# Patient Record
Sex: FEMALE | Race: WHITE | NOT HISPANIC OR LATINO | Employment: OTHER | URBAN - METROPOLITAN AREA
[De-identification: names, ages, dates, MRNs, and addresses within clinical notes are randomized per-mention and may not be internally consistent; named-entity substitution may affect disease eponyms.]

---

## 2017-02-07 DIAGNOSIS — I71.20 THORACIC AORTIC ANEURYSM WITHOUT RUPTURE (HCC): Primary | ICD-10-CM

## 2017-02-15 ENCOUNTER — HOSPITAL ENCOUNTER (OUTPATIENT)
Dept: CT IMAGING | Facility: HOSPITAL | Age: 72
Discharge: HOME | End: 2017-02-15
Admitting: PHYSICIAN ASSISTANT

## 2017-02-15 DIAGNOSIS — I71.20 THORACIC AORTIC ANEURYSM WITHOUT RUPTURE (HCC): ICD-10-CM

## 2017-02-15 PROCEDURE — 0 IOPAMIDOL PER 1 ML: Performed by: PHYSICIAN ASSISTANT

## 2017-02-15 PROCEDURE — 82565 ASSAY OF CREATININE: CPT

## 2017-02-15 PROCEDURE — 71275 CT ANGIOGRAPHY CHEST: CPT

## 2017-02-15 RX ADMIN — IOPAMIDOL 85 ML: 755 INJECTION, SOLUTION INTRAVENOUS at 13:14

## 2017-02-16 LAB — CREAT BLDA-MCNC: 0.8 MG/DL (ref 0.6–1.3)

## 2017-04-13 ENCOUNTER — OFFICE VISIT (OUTPATIENT)
Dept: CARDIAC SURGERY | Facility: CLINIC | Age: 72
End: 2017-04-13

## 2017-04-13 VITALS
HEIGHT: 65 IN | WEIGHT: 188 LBS | HEART RATE: 76 BPM | DIASTOLIC BLOOD PRESSURE: 86 MMHG | BODY MASS INDEX: 31.32 KG/M2 | SYSTOLIC BLOOD PRESSURE: 126 MMHG

## 2017-04-13 DIAGNOSIS — I71.20 THORACIC AORTIC ANEURYSM WITHOUT RUPTURE (HCC): Primary | ICD-10-CM

## 2017-04-13 PROCEDURE — 99214 OFFICE O/P EST MOD 30 MIN: CPT | Performed by: THORACIC SURGERY (CARDIOTHORACIC VASCULAR SURGERY)

## 2017-04-13 RX ORDER — MELOXICAM 7.5 MG/1
7.5 TABLET ORAL DAILY
Refills: 0 | COMMUNITY
Start: 2017-03-15 | End: 2019-04-30 | Stop reason: SDUPTHER

## 2017-04-13 NOTE — PROGRESS NOTES
04/13/2017  Patient Information  Lucie Michele                                                                                          [address]  [place] [address]   1945  'PCP/Referring Physician'  Giovanni Lee MD  [phone]  No ref. provider found    Chief Complaint   Patient presents with   • Follow-up     1 year follow up with CTA chest.   • Thoracic Aneurysm       History of Present Illness:   Patient is a fairly well known to me.  I have in years past performed an extensive Bentall procedure with aortic root reconstruction and repair of an ascending aortic aneurysm.  She had done well following that but developed, from where she had significantly elevated INR and supratherapeutic Coumadin levels, a small perigraft hematoma which has been completely stable for a number of years.  We have followed her with a repeat CT scan this year and she is here to discuss results of this.  She has no anginal symptoms.  She does get somewhat short of breath walking on an incline but has no other issues and has not been hospitalized in the year since I have seen her.      Patient Active Problem List   Diagnosis   • Meningioma   • Trigeminal neuralgia   • Depression   • Hypertonicity of bladder   • Hyperlipidemia   • Hypertension   • Osteoarthritis   • Osteoporosis   • Vascular disorder   • Sleep apnea   • Disorder of thyroid   • Thoracic aortic aneurysm without rupture     Past Medical History:   Diagnosis Date   • Aneurysm    • Arthritis    • Depression    • Disease of thyroid gland    • Hyperlipidemia    • Meningioma    • Osteoporosis    • Pseudoaneurysm    • Sleep apnea    • Trigeminal neuralgia      Past Surgical History:   Procedure Laterality Date   • ABDOMINAL SURGERY      tumor removed from ovary   • AORTIC VALVE REPAIR/REPLACEMENT     • ARTERIAL ANEURYSM REPAIR     • HYSTERECTOMY     • THYROID SURGERY     • TONSILLECTOMY AND ADENOIDECTOMY         Current Outpatient Prescriptions:   •  Ascorbic  Acid (VITAMIN C) 500 MG capsule, Take 1 capsule by mouth daily., Disp: , Rfl:   •  aspirin 81 MG EC tablet, Take 1 tablet by mouth daily., Disp: , Rfl:   •  atorvastatin (LIPITOR) 20 MG tablet, Take 1 tablet by mouth daily., Disp: , Rfl:   •  B Complex Vitamins (VITAMIN B COMPLEX) tablet, Take 1 tablet by mouth daily., Disp: , Rfl:   •  carbonyl iron (FEOSOL) 45 MG tablet tablet, Take 1 tablet by mouth daily., Disp: , Rfl:   •  cetirizine (ZyrTEC) 10 MG tablet, Take 1 tablet by mouth daily., Disp: , Rfl:   •  Cinnamon 500 MG capsule, Take 1 capsule by mouth daily., Disp: , Rfl:   •  Coenzyme Q10 (CO Q 10) 10 MG capsule, Take 1 capsule by mouth daily., Disp: , Rfl:   •  Digestive Enzymes (FIBERZYME CONCENTRATE-HP PO), Take 1 tablet by mouth daily., Disp: , Rfl:   •  furosemide (LASIX) 40 MG tablet, Take 1 tablet by mouth daily., Disp: , Rfl:   •  Glucosamine-Chondroit-Vit C-Mn (GLUCOSAMINE-CHONDROITIN) tablet, Take 1 tablet by mouth daily., Disp: , Rfl:   •  levothyroxine (SYNTHROID, LEVOTHROID) 112 MCG tablet, Take 1 tablet by mouth daily., Disp: , Rfl:   •  Lysine 500 MG capsule, Take 1 tablet by mouth daily., Disp: , Rfl:   •  meloxicam (MOBIC) 7.5 MG tablet, Take 7.5 mg by mouth Daily., Disp: , Rfl: 0  •  metoprolol succinate XL (TOPROL-XL) 50 MG 24 hr tablet, Take 1.5 tablets by mouth daily. 1 TABLET AM .5 IN THE PM, Disp: , Rfl:   •  Multiple Vitamins-Minerals (MULTIVITAMIN ADULT PO), Take 1 tablet by mouth daily., Disp: , Rfl:   •  omeprazole (PriLOSEC) 20 MG capsule, Take 1 capsule by mouth daily., Disp: , Rfl:   •  OXcarbazepine (TRILEPTAL) 150 MG tablet, TAKE 1 TABLET BY MOUTH THREE TIMES DAILY, Disp: 90 tablet, Rfl: 0  •  oxybutynin (DITROPAN) 5 MG tablet, Take 0.5 tablets by mouth 2 (two) times a day., Disp: , Rfl:   •  potassium chloride (K-DUR,KLOR-CON) 10 MEQ CR tablet, Take 1 tablet by mouth daily., Disp: , Rfl:   •  PROAIR  (90 BASE) MCG/ACT inhaler, INHALE 1 OR 2 PUFFS INTO THE LUNGS Q 4 TO 6  HOURS PRN, Disp: , Rfl: 1  •  SPIRIVA HANDIHALER 18 MCG per inhalation capsule, INHALE CONTENTS OF 1 C ONCE D, Disp: , Rfl: 5  •  Vitamin E 400 UNITS tablet, Take 1 tablet by mouth daily., Disp: , Rfl:   •  warfarin (COUMADIN) 2 MG tablet, Take 1 tablet by mouth daily. Monday,WEDNESDAY AND Friday ADD .5, Disp: , Rfl:   Allergies   Allergen Reactions   • Sulfa Antibiotics Hives     Social History     Social History   • Marital status:      Spouse name: N/A   • Number of children: N/A   • Years of education: N/A     Occupational History   • Not on file.     Social History Main Topics   • Smoking status: Former Smoker     Packs/day: 3.00     Years: 4.00     Types: Cigarettes     Quit date: 1968   • Smokeless tobacco: Never Used      Comment: up to 3 ppd   • Alcohol use Yes      Comment: 1 glass of wine a week   • Drug use: No   • Sexual activity: Not on file     Other Topics Concern   • Not on file     Social History Narrative   • No narrative on file     Family History   Problem Relation Age of Onset   • Breast cancer Mother    • COPD Mother    • Heart failure Mother    • Cancer Mother    • Arthritis Mother    • Kidney disease Mother    • Coronary artery disease Father    • Heart attack Father    • COPD Father      Review of Systems   Constitution: Positive for malaise/fatigue and weight loss. Negative for chills, fever and night sweats.   HENT: Negative.  Negative for headaches, hearing loss, odynophagia and sore throat.    Eyes: Negative.    Cardiovascular: Positive for dyspnea on exertion and leg swelling. Negative for chest pain, orthopnea and palpitations.   Respiratory: Positive for shortness of breath. Negative for cough and hemoptysis.    Endocrine: Negative.  Negative for cold intolerance, heat intolerance, polydipsia, polyphagia and polyuria.   Hematologic/Lymphatic: Bruises/bleeds easily.   Skin: Negative.  Negative for itching and rash.   Musculoskeletal: Positive for arthritis and joint pain.  "Negative for joint swelling and myalgias.   Gastrointestinal: Positive for diarrhea. Negative for abdominal pain, constipation, hematemesis, hematochezia, melena, nausea and vomiting.   Genitourinary: Positive for frequency. Negative for dysuria and hematuria.   Neurological: Positive for dizziness and light-headedness. Negative for focal weakness, numbness and seizures.   Psychiatric/Behavioral: Positive for depression. Negative for suicidal ideas.   Allergic/Immunologic: Negative.    All other systems reviewed and are negative.    Vitals:    04/13/17 0959   BP: 126/86   Pulse: 76   Weight: 188 lb (85.3 kg)   Height: 65\" (165.1 cm)      Physical Exam   CONSTITUTIONAL: Alert and conversant, Well dressed, Well nourished, No acute distress  EYES: Sclera clean, Anicteric, Pupils equal  ENT: No nasal deviation, Trachea midline  NECK: No neck masses, Supple  LUNGS: No wheezing, Cough, non-congested  HEART: No rubs, murmur of her prosthetic aortic valve ABDOMEN: Soft, non-distended, No masses, Non tender  to palpation  NEURO: No motor deficits, No sensory deficits, Cranial Nerves 2 through 12 grossly intact  PSYCHIATRIC: Oriented to person, place and time, No memory deficits, Mood appropriate  VASCULAR: No carotid bruits, Posterior tibial pulses palpable bilaterally, Femoral pulses palpable and symmetric    Labs/Imaging:   I reviewed the CT scan report and images.    Assessment/Plan:   The patient well known to me status post Bentall procedure.  A number of years ago she developed a small perigraft hematoma secondary to supratherapeutic Coumadin levels.  Now her Coumadin levels have been well-controlled there has been no further change.  The entire appearance of her CT scan has been unchanged now had a number of years.  At this point, we will maximize her surveillance interval and check her in 18 months rather than 1 year in order to minimize contrast and radiation exposure.  She is agreeable to that plan.      Patient " Active Problem List   Diagnosis   • Meningioma   • Trigeminal neuralgia   • Depression   • Hypertonicity of bladder   • Hyperlipidemia   • Hypertension   • Osteoarthritis   • Osteoporosis   • Vascular disorder   • Sleep apnea   • Disorder of thyroid   • Thoracic aortic aneurysm without rupture     Signed by: Korey Hollis M.D.    4/13/2017    CC:  MD Alexa Hernandez, , editing for Korey Hollis M.D.    I, Korey Hollis, have read and agree with the editing done by Vivian Crawford, .

## 2017-04-17 ENCOUNTER — OFFICE VISIT (OUTPATIENT)
Dept: NEUROLOGY | Facility: CLINIC | Age: 72
End: 2017-04-17

## 2017-04-17 VITALS
DIASTOLIC BLOOD PRESSURE: 84 MMHG | OXYGEN SATURATION: 98 % | HEART RATE: 75 BPM | WEIGHT: 190 LBS | HEIGHT: 65 IN | SYSTOLIC BLOOD PRESSURE: 128 MMHG | BODY MASS INDEX: 31.65 KG/M2

## 2017-04-17 DIAGNOSIS — G50.0 TRIGEMINAL NEURALGIA: Primary | ICD-10-CM

## 2017-04-17 PROCEDURE — 99212 OFFICE O/P EST SF 10 MIN: CPT | Performed by: NURSE PRACTITIONER

## 2017-04-17 RX ORDER — OXCARBAZEPINE 150 MG/1
150 TABLET, FILM COATED ORAL 3 TIMES DAILY
Qty: 270 TABLET | Refills: 3 | Status: SHIPPED | OUTPATIENT
Start: 2017-04-17 | End: 2018-04-17 | Stop reason: SDUPTHER

## 2017-04-17 NOTE — PROGRESS NOTES
Subjective:     Patient ID: Lucie Michele is a 71 y.o. female presenting for follow up for glossopharyngeal neuralgia. She has a history of a cavernous sinus meningioma with stereotactic radiation an dis followed by dr. Golden. She describes symptoms of a shocking sensation beginning in the midline of her throat and shooting up to the right side of her tongue, then into the right ear. She has been on oxcarbazepine 150 mg three times daily for several years and states that as long as she takes the medication she is free of the glossopharyngeal neuralgia symptoms. She denies adverse side effects to the medication.     Trigeminal Neuralgia   Pertinent negatives include no abdominal pain, chest pain, fatigue, headaches, nausea, neck pain, numbness, vomiting or weakness.     The following portions of the patient's history were reviewed and updated as appropriate: allergies, current medications, past family history, past medical history, past social history, past surgical history and problem list.    Review of Systems   Constitutional: Negative for fatigue.   HENT: Negative for trouble swallowing.    Eyes: Negative for photophobia, pain and visual disturbance.   Respiratory: Positive for shortness of breath. Negative for choking and chest tightness.    Cardiovascular: Negative for chest pain and leg swelling.   Gastrointestinal: Negative for abdominal pain, nausea and vomiting.   Musculoskeletal: Negative for back pain, gait problem and neck pain.   Neurological: Negative for dizziness, tremors, seizures, syncope, facial asymmetry, speech difficulty, weakness, light-headedness, numbness and headaches.   Hematological: Negative for adenopathy. Bruises/bleeds easily.   Psychiatric/Behavioral: Negative for agitation, behavioral problems, confusion, decreased concentration, dysphoric mood, hallucinations, self-injury, sleep disturbance and suicidal ideas. The patient is not nervous/anxious and is not hyperactive.          Objective:    The following exam was performed today and there have been no changes since her last visit.     Neurologic Exam  General: Well nourished, well developed, and in no acute distress.  HEENT: Normocephalic/atraumatic. Mucous membranes moist. Sclerae anicteric. Cervical range of motion is decreased on head turn to left, full to right. In primary seated position there is mild cervical asymmetry and shoulder height asymmetry. L shoulder is elevated compared to right and left levator and upper margin of trapezius are more prominent.  Skin: No notable rashes or lesions on the visible surfaces.    Extremities: No clubbing, cyanosis or significant edema.    Psychiatric: Pleasant, cooperative, and appropriate.    Neurologic Exam:  Mental Status: Alert and oriented. Speech is fluent. Comprehension is intact.    Cranial Nerves II-XII: Pupils are equal. Extraocular movements are full and conjugate. No nystagmus noted.  Hearing is intact to conversational tones. Facial strength is preserved/symmetric. Voice non-hoarse, non-dysarthric.    Motor: Normal bulk. No focal or lateralizing deficits grossly. There are no abnormal or involuntary movements noted.    Coordination: grossly intact.  Gait: Normal. No ataxia or apraxia.   Physical Exam    Assessment/Plan:     Lucie was seen today for trigeminal neuralgia.    Diagnoses and all orders for this visit:    Trigeminal neuralgia       Glossopharyngeal Neuralgia, well controlled on Trileptal 150 mg TID. Medication renewed without changes. Refills for 1 year provided. She will follow up with Dr. Golden regarding meningioma surveillance. She will f/u with me yearly regarding neuralgia but is to contact me if any pain flair occurs in the interim.

## 2018-01-30 ENCOUNTER — TELEPHONE (OUTPATIENT)
Dept: NEUROSURGERY | Facility: CLINIC | Age: 73
End: 2018-01-30

## 2018-01-30 DIAGNOSIS — D32.9 MENINGIOMA (HCC): Primary | ICD-10-CM

## 2018-01-30 NOTE — TELEPHONE ENCOUNTER
Order in. Please schedule at least 10 days prior to appt with KK and mail out new patient pw (last OV is converted).

## 2018-01-30 NOTE — TELEPHONE ENCOUNTER
Patient was last seen 4 yrs ago for meningioma by Dr Golden.  Pt called to schedule her appt and MRI appt.  Pt is scheduled to see KK on 3/2   Need an order for her MRI please

## 2018-02-16 ENCOUNTER — HOSPITAL ENCOUNTER (OUTPATIENT)
Dept: MRI IMAGING | Facility: HOSPITAL | Age: 73
Discharge: HOME | End: 2018-02-16
Admitting: NURSE PRACTITIONER

## 2018-02-16 DIAGNOSIS — D32.9 MENINGIOMA (HCC): ICD-10-CM

## 2018-02-16 PROCEDURE — 0 GADOBENATE DIMEGLUMINE 529 MG/ML SOLUTION: Performed by: NURSE PRACTITIONER

## 2018-02-16 PROCEDURE — 82565 ASSAY OF CREATININE: CPT

## 2018-02-16 PROCEDURE — 70553 MRI BRAIN STEM W/O & W/DYE: CPT

## 2018-02-16 PROCEDURE — A9577 INJ MULTIHANCE: HCPCS | Performed by: NURSE PRACTITIONER

## 2018-02-16 RX ADMIN — GADOBENATE DIMEGLUMINE 18 ML: 529 INJECTION, SOLUTION INTRAVENOUS at 12:42

## 2018-02-19 LAB — CREAT BLDA-MCNC: 0.9 MG/DL (ref 0.6–1.3)

## 2018-03-02 ENCOUNTER — OFFICE VISIT (OUTPATIENT)
Dept: NEUROSURGERY | Facility: CLINIC | Age: 73
End: 2018-03-02

## 2018-03-02 VITALS
HEIGHT: 66 IN | DIASTOLIC BLOOD PRESSURE: 82 MMHG | WEIGHT: 193 LBS | RESPIRATION RATE: 18 BRPM | HEART RATE: 78 BPM | SYSTOLIC BLOOD PRESSURE: 144 MMHG | BODY MASS INDEX: 31.02 KG/M2

## 2018-03-02 DIAGNOSIS — D49.6 CAVERNOUS SINUS TUMOR (HCC): Primary | ICD-10-CM

## 2018-03-02 DIAGNOSIS — G50.0 TRIGEMINAL NEURALGIA: ICD-10-CM

## 2018-03-02 PROCEDURE — 99203 OFFICE O/P NEW LOW 30 MIN: CPT | Performed by: NURSE PRACTITIONER

## 2018-03-02 RX ORDER — MULTIVIT-MIN/IRON/FOLIC ACID/K 18-600-40
CAPSULE ORAL
COMMUNITY
End: 2018-04-24

## 2018-03-02 NOTE — PROGRESS NOTES
"Subjective   Patient ID: Lucie Michele is a 72 y.o. female is here today for follow-up meningioma. Patient presents unaccompanied.     History of Present Illness   Patient presents for follow-up of right cavernous sinus meningioma.  She is status post stereotactic radiosurgery in December 2000.  She's been followed with iterative imaging since that time.  Her last visit was 4 years ago.  Since then she has had repeat MRI of the brain.    She denies any headaches or seizures. Vision has improved! She has history of trigeminal neuralgia with facial tics but no pain. She remains on Trileptal and follows yearly with neurology.    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 During repeat cardiac cath it was not found so no surgery required. She has been monitored closely since. She has been on Coumadin since 2011 due to AVR and artifical aortic root. She reports a fall on black ice approximately 1 month ago. She landed on hip. She did not have head injury but she did have CTH that was \"negative\". Her INR was elevated so she was given Vit K and they are still working on getting it into therapeutic range. She denies any sudden diet changes.     The following portions of the patient's history were reviewed and updated as appropriate: allergies, current medications, past family history, past medical history, past social history, past surgical history and problem list.    Review of Systems   HENT: Positive for tinnitus (R ear vibration feeling when meds not on time).    Eyes: Positive for visual disturbance (floaters ).   Musculoskeletal: Negative for gait problem.   Neurological: Negative for dizziness, tremors, seizures, syncope, speech difficulty, weakness, light-headedness, numbness and headaches.   Psychiatric/Behavioral: Negative for confusion and decreased concentration.       Objective   Physical Exam   Constitutional: She is oriented to person, place, and time. She appears " well-developed and well-nourished.   Body mass index is 31.63 kg/(m^2).     Pulmonary/Chest: Effort normal.   Neurological: She is alert and oriented to person, place, and time. She has a normal Finger-Nose-Finger Test, a normal Romberg Test and a normal Tandem Gait Test. Gait normal.   Skin: Skin is warm and dry.   Psychiatric: She has a normal mood and affect. Her speech is normal and behavior is normal. Judgment normal.   Vitals reviewed.    Neurologic Exam     Mental Status   Oriented to person, place, and time.   Follows 3 step commands.   Attention: normal. Concentration: normal.   Speech: speech is normal   Level of consciousness: alert  Knowledge: good.   Normal comprehension.     Cranial Nerves   Cranial nerves II through XII intact.     Motor Exam   Right arm pronator drift: absent  Left arm pronator drift: absent    Gait, Coordination, and Reflexes     Gait  Gait: normal    Coordination   Romberg: negative  Finger to nose coordination: normal  Tandem walking coordination: normal      Assessment/Plan   Independent Review of Radiographic Studies:    MRI [facility] dated February 16, 2018 was reviewed with Dr. Golden.  This reveals stable findings as compared with the prior study.  There is a right cavernous sinus T1 and T2 hypointense enhancing mass that is unchanged.    Medical Decision Making:    Patient presents for ongoing follow-up of right cavernous sinus lesion consistent with meningioma.  She is status post stereotactic radiosurgery 17 years ago.  Her MRIs of been stable since that time.  Her most recent MRI reveals continued stability of the residual lesion.  No additional lesions seen.  Her exam is as noted above with no neurologic red flags.  She reports having hearing test last year that was normal.  We will plan to see her back in 5 years with repeat MRI brain.  She'll contact sooner with any concerns.  She will continue follow-ups with neurology with regard to her  trigeminal neuralgia that is well controlled on Trileptal.    Plan: Return to office 5 years with MRI brain prior    Lucie was seen today for brain tumor.    Diagnoses and all orders for this visit:    Cavernous sinus tumor  -     Cancel: MRI Brain With & Without Contrast; Future  -     MRI Brain With & Without Contrast; Future    Trigeminal neuralgia  -     Cancel: MRI Brain With & Without Contrast; Future  -     MRI Brain With & Without Contrast; Future      Return in about 5 years (around 3/2/2023) for Follow-up with NP, with imaging.

## 2018-04-17 DIAGNOSIS — G50.0 TRIGEMINAL NEURALGIA: ICD-10-CM

## 2018-04-17 RX ORDER — OXCARBAZEPINE 150 MG/1
TABLET, FILM COATED ORAL
Qty: 270 TABLET | Refills: 3 | Status: SHIPPED | OUTPATIENT
Start: 2018-04-17 | End: 2018-04-24 | Stop reason: SDUPTHER

## 2018-04-24 ENCOUNTER — OFFICE VISIT (OUTPATIENT)
Dept: NEUROLOGY | Facility: CLINIC | Age: 73
End: 2018-04-24

## 2018-04-24 VITALS
WEIGHT: 199 LBS | SYSTOLIC BLOOD PRESSURE: 140 MMHG | HEIGHT: 65 IN | OXYGEN SATURATION: 98 % | HEART RATE: 74 BPM | DIASTOLIC BLOOD PRESSURE: 86 MMHG | BODY MASS INDEX: 33.15 KG/M2

## 2018-04-24 DIAGNOSIS — G50.0 TRIGEMINAL NEURALGIA: ICD-10-CM

## 2018-04-24 DIAGNOSIS — G52.1 GLOSSOPHARYNGEAL NEURALGIA: Primary | ICD-10-CM

## 2018-04-24 PROCEDURE — 99212 OFFICE O/P EST SF 10 MIN: CPT | Performed by: NURSE PRACTITIONER

## 2018-04-24 RX ORDER — OXCARBAZEPINE 150 MG/1
150 TABLET, FILM COATED ORAL 3 TIMES DAILY
Qty: 270 TABLET | Refills: 3 | Status: SHIPPED | OUTPATIENT
Start: 2018-04-24 | End: 2019-04-30 | Stop reason: SDUPTHER

## 2018-04-24 NOTE — PROGRESS NOTES
Subjective:     Patient ID: Lucie Michele is a 72 y.o. female presenting for follow up for glossopharyngeal neuralgia. She has a history of a cavernous sinus meningioma with stereotactic radiation and is followed by Dr. Golden. She describes symptoms of a shocking sensation beginning in the midline of her throat and shooting up to the right side of her tongue, then into the right ear. She has been on oxcarbazepine 150 mg three times daily for several years and states that as long as she takes the medication she is free of the glossopharyngeal neuralgia symptoms. She denies adverse side effects to the medication.     Trigeminal Neuralgia   Pertinent negatives include no abdominal pain, chest pain, fatigue, headaches, nausea, neck pain, numbness, vomiting or weakness.     The following portions of the patient's history were reviewed and updated as appropriate: allergies, current medications, past family history, past medical history, past social history, past surgical history and problem list.    Review of Systems   Constitutional: Negative for activity change, appetite change and fatigue.   HENT: Negative for facial swelling, hearing loss and trouble swallowing.    Eyes: Negative for photophobia, pain and visual disturbance.   Respiratory: Positive for shortness of breath. Negative for choking and chest tightness.    Cardiovascular: Positive for leg swelling. Negative for chest pain and palpitations.   Gastrointestinal: Negative for abdominal pain, nausea and vomiting.   Endocrine: Negative for polydipsia and polyphagia.   Musculoskeletal: Negative for back pain, gait problem and neck pain.   Neurological: Negative for dizziness, tremors, seizures, syncope, facial asymmetry, speech difficulty, weakness, light-headedness, numbness and headaches.   Hematological: Negative for adenopathy. Bruises/bleeds easily.   Psychiatric/Behavioral: Negative for agitation, behavioral problems, confusion, decreased concentration,  dysphoric mood, hallucinations, self-injury, sleep disturbance and suicidal ideas. The patient is not nervous/anxious and is not hyperactive.         Objective:    Neurologic Exam  General: Well nourished, well developed, and in no acute distress.  HEENT: Normocephalic/atraumatic. Mucous membranes moist. Sclerae anicteric. Cervical range of motion is decreased on head turn to left, full to right. In primary seated position there is mild cervical asymmetry and shoulder height asymmetry. L shoulder is elevated compared to right and left levator and upper margin of trapezius are more prominent.  Skin: No notable rashes or lesions on the visible surfaces.    Extremities: No clubbing, cyanosis or significant edema.    Psychiatric: Pleasant, cooperative, and appropriate.    Neurologic Exam:  Mental Status: Alert and oriented. Speech is fluent. Comprehension is intact.    Cranial Nerves II-XII: Pupils are equal. Extraocular movements are full and conjugate. No nystagmus noted.  Hearing is intact to conversational tones. Facial strength is preserved/symmetric. Voice non-hoarse, non-dysarthric.    Motor: Normal bulk. No focal or lateralizing deficits grossly. There are no abnormal or involuntary movements noted.    Coordination: grossly intact.  Gait: Normal. No ataxia or apraxia.     Physical Exam    Assessment/Plan:     Lucie was seen today for trigeminal neuralgia.    Diagnoses and all orders for this visit:    Glossopharyngeal neuralgia    Trigeminal neuralgia  -     OXcarbazepine (TRILEPTAL) 150 MG tablet; Take 1 tablet by mouth 3 (Three) Times a Day.       Glossopharyngeal Neuralgia, well controlled on Trileptal 150 mg TID. Medication renewed without changes. Refills for 1 year provided. She will follow up with Dr. Golden regarding meningioma surveillance. She will f/u with me yearly regarding neuralgia but is to contact me if any pain flair occurs in the interim.

## 2018-09-17 ENCOUNTER — TELEPHONE (OUTPATIENT)
Dept: CARDIAC SURGERY | Facility: CLINIC | Age: 73
End: 2018-09-17

## 2018-09-17 NOTE — TELEPHONE ENCOUNTER
PT RECEIVED RECALL LETTER PER ROM IN [place] FOR 18 MON W/ CTA ABD @ [facility], PT VERIFIED PHONE NUMBER, ADDRESS, AND INSURANCE

## 2018-09-18 DIAGNOSIS — I71.20 THORACIC AORTIC ANEURYSM WITHOUT RUPTURE (HCC): Primary | ICD-10-CM

## 2018-09-24 ENCOUNTER — HOSPITAL ENCOUNTER (OUTPATIENT)
Dept: CT IMAGING | Facility: HOSPITAL | Age: 73
Discharge: HOME | End: 2018-09-24
Admitting: PHYSICIAN ASSISTANT

## 2018-09-24 DIAGNOSIS — I71.20 THORACIC AORTIC ANEURYSM WITHOUT RUPTURE (HCC): ICD-10-CM

## 2018-09-24 PROCEDURE — 82565 ASSAY OF CREATININE: CPT

## 2018-09-24 PROCEDURE — 0 IOPAMIDOL PER 1 ML: Performed by: PHYSICIAN ASSISTANT

## 2018-09-24 PROCEDURE — 71275 CT ANGIOGRAPHY CHEST: CPT

## 2018-09-24 RX ADMIN — IOPAMIDOL 80 ML: 755 INJECTION, SOLUTION INTRAVENOUS at 13:48

## 2018-09-25 LAB — CREAT BLDA-MCNC: 1 MG/DL (ref 0.6–1.3)

## 2018-11-08 ENCOUNTER — OFFICE VISIT (OUTPATIENT)
Dept: CARDIAC SURGERY | Facility: CLINIC | Age: 73
End: 2018-11-08

## 2018-11-08 VITALS
HEIGHT: 65 IN | WEIGHT: 186 LBS | HEART RATE: 79 BPM | BODY MASS INDEX: 30.99 KG/M2 | DIASTOLIC BLOOD PRESSURE: 70 MMHG | OXYGEN SATURATION: 98 % | SYSTOLIC BLOOD PRESSURE: 130 MMHG

## 2018-11-08 DIAGNOSIS — I71.20 THORACIC AORTIC ANEURYSM WITHOUT RUPTURE (HCC): Primary | ICD-10-CM

## 2018-11-08 PROCEDURE — 99214 OFFICE O/P EST MOD 30 MIN: CPT | Performed by: THORACIC SURGERY (CARDIOTHORACIC VASCULAR SURGERY)

## 2018-11-08 RX ORDER — ERGOCALCIFEROL 1.25 MG/1
50000 CAPSULE ORAL WEEKLY
COMMUNITY

## 2018-11-08 RX ORDER — ALBUTEROL SULFATE 90 UG/1
2 AEROSOL, METERED RESPIRATORY (INHALATION) EVERY 4 HOURS PRN
COMMUNITY
End: 2022-04-14

## 2018-11-08 NOTE — PROGRESS NOTES
11/08/2018  Patient Information  Lucie Michele                                                                                          [address] [facility] [place] [address]   1945  'PCP/Referring Physician'  Giovanni Lee MD  [phone]  No ref. provider found    Chief Complaint   Patient presents with   • Follow-up     18 MO FU with CTA Chest for Thoracic Aneurysm   • Thoracic Aneurysm       History of Present Illness:  This is a very pleasant lady on whom I performed an extensive Bentall procedure with replacement of ascending aortic aneurysm.  This was all done in May, 2011.  She had an episode where her Coumadin became supratherapeutic and she had an intrathoracic hematoma for which we performed an aortogram as well as CT angiograms and this appeared stable and she did not require further surgery.  We have been following her with serial scans since that time and they have all been normal.  It has now been 18 months since her previous scan and she returns to see me today.  She has no unusual chest, back or flank or sternal pain.  She is actually staying quite active and her Coumadin level is much better controlled.      Patient Active Problem List   Diagnosis   • Cavernous sinus tumor (CMS/HCC)   • Trigeminal neuralgia   • Depression   • Hypertonicity of bladder   • Hyperlipidemia   • Hypertension   • Osteoarthritis   • Osteoporosis   • Vascular disorder   • Sleep apnea   • Disorder of thyroid   • Thoracic aortic aneurysm without rupture (CMS/HCC)     Past Medical History:   Diagnosis Date   • Abnormality of heart valve    • Aneurysm (CMS/HCC)     aortic   • Arthritis    • Broken bones     pelvis   • Depression    • Disease of thyroid gland    • Hyperlipidemia    • Meningioma (CMS/HCC)     NOT cancer, meningioma   • Osteoporosis    • Pseudoaneurysm (CMS/HCC)    • Raynaud disease    • Sleep apnea     CPAP   • Trigeminal neuralgia      Past Surgical History:   Procedure Laterality Date   • ABDOMINAL  SURGERY      tumor removed from ovary   • AORTIC VALVE REPAIR/REPLACEMENT     • ARTERIAL ANEURYSM REPAIR     • HYSTERECTOMY     • THYROID SURGERY     • TONSILLECTOMY AND ADENOIDECTOMY         Current Outpatient Prescriptions:   •  albuterol (PROAIR HFA) 108 (90 Base) MCG/ACT inhaler, Inhale 2 puffs Every 4 (Four) Hours As Needed for Wheezing., Disp: , Rfl:   •  Ascorbic Acid (VITAMIN C) 500 MG capsule, Take 1 capsule by mouth daily., Disp: , Rfl:   •  aspirin 81 MG EC tablet, Take 1 tablet by mouth daily., Disp: , Rfl:   •  atorvastatin (LIPITOR) 20 MG tablet, Take 1 tablet by mouth daily., Disp: , Rfl:   •  B Complex Vitamins (VITAMIN B COMPLEX) tablet, Take 1 tablet by mouth daily., Disp: , Rfl:   •  carbonyl iron (FEOSOL) 45 MG tablet tablet, Take 1 tablet by mouth daily., Disp: , Rfl:   •  cetirizine (ZyrTEC) 10 MG tablet, Take 1 tablet by mouth daily., Disp: , Rfl:   •  Cinnamon 500 MG capsule, Take 1 capsule by mouth daily., Disp: , Rfl:   •  Digestive Enzymes (FIBERZYME CONCENTRATE-HP PO), Take 1 tablet by mouth daily., Disp: , Rfl:   •  furosemide (LASIX) 40 MG tablet, Take 1 tablet by mouth daily., Disp: , Rfl:   •  Glucosamine-Chondroit-Vit C-Mn (GLUCOSAMINE 1500 COMPLEX PO), Take  by mouth., Disp: , Rfl:   •  GLUCOSAMINE-CHONDROITIN DS PO, Take  by mouth 2 (Two) Times a Day. 1500 mg, Disp: , Rfl:   •  levothyroxine (SYNTHROID, LEVOTHROID) 112 MCG tablet, Take 1 tablet by mouth daily., Disp: , Rfl:   •  Lysine 500 MG capsule, Take 1 tablet by mouth daily., Disp: , Rfl:   •  meloxicam (MOBIC) 7.5 MG tablet, Take 7.5 mg by mouth Daily., Disp: , Rfl: 0  •  metoprolol succinate XL (TOPROL-XL) 50 MG 24 hr tablet, Take 100 mg by mouth Daily. 1 TABLET AM .5 IN THE PM, Disp: , Rfl:   •  Multiple Vitamins-Minerals (MULTIVITAMIN ADULT PO), Take 1 tablet by mouth daily., Disp: , Rfl:   •  omeprazole (PriLOSEC) 20 MG capsule, Take 1 capsule by mouth daily., Disp: , Rfl:   •  OXcarbazepine (TRILEPTAL) 150 MG tablet,  Take 1 tablet by mouth 3 (Three) Times a Day., Disp: 270 tablet, Rfl: 3  •  oxybutynin (DITROPAN) 5 MG tablet, Take 0.5 tablets by mouth 2 (two) times a day., Disp: , Rfl:   •  potassium chloride (K-DUR,KLOR-CON) 10 MEQ CR tablet, Take 1 tablet by mouth daily., Disp: , Rfl:   •  SPIRIVA HANDIHALER 18 MCG per inhalation capsule, INHALE CONTENTS OF 1 C ONCE D, Disp: , Rfl: 5  •  vitamin D (ERGOCALCIFEROL) 74071 units capsule capsule, Take 50,000 Units by mouth 1 (One) Time Per Week., Disp: , Rfl:   •  Vitamin E 400 UNITS tablet, Take 1 tablet by mouth daily., Disp: , Rfl:   •  warfarin (COUMADIN) 2 MG tablet, Take 1 tablet by mouth Daily. 2 MG M/W/F/S and 3mg T/ Th/ S, Disp: , Rfl:   •  Coenzyme Q10 (CO Q 10) 10 MG capsule, Take 1 capsule by mouth daily., Disp: , Rfl:   Allergies   Allergen Reactions   • Adhesive Tape Itching     Reddening of skin, when younger   • Sulfa Antibiotics Hives     Social History     Social History   • Marital status:      Spouse name: N/A   • Number of children: 2   • Years of education: N/A     Occupational History   • retired      Teacher     Social History Main Topics   • Smoking status: Former Smoker     Packs/day: 3.00     Years: 4.00     Types: Cigarettes     Quit date: 1968   • Smokeless tobacco: Never Used      Comment: up to 3 ppd   • Alcohol use Yes      Comment: 1 glass of wine a week, usually not   • Drug use: No   • Sexual activity: Not on file     Other Topics Concern   • Not on file     Social History Narrative    Lives in [place].      Family History   Problem Relation Age of Onset   • Breast cancer Mother    • COPD Mother    • Heart failure Mother    • Arthritis Mother    • Kidney disease Mother    • Lymphoma Mother    • Coronary artery disease Father    • Heart attack Father    • COPD Father    • Heart disease Father    • Diabetes Maternal Uncle    • Heart disease Maternal Uncle    • Heart disease Maternal Grandfather    • Stroke Paternal Grandmother    • Heart  "disease Paternal Grandfather      Review of Systems   Constitution: Positive for weight loss (By diet). Negative for chills, fever, malaise/fatigue and night sweats.   HENT: Negative for hearing loss, odynophagia and sore throat.    Cardiovascular: Positive for dyspnea on exertion and leg swelling (bilateral). Negative for chest pain, orthopnea and palpitations.   Respiratory: Positive for cough. Negative for hemoptysis.    Endocrine: Negative for cold intolerance, heat intolerance, polydipsia, polyphagia and polyuria.   Hematologic/Lymphatic: Bruises/bleeds easily.   Skin: Negative for itching and rash.   Musculoskeletal: Positive for back pain and joint pain. Negative for joint swelling and myalgias.   Gastrointestinal: Negative for abdominal pain, constipation, diarrhea, hematemesis, hematochezia, melena, nausea and vomiting.   Genitourinary: Negative for dysuria, frequency and hematuria.   Neurological: Negative for focal weakness, headaches, numbness and seizures.   Psychiatric/Behavioral: Negative for suicidal ideas.   All other systems reviewed and are negative.    Vitals:    11/08/18 1232   BP: 130/70   BP Location: Left arm   Patient Position: Sitting   Pulse: 79   SpO2: 98%   Weight: 84.4 kg (186 lb)   Height: 165.1 cm (65\")      Physical Exam  CONSTITUTIONAL:  Alert and conversant, in no acute distress.  EYES:    Eyes anicteric.  EOMI.  PERRLA.  ENT:    No nasal deviation.  Trachea is midline.  NECK:    No masses or JVD.  LUNGS:   Decreased in the bases; no wheezing, rales or rhonchi.  CARDIAC:        Regular rate and rhythm without murmur or rub.  No carotid bruits.  GI:      The abdomen is soft, nontender, nondistended with normal bowel sounds.  No hepatosplenomegaly and no masses.  EXTREMITIES:    No cyanosis, clubbing or edema.  SKIN:    No ulcers.  NEURO:   No focal motor or sensory deficits.  PSYCH:   Alert and oriented x3; mood and affect good.    Labs/Imaging:  I have reviewed the CT scan " demonstrating no intrathoracic abnormality.    Assessment/Plan:  CT scan following her Bentall procedure nearly eight years ago shows no abnormalities.  This scan was compared to the scan of February, 2017, and it is completely stable.  At this point, I have made arrangements for a surveillance to be done two years from now.  She is agreeable to that plan.    Patient Active Problem List   Diagnosis   • Cavernous sinus tumor (CMS/HCC)   • Trigeminal neuralgia   • Depression   • Hypertonicity of bladder   • Hyperlipidemia   • Hypertension   • Osteoarthritis   • Osteoporosis   • Vascular disorder   • Sleep apnea   • Disorder of thyroid   • Thoracic aortic aneurysm without rupture (CMS/HCC)     CC: MD Alexa Hernandez transcribing on behalf of Korey Hollis MD    I, Korey Hollis MD, have read and agree with the editing done by Alexa Crawford, .

## 2019-04-30 ENCOUNTER — OFFICE VISIT (OUTPATIENT)
Dept: NEUROLOGY | Facility: CLINIC | Age: 74
End: 2019-04-30

## 2019-04-30 VITALS
WEIGHT: 191.8 LBS | OXYGEN SATURATION: 84 % | HEIGHT: 65 IN | SYSTOLIC BLOOD PRESSURE: 130 MMHG | HEART RATE: 74 BPM | DIASTOLIC BLOOD PRESSURE: 90 MMHG | BODY MASS INDEX: 31.96 KG/M2

## 2019-04-30 DIAGNOSIS — G50.0 TRIGEMINAL NEURALGIA: Primary | ICD-10-CM

## 2019-04-30 DIAGNOSIS — S04.891D: ICD-10-CM

## 2019-04-30 PROCEDURE — 99212 OFFICE O/P EST SF 10 MIN: CPT | Performed by: NURSE PRACTITIONER

## 2019-04-30 RX ORDER — METOPROLOL SUCCINATE 100 MG/1
TABLET, EXTENDED RELEASE ORAL
COMMUNITY
Start: 2018-11-09 | End: 2021-05-06

## 2019-04-30 RX ORDER — AMPICILLIN TRIHYDRATE 250 MG
CAPSULE ORAL
COMMUNITY
Start: 2018-05-04 | End: 2021-04-29 | Stop reason: SDUPTHER

## 2019-04-30 RX ORDER — HEPATITIS A VACCINE 1440 [IU]/ML
INJECTION, SUSPENSION INTRAMUSCULAR
Refills: 0 | COMMUNITY
Start: 2019-04-10 | End: 2022-11-02

## 2019-04-30 RX ORDER — OXYBUTYNIN CHLORIDE 5 MG/1
TABLET ORAL
COMMUNITY
Start: 2019-03-26 | End: 2019-04-30 | Stop reason: SDUPTHER

## 2019-04-30 RX ORDER — ASCORBIC ACID 100 MG
TABLET,CHEWABLE ORAL
COMMUNITY
Start: 2018-05-04 | End: 2021-04-29 | Stop reason: SDUPTHER

## 2019-04-30 RX ORDER — VITAMIN E 268 MG
180 CAPSULE ORAL DAILY
COMMUNITY
Start: 2018-05-04

## 2019-04-30 RX ORDER — ERGOCALCIFEROL 1.25 MG/1
CAPSULE ORAL
COMMUNITY
Start: 2018-12-26 | End: 2019-04-30 | Stop reason: SDUPTHER

## 2019-04-30 RX ORDER — FUROSEMIDE 40 MG/1
TABLET ORAL
COMMUNITY
Start: 2019-02-01 | End: 2019-04-30 | Stop reason: SDUPTHER

## 2019-04-30 RX ORDER — MELOXICAM 7.5 MG/1
TABLET ORAL
COMMUNITY
Start: 2019-04-02 | End: 2019-04-30 | Stop reason: SDUPTHER

## 2019-04-30 RX ORDER — ATORVASTATIN CALCIUM 20 MG/1
TABLET, FILM COATED ORAL
COMMUNITY
Start: 2018-05-15 | End: 2021-01-15 | Stop reason: SDUPTHER

## 2019-04-30 RX ORDER — CALCIUM POLYCARBOPHIL 625 MG 625 MG/1
TABLET ORAL
COMMUNITY
Start: 2018-05-04

## 2019-04-30 RX ORDER — LEVOTHYROXINE SODIUM 0.12 MG/1
TABLET ORAL
COMMUNITY
Start: 2019-02-15 | End: 2022-07-12

## 2019-04-30 RX ORDER — CHROMIUM 200 MCG
TABLET ORAL
COMMUNITY
Start: 2018-05-04

## 2019-04-30 RX ORDER — OXCARBAZEPINE 150 MG/1
150 TABLET, FILM COATED ORAL 3 TIMES DAILY
Qty: 270 TABLET | Refills: 3 | Status: SHIPPED | OUTPATIENT
Start: 2019-04-30 | End: 2020-04-20

## 2019-04-30 RX ORDER — AMPICILLIN TRIHYDRATE 250 MG
CAPSULE ORAL
COMMUNITY
Start: 2018-05-04

## 2019-04-30 RX ORDER — AMLODIPINE BESYLATE 5 MG/1
TABLET ORAL
COMMUNITY
End: 2021-12-01

## 2019-04-30 RX ORDER — OMEPRAZOLE 20 MG/1
CAPSULE, DELAYED RELEASE ORAL
COMMUNITY
Start: 2019-02-12 | End: 2019-04-30 | Stop reason: SDUPTHER

## 2019-04-30 RX ORDER — POTASSIUM CHLORIDE 750 MG/1
CAPSULE, EXTENDED RELEASE ORAL
COMMUNITY
End: 2019-04-30 | Stop reason: SDUPTHER

## 2019-04-30 RX ORDER — POTASSIUM CHLORIDE 1.5 G/1.77G
POWDER, FOR SOLUTION ORAL
COMMUNITY
End: 2021-12-01

## 2019-04-30 RX ORDER — VALACYCLOVIR HYDROCHLORIDE 1 G/1
TABLET, FILM COATED ORAL
COMMUNITY
End: 2022-12-01

## 2019-04-30 RX ORDER — WARFARIN SODIUM 2 MG/1
TABLET ORAL
COMMUNITY
Start: 2019-03-20 | End: 2019-04-30 | Stop reason: SDUPTHER

## 2019-04-30 NOTE — PROGRESS NOTES
Subjective:     Patient ID: Lucie Michele is a 73 y.o. female presenting for follow up for glossopharyngeal neuralgia. She has a history of a cavernous sinus meningioma with stereotactic radiation and is followed by Dr. Golden. She describes symptoms of a shocking sensation beginning in the midline of her throat and shooting up to the right side of her tongue, then into the right ear. She has been on oxcarbazepine 150 mg three times daily for several years and states that as long as she takes the medication she is free of the glossopharyngeal neuralgia symptoms. She denies adverse side effects to the medication.     History of Present Illness  The following portions of the patient's history were reviewed and updated as appropriate: allergies, current medications, past family history, past medical history, past social history, past surgical history and problem list.    Review of Systems   Constitutional: Negative for activity change, appetite change and fatigue.   HENT: Negative for congestion, sinus pressure, sinus pain, sore throat and tinnitus.    Eyes: Negative for photophobia, redness and visual disturbance.   Respiratory: Positive for shortness of breath. Negative for choking and chest tightness.    Cardiovascular: Positive for leg swelling. Negative for chest pain and palpitations.   Gastrointestinal: Negative for constipation, diarrhea and nausea.   Endocrine: Negative for polydipsia, polyphagia and polyuria.   Genitourinary: Negative for difficulty urinating, frequency and urgency.   Musculoskeletal: Positive for joint swelling. Negative for back pain and gait problem.   Skin: Negative for color change, pallor, rash and wound.   Allergic/Immunologic: Positive for environmental allergies. Negative for food allergies and immunocompromised state.   Neurological: Positive for headaches. Negative for dizziness, tremors, seizures, syncope, facial asymmetry, speech difficulty, weakness, light-headedness and  numbness.   Hematological: Negative for adenopathy. Bruises/bleeds easily.   Psychiatric/Behavioral: Negative for agitation, behavioral problems, confusion, decreased concentration, dysphoric mood, hallucinations, self-injury, sleep disturbance and suicidal ideas. The patient is not nervous/anxious and is not hyperactive.         Objective:    Neurologic Exam  General: Well nourished, well developed, and in no acute distress.  HEENT: Normocephalic/atraumatic. Mucous membranes moist. Sclerae anicteric. Cervical range of motion is decreased on head turn to left, full to right. In primary seated position there is mild cervical asymmetry and shoulder height asymmetry. L shoulder is elevated compared to right and left levator and upper margin of trapezius are more prominent.  Skin: No notable rashes or lesions on the visible surfaces.    Extremities: No clubbing, cyanosis or significant edema.    Psychiatric: Pleasant, cooperative, and appropriate.    Neurologic Exam:  Mental Status: Alert and oriented. Speech is fluent. Comprehension is intact.    Cranial Nerves II-XII: Pupils are equal. Extraocular movements are full and conjugate. No nystagmus noted.  Hearing is intact to conversational tones. Facial strength is preserved/symmetric. Voice non-hoarse, non-dysarthric.    Motor: Normal bulk. No focal or lateralizing deficits grossly. There are no abnormal or involuntary movements noted.    Coordination: grossly intact.  Gait: Normal. No ataxia or apraxia.      Physical Exam    Assessment/Plan:     Lucie was seen today for trigeminal neuralgia.    Diagnoses and all orders for this visit:    Trigeminal neuralgia  -     OXcarbazepine (TRILEPTAL) 150 MG tablet; Take 1 tablet by mouth 3 (Three) Times a Day.    Injury of right glossopharyngeal nerve, subsequent encounter          Glossopharyngeal Neuralgia, well controlled on Trileptal 150 mg TID. Medication renewed without changes. Refills for 1 year provided. She will  follow up with Dr. Golden regarding meningioma surveillance. She will f/u with me yearly regarding neuralgia but is to contact me if any pain flair occurs in the interim.

## 2020-04-19 DIAGNOSIS — G50.0 TRIGEMINAL NEURALGIA: ICD-10-CM

## 2020-04-20 RX ORDER — OXCARBAZEPINE 150 MG/1
TABLET, FILM COATED ORAL
Qty: 270 TABLET | Refills: 0 | Status: SHIPPED | OUTPATIENT
Start: 2020-04-20 | End: 2020-04-28 | Stop reason: SDUPTHER

## 2020-04-28 ENCOUNTER — TELEMEDICINE (OUTPATIENT)
Dept: NEUROLOGY | Facility: CLINIC | Age: 75
End: 2020-04-28

## 2020-04-28 DIAGNOSIS — G50.0 TRIGEMINAL NEURALGIA: ICD-10-CM

## 2020-04-28 DIAGNOSIS — G52.1 GLOSSOPHARYNGEAL NEURALGIA: Primary | ICD-10-CM

## 2020-04-28 PROCEDURE — 99441 PR PHYS/QHP TELEPHONE EVALUATION 5-10 MIN: CPT | Performed by: NURSE PRACTITIONER

## 2020-04-28 RX ORDER — OXCARBAZEPINE 150 MG/1
150 TABLET, FILM COATED ORAL 3 TIMES DAILY
Qty: 270 TABLET | Refills: 3 | Status: SHIPPED | OUTPATIENT
Start: 2020-04-28 | End: 2021-04-29 | Stop reason: SDUPTHER

## 2020-04-28 NOTE — PROGRESS NOTES
Subjective   Lucie Michele is a 74 y.o. female presenting for follow up for glossopharyngeal neuralgia. My last visit with the patient was one year ago.     This visit has been rescheduled as a phone visit to comply with patient safety concerns in accordance with [facility] recommendations. Total time of discussion was 8 minutes. The patient consented to this visit be performed by telephone.     She has a history of a cavernous sinus meningioma with stereotactic radiation and is followed by Dr. Golden. She describes symptoms of a shocking sensation beginning in the midline of her throat and shooting up to the right side of her tongue, then into the right ear. She has been on oxcarbazepine 150 mg three times daily for several years and states that as long as she takes the medication she is free of the glossopharyngeal neuralgia symptoms. She denies adverse side effects to the medication.       History of Present Illness     The following portions of the patient's history were reviewed and updated as appropriate: allergies, current medications, past family history, past medical history, past social history, past surgical history and problem list.    Review of Systems    Objective   Physical Exam  Not performed due to limitations of the evisit.     Assessment/Plan   Diagnoses and all orders for this visit:    Glossopharyngeal neuralgia    Trigeminal neuralgia  -     OXcarbazepine (TRILEPTAL) 150 MG tablet; Take 1 tablet by mouth 3 (Three) Times a Day.      Glossopharyngeal Neuralgia, well controlled on Trileptal 150 mg TID. Medication renewed without changes. Refills for 1 year provided. She will follow up with Dr. Golden regarding meningioma surveillance. She will f/u with me yearly regarding neuralgia but is to contact me if any pain flair occurs in the interim

## 2020-04-30 ENCOUNTER — TELEPHONE (OUTPATIENT)
Dept: NEUROLOGY | Facility: CLINIC | Age: 75
End: 2020-04-30

## 2020-04-30 NOTE — TELEPHONE ENCOUNTER
I have asked and no one in the office has called the pt. No telephone encounters documented of any attempts to call pt. Either. Please advise.

## 2021-01-07 ENCOUNTER — TELEPHONE (OUTPATIENT)
Dept: CARDIAC SURGERY | Facility: CLINIC | Age: 76
End: 2021-01-07

## 2021-01-12 DIAGNOSIS — I71.20 THORACIC AORTIC ANEURYSM WITHOUT RUPTURE (HCC): Primary | ICD-10-CM

## 2021-01-15 ENCOUNTER — CLINICAL SUPPORT (OUTPATIENT)
Dept: CARDIOLOGY | Facility: CLINIC | Age: 76
End: 2021-01-15

## 2021-01-15 DIAGNOSIS — I35.9 AORTIC VALVE DISEASE: Primary | ICD-10-CM

## 2021-01-15 DIAGNOSIS — E78.5 HYPERLIPIDEMIA, UNSPECIFIED HYPERLIPIDEMIA TYPE: Primary | ICD-10-CM

## 2021-01-15 LAB — INR PPP: 4.7 (ref 0.9–1.1)

## 2021-01-15 PROCEDURE — 85610 PROTHROMBIN TIME: CPT | Performed by: INTERNAL MEDICINE

## 2021-01-15 PROCEDURE — 36416 COLLJ CAPILLARY BLOOD SPEC: CPT | Performed by: INTERNAL MEDICINE

## 2021-01-15 RX ORDER — ATORVASTATIN CALCIUM 20 MG/1
20 TABLET, FILM COATED ORAL DAILY
Qty: 90 TABLET | Refills: 1 | Status: SHIPPED | OUTPATIENT
Start: 2021-01-15 | End: 2021-07-15

## 2021-01-15 NOTE — TELEPHONE ENCOUNTER
Dr. Blake:     Last Office Visit: 11/20/2020  Last Labs: 12/10/2020  Next Lab Visit:    Next Office Visit: 11/24/2021

## 2021-01-15 NOTE — PROGRESS NOTES
pts inr was 4.7 today her range is 2.6-3.8 she takes 2mg of warfarin Monday-Friday and them 3mg on Saturday and Sunday, I changed her to 2mg on Saturday and told her to eat a little more greens this week and to come recheck in 1 week.

## 2021-01-20 DIAGNOSIS — I35.9 AORTIC VALVE DISEASE: Primary | ICD-10-CM

## 2021-01-20 RX ORDER — FUROSEMIDE 40 MG/1
40 TABLET ORAL DAILY
Qty: 90 TABLET | Refills: 2 | Status: CANCELLED | OUTPATIENT
Start: 2021-01-20

## 2021-01-20 NOTE — TELEPHONE ENCOUNTER
Last Office Visit: 11/20/20  Last Labs: 12/10/20  Next Labs: will need  Next Office Visit: 11/24/21

## 2021-01-21 RX ORDER — FUROSEMIDE 40 MG/1
40 TABLET ORAL DAILY
Qty: 90 TABLET | Refills: 3 | Status: SHIPPED | OUTPATIENT
Start: 2021-01-21 | End: 2022-01-17

## 2021-01-22 ENCOUNTER — CLINICAL SUPPORT (OUTPATIENT)
Dept: CARDIOLOGY | Facility: CLINIC | Age: 76
End: 2021-01-22

## 2021-01-22 DIAGNOSIS — I35.9 AORTIC VALVE DISEASE: Primary | ICD-10-CM

## 2021-01-22 LAB — INR PPP: 3.9 (ref 0.9–1.1)

## 2021-01-22 PROCEDURE — 36416 COLLJ CAPILLARY BLOOD SPEC: CPT | Performed by: INTERNAL MEDICINE

## 2021-01-22 PROCEDURE — 85610 PROTHROMBIN TIME: CPT | Performed by: INTERNAL MEDICINE

## 2021-01-22 NOTE — PROGRESS NOTES
Pt was 3.9 today with a range of 2.6-3.8 told her to eat an extra salad and to recheck in 2 weeks.

## 2021-02-02 ENCOUNTER — HOSPITAL ENCOUNTER (OUTPATIENT)
Dept: CT IMAGING | Facility: HOSPITAL | Age: 76
Discharge: HOME | End: 2021-02-02
Admitting: PHYSICIAN ASSISTANT

## 2021-02-02 DIAGNOSIS — I71.20 THORACIC AORTIC ANEURYSM WITHOUT RUPTURE (HCC): ICD-10-CM

## 2021-02-02 LAB — CREAT BLDA-MCNC: 0.8 MG/DL (ref 0.6–1.3)

## 2021-02-02 PROCEDURE — 0 IOPAMIDOL PER 1 ML: Performed by: PHYSICIAN ASSISTANT

## 2021-02-02 PROCEDURE — 71275 CT ANGIOGRAPHY CHEST: CPT

## 2021-02-02 PROCEDURE — 82565 ASSAY OF CREATININE: CPT

## 2021-02-02 RX ADMIN — IOPAMIDOL 100 ML: 755 INJECTION, SOLUTION INTRAVENOUS at 11:18

## 2021-02-22 ENCOUNTER — CLINICAL SUPPORT (OUTPATIENT)
Dept: CARDIOLOGY | Facility: CLINIC | Age: 76
End: 2021-02-22

## 2021-02-22 DIAGNOSIS — I35.9 AORTIC VALVE DISEASE: Primary | ICD-10-CM

## 2021-02-22 LAB — INR PPP: 4.3 (ref 0.9–1.1)

## 2021-02-22 PROCEDURE — 36416 COLLJ CAPILLARY BLOOD SPEC: CPT | Performed by: INTERNAL MEDICINE

## 2021-02-22 PROCEDURE — 85610 PROTHROMBIN TIME: CPT | Performed by: INTERNAL MEDICINE

## 2021-02-22 NOTE — PROGRESS NOTES
Pt INR was 4.3 today, changed warfarin to 2mg M-Sun, told to eat an extra salad and rtc in 1 week.

## 2021-02-25 ENCOUNTER — OFFICE VISIT (OUTPATIENT)
Dept: CARDIAC SURGERY | Facility: CLINIC | Age: 76
End: 2021-02-25

## 2021-02-25 VITALS
WEIGHT: 199 LBS | TEMPERATURE: 97.7 F | HEART RATE: 78 BPM | HEIGHT: 65 IN | DIASTOLIC BLOOD PRESSURE: 74 MMHG | SYSTOLIC BLOOD PRESSURE: 120 MMHG | BODY MASS INDEX: 33.15 KG/M2 | OXYGEN SATURATION: 98 %

## 2021-02-25 DIAGNOSIS — I71.20 THORACIC AORTIC ANEURYSM WITHOUT RUPTURE (HCC): Primary | ICD-10-CM

## 2021-02-25 PROCEDURE — 99213 OFFICE O/P EST LOW 20 MIN: CPT | Performed by: NURSE PRACTITIONER

## 2021-02-25 NOTE — PROGRESS NOTES
[facility] Cardiothoracic Surgery Office Follow Up Note     Date of Encounter: 02/25/2021     MRN Number: [id number]  Name: Lucie Michele  Phone Number: [phone]     Referred By: No ref. provider found  PCP: Giovanni Lee MD    Chief Complaint:    Chief Complaint   Patient presents with   • Aortic Aneurysm     2 year follow-up with results from CTA chest to evaluate thoracic aortic aneurysm.       History of Present Illness:    Lucie Michele is a 75 y.o. female with a history of hypertension, hyperlipidemia, aortic valve disorder/ascending aortic aneurysm S/P Bentall procedure with Dr. Hollis approximately 2010.  She presents today for 2-year follow-up and discussion of her CTA chest.  She follows closely with Dr. Blake  She has had some recent issues with supratherapeutic INRs and they are adjusting her Coumadin dosing.  She denies any chest pain or back pain.  She does have some baseline dyspnea on exertion and lower extremity swelling.    Review of Systems:  Review of Systems   Constitution: Negative for chills, decreased appetite, diaphoresis, fever, malaise/fatigue, night sweats and weight loss.   HENT: Negative for congestion, hoarse voice, sore throat and stridor.    Cardiovascular: Positive for dyspnea on exertion and leg swelling. Negative for chest pain, claudication, irregular heartbeat, near-syncope, orthopnea, palpitations, paroxysmal nocturnal dyspnea and syncope.   Respiratory: Negative for cough, hemoptysis, shortness of breath, sleep disturbances due to breathing, snoring, sputum production and wheezing.    Hematologic/Lymphatic: Negative for adenopathy and bleeding problem. Bruises/bleeds easily.   Skin: Positive for dry skin. Negative for color change, itching, poor wound healing and rash.   Musculoskeletal: Positive for arthritis, joint pain and joint swelling. Negative for back pain, falls and muscle weakness.   Gastrointestinal: Negative for abdominal pain, anorexia,  constipation, diarrhea, hematochezia, melena, nausea and vomiting.   Neurological: Negative for difficulty with concentration, disturbances in coordination, dizziness, loss of balance, numbness, seizures, vertigo and weakness.   Psychiatric/Behavioral: Negative for altered mental status, depression, memory loss and substance abuse. The patient does not have insomnia and is not nervous/anxious.    Allergic/Immunologic: Negative for persistent infections.       I have reviewed the review of systems as entered by my clinical support staff and have updated it as appropriate.       Allergies:  Allergies   Allergen Reactions   • Adhesive Tape Itching     Reddening of skin, when younger  When left on for long period of time    • Sulfa Antibiotics Hives       Medications:      Current Outpatient Medications:   •  Ascorbic Acid (VITAMIN C) 100 MG chewable tablet, 1 po qd, Disp: , Rfl:   •  Ascorbic Acid (VITAMIN C) 500 MG capsule, Take 1 capsule by mouth daily., Disp: , Rfl:   •  aspirin 81 MG EC tablet, Take 1 tablet by mouth daily., Disp: , Rfl:   •  atorvastatin (Lipitor) 20 MG tablet, Take 1 tablet by mouth Daily., Disp: 90 tablet, Rfl: 1  •  B Complex Vitamins (VITAMIN B COMPLEX) tablet, Take 1 tablet by mouth daily., Disp: , Rfl:   •  calcium polycarbophil (FIBER-CAPS) 625 MG tablet, 1 po qd, Disp: , Rfl:   •  carbonyl iron (FEOSOL) 45 MG tablet tablet, Take 1 tablet by mouth daily., Disp: , Rfl:   •  carbonyl iron (FEOSOL) 45 MG tablet tablet, 1 po qd, Disp: , Rfl:   •  cetirizine (ZyrTEC) 10 MG tablet, Take 1 tablet by mouth daily., Disp: , Rfl:   •  Cholecalciferol 4000 units capsule, 1 po qd OTC, Disp: , Rfl:   •  Cinnamon 500 MG capsule, Take 1 capsule by mouth daily., Disp: , Rfl:   •  Cinnamon 500 MG capsule, 2 po qd- takes 1000 mg per day, Disp: , Rfl:   •  Coenzyme Q10 (CO Q 10) 10 MG capsule, Take 1 capsule by mouth daily., Disp: , Rfl:   •  Coenzyme Q10 (CO Q-10) 50 MG capsule, 1 po qd, Disp: , Rfl:   •   Digestive Enzymes (FIBERZYME CONCENTRATE-HP PO), Take 1 tablet by mouth daily., Disp: , Rfl:   •  furosemide (LASIX) 40 MG tablet, Take 1 tablet by mouth Daily., Disp: 90 tablet, Rfl: 3  •  GLUCOSAMINE-CHONDROITIN DS PO, Take  by mouth 2 (Two) Times a Day. 1500 mg, Disp: , Rfl:   •  L-Lysine 500 MG capsule, 1 po qd, Disp: , Rfl:   •  levothyroxine (Synthroid) 125 MCG tablet, , Disp: , Rfl:   •  levothyroxine sodium (TIROSINT) 125 MCG capsule capsule, Take 1 tablet by mouth Daily., Disp: , Rfl:   •  Lysine 500 MG capsule, Take 1 tablet by mouth daily., Disp: , Rfl:   •  metoprolol succinate XL (TOPROL XL) 100 MG 24 hr tablet, TAKE 1 TABLET BY ORAL ROUTE IN THE MORNING AND 1/2 TABLET IN THE AFTERNOON, Disp: , Rfl:   •  Multiple Vitamins-Minerals (MULTIVITAMIN ADULT PO), Take 1 tablet by mouth daily., Disp: , Rfl:   •  omeprazole (PriLOSEC) 20 MG capsule, Take 1 capsule by mouth daily., Disp: , Rfl:   •  OXcarbazepine (TRILEPTAL) 150 MG tablet, Take 1 tablet by mouth 3 (Three) Times a Day., Disp: 270 tablet, Rfl: 3  •  oxybutynin (DITROPAN) 5 MG tablet, Take 0.5 tablets by mouth 2 (two) times a day., Disp: , Rfl:   •  potassium chloride (K-DUR,KLOR-CON) 10 MEQ CR tablet, Take 1 tablet by mouth daily., Disp: , Rfl:   •  SPIRIVA HANDIHALER 18 MCG per inhalation capsule, INHALE CONTENTS OF 1 C ONCE D, Disp: , Rfl: 5  •  tiotropium (SPIRIVA HANDIHALER) 18 MCG per inhalation capsule, INHALE 1 CAPSULE BY INHALATION ROUTE EVERY DAY, Disp: , Rfl:   •  vitamin D (ERGOCALCIFEROL) 74304 units capsule capsule, Take 50,000 Units by mouth 1 (One) Time Per Week., Disp: , Rfl:   •  vitamin E 400 UNIT capsule, 1 po qd, Disp: , Rfl:   •  Vitamin E 400 UNITS tablet, Take 1 tablet by mouth daily., Disp: , Rfl:   •  warfarin (COUMADIN) 2 MG tablet, Take 1 tablet by mouth Daily. 2 MG M/W/F/S and 3mg T/ Th/ S, Disp: , Rfl:   •  albuterol (PROAIR HFA) 108 (90 Base) MCG/ACT inhaler, Inhale 2 puffs Every 4 (Four) Hours As Needed for Wheezing.,  Disp: , Rfl:   •  amLODIPine (NORVASC) 5 MG tablet, amlodipine 5 mg tablet, Disp: , Rfl:   •  HAVRIX 1440 EL U/ML vaccine, ADM 1ML IM UTD, Disp: , Rfl: 0  •  Pediatric Multivit-Minerals-C (CHEWABLES MULTIVITAMIN PO), 2 po qd OTC, Disp: , Rfl:   •  potassium chloride (KLOR-CON) 20 MEQ packet, Klor-Con 20 mEq oral packet, Disp: , Rfl:   •  valACYclovir (VALTREX) 1000 MG tablet, valacyclovir 1 gram tablet, Disp: , Rfl:     Social History     Socioeconomic History   • Marital status:      Spouse name: Not on file   • Number of children: 2   • Years of education: Not on file   • Highest education level: Not on file   Occupational History   • Occupation: retired     Comment: Teacher   Tobacco Use   • Smoking status: Former Smoker     Packs/day: 3.00     Years: 4.00     Pack years: 12.00     Types: Cigarettes     Quit date:      Years since quittin.1   • Smokeless tobacco: Never Used   • Tobacco comment: up to 3 ppd   Substance and Sexual Activity   • Alcohol use: Yes     Comment: 1 glass of wine a week, usually not   • Drug use: No   Social History Narrative    Lives in [place].        Family History   Problem Relation Age of Onset   • Breast cancer Mother    • COPD Mother    • Heart failure Mother    • Arthritis Mother    • Kidney disease Mother    • Lymphoma Mother    • Coronary artery disease Father    • Heart attack Father    • COPD Father    • Heart disease Father    • Diabetes Maternal Uncle    • Heart disease Maternal Uncle    • Heart disease Maternal Grandfather    • Stroke Paternal Grandmother    • Heart disease Paternal Grandfather        Past Medical History:   Diagnosis Date   • Abnormality of heart valve    • Aneurysm (CMS/HCC)     aortic   • Arthritis    • Broken bones     pelvis   • Depression    • Disease of thyroid gland    • Hyperlipidemia    • Meningioma (CMS/HCC)     NOT cancer, meningioma   • Osteoporosis    • Pseudoaneurysm (CMS/HCC)    • Raynaud disease    • Sleep apnea     CPAP  "  • Trigeminal neuralgia        Past Surgical History:   Procedure Laterality Date   • ABDOMINAL SURGERY      tumor removed from ovary   • AORTIC VALVE REPAIR/REPLACEMENT     • ARTERIAL ANEURYSM REPAIR     • HYSTERECTOMY     • THYROID SURGERY     • TONSILLECTOMY AND ADENOIDECTOMY         Physical Exam:  Vital Signs:    Vitals:    02/25/21 1324   BP: 120/74   BP Location: Right arm   Patient Position: Sitting   Pulse: 78   Temp: 97.7 °F (36.5 °C)   SpO2: 98%   Weight: 90.3 kg (199 lb)   Height: 165.1 cm (65\")     Body mass index is 33.12 kg/m².     Physical Exam  Vitals signs and nursing note reviewed.   Constitutional:       Appearance: Normal appearance. She is well-developed and well-groomed.   HENT:      Head: Normocephalic and atraumatic.   Neck:      Musculoskeletal: Neck supple.   Cardiovascular:      Rate and Rhythm: Normal rate and regular rhythm.      Heart sounds: Normal heart sounds, S1 normal and S2 normal. No murmur. No friction rub.      Comments: +click  Pulmonary:      Comments: Unlabored, Clear to auscultation bilaterally  Abdominal:      General: Bowel sounds are normal.      Palpations: Abdomen is soft.      Tenderness: There is no abdominal tenderness.   Musculoskeletal:      Right lower leg: No edema.      Left lower leg: No edema.   Skin:     General: Skin is warm and dry.   Neurological:      Mental Status: She is alert and oriented to person, place, and time.   Psychiatric:         Attention and Perception: Attention normal.         Mood and Affect: Mood normal.         Speech: Speech normal.         Behavior: Behavior is cooperative.         Labs/Imaging:    Ct Angiogram Chest With & Without Contrast    Result Date: 2/2/2021  Stable appearance of the thoracic aorta following repair of ascending aortic aneurysm. No acute findings in the chest otherwise.   This report was finalized on 2/2/2021 1:51 PM by Navid Duong.    Personally reviewed    Assessment / Plan:  Diagnoses and all orders " for this visit:    1. Thoracic aortic aneurysm without rupture (CMS/HCC) (Primary)     Lucie Michele is a 75 y.o. female with a history of hypertension, hyperlipidemia, aortic valve disorder/ascending aortic aneurysm S/P Bentall procedure with Dr. Hollis approximately 2010.  Discussed findings of stable postoperative ascending thoracic aorta.  Blood pressures have been stable.  She follows closely with Dr. Blake.  We will follow-up with patient in 2 years with repeat CTA chest earlier as needed.    SAGAR Parra  [facility] Cardiothoracic Surgery    Please note that portions of this note may have been completed with a voice recognition program. Efforts were made to edit the dictations, but occasionally words are mistranscribed.

## 2021-03-02 ENCOUNTER — CLINICAL SUPPORT (OUTPATIENT)
Dept: CARDIOLOGY | Facility: CLINIC | Age: 76
End: 2021-03-02

## 2021-03-02 DIAGNOSIS — I35.9 AORTIC VALVE DISEASE: Primary | ICD-10-CM

## 2021-03-02 LAB — INR PPP: 3 (ref 0.9–1.1)

## 2021-03-02 PROCEDURE — 36416 COLLJ CAPILLARY BLOOD SPEC: CPT | Performed by: INTERNAL MEDICINE

## 2021-03-02 PROCEDURE — 85610 PROTHROMBIN TIME: CPT | Performed by: INTERNAL MEDICINE

## 2021-04-03 DIAGNOSIS — I35.9 AORTIC VALVE DISEASE: Primary | ICD-10-CM

## 2021-04-05 ENCOUNTER — CLINICAL SUPPORT (OUTPATIENT)
Dept: CARDIOLOGY | Facility: CLINIC | Age: 76
End: 2021-04-05

## 2021-04-05 ENCOUNTER — ANTICOAGULATION VISIT (OUTPATIENT)
Dept: PHARMACY | Facility: HOSPITAL | Age: 76
End: 2021-04-05

## 2021-04-05 DIAGNOSIS — I35.9 AORTIC VALVE DISEASE: Primary | ICD-10-CM

## 2021-04-05 DIAGNOSIS — I35.9 AORTIC VALVE DISEASE: ICD-10-CM

## 2021-04-05 LAB — INR PPP: 2.6 (ref 0.9–1.1)

## 2021-04-05 PROCEDURE — 36416 COLLJ CAPILLARY BLOOD SPEC: CPT | Performed by: INTERNAL MEDICINE

## 2021-04-05 PROCEDURE — 85610 PROTHROMBIN TIME: CPT | Performed by: INTERNAL MEDICINE

## 2021-04-05 RX ORDER — WARFARIN SODIUM 2 MG/1
TABLET ORAL
Qty: 180 TABLET | Refills: 3 | Status: SHIPPED | OUTPATIENT
Start: 2021-04-05 | End: 2022-01-05

## 2021-04-05 NOTE — PROGRESS NOTES
Anticoagulation Clinic - Remote Progress Note  REMOTE LAB    Indication: St Hank Mechanical Aortic Valve  Referring Provider: Blas Blake  Initial Warfarin Start Date: 3/24/2011  Goal INR: 2.5-3.5   Current Drug Interactions: levothyroxine, MVI, glucosamine- chondroitin, Vit C, co- Q10; ASA, meloxicam  Bleed Risk: No hx of bleed per patient  Other: Lovenox bridge hx; of note patient has an artificial root    Diet: 1 cup of brussels sprouts or broccoli weekly  Alcohol: Seldom  Tobacco: None  OTC Pain Medication: APAP    INR History:  Date 4/5         Total Weekly Dose 15mg         INR 2.6         Notes            Phone Interview:  Tablet Strength: 2mg  Patient Contact Info: [phone] (Mobile) *Preferred*;   Estimated OOP cost: Will send if patient comes to [place]  Verbal Release Authorization signed on 4/7/21 -- may speak with Tammy Bai-friend [phone], Ismael Ryne -brother ([phone])  Lab Contact Info: Blas Blake's office    Patient Findings  Negatives:  Signs/symptoms of thrombosis, Signs/symptoms of bleeding, Laboratory test error suspected, Change in health, Change in alcohol use, Change in activity, Upcoming invasive procedure, Emergency department visit, Upcoming dental procedure, Missed doses, Extra doses, Change in medications, Change in diet/appetite, Hospital admission, Bruising, Other complaints     Welcomed Lucie Michele to the [facility]. I introduced myself and discussed that we will assist with her warfarin monitoring and work with her cardiologist or other physicians to provide patient care. Encouraged patient to call the clinic with requests for warfarin refills, changes in medications / diet, change in health, or upcoming procedures / surgeries. Discussed use of OTC pain medications, and alcohol / tobacco / dietary / other drug interactions in relation to warfarin. Explained that [facility] will now be managing her INR with her  cardiologist moving forward. The method in which she tests will remain the same, however, labs will come to the [facility]. We will then call the patient with results. Advised patient that if she does not receive a phone call from [facility] within 24 hours to call the clinic to inquire about her recent INR test. Furthermore, we discussed available dates to come to the [facility]ticoagulation [facility] for face to face meetings. Patient was agreeable to meeting twice per year. At this time, She did not have further questions or concerns. Provided patient with the clinic's contact information for future reference.    Plan:  1. INR is therapeutic today. New referral. Instructed pt to continue 2mg daily except 3mg Friday.   2. Repeat INR 4/19. She will call Lou's office for an appointment.  3. Pt declines warfarin refills.  4. Emailed patients rights and responsibilities and verbal release, and education pieces to the patient via [email or web address]. 4/6/2021  5. Verbal information provided over the phone to Lucie Michele. Lucie Michele expresses understanding by teach back, RBV dosing instructions, and has no further questions at this time.    Ngoc Brooks, PharmD  4/5/2021  10:18 EDT

## 2021-04-06 ENCOUNTER — ANTICOAGULATION VISIT (OUTPATIENT)
Dept: PHARMACY | Facility: HOSPITAL | Age: 76
End: 2021-04-06

## 2021-04-06 DIAGNOSIS — I35.9 AORTIC VALVE DISEASE: ICD-10-CM

## 2021-04-19 ENCOUNTER — ANTICOAGULATION VISIT (OUTPATIENT)
Dept: PHARMACY | Facility: HOSPITAL | Age: 76
End: 2021-04-19

## 2021-04-19 ENCOUNTER — CLINICAL SUPPORT (OUTPATIENT)
Dept: CARDIOLOGY | Facility: CLINIC | Age: 76
End: 2021-04-19

## 2021-04-19 DIAGNOSIS — I35.9 AORTIC VALVE DISEASE: Primary | ICD-10-CM

## 2021-04-19 LAB — INR PPP: 3.9 (ref 0.9–1.1)

## 2021-04-19 NOTE — PROGRESS NOTES
Anticoagulation Clinic - Remote Progress Note  REMOTE LAB    Indication: St Hank Mechanical Aortic Valve  Referring Provider: Blas Blake  Initial Warfarin Start Date: 3/24/2011  Goal INR: 2.5-3.5   Current Drug Interactions: levothyroxine, MVI, glucosamine- chondroitin, Vit C, co- Q10; ASA, meloxicam  Bleed Risk: No hx of bleed per patient  Other: Lovenox bridge hx; of note patient has an artificial root    Diet: 1 cup of brussels sprouts or broccoli weekly  Alcohol: Seldom  Tobacco: None  OTC Pain Medication: APAP    INR History:  Date 4/5 4/19        Total Weekly Dose 15mg 15mg        INR 2.6 3.9        Notes            Phone Interview:  Tablet Strength: 2mg  Patient Contact Info: [phone] (Mobile) *Preferred*;   Estimated OOP cost: Will send if patient comes to [place]  Verbal Release Authorization signed on 4/7/21 -- may speak with Tammy Bai-friend [phone], Ismael Michele -brother ([phone])  Lab Contact Info: Blas Blake's office  [email or web address].    Patient Findings  Positives:  Change in diet/appetite   Negatives:  Signs/symptoms of thrombosis, Signs/symptoms of bleeding, Laboratory test error suspected, Change in health, Change in alcohol use, Change in activity, Upcoming invasive procedure, Emergency department visit, Upcoming dental procedure, Missed doses, Extra doses, Change in medications, Hospital admission, Bruising, Other complaints   Comments:  She reports that she ate one serving of broccoli a week instead two (broccoli, brussels sprouts)      Plan:  1. INR is SUPRAtherapeutic today.  Instructed pt to decrease dose to 1mg and then continue 2mg daily except 3mg Friday.   2. Repeat INR 4/28/2021. She will call Lou's office for an appointment.  3. Pt declines warfarin refills.  4. Verbal information provided over the phone to Lucie Michele. Lucie Michele expresses understanding by teach back, RBV dosing instructions, and has no further questions at this  time.    Ngoc Brooks, PharmD  04/19/21  14:48 EDT

## 2021-04-26 ENCOUNTER — CLINICAL SUPPORT (OUTPATIENT)
Dept: CARDIOLOGY | Facility: CLINIC | Age: 76
End: 2021-04-26

## 2021-04-26 ENCOUNTER — ANTICOAGULATION VISIT (OUTPATIENT)
Dept: PHARMACY | Facility: HOSPITAL | Age: 76
End: 2021-04-26

## 2021-04-26 DIAGNOSIS — I35.9 AORTIC VALVE DISEASE: Primary | ICD-10-CM

## 2021-04-26 LAB — INR PPP: 3.9 (ref 0.9–1.1)

## 2021-04-26 NOTE — PROGRESS NOTES
Anticoagulation Clinic - Remote Progress Note  REMOTE LAB    Indication: St Hank Mechanical Aortic Valve  Referring Provider: Blas Blake  Initial Warfarin Start Date: 3/24/2011  Goal INR: 2.5-3.5   Current Drug Interactions: levothyroxine, MVI, glucosamine- chondroitin, Vit C, co- Q10; ASA, meloxicam  Bleed Risk: No hx of bleed per patient  Other: Lovenox bridge hx; of note patient has an artificial root    Diet: 1 cup of brussels sprouts or broccoli weekly  Alcohol: Seldom  Tobacco: None  OTC Pain Medication: APAP    INR History:  Date 4/5 4/19 4/26       Total Weekly Dose 15mg 15mg 14mg       INR 2.6 3.9 3.9       Notes            Phone Interview:  Tablet Strength: 2mg  Patient Contact Info: [phone] (Mobile) *Preferred*;   Estimated OOP cost: Will send if patient comes to [place]  Verbal Release Authorization signed on 4/7/21 -- may speak with Tammy Bai-friend [phone], Ismael Michele -brother ([phone])  Lab Contact Info: Blas Blake's office  [email or web address].    Patient Findings  Negatives:  Signs/symptoms of thrombosis, Signs/symptoms of bleeding, Laboratory test error suspected, Change in health, Change in alcohol use, Change in activity, Upcoming invasive procedure, Emergency department visit, Upcoming dental procedure, Missed doses, Extra doses, Change in medications, Change in diet/appetite, Hospital admission, Bruising, Other complaints     Plan:  1. INR is SUPRAtherapeutic today. Instructed pt to decrease dose to 1mg tonight and then decrease dose to warfarin 2mg daily .   2. Repeat INR 5/5/2021. She will call Lou's office for an appointment.  3. Pt declines warfarin refills.  4. Verbal information provided over the phone to Lucie Michele. Lucie Michele expresses understanding by teach back, RBV dosing instructions, and has no further questions at this time.    Ngoc Brooks, PharmD  04/26/21  11:53 EDT

## 2021-04-29 ENCOUNTER — OFFICE VISIT (OUTPATIENT)
Dept: NEUROLOGY | Facility: CLINIC | Age: 76
End: 2021-04-29

## 2021-04-29 VITALS
HEART RATE: 77 BPM | DIASTOLIC BLOOD PRESSURE: 80 MMHG | BODY MASS INDEX: 33.11 KG/M2 | WEIGHT: 198.7 LBS | OXYGEN SATURATION: 97 % | SYSTOLIC BLOOD PRESSURE: 132 MMHG | HEIGHT: 65 IN

## 2021-04-29 DIAGNOSIS — G52.1 GLOSSOPHARYNGEAL NEURALGIA: Primary | ICD-10-CM

## 2021-04-29 DIAGNOSIS — G50.0 TRIGEMINAL NEURALGIA: ICD-10-CM

## 2021-04-29 PROCEDURE — 99212 OFFICE O/P EST SF 10 MIN: CPT | Performed by: NURSE PRACTITIONER

## 2021-04-29 RX ORDER — OXCARBAZEPINE 150 MG/1
150 TABLET, FILM COATED ORAL 2 TIMES DAILY
Qty: 180 TABLET | Refills: 3 | Status: SHIPPED | OUTPATIENT
Start: 2021-04-29 | End: 2022-04-26 | Stop reason: SDUPTHER

## 2021-04-29 RX ORDER — MELOXICAM 7.5 MG/1
7.5 TABLET ORAL DAILY
COMMUNITY
Start: 2021-04-02 | End: 2022-08-19

## 2021-04-29 NOTE — PROGRESS NOTES
Subjective:     Patient ID: Lucie Michele is a 75 y.o. female presenting for follow up for glossopharyngeal neuralgia. I last saw the patient on 4/28/2020.     She has a history of cavernous sinus meningioma with stereotactic radiation and is followed by neurosurgery. She describes symptoms of a shocking sensation beginning in the midline of her throat and shooting up to the right side of her tongue, then into the right ear. She has been on oxcarbazepine 150 mg three times daily for several years and states that as long as she takes the medication she is free of the glossopharyngeal neuralgia symptoms. She denies adverse side effects to the medication. For the past year she says she has only been taking the medication twice a day and that this seems to work well for her.        History of Present Illness  The following portions of the patient's history were reviewed and updated as appropriate: allergies, current medications, past family history, past medical history, past social history, past surgical history and problem list.    Review of Systems   Constitutional: Negative for chills, fatigue and fever.   HENT: Negative for hearing loss, tinnitus and trouble swallowing.    Eyes: Negative for photophobia, pain and visual disturbance.   Respiratory: Negative for cough, shortness of breath and wheezing.    Cardiovascular: Positive for leg swelling. Negative for chest pain and palpitations.   Gastrointestinal: Negative for diarrhea, nausea and vomiting.   Endocrine: Negative for cold intolerance, heat intolerance and polydipsia.   Genitourinary: Negative for decreased urine volume, difficulty urinating and urgency.   Musculoskeletal: Negative for back pain, neck pain and neck stiffness.   Skin: Negative for color change, rash and wound.   Allergic/Immunologic: Negative for environmental allergies, food allergies and immunocompromised state.   Neurological: Negative for dizziness, tremors, seizures, syncope, facial  asymmetry, speech difficulty, weakness, light-headedness, numbness and headaches.   Hematological: Negative for adenopathy. Does not bruise/bleed easily.   Psychiatric/Behavioral: Negative for confusion, decreased concentration and sleep disturbance. The patient is not nervous/anxious.         Objective:    Neurologic Exam  General: Well nourished, well developed, and in no acute distress.  HEENT: Normocephalic/atraumatic. Mucous membranes moist. Sclerae anicteric.   Heart: Regular rate and rhythm. No murmurs, rubs or gallops.  Lungs: Clear to auscultation bilaterally.  Skin: No notable rashes or lesions on the visible surfaces.   Extremities: No clubbing, cyanosis or significant edema.   Psychiatric: Pleasant, cooperative, and appropriate.   Neurologic Exam:  Mental Status:  Alert and oriented. Speech is fluent. Comprehension is intact.   Cranial Nerves II-XII: Pupils equal, round, reactive to light. Extraocular movements are full and conjugate in all directions. Pursuit movements do not provoke any apparent dizziness or discomfort.  No nystagmus noted.  Hearing is intact to voice. Facial strength and sensation are preserved and symmetric. Tongue and palate midline. Voice non-hoarse, non-dysarthric.   Motor: Normal bulk and tone of bilateral upper and lower extremities. Strength is 5/5 in all 4 extremities both proximally and distally. There are no abnormal or involuntary movements noted.  Sensation: Intact to light touch throughout. Romberg was negative with no significant sway.   Coordination: Fully intact. Finger-to-nose performed accurately bilaterally.  Reflexes: The deep tendon reflexes are 2+/4 in bilateral biceps, brachioradialis, triceps, patella, and Achilles.  No pathologic reflexes were noted.  Gait: Normal. No ataxia or apraxia.         Physical Exam    Assessment/Plan:     Diagnoses and all orders for this visit:    1. Glossopharyngeal neuralgia (Primary)    2. Trigeminal neuralgia  -      OXcarbazepine (TRILEPTAL) 150 MG tablet; Take 1 tablet by mouth 2 (Two) Times a Day.  Dispense: 180 tablet; Refill: 3        She is doing well. No changes made today. Continue oxcarbazepine 150 mg twice daily. She will call with any problems, otherwise will f/u annually.

## 2021-05-05 ENCOUNTER — CLINICAL SUPPORT (OUTPATIENT)
Dept: CARDIOLOGY | Facility: CLINIC | Age: 76
End: 2021-05-05

## 2021-05-05 ENCOUNTER — ANTICOAGULATION VISIT (OUTPATIENT)
Dept: PHARMACY | Facility: HOSPITAL | Age: 76
End: 2021-05-05

## 2021-05-05 DIAGNOSIS — I35.9 AORTIC VALVE DISEASE: Primary | ICD-10-CM

## 2021-05-05 LAB — INR PPP: 2.7 (ref 0.9–1.1)

## 2021-05-05 PROCEDURE — 85610 PROTHROMBIN TIME: CPT | Performed by: INTERNAL MEDICINE

## 2021-05-05 PROCEDURE — 36416 COLLJ CAPILLARY BLOOD SPEC: CPT | Performed by: INTERNAL MEDICINE

## 2021-05-05 NOTE — PROGRESS NOTES
Anticoagulation Clinic - Remote Progress Note  REMOTE LAB    Indication: St Hank Mechanical Aortic Valve  Referring Provider: Blas Blake  Initial Warfarin Start Date: 3/24/2011  Goal INR: 2.5-3.5   Current Drug Interactions: levothyroxine, MVI, glucosamine- chondroitin, Vit C, co- Q10; ASA, meloxicam  Bleed Risk: No hx of bleed per patient  Other: Lovenox bridge hx; of note patient has an artificial root    Diet: 1 cup of brussels sprouts or broccoli weekly  Alcohol: Seldom  Tobacco: None  OTC Pain Medication: APAP    INR History:  Date 4/5 4/19 4/26 5/5      Total Weekly Dose 15mg 15mg 14mg 14mg      INR 2.6 3.9 3.9 2.7      Notes            Phone Interview:  Tablet Strength: 2mg  Patient Contact Info: [phone] (Mobile) *Preferred*;   Estimated OOP cost: Will send if patient comes to [place]  Verbal Release Authorization signed on 4/7/21 -- may speak with Tammy Bai-friend [phone], Ismael Michele -brother ([phone])  Lab Contact Info: Blas Blake's office  [email or web address].    Patient Findings    Negatives:  Signs/symptoms of thrombosis, Signs/symptoms of bleeding, Laboratory test error suspected, Change in health, Change in alcohol use, Change in activity, Upcoming invasive procedure, Emergency department visit, Upcoming dental procedure, Missed doses, Extra doses, Change in medications, Change in diet/appetite, Hospital admission, Bruising, Other complaints   Comments:  Has 3x servings GLV         Plan:  1. INR is therapeutic today. Instructed pt to continue decreased dose to warfarin 2mg daily .   2. Repeat INR 5/12/2021. She will call Lou's office for an appointment.IF WNL will extend interval  3. Pt declines warfarin refills.  4. Verbal information provided over the phone to Lucie Michele. Lucie Michele expresses understanding by teach back, RBV dosing instructions, and has no further questions at this time.    Lizet Marinelli, JenniD.  05/05/21   14:33 EDT

## 2021-05-06 DIAGNOSIS — I10 ESSENTIAL HYPERTENSION, BENIGN: Primary | ICD-10-CM

## 2021-05-06 RX ORDER — METOPROLOL SUCCINATE 100 MG/1
TABLET, EXTENDED RELEASE ORAL
Qty: 135 TABLET | Refills: 3 | Status: SHIPPED | OUTPATIENT
Start: 2021-05-06 | End: 2022-02-01

## 2021-05-10 ENCOUNTER — CLINICAL SUPPORT (OUTPATIENT)
Dept: CARDIOLOGY | Facility: CLINIC | Age: 76
End: 2021-05-10

## 2021-05-10 DIAGNOSIS — I35.9 AORTIC VALVE DISEASE: Primary | ICD-10-CM

## 2021-05-10 LAB — INR PPP: 3 (ref 0.9–1.1)

## 2021-05-11 ENCOUNTER — ANTICOAGULATION VISIT (OUTPATIENT)
Dept: PHARMACY | Facility: HOSPITAL | Age: 76
End: 2021-05-11

## 2021-05-11 DIAGNOSIS — I35.9 AORTIC VALVE DISEASE: Primary | ICD-10-CM

## 2021-05-11 NOTE — PROGRESS NOTES
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    Diet: 3x week: 1 cup of brussels sprouts or broccoli weekly 5/11/21  Alcohol: Seldom  Tobacco: None  OTC Pain Medication: APAP    INR History:  Date 4/5 4/19 4/26 5/5 5/11     Total Weekly Dose 15mg 15mg 14mg 14mg 14 mg     INR 2.6 3.9 3.9 2.7 3.0     Notes            Phone Interview:  Tablet Strength: 2mg  Patient Contact Info: [phone] (Mobile) *Preferred*;   Estimated OOP cost: Will send if patient comes to [place]  Verbal Release Authorization signed on 4/7/21 -- may speak with Tammy Bai-friend [phone], Ismael Michele -brother ([phone])  Lab Contact Info: [facility] ([place])  [email or web address].    Patient Findings:  Positives:  Bruising   Negatives:  Signs/symptoms of thrombosis, Signs/symptoms of bleeding, Laboratory test error suspected, Change in health, Change in alcohol use, Change in activity, Upcoming invasive procedure, Emergency department visit, Upcoming dental procedure, Missed doses, Extra doses, Change in medications, Change in diet/appetite, Hospital admission, Other complaints   Comments:  Ms. Michele says she has several bruises but this is not uncommon for her since taking warfarin. Advised her to monitor for unusual signs/symptoms since we are extending her recheck date. She verbalized understanding and will call the clinic back sooner if needed.     Plan:  1. INR is therapeutic today at 3.0 (goal 2.5-3.5). Instructed Ms. Michele to continue recently decreased dose of warfarin 2 mg daily until recheck.  2. Repeat INR in three  weeks.  3. Patient declines warfarin refills.  4. Verbal information provided over the phone to Lucie Michele. Lucie Michele expresses understanding by teach back, RBV dosing instructions, and has no further questions at this time.    Cristina Mcelroy, Sondra  5/11/2021  12:16 EDT    I, Ngoc Brooks, PharmD, have reviewed the note in full and agree with the assessment and plan.  05/11/21  15:52 EDT

## 2021-06-02 ENCOUNTER — CLINICAL SUPPORT (OUTPATIENT)
Dept: CARDIOLOGY | Facility: CLINIC | Age: 76
End: 2021-06-02

## 2021-06-02 ENCOUNTER — ANTICOAGULATION VISIT (OUTPATIENT)
Dept: PHARMACY | Facility: HOSPITAL | Age: 76
End: 2021-06-02

## 2021-06-02 DIAGNOSIS — I35.9 AORTIC VALVE DISEASE: Primary | ICD-10-CM

## 2021-06-02 LAB — INR PPP: 3.6 (ref 0.9–1.1)

## 2021-06-02 PROCEDURE — 85610 PROTHROMBIN TIME: CPT | Performed by: INTERNAL MEDICINE

## 2021-06-02 PROCEDURE — 36416 COLLJ CAPILLARY BLOOD SPEC: CPT | Performed by: INTERNAL MEDICINE

## 2021-06-02 NOTE — PROGRESS NOTES
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    Diet: 3x week: 1 cup of brussels sprouts or broccoli weekly 5/11/21  Alcohol: Seldom  Tobacco: None  OTC Pain Medication: APAP    INR History:  Date 4/5 4/19 4/26 5/5 5/11 6/2      Total Weekly Dose 15mg 15mg 14mg 14mg 14 mg 14 mg      INR 2.6 3.9 3.9 2.7 3.0 3.6      Notes      decr GLV        Phone Interview:  Tablet Strength: 2mg  Patient Contact Info: [phone] (Mobile) *Preferred*;   Estimated OOP cost: Will send if patient comes to [place]  Verbal Release Authorization signed on 4/7/21 -- may speak with Tammy Bhumika-friend [phone], Ismael Michele -brother ([phone])  Lab Contact Info: [facility] ([place])  [email or web address].    Patient Findings:    Positives:  Change in diet/appetite   Negatives:  Signs/symptoms of thrombosis, Signs/symptoms of bleeding, Laboratory test error suspected, Change in health, Change in alcohol use, Change in activity, Upcoming invasive procedure, Emergency department visit, Upcoming dental procedure, Missed doses, Extra doses, Change in medications, Hospital admission, Bruising, Other complaints   Comments:  I spoke with Ms Michele on 6/3. She reports only 2 servings of GLV this week instead of 3 servings (broccoli, brussels sprouts, or asapargus). She has some vegetables at home, and ate a small amount of broccoli with dinner yesterday.     Plan:  1. INR was slightly supratherapeutic yesterday at 3.6 (goal 2.5-3.5). I instructed Ms. Michele to eat some broccoli tonight and continue dose of warfarin 2 mg daily until recheck.  She will continue her normal 3 helpings of green vegetables weekly.  2. Repeat INR in 2 weeks, 6/16.  3. Patient declines warfarin refills.  4. Verbal information provided over the phone to Lucie Michele. Lucie Michele expresses understanding by teach back, RBV dosing instructions, and has no further questions at this time.    Kamilla Hardin, PharmD.  Pharmacy Resident  6/3/2021 10:03 EDT

## 2021-06-15 ENCOUNTER — CLINICAL SUPPORT (OUTPATIENT)
Dept: CARDIOLOGY | Facility: CLINIC | Age: 76
End: 2021-06-15

## 2021-06-15 ENCOUNTER — ANTICOAGULATION VISIT (OUTPATIENT)
Dept: PHARMACY | Facility: HOSPITAL | Age: 76
End: 2021-06-15

## 2021-06-15 DIAGNOSIS — I35.9 AORTIC VALVE DISEASE: Primary | ICD-10-CM

## 2021-06-15 LAB — INR PPP: 2.7 (ref 0.9–1.1)

## 2021-06-15 PROCEDURE — 36416 COLLJ CAPILLARY BLOOD SPEC: CPT | Performed by: INTERNAL MEDICINE

## 2021-06-15 PROCEDURE — 85610 PROTHROMBIN TIME: CPT | Performed by: INTERNAL MEDICINE

## 2021-06-15 NOTE — PROGRESS NOTES
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    Diet: 3x week: 1 cup of brussels sprouts or broccoli weekly 6/15/21  Alcohol: Seldom  Tobacco: None  OTC Pain Medication: APAP    INR History:  Date 4/5 4/19 4/26 5/5 5/11 6/2 6/15     Total Weekly Dose 15mg 15mg 14mg 14mg 14 mg 14 mg 14 mg     INR 2.6 3.9 3.9 2.7 3.0 3.6 2.7     Notes      decr GLV        Phone Interview:  Tablet Strength: 2mg  Patient Contact Info: [phone] (Mobile) *Preferred*;   Estimated OOP cost: Will send if patient comes to [place]  Verbal Release Authorization signed on 4/7/21 -- may speak with Tammy Bhumika-friend [phone], Ismael Michele -brother ([phone])  Lab Contact Info: [facility] ([place])  [email or web address].    Patient Findings:  Negatives:  Signs/symptoms of thrombosis, Signs/symptoms of bleeding, Laboratory test error suspected, Change in health, Change in alcohol use, Change in activity, Upcoming invasive procedure, Emergency department visit, Upcoming dental procedure, Missed doses, Extra doses, Change in medications, Change in diet/appetite, Hospital admission, Bruising, Other complaints     Plan:  1. INR is back WNL today at 2.7 (goal 2.5-3.5). Instructed Ms. Michele to  continue current dose of warfarin 2 mg daily until recheck.   2. Repeat INR in three weeks.  3. Patient declines warfarin refills.  4. Verbal information provided over the phone to Lucie Michele. Lucie Michele expresses understanding by teach back, RBV dosing instructions, and has no further questions at this time.  5. Patient is interested in mdINR  home monitor. Will start process today.     Cristina Mcelroy, Sondra  6/15/2021  10:54 EDT    I, gNoc Brooks, PharmD, have reviewed the note in full and agree with the assessment and plan.  06/15/21  15:39 EDT

## 2021-06-21 ENCOUNTER — ANTICOAGULATION VISIT (OUTPATIENT)
Dept: PHARMACY | Facility: HOSPITAL | Age: 76
End: 2021-06-21

## 2021-06-21 DIAGNOSIS — I35.9 AORTIC VALVE DISEASE: Primary | ICD-10-CM

## 2021-06-21 LAB — INR PPP: 2.9

## 2021-06-21 NOTE — PROGRESS NOTES
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  Estimated OOP cost: Will send if patient comes to [place]  Verbal Release Authorization signed on 4/7/21 -- may speak with Tammy Bai (friend: [phone]), Imsael Greerligia (brother: [phone])  Lab Contact Info: [facility] ([facility])    Patient Findings  Negatives:  Signs/symptoms of thrombosis, Signs/symptoms of bleeding, Laboratory test error suspected, Change in health, Change in alcohol use, Change in activity, Upcoming invasive procedure, Emergency department visit, Upcoming dental procedure, Missed doses, Extra doses, Change in medications, Change in diet/appetite, Hospital admission, Bruising, Other complaints     Plan:  1. INR was therapeutic on 6/18 at 2.9 via ; results received 6/21 (goal 2.5-3.5). Instructed Ms. Michele to continue warfarin 2mg oral daily until recheck.   2. Repeat INR on 6/25 with . Patient will be required to test weekly  3. Verbal information provided over the phone. Lucie Michele RBV dosing instructions, expresses understanding by teach back, and has no further questions  at this time.    Shekhar Scherer, CP  6/21/2021  11:08 EDT    I, Tyrone Hensley, PharmD, have reviewed the note in full and agree with the assessment and plan.  06/21/21  15:06 EDT

## 2021-06-25 ENCOUNTER — ANTICOAGULATION VISIT (OUTPATIENT)
Dept: PHARMACY | Facility: HOSPITAL | Age: 76
End: 2021-06-25

## 2021-06-25 DIAGNOSIS — I35.9 AORTIC VALVE DISEASE: Primary | ICD-10-CM

## 2021-06-25 LAB — INR PPP: 2.9

## 2021-06-25 NOTE — PROGRESS NOTES
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  Estimated OOP cost: Will send if patient comes to [place]  Verbal Release Authorization signed on 4/7/21 -- may speak with Tammy Bai (friend: [phone]), Ismael Michele (brother: [phone])  Lab Contact Info: [facility] ([facility])    Patient Findings  Negatives:  Signs/symptoms of thrombosis, Signs/symptoms of bleeding, Laboratory test error suspected, Change in health, Change in alcohol use, Change in activity, Upcoming invasive procedure, Emergency department visit, Upcoming dental procedure, Missed doses, Extra doses, Change in medications, Change in diet/appetite, Hospital admission, Bruising, Other complaints     Plan:  1. INR is therapeutic today, again at 2.9 (goal 2.5-3.5). Instructed Ms. Michele to continue warfarin 2mg oral daily until recheck.   2. Repeat INR in 1 week, 7/2. Patient will be required to test weekly  3. Verbal information provided over the phone. Lucie Michele RBV dosing instructions, expresses understanding by teach back, and has no further questions at this  time.    Shekhar Scherer, BALAJI  6/25/2021  14:28 EDT     IBob, [facility], have reviewed the note in full and agree with the assessment and plan.  06/25/21  14:40 EDT

## 2021-07-02 ENCOUNTER — ANTICOAGULATION VISIT (OUTPATIENT)
Dept: PHARMACY | Facility: HOSPITAL | Age: 76
End: 2021-07-02

## 2021-07-02 DIAGNOSIS — I35.9 AORTIC VALVE DISEASE: Primary | ICD-10-CM

## 2021-07-02 LAB — INR PPP: 2.6

## 2021-07-02 NOTE — PROGRESS NOTES
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  Estimated OOP cost: Will send if patient comes to [place]  Verbal Release Authorization signed on 4/7/21 -- may speak with Tammy Bhumika (friend: [phone]), Ismael Michele (brother: [phone])  Lab Contact Info: [facility] ([place])    Patient Findings  Negatives:  Signs/symptoms of thrombosis, Signs/symptoms of bleeding, Laboratory test error suspected, Change in health, Change in alcohol use, Change in activity, Upcoming invasive procedure, Emergency department visit, Upcoming dental procedure, Missed doses, Extra doses, Change in medications, Change in diet/appetite, Hospital admission, Bruising, Other complaints       Plan:  1. INR is therapeutic today at 2.6 (goal 2.5-3.5). Instructed Ms. Michele to continue warfarin 2mg oral daily until recheck.   2. Repeat INR in 1 week, 7/9. Patient will be required to test weekly  3. Verbal information provided over the phone. Lucie Michele RBV dosing instructions, expresses understanding by teach back, and has no further questions at this  time.    Shekhar Scherer, BALAJI  7/2/2021  12:23 EDT      I, Ngoc Brooks, PharmD, have reviewed the note in full and agree with the assessment and plan.  07/02/21  15:54 EDT

## 2021-07-09 ENCOUNTER — ANTICOAGULATION VISIT (OUTPATIENT)
Dept: PHARMACY | Facility: HOSPITAL | Age: 76
End: 2021-07-09

## 2021-07-09 DIAGNOSIS — G50.0 TRIGEMINAL NEURALGIA: ICD-10-CM

## 2021-07-09 DIAGNOSIS — I35.9 AORTIC VALVE DISEASE: Primary | ICD-10-CM

## 2021-07-09 LAB — INR PPP: 2.9

## 2021-07-09 RX ORDER — OXCARBAZEPINE 150 MG/1
TABLET, FILM COATED ORAL
Qty: 270 TABLET | OUTPATIENT
Start: 2021-07-09

## 2021-07-09 NOTE — PROGRESS NOTES
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  Estimated OOP cost: Will send if patient comes to [place]  Verbal Release Authorization signed on 4/7/21 -- may speak with Tammy Bhumika (friend: [phone]), Ismael Michele (brother: [phone])  Lab Contact Info: [facility] ([place])    Patient Findings  Negatives:  Signs/symptoms of thrombosis, Signs/symptoms of bleeding, Laboratory test error suspected, Change in health, Change in alcohol use, Change in activity, Upcoming invasive procedure, Emergency department visit, Upcoming dental procedure, Missed doses, Extra doses, Change in medications, Change in diet/appetite, Hospital admission, Bruising, Other complaints     Plan:  1. INR is therapeutic today at 2.9 (goal 2.5-3.5). Instructed Ms. Michele to continue warfarin 2mg oral daily until recheck.   2. Repeat INR in 1 week, 7/16. Patient will be required to test weekly  3. Verbal information provided over the phone. Lucie Michele RBV dosing instructions, expresses understanding by teach back, and has no further questions  at this time.    Shekhar Scherer, CP  7/9/2021  12:14 EDT     I, Tyrone Hensley, PharmD, have reviewed the note in full and agree with the assessment and plan.  07/09/21  14:00 EDT

## 2021-07-14 NOTE — TELEPHONE ENCOUNTER
Provider: SHARRI RAPHAEL    Caller: ASHIA GHOSH    Relationship to Patient: SELF    Phone Number: [phone]    Reason for Call: THE PT CALLED IN TO CHECK THE STATUS OF THE MED REFILL HER PHARMACY SENT IN. I NOTIFIED HER OF THE STATUS OF THE RX REFILL REQUEST.

## 2021-07-15 DIAGNOSIS — E78.5 HYPERLIPIDEMIA, UNSPECIFIED HYPERLIPIDEMIA TYPE: ICD-10-CM

## 2021-07-15 RX ORDER — ATORVASTATIN CALCIUM 20 MG/1
20 TABLET, FILM COATED ORAL DAILY
Qty: 90 TABLET | Refills: 0 | Status: SHIPPED | OUTPATIENT
Start: 2021-07-15 | End: 2021-11-24

## 2021-07-17 LAB — INR PPP: 3.1

## 2021-07-19 ENCOUNTER — ANTICOAGULATION VISIT (OUTPATIENT)
Dept: PHARMACY | Facility: HOSPITAL | Age: 76
End: 2021-07-19

## 2021-07-19 DIAGNOSIS — I35.9 AORTIC VALVE DISEASE: Primary | ICD-10-CM

## 2021-07-19 NOTE — PROGRESS NOTES
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    Diet: 3x week: 1 cup of brussels sprouts or broccoli weekly 6/15/21  Alcohol: Seldom  Tobacco: None  OTC Pain Medication: APAP    INR History:  Date 4/5 4/19 4/26 5/5 5/11 6/2 6/15 6/18 6/25 7/2 7/9 7/19   Total Weekly Dose 15mg 15mg 14mg 14mg 14mg 14mg 14mg 14mg 14mg 14mg 14mg 14mg   INR 2.6 3.9 3.9 2.7 3.0 3.6 2.7 2.9 2.9 2.6 2.9 3.1   Notes      decr GLV  recv'd 6/21; [facility]        Phone Interview:  Tablet Strength: 2mg  Patient Contact Info: [phone] (Mobile) *preferred*  [email or web address]  Estimated OOP cost: Will send if patient comes to [place]  Verbal Release Authorization signed on 4/7/21 -- may speak with Tammy Bai (friend: [phone]), Ismael Michele (brother: [phone])  Lab Contact Info: [facility] ([facility])    Patient Findings:  Negatives:  Signs/symptoms of thrombosis, Signs/symptoms of bleeding, Laboratory test error suspected, Change in health, Change in alcohol use, Change in activity, Upcoming invasive procedure, Emergency department visit, Upcoming dental procedure, Missed doses, Extra doses, Change in medications, Change in diet/appetite, Hospital admission, Bruising, Other complaints     Plan:  1. INR was therapeutic again this weekend, so instructed Ms. Michele to continue warfarin 2mg oral daily until recheck.   2. Repeat INR in 1 week (weekly testing required).  3. Verbal information provided over the phone. Lucie Michele RBV dosing instructions, expresses understanding by teach back, and has no further questions at this  time.    Jenni LopezD, BCPS  7/19/2021  10:50 EDT

## 2021-07-23 ENCOUNTER — ANTICOAGULATION VISIT (OUTPATIENT)
Dept: PHARMACY | Facility: HOSPITAL | Age: 76
End: 2021-07-23

## 2021-07-23 DIAGNOSIS — I35.9 AORTIC VALVE DISEASE: Primary | ICD-10-CM

## 2021-07-23 LAB — INR PPP: 2.5

## 2021-07-23 NOTE — PROGRESS NOTES
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    Diet: 3x week: 1 cup of brussels sprouts or broccoli weekly 6/15/21  Alcohol: Seldom  Tobacco: None  OTC Pain Medication: APAP    INR History:  Date 4/5 4/19 4/26 5/5 5/11 6/2 6/15 6/18 6/25 7/2 7/9 7/19   Total Weekly Dose 15mg 15mg 14mg 14mg 14mg 14mg 14mg 14mg 14mg 14mg 14mg 14mg   INR 2.6 3.9 3.9 2.7 3.0 3.6 2.7 2.9 2.9 2.6 2.9 3.1   Notes      decr GLV  recv'd 6/21; [facility]        Date 7/23              Total WeeklyDose 14mg              INR 2.5              Notes incr GLV                  Phone Interview:  Tablet Strength: 2mg  Patient Contact Info: [phone] (Mobile) *preferred*  [email or web address]  Estimated OOP cost: Will send if patient comes to [place]  Verbal Release Authorization signed on 4/7/21 -- may speak with Tammy Bai (friend: [phone]), Ismael Michele (brother: [phone])  Lab Contact Info: [facility] ([facility])    Patient Findings  Positives:  Change in diet/appetite   Negatives:  Signs/symptoms of thrombosis, Signs/symptoms of bleeding, Laboratory test error suspected, Change in health, Change in alcohol use, Change in activity, Upcoming invasive procedure, Emergency department visit, Upcoming dental procedure, Missed doses, Extra doses, Change in medications, Hospital admission, Bruising, Other complaints   Comments:  Patient feels she may have had a bit extra GLV this past week. Is agreeable to resume usual GLV intake. Otherwise denies findings .     Plan:  1. INR is therapeutic today at 2.5, though this is LLN. Instructed Ms.  Ryne to continue warfarin 2mg oral daily until recheck.   2. Repeat INR in 1 week, 7/30.   3. Verbal information provided over the phone. Lucie Michele RBV dosing instructions, expresses understanding by teach back, and has no further questions at this time.    Shekhar Scherer, BALAJI  7/23/2021  13:15 EDT     I, Eleanor Richardson, PharmD, have reviewed the note in full and agree with the assessment and plan.  07/23/21  14:55 EDT

## 2021-07-30 ENCOUNTER — ANTICOAGULATION VISIT (OUTPATIENT)
Dept: PHARMACY | Facility: HOSPITAL | Age: 76
End: 2021-07-30

## 2021-07-30 DIAGNOSIS — I35.9 AORTIC VALVE DISEASE: Primary | ICD-10-CM

## 2021-07-30 LAB — INR PPP: 2.3

## 2021-07-30 NOTE — PROGRESS NOTES
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    Diet: 3x week: 1 cup of brussels sprouts or broccoli weekly 6/15/21  Alcohol: Seldom  Tobacco: None  OTC Pain Medication: APAP    INR History:  Date 4/5 4/19 4/26 5/5 5/11 6/2 6/15 6/18 6/25 7/2 7/9 7/19   Total Weekly Dose 15mg 15mg 14mg 14mg 14mg 14mg 14mg 14mg 14mg 14mg 14mg 14mg   INR 2.6 3.9 3.9 2.7 3.0 3.6 2.7 2.9 2.9 2.6 2.9 3.1   Notes      decr GLV  recv'd 6/21; [facility]        Date 7/23 7/30             Total WeeklyDose 14mg              INR 2.5              Notes incr GLV                  Phone Interview:  Tablet Strength: 2mg  Patient Contact Info: [phone] (Mobile) *preferred*  [email or web address]  Estimated OOP cost: Will send if patient comes to [place]  Verbal Release Authorization signed on 4/7/21 -- may speak with Tammy Bai (friend: [phone]), Ismael Ryne (brother: [phone])  Lab Contact Info: [facility] ([facility])    Patient Findings    Negatives:  Signs/symptoms of thrombosis, Signs/symptoms of bleeding, Laboratory test error suspected, Change in health, Change in alcohol use, Change in activity, Upcoming invasive procedure, Emergency department visit, Upcoming dental procedure, Missed doses, Extra doses, Change in medications, Change in diet/appetite, Hospital admission, Bruising, Other complaints   Comments:  Patient feels she possibly had decrease in GLV this past week. Reports only having serving of broccoli on Wednesday and brussels sprouts on Monday. Does admit missing 1x dose of omeprazole. Otherwise denies findings.       Plan:  1. INR is SUBtherapeutic today at 2.3 (goal range 2.5-3.5). Instructed Ms. Michele INCREASE tonight's dose to 3mg (7.1% increase) and then continue warfarin 2mg oral daily until recheck.   2. Repeat INR in 1 week, 8/6.   3. Verbal information provided over the phone. Lucie Michele RBV dosing instructions, expresses understanding by teach back, and has no further questions at this time.    Shekhar Scherer CPhT  7/30/2021  14:58 EDT     I, Ngoc Brooks, PharmD, have reviewed the note in full and agree with the assessment and plan.  07/30/21  15:57 EDT

## 2021-08-09 ENCOUNTER — ANTICOAGULATION VISIT (OUTPATIENT)
Dept: PHARMACY | Facility: HOSPITAL | Age: 76
End: 2021-08-09

## 2021-08-09 DIAGNOSIS — I35.9 AORTIC VALVE DISEASE: Primary | ICD-10-CM

## 2021-08-09 LAB — INR PPP: 2.4

## 2021-08-09 NOTE — PROGRESS NOTES
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    Diet: 3x week: 1 cup of brussels sprouts or broccoli weekly 8/9/21  Alcohol: Seldom  Tobacco: None  OTC Pain Medication: APAP    INR History:  Date 4/5 4/19 4/26 5/5 5/11 6/2 6/15 6/18 6/25 7/2 7/9 7/19   Total Weekly Dose 15mg 15mg 14mg 14mg 14mg 14mg 14mg 14mg 14mg 14mg 14mg 14mg   INR 2.6 3.9 3.9 2.7 3.0 3.6 2.7 2.9 2.9 2.6 2.9 3.1   Notes      decr GLV  recv'd 6/21; HM HM        Date 7/23 7/30 8/6            Total WeeklyDose 14mg 14mg 14mg            INR 2.5 2.3 2.4            Notes incr GLV Less GLV Less GLV              Phone Interview:  Tablet Strength: 2mg  Patient Contact Info: [phone] (Mobile) *preferred*  [email or web address])  Estimated OOP cost: Will send if patient comes to [place]  Verbal Release Authorization signed on 4/7/21 -- may speak with Tammy Bai (friend: [phone]), Ismael Michele (brother: [phone])  Lab Contact Info: [facility] ([place])    Patient Findings  Negatives:  Signs/symptoms of thrombosis, Signs/symptoms of bleeding, Laboratory test error suspected, Change in health, Change in alcohol use, Change in activity, Upcoming invasive procedure, Emergency department visit, Upcoming dental procedure, Missed doses, Extra doses, Change in medications, Change in diet/appetite, Hospital admission, Bruising, Other complaints   Comments:  Patient feels she possibly had decrease in GLV this past week. Reports only having serving of broccoli on Wednesday and bruNearVersets on Monday. Does admit missing 1x dose of omeprazole.  Otherwise denies findings.      Plan:  1. INR is SUBtherapeutic again at 2.4 (goal range 2.5-3.5). results from 8/6 and received on 8/9. Instructed Ms. Michele increase dose to warfarin 2mg oral daily except 3mg Monday until recheck.   2. Repeat INR in 1 week from previous test, 8/13.   3. Verbal information provided over the phone. Lucie RODRIGUEZ Ryne RBV dosing instructions, expresses understanding by teach back, and has no further questions at this time.    Ngoc Brooks, PharmD  8/9/2021  14:08 EDT

## 2021-08-13 ENCOUNTER — ANTICOAGULATION VISIT (OUTPATIENT)
Dept: PHARMACY | Facility: HOSPITAL | Age: 76
End: 2021-08-13

## 2021-08-13 DIAGNOSIS — I35.9 AORTIC VALVE DISEASE: Primary | ICD-10-CM

## 2021-08-13 LAB — INR PPP: 3.4

## 2021-08-13 NOTE — PROGRESS NOTES
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    Diet: 3x week: 1 cup of brussels sprouts or broccoli weekly 8/9/21  Alcohol: Seldom  Tobacco: None  OTC Pain Medication: APAP    INR History:  Date 4/5 4/19 4/26 5/5 5/11 6/2 6/15 6/18 6/25 7/2 7/9 7/19   Total Weekly Dose 15mg 15mg 14mg 14mg 14mg 14mg 14mg 14mg 14mg 14mg 14mg 14mg   INR 2.6 3.9 3.9 2.7 3.0 3.6 2.7 2.9 2.9 2.6 2.9 3.1   Notes      decr GLV  recv'd 6/21; HM HM        Date 7/23 7/30 8/6 8/13           Total WeeklyDose 14mg 14mg 14mg 15mg           INR 2.5 2.3 2.4 3.4           Notes incr GLV Less GLV Less GLV Less GLV             Phone Interview:  Tablet Strength: 2mg  Patient Contact Info: [phone] (Mobile) *preferred*  [email or web address]  Estimated OOP cost: Will send if patient comes to [place]  Verbal Release Authorization signed on 4/7/21 -- may speak with Tammy Bai (friend: [phone]), Ismael Michele (brother: [phone])  Lab Contact Info: [facility] ([place])    Patient Findings  Negatives:  Signs/symptoms of thrombosis, Signs/symptoms of bleeding, Laboratory test error suspected, Change in health, Change in alcohol use, Change in activity, Upcoming invasive procedure, Emergency department visit, Upcoming dental procedure, Missed doses, Extra doses, Change in medications, Change in diet/appetite, Hospital admission, Bruising, Other complaints   Comments:  Patient reports that she ate one cup of peas this week. Discussed difference in broccoli or brussels sprouts vit k content. She reports she will incorporate her one cup of  broccoli or brussels sprouts back into her diet weekly.      Plan:  1. INR is therapeutic. Instructed Ms. Michele to continue recently increased dose of warfarin to 2mg oral daily except 3mg Monday until recheck. She will incorporate cup of broccoli or brussels sprouts into her diet once weekly.  2. Repeat INR in 1 week from previous test, 8/20. She works on Tue and Thurs and are not good days to test.   3. Verbal information provided over the phone. Lucie Michele RBV dosing instructions, expresses understanding by teach back, and has no further questions at this time.    Ngoc Brooks, PharmD  8/13/2021  16:12 EDT

## 2021-08-20 ENCOUNTER — ANTICOAGULATION VISIT (OUTPATIENT)
Dept: PHARMACY | Facility: HOSPITAL | Age: 76
End: 2021-08-20

## 2021-08-20 DIAGNOSIS — I35.9 AORTIC VALVE DISEASE: Primary | ICD-10-CM

## 2021-08-20 LAB — INR PPP: 3.8

## 2021-08-20 NOTE — PROGRESS NOTES
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    Diet: 3x week: 1 cup of brussels sprouts or broccoli weekly 8/9/21; premier protein ~4xwk  Alcohol: Seldom  Tobacco: None  OTC Pain Medication: APAP    INR History:  Date 4/5 4/19 4/26 5/5 5/11 6/2 6/15 6/18 6/25 7/2 7/9 7/19   Total Weekly Dose 15mg 15mg 14mg 14mg 14mg 14mg 14mg 14mg 14mg 14mg 14mg 14mg   INR 2.6 3.9 3.9 2.7 3.0 3.6 2.7 2.9 2.9 2.6 2.9 3.1   Notes      decr GLV  recv'd 6/21; HM HM        Date 7/23 7/30 8/6 8/13 8/20          Total WeeklyDose 14mg 14mg 14mg 15mg 15mg          INR 2.5 2.3 2.4 3.4 3.8          Notes incr GLV Less GLV Less GLV Less GLV             Phone Interview:  Tablet Strength: 2mg  Patient Contact Info: [phone] (Mobile) *preferred*  [email or web address]  Estimated OOP cost: Will send if patient comes to [place]  Verbal Release Authorization signed on 4/7/21 -- may speak with Tammy Bai (friend: [phone]), Ismael Michele (brother: [phone])  Lab Contact Info: [facility] ([place])    Patient Findings    Positives:  Change in diet/appetite   Negatives:  Signs/symptoms of thrombosis, Signs/symptoms of bleeding, Laboratory test error suspected, Change in health, Change in alcohol use, Change in activity, Upcoming invasive procedure, Emergency department visit, Upcoming dental procedure, Missed doses, Extra doses, Change in medications, Hospital admission, Bruising, Other complaints   Comments:  Ms. Michele reports that she has been having about 4 priemier protien shakes a week. She is going to being intermittent fasting  from 12-8pm. She had some broccoli and cauliflower this week. Approx. 1c each. 3 servings.          Plan:  1. INR is supratherapeutic at 3.8. Instructed Ms. Michele to reduce dose to 2mg daily She will incorporate cup of broccoli or brussels sprouts into her diet once weekly.  2. Repeat INR 8/27. She works on Tue and Thurs and are not good days to test.   3. Verbal information provided over the phone. Lucie Michele RBV dosing instructions, expresses understanding by teach back, and has no further questions at this time.    Thank you,    Ramana ArteagaD, NANCI, BCPS  8/20/2021  12:23 EDT

## 2021-08-27 ENCOUNTER — ANTICOAGULATION VISIT (OUTPATIENT)
Dept: PHARMACY | Facility: HOSPITAL | Age: 76
End: 2021-08-27

## 2021-08-27 DIAGNOSIS — I35.9 AORTIC VALVE DISEASE: Primary | ICD-10-CM

## 2021-08-27 LAB — INR PPP: 2.9

## 2021-08-27 NOTE — PROGRESS NOTES
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    Diet: 3x week: 1 cup of brussels sprouts or broccoli weekly 8/9/21; premier protein ~4x/week  Alcohol: Seldom  Tobacco: None  OTC Pain Medication: APAP    INR History:  Date 4/5 4/19 4/26 5/5 5/11 6/2 6/15 6/18 6/25 7/2 7/9 7/19   Total Weekly Dose 15mg 15mg 14mg 14mg 14mg 14mg 14mg 14mg 14mg 14mg 14mg 14mg   INR 2.6 3.9 3.9 2.7 3.0 3.6 2.7 2.9 2.9 2.6 2.9 3.1   Notes      decr GLV  recv'd 6/21; HM HM        Date 7/23 7/30 8/6 8/13 8/20 8/27         Total WeeklyDose 14mg 14mg 14mg 15mg 15mg 14mg         INR 2.5 2.3 2.4 3.4 3.8 2.9         Notes incr GLV decr GLV decr GLV decr GLV             Phone Interview:  Tablet Strength: 2mg  Patient Contact Info: [phone] (Mobile) *preferred*  [email or web address]  Estimated OOP cost: Will send if patient comes to [place]  Verbal Release Authorization signed on 4/7/21 -- may speak with Tammy Bai (friend: [phone]), Ismael Michele (brother: [phone])  Lab Contact Info: [facility] ([place])    Patient Findings  Negatives:  Signs/symptoms of thrombosis, Signs/symptoms of bleeding, Laboratory test error suspected, Change in health, Change in alcohol use, Change in activity, Upcoming invasive procedure, Emergency department visit, Upcoming dental procedure, Missed doses, Extra doses, Change in medications, Change in diet/appetite, Hospital admission, Bruising, Other complaints         Plan:  1. INR is therapeutic and back WNL today at 2.9. Instructed Ms. Michele continue recently reduced dose of warfarin 2mg oral  daily until recheck. She will continue with current GLV intake as well  2. Repeat INR in 1 week, 9/3, to ensure WNL  3. Verbal information provided over the phone. Lucie Michele RBV dosing instructions, expresses understanding by teach back, and has no further questions at this time.    Shekhar Scherer, Sondra  8/27/2021  11:06 EDT     I, Tyrone Hensley, PharmD, have reviewed the note in full and agree with the assessment and plan.  08/27/21  15:00 EDT

## 2021-09-03 ENCOUNTER — ANTICOAGULATION VISIT (OUTPATIENT)
Dept: PHARMACY | Facility: HOSPITAL | Age: 76
End: 2021-09-03

## 2021-09-03 DIAGNOSIS — I35.9 AORTIC VALVE DISEASE: Primary | ICD-10-CM

## 2021-09-03 LAB — INR PPP: 2.2

## 2021-09-03 NOTE — PROGRESS NOTES
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    Diet: 3x week: 1 cup of brussels sprouts or broccoli weekly 8/9/21; premier protein ~4x/week  Alcohol: Seldom  Tobacco: None  OTC Pain Medication: APAP    INR History:  Date 4/5 4/19 4/26 5/5 5/11 6/2 6/15 6/18 6/25 7/2 7/9 7/19   Total Weekly Dose 15mg 15mg 14mg 14mg 14mg 14mg 14mg 14mg 14mg 14mg 14mg 14mg   INR 2.6 3.9 3.9 2.7 3.0 3.6 2.7 2.9 2.9 2.6 2.9 3.1   Notes      decr GLV  recv'd 6/21; HM HM        Date 7/23 7/30 8/6 8/13 8/20 8/27 9/3        Total WeeklyDose 14mg 14mg 14mg 15mg 15mg 14mg 14mg        INR 2.5 2.3 2.4 3.4 3.8 2.9 2.2        Notes incr GLV decr GLV decr GLV decr GLV    1 boost         Phone Interview:  Tablet Strength: 2mg  Patient Contact Info: [phone] (Mobile) *preferred*  [email or web address]  Estimated OOP cost: Will send if patient comes to [place]  Verbal Release Authorization signed on 4/7/21 -- may speak with Tammy Bai (friend: [phone]), Ismael Michele (brother: [phone])  Lab Contact Info: [facility] ([place])    Patient findings  Negatives:  Signs/symptoms of thrombosis, Signs/symptoms of bleeding, Laboratory test error suspected, Change in health, Change in alcohol use, Change in activity, Upcoming invasive procedure, Emergency department visit, Upcoming dental procedure, Missed doses, Extra doses, Change in medications, Change in diet/appetite, Hospital admission, Bruising, Other complaints   Comments:  Servings of  broccoli on Tuesday, cauliflower and carrots on Wednesday, Thursday she had pizza with green  cresencio.       Plan:  1. INR is SUBtherapeutic today at 2.2. Instructed Ms. Michele continue recently reduced dose of warfarin 2mg oral daily until recheck. She will continue with current GLV intake as well.   2. Repeat INR in 1 week, 9/10.   3. Verbal information provided over the phone. Lucie Michele RBV dosing instructions, expresses understanding by teach back, and has no further questions at this time.    Shekhar Hawkins, PharmD  9/3/2021  15:26 EDT

## 2021-09-10 ENCOUNTER — ANTICOAGULATION VISIT (OUTPATIENT)
Dept: PHARMACY | Facility: HOSPITAL | Age: 76
End: 2021-09-10

## 2021-09-10 DIAGNOSIS — I35.9 AORTIC VALVE DISEASE: Primary | ICD-10-CM

## 2021-09-10 LAB — INR PPP: 3.2

## 2021-09-10 NOTE — PROGRESS NOTES
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    Diet: 3x week: 1 cup of brussels sprouts or broccoli weekly 8/9/21; premier protein ~4x/week  Alcohol: Seldom  Tobacco: None  OTC Pain Medication: APAP    INR History:  Date 4/5 4/19 4/26 5/5 5/11 6/2 6/15 6/18 6/25 7/2 7/9 7/19   Total Weekly Dose 15mg 15mg 14mg 14mg 14mg 14mg 14mg 14mg 14mg 14mg 14mg 14mg   INR 2.6 3.9 3.9 2.7 3.0 3.6 2.7 2.9 2.9 2.6 2.9 3.1   Notes      decr GLV  recv'd 6/21; HM HM        Date 7/23 7/30 8/6 8/13 8/20 8/27 9/3 9/10       Total WeeklyDose 14mg 14mg 14mg 15mg 15mg 14mg 14mg 15mg 14mg      INR 2.5 2.3 2.4 3.4 3.8 2.9 2.2 3.2       Notes incr GLV decr GLV decr GLV decr GLV    1x incr dose         Phone Interview:  Tablet Strength: 2mg  Patient Contact Info: [phone] (Mobile) *preferred*  [email or web address]  Estimated OOP cost: Will send if patient comes to [place]  Verbal Release Authorization signed on 4/7/21 -- may speak with Tammy Bai (friend: [phone]), Ismael Michele (brother: [phone])  Lab Contact Info: [facility] ([facility])      Patient Findings  Negatives:  Signs/symptoms of thrombosis, Signs/symptoms of bleeding, Laboratory test error suspected, Change in health, Change in alcohol use, Change in activity, Upcoming invasive procedure, Emergency department visit, Upcoming dental procedure, Missed doses, Extra doses, Change in medications, Change in diet/appetite, Hospital admission, Bruising, Other complaints         Plan:  1. INR is therapeutic and back WNL today at 3.2. Significant increase from previous  encounter. Instructed Ms. Michele to be consistent with GLV intake and then continue warfarin 2mg oral daily until recheck.  2. Repeat INR in 1 week, 9/17, to ensure WNL  3. Verbal information provided over the phone. Lucie Michele RBV dosing instructions, expresses understanding by teach back, and has no further questions at this time.    Shekhar Scherer CPhT  9/10/2021  15:23 EDT      I, Lizet Marinelli, PharmD, have reviewed the note in full and agree with the assessment and plan.  09/10/21  15:57 EDT

## 2021-09-17 ENCOUNTER — ANTICOAGULATION VISIT (OUTPATIENT)
Dept: PHARMACY | Facility: HOSPITAL | Age: 76
End: 2021-09-17

## 2021-09-17 DIAGNOSIS — I35.9 AORTIC VALVE DISEASE: Primary | ICD-10-CM

## 2021-09-17 LAB — INR PPP: 2.9

## 2021-09-17 NOTE — PROGRESS NOTES
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     Diet: 3x week: 1 cup of brussels sprouts or broccoli weekly 8/9/21; premier protein ~4x/week  Alcohol: Seldom  Tobacco: None  OTC Pain Medication: APAP    INR History:  Date 4/5 4/19 4/26 5/5 5/11 6/2 6/15 6/18 6/25 7/2 7/9 7/19   Total Weekly Dose 15mg 15mg 14mg 14mg 14mg 14mg 14mg 14mg 14mg 14mg 14mg 14mg   INR 2.6 3.9 3.9 2.7 3.0 3.6 2.7 2.9 2.9 2.6 2.9 3.1   Notes      decr GLV  recv'd 6/21; HM HM        Date 7/23 7/30 8/6 8/13 8/20 8/27 9/3 9/10 9/17      Total WeeklyDose 14mg 14mg 14mg 15mg 15mg 14mg 14mg 15mg 14mg      INR 2.5 2.3 2.4 3.4 3.8 2.9 2.2 3.2 2.9      Notes incr GLV decr GLV decr GLV decr GLV    1x incr dose         Phone Interview:  Tablet Strength: 2mg  Patient Contact Info: [phone] (Mobile) *preferred*  [email or web address]  Estimated OOP cost: Will send if patient comes to [place]  Verbal Release Authorization signed on 4/7/21 -- may speak with Tammy Bai (friend: [phone]), Ismael Greerligia (brother: [phone])  Lab Contact Info: [facility] ([place])    Patient Findings  Negatives:  Signs/symptoms of thrombosis, Signs/symptoms of bleeding, Laboratory test error suspected, Change in health, Change in alcohol use, Change in activity, Upcoming invasive procedure, Emergency department visit, Upcoming dental procedure, Missed doses, Extra doses, Change in medications, Change in diet/appetite, Hospital admission, Bruising, Other complaints     Plan:  1. INR is therapeutic today at 2.9. Instructed Ms. Michele to continue warfarin 2mg  oral daily until recheck.  2. Repeat INR in 1 week, 9/24.  3. Verbal information provided over the phone. Lucie JENNIFER Michele RBV dosing instructions, expresses understanding by teach back, and has no further questions at this time.    Shekhar Scherer, Sondra  9/17/2021  13:07 EDT    I, Tyrone Hensley, PharmD, have reviewed the note in full and agree with the assessment and plan.  09/17/21  15:25 EDT

## 2021-09-24 ENCOUNTER — ANTICOAGULATION VISIT (OUTPATIENT)
Dept: PHARMACY | Facility: HOSPITAL | Age: 76
End: 2021-09-24

## 2021-09-24 DIAGNOSIS — I35.9 AORTIC VALVE DISEASE: Primary | ICD-10-CM

## 2021-09-24 LAB — INR PPP: 3.3

## 2021-09-24 NOTE — PROGRESS NOTES
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     Diet: 3x week: 1 cup of brussels sprouts or broccoli weekly 8/9/21; premier protein ~4x/week  Alcohol: Seldom  Tobacco: None  OTC Pain Medication: APAP    INR History:  Date 4/5 4/19 4/26 5/5 5/11 6/2 6/15 6/18 6/25 7/2 7/9 7/19   Total Weekly Dose 15mg 15mg 14mg 14mg 14mg 14mg 14mg 14mg 14mg 14mg 14mg 14mg   INR 2.6 3.9 3.9 2.7 3.0 3.6 2.7 2.9 2.9 2.6 2.9 3.1   Notes      decr GLV  recv'd 6/21; HM HM        Date 7/23 7/30 8/6 8/13 8/20 8/27 9/3 9/10 9/17 9/24     Total WeeklyDose 14mg 14mg 14mg 15mg 15mg 14mg 14mg 15mg 14mg 14mg     INR 2.5 2.3 2.4 3.4 3.8 2.9 2.2 3.2 2.9 3.3     Notes incr GLV decr GLV decr GLV decr GLV    1x incr dose         Phone Interview:  Tablet Strength: 2mg  Patient Contact Info: [phone] (Mobile) *preferred*  [email or web address]  Estimated OOP cost: Will send if patient comes to [place]  Verbal Release Authorization signed on 4/7/21 -- may speak with Tammy Bai (friend: [phone]), Ismael Michele (brother: [phone])  Lab Contact Info: [facility] ([place])    Patient Findings    Negatives:  Signs/symptoms of thrombosis, Signs/symptoms of bleeding, Laboratory test error suspected, Change in health, Change in alcohol use, Change in activity, Upcoming invasive procedure, Emergency department visit, Upcoming dental procedure, Missed doses, Extra doses, Change in medications, Change in diet/appetite, Hospital admission, Bruising, Other complaints         Plan:  1. INR is therapeutic today at 3.3. Instructed MsDaylin Ryne to continue  warfarin 2mg oral daily until recheck.  2. Repeat INR in 1 week, 9/24.  3. Verbal information provided over the phone. Lucie Michele RBV dosing instructions, expresses understanding by teach back, and has no further questions at this time.    Lizet Marinelli, PharmD.  09/24/21   14:41 EDT

## 2021-10-01 ENCOUNTER — ANTICOAGULATION VISIT (OUTPATIENT)
Dept: PHARMACY | Facility: HOSPITAL | Age: 76
End: 2021-10-01

## 2021-10-01 DIAGNOSIS — I35.9 AORTIC VALVE DISEASE: Primary | ICD-10-CM

## 2021-10-01 LAB — INR PPP: 3.2

## 2021-10-01 NOTE — PROGRESS NOTES
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     Diet: 3x week: 1 cup of brussels sprouts or broccoli weekly 8/9/21; premier protein ~4x/week  Alcohol: Seldom  Tobacco: None  OTC Pain Medication: APAP    INR History:  Date 4/5 4/19 4/26 5/5 5/11 6/2 6/15 6/18 6/25 7/2 7/9 7/19   Total Weekly Dose 15mg 15mg 14mg 14mg 14mg 14mg 14mg 14mg 14mg 14mg 14mg 14mg   INR 2.6 3.9 3.9 2.7 3.0 3.6 2.7 2.9 2.9 2.6 2.9 3.1   Notes      decr GLV  recv'd 6/21; HM HM        Date 7/23 7/30 8/6 8/13 8/20 8/27 9/3 9/10 9/17 9/24 10/1    Total WeeklyDose 14mg 14mg 14mg 15mg 15mg 14mg 14mg 15mg 14mg 14mg 14mg    INR 2.5 2.3 2.4 3.4 3.8 2.9 2.2 3.2 2.9 3.3 3.2    Notes incr GLV decr GLV decr GLV decr GLV    1x incr dose         Phone Interview:  Tablet Strength: 2mg  Patient Contact Info: [phone] (Mobile) *preferred*  [email or web address]  Estimated OOP cost: Will send if patient comes to [place]  Verbal Release Authorization signed on 4/7/21 -- may speak with Tammy Bai (friend: [phone]), Ismael Michele (brother: [phone])  Lab Contact Info: [facility] ([place])    Patient Findings  Negatives:  Signs/symptoms of thrombosis, Signs/symptoms of bleeding, Laboratory test error suspected, Change in health, Change in alcohol use, Change in activity, Upcoming invasive procedure, Emergency department visit, Upcoming dental procedure, Missed doses, Extra doses, Change in medications, Change in diet/appetite, Hospital admission, Bruising, Other complaints     Plan:  1. INR is therapeutic today at 3.2. Instructed Ms. Michele to  continue warfarin 2mg oral daily until recheck.  2. Repeat INR in 1 week, 10/8  3. Verbal information provided over the phone. Lucie Michele RBV dosing instructions, expresses understanding by teach back, and has no further questions at this time.    Shekhar Scherer, Sondra  10/1/2021  15:21 EDT    I, Rosanna Guardado, PharmD, have reviewed the note in full and agree with the assessment and plan.  10/01/21  15:29 EDT

## 2021-10-08 ENCOUNTER — ANTICOAGULATION VISIT (OUTPATIENT)
Dept: PHARMACY | Facility: HOSPITAL | Age: 76
End: 2021-10-08

## 2021-10-08 DIAGNOSIS — I35.9 AORTIC VALVE DISEASE: Primary | ICD-10-CM

## 2021-10-08 LAB — INR PPP: 3.3

## 2021-10-08 NOTE — PROGRESS NOTES
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     Diet: 3x week: 1 cup of brussels sprouts or broccoli weekly 8/9/21; premier protein ~4x/week  Alcohol: Seldom  Tobacco: None  OTC Pain Medication: APAP    INR History:  Date 4/5 4/19 4/26 5/5 5/11 6/2 6/15 6/18 6/25 7/2 7/9 7/19 7/23 7/30   Total Weekly Dose 15mg 15mg 14mg 14mg 14mg 14mg 14mg 14mg 14mg 14mg 14mg 14mg 14mg 14mg   INR 2.6 3.9 3.9 2.7 3.0 3.6 2.7 2.9 2.9 2.6 2.9 3.1 2.5 2.3   Notes      decr GLV  recv'd 6/21; HM HM    incr GLV decr GLV     Date 8/6 8/13 8/20 8/27 9/3 9/10 9/17 9/24 10/1 10/8       Total WeeklyDose 14mg 15mg 15mg 14mg 14mg 15mg 14mg 14mg 14mg 14mg       INR 2.4 3.4 3.8 2.9 2.2 3.2 2.9 3.3 3.2 3.3       Notes decr GLV decr GLV    1x incr dose             Phone Interview:  Tablet Strength: 2mg  Patient Contact Info: [phone] (Mobile) *preferred*  [email or web address]  Estimated OOP cost: Will send if patient comes to [place]  Verbal Release Authorization signed on 4/7/21 -- may speak with Tammy Bai (friend: [phone]), Ismael Michele (brother: [phone])  Lab Contact Info: [facility] ([place])    Patient Findings  Positives:  Bruising   Negatives:  Signs/symptoms of thrombosis, Signs/symptoms of bleeding, Laboratory test error suspected, Change in health, Change in alcohol use, Change in activity, Upcoming invasive procedure, Emergency department visit, Upcoming dental procedure, Missed doses, Extra doses, Change in medications, Change in diet/appetite, Hospital admission, Other complaints   Comments:  Patient reports she  has large bruise on calf from hitting piece of wood in her garage. Does confirm it is healing. She is agreeable to keep an eye on it. Otherwise denies findings.        Plan:  1. INR is therapeutic today at 3.3. Instructed Ms. Michele to continue warfarin 2mg oral daily until recheck.  2. Repeat INR in 1 week, 10/15  3. Verbal information provided over the phone. Lucie Michele RBV dosing instructions, expresses understanding by teach back, and has no further questions at this time.    Shekhar Scherer CPhT  10/8/2021  12:11 EDT      I, Shekhar Hawkins, PharmD, have reviewed the note in full and agree with the assessment and plan.  10/08/21  13:26 EDT

## 2021-10-15 ENCOUNTER — ANTICOAGULATION VISIT (OUTPATIENT)
Dept: PHARMACY | Facility: HOSPITAL | Age: 76
End: 2021-10-15

## 2021-10-15 DIAGNOSIS — I35.9 AORTIC VALVE DISEASE: Primary | ICD-10-CM

## 2021-10-15 LAB — INR PPP: 3

## 2021-10-15 NOTE — PROGRESS NOTES
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     Diet: 3x week: 1 cup of brussels sprouts or broccoli weekly 8/9/21; premier protein ~4x/week  Alcohol: Seldom  Tobacco: None  OTC Pain Medication: APAP    INR History:  Date 4/5 4/19 4/26 5/5 5/11 6/2 6/15 6/18 6/25 7/2 7/9 7/19 7/23 7/30   Total Weekly Dose 15mg 15mg 14mg 14mg 14mg 14mg 14mg 14mg 14mg 14mg 14mg 14mg 14mg 14mg   INR 2.6 3.9 3.9 2.7 3.0 3.6 2.7 2.9 2.9 2.6 2.9 3.1 2.5 2.3   Notes      decr GLV  recv'd 6/21; HM HM    incr GLV decr GLV     Date 8/6 8/13 8/20 8/27 9/3 9/10 9/17 9/24 10/1 10/8 10/15      Total WeeklyDose 14mg 15mg 15mg 14mg 14mg 15mg 14mg 14mg 14mg 14mg 14mg      INR 2.4 3.4 3.8 2.9 2.2 3.2 2.9 3.3 3.2 3.3 3.0      Notes decr GLV decr GLV    1x incr dose     call        Phone Interview:  Tablet Strength: 2mg  Patient Contact Info: [phone] (Mobile) *preferred*  [email or web address]  Estimated OOP cost: Will send if patient comes to [place]  Verbal Release Authorization signed on 4/7/21 -- may speak with Tmamy Bai (friend: [phone]), Ismael Ryne (brother: [phone])  Lab Contact Info: [facility] ([facility])  ** will call once monthly or if INR is out of range**    Patient Findings  Negatives:  Signs/symptoms of thrombosis, Signs/symptoms of bleeding, Laboratory test error suspected, Change in health, Change in alcohol use, Change in activity, Upcoming invasive procedure, Emergency department visit, Upcoming dental procedure, Missed doses, Extra doses, Change in medications, Change in diet/appetite, Hospital admission,  Bruising, Other complaints   Comments:  Patient denies all findings. Will initate warfnocall protocol       Plan:  1. INR is therapeutic today at 3.0. Instructed Ms. Michele to continue warfarin 2mg oral daily until recheck.  2. Repeat INR in 1 week, 10/22. Will init warfnocall protocol going forward.  3. Verbal information provided over the phone. Lucie Michele RBV dosing instructions, expresses understanding by teach back, and has no further questions at this time.  4. Lucie Michele understands the importance of calling the [facility] if she notices any s/sx of bleeding, stroke, or abnormal bruising, if any changes are made to her medications or medication doses (Rx, OTC, herbal), or if any upcoming procedures are scheduled. Lucie Michele will likewise let us know if any other changes, questions, or concerns arise regarding anticoagulation therapy. she understands the importance of seeking medical attention immediately if she experiences any falls, vehicle accidents, or abnormal bleeding or bruising. Lucie Michele voiced understanding of this information and confirms that she has the [facility]'s contact information. Otherwise, we will plan to contact the patient once monthly or if her INR is out of range.    Shekhar Scherer, Sondra  10/15/2021  11:43 EDT     I, Tyrone Hensley, PharmD, have reviewed the note in full and agree with the assessment and plan. To initiate warfnocall policy at next encounter.  10/15/21  13:57 EDT

## 2021-10-22 ENCOUNTER — ANTICOAGULATION VISIT (OUTPATIENT)
Dept: PHARMACY | Facility: HOSPITAL | Age: 76
End: 2021-10-22

## 2021-10-22 DIAGNOSIS — I35.9 AORTIC VALVE DISEASE: Primary | ICD-10-CM

## 2021-10-22 LAB — INR PPP: 2.4

## 2021-10-22 NOTE — PROGRESS NOTES
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     Diet: 3x week: 1 cup of brussels sprouts or broccoli weekly 8/9/21; premier protein ~4x/week  Alcohol: Seldom  Tobacco: None  OTC Pain Medication: APAP    INR History:  Date 4/5 4/19 4/26 5/5 5/11 6/2 6/15 6/18 6/25 7/2 7/9 7/19 7/23 7/30   Total Weekly Dose 15mg 15mg 14mg 14mg 14mg 14mg 14mg 14mg 14mg 14mg 14mg 14mg 14mg 14mg   INR 2.6 3.9 3.9 2.7 3.0 3.6 2.7 2.9 2.9 2.6 2.9 3.1 2.5 2.3   Notes      decr GLV  recv'd 6/21; HM HM    incr GLV decr GLV     Date 8/6 8/13 8/20 8/27 9/3 9/10 9/17 9/24 10/1 10/8 10/15 10/22     Total WeeklyDose 14mg 15mg 15mg 14mg 14mg 15mg 14mg 14mg 14mg 14mg 14mg 14mg 15mg    INR 2.4 3.4 3.8 2.9 2.2 3.2 2.9 3.3 3.2 3.3 3.0 2.4     Notes decr GLV decr GLV    1x incr dose     call call 1x incr dose      Phone Interview:  Tablet Strength: 2mg  Patient Contact Info: [phone] (Mobile) *preferred*  [email or web address]  Estimated OOP cost: Will send if patient comes to [place]  Verbal Release Authorization signed on 4/7/21 -- may speak with Tammy Bai (friend: [phone]), Ismael Michele (brother: [phone])  Lab Contact Info: [facility] ([facility])  ** will call once monthly or if INR is out of range**    Patient Findings  Negatives:  Signs/symptoms of thrombosis, Signs/symptoms of bleeding, Laboratory test error suspected, Change in health, Change in alcohol use, Change in activity, Upcoming invasive procedure, Emergency department visit, Upcoming dental procedure, Missed doses, Extra doses, Change in medications, Change in  diet/appetite, Hospital admission, Bruising, Other complaints     Plan:  1. INR is SUBtherapeutic today at 2.4. Instructed Ms. Michele to INCREASE tonight's dose to 3mg and then continue warfarin 2mg oral daily until recheck.  2. Repeat INR in 1 week, 10/29. Will resume warfnocall protocol if therapeutic at next encounter  3. Verbal information provided over the phone. Lucie Michele RBV dosing instructions, expresses understanding by teach back, and has no further questions at this time.  4. Lucie Michele understands the importance of calling the [facility] if she notices any s/sx of bleeding, stroke, or abnormal bruising, if any changes are made to her medications or medication doses (Rx, OTC, herbal), or if any upcoming procedures are scheduled. Lucie Michele will likewise let us know if any other changes, questions, or concerns arise regarding anticoagulation therapy. she understands the importance of seeking medical attention immediately if she experiences any falls, vehicle accidents, or abnormal bleeding or bruising. Lucie Michele voiced understanding of this information and confirms that she has the [facility]'s contact information. Otherwise, we will plan to contact the patient once monthly or if her INR is out of range.    Shekhar Scherer CPhT  10/22/2021  11:34 EDT     I, Lizet Marinelli, PharmD, have reviewed the note in full and agree with the assessment and plan.  10/22/21  16:33 EDT

## 2021-10-29 ENCOUNTER — ANTICOAGULATION VISIT (OUTPATIENT)
Dept: PHARMACY | Facility: HOSPITAL | Age: 76
End: 2021-10-29

## 2021-10-29 DIAGNOSIS — I35.9 AORTIC VALVE DISEASE: Primary | ICD-10-CM

## 2021-10-29 LAB — INR PPP: 2.4

## 2021-10-29 NOTE — PROGRESS NOTES
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     Diet: 3x week: 1 cup of brussels sprouts or broccoli weekly (8/9/21)  Premier Protein (or Equate brand) meal replacement ~7x/week (10/29/21)  Alcohol: Seldom  Tobacco: None  OTC Pain Medication: APAP    INR History:  Date 4/5 4/19 4/26 5/5 5/11 6/2 6/15 6/18 6/25 7/2 7/9 7/19 7/23 7/30   Total Weekly Dose 15mg 15mg 14mg 14mg 14mg 14mg 14mg 14mg 14mg 14mg 14mg 14mg 14mg 14mg   INR 2.6 3.9 3.9 2.7 3.0 3.6 2.7 2.9 2.9 2.6 2.9 3.1 2.5 2.3   Notes      decr GLV  recv'd 6/21; HM HM    incr GLV decr GLV     Date 8/6 8/13 8/20 8/27 9/3 9/10 9/17 9/24 10/1 10/8 10/15 10/22 10/29    Total WeeklyDose 14mg 15mg 15mg 14mg 14mg 15mg 14mg 14mg 14mg 14mg 14mg 14mg 15mg 16mg   INR 2.4 3.4 3.8 2.9 2.2 3.2 2.9 3.3 3.2 3.3 3.0 2.4 2.4    Notes decr GLV decr GLV    1x incr dose     call call 1x incr dose;   incr VitK call 2x incr dose; call     Phone Interview:  Tablet Strength: 2mg  Patient Contact Info: [phone] (Mobile) *preferred*  [email or web address]  Estimated OOP cost: Will send if patient comes to [place]  Verbal Release Authorization signed on 4/7/21 -- may speak with Tammy Bhumika (friend: [phone]), Ismael Michele (brother: [phone])  Lab Contact Info: [facility] ([facility])  ** will call once monthly or if INR is out of range**    Patient Findings  Positives:  Change in diet/appetite   Negatives:  Signs/symptoms of thrombosis, Signs/symptoms of bleeding, Laboratory test error suspected, Change in health, Change in alcohol use, Change in activity, Upcoming  invasive procedure, Emergency department visit, Upcoming dental procedure, Missed doses, Extra doses, Change in medications, Hospital admission, Bruising, Other complaints   Comments:  Patient reports she has been drinking her meal replacement drinks ~7x/week. This may be slight increase in prior 4-5x a week intake. May account for SUBtherapeutic INR. Also reports starting Monday she has initiated mediterranean diet. Otherwise denies findings.       Plan:  1. INR is SUBtherapeutic today, again at 2.4. Instructed Ms. Michele to INCREASE tonight and tomorrow's dose to 3mg and then continue warfarin 2mg oral daily until recheck.  2. Repeat INR in 1 week, 11/5. Will resume warfnocall protocol if therapeutic at next encounter  3. Verbal information provided over the phone. Lucie Michele RBV dosing instructions, expresses understanding by teach back, and has no further questions at this time.  4. Lucie Michele understands the importance of calling the [facility] if she notices any s/sx of bleeding, stroke, or abnormal bruising, if any changes are made to her medications or medication doses (Rx, OTC, herbal), or if any upcoming procedures are scheduled. Lucie Michele will likewise let us know if any other changes, questions, or concerns arise regarding anticoagulation therapy. she understands the importance of seeking medical attention immediately if she experiences any falls, vehicle accidents, or abnormal bleeding or bruising. Lucie Michele voiced understanding of this information and confirms that she has the [facility]'s contact information. Otherwise, we will plan to contact the patient once monthly or if her INR is out of range.    Shekhar Scherer, Sondra  10/29/2021  13:14 EDT    I, Tyrone Hensley, PharmD, have reviewed the note in full and agree with the assessment and plan.  10/29/21  14:11 EDT

## 2021-11-05 ENCOUNTER — ANTICOAGULATION VISIT (OUTPATIENT)
Dept: PHARMACY | Facility: HOSPITAL | Age: 76
End: 2021-11-05

## 2021-11-05 DIAGNOSIS — I35.9 AORTIC VALVE DISEASE: Primary | ICD-10-CM

## 2021-11-05 LAB — INR PPP: 2.4

## 2021-11-05 NOTE — PROGRESS NOTES
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     Diet: 3x week: 1 cup of brussels sprouts or broccoli weekly (8/9/21)  Premier Protein (or Equate brand) meal replacement ~7x/week (10/29/21)  Alcohol: Seldom  Tobacco: None  OTC Pain Medication: APAP    INR History:  Date 4/5 4/19 4/26 5/5 5/11 6/2 6/15 6/18 6/25 7/2 7/9 7/19 7/23 7/30   Total Weekly Dose 15mg 15mg 14mg 14mg 14mg 14mg 14mg 14mg 14mg 14mg 14mg 14mg 14mg 14mg   INR 2.6 3.9 3.9 2.7 3.0 3.6 2.7 2.9 2.9 2.6 2.9 3.1 2.5 2.3   Notes      decr GLV  recv'd 6/21;  HM    incr GLV decr GLV     Date 8/6 8/13 8/20 8/27 9/3 9/10 9/17 9/24 10/1 10/8 10/15 10/22 10/29 11/5   Total WeeklyDose 14mg 15mg 15mg 14mg 14mg 15mg 14mg 14mg 14mg 14mg 14mg 14mg 15mg 16mg   INR 2.4 3.4 3.8 2.9 2.2 3.2 2.9 3.3 3.2 3.3 3.0 2.4 2.4 2.4   Notes decr GLV decr GLV    1xboost     call call 1x boost   incr VitK call 2x boost; call     Date               Total WeeklyDose               INR               Notes                 Phone Interview:  Tablet Strength: 2mg  Patient Contact Info: [phone] (Mobile) *preferred*  [email or web address]  Estimated OOP cost: Will send if patient comes to [place]  Verbal Release Authorization signed on 4/7/21 -- may speak with Tammy Bhumika (friend: [phone]), Ismael Michele (brother: [phone])  Lab Contact Info: [facility] ([facility])  ** will call once monthly or if INR is out of range**    Patient Findings  Negatives:  Signs/symptoms of thrombosis, Signs/symptoms of bleeding, Laboratory test error suspected, Change in health, Change in  alcohol use, Change in activity, Upcoming invasive procedure, Emergency department visit, Upcoming dental procedure, Missed doses, Extra doses, Change in medications, Change in diet/appetite, Hospital admission, Bruising, Other complaints   Comments:  Patient denies all findings. She feels she has been consistent with GLV intake. Admits she does have good intake of VitK containing foods and Ensure drinks but believes it has been consistent.      Plan:  1. INR is SUBtherapeutic again today, stable at 2.4. After consulting with Ngoc Brooks, PharmD, instructed Ms. Michele to increase to warfarin 3mg oral daily except for 2mg on SunTuesThurs until recheck.  2. Repeat INR in on Wed, 11/10  3. Verbal information provided over the phone. Lucie Michele RBV dosing instructions, expresses understanding by teach back, and has no further questions at this time.  4. Lucie Michele understands the importance of calling the [facility] if she notices any s/sx of bleeding, stroke, or abnormal bruising, if any changes are made to her medications or medication doses (Rx, OTC, herbal), or if any upcoming procedures are scheduled. Lucie Michele will likewise let us know if any other changes, questions, or concerns arise regarding anticoagulation therapy. she understands the importance of seeking medical attention immediately if she experiences any falls, vehicle accidents, or abnormal bleeding or bruising. Lucie Michele voiced understanding of this information and confirms that she has the [facility]'s contact information. Otherwise, we will plan to contact the patient once monthly or if her INR is out of range.    Shekhar Scherer, [facility]  11/5/2021  16:22 EDT    I, Jesenia Garcia, PharmD, have reviewed the note in full and agree with the assessment and plan.  It appears that plan is to increase maintence dose to warfarin 2 mg oral daily except 3 mg MonWedFri (17 mg/week).    11/08/21  14:10  EST

## 2021-11-10 ENCOUNTER — ANTICOAGULATION VISIT (OUTPATIENT)
Dept: PHARMACY | Facility: HOSPITAL | Age: 76
End: 2021-11-10

## 2021-11-10 DIAGNOSIS — I35.9 AORTIC VALVE DISEASE: Primary | ICD-10-CM

## 2021-11-10 LAB — INR PPP: 3.7

## 2021-11-10 NOTE — PROGRESS NOTES
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     Diet: 3x week: 1 cup of brussels sprouts or broccoli weekly (8/9/21)  Premier Protein (or Equate brand) meal replacement ~7x/week (10/29/21)  Alcohol: Seldom  Tobacco: None  OTC Pain Medication: APAP    INR History:  Date 4/5 4/19 4/26 5/5 5/11 6/2 6/15 6/18 6/25 7/2 7/9 7/19 7/23 7/30   Total Weekly Dose 15mg 15mg 14mg 14mg 14mg 14mg 14mg 14mg 14mg 14mg 14mg 14mg 14mg 14mg   INR 2.6 3.9 3.9 2.7 3.0 3.6 2.7 2.9 2.9 2.6 2.9 3.1 2.5 2.3   Notes      decr GLV  recv'd 6/21; [facility]    incr GLV decr GLV     Date 8/6 8/13 8/20 8/27 9/3 9/10 9/17 9/24 10/1 10/8 10/15 10/22 10/29 11/5   Total WeeklyDose 14mg 15mg 15mg 14mg 14mg 15mg 14mg 14mg 14mg 14mg 14mg 14mg 15mg 16mg   INR 2.4 3.4 3.8 2.9 2.2 3.2 2.9 3.3 3.2 3.3 3.0 2.4 2.4 2.4   Notes decr GLV decr GLV    1xboost     call call 1x boost   incr VitK call 2x boost; call     Date 11/10              Total WeeklyDose 17mg              INR 3.7              Notes Dec GLV                Phone Interview:  Tablet Strength: 2mg  Patient Contact Info: [phone] (Mobile) *preferred*  [email or web address]  Estimated OOP cost: Will send if patient comes to [place]  Verbal Release Authorization signed on 4/7/21 -- may speak with Tammy Bhumika (friend: [phone]), Ismael Michele (brother: [phone])  Lab Contact Info: [facility] ([facility])  ** will call once monthly or if INR is out of range**    Patient Findings  Positives:  Change in diet/appetite   Negatives:  Signs/symptoms of thrombosis, Signs/symptoms of bleeding,  Laboratory test error suspected, Change in health, Change in alcohol use, Change in activity, Upcoming invasive procedure, Emergency department visit, Upcoming dental procedure, Missed doses, Extra doses, Change in medications, Hospital admission, Bruising, Other complaints   Comments:  Has had reduced GLV the last few days and forgot to drink her protein drink yesterday. Plans to resume her normal GLV (jimmie, broccoli)          Plan:  1. INR is SUPRAtherapeutic today at 3.7, significant increase from previous encounter Instructed Ms. Michele to increase to warfarin 2mg oral daily except for 3mg on MonFri until recheck.  2. Repeat INR in on Wed, 11/10  3. Verbal information provided over the phone. Lucie Michele RBV dosing instructions, expresses understanding by teach back, and has no further questions at this time.  4. Lucie Michele understands the importance of calling the [facility] if she notices any s/sx of bleeding, stroke, or abnormal bruising, if any changes are made to her medications or medication doses (Rx, OTC, herbal), or if any upcoming procedures are scheduled. Lucie Michele will likewise let us know if any other changes, questions, or concerns arise regarding anticoagulation therapy. she understands the importance of seeking medical attention immediately if she experiences any falls, vehicle accidents, or abnormal bleeding or bruising. Lucie Michele voiced understanding of this information and confirms that she has the [facility]'s contact information. Otherwise, we will plan to contact the patient once monthly or if her INR is out of range.      Lizet Marinelli, JenniD.  11/10/21   12:21 EST

## 2021-11-17 ENCOUNTER — ANTICOAGULATION VISIT (OUTPATIENT)
Dept: PHARMACY | Facility: HOSPITAL | Age: 76
End: 2021-11-17

## 2021-11-17 DIAGNOSIS — I35.9 AORTIC VALVE DISEASE: Primary | ICD-10-CM

## 2021-11-17 LAB — INR PPP: 3.3

## 2021-11-17 NOTE — PROGRESS NOTES
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     Diet: 3x week: 1 cup of brussels sprouts or broccoli weekly (8/9/21)  Premier Protein (or Equate brand) meal replacement ~7x/week (10/29/21)  Alcohol: Seldom  Tobacco: None  OTC Pain Medication: APAP    INR History:  Date 4/5 4/19 4/26 5/5 5/11 6/2 6/15 6/18 6/25 7/2 7/9 7/19 7/23 7/30   Total Weekly Dose 15mg 15mg 14mg 14mg 14mg 14mg 14mg 14mg 14mg 14mg 14mg 14mg 14mg 14mg   INR 2.6 3.9 3.9 2.7 3.0 3.6 2.7 2.9 2.9 2.6 2.9 3.1 2.5 2.3   Notes      decr GLV  recv'd 6/21; [facility]    incr GLV decr GLV     Date 8/6 8/13 8/20 8/27 9/3 9/10 9/17 9/24 10/1 10/8 10/15 10/22 10/29 11/5   Total WeeklyDose 14mg 15mg 15mg 14mg 14mg 15mg 14mg 14mg 14mg 14mg 14mg 14mg 15mg 16mg   INR 2.4 3.4 3.8 2.9 2.2 3.2 2.9 3.3 3.2 3.3 3.0 2.4 2.4 2.4   Notes decr GLV decr GLV    1xboost     call call 1x boost   incr VitK call 2x boost; call     Date 11/10 11/17             Total WeeklyDose 17mg 16mg             INR 3.7 3.3             Notes Dec GLV                Phone Interview:  Tablet Strength: 2mg  Patient Contact Info: [phone] (Mobile) *preferred*  [email or web address]  Estimated OOP cost: Will send if patient comes to [place]  Verbal Release Authorization signed on 4/7/21 -- may speak with Tammy Bai (friend: [phone]), Ismael Michele (brother: [phone])  Lab Contact Info: [facility] ([facility])  ** will call once monthly or if INR is out of range**    Patient Findings    Negatives:  Signs/symptoms of thrombosis, Signs/symptoms of bleeding, Laboratory test error  DAVI for signature.        suspected, Change in health, Change in alcohol use, Change in activity, Upcoming invasive procedure, Emergency department visit, Upcoming dental procedure, Missed doses, Extra doses, Change in medications, Change in diet/appetite, Hospital admission, Bruising, Other complaints   Comments:  Patient INR within range for this visit. Patient not contacted.          Plan:  1. INR is therapeutic today at 3.3 (Goal 2.5 - 3.5), Instructed Ms. Michele to continue warfarin 2mg oral daily except for 3mg on MonFri until recheck.  2. Repeat INR in on Wed, 11/24  3. Verbal information provided over the phone. Lucie Michele RBV dosing instructions, expresses understanding by teach back, and has no further questions at this time.  4. Lucie Michele understands the importance of calling the [facility] if she notices any s/sx of bleeding, stroke, or abnormal bruising, if any changes are made to her medications or medication doses (Rx, OTC, herbal), or if any upcoming procedures are scheduled. Lucie Michele will likewise let us know if any other changes, questions, or concerns arise regarding anticoagulation therapy. she understands the importance of seeking medical attention immediately if she experiences any falls, vehicle accidents, or abnormal bleeding or bruising. Lucie Michele voiced understanding of this information and confirms that she has the [facility]'s contact information. Otherwise, we will plan to contact the patient once monthly or if her INR is out of range.      Herberth Khanna,  Tammy Candidate 2022 11/17/21  10:53 EST    Back in range with 16 mg/week, continue for now.  IShekhar, Tammy, have reviewed the note in full and agree with the assessment and plan.  11/18/21  08:26 EST

## 2021-11-24 ENCOUNTER — ANTICOAGULATION VISIT (OUTPATIENT)
Dept: PHARMACY | Facility: HOSPITAL | Age: 76
End: 2021-11-24

## 2021-11-24 DIAGNOSIS — I35.9 AORTIC VALVE DISEASE: Primary | ICD-10-CM

## 2021-11-24 DIAGNOSIS — E78.5 HYPERLIPIDEMIA, UNSPECIFIED HYPERLIPIDEMIA TYPE: ICD-10-CM

## 2021-11-24 LAB — INR PPP: 3.9

## 2021-11-24 RX ORDER — ATORVASTATIN CALCIUM 20 MG/1
20 TABLET, FILM COATED ORAL DAILY
Qty: 30 TABLET | Refills: 0 | Status: SHIPPED | OUTPATIENT
Start: 2021-11-24 | End: 2022-01-17 | Stop reason: SDUPTHER

## 2021-11-24 NOTE — PROGRESS NOTES
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     Diet: 3x week: 1 cup of brussels sprouts or broccoli weekly (8/9/21)  Premier Protein (or Equate brand) meal replacement ~7x/week (10/29/21)  Alcohol: Seldom  Tobacco: None  OTC Pain Medication: APAP    INR History:  Date 4/5 4/19 4/26 5/5 5/11 6/2 6/15 6/18 6/25 7/2 7/9 7/19 7/23 7/30   Total Weekly Dose 15mg 15mg 14mg 14mg 14mg 14mg 14mg 14mg 14mg 14mg 14mg 14mg 14mg 14mg   INR 2.6 3.9 3.9 2.7 3.0 3.6 2.7 2.9 2.9 2.6 2.9 3.1 2.5 2.3   Notes      decr GLV  recv'd 6/21; [facility]    incr GLV decr GLV     Date 8/6 8/13 8/20 8/27 9/3 9/10 9/17 9/24 10/1 10/8 10/15 10/22 10/29 11/5   Total WeeklyDose 14mg 15mg 15mg 14mg 14mg 15mg 14mg 14mg 14mg 14mg 14mg 14mg 15mg 16mg   INR 2.4 3.4 3.8 2.9 2.2 3.2 2.9 3.3 3.2 3.3 3.0 2.4 2.4 2.4   Notes decr GLV decr GLV    1xboost     call call 1x boost   incr VitK call 2x boost; call     Date 11/10 11/17 11/24            Total WeeklyDose 17mg 16mg 16mg            INR 3.7 3.3 3.9            Notes Dec GLV  Zero GLV              Phone Interview:  Tablet Strength: 2mg  Patient Contact Info: [phone] (Mobile) *preferred*  [email or web address]  Estimated OOP cost: Will send if patient comes to [place]  Verbal Release Authorization signed on 4/7/21 -- may speak with Tammy Bai (friend: [phone]), Ismael Percyligia (brother: [phone])  Lab Contact Info: [facility] ([facility])  ** will call once monthly or if INR is out of range**    Patient Findings  Negatives:  Signs/symptoms of thrombosis, Signs/symptoms of bleeding, Laboratory  test error suspected, Change in health, Change in alcohol use, Change in activity, Upcoming invasive procedure, Emergency department visit, Upcoming dental procedure, Missed doses, Extra doses, Change in medications, Change in diet/appetite, Hospital admission, Bruising, Other complaints   Comments:  Had zero GLV this week., has been saving up for thanksgiving         Plan:  1. INR is SUPRAtherapeutic today at 3.9 (Goal 2.5 - 3.5), Instructed Ms. Michele to reduce dose to warfarin 2mg oral daily except for 3mg on Mon until recheck.  2. Repeat INR in on Wed, 12/1  3. Verbal information provided over the phone. Lucie Michele RBV dosing instructions, expresses understanding by teach back, and has no further questions at this time.  4. Lucie Michele understands the importance of calling the [facility] if she notices any s/sx of bleeding, stroke, or abnormal bruising, if any changes are made to her medications or medication doses (Rx, OTC, herbal), or if any upcoming procedures are scheduled. Lucie Michele will likewise let us know if any other changes, questions, or concerns arise regarding anticoagulation therapy. she understands the importance of seeking medical attention immediately if she experiences any falls, vehicle accidents, or abnormal bleeding or bruising. Lucie Michele voiced understanding of this information and confirms that she has the [facility]'s contact information. Otherwise, we will plan to contact the patient once monthly or if her INR is out of range.      Lizet Marinelli, JenniD.  11/24/21   11:49 EST

## 2021-12-01 ENCOUNTER — ANTICOAGULATION VISIT (OUTPATIENT)
Dept: PHARMACY | Facility: HOSPITAL | Age: 76
End: 2021-12-01

## 2021-12-01 ENCOUNTER — OFFICE VISIT (OUTPATIENT)
Dept: CARDIOLOGY | Facility: CLINIC | Age: 76
End: 2021-12-01

## 2021-12-01 VITALS
WEIGHT: 195 LBS | SYSTOLIC BLOOD PRESSURE: 128 MMHG | OXYGEN SATURATION: 96 % | DIASTOLIC BLOOD PRESSURE: 72 MMHG | TEMPERATURE: 97 F | BODY MASS INDEX: 32.49 KG/M2 | RESPIRATION RATE: 11 BRPM | HEART RATE: 73 BPM | HEIGHT: 65 IN

## 2021-12-01 DIAGNOSIS — I71.20 THORACIC AORTIC ANEURYSM WITHOUT RUPTURE (HCC): Primary | ICD-10-CM

## 2021-12-01 DIAGNOSIS — Z95.2 S/P AVR: ICD-10-CM

## 2021-12-01 DIAGNOSIS — G47.33 OBSTRUCTIVE SLEEP APNEA SYNDROME: ICD-10-CM

## 2021-12-01 DIAGNOSIS — I38 HEART FAILURE DUE TO VALVULAR DISEASE, CHRONIC, DIASTOLIC (HCC): ICD-10-CM

## 2021-12-01 DIAGNOSIS — E78.00 PURE HYPERCHOLESTEROLEMIA: ICD-10-CM

## 2021-12-01 DIAGNOSIS — I35.9 AORTIC VALVE DISEASE: Primary | ICD-10-CM

## 2021-12-01 DIAGNOSIS — I50.32 HEART FAILURE DUE TO VALVULAR DISEASE, CHRONIC, DIASTOLIC (HCC): ICD-10-CM

## 2021-12-01 DIAGNOSIS — I10 PRIMARY HYPERTENSION: ICD-10-CM

## 2021-12-01 LAB — INR PPP: 4

## 2021-12-01 PROCEDURE — 93000 ELECTROCARDIOGRAM COMPLETE: CPT | Performed by: INTERNAL MEDICINE

## 2021-12-01 PROCEDURE — 99214 OFFICE O/P EST MOD 30 MIN: CPT | Performed by: INTERNAL MEDICINE

## 2021-12-01 RX ORDER — POTASSIUM CHLORIDE 750 MG/1
10 TABLET, FILM COATED, EXTENDED RELEASE ORAL DAILY
Qty: 90 TABLET | Refills: 3 | Status: SHIPPED | OUTPATIENT
Start: 2021-12-01 | End: 2022-12-08

## 2021-12-01 NOTE — PROGRESS NOTES
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     Diet: 3x week: 1 cup of brussels sprouts or broccoli weekly (8/9/21)  Premier Protein (or Equate brand) meal replacement ~7x/week (10/29/21)  Alcohol: Seldom  Tobacco: None  OTC Pain Medication: APAP    INR History:  Date 4/5 4/19 4/26 5/5 5/11 6/2 6/15 6/18 6/25 7/2 7/9 7/19 7/23 7/30   Total Weekly Dose 15mg 15mg 14mg 14mg 14mg 14mg 14mg 14mg 14mg 14mg 14mg 14mg 14mg 14mg   INR 2.6 3.9 3.9 2.7 3.0 3.6 2.7 2.9 2.9 2.6 2.9 3.1 2.5 2.3   Notes      decr GLV  recv'd 6/21; [facility]    incr GLV decr GLV     Date 8/6 8/13 8/20 8/27 9/3 9/10 9/17 9/24 10/1 10/8 10/15 10/22 10/29 11/5   Total WeeklyDose 14mg 15mg 15mg 14mg 14mg 15mg 14mg 14mg 14mg 14mg 14mg 14mg 15mg 16mg   INR 2.4 3.4 3.8 2.9 2.2 3.2 2.9 3.3 3.2 3.3 3.0 2.4 2.4 2.4   Notes decr GLV decr GLV    1xboost     call call 1x boost   incr VitK call 2x boost; call     Date 11/10 11/17 11/24 12/1           Total WeeklyDose 17mg 16mg 16mg 15mg           INR 3.7 3.3 3.9 4.0           Notes Dec GLV  Zero GLV              Phone Interview:  Tablet Strength: 2mg  Patient Contact Info: [phone] (Mobile) *preferred*  [email or web address]  Estimated OOP cost: Will send if patient comes to [place]  Verbal Release Authorization signed on 4/7/21 -- may speak with Tammy Bai (friend: [phone]), Ismael Michele (brother: [phone])  Lab Contact Info: [facility] ([facility])  ** will call once monthly or if INR is out of range**    Patient Findings  Positives:  Extra doses   Negatives:  Signs/symptoms of thrombosis,  Signs/symptoms of bleeding, Laboratory test error suspected, Change in health, Change in alcohol use, Change in activity, Upcoming invasive procedure, Emergency department visit, Upcoming dental procedure, Missed doses, Change in medications, Change in diet/appetite, Hospital admission, Bruising, Other complaints   Comments:  Did not have anticipated GLV with holiday. Misdosed. Only had 1x serving GLV instead of 2       Plan:  1. INR is SUPRAtherapeutic again today at 4.0 (Goal 2.5 - 3.5), Instructed Ms. Michele to resume normal GLV and take warfarin 2mg oral daily except 3mg Tuesday  until recheck.  2. Repeat INR in on Wed, 12/1  3. Verbal information provided over the phone. Lucie Michele RBV dosing instructions, expresses understanding by teach back, and has no further questions at this time.  4. Lucie Michele understands the importance of calling the [facility] if she notices any s/sx of bleeding, stroke, or abnormal bruising, if any changes are made to her medications or medication doses (Rx, OTC, herbal), or if any upcoming procedures are scheduled. Lucie Michele will likewise let us know if any other changes, questions, or concerns arise regarding anticoagulation therapy. she understands the importance of seeking medical attention immediately if she experiences any falls, vehicle accidents, or abnormal bleeding or bruising. Lucie Michele voiced understanding of this information and confirms that she has the [facility]'s contact information. Otherwise, we will plan to contact the patient once monthly or if her INR is out of range.      Lizet Marinelli, JenniD.  12/01/21   10:11 EST
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
## 2021-12-08 ENCOUNTER — ANTICOAGULATION VISIT (OUTPATIENT)
Dept: PHARMACY | Facility: HOSPITAL | Age: 76
End: 2021-12-08

## 2021-12-08 DIAGNOSIS — I35.9 AORTIC VALVE DISEASE: Primary | ICD-10-CM

## 2021-12-08 LAB — INR PPP: 2.6

## 2021-12-15 ENCOUNTER — ANTICOAGULATION VISIT (OUTPATIENT)
Dept: PHARMACY | Facility: HOSPITAL | Age: 76
End: 2021-12-15

## 2021-12-15 DIAGNOSIS — I35.9 AORTIC VALVE DISEASE: Primary | ICD-10-CM

## 2021-12-15 LAB — INR PPP: 3.4

## 2021-12-15 NOTE — PROGRESS NOTES
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     Diet: 3x week: 1 cup of brussels sprouts or broccoli weekly (8/9/21)  Premier Protein (or Equate brand) meal replacement ~7x/week (10/29/21)  Alcohol: Seldom  Tobacco: None  OTC Pain Medication: APAP    INR History:  Date 4/5 4/19 4/26 5/5 5/11 6/2 6/15 6/18 6/25 7/2 7/9 7/19 7/23 7/30   Total Weekly Dose 15mg 15mg 14mg 14mg 14mg 14mg 14mg 14mg 14mg 14mg 14mg 14mg 14mg 14mg   INR 2.6 3.9 3.9 2.7 3.0 3.6 2.7 2.9 2.9 2.6 2.9 3.1 2.5 2.3   Notes      decr GLV  recv'd 6/21; [facility]    incr GLV decr GLV     Date 8/6 8/13 8/20 8/27 9/3 9/10 9/17 9/24 10/1 10/8 10/15 10/22 10/29 11/5   Total WeeklyDose 14mg 15mg 15mg 14mg 14mg 15mg 14mg 14mg 14mg 14mg 14mg 14mg 15mg 16mg   INR 2.4 3.4 3.8 2.9 2.2 3.2 2.9 3.3 3.2 3.3 3.0 2.4 2.4 2.4   Notes decr GLV decr GLV    1xboost     call call 1x boost   incr VitK call 2x boost; call     Date 11/10 11/17 11/24 12/1 12/8 12/15         Total WeeklyDose 17mg 16mg 16mg 16mg 15mg 15 mg         INR 3.7 3.3 3.9 4.0 2.6 3.4         Notes Dec GLV  Zero GLV call Red x1 call Less GLV   call           Phone Interview:  Tablet Strength: 2mg  Patient Contact Info: [phone] (Mobile) *preferred*  [email or web address]  Estimated OOP cost: Will send if patient comes to [place]  Verbal Release Authorization signed on 4/7/21 -- may speak with Tammy Bai (friend: [phone]), Ismael Michele (brother: [phone])  Lab Contact Info: [facility] ([facility])  ** will call once monthly or if INR is out of  range**    Patient Findings  Positives:  Change in diet/appetite   Negatives:  Signs/symptoms of thrombosis, Signs/symptoms of bleeding, Laboratory test error suspected, Change in health, Change in alcohol use, Change in activity, Upcoming invasive procedure, Emergency department visit, Upcoming dental procedure, Missed doses, Extra doses, Change in medications, Hospital admission, Bruising, Other complaints   Comments:  Ms Michele denies any changes.  Above findings negative     She then stated that she only had one servings of GLV last week rather than her usual two servings.  She stated that she would try to resume her usual intake.     Plan:    1. INR is therapeutic  today at 3.4 (Goal 2.5 - 3.5. Instructed Ms. Michele to  continue warfarin 2mg oral daily except 3mg Mondays until recheck.  2. Repeat INR in on Wed, 12/22  3. Verbal information provided over the phone. Lucie Michele RBV dosing instructions, expresses understanding by teach back, and has no further questions at this time.  4. Lucie Michele understands the importance of calling the [facility] if she notices any s/sx of bleeding, stroke, or abnormal bruising, if any changes are made to her medications or medication doses (Rx, OTC, herbal), or if any upcoming procedures are scheduled. Lucie Michele will likewise let us know if any other changes, questions, or concerns arise regarding anticoagulation therapy. she understands the importance of seeking medical attention immediately if she experiences any falls, vehicle accidents, or abnormal bleeding or bruising. Lucie Michele voiced understanding of this information and confirms that she has the [facility]'s contact information. Otherwise, we will plan to contact the patient once monthly or if her INR is out of range.    Jesenia Garcia, PharmD  12/15/21   10:01 EST

## 2021-12-23 ENCOUNTER — ANTICOAGULATION VISIT (OUTPATIENT)
Dept: PHARMACY | Facility: HOSPITAL | Age: 76
End: 2021-12-23

## 2021-12-23 DIAGNOSIS — I35.9 AORTIC VALVE DISEASE: Primary | ICD-10-CM

## 2021-12-23 DIAGNOSIS — E78.5 HYPERLIPIDEMIA, UNSPECIFIED HYPERLIPIDEMIA TYPE: ICD-10-CM

## 2021-12-23 LAB — INR PPP: 4

## 2021-12-23 NOTE — PROGRESS NOTES
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     Diet: 3x week: 1 cup of brussels sprouts or broccoli weekly (8/9/21)  Premier Protein (or Equate brand) meal replacement ~7x/week (10/29/21)  Alcohol: Seldom  Tobacco: None  OTC Pain Medication: APAP    INR History:  Date 4/5 4/19 4/26 5/5 5/11 6/2 6/15 6/18 6/25 7/2 7/9 7/19 7/23 7/30   Total Weekly Dose 15mg 15mg 14mg 14mg 14mg 14mg 14mg 14mg 14mg 14mg 14mg 14mg 14mg 14mg   INR 2.6 3.9 3.9 2.7 3.0 3.6 2.7 2.9 2.9 2.6 2.9 3.1 2.5 2.3   Notes      decr GLV  recv'd 6/21; [facility]    incr GLV decr GLV     Date 8/6 8/13 8/20 8/27 9/3 9/10 9/17 9/24 10/1 10/8 10/15 10/22 10/29 11/5   Total WeeklyDose 14mg 15mg 15mg 14mg 14mg 15mg 14mg 14mg 14mg 14mg 14mg 14mg 15mg 16mg   INR 2.4 3.4 3.8 2.9 2.2 3.2 2.9 3.3 3.2 3.3 3.0 2.4 2.4 2.4   Notes decr GLV decr GLV    1xboost     call call 1x boost   incr VitK call 2x boost; call     Date 11/10 11/17 11/24 12/1 12/8 12/15 12/23        Total WeeklyDose 17mg 16mg 16mg 16mg 15mg 15 mg Pt did not call back        INR 3.7 3.3 3.9 4.0 2.6 3.4 4.0        Notes Dec GLV  Zero GLV call Red x1 call Less GLV   call           Phone Interview:  Tablet Strength: 2mg  Patient Contact Info: [phone] (Mobile) *preferred*  [email or web address]  Estimated OOP cost: Will send if patient comes to [place]  Verbal Release Authorization signed on 4/7/21 -- may speak with Tammy Bai (friend: [phone]), Ismael Michele (brother: [phone])  Lab Contact Info: [facility] ([facility])  ** will call once monthly or  if INR is out of range**        Plan:  1. INR is SUPRAtherapeutic today at 4.0 on 12/23 (Goal 2.5 - 3.5). Unable to get in touch with Ms. Michele.  2. Repeat INR is due today. Will close this encounter and await new result.  3. Verbal information provided over the phone. Lucie Michele RBV dosing instructions, expresses understanding by teach back, and has no further questions at this time.  4. Lucie Michele understands the importance of calling the [facility] if she notices any s/sx of bleeding, stroke, or abnormal bruising, if any changes are made to her medications or medication doses (Rx, OTC, herbal), or if any upcoming procedures are scheduled. Lucie Michele will likewise let us know if any other changes, questions, or concerns arise regarding anticoagulation therapy. she understands the importance of seeking medical attention immediately if she experiences any falls, vehicle accidents, or abnormal bleeding or bruising. Lucie Michele voiced understanding of this information and confirms that she has the [facility]'s contact information. Otherwise, we will plan to contact the patient once monthly or if her INR is out of range.    Ngoc Brooks, PharmD  12/23/21   10:31 EST

## 2021-12-30 ENCOUNTER — ANTICOAGULATION VISIT (OUTPATIENT)
Dept: PHARMACY | Facility: HOSPITAL | Age: 76
End: 2021-12-30

## 2021-12-30 DIAGNOSIS — I35.9 AORTIC VALVE DISEASE: Primary | ICD-10-CM

## 2021-12-30 LAB — INR PPP: 4.9

## 2021-12-30 NOTE — PROGRESS NOTES
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     Diet: 3x week: 1 cup of brussels sprouts or broccoli weekly (12/30/2021)  Premier Protein (or Equate brand) meal replacement ~7x/week 12/30/2021  Alcohol: Seldom  Tobacco: None  OTC Pain Medication: APAP    INR History:  Date 4/5 4/19 4/26 5/5 5/11 6/2 6/15 6/18 6/25 7/2 7/9 7/19 7/23 7/30   Total Weekly Dose 15mg 15mg 14mg 14mg 14mg 14mg 14mg 14mg 14mg 14mg 14mg 14mg 14mg 14mg   INR 2.6 3.9 3.9 2.7 3.0 3.6 2.7 2.9 2.9 2.6 2.9 3.1 2.5 2.3   Notes      decr GLV  recv'd 6/21; [facility]    incr GLV decr GLV     Date 8/6 8/13 8/20 8/27 9/3 9/10 9/17 9/24 10/1 10/8 10/15 10/22 10/29 11/5   Total WeeklyDose 14mg 15mg 15mg 14mg 14mg 15mg 14mg 14mg 14mg 14mg 14mg 14mg 15mg 16mg   INR 2.4 3.4 3.8 2.9 2.2 3.2 2.9 3.3 3.2 3.3 3.0 2.4 2.4 2.4   Notes decr GLV decr GLV    1xboost     call call 1x boost   incr VitK call 2x boost; call     Date 11/10 11/17 11/24 12/1 12/8 12/15 12/23 12/30       Total WeeklyDose 17mg 16mg 16mg 16mg 15mg 15 mg Pt did not call back 15mg       INR 3.7 3.3 3.9 4.0 2.6 3.4 4.0 4.9       Notes Dec GLV  Zero GLV call Red x1 call Less GLV   call           Phone Interview:  Tablet Strength: 2mg  Patient Contact Info: [phone] (Mobile) *preferred*  [email or web address]  Estimated OOP cost: Will send if patient comes to [place]  Verbal Release Authorization signed on 4/7/21 -- may speak with Tammy Bai (friend: [phone]), Ismael Michele (brother: [phone])  Lab Contact Info: [facility] ([facility])  ** will call  once monthly or if INR is out of range**    Patient Findings  Negatives:  Signs/symptoms of thrombosis, Signs/symptoms of bleeding, Laboratory test error suspected, Change in health, Change in alcohol use, Change in activity, Upcoming invasive procedure, Emergency department visit, Upcoming dental procedure, Missed doses, Extra doses, Change in medications, Change in diet/appetite, Hospital admission, Bruising, Other complaints   Comments:  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 She may have less protein drinks since she typically drinks them on Tuesday and Thursday- although she does try to drink them daily.     Plan:  1. INR is SUPRAtherapeutic today at 4.0 on 12/23 (Goal 2.5 - 3.5). She has a package of brussels sprouts at home and plans to eat them as an extra serving for the week. She will also resume her GLV intake. She will also decrease dose to 1mg tonight then continue 2mg daily until Monday. May need to drop dose again.   2. Repeat INR Monday.   3. Discussed to monitor for s/sx of bleeding including epistaxis, hematuria, unusual bruising, hemoptysis, hematochezia as well as s/sx of stroke including impaired speech, unilateral paralysis, blurry vision and when to seek medical attention   4. Verbal information provided over the phone. Lucie Michele RBV dosing instructions, expresses understanding by teach back, and has no further questions at this time.  5. Lucie Michele understands the importance of calling the [facility] if she notices any s/sx of bleeding, stroke, or abnormal bruising, if any changes are made to her medications or medication doses (Rx, OTC, herbal), or if any upcoming procedures are scheduled. Lucie Michele will likewise let us know if any other changes, questions, or concerns arise regarding anticoagulation therapy. she understands the importance of seeking medical attention immediately if she  experiences any falls, vehicle accidents, or abnormal bleeding or bruising. Lucie Michele voiced understanding of this information and confirms that she has the [facility]'s contact information. Otherwise, we will plan to contact the patient once monthly or if her INR is out of range.    Ngoc Brooks, PharmD  12/30/21   11:30 EST

## 2022-01-03 ENCOUNTER — ANTICOAGULATION VISIT (OUTPATIENT)
Dept: PHARMACY | Facility: HOSPITAL | Age: 77
End: 2022-01-03

## 2022-01-03 DIAGNOSIS — I35.9 AORTIC VALVE DISEASE: Primary | ICD-10-CM

## 2022-01-03 LAB — INR PPP: 3.6

## 2022-01-03 NOTE — PROGRESS NOTES
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     Diet: 3x week: 1 cup of brussels sprouts or broccoli weekly (12/30/2021)  Premier Protein (or Equate brand) meal replacement ~7x/week 12/30/2021  Alcohol: Seldom  Tobacco: None  OTC Pain Medication: APAP    INR History:  Date 4/5 4/19 4/26 5/5 5/11 6/2 6/15 6/18 6/25 7/2 7/9 7/19 7/23 7/30   Total Weekly Dose 15mg 15mg 14mg 14mg 14mg 14mg 14mg 14mg 14mg 14mg 14mg 14mg 14mg 14mg   INR 2.6 3.9 3.9 2.7 3.0 3.6 2.7 2.9 2.9 2.6 2.9 3.1 2.5 2.3   Notes      decr GLV  recv'd 6/21; [facility]    incr GLV decr GLV     Date 8/6 8/13 8/20 8/27 9/3 9/10 9/17 9/24 10/1 10/8 10/15 10/22 10/29 11/5   Total WeeklyDose 14mg 15mg 15mg 14mg 14mg 15mg 14mg 14mg 14mg 14mg 14mg 14mg 15mg 16mg   INR 2.4 3.4 3.8 2.9 2.2 3.2 2.9 3.3 3.2 3.3 3.0 2.4 2.4 2.4   Notes decr GLV decr GLV    1xboost     call call 1x boost   incr VitK call 2x boost; call     Date 11/10 11/17 11/24 12/1 12/8 12/15 12/23 12/30 1/3/22      Total WeeklyDose 17mg 16mg 16mg 16mg 15mg 15 mg Pt did not call back 15mg 12 mg      INR 3.7 3.3 3.9 4.0 2.6 3.4 4.0 4.9 3.6      Notes Dec GLV  Zero GLV call Red x1 call Less GLV   call  Less  Protein drink redx1  She held x1 (midose)        Phone Interview:  Tablet Strength: 2mg  Patient Contact Info: [phone] (Mobile) *preferred*  [email or web address]  Estimated OOP cost: Will send if patient comes to [place]  Verbal Release Authorization signed on 4/7/21 -- may speak with Tammy Bai (friend: [phone]), Ismael Michele (brother: [phone])  Lab  Contact Info: [facility] ([facility])  ** will call once monthly or if INR is out of range**    Patient Findings  Positives:  Missed doses   Negatives:  Signs/symptoms of thrombosis, Signs/symptoms of bleeding, Laboratory test error suspected, Change in health, Change in alcohol use, Change in activity, Upcoming invasive procedure, Emergency department visit, Upcoming dental procedure, Extra doses, Change in medications, Change in diet/appetite, Hospital admission, Bruising, Other complaints   Comments:  She denies any unusual bruising or bleeding   She had a serving of brussel sprouts on Thursday and 1/2 serving of green beans on Friday.     She did not have any protein drinks from Tuesday until this morning.  She plans to resume her protein drinks along with her regular diet   She reported that she held her dose on Thursday and took the 1 mg on Friday, though documentation states that she should have taken 1 mg on Thursday and 2 mg on Friday.   Discussed the importance of consistent intake of her premier protein drinks and foods that contain vitamin K to help stabilize INR.  She verbalized understanding.   Otherwise, above findings negative      Plan:    1. INR is slightly supra therapeutic today at 3.6 though improved from last encounter (Goal 2.5 - 3.5).  Instructed Ms. Michele to continue warfarin 2 mg oral daily until recheck.  She has resumed her GLV intake.  2. Repeat INR Friday, 1/7 per patient preference.   3. She is aware to monitor for s/sx of bleeding including epistaxis, hematuria, unusual bruising, hemoptysis, hematochezia as well as s/sx of stroke including impaired speech, unilateral paralysis, blurry vision and when to seek medical attention   4. Verbal information provided over the phone. Lucie Michele RBV dosing instructions, expresses understanding by teach back, and has no further questions at this time.  5. Lucie Michele understands the importance of calling the [facility] if she notices any s/sx of bleeding, stroke, or abnormal bruising, if any changes are made to her medications or medication doses (Rx, OTC, herbal), or if any upcoming procedures are scheduled. Lucie Michele will likewise let us know if any other changes, questions, or concerns arise regarding anticoagulation therapy. she understands the importance of seeking medical attention immediately if she experiences any falls, vehicle accidents, or abnormal bleeding or bruising. Lucie A Ryne voiced understanding of this information and confirms that she has the [facility]'s contact information. Otherwise, we will plan to contact the patient once monthly or if her INR is out of range.    Jesenia Garcia, PharmD  01/03/22   13:12 EST

## 2022-01-05 DIAGNOSIS — I35.9 AORTIC VALVE DISEASE: ICD-10-CM

## 2022-01-05 RX ORDER — WARFARIN SODIUM 2 MG/1
TABLET ORAL
Qty: 180 TABLET | Refills: 1 | Status: SHIPPED | OUTPATIENT
Start: 2022-01-05 | End: 2023-03-16 | Stop reason: SDUPTHER

## 2022-01-07 ENCOUNTER — ANTICOAGULATION VISIT (OUTPATIENT)
Dept: PHARMACY | Facility: HOSPITAL | Age: 77
End: 2022-01-07

## 2022-01-07 DIAGNOSIS — I35.9 AORTIC VALVE DISEASE: Primary | ICD-10-CM

## 2022-01-07 LAB — INR PPP: 2.4

## 2022-01-07 NOTE — PROGRESS NOTES
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     Diet: 3x week: 1 cup of brussels sprouts or broccoli weekly (12/30/2021)  Premier Protein (or Equate brand) meal replacement ~7x/week 12/30/2021  Alcohol: Seldom  Tobacco: None  OTC Pain Medication: APAP    INR History:  Date 4/5 4/19 4/26 5/5 5/11 6/2 6/15 6/18 6/25 7/2 7/9 7/19 7/23 7/30   Total Weekly Dose 15mg 15mg 14mg 14mg 14mg 14mg 14mg 14mg 14mg 14mg 14mg 14mg 14mg 14mg   INR 2.6 3.9 3.9 2.7 3.0 3.6 2.7 2.9 2.9 2.6 2.9 3.1 2.5 2.3   Notes      decr GLV  recv'd 6/21; [facility]    incr GLV decr GLV     Date 8/6 8/13 8/20 8/27 9/3 9/10 9/17 9/24 10/1 10/8 10/15 10/22 10/29 11/5   Total WeeklyDose 14mg 15mg 15mg 14mg 14mg 15mg 14mg 14mg 14mg 14mg 14mg 14mg 15mg 16mg   INR 2.4 3.4 3.8 2.9 2.2 3.2 2.9 3.3 3.2 3.3 3.0 2.4 2.4 2.4   Notes decr GLV decr GLV    1xboost     call call 1x boost   incr VitK call 2x boost; call     Date 11/10 11/17 11/24 12/1 12/8 12/15 12/23 12/30 1/3/22      Total WeeklyDose 17mg 16mg 16mg 16mg 15mg 15 mg Pt did not call back 15mg 12 mg      INR 3.7 3.3 3.9 4.0 2.6 3.4 4.0 4.9 3.6      Notes Dec GLV  Zero GLV call Red x1 call Less GLV   call  Less  Protein drink redx1  She held x1 (midose)        Phone Interview:  Tablet Strength: 2mg  Patient Contact Info: [phone] (Mobile) *preferred*  [email or web address]  Estimated OOP cost: Will send if patient comes to [place]  Verbal Release Authorization signed on 4/7/21 -- may speak with Tammy Bai (friend: [phone]), Ismael Michele (brother: [phone])  Lab  Contact Info: [facility] ([facility])  ** will call once monthly or if INR is out of range**    Patient Findings  Negatives:  Signs/symptoms of thrombosis, Signs/symptoms of bleeding, Laboratory test error suspected, Change in health, Change in alcohol use, Change in activity, Upcoming invasive procedure, Emergency department visit, Upcoming dental procedure, Missed doses, Extra doses, Change in medications, Change in diet/appetite, Hospital admission, Bruising, Other complaints   Comments:  Had kamarnels sproudonte Tuesday        Plan:    1. INR is slightly sub therapeutic today at 2.4, significant decrease from last encounter (Goal 2.5 - 3.5).  Instructed Ms. Michele to take 3mg tonight then continue warfarin 2 mg oral daily until recheck.  She has resumed her GLV intake.  2. Repeat INR 1/11.   3. Verbal information provided over the phone. Lucie Michele RBV dosing instructions, expresses understanding by teach back, and has no further questions at this time.  4. Lucie Michele understands the importance of calling the [facility] if she notices any s/sx of bleeding, stroke, or abnormal bruising, if any changes are made to her medications or medication doses (Rx, OTC, herbal), or if any upcoming procedures are scheduled. Lucie Michele will likewise let us know if any other changes, questions, or concerns arise regarding anticoagulation therapy. she understands the importance of seeking medical attention immediately if she experiences any falls, vehicle accidents, or abnormal bleeding or bruising. Lucie Michele voiced understanding of this information and confirms that she has the [facility]'s contact information. Otherwise, we will plan to contact the patient once monthly or if her INR is out of range.    Lizet Marinelli, JenniD.  01/07/22   11:40 EST

## 2022-01-11 ENCOUNTER — ANTICOAGULATION VISIT (OUTPATIENT)
Dept: PHARMACY | Facility: HOSPITAL | Age: 77
End: 2022-01-11

## 2022-01-11 DIAGNOSIS — I35.9 AORTIC VALVE DISEASE: Primary | ICD-10-CM

## 2022-01-11 LAB — INR PPP: 3.3

## 2022-01-11 NOTE — PROGRESS NOTES
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     Diet: 3x week: 1 cup of brussels sprouts or broccoli weekly (12/30/2021)  Premier Protein (or Equate brand) meal replacement ~7x/week 12/30/2021  Alcohol: Seldom  Tobacco: None  OTC Pain Medication: APAP    INR History:  Date 4/5 4/19 4/26 5/5 5/11 6/2 6/15 6/18 6/25 7/2 7/9 7/19 7/23 7/30   Total Weekly Dose 15mg 15mg 14mg 14mg 14mg 14mg 14mg 14mg 14mg 14mg 14mg 14mg 14mg 14mg   INR 2.6 3.9 3.9 2.7 3.0 3.6 2.7 2.9 2.9 2.6 2.9 3.1 2.5 2.3   Notes      decr GLV  recv'd 6/21; [facility]    incr GLV decr GLV     Date 8/6 8/13 8/20 8/27 9/3 9/10 9/17 9/24 10/1 10/8 10/15 10/22 10/29 11/5   Total WeeklyDose 14mg 15mg 15mg 14mg 14mg 15mg 14mg 14mg 14mg 14mg 14mg 14mg 15mg 16mg   INR 2.4 3.4 3.8 2.9 2.2 3.2 2.9 3.3 3.2 3.3 3.0 2.4 2.4 2.4   Notes decr GLV decr GLV    1xboost     call call 1x boost   incr VitK call 2x boost; call     Date 11/10 11/17 11/24 12/1 12/8 12/15 12/23 12/30 1/3/22 1/7 1/11    Total WeeklyDose 17mg 16mg 16mg 16mg 15mg 15 mg Pt did not call back 15mg 12 mg 13mg 15mg    INR 3.7 3.3 3.9 4.0 2.6 3.4 4.0 4.9 3.6 2.4 3.3    Notes Dec GLV  Zero GLV call Red x1 call Less GLV   call  Less  Protein drink redx1  She held x1 (midose)  Dec vit K fall      Phone Interview:  Tablet Strength: 2mg  Patient Contact Info: [phone] (Mobile) *preferred*  [email or web address]  Estimated OOP cost: Will send if patient comes to [place]  Verbal Release Authorization signed on 4/7/21 -- may speak with Tammy Bai (friend: [phone]), Ismael  Ryne (brother: [phone])  Lab Contact Info: [facility] ([facility])  ** will call once monthly or if INR is out of range**    Patient Findings    Positives:  Change in diet/appetite, Bruising   Negatives:  Signs/symptoms of thrombosis, Signs/symptoms of bleeding, Laboratory test error suspected, Change in health, Change in alcohol use, Change in activity, Upcoming invasive procedure, Emergency department visit, Upcoming dental procedure, Missed doses, Extra doses, Change in medications, Hospital admission, Other complaints   Comments:  Had a fall on black ice and hurt knee, took 4 total doses APAP. Missed protein drinks x2 days       Plan:    1. INR is therapeutic today at 3.3 (Goal 2.5 - 3.5).  Instructed Ms. Michele to continue warfarin 2 mg oral daily except 3mg Friday until recheck.  .  2. Repeat INR 1/18.   3. Verbal information provided over the phone. Lucie Michele RBV dosing instructions, expresses understanding by teach back, and has no further questions at this time.  4. Lucie Michele understands the importance of calling the [facility] if she notices any s/sx of bleeding, stroke, or abnormal bruising, if any changes are made to her medications or medication doses (Rx, OTC, herbal), or if any upcoming procedures are scheduled. Lucie Michele will likewise let us know if any other changes, questions, or concerns arise regarding anticoagulation therapy. she understands the importance of seeking medical attention immediately if she experiences any falls, vehicle accidents, or abnormal bleeding or bruising. Lucie Michele voiced understanding of this information and confirms that she has the [facility]'s contact information. Otherwise, we will plan to contact the patient once monthly or if her INR is out of range.    Lizet Marinelli, JenniD.  01/11/22   11:50 EST

## 2022-01-17 DIAGNOSIS — E78.5 HYPERLIPIDEMIA, UNSPECIFIED HYPERLIPIDEMIA TYPE: ICD-10-CM

## 2022-01-17 RX ORDER — ATORVASTATIN CALCIUM 20 MG/1
20 TABLET, FILM COATED ORAL DAILY
Qty: 90 TABLET | Refills: 3 | Status: SHIPPED | OUTPATIENT
Start: 2022-01-17 | End: 2022-04-14 | Stop reason: SDUPTHER

## 2022-01-17 RX ORDER — ATORVASTATIN CALCIUM 20 MG/1
20 TABLET, FILM COATED ORAL DAILY
Qty: 30 TABLET | Refills: 0 | Status: SHIPPED | OUTPATIENT
Start: 2022-01-17 | End: 2023-01-04 | Stop reason: SDUPTHER

## 2022-01-17 RX ORDER — FUROSEMIDE 40 MG/1
40 TABLET ORAL DAILY
Qty: 90 TABLET | Refills: 1 | Status: SHIPPED | OUTPATIENT
Start: 2022-01-17 | End: 2022-04-14 | Stop reason: SDUPTHER

## 2022-01-18 ENCOUNTER — ANTICOAGULATION VISIT (OUTPATIENT)
Dept: PHARMACY | Facility: HOSPITAL | Age: 77
End: 2022-01-18

## 2022-01-18 DIAGNOSIS — I35.9 AORTIC VALVE DISEASE: Primary | ICD-10-CM

## 2022-01-18 LAB — INR PPP: 2.8

## 2022-01-18 NOTE — PROGRESS NOTES
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     Diet: 3x week: 1 cup of brussels sprouts or broccoli weekly (12/30/2021)  Premier Protein (or Equate brand) meal replacement ~7x/week 12/30/2021  Alcohol: Seldom  Tobacco: None  OTC Pain Medication: APAP    INR History:  Date 4/5 4/19 4/26 5/5 5/11 6/2 6/15 6/18 6/25 7/2 7/9 7/19 7/23 7/30   Total Weekly Dose 15mg 15mg 14mg 14mg 14mg 14mg 14mg 14mg 14mg 14mg 14mg 14mg 14mg 14mg   INR 2.6 3.9 3.9 2.7 3.0 3.6 2.7 2.9 2.9 2.6 2.9 3.1 2.5 2.3   Notes      decr GLV  recv'd 6/21; [facility]    incr GLV decr GLV     Date 8/6 8/13 8/20 8/27 9/3 9/10 9/17 9/24 10/1 10/8 10/15 10/22 10/29 11/5   Total WeeklyDose 14mg 15mg 15mg 14mg 14mg 15mg 14mg 14mg 14mg 14mg 14mg 14mg 15mg 16mg   INR 2.4 3.4 3.8 2.9 2.2 3.2 2.9 3.3 3.2 3.3 3.0 2.4 2.4 2.4   Notes decr GLV decr GLV    1xboost     call call 1x boost   incr VitK call 2x boost; call     Date 11/10 11/17 11/24 12/1 12/8 12/15 12/23 12/30 1/3/22 1/7 1/11 1/18    Total WeeklyDose 17mg 16mg 16mg 16mg 15mg 15 mg Pt did not call back 15mg 12 mg 13mg 15mg 15 mg    INR 3.7 3.3 3.9 4.0 2.6 3.4 4.0 4.9 3.6 2.4 3.3 2.8    Notes Dec GLV  Zero GLV call Red x1 call Less GLV   call  Less  Protein drink redx1  She held x1 (midose)  Dec vit K fall  Call  apap call      Phone Interview:  Tablet Strength: 2mg  Patient Contact Info: [phone] (Mobile) *preferred*  [email or web address]  Estimated OOP cost: Will send if patient comes to [place]  Verbal Release Authorization signed on 4/7/21 -- may speak with Tammy  Erika (friend: [phone]), Ismael Michele (brother: [phone])  Lab Contact Info: [facility] ([place])  ** will call once monthly or if INR is out of range**    Patient Findings  Positives:  Change in medications, Bruising   Negatives:  Signs/symptoms of thrombosis, Signs/symptoms of bleeding, Laboratory test error suspected, Change in health, Change in alcohol use, Change in activity, Upcoming invasive procedure, Emergency department visit, Upcoming dental procedure, Missed doses, Extra doses, Change in diet/appetite, Hospital admission, Other complaints   Comments:  Her bruise is sliding down her leg after her fall; however, she does not feel any knots.   She has not been taking any apap over the past few days.   Dr Blake has renewed her atorvastatin   Otherwise, above findings negative      Plan:    1. INR is therapeutic today at 2.8 (Goal 2.5 - 3.5).  Instructed Ms. Michele to continue warfarin 2 mg oral daily except 3mg Friday until recheck.  .  2. Repeat INR 1/25   3. Verbal information provided over the phone. Lucie Michele RBV dosing instructions, expresses understanding by teach back, and has no further questions at this time.  4. Lucie Michele understands the importance of calling the [facility] if she notices any s/sx of bleeding, stroke, or abnormal bruising, if any changes are made to her medications or medication doses (Rx, OTC, herbal), or if any upcoming procedures are scheduled. Lucie Michele will likewise let us know if any other changes, questions, or concerns arise regarding anticoagulation therapy. she understands the importance of seeking medical attention immediately if she experiences any falls, vehicle accidents, or abnormal bleeding or bruising. Lucie Mihcele voiced understanding of this information and confirms that she has the [facility]'s contact information. Otherwise, we will plan to contact the patient once monthly or if her INR is out  of range.    Jesenia Garcia, PharmD  01/18/22   13:39 EST

## 2022-01-25 ENCOUNTER — ANTICOAGULATION VISIT (OUTPATIENT)
Dept: PHARMACY | Facility: HOSPITAL | Age: 77
End: 2022-01-25

## 2022-01-25 DIAGNOSIS — I35.9 AORTIC VALVE DISEASE: Primary | ICD-10-CM

## 2022-01-25 LAB — INR PPP: 2.7

## 2022-01-25 NOTE — PROGRESS NOTES
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     Diet: 3x week: 1 cup of brussels sprouts or broccoli weekly (12/30/2021)  Premier Protein (or Equate brand) meal replacement ~7x/week 12/30/2021  Alcohol: Seldom  Tobacco: None  OTC Pain Medication: APAP    INR History:  Date 4/5 4/19 4/26 5/5 5/11 6/2 6/15 6/18 6/25 7/2 7/9 7/19 7/23 7/30   Total Weekly Dose 15mg 15mg 14mg 14mg 14mg 14mg 14mg 14mg 14mg 14mg 14mg 14mg 14mg 14mg   INR 2.6 3.9 3.9 2.7 3.0 3.6 2.7 2.9 2.9 2.6 2.9 3.1 2.5 2.3   Notes      decr GLV  recv'd 6/21; [facility]    incr GLV decr GLV     Date 8/6 8/13 8/20 8/27 9/3 9/10 9/17 9/24 10/1 10/8 10/15 10/22 10/29 11/5   Total WeeklyDose 14mg 15mg 15mg 14mg 14mg 15mg 14mg 14mg 14mg 14mg 14mg 14mg 15mg 16mg   INR 2.4 3.4 3.8 2.9 2.2 3.2 2.9 3.3 3.2 3.3 3.0 2.4 2.4 2.4   Notes decr GLV decr GLV    1xboost     call call 1x boost   incr VitK call 2x boost; call     Date 11/10 11/17 11/24 12/1 12/8 12/15 12/23 12/30 1/3/22 1/7 1/11 1/18 1/25   Total WeeklyDose 17mg 16mg 16mg 16mg 15mg 15 mg Pt did not call back 15mg 12 mg 13mg 15mg 15 mg 15 mg   INR 3.7 3.3 3.9 4.0 2.6 3.4 4.0 4.9 3.6 2.4 3.3 2.8 2.7   Notes Dec GLV  Zero GLV call Red x1 call Less GLV   call  Less  Protein drink redx1  She held x1 (midose)  Dec vit K fall  Call  apap call      Phone Interview:  Tablet Strength: 2mg  Patient Contact Info: [phone] (Mobile) *preferred*  [email or web address]  Estimated OOP cost: Will send if patient comes to [place]  Verbal Release Authorization signed on 4/7/21 -- may speak  with Tammy Bai (friend: [phone]), Ismael Michele (brother: [phone])  Lab Contact Info: [facility] ([facility])  ** will call once monthly or if INR is out of range**    Patient Findings:  Comments:  Patient was not contacted at this encounter.     Plan:  1. INR is therapeutic today at 2.7 (Goal 2.5 - 3.5). Ms. Michele to continue warfarin 2 mg oral daily except 3mg Friday until recheck.    2. Repeat INR in one week, 2/1/22.  3. Lucie Michele understands the importance of calling the [facility] if she notices any s/sx of bleeding, stroke, or abnormal bruising, if any changes are made to her medications or medication doses (Rx, OTC, herbal), or if any upcoming procedures are scheduled. Lucie Michele will likewise let us know if any other changes, questions, or concerns arise regarding anticoagulation therapy. she understands the importance of seeking medical attention immediately if she experiences any falls, vehicle accidents, or abnormal bleeding or bruising. Lucie Michele voiced understanding of this information and confirms that she has the [facility]'s contact information. Otherwise, we will plan to contact the patient once monthly or if her INR is out of range.    Cristina Mcelroy, Sondra  1/25/2022  10:43 Lizet COLEY, PharmD, have reviewed the note in full and agree with the assessment and plan.  01/25/22  10:55 EST

## 2022-02-01 ENCOUNTER — ANTICOAGULATION VISIT (OUTPATIENT)
Dept: PHARMACY | Facility: HOSPITAL | Age: 77
End: 2022-02-01

## 2022-02-01 DIAGNOSIS — I35.9 AORTIC VALVE DISEASE: Primary | ICD-10-CM

## 2022-02-01 DIAGNOSIS — I10 ESSENTIAL HYPERTENSION, BENIGN: ICD-10-CM

## 2022-02-01 LAB — INR PPP: 2.9

## 2022-02-01 RX ORDER — METOPROLOL SUCCINATE 100 MG/1
TABLET, EXTENDED RELEASE ORAL
Qty: 135 TABLET | Refills: 3 | Status: SHIPPED | OUTPATIENT
Start: 2022-02-01 | End: 2022-04-14 | Stop reason: SDUPTHER

## 2022-02-01 NOTE — PROGRESS NOTES
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     Diet: 3x week: 1 cup of brussels sprouts or broccoli weekly (12/30/2021)  Premier Protein (or Equate brand) meal replacement ~7x/week 12/30/2021  Alcohol: Seldom  Tobacco: None  OTC Pain Medication: APAP    INR History:  Date 4/5 4/19 4/26 5/5 5/11 6/2 6/15 6/18 6/25 7/2 7/9 7/19 7/23 7/30   Total Weekly Dose 15mg 15mg 14mg 14mg 14mg 14mg 14mg 14mg 14mg 14mg 14mg 14mg 14mg 14mg   INR 2.6 3.9 3.9 2.7 3.0 3.6 2.7 2.9 2.9 2.6 2.9 3.1 2.5 2.3   Notes      decr GLV  recv'd 6/21; [facility]    incr GLV decr GLV     Date 8/6 8/13 8/20 8/27 9/3 9/10 9/17 9/24 10/1 10/8 10/15 10/22 10/29 11/5   Total WeeklyDose 14mg 15mg 15mg 14mg 14mg 15mg 14mg 14mg 14mg 14mg 14mg 14mg 15mg 16mg   INR 2.4 3.4 3.8 2.9 2.2 3.2 2.9 3.3 3.2 3.3 3.0 2.4 2.4 2.4   Notes decr GLV decr GLV    1xboost     call call 1x boost   incr VitK call 2x boost; call     Date 11/10 11/17 11/24 12/1 12/8 12/15 12/23 12/30 1/3/22 1/7 1/11 1/18 1/25 2/1   Total WeeklyDose 17mg 16mg 16mg 16mg 15mg 15 mg Pt did not call back 15mg 12 mg 13mg 15mg 15 mg 15 mg 15 mg   INR 3.7 3.3 3.9 4.0 2.6 3.4 4.0 4.9 3.6 2.4 3.3 2.8 2.7 2.9   Notes Dec GLV  Zero GLV call Red x1 call Less GLV   call  Less  Protein drink redx1  She held x1 (midose)  Dec vit K fall  Call  apap call       Phone Interview:  Tablet Strength: 2mg  Patient Contact Info: [phone] (Mobile) *preferred*  [email or web address]  Estimated OOP cost: Will send if patient comes to [place]  Verbal Release Authorization signed on 4/7/21  -- may speak with Tammy Bai (friend: [phone]), Ismael Michele (brother: [phone])  Lab Contact Info: [facility] ([facility])  ** will call once monthly or if INR is out of range**    Patient Findings:  Comments:  Patient was not contacted at this encounter.     Plan:  1. INR is therapeutic today at 2.9 (Goal 2.5 - 3.5). Ms. Michele to continue warfarin 2 mg oral daily except 3mg Friday until recheck.    2. Repeat INR in one week, 2/8/22.  3. Lucie Michele understands the importance of calling the [facility] if she notices any s/sx of bleeding, stroke, or abnormal bruising, if any changes are made to her medications or medication doses (Rx, OTC, herbal), or if any upcoming procedures are scheduled. Lucie Michele will likewise let us know if any other changes, questions, or concerns arise regarding anticoagulation therapy. she understands the importance of seeking medical attention immediately if she experiences any falls, vehicle accidents, or abnormal bleeding or bruising. Lucie Michele voiced understanding of this information and confirms that she has the [facility]'s contact information. Otherwise, we will plan to contact the patient once monthly or if her INR is out of range.    Cristina Mcelroy, BALAJI  2/1/2022  08:59 Lizet COLEY, PharmD, have reviewed the note in full and agree with the assessment and plan.  02/01/22  09:10 EST

## 2022-02-08 ENCOUNTER — ANTICOAGULATION VISIT (OUTPATIENT)
Dept: PHARMACY | Facility: HOSPITAL | Age: 77
End: 2022-02-08

## 2022-02-08 DIAGNOSIS — I35.9 AORTIC VALVE DISEASE: Primary | ICD-10-CM

## 2022-02-08 LAB — INR PPP: 4

## 2022-02-08 NOTE — PROGRESS NOTES
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     Diet: 3x week: 1 cup of brussels sprouts or broccoli weekly (12/30/2021)  Premier Protein (or Equate brand) meal replacement ~7x/week 12/30/2021  Alcohol: Seldom  Tobacco: None  OTC Pain Medication: APAP    INR History:  Date 4/5 4/19 4/26 5/5 5/11 6/2 6/15 6/18 6/25 7/2 7/9 7/19 7/23 7/30   Total Weekly Dose 15mg 15mg 14mg 14mg 14mg 14mg 14mg 14mg 14mg 14mg 14mg 14mg 14mg 14mg   INR 2.6 3.9 3.9 2.7 3.0 3.6 2.7 2.9 2.9 2.6 2.9 3.1 2.5 2.3   Notes      decr GLV  recv'd 6/21; [facility]    incr GLV decr GLV     Date 8/6 8/13 8/20 8/27 9/3 9/10 9/17 9/24 10/1 10/8 10/15 10/22 10/29 11/5   Total WeeklyDose 14mg 15mg 15mg 14mg 14mg 15mg 14mg 14mg 14mg 14mg 14mg 14mg 15mg 16mg   INR 2.4 3.4 3.8 2.9 2.2 3.2 2.9 3.3 3.2 3.3 3.0 2.4 2.4 2.4   Notes decr GLV decr GLV    1xboost     call call 1x boost   incr VitK call 2x boost; call     Date 11/10 11/17 11/24 12/1 12/8 12/15 12/23 12/30 1/3/22 1/7 1/11 1/18 1/25 2/1   Total WeeklyDose 17mg 16mg 16mg 16mg 15mg 15 mg Pt did not call back 15mg 12 mg 13mg 15mg 15 mg 15 mg 15 mg   INR 3.7 3.3 3.9 4.0 2.6 3.4 4.0 4.9 3.6 2.4 3.3 2.8 2.7 2.9   Notes Dec GLV  Zero GLV call Red x1 call Less GLV   call  Less  Protein drink redx1  She held x1 (midose)  Dec vit K fall  Call  apap call       Date 2/8              Total WeeklyDose               INR 4.0              Notes                     Phone Interview:  Tablet Strength: 2mg  Patient Contact Info: [phone] (Mobile) *preferred*   [email or web address]  Estimated OOP cost: Will send if patient comes to [place]  Verbal Release Authorization signed on 4/7/21 -- may speak with Tammy Bai (friend: [phone]), Ismael Michele (brother: [phone])  Lab Contact Info: [facility] ([facility])  ** will call once monthly or if INR is out of range**    Patient Findings:    Positives:  Change in diet/appetite   Negatives:  Signs/symptoms of thrombosis, Signs/symptoms of bleeding, Laboratory test error suspected, Change in health, Change in alcohol use, Change in activity, Upcoming invasive procedure, Emergency department visit, Upcoming dental procedure, Missed doses, Extra doses, Change in medications, Hospital admission, Bruising, Other complaints   Comments:  Zero GLV last week (usually 2x) Plans to resume as soon as she gets to grocery         Plan:  1. INR is SUPRAtherapeutic today at 4.0 (Goal 2.5 - 3.5). Ms. Michele to resume normal GLV and reduce tonight's dose to 1mg then continue warfarin 2 mg oral daily except 3mg Friday until recheck.    2. Repeat INR in one week, 2/15/22.  3. Lucie Michele understands the importance of calling the [facility] if she notices any s/sx of bleeding, stroke, or abnormal bruising, if any changes are made to her medications or medication doses (Rx, OTC, herbal), or if any upcoming procedures are scheduled. Lucie Michele will likewise let us know if any other changes, questions, or concerns arise regarding anticoagulation therapy. she understands the importance of seeking medical attention immediately if she experiences any falls, vehicle accidents, or abnormal bleeding or bruising. Lucie Michele voiced understanding of this information and confirms that she has the [facility]'s contact information. Otherwise, we will plan to contact the patient once monthly or if her INR is out of range.    Lizet Marinelli, JenniD.  02/08/22   11:08 EST

## 2022-02-15 ENCOUNTER — ANTICOAGULATION VISIT (OUTPATIENT)
Dept: PHARMACY | Facility: HOSPITAL | Age: 77
End: 2022-02-15

## 2022-02-15 DIAGNOSIS — I35.9 AORTIC VALVE DISEASE: Primary | ICD-10-CM

## 2022-02-15 LAB — INR PPP: 4

## 2022-02-15 NOTE — PROGRESS NOTES
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     Diet: 3x week: 1 cup of brussels sprouts or broccoli weekly (12/30/2021)  Premier Protein (or Equate brand) meal replacement ~2x/week 2/15/22  Alcohol: Seldom  Tobacco: None  OTC Pain Medication: APAP    INR History:  Date 4/5 4/19 4/26 5/5 5/11 6/2 6/15 6/18 6/25 7/2 7/9 7/19 7/23 7/30   Total Weekly Dose 15mg 15mg 14mg 14mg 14mg 14mg 14mg 14mg 14mg 14mg 14mg 14mg 14mg 14mg   INR 2.6 3.9 3.9 2.7 3.0 3.6 2.7 2.9 2.9 2.6 2.9 3.1 2.5 2.3   Notes      decr GLV  recv'd 6/21; [facility]    incr GLV decr GLV     Date 8/6 8/13 8/20 8/27 9/3 9/10 9/17 9/24 10/1 10/8 10/15 10/22 10/29 11/5   Total WeeklyDose 14mg 15mg 15mg 14mg 14mg 15mg 14mg 14mg 14mg 14mg 14mg 14mg 15mg 16mg   INR 2.4 3.4 3.8 2.9 2.2 3.2 2.9 3.3 3.2 3.3 3.0 2.4 2.4 2.4   Notes decr GLV decr GLV    1xboost     call call 1x boost   incr VitK call 2x boost; call     Date 11/10 11/17 11/24 12/1 12/8 12/15 12/23 12/30 1/3/22 1/7 1/11 1/18 1/25 2/1   Total WeeklyDose 17mg 16mg 16mg 16mg 15mg 15 mg Pt did not call back 15mg 12 mg 13mg 15mg 15 mg 15 mg 15 mg   INR 3.7 3.3 3.9 4.0 2.6 3.4 4.0 4.9 3.6 2.4 3.3 2.8 2.7 2.9   Notes Dec GLV  Zero GLV call Red x1 call Less GLV   call  Less  Protein drink redx1  She held x1 (midose)  Dec vit K fall  Call  apap call       Date 2/8 2/15             Total WeeklyDose 15mg 14mg             INR 4.0 4.0             Notes Dec GLV                    Phone Interview:  Tablet Strength: 2mg  Patient Contact Info: [phone] (Mobile) *preferred*   [email or web address]  Estimated OOP cost: Will send if patient comes to [place]  Verbal Release Authorization signed on 4/7/21 -- may speak with Tammy Tyler (friend: [phone]), Ismael Michele (brother: [phone])  Lab Contact Info: [facility] ([facility])  ** will call once monthly or if INR is out of range**    Patient Findings:      Negatives:  Signs/symptoms of thrombosis, Signs/symptoms of bleeding, Laboratory test error suspected, Change in health, Change in alcohol use, Change in activity, Upcoming invasive procedure, Emergency department visit, Upcoming dental procedure, Missed doses, Extra doses, Change in medications, Change in diet/appetite, Hospital admission, Bruising, Other complaints   Comments:  Had broccoli, peas and mixed veggies x2. Only has 2x protein drinks-- was drinking 7x week but has reduced use. Will not resume daily use for now but may reume qd at end of the month when she is back to work           Plan:  1. INR is SUPRAtherapeutic again today at 4.0 (Goal 2.5 - 3.5). Ms. Michele to resume normal GLV and reduce tonight's dose to 1mg then reduce dose to warfarin 2 mg oral daily until recheck.    2. Repeat INR in one week, 2/22/22.  3. Lucie Michele understands the importance of calling the [facility] if she notices any s/sx of bleeding, stroke, or abnormal bruising, if any changes are made to her medications or medication doses (Rx, OTC, herbal), or if any upcoming procedures are scheduled. Lucie Michele will likewise let us know if any other changes, questions, or concerns arise regarding anticoagulation therapy. she understands the importance of seeking medical attention immediately if she experiences any falls, vehicle accidents, or abnormal bleeding or bruising. Lucie Michele voiced understanding of this information and confirms that she has the [facility]'s contact information. Otherwise, we will plan to contact the patient once monthly  or if her INR is out of range.    Lizet Marinelli, PharmD.  02/15/22   14:16 EST

## 2022-02-22 ENCOUNTER — ANTICOAGULATION VISIT (OUTPATIENT)
Dept: PHARMACY | Facility: HOSPITAL | Age: 77
End: 2022-02-22

## 2022-02-22 DIAGNOSIS — I35.9 AORTIC VALVE DISEASE: Primary | ICD-10-CM

## 2022-02-22 LAB — INR PPP: 2.8

## 2022-02-22 NOTE — PROGRESS NOTES
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     Diet: 3x week: 1 cup of brussels sprouts or broccoli weekly (12/30/2021)  Premier Protein (or Equate brand) meal replacement ~2x/week 2/15/22  Alcohol: Seldom  Tobacco: None  OTC Pain Medication: APAP    INR History:  Date 4/5 4/19 4/26 5/5 5/11 6/2 6/15 6/18 6/25 7/2 7/9 7/19 7/23 7/30   Total Weekly Dose 15mg 15mg 14mg 14mg 14mg 14mg 14mg 14mg 14mg 14mg 14mg 14mg 14mg 14mg   INR 2.6 3.9 3.9 2.7 3.0 3.6 2.7 2.9 2.9 2.6 2.9 3.1 2.5 2.3   Notes      decr GLV  recv'd 6/21; [facility]    incr GLV decr GLV     Date 8/6 8/13 8/20 8/27 9/3 9/10 9/17 9/24 10/1 10/8 10/15 10/22 10/29 11/5   Total WeeklyDose 14mg 15mg 15mg 14mg 14mg 15mg 14mg 14mg 14mg 14mg 14mg 14mg 15mg 16mg   INR 2.4 3.4 3.8 2.9 2.2 3.2 2.9 3.3 3.2 3.3 3.0 2.4 2.4 2.4   Notes decr GLV decr GLV    1xboost     call call 1x boost   incr VitK call 2x boost; call     Date 11/10 11/17 11/24 12/1 12/8 12/15 12/23 12/30 1/3/22 1/7 1/11 1/18 1/25 2/1   Total WeeklyDose 17mg 16mg 16mg 16mg 15mg 15 mg Pt did not call back 15mg 12 mg 13mg 15mg 15 mg 15 mg 15 mg   INR 3.7 3.3 3.9 4.0 2.6 3.4 4.0 4.9 3.6 2.4 3.3 2.8 2.7 2.9   Notes Dec GLV  Zero GLV call Red x1 call Less GLV   call  Less  Protein drink redx1  She held x1 (midose)  Dec vit K fall  Call  apap call       Date 2/8 2/15 2/22            Total WeeklyDose 15mg 14mg 14 mg            INR 4.0 4.0 2.8            Notes Dec GLV  Mis-dose              Phone Interview:  Tablet Strength: 2mg  Patient Contact Info: [phone] (Mobile) *preferred*   [email or web address]  Estimated OOP cost: Will send if patient comes to [place]  Verbal Release Authorization signed on 4/7/21 -- may speak with Tammy Bai (friend: [phone]), Ismael Michele (brother: [phone])  Lab Contact Info: [facility] ([facility])  ** will call once monthly or if INR is out of range**    Patient Findings:  Negatives:  Signs/symptoms of thrombosis, Signs/symptoms of bleeding, Laboratory test error suspected, Change in health, Change in alcohol use, Change in activity, Upcoming invasive procedure, Emergency department visit, Upcoming dental procedure, Missed doses, Extra doses, Change in medications, Change in diet/appetite, Hospital admission, Bruising, Other complaints   Comments:  Took warfarin 2 mg daily versus intended 1 mg on Tuesday of last week. She states her diet and appetite are basically back to baseline. Had two protein drinks this week and a salad last night for dinner. All other findings negative.     Plan:  1. INR is back WNL today at 2.8 (Goal 2.5 - 3.5). Spoke with Lizet Marinelli, PharmD, and instructed Ms. Michele to take warfarin 2 mg daily except 3 mg on Friday until recheck.    2. Repeat INR in one week, 3/1/22.  3. Verbal information provided over the phone. Patient RBV dosing instructions, expresses understanding by teach back, and has no further questions at this time.  4. Lucie Michele understands the importance of calling the [facility] if she notices any s/sx of bleeding, stroke, or abnormal bruising, if any changes are made to her medications or medication doses (Rx, OTC, herbal), or if any upcoming procedures are scheduled. Lucie Michele will likewise let us know if any other changes, questions, or concerns arise regarding anticoagulation therapy. she understands the importance of seeking medical attention immediately if she experiences any falls, vehicle accidents, or abnormal bleeding or bruising. Lucie RODRIGUEZ Percyligia voiced  understanding of this information and confirms that she has the [facility]'s contact information. Otherwise, we will plan to contact the patient once monthly or if her INR is out of range.    Cristina Mcelroy, Sondra  2/22/2022  11:41 Justina COLEY, PharmD, have reviewed the note in full and agree with the assessment and plan.  02/22/22  14:31 EST

## 2022-03-01 ENCOUNTER — ANTICOAGULATION VISIT (OUTPATIENT)
Dept: PHARMACY | Facility: HOSPITAL | Age: 77
End: 2022-03-01

## 2022-03-01 DIAGNOSIS — I35.9 AORTIC VALVE DISEASE: Primary | ICD-10-CM

## 2022-03-01 LAB — INR PPP: 2.9

## 2022-03-01 NOTE — PROGRESS NOTES
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     Diet: 3x week: 1 cup of brussels sprouts or broccoli weekly (12/30/2021)  Premier Protein (or Equate brand) meal replacement ~2x/week 2/15/22  Alcohol: Seldom  Tobacco: None  OTC Pain Medication: APAP    INR History:  Date 4/5 4/19 4/26 5/5 5/11 6/2 6/15 6/18 6/25 7/2 7/9 7/19 7/23 7/30   Total Weekly Dose 15mg 15mg 14mg 14mg 14mg 14mg 14mg 14mg 14mg 14mg 14mg 14mg 14mg 14mg   INR 2.6 3.9 3.9 2.7 3.0 3.6 2.7 2.9 2.9 2.6 2.9 3.1 2.5 2.3   Notes      decr GLV  recv'd 6/21; [facility]    incr GLV decr GLV     Date 8/6 8/13 8/20 8/27 9/3 9/10 9/17 9/24 10/1 10/8 10/15 10/22 10/29 11/5   Total WeeklyDose 14mg 15mg 15mg 14mg 14mg 15mg 14mg 14mg 14mg 14mg 14mg 14mg 15mg 16mg   INR 2.4 3.4 3.8 2.9 2.2 3.2 2.9 3.3 3.2 3.3 3.0 2.4 2.4 2.4   Notes decr GLV decr GLV    1xboost     call call 1x boost   incr VitK call 2x boost; call     Date 11/10 11/17 11/24 12/1 12/8 12/15 12/23 12/30 1/3/22 1/7 1/11 1/18 1/25 2/1   Total WeeklyDose 17mg 16mg 16mg 16mg 15mg 15 mg Pt did not call back 15mg 12 mg 13mg 15mg 15 mg 15 mg 15 mg   INR 3.7 3.3 3.9 4.0 2.6 3.4 4.0 4.9 3.6 2.4 3.3 2.8 2.7 2.9   Notes Dec GLV  Zero GLV call Red x1 call Less GLV   call  Less  Protein drink redx1  self held x1 (misdose)  Dec vit K fall, apap  Call    call       Date 2/8 2/15 2/22 3/1           Total WeeklyDose 15mg 14mg 14 mg 15 mg           INR 4.0 4.0 2.8 2.9           Notes Call; Dec GLV call Call; Mis-dose call             Phone Interview:  Tablet Strength: 2mg  Patient Contact  Info: [phone] (Mobile) *preferred*  [email or web address]  Estimated OOP cost: Will send if patient comes to [place]  Verbal Release Authorization signed on 4/7/21 -- may speak with Tammy Tyler (friend: [phone]), Ismael Michele (brother: [phone])  Lab Contact Info: [facility] ([facility])  ** will call once monthly or if INR is out of range**    Patient Findings:  Negatives:  Signs/symptoms of thrombosis, Signs/symptoms of bleeding, Laboratory test error suspected, Change in health, Change in alcohol use, Change in activity, Upcoming invasive procedure, Emergency department visit, Upcoming dental procedure, Missed doses, Extra doses, Change in medications, Change in diet/appetite, Hospital admission, Bruising, Other complaints   Comments:  All findings negative upon patient interview.      Plan:  1. INR is therapeutic today at 2.9 (goal 2.5-3.5). Instructed Ms. Michele to continue warfarin 2 mg oral daily except 3 mg on Friday until recheck.    2. Repeat INR in one week, 3/8.  3. Verbal information provided over the phone. Patient RBV dosing instructions, expresses understanding by teach back, and has no further questions at this time.  4. Lucie Michele understands the importance of calling the [facility] if she notices any s/sx of bleeding, stroke, or abnormal bruising, if any changes are made to her medications or medication doses (Rx, OTC, herbal), or if any upcoming procedures are scheduled. Lucie Michele will likewise let us know if any other changes, questions, or concerns arise regarding anticoagulation therapy. she understands the importance of seeking medical attention immediately if she experiences any falls, vehicle accidents, or abnormal bleeding or bruising. Lucie Michele voiced understanding of this information and confirms that she has the [facility]'s contact information. Otherwise, we will plan to contact the patient once monthly or if her INR  is out of range.    Cristina Mcelroy, [facility]  3/1/2022  09:18 EST    I, Jesenia Garcia, PharmD, have reviewed the note in full and agree with the assessment and plan.  03/01/22  15:12 EST

## 2022-03-08 ENCOUNTER — ANTICOAGULATION VISIT (OUTPATIENT)
Dept: PHARMACY | Facility: HOSPITAL | Age: 77
End: 2022-03-08

## 2022-03-08 DIAGNOSIS — I35.9 AORTIC VALVE DISEASE: Primary | ICD-10-CM

## 2022-03-08 LAB — INR PPP: 2.9

## 2022-03-08 NOTE — PROGRESS NOTES
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     Diet: 3x week: 1 cup of brussels sprouts or broccoli weekly (12/30/2021)  Premier Protein (or Equate brand) meal replacement ~2x/week 2/15/22  Alcohol: Seldom  Tobacco: None  OTC Pain Medication: APAP    INR History:  Date 4/5 4/19 4/26 5/5 5/11 6/2 6/15 6/18 6/25 7/2 7/9 7/19 7/23 7/30   Total Weekly Dose 15mg 15mg 14mg 14mg 14mg 14mg 14mg 14mg 14mg 14mg 14mg 14mg 14mg 14mg   INR 2.6 3.9 3.9 2.7 3.0 3.6 2.7 2.9 2.9 2.6 2.9 3.1 2.5 2.3   Notes      decr GLV  recv'd 6/21; [facility]    incr GLV decr GLV     Date 8/6 8/13 8/20 8/27 9/3 9/10 9/17 9/24 10/1 10/8 10/15 10/22 10/29 11/5   Total WeeklyDose 14mg 15mg 15mg 14mg 14mg 15mg 14mg 14mg 14mg 14mg 14mg 14mg 15mg 16mg   INR 2.4 3.4 3.8 2.9 2.2 3.2 2.9 3.3 3.2 3.3 3.0 2.4 2.4 2.4   Notes decr GLV decr GLV    1xboost     call call 1x boost   incr VitK call 2x boost; call     Date 11/10 11/17 11/24 12/1 12/8 12/15 12/23 12/30 1/3/22 1/7 1/11 1/18 1/25 2/1   Total WeeklyDose 17mg 16mg 16mg 16mg 15mg 15 mg Pt did not call back 15mg 12 mg 13mg 15mg 15 mg 15 mg 15 mg   INR 3.7 3.3 3.9 4.0 2.6 3.4 4.0 4.9 3.6 2.4 3.3 2.8 2.7 2.9   Notes Dec GLV  Zero GLV call Red x1 call Less GLV   call  Less  Protein drink redx1  self held x1 (misdose)  Dec vit K fall, apap  Call    call       Date 2/8 2/15 2/22 3/1 3/8          Total WeeklyDose 15mg 14mg 14 mg 15 mg 15 mg          INR 4.0 4.0 2.8 2.9 2.9          Notes Call; Dec GLV call Call; Mis-dose call             Phone Interview:  Tablet Strength: 2mg  Patient  Contact Info: [phone] (Mobile) *preferred*  [email or web address]  Estimated OOP cost: Will send if patient comes to [place]  Verbal Release Authorization signed on 4/7/21 -- may speak with Tammy Bai (friend: [phone]), Ismael Michele (brother: [phone])  Lab Contact Info: [facility] ([facility])  ** will call once monthly or if INR is out of range**    Patient Findings:  Comments:  Patient was not contacted at this encounter.      Plan:  1. INR is therapeutic again today at 2.9 (goal 2.5-3.5). Instructed Ms. Michele to continue warfarin 2 mg oral daily except 3 mg on Friday until recheck.    2. Repeat INR in one week, 3/15/22.  3. Lucie Michele understands the importance of calling the [facility] if she notices any s/sx of bleeding, stroke, or abnormal bruising, if any changes are made to her medications or medication doses (Rx, OTC, herbal), or if any upcoming procedures are scheduled. Lucie Michele will likewise let us know if any other changes, questions, or concerns arise regarding anticoagulation therapy. she understands the importance of seeking medical attention immediately if she experiences any falls, vehicle accidents, or abnormal bleeding or bruising. Lucie Michele voiced understanding of this information and confirms that she has the [facility]'s contact information. Otherwise, we will plan to contact the patient once monthly or if her INR is out of range.    Cristina Mcelroy, [facility]  3/8/2022  10:06 DANNA MAS, Ngoc Brooks, PharmD, have reviewed the note in full and agree with the assessment and plan.  03/08/22  14:50 EST

## 2022-03-15 ENCOUNTER — ANTICOAGULATION VISIT (OUTPATIENT)
Dept: PHARMACY | Facility: HOSPITAL | Age: 77
End: 2022-03-15

## 2022-03-15 DIAGNOSIS — I35.9 AORTIC VALVE DISEASE: Primary | ICD-10-CM

## 2022-03-15 LAB — INR PPP: 4.2

## 2022-03-15 NOTE — PROGRESS NOTES
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     Diet: 3x week: 1 cup of brussels sprouts or broccoli weekly (12/30/2021)  Premier Protein (or Equate brand) meal replacement ~2x/week 2/15/22  Alcohol: Seldom  Tobacco: None  OTC Pain Medication: APAP    INR History:  Date 4/5 4/19 4/26 5/5 5/11 6/2 6/15 6/18 6/25 7/2 7/9 7/19 7/23 7/30   Total Weekly Dose 15mg 15mg 14mg 14mg 14mg 14mg 14mg 14mg 14mg 14mg 14mg 14mg 14mg 14mg   INR 2.6 3.9 3.9 2.7 3.0 3.6 2.7 2.9 2.9 2.6 2.9 3.1 2.5 2.3   Notes      [facility] GL[facility]  [facility]    incr GLV decr GLV     Date 8/6 8/13 8/20 8/27 9/3 9/10 9/17 9/24 10/1 10/8 10/15 10/22 10/29 11/5   Total WeeklyDose 14mg 15mg 15mg 14mg 14mg 15mg 14mg 14mg 14mg 14mg 14mg 14mg 15mg 16mg   INR 2.4 3.4 3.8 2.9 2.2 3.2 2.9 3.3 3.2 3.3 3.0 2.4 2.4 2.4   Notes decr GLV decr GLV    1xboost     call call 1x boost   incr VitK call 2x boost; call     Date 11/10 11/17 11/24 12/1 12/8 12/15 12/23 12/30 1/3/22 1/7 1/11 1/18 1/25 2/1   Total WeeklyDose 17mg 16mg 16mg 16mg 15mg 15 mg Pt did not call back 15mg 12 mg 13mg 15mg 15 mg 15 mg 15 mg   INR 3.7 3.3 3.9 4.0 2.6 3.4 4.0 4.9 3.6 2.4 3.3 2.8 2.7 2.9   Notes Dec GLV  Zero GLV call Red x1 call Less GLV   call  Less  Protein drink redx1  self held x1 (misdose)  Dec vit K fall, apap  Call    call       Date 2/8 2/15 2/22 3/1 3/8 3/15         Total WeeklyDose 15mg 14mg 14 mg 15 mg 15 mg 15 mg         INR 4.0 4.0 2.8 2.9 2.9 4.2         Notes Call; Dec GLV call Call; Mis-dose call  Call; no GLV           Phone Interview:  Tablet  Strength: 2mg  Patient Contact Info: [phone] (Mobile) *preferred*  [email or web address]  Estimated OOP cost: Will send if patient comes to [place]  Verbal Release Authorization signed on 4/7/21 -- may speak with Tammy Tyler (friend: [phone]), Ismael Michele (brother: [phone])  Lab Contact Info: [facility] ([facility])  ** will call once monthly or if INR is out of range**    Patient Findings  Positives:  Change in diet/appetite   Negatives:  Signs/symptoms of thrombosis, Signs/symptoms of bleeding, Laboratory test error suspected, Change in health, Change in alcohol use, Change in activity, Upcoming invasive procedure, Emergency department visit, Upcoming dental procedure, Missed doses, Extra doses, Change in medications, Hospital admission, Bruising, Other complaints   Comments:  She did not have any green vegetables last week; she typically has two servings each week.  Also, she only had one protein drink and she usually drinks two to three per week.  She has resumed her usual intake as of yesterday.   Otherwise, above findings negative      Plan:    1. INR is SUPRA therapeutic today at 4.2 (goal 2.5-3.5). Instructed Ms. Michele to take warfarin 1 mg tonight, then tomorrow resume warfarin 2 mg oral daily except 3 mg on Friday until recheck.    2. Repeat INR in one week, 3/22/22.  3. Verbal information provided over the phone. Patient RBV dosing instructions, expresses understanding by teach back, and has no further questions at this time  4. Lucie Michele understands the importance of calling the [facility] if she notices any s/sx of bleeding, stroke, or abnormal bruising, if any changes are made to her medications or medication doses (Rx, OTC, herbal), or if any upcoming procedures are scheduled. Lucie Michele will likewise let us know if any other changes, questions, or concerns arise regarding anticoagulation therapy. she understands the importance of seeking medical  attention immediately if she experiences any falls, vehicle accidents, or abnormal bleeding or bruising. Lucie Michele voiced understanding of this information and confirms that she has the [facility]'s contact information. Otherwise, we will plan to contact the patient once monthly or if her INR is out of range.    Jesenia Garcia, PharmD  3/15/2022  09:45 EDT

## 2022-03-22 ENCOUNTER — ANTICOAGULATION VISIT (OUTPATIENT)
Dept: PHARMACY | Facility: HOSPITAL | Age: 77
End: 2022-03-22

## 2022-03-22 DIAGNOSIS — I35.9 AORTIC VALVE DISEASE: Primary | ICD-10-CM

## 2022-03-22 LAB — INR PPP: 3.9

## 2022-03-22 NOTE — PROGRESS NOTES
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     Diet: 3x week: 1 cup of brussels sprouts or broccoli weekly (12/30/2021)  Premier Protein (or Equate brand) meal replacement ~2x/week 2/15/22  Alcohol: Seldom  Tobacco: None  OTC Pain Medication: APAP    INR History:  Date 4/5 4/19 4/26 5/5 5/11 6/2 6/15 6/18 6/25 7/2 7/9 7/19 7/23 7/30   Total Weekly Dose 15mg 15mg 14mg 14mg 14mg 14mg 14mg 14mg 14mg 14mg 14mg 14mg 14mg 14mg   INR 2.6 3.9 3.9 2.7 3.0 3.6 2.7 2.9 2.9 2.6 2.9 3.1 2.5 2.3   Notes      decr GLV  recv'd 6/21; [facility]    incr GLV decr GLV     Date 8/6 8/13 8/20 8/27 9/3 9/10 9/17 9/24 10/1 10/8 10/15 10/22 10/29 11/5   Total WeeklyDose 14mg 15mg 15mg 14mg 14mg 15mg 14mg 14mg 14mg 14mg 14mg 14mg 15mg 16mg   INR 2.4 3.4 3.8 2.9 2.2 3.2 2.9 3.3 3.2 3.3 3.0 2.4 2.4 2.4   Notes decr GLV decr GLV    1xboost     call call 1x boost   incr VitK call 2x boost; call     Date 11/10 11/17 11/24 12/1 12/8 12/15 12/23 12/30 1/3/22 1/7 1/11 1/18 1/25 2/1   Total WeeklyDose 17mg 16mg 16mg 16mg 15mg 15 mg Pt did not call back 15mg 12 mg 13mg 15mg 15 mg 15 mg 15 mg   INR 3.7 3.3 3.9 4.0 2.6 3.4 4.0 4.9 3.6 2.4 3.3 2.8 2.7 2.9   Notes Dec GLV  Zero GLV call Red x1 call Less GLV   call  Less  Protein drink redx1  self held x1 (misdose)  Dec vit K fall, apap  Call    call       Date 2/8 2/15 2/22 3/1 3/8 3/15 3/22        Total WeeklyDose 15mg 14mg 14 mg 15 mg 15 mg 15 mg 14mg        INR 4.0 4.0 2.8 2.9 2.9 4.2 3.9        Notes Call; Dec GLV call Call; Mis-dose call  Call; no GLV           Phone  Interview:  Tablet Strength: 2mg  Patient Contact Info: [phone] (Mobile) *preferred*  [email or web address]  Estimated OOP cost: Will send if patient comes to [place]  Verbal Release Authorization signed on 4/7/21 -- may speak with Tammy Bai (friend: [phone]), Ismael Michele (brother: [phone])  Lab Contact Info: [facility] ([facility])  ** will call once monthly or if INR is out of range**    Patient Findings  Positives:  Change in diet/appetite, Bruising   Negatives:  Signs/symptoms of thrombosis, Signs/symptoms of bleeding, Laboratory test error suspected, Change in health, Change in alcohol use, Change in activity, Upcoming invasive procedure, Emergency department visit, Upcoming dental procedure, Missed doses, Extra doses, Change in medications, Hospital admission, Other complaints   Comments:  Had more GLV than last week-- her normal 2x servings + 1 protein drink, however she usually drinks two to three per week Had 1x dose 1000mg APAP yesterday for back pain         Plan:    1. INR is SUPRA therapeutic today at 3.9 (goal 2.5-3.5). Instructed Ms. Michele to take warfarin 1 mg tonight, then tomorrow reduce dose to warfarin 2 mg oral daily until recheck.    2. Repeat INR in one week, 3/22/22.  3. Verbal information provided over the phone. Patient RBV dosing instructions, expresses understanding by teach back, and has no further questions at this time  4. Lucie Michele understands the importance of calling the [facility] if she notices any s/sx of bleeding, stroke, or abnormal bruising, if any changes are made to her medications or medication doses (Rx, OTC, herbal), or if any upcoming procedures are scheduled. Lucie Michele will likewise let us know if any other changes, questions, or concerns arise regarding anticoagulation therapy. she understands the importance of seeking medical attention immediately if she experiences any falls, vehicle accidents, or abnormal  bleeding or bruising. Lucie Michele voiced understanding of this information and confirms that she has the [facility]'s contact information. Otherwise, we will plan to contact the patient once monthly or if her INR is out of range.      Lizet Marinelli, PharmD.  03/22/22   15:06 EDT

## 2022-03-29 ENCOUNTER — ANTICOAGULATION VISIT (OUTPATIENT)
Dept: PHARMACY | Facility: HOSPITAL | Age: 77
End: 2022-03-29

## 2022-03-29 DIAGNOSIS — I35.9 AORTIC VALVE DISEASE: Primary | ICD-10-CM

## 2022-03-29 LAB — INR PPP: 3.3

## 2022-03-29 NOTE — PROGRESS NOTES
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     Diet: 3x week: 1 cup of brussels sprouts or broccoli weekly (12/30/2021)  Premier Protein (or Equate brand) meal replacement ~2x/week 2/15/22  Alcohol: Seldom  Tobacco: None  OTC Pain Medication: APAP    INR History:  Date 4/5 4/19 4/26 5/5 5/11 6/2 6/15 6/18 6/25 7/2 7/9 7/19 7/23 7/30   Total Weekly Dose 15mg 15mg 14mg 14mg 14mg 14mg 14mg 14mg 14mg 14mg 14mg 14mg 14mg 14mg   INR 2.6 3.9 3.9 2.7 3.0 3.6 2.7 2.9 2.9 2.6 2.9 3.1 2.5 2.3   Notes      decr GLV  recv'd 6/21; [facility]    incr GLV decr GLV     Date 8/6 8/13 8/20 8/27 9/3 9/10 9/17 9/24 10/1 10/8 10/15 10/22 10/29 11/5   Total WeeklyDose 14mg 15mg 15mg 14mg 14mg 15mg 14mg 14mg 14mg 14mg 14mg 14mg 15mg 16mg   INR 2.4 3.4 3.8 2.9 2.2 3.2 2.9 3.3 3.2 3.3 3.0 2.4 2.4 2.4   Notes decr GLV decr GLV    1xboost     call call 1x boost   incr VitK call 2x boost; call     Date 11/10 11/17 11/24 12/1 12/8 12/15 12/23 12/30 1/3/22 1/7 1/11 1/18 1/25 2/1   Total WeeklyDose 17mg 16mg 16mg 16mg 15mg 15 mg Pt did not call back 15mg 12 mg 13mg 15mg 15 mg 15 mg 15 mg   INR 3.7 3.3 3.9 4.0 2.6 3.4 4.0 4.9 3.6 2.4 3.3 2.8 2.7 2.9   Notes Dec GLV  Zero GLV call Red x1 call Less GLV   call  Less  Protein drink redx1  self held x1 (misdose)  Dec vit K fall, apap  Call    call       Date 2/8 2/15 2/22 3/1 3/8 3/15 3/22 3/29       Total WeeklyDose 15mg 14mg 14 mg 15 mg 15 mg 15 mg 14mg 13 mg       INR 4.0 4.0 2.8 2.9 2.9 4.2 3.9 3.3       Notes Call; Dec GLV call Call; Mis-dose call  Call; no [employer] Inc GLV.  Less protein, apap  redx1         Phone Interview:  Tablet Strength: 2mg  Patient Contact Info: [phone] (Mobile) *preferred*  [email or web address]  Estimated OOP cost: Will send if patient comes to [place]  Verbal Release Authorization signed on 4/7/21 -- may speak with Tammy Bai (friend: [phone]), Ismael Michele (brother: [phone])  Lab Contact Info: [facility] ([facility])  ** will call once monthly or if INR is out of range**    Patient Findings  Negatives:  Signs/symptoms of thrombosis, Signs/symptoms of bleeding, Laboratory test error suspected, Change in health, Change in alcohol use, Change in activity, Upcoming invasive procedure, Emergency department visit, Upcoming dental procedure, Missed doses, Extra doses, Change in medications, Change in diet/appetite, Hospital admission, Bruising, Other complaints   Comments:  Ms Michele had two small servings of broccoli and a serving of mixed vegetables.   She only drank one protein drink.  She typically has two servings per week.   Otherwise, above findings negative     Plan:    1. INR is therapeutic today at 3.3 (goal 2.5-3.5). Instructed Ms. Michele to take continue recently reduced regimen of warfarin 2 mg oral daily until recheck.    2. Repeat INR in one week, 3/22/22.  3. Verbal information provided over the phone. Patient RBV dosing instructions, expresses understanding by teach back, and has no further questions at this time  4. Lucie Michele understands the importance of calling the [facility] if she notices any s/sx of bleeding, stroke, or abnormal bruising, if any changes are made to her medications or medication doses (Rx, OTC, herbal), or if any upcoming procedures are scheduled. Lucie Michele will likewise let us know if any other changes, questions, or concerns arise regarding anticoagulation therapy. she understands the importance of seeking medical attention immediately if she experiences any falls, vehicle  accidents, or abnormal bleeding or bruising. Lucie Michele voiced understanding of this information and confirms that she has the [facility]'s contact information. Otherwise, we will plan to contact the patient once monthly or if her INR is out of range.      Jesenia Garcia, PharmD  03/29/22   11:53 EDT

## 2022-04-05 ENCOUNTER — ANTICOAGULATION VISIT (OUTPATIENT)
Dept: PHARMACY | Facility: HOSPITAL | Age: 77
End: 2022-04-05

## 2022-04-05 DIAGNOSIS — I35.9 AORTIC VALVE DISEASE: Primary | ICD-10-CM

## 2022-04-05 LAB — INR PPP: 2.9

## 2022-04-07 ENCOUNTER — LAB (OUTPATIENT)
Dept: FAMILY MEDICINE CLINIC | Facility: CLINIC | Age: 77
End: 2022-04-07

## 2022-04-07 DIAGNOSIS — R73.9 BLOOD GLUCOSE ELEVATED: ICD-10-CM

## 2022-04-07 DIAGNOSIS — E53.8 DISORDER OF VITAMIN B12: ICD-10-CM

## 2022-04-07 DIAGNOSIS — E78.5 HYPERLIPIDEMIA, UNSPECIFIED HYPERLIPIDEMIA TYPE: ICD-10-CM

## 2022-04-07 DIAGNOSIS — E55.9 VITAMIN D DEFICIENCY DISEASE: ICD-10-CM

## 2022-04-07 DIAGNOSIS — E03.9 ACQUIRED HYPOTHYROIDISM: Primary | ICD-10-CM

## 2022-04-07 PROCEDURE — 36415 COLL VENOUS BLD VENIPUNCTURE: CPT | Performed by: FAMILY MEDICINE

## 2022-04-08 LAB
25(OH)D3+25(OH)D2 SERPL-MCNC: 55.9 NG/ML (ref 30–100)
ALBUMIN SERPL-MCNC: 4.1 G/DL (ref 3.7–4.7)
ALBUMIN/GLOB SERPL: 1.7 {RATIO} (ref 1.2–2.2)
ALP SERPL-CCNC: 113 IU/L (ref 44–121)
ALT SERPL-CCNC: 19 IU/L (ref 0–32)
AST SERPL-CCNC: 29 IU/L (ref 0–40)
BASOPHILS # BLD AUTO: 0 X10E3/UL (ref 0–0.2)
BASOPHILS NFR BLD AUTO: 1 %
BILIRUB SERPL-MCNC: 0.3 MG/DL (ref 0–1.2)
BUN SERPL-MCNC: 13 MG/DL (ref 8–27)
BUN/CREAT SERPL: 16 (ref 12–28)
CALCIUM SERPL-MCNC: 9 MG/DL (ref 8.7–10.3)
CHLORIDE SERPL-SCNC: 101 MMOL/L (ref 96–106)
CHOLEST SERPL-MCNC: 204 MG/DL (ref 100–199)
CK SERPL-CCNC: 87 U/L (ref 32–182)
CO2 SERPL-SCNC: 22 MMOL/L (ref 20–29)
CREAT SERPL-MCNC: 0.8 MG/DL (ref 0.57–1)
EGFRCR SERPLBLD CKD-EPI 2021: 77 ML/MIN/1.73
EOSINOPHIL # BLD AUTO: 0.3 X10E3/UL (ref 0–0.4)
EOSINOPHIL NFR BLD AUTO: 5 %
ERYTHROCYTE [DISTWIDTH] IN BLOOD BY AUTOMATED COUNT: 14.1 % (ref 11.7–15.4)
GLOBULIN SER CALC-MCNC: 2.4 G/DL (ref 1.5–4.5)
GLUCOSE SERPL-MCNC: 98 MG/DL (ref 65–99)
HBA1C MFR BLD: 5.8 % (ref 4.8–5.6)
HCT VFR BLD AUTO: 38.4 % (ref 34–46.6)
HDLC SERPL-MCNC: 70 MG/DL
HGB BLD-MCNC: 12.6 G/DL (ref 11.1–15.9)
IMM GRANULOCYTES # BLD AUTO: 0 X10E3/UL (ref 0–0.1)
IMM GRANULOCYTES NFR BLD AUTO: 0 %
LDLC SERPL CALC-MCNC: 111 MG/DL (ref 0–99)
LYMPHOCYTES # BLD AUTO: 2.2 X10E3/UL (ref 0.7–3.1)
LYMPHOCYTES NFR BLD AUTO: 36 %
MCH RBC QN AUTO: 30.4 PG (ref 26.6–33)
MCHC RBC AUTO-ENTMCNC: 32.8 G/DL (ref 31.5–35.7)
MCV RBC AUTO: 93 FL (ref 79–97)
MONOCYTES # BLD AUTO: 0.7 X10E3/UL (ref 0.1–0.9)
MONOCYTES NFR BLD AUTO: 11 %
NEUTROPHILS # BLD AUTO: 2.9 X10E3/UL (ref 1.4–7)
NEUTROPHILS NFR BLD AUTO: 47 %
PLATELET # BLD AUTO: 219 X10E3/UL (ref 150–450)
POTASSIUM SERPL-SCNC: 3.6 MMOL/L (ref 3.5–5.2)
PROT SERPL-MCNC: 6.5 G/DL (ref 6–8.5)
RBC # BLD AUTO: 4.14 X10E6/UL (ref 3.77–5.28)
SODIUM SERPL-SCNC: 139 MMOL/L (ref 134–144)
TRIGL SERPL-MCNC: 133 MG/DL (ref 0–149)
TSH SERPL DL<=0.005 MIU/L-ACNC: 2.56 UIU/ML (ref 0.45–4.5)
VIT B12 SERPL-MCNC: 396 PG/ML (ref 232–1245)
VLDLC SERPL CALC-MCNC: 23 MG/DL (ref 5–40)
WBC # BLD AUTO: 6.2 X10E3/UL (ref 3.4–10.8)

## 2022-04-12 ENCOUNTER — ANTICOAGULATION VISIT (OUTPATIENT)
Dept: PHARMACY | Facility: HOSPITAL | Age: 77
End: 2022-04-12

## 2022-04-12 DIAGNOSIS — I35.9 AORTIC VALVE DISEASE: Primary | ICD-10-CM

## 2022-04-12 LAB — INR PPP: 3

## 2022-04-12 NOTE — PROGRESS NOTES
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     Diet: 3x week: 1 cup of brussels sprouts or broccoli weekly (12/30/2021)  Premier Protein (or Equate brand) meal replacement ~2x/week 2/15/22  Alcohol: Seldom  Tobacco: None  OTC Pain Medication: APAP    INR History:  Date 4/5 4/19 4/26 5/5 5/11 6/2 6/15 6/18 6/25 7/2 7/9 7/19 7/23 7/30   Total Weekly Dose 15mg 15mg 14mg 14mg 14mg 14mg 14mg 14mg 14mg 14mg 14mg 14mg 14mg 14mg   INR 2.6 3.9 3.9 2.7 3.0 3.6 2.7 2.9 2.9 2.6 2.9 3.1 2.5 2.3   Notes      decr GLV  recv'd 6/21; [facility]    incr GLV decr GLV     Date 8/6 8/13 8/20 8/27 9/3 9/10 9/17 9/24 10/1 10/8 10/15 10/22 10/29 11/5   Total WeeklyDose 14mg 15mg 15mg 14mg 14mg 15mg 14mg 14mg 14mg 14mg 14mg 14mg 15mg 16mg   INR 2.4 3.4 3.8 2.9 2.2 3.2 2.9 3.3 3.2 3.3 3.0 2.4 2.4 2.4   Notes decr GLV decr GLV    1xboost     call call 1x boost   incr VitK call 2x boost; call     Date 11/10 11/17 11/24 12/1 12/8 12/15 12/23 12/30 1/3/22 1/7 1/11 1/18 1/25 2/1   Total WeeklyDose 17mg 16mg 16mg 16mg 15mg 15 mg Pt did not call back 15mg 12 mg 13mg 15mg 15 mg 15 mg 15 mg   INR 3.7 3.3 3.9 4.0 2.6 3.4 4.0 4.9 3.6 2.4 3.3 2.8 2.7 2.9   Notes Dec GLV  Zero GLV call Red x1 call Less GLV   call  Less  Protein drink redx1  self held x1 (misdose)  Dec vit K fall, apap  Call    call       Date 2/8 2/15 2/22 3/1 3/8 3/15 3/22 3/29 4/5 4/12     Total WeeklyDose 15mg 14mg 14 mg 15 mg 15 mg 15 mg 14mg 13 mg 14 mg 14 mg     INR 4.0 4.0 2.8 2.9 2.9 4.2 3.9 3.3 2.9 3.0     Notes Call; Dec GLV call Call; Mis-dose call   Call; no GLV Inc GLV. Less protein, apap  redx1 call        Phone Interview:  Tablet Strength: 2mg  Patient Contact Info: [phone] (Mobile) *preferred*  [email or web address]  Estimated OOP cost: Will send if patient comes to [place]  Verbal Release Authorization signed on 4/7/21 -- may speak with Tammy Bai (friend: [phone]), Ismael Michele (brother: [phone])  Lab Contact Info: [facility] ([facility])  ** will call once monthly or if INR is out of range**    Patient Findings:  Comments:  Patient was not contacted at this encounter.      Plan:  1. INR is therapeutic today at 3.0 (goal 2.5-3.5). Ms. Michele to continue warfarin 2 mg oral daily until recheck.    2. Repeat INR in one week, 4/19/22.  3. uLcie Michele understands the importance of calling the [facility] if she notices any s/sx of bleeding, stroke, or abnormal bruising, if any changes are made to her medications or medication doses (Rx, OTC, herbal), or if any upcoming procedures are scheduled. Lucie Michele will likewise let us know if any other changes, questions, or concerns arise regarding anticoagulation therapy. she understands the importance of seeking medical attention immediately if she experiences any falls, vehicle accidents, or abnormal bleeding or bruising. Lucie Michele voiced understanding of this information and confirms that she has the [facility]'s contact information. Otherwise, we will plan to contact the patient once monthly or if her INR is out of range.    Cristina Mcelroy, Sondra  4/12/2022  11:31 EDT    I, Shekhar Hawkins, PharmD, have reviewed the note in full and agree with the assessment and plan.  04/12/22  14:21 EDT

## 2022-04-13 PROBLEM — M48.02 DEGENERATIVE CERVICAL SPINAL STENOSIS: Status: ACTIVE | Noted: 2022-04-13

## 2022-04-13 PROBLEM — E66.9 OBESITY (BMI 30.0-34.9): Status: ACTIVE | Noted: 2022-04-13

## 2022-04-13 PROBLEM — R73.03 PREDIABETES: Status: ACTIVE | Noted: 2022-04-13

## 2022-04-13 PROBLEM — E55.9 VITAMIN D DEFICIENCY: Status: ACTIVE | Noted: 2018-04-11

## 2022-04-13 PROBLEM — M85.80 OSTEOPENIA: Status: ACTIVE | Noted: 2022-04-13

## 2022-04-13 PROBLEM — Z92.29 HISTORY OF POSTMENOPAUSAL HRT: Status: ACTIVE | Noted: 2022-04-13

## 2022-04-13 PROBLEM — Z79.899 HIGH RISK MEDICATION USE: Status: ACTIVE | Noted: 2022-04-13

## 2022-04-13 PROBLEM — J44.9 CHRONIC OBSTRUCTIVE PULMONARY DISEASE: Status: ACTIVE | Noted: 2022-04-13

## 2022-04-13 PROBLEM — E66.811 OBESITY (BMI 30.0-34.9): Status: ACTIVE | Noted: 2022-04-13

## 2022-04-13 PROBLEM — J30.9 ALLERGIC RHINITIS: Status: ACTIVE | Noted: 2022-04-13

## 2022-04-13 PROBLEM — K21.9 GASTROESOPHAGEAL REFLUX DISEASE WITHOUT ESOPHAGITIS: Status: ACTIVE | Noted: 2022-04-13

## 2022-04-13 PROBLEM — N18.31 STAGE 3A CHRONIC KIDNEY DISEASE (HCC): Status: ACTIVE | Noted: 2022-04-13

## 2022-04-13 PROBLEM — F32.5 MAJOR DEPRESSION IN REMISSION: Status: ACTIVE | Noted: 2022-04-13

## 2022-04-13 PROBLEM — Z79.01 CHRONIC ANTICOAGULATION: Status: ACTIVE | Noted: 2022-04-13

## 2022-04-13 PROBLEM — K92.89 DUODENOGASTRIC REFLUX: Status: ACTIVE | Noted: 2022-04-13

## 2022-04-13 PROBLEM — Z87.891 FORMER TOBACCO USE: Status: ACTIVE | Noted: 2022-04-13

## 2022-04-13 PROBLEM — F95.0 TRANSIENT TIC DISORDER: Status: ACTIVE | Noted: 2022-04-13

## 2022-04-13 PROBLEM — Z86.79 HISTORY OF ATRIAL FLUTTER: Status: ACTIVE | Noted: 2018-05-04

## 2022-04-13 PROBLEM — D49.7 NEOPLASM OF MENINGES: Status: ACTIVE | Noted: 2022-04-13

## 2022-04-13 PROBLEM — M15.9 PRIMARY OSTEOARTHRITIS INVOLVING MULTIPLE JOINTS: Status: ACTIVE | Noted: 2022-04-13

## 2022-04-13 PROBLEM — M15.0 PRIMARY OSTEOARTHRITIS INVOLVING MULTIPLE JOINTS: Status: ACTIVE | Noted: 2022-04-13

## 2022-04-13 PROBLEM — E03.9 ACQUIRED HYPOTHYROIDISM: Status: ACTIVE | Noted: 2022-04-13

## 2022-04-13 PROBLEM — K83.8 BILIARY SLUDGE: Status: ACTIVE | Noted: 2022-04-13

## 2022-04-13 PROBLEM — I27.20 PULMONARY HYPERTENSION (HCC): Status: ACTIVE | Noted: 2022-04-13

## 2022-04-13 RX ORDER — MELOXICAM 7.5 MG/1
1 TABLET ORAL DAILY
COMMUNITY
Start: 2021-11-29 | End: 2022-04-13 | Stop reason: SDUPTHER

## 2022-04-13 RX ORDER — OMEPRAZOLE 20 MG/1
TABLET, DELAYED RELEASE ORAL
COMMUNITY
Start: 2022-02-11 | End: 2022-04-13 | Stop reason: SDUPTHER

## 2022-04-13 RX ORDER — LEVOTHYROXINE SODIUM 0.12 MG/1
1 TABLET ORAL DAILY
COMMUNITY
Start: 2022-01-07 | End: 2022-04-14 | Stop reason: SDUPTHER

## 2022-04-13 RX ORDER — BUDESONIDE, GLYCOPYRROLATE, AND FORMOTEROL FUMARATE 160; 9; 4.8 UG/1; UG/1; UG/1
2 AEROSOL, METERED RESPIRATORY (INHALATION) EVERY 12 HOURS
COMMUNITY
Start: 2022-02-08 | End: 2022-04-13 | Stop reason: SDUPTHER

## 2022-04-14 ENCOUNTER — OFFICE VISIT (OUTPATIENT)
Dept: FAMILY MEDICINE CLINIC | Facility: CLINIC | Age: 77
End: 2022-04-14

## 2022-04-14 ENCOUNTER — TELEPHONE (OUTPATIENT)
Dept: FAMILY MEDICINE CLINIC | Facility: CLINIC | Age: 77
End: 2022-04-14

## 2022-04-14 VITALS
HEART RATE: 82 BPM | BODY MASS INDEX: 32.97 KG/M2 | HEIGHT: 65 IN | OXYGEN SATURATION: 97 % | WEIGHT: 197.9 LBS | TEMPERATURE: 97.8 F | RESPIRATION RATE: 12 BRPM | DIASTOLIC BLOOD PRESSURE: 82 MMHG | SYSTOLIC BLOOD PRESSURE: 138 MMHG

## 2022-04-14 DIAGNOSIS — I10 ESSENTIAL HYPERTENSION, BENIGN: ICD-10-CM

## 2022-04-14 DIAGNOSIS — N18.31 STAGE 3A CHRONIC KIDNEY DISEASE: ICD-10-CM

## 2022-04-14 DIAGNOSIS — E55.9 VITAMIN D DEFICIENCY: ICD-10-CM

## 2022-04-14 DIAGNOSIS — M85.80 OSTEOPENIA, UNSPECIFIED LOCATION: ICD-10-CM

## 2022-04-14 DIAGNOSIS — Z87.81 HISTORY OF PELVIC FRACTURE: ICD-10-CM

## 2022-04-14 DIAGNOSIS — Z13.820 OSTEOPOROSIS SCREENING: ICD-10-CM

## 2022-04-14 DIAGNOSIS — E03.9 ACQUIRED HYPOTHYROIDISM: ICD-10-CM

## 2022-04-14 DIAGNOSIS — I10 PRIMARY HYPERTENSION: Primary | ICD-10-CM

## 2022-04-14 DIAGNOSIS — E07.9 DISORDER OF THYROID: ICD-10-CM

## 2022-04-14 DIAGNOSIS — R73.03 PREDIABETES: ICD-10-CM

## 2022-04-14 DIAGNOSIS — M25.552 LEFT HIP PAIN: ICD-10-CM

## 2022-04-14 PROBLEM — M85.89 OSTEOPENIA OF MULTIPLE SITES: Status: ACTIVE | Noted: 2022-04-14

## 2022-04-14 PROCEDURE — 99214 OFFICE O/P EST MOD 30 MIN: CPT | Performed by: FAMILY MEDICINE

## 2022-04-14 RX ORDER — METOPROLOL SUCCINATE 100 MG/1
100 TABLET, EXTENDED RELEASE ORAL DAILY
Qty: 90 TABLET | Refills: 3 | Status: SHIPPED | OUTPATIENT
Start: 2022-04-14 | End: 2022-12-27

## 2022-04-14 RX ORDER — FUROSEMIDE 40 MG/1
40 TABLET ORAL DAILY
Qty: 90 TABLET | Refills: 3 | Status: SHIPPED | OUTPATIENT
Start: 2022-04-14 | End: 2022-08-01

## 2022-04-14 RX ORDER — OMEPRAZOLE 20 MG/1
20 CAPSULE, DELAYED RELEASE ORAL DAILY
Qty: 90 CAPSULE | Refills: 3 | Status: SHIPPED | OUTPATIENT
Start: 2022-04-14

## 2022-04-14 RX ORDER — OXYBUTYNIN CHLORIDE 5 MG/1
5 TABLET ORAL 2 TIMES DAILY
Qty: 180 TABLET | Refills: 3 | Status: SHIPPED | OUTPATIENT
Start: 2022-04-14 | End: 2023-04-05
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
## 2022-04-14 NOTE — ASSESSMENT & PLAN NOTE
Patient is instructed to not take any NSAIDs.  Medicines as directed.  Stay well-hydrated.  GFR 77

## 2022-04-14 NOTE — TELEPHONE ENCOUNTER
Called [facility] and added a DEXA scan to appt 4/19/2022 for mammogram and now dexa

## 2022-04-14 NOTE — ASSESSMENT & PLAN NOTE
Fell approximately 2 months ago and her left hip is continued to hurt.  We will get x-ray of her left hip

## 2022-04-19 ENCOUNTER — ANTICOAGULATION VISIT (OUTPATIENT)
Dept: PHARMACY | Facility: HOSPITAL | Age: 77
End: 2022-04-19

## 2022-04-19 DIAGNOSIS — I35.9 AORTIC VALVE DISEASE: Primary | ICD-10-CM

## 2022-04-19 LAB — INR PPP: 3.8

## 2022-04-19 NOTE — PROGRESS NOTES
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     Diet: 3x week: 1 cup of brussels sprouts or broccoli weekly (4/19/2022)  Premier Protein (or Equate brand) meal replacement ~2x/week 2/15/22  Alcohol: Seldom  Tobacco: None  OTC Pain Medication: APAP    INR History:  Date 4/5 4/19 4/26 5/5 5/11 6/2 6/15 6/18 6/25 7/2 7/9 7/19 7/23 7/30   Total Weekly Dose 15mg 15mg 14mg 14mg 14mg 14mg 14mg 14mg 14mg 14mg 14mg 14mg 14mg 14mg   INR 2.6 3.9 3.9 2.7 3.0 3.6 2.7 2.9 2.9 2.6 2.9 3.1 2.5 2.3   Notes      decr GLV  recv'd 6/21; [facility]    incr GLV decr GLV     Date 8/6 8/13 8/20 8/27 9/3 9/10 9/17 9/24 10/1 10/8 10/15 10/22 10/29 11/5   Total WeeklyDose 14mg 15mg 15mg 14mg 14mg 15mg 14mg 14mg 14mg 14mg 14mg 14mg 15mg 16mg   INR 2.4 3.4 3.8 2.9 2.2 3.2 2.9 3.3 3.2 3.3 3.0 2.4 2.4 2.4   Notes decr GLV decr GLV    1xboost     call call 1x boost   incr VitK call 2x boost; call     Date 11/10 11/17 11/24 12/1 12/8 12/15 12/23 12/30 1/3/22 1/7 1/11 1/18 1/25 2/1   Total WeeklyDose 17mg 16mg 16mg 16mg 15mg 15 mg Pt did not call back 15mg 12 mg 13mg 15mg 15 mg 15 mg 15 mg   INR 3.7 3.3 3.9 4.0 2.6 3.4 4.0 4.9 3.6 2.4 3.3 2.8 2.7 2.9   Notes Dec GLV  Zero GLV call Red x1 call Less GLV   call  Less  Protein drink redx1  self held x1 (misdose)  Dec vit K fall, apap  Call    call       Date 2/8 2/15 2/22 3/1 3/8 3/15 3/22 3/29 4/5 4/12 4/19    Total WeeklyDose 15mg 14mg 14 mg 15 mg 15 mg 15 mg 14mg 13 mg 14 mg 14 mg 14mg    INR 4.0 4.0 2.8 2.9 2.9 4.2 3.9 3.3 2.9 3.0 3.8    Notes Call; Dec GLV call Call;  Mis-dose call  Call; no GLV Inc GLV. Less protein, apap  redx1 call  Less GLV      Phone Interview:  Tablet Strength: 2mg  Patient Contact Info: [phone] (Mobile) *preferred*  [email or web address]  Estimated OOP cost: Will send if patient comes to [place]  Verbal Release Authorization signed on 4/7/21 -- may speak with Tammy Bai (friend: [phone]), Ismael Michele (brother: [phone])  Lab Contact Info: [facility] ([facility])  ** will call once monthly or if INR is out of range**    Patient Findings  Positives:  Change in diet/appetite   Negatives:  Signs/symptoms of thrombosis, Signs/symptoms of bleeding, Laboratory test error suspected, Change in health, Change in alcohol use, Change in activity, Upcoming invasive procedure, Emergency department visit, Upcoming dental procedure, Missed doses, Extra doses, Change in medications, Hospital admission, Bruising, Other complaints   Comments:  Ms. Michele reports that she has decreased her protein drinks to twice last week. Then she didn't eat her last serving of Vit K. Discussed vit K and affect on warfarin. She is now taking meloxicam again- she ran out.      Plan:  1. INR is SUPRAtherapeutic today at 3.8 (goal 2.5-3.5). Instructed Ms. Michele to reduce dose to 1mg today then continue warfarin 2mg oral daily. She also plans to resume GLV intake and   2. Repeat INR in one week, 4/26/22.  3. Lucie Michele understands the importance of calling the [facility] if she notices any s/sx of bleeding, stroke, or abnormal bruising, if any changes are made to her medications or medication doses (Rx, OTC, herbal), or if any upcoming procedures are scheduled. Lucie Michele will likewise let us know if any other changes, questions, or concerns arise regarding anticoagulation therapy. she understands the importance of seeking medical attention immediately if she experiences any falls, vehicle accidents, or abnormal bleeding or bruising. Lucie RODRIGUEZ  Ryne voiced understanding of this information and confirms that she has the [facility]'s contact information. Otherwise, we will plan to contact the patient once monthly or if her INR is out of range.    Ngoc Brooks, PharmD  4/19/2022  11:01 EDT

## 2022-04-22 RX ORDER — ALENDRONATE SODIUM 70 MG/1
70 TABLET ORAL
Qty: 12 TABLET | Refills: 3 | Status: SHIPPED | OUTPATIENT
Start: 2022-04-22 | End: 2023-03-27

## 2022-04-22 RX ORDER — CAL/D3/MAG11/ZINC/COP/MANG/BOR 600 MG-800
600 TABLET ORAL 2 TIMES DAILY
COMMUNITY
End: 2022-04-22 | Stop reason: SDUPTHER

## 2022-04-22 RX ORDER — CAL/D3/MAG11/ZINC/COP/MANG/BOR 600 MG-800
600 TABLET ORAL 2 TIMES DAILY
Qty: 180 TABLET | Refills: 3 | Status: SHIPPED | OUTPATIENT
Start: 2022-04-22

## 2022-04-22 RX ORDER — ALENDRONATE SODIUM 70 MG/1
70 TABLET ORAL
COMMUNITY
End: 2022-04-22 | Stop reason: SDUPTHER

## 2022-04-22 NOTE — TELEPHONE ENCOUNTER
Rx Refill Note    Requested Prescriptions     Pending Prescriptions Disp Refills   • alendronate (FOSAMAX) 70 MG tablet       Sig: Take 1 tablet by mouth Every 7 (Seven) Days.   • Calcium Carbonate-Vit D-Min (Caltrate 600+D Plus Minerals) 600-800 MG-UNIT tablet       Sig: Take 600 mg by mouth 2 (Two) Times a Day.        Last office visit with prescribing clinician: 4/14/2022      Next office visit with prescribing clinician: 10/14/2022   Last labs: 10/25/2021  Last refill: never new script  Pharmacy [facility]

## 2022-04-26 ENCOUNTER — OFFICE VISIT (OUTPATIENT)
Dept: NEUROLOGY | Facility: CLINIC | Age: 77
End: 2022-04-26

## 2022-04-26 VITALS
DIASTOLIC BLOOD PRESSURE: 78 MMHG | WEIGHT: 196.21 LBS | BODY MASS INDEX: 32.65 KG/M2 | HEART RATE: 66 BPM | SYSTOLIC BLOOD PRESSURE: 130 MMHG | OXYGEN SATURATION: 95 %

## 2022-04-26 DIAGNOSIS — D49.6 CAVERNOUS SINUS TUMOR: Primary | ICD-10-CM

## 2022-04-26 DIAGNOSIS — G52.1 GLOSSOPHARYNGEAL NEURALGIA: ICD-10-CM

## 2022-04-26 DIAGNOSIS — G50.0 TRIGEMINAL NEURALGIA: ICD-10-CM

## 2022-04-26 PROCEDURE — 99213 OFFICE O/P EST LOW 20 MIN: CPT | Performed by: NURSE PRACTITIONER

## 2022-04-26 RX ORDER — OXCARBAZEPINE 150 MG/1
150 TABLET, FILM COATED ORAL 3 TIMES DAILY
Qty: 270 TABLET | Refills: 3 | Status: SHIPPED | OUTPATIENT
Start: 2022-04-26 | End: 2023-03-14

## 2022-04-26 RX ORDER — BUDESONIDE, GLYCOPYRROLATE, AND FORMOTEROL FUMARATE 160; 9; 4.8 UG/1; UG/1; UG/1
AEROSOL, METERED RESPIRATORY (INHALATION)
COMMUNITY
Start: 2022-03-12 | End: 2023-01-04 | Stop reason: SDUPTHER

## 2022-04-26 NOTE — PROGRESS NOTES
Subjective:     Patient ID: Lucie Michele is a 76 y.o. female presenting for follow up for glossopharyngeal neuralgia. I last saw the patient on 4/29/2021.     She has a history of cavernous sinus meningioma with stereotactic radiation and is followed by neurosurgery. She describes symptoms of a shocking sensation beginning in the midline of her throat and shooting up to the right side of her tongue, then into the right ear. She has been on oxcarbazepine 150 mg three times daily for several years and states that as long as she takes the medication she is free of the glossopharyngeal neuralgia symptoms. She denies adverse side effects to the medication.Last year she stated she had reduced her medication to bid and this seemed to be fine. Today she states that every day around 4:30 pm she notices a twitching in the right face with a slight change in sensation. This is not painful but is similar to what would happen when this problem first started.         History of Present Illness  The following portions of the patient's history were reviewed and updated as appropriate: allergies, current medications, past family history, past medical history, past social history, past surgical history and problem list.    Review of Systems   Musculoskeletal: Positive for gait problem (slightly worse).   Neurological: Positive for numbness (tingle right side face). Negative for dizziness, tremors, seizures, syncope, facial asymmetry, speech difficulty, weakness, light-headedness and headaches.   Hematological: Does not bruise/bleed easily.   Psychiatric/Behavioral: Negative for agitation, behavioral problems, confusion, decreased concentration, dysphoric mood, hallucinations, self-injury, sleep disturbance and suicidal ideas. The patient is not nervous/anxious and is not hyperactive.         Objective:    Neurologic Exam  General: Well nourished, well developed, and in no acute distress.  HEENT: Normocephalic/atraumatic. Mucous  membranes moist. Sclerae anicteric.   Heart: Regular rate and rhythm. No murmurs, rubs or gallops.  Lungs: Clear to auscultation bilaterally.  Skin: No notable rashes or lesions on the visible surfaces.   Extremities: No clubbing, cyanosis or significant edema.   Psychiatric: Pleasant, cooperative, and appropriate.   Neurologic Exam:  Mental Status:  Alert and oriented. Speech is fluent. Comprehension is intact.   Cranial Nerves II-XII: Pupils equal, round, reactive to light. Extraocular movements are full and conjugate in all directions. Pursuit movements do not provoke any apparent dizziness or discomfort.  No nystagmus noted.  Hearing is intact to voice. Facial strength and sensation are preserved and symmetric. Tongue and palate midline. Voice non-hoarse, non-dysarthric.   Motor: Normal bulk and tone of bilateral upper and lower extremities. Strength is 5/5 in all 4 extremities both proximally and distally. There are no abnormal or involuntary movements noted.  Sensation: Intact to light touch throughout. Romberg was negative with no significant sway.   Coordination: Fully intact. Finger-to-nose performed accurately bilaterally.  Reflexes: The deep tendon reflexes are 2+/4 in bilateral biceps, brachioradialis, triceps, patella, and Achilles.  No pathologic reflexes were noted.  Gait: Normal. No ataxia or apraxia.      Physical Exam    Assessment/Plan:     Diagnoses and all orders for this visit:    1. Cavernous sinus tumor (HCC) (Primary)    2. Trigeminal neuralgia  -     OXcarbazepine (TRILEPTAL) 150 MG tablet; Take 1 tablet by mouth 3 (Three) Times a Day.  Dispense: 270 tablet; Refill: 3    3. Glossopharyngeal neuralgia        Her symptoms have worsened slightly over the last 4-5 months. I am going to have her increase the dose of oxcarbazepine back to 150 mg three times a day to see if this helps. If this is not helpful we may need to recheck an MRI. She will call in a few weeks if no improvement. If this  improves the symptoms she can follow up in one year.

## 2022-04-27 ENCOUNTER — ANTICOAGULATION VISIT (OUTPATIENT)
Dept: PHARMACY | Facility: HOSPITAL | Age: 77
End: 2022-04-27

## 2022-04-27 DIAGNOSIS — I35.9 AORTIC VALVE DISEASE: Primary | ICD-10-CM

## 2022-04-27 LAB — INR PPP: 2.4

## 2022-04-27 NOTE — PROGRESS NOTES
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     Diet: 3x week: 1 cup of brussels sprouts or broccoli weekly (4/19/2022)  Premier Protein (or Equate brand) meal replacement ~2x/week 4/27/22  Alcohol: Seldom  Tobacco: None  OTC Pain Medication: APAP    INR History:  Date 4/5 4/19 4/26 5/5 5/11 6/2 6/15 6/18 6/25 7/2 7/9 7/19 7/23 7/30   Total Weekly Dose 15mg 15mg 14mg 14mg 14mg 14mg 14mg 14mg 14mg 14mg 14mg 14mg 14mg 14mg   INR 2.6 3.9 3.9 2.7 3.0 3.6 2.7 2.9 2.9 2.6 2.9 3.1 2.5 2.3   Notes      decr GLV  recv'd 6/21; [facility]    incr GLV decr GLV     Date 8/6 8/13 8/20 8/27 9/3 9/10 9/17 9/24 10/1 10/8 10/15 10/22 10/29 11/5   Total WeeklyDose 14mg 15mg 15mg 14mg 14mg 15mg 14mg 14mg 14mg 14mg 14mg 14mg 15mg 16mg   INR 2.4 3.4 3.8 2.9 2.2 3.2 2.9 3.3 3.2 3.3 3.0 2.4 2.4 2.4   Notes decr GLV decr GLV    1xboost     call call 1x boost   incr VitK call 2x boost; call     Date 11/10 11/17 11/24 12/1 12/8 12/15 12/23 12/30 1/3/22 1/7 1/11 1/18 1/25 2/1   Total WeeklyDose 17mg 16mg 16mg 16mg 15mg 15 mg Pt did not call back 15mg 12 mg 13mg 15mg 15 mg 15 mg 15 mg   INR 3.7 3.3 3.9 4.0 2.6 3.4 4.0 4.9 3.6 2.4 3.3 2.8 2.7 2.9   Notes Dec GLV  Zero GLV call Red x1 call Less GLV   call  Less  Protein drink redx1  self held x1 (misdose)  Dec vit K fall, apap  Call    call       Date 2/8 2/15 2/22 3/1 3/8 3/15 3/22 3/29 4/5 4/12 4/19 4/26   Total WeeklyDose 15mg 14mg 14 mg 15 mg 15 mg 15 mg 14mg 13 mg 14 mg 14 mg 14mg 13mg   INR 4.0 4.0 2.8 2.9 2.9 4.2 3.9 3.3 2.9 3.0 3.8 2.4   Notes Call; Dec GLV call  Call; Mis-dose call  Call; no GLV Inc GLV. Less protein, apap  redx1 call  Less GLV rec'd 4/27, call     Phone Interview:  Tablet Strength: 2mg  Patient Contact Info: [phone] (Mobile) *preferred*  [email or web address]  Estimated OOP cost: Will send if patient comes to [place]  Verbal Release Authorization signed on 4/7/21 -- may speak with Tammy Bhumika (friend: [phone]), Ismael Michele (brother: [phone])  Lab Contact Info: [facility] ([facility])  ** will call once monthly or if INR is out of range**    Patient Findings:  Positives:  Change in medications, Change in diet/appetite, Bruising   Negatives:  Signs/symptoms of thrombosis, Signs/symptoms of bleeding, Laboratory test error suspected, Change in health, Change in alcohol use, Change in activity, Upcoming invasive procedure, Emergency department visit, Upcoming dental procedure, Missed doses, Extra doses, Hospital admission, Other complaints   Comments:  Has a bruise on her left arm however she remembers running into her filing cabinet. She will be starting alendronate and Caltrate for osteoporosis as soon as her pharmacy fills the prescriptions. She surprisingly states she missed a serving of protein shake (usually TuesThurs, missed Thursday). She also reports she usually has GLV 3x per week but only had two servings this week.       Plan:  1. INR is slightly sub therapeutic today at 2.4 (goal 2.5-3.5). Spoke with Ramana Bagley, PharmD and instructed Ms. Michele to boost today to 3 mg then otherwise resume warfarin 2 mg oral daily until recheck.   2. Repeat INR in one week, 5/3/22.  3. Verbal information provided over the phone. Patient RBV dosing instructions, expresses understanding by teach back, and has no further questions at this time.  4. Lucie Michele understands the importance of calling the [facility] if she notices any s/sx of bleeding, stroke, or abnormal bruising, if any changes are made to her medications or  medication doses (Rx, OTC, herbal), or if any upcoming procedures are scheduled. Lucie Michele will likewise let us know if any other changes, questions, or concerns arise regarding anticoagulation therapy. she understands the importance of seeking medical attention immediately if she experiences any falls, vehicle accidents, or abnormal bleeding or bruising. Lucie Michele voiced understanding of this information and confirms that she has the [facility]'s contact information. Otherwise, we will plan to contact the patient once monthly or if her INR is out of range.    Cristina Mcelroy, Pharmacy Technician  4/27/2022  08:31 EDT     ``  I, Lizet Marinelli, PharmD, have reviewed the note in full and agree with the assessment and plan.  04/27/22  11:44 EDT

## 2022-04-28 ENCOUNTER — TELEPHONE (OUTPATIENT)
Dept: FAMILY MEDICINE CLINIC | Facility: CLINIC | Age: 77
End: 2022-04-28

## 2022-04-28 NOTE — TELEPHONE ENCOUNTER
----- Message from Lucie Michele sent at 4/25/2022  8:36 PM EDT -----  Regarding: Hip X-ray  I just read the notes from my last visit.  I did not get an appointment for the hip X-ray.  The notes imply I was supposed to just go get it before 4/19.  I didn't understand that I didn't need an appointment.  Can that xray be set up again?  I will go get it done!

## 2022-05-02 ENCOUNTER — OFFICE VISIT (OUTPATIENT)
Dept: FAMILY MEDICINE CLINIC | Facility: CLINIC | Age: 77
End: 2022-05-02
Payer: MEDICARE

## 2022-05-02 VITALS
TEMPERATURE: 97.3 F | OXYGEN SATURATION: 95 % | BODY MASS INDEX: 33.52 KG/M2 | DIASTOLIC BLOOD PRESSURE: 60 MMHG | WEIGHT: 201.2 LBS | SYSTOLIC BLOOD PRESSURE: 138 MMHG | HEART RATE: 85 BPM | HEIGHT: 65 IN

## 2022-05-02 DIAGNOSIS — Z71.89 ADVANCED DIRECTIVES, COUNSELING/DISCUSSION: ICD-10-CM

## 2022-05-02 DIAGNOSIS — Z95.2 HISTORY OF AORTIC VALVE REPLACEMENT: ICD-10-CM

## 2022-05-02 DIAGNOSIS — Z79.01 CHRONIC ANTICOAGULATION: ICD-10-CM

## 2022-05-02 DIAGNOSIS — I27.20 PULMONARY HYPERTENSION: ICD-10-CM

## 2022-05-02 DIAGNOSIS — Z79.899 HIGH RISK MEDICATION USE: ICD-10-CM

## 2022-05-02 DIAGNOSIS — E66.9 OBESITY (BMI 30.0-34.9): ICD-10-CM

## 2022-05-02 DIAGNOSIS — Z86.79 HISTORY OF ATRIAL FLUTTER: ICD-10-CM

## 2022-05-02 DIAGNOSIS — J30.89 SEASONAL ALLERGIC RHINITIS DUE TO OTHER ALLERGIC TRIGGER: ICD-10-CM

## 2022-05-02 DIAGNOSIS — N18.31 STAGE 3A CHRONIC KIDNEY DISEASE: ICD-10-CM

## 2022-05-02 DIAGNOSIS — I38 HEART FAILURE DUE TO VALVULAR DISEASE, CHRONIC, DIASTOLIC: Primary | ICD-10-CM

## 2022-05-02 DIAGNOSIS — Z12.11 COLON CANCER SCREENING: ICD-10-CM

## 2022-05-02 DIAGNOSIS — M81.0 AGE-RELATED OSTEOPOROSIS WITHOUT CURRENT PATHOLOGICAL FRACTURE: ICD-10-CM

## 2022-05-02 DIAGNOSIS — G50.0 TRIGEMINAL NEURALGIA: ICD-10-CM

## 2022-05-02 DIAGNOSIS — M15.9 PRIMARY OSTEOARTHRITIS INVOLVING MULTIPLE JOINTS: ICD-10-CM

## 2022-05-02 DIAGNOSIS — E03.9 ACQUIRED HYPOTHYROIDISM: ICD-10-CM

## 2022-05-02 DIAGNOSIS — I50.32 HEART FAILURE DUE TO VALVULAR DISEASE, CHRONIC, DIASTOLIC: Primary | ICD-10-CM

## 2022-05-02 DIAGNOSIS — F32.5 MAJOR DEPRESSION IN REMISSION: ICD-10-CM

## 2022-05-02 DIAGNOSIS — E55.9 VITAMIN D DEFICIENCY: ICD-10-CM

## 2022-05-02 DIAGNOSIS — N31.8 HYPERTONICITY OF BLADDER: ICD-10-CM

## 2022-05-02 DIAGNOSIS — E78.2 MIXED HYPERLIPIDEMIA: ICD-10-CM

## 2022-05-02 DIAGNOSIS — R73.03 PREDIABETES: ICD-10-CM

## 2022-05-02 DIAGNOSIS — I10 PRIMARY HYPERTENSION: ICD-10-CM

## 2022-05-02 DIAGNOSIS — M48.02 DEGENERATIVE CERVICAL SPINAL STENOSIS: ICD-10-CM

## 2022-05-02 DIAGNOSIS — Z13.820 OSTEOPOROSIS SCREENING: ICD-10-CM

## 2022-05-02 DIAGNOSIS — Z00.00 MEDICARE ANNUAL WELLNESS VISIT, SUBSEQUENT: ICD-10-CM

## 2022-05-02 DIAGNOSIS — Z12.39 ENCOUNTER FOR BREAST CANCER SCREENING USING NON-MAMMOGRAM MODALITY: ICD-10-CM

## 2022-05-02 PROBLEM — J44.9 CHRONIC OBSTRUCTIVE PULMONARY DISEASE: Status: RESOLVED | Noted: 2022-04-13 | Resolved: 2022-05-02

## 2022-05-02 PROBLEM — J44.9 COPD MIXED TYPE: Status: ACTIVE | Noted: 2022-05-02

## 2022-05-02 NOTE — PROGRESS NOTES
QUICK REFERENCE INFORMATION:  The ABCs of the Annual Wellness Visit    Subsequent Medicare Wellness Visit      HEALTH RISK ASSESSMENT    1945    Recent Hospitalizations:  No hospitalization(s) within the last year..    Current Medical Providers:  Patient Care Team:  Giovanni Lee MD as PCP -   BlakeBlas cisse MD as Consulting Physician (Cardiology)  Bam Walden APRN as Nurse Practitioner (Nurse Practitioner)  Florentin Golden MD as Surgeon (Neurosurgery)  Korey Hollis MD as Surgeon (Cardiothoracic Surgery)    Smoking Status:  Social History     Tobacco Use   Smoking Status Former Smoker   • Packs/day: 1.00   • Years: 4.00   • Pack years: 4.00   • Types: Cigarettes   • Start date:    • Quit date:    • Years since quittin.3   Smokeless Tobacco Never Used   Tobacco Comment    up to 3 ppd       Alcohol Consumption:  Social History     Substance and Sexual Activity   Alcohol Use Yes    Comment: 1 glass occasionally       Depression Screen:   PHQ-2/PHQ-9 Depression Screening 2022   Little Interest or Pleasure in Doing Things 0-->not at all   Feeling Down, Depressed or Hopeless 0-->not at all   PHQ-9: Brief Depression Severity Measure Score 0       Health Habits and Functional and Cognitive Screening:  Functional & Cognitive Status 2022   Do you have difficulty preparing food and eating? No   Do you have difficulty bathing yourself, getting dressed or grooming yourself? No   Do you have difficulty using the toilet? No   Do you have difficulty moving around from place to place? No   Do you have trouble with steps or getting out of a bed or a chair? No   Current Diet Other        Current Diet Comment limited greens   Exercise (times per week) 7 times per week   Current Exercises Include Walking   Do you need help using the phone?  No   Are you deaf or do you have serious difficulty hearing?  No   Do you need help with transportation? No   Do you need help  shopping? No   Do you need help preparing meals?  No   Do you need help with housework?  No   Do you need help with laundry? No   Do you need help taking your medications? No   Do you need help managing money? No   Do you ever drive or ride in a car without wearing a seat belt? No   Have you felt unusual stress, anger or loneliness in the last month? No   Who do you live with? Child   If you need help, do you have trouble finding someone available to you? No   Have you been bothered in the last four weeks by sexual problems? No   Do you have difficulty concentrating, remembering or making decisions? No       Fall Risk Screen:  JEREMÍAS Fall Risk Assessment was completed, and patient is at HIGH risk for falls. Assessment completed on:2022    ACE III MINI   ATTENTION  What is the year: correct  What is the month of the year: correct  What is the day of the week?: correct  What is the date?: correct  MEMORY  Repeat address three times, only score third attempt: Justin Lee [address] [place] [address]: 7  HOW MANY ANIMALS DID THE PATIENT NAME  Verbal Fluency -- Animal Names (0-25): 22+  CLOCK DRAWING  Clock Drawing: All Correct  MEMORY RECALL  Tell me what you remember about that name and address we were repeating at the beginnin  ACE TOTAL SCORE  Total ACE Score - <25/30 strongly suggests cognitive impairment; <21/30 almost certainly shows dementia: 30    Does the patient have evidence of cognitive impairment? No    No opioid medication identified on active medication list. I have reviewed chart for other potential  high risk medication/s and harmful drug interactions in the elderly.          Aspirin use counseling: Does not need ASA (and currently is not on it)    Recent Lab Results:  CMP:  Lab Results   Component Value Date    BUN 13 2022    CREATININE 0.80 2022    BCR 16 2022     2022    K 3.6 2022    CO2 22 2022    CALCIUM 9.0 2022    PROTENTOTREF 6.5  04/07/2022    ALBUMIN 4.1 04/07/2022    LABGLOBREF 2.4 04/07/2022    LABIL2 1.7 04/07/2022    BILITOT 0.3 04/07/2022    ALKPHOS 113 04/07/2022    AST 29 04/07/2022    ALT 19 04/07/2022     HbA1c:  Lab Results   Component Value Date    HGBA1C 5.8 (H) 04/07/2022     Microalbumin:  No results found for: MICROALBUR, POCMALB, POCCREAT  Lipid Panel  Lab Results   Component Value Date    TRIG 133 04/07/2022    HDL 70 04/07/2022     (H) 04/07/2022    AST 29 04/07/2022    ALT 19 04/07/2022       Visual Acuity:  No exam data present    Age-appropriate Screening Schedule:  Refer to the list below for future screening recommendations based on patient's age, sex and/or medical conditions. Orders for these recommended tests are listed in the plan section. The patient has been provided with a written plan.    Health Maintenance   Topic Date Due   • INFLUENZA VACCINE  08/01/2022   • LIPID PANEL  04/07/2023   • MAMMOGRAM  04/14/2024   • DXA SCAN  04/19/2024   • TDAP/TD VACCINES (2 - Td or Tdap) 06/21/2031   • ZOSTER VACCINE  Completed        Social Determinants of Health     Tobacco Use: Medium Risk   • Smoking Tobacco Use: Former Smoker   • Smokeless Tobacco Use: Never Used   Alcohol Use: Not At Risk   • Frequency of Alcohol Consumption: 2-4 times a month   • Average Number of Drinks: 1 or 2   • Frequency of Binge Drinking: Never   Financial Resource Strain: Low Risk    • Difficulty of Paying Living Expenses: Not very hard   Food Insecurity: No Food Insecurity   • Worried About Running Out of Food in the Last Year: Never true   • Ran Out of Food in the Last Year: Never true   Transportation Needs: No Transportation Needs   • Lack of Transportation (Medical): No   • Lack of Transportation (Non-Medical): No   Physical Activity: Sufficiently Active   • Days of Exercise per Week: 7 days   • Minutes of Exercise per Session: 30 min   Stress: No Stress Concern Present   • Feeling of Stress : Not at all   Social Connections:  Moderately Isolated   • Frequency of Communication with Friends and Family: More than three times a week   • Frequency of Social Gatherings with Friends and Family: More than three times a week   • Attends Tenriism Services: Never   • Active Member of Clubs or Organizations: Yes   • Attends Club or Organization Meetings: More than 4 times per year   • Marital Status:    Intimate Partner Violence: Not At Risk   • Fear of Current or Ex-Partner: No   • Emotionally Abused: No   • Physically Abused: No   • Sexually Abused: No   Depression: Not at risk   • PHQ-2 Score: 0   Housing Stability: Low Risk    • Unable to Pay for Housing in the Last Year: No   • Number of Places Lived in the Last Year: 1   • Unstable Housing in the Last Year: No           Subjective     Lucie Michele is a 76 y.o. female who presents for a Subsequent Wellness Visit.    The following portions of the patient's history were reviewed and updated as appropriate: allergies, current medications, past family history, past medical history, past social history, past surgical history and problem list.    Outpatient Medications Prior to Visit   Medication Sig Dispense Refill   • alendronate (FOSAMAX) 70 MG tablet Take 1 tablet by mouth Every 7 (Seven) Days. 12 tablet 3   • atorvastatin (LIPITOR) 20 MG tablet TAKE 1 TABLET BY MOUTH DAILY 30 tablet 0   • B Complex Vitamins (VITAMIN B COMPLEX) tablet Take 1 tablet by mouth daily.     • Budeson-Glycopyrrol-Formoterol (Breztri Aerosphere) 160-9-4.8 MCG/ACT aerosol inhaler      • Calcium Carbonate-Vit D-Min (Caltrate 600+D Plus Minerals) 600-800 MG-UNIT tablet Take 600 mg by mouth 2 (Two) Times a Day. 180 tablet 3   • cetirizine (ZyrTEC) 10 MG tablet Take 1 tablet by mouth daily.     • Cholecalciferol 4000 units capsule 1 po qd OTC     • Cinnamon 500 MG capsule Take 1 capsule by mouth daily.     • Coenzyme Q10 (CO Q-10) 50 MG capsule 1 po qd     • Digestive Enzymes (FIBERZYME CONCENTRATE-HP PO) Take 1  tablet by mouth daily.     • furosemide (LASIX) 40 MG tablet Take 1 tablet by mouth Daily. 90 tablet 3   • GLUCOSAMINE-CHONDROITIN DS PO Take  by mouth 2 (Two) Times a Day. 1500 mg     • L-Lysine 500 MG capsule 1 po qd     • levothyroxine (SYNTHROID, LEVOTHROID) 125 MCG tablet      • meloxicam (MOBIC) 7.5 MG tablet Take 7.5 mg by mouth Daily.     • metoprolol succinate XL (TOPROL-XL) 100 MG 24 hr tablet Take 1 tablet by mouth Daily. 90 tablet 3   • Multiple Vitamins-Minerals (MULTIVITAMIN ADULT PO) Take 1 tablet by mouth daily.     • omeprazole (priLOSEC) 20 MG capsule Take 1 capsule by mouth Daily. 90 capsule 3   • OXcarbazepine (TRILEPTAL) 150 MG tablet Take 1 tablet by mouth 3 (Three) Times a Day. 270 tablet 3   • oxybutynin (DITROPAN) 5 MG tablet Take 1 tablet by mouth 2 (Two) Times a Day. 180 tablet 3   • potassium chloride 10 MEQ CR tablet Take 1 tablet by mouth Daily. 90 tablet 3   • vitamin E 400 UNIT capsule 1 po qd     • warfarin (COUMADIN) 2 MG tablet Take 2 tablets by mouth once daily or as directed by the anticoagulation clinic 180 tablet 1   • carbonyl iron (FEOSOL) 45 MG tablet tablet 1 po qd     • HAVRIX 1440 EL U/ML vaccine ADM 1ML IM UTD  0   • Pediatric Multivit-Minerals-C (CHEWABLES MULTIVITAMIN PO) 2 po qd OTC     • polycarbophil (calcium polycarbophil) 625 MG tablet tablet 1 po qd     • SPIRIVA HANDIHALER 18 MCG per inhalation capsule INHALE CONTENTS OF 1 C ONCE D  5   • tiotropium (SPIRIVA) 18 MCG per inhalation capsule INHALE 1 CAPSULE BY INHALATION ROUTE EVERY DAY     • valACYclovir (VALTREX) 1000 MG tablet valacyclovir 1 gram tablet     • vitamin D (ERGOCALCIFEROL) 87372 units capsule capsule Take 50,000 Units by mouth 1 (One) Time Per Week.       No facility-administered medications prior to visit.       Patient Active Problem List   Diagnosis   • Cavernous sinus tumor (HCC)   • Trigeminal neuralgia   • Depression   • Hypertonicity of bladder   • Hyperlipidemia   • Hypertension   •  "Osteoarthritis   • Osteoporosis   • Vascular disorder   • Sleep apnea   • Disorder of thyroid   • Thoracic aortic aneurysm without rupture (HCC)   • Aortic valve disease   • Heart failure due to valvular disease, chronic, diastolic (HCC)   • Acquired hypothyroidism   • Allergic rhinitis   • Biliary sludge   • Chronic anticoagulation   • Degenerative cervical spinal stenosis   • Duodenogastric reflux   • Former tobacco use   • Gastroesophageal reflux disease without esophagitis   • High risk medication use   • History of aortic valve replacement   • History of atrial flutter   • History of postmenopausal HRT   • Major depression in remission ([facility])   • Neoplasm of meninges   • Obesity (BMI 30.0-34.9)   • Osteopenia   • Prediabetes   • Primary osteoarthritis involving multiple joints   • Pulmonary hypertension (HCC)   • Stage 3a chronic kidney disease (HCC)   • Transient tic disorder   • Vitamin D deficiency   • Left hip pain   • Osteopenia of multiple sites   • COPD mixed type ([facility])       Advance Care Planning:  ACP discussion was held with the patient during this visit. Patient has an advance directive (not in EMR), copy requested.    Identification of Risk Factors:  Risk factors include: Chronic Pain   Fall Risk  Hearing Problem  Inactivity/Sedentary  Obesity/Overweight .    Compared to one year ago, the patient feels her physical health is the same.  Compared to one year ago, the patient feels her mental health is the same.    Objective       Vitals:    05/02/22 1014   BP: 138/60   BP Location: Left arm   Cuff Size: Large Adult   Pulse: 85   Temp: 97.3 °F (36.3 °C)   SpO2: 95%   Weight: 91.3 kg (201 lb 3.2 oz)   Height: 165.1 cm (65\")   PainSc:   4   PainLoc: Knee     BMI Readings from Last 1 Encounters:   05/02/22 33.48 kg/m²   BMI is above normal parameters. Recommendations include: exercise counseling and nutrition counseling      Assessment/Plan   Problem List Items Addressed This Visit        Allergies and " Adverse Reactions    Allergic rhinitis    Relevant Medications    meloxicam (MOBIC) 7.5 MG tablet    High risk medication use       Cardiac and Vasculature    Hyperlipidemia    Relevant Medications    atorvastatin (LIPITOR) 20 MG tablet    Hypertension    Relevant Medications    oxybutynin (DITROPAN) 5 MG tablet    furosemide (LASIX) 40 MG tablet    metoprolol succinate XL (TOPROL-XL) 100 MG 24 hr tablet    omeprazole (priLOSEC) 20 MG capsule    Heart failure due to valvular disease, chronic, diastolic (HCC) - Primary    Relevant Medications    metoprolol succinate XL (TOPROL-XL) 100 MG 24 hr tablet    History of aortic valve replacement    History of atrial flutter       Coag and Thromboembolic    Chronic anticoagulation       Endocrine and Metabolic    Acquired hypothyroidism    Relevant Medications    levothyroxine (SYNTHROID, LEVOTHROID) 125 MCG tablet    oxybutynin (DITROPAN) 5 MG tablet    furosemide (LASIX) 40 MG tablet    metoprolol succinate XL (TOPROL-XL) 100 MG 24 hr tablet    omeprazole (priLOSEC) 20 MG capsule    Obesity (BMI 30.0-34.9)    Prediabetes    Relevant Medications    oxybutynin (DITROPAN) 5 MG tablet    furosemide (LASIX) 40 MG tablet    metoprolol succinate XL (TOPROL-XL) 100 MG 24 hr tablet    omeprazole (priLOSEC) 20 MG capsule    Vitamin D deficiency    Relevant Medications    oxybutynin (DITROPAN) 5 MG tablet    furosemide (LASIX) 40 MG tablet    metoprolol succinate XL (TOPROL-XL) 100 MG 24 hr tablet    omeprazole (priLOSEC) 20 MG capsule       Genitourinary and Reproductive     Hypertonicity of bladder    Relevant Medications    oxybutynin (DITROPAN) 5 MG tablet    Stage 3a chronic kidney disease (HCC)    Relevant Medications    oxybutynin (DITROPAN) 5 MG tablet    furosemide (LASIX) 40 MG tablet    metoprolol succinate XL (TOPROL-XL) 100 MG 24 hr tablet    omeprazole (priLOSEC) 20 MG capsule       Mental Health    Major depression in remission ([facility])       Musculoskeletal and Injuries     Osteoarthritis    Osteoporosis       Neuro    Trigeminal neuralgia    Relevant Medications    OXcarbazepine (TRILEPTAL) 150 MG tablet    Degenerative cervical spinal stenosis       Pulmonary and Pneumonias    Pulmonary hypertension (HCC)      Other Visit Diagnoses     Medicare annual wellness visit, subsequent        Discussed health problems and history. Reviewed vaccines. Patient is current for all. Health indicators and risk factors reviewed. Patient educated.     Osteoporosis screening        continue follow up with pcp. Current for DEXA    Encounter for breast cancer screening using non-mammogram modality        continue follow up with pcp. Current for Mamm    Colon cancer screening        continue follow up with pcp. Patient deneies changes in bowel patterns or rectal bleeding. Current for colonoscopy    Advanced directives, counseling/discussion        Patient has living will        Patient Self-Management and Personalized Health Advice  The patient has been provided with information about: exercise and fall prevention and preventive services including:   · Annual Wellness Visit (AWV)  · Bone Density Measurements  · Colorectal Cancer Screening, Colonoscopy  · Hepatitis C Virus Screening (beneficiaries must fall into one of the following categories to be eligible- high risk for HCV infection, born between [birth year]34-[birth year]19, or history of blood transfusion before 1992).    Outpatient Encounter Medications as of 5/2/2022   Medication Sig Dispense Refill   • alendronate (FOSAMAX) 70 MG tablet Take 1 tablet by mouth Every 7 (Seven) Days. 12 tablet 3   • atorvastatin (LIPITOR) 20 MG tablet TAKE 1 TABLET BY MOUTH DAILY 30 tablet 0   • B Complex Vitamins (VITAMIN B COMPLEX) tablet Take 1 tablet by mouth daily.     • Budeson-Glycopyrrol-Formoterol (Breztri Aerosphere) 160-9-4.8 MCG/ACT aerosol inhaler      • Calcium Carbonate-Vit D-Min (Caltrate 600+D Plus Minerals) 600-800 MG-UNIT tablet Take 600 mg by mouth 2 (Two) Times  a Day. 180 tablet 3   • cetirizine (ZyrTEC) 10 MG tablet Take 1 tablet by mouth daily.     • Cholecalciferol 4000 units capsule 1 po qd OTC     • Cinnamon 500 MG capsule Take 1 capsule by mouth daily.     • Coenzyme Q10 (CO Q-10) 50 MG capsule 1 po qd     • Digestive Enzymes (FIBERZYME CONCENTRATE-HP PO) Take 1 tablet by mouth daily.     • furosemide (LASIX) 40 MG tablet Take 1 tablet by mouth Daily. 90 tablet 3   • GLUCOSAMINE-CHONDROITIN DS PO Take  by mouth 2 (Two) Times a Day. 1500 mg     • L-Lysine 500 MG capsule 1 po qd     • levothyroxine (SYNTHROID, LEVOTHROID) 125 MCG tablet      • meloxicam (MOBIC) 7.5 MG tablet Take 7.5 mg by mouth Daily.     • metoprolol succinate XL (TOPROL-XL) 100 MG 24 hr tablet Take 1 tablet by mouth Daily. 90 tablet 3   • Multiple Vitamins-Minerals (MULTIVITAMIN ADULT PO) Take 1 tablet by mouth daily.     • omeprazole (priLOSEC) 20 MG capsule Take 1 capsule by mouth Daily. 90 capsule 3   • OXcarbazepine (TRILEPTAL) 150 MG tablet Take 1 tablet by mouth 3 (Three) Times a Day. 270 tablet 3   • oxybutynin (DITROPAN) 5 MG tablet Take 1 tablet by mouth 2 (Two) Times a Day. 180 tablet 3   • potassium chloride 10 MEQ CR tablet Take 1 tablet by mouth Daily. 90 tablet 3   • vitamin E 400 UNIT capsule 1 po qd     • warfarin (COUMADIN) 2 MG tablet Take 2 tablets by mouth once daily or as directed by the anticoagulation clinic 180 tablet 1   • carbonyl iron (FEOSOL) 45 MG tablet tablet 1 po qd     • HAVRIX 1440 EL U/ML vaccine ADM 1ML IM UTD  0   • Pediatric Multivit-Minerals-C (CHEWABLES MULTIVITAMIN PO) 2 po qd OTC     • polycarbophil (calcium polycarbophil) 625 MG tablet tablet 1 po qd     • SPIRIVA HANDIHALER 18 MCG per inhalation capsule INHALE CONTENTS OF 1 C ONCE D  5   • tiotropium (SPIRIVA) 18 MCG per inhalation capsule INHALE 1 CAPSULE BY INHALATION ROUTE EVERY DAY     • valACYclovir (VALTREX) 1000 MG tablet valacyclovir 1 gram tablet     • vitamin D (ERGOCALCIFEROL) 02045 units  capsule capsule Take 50,000 Units by mouth 1 (One) Time Per Week.       No facility-administered encounter medications on file as of 5/2/2022.       Follow Up:  Return in about 1 year (around 5/2/2023) for Medicare Wellness.     There are no Patient Instructions on file for this visit.    An After Visit Summary and PPPS with all of these plans were given to the patient.       I have reviewed and edited information documented by wellness nurse and documentation on diagnoses is my own.

## 2022-05-03 ENCOUNTER — ANTICOAGULATION VISIT (OUTPATIENT)
Dept: PHARMACY | Facility: HOSPITAL | Age: 77
End: 2022-05-03

## 2022-05-03 DIAGNOSIS — I35.9 AORTIC VALVE DISEASE: Primary | ICD-10-CM

## 2022-05-03 LAB — INR PPP: 3.8

## 2022-05-03 NOTE — PROGRESS NOTES
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     Diet: 3x week: 1 cup of brussels sprouts or broccoli weekly (4/19/2022)  Premier Protein (or Equate brand) meal replacement ~2x/week 4/27/22  Alcohol: Seldom  Tobacco: None  OTC Pain Medication: APAP    INR History:  Date 4/5 4/19 4/26 5/5 5/11 6/2 6/15 6/18 6/25 7/2 7/9 7/19 7/23 7/30   Total Weekly Dose 15mg 15mg 14mg 14mg 14mg 14mg 14mg 14mg 14mg 14mg 14mg 14mg 14mg 14mg   INR 2.6 3.9 3.9 2.7 3.0 3.6 2.7 2.9 2.9 2.6 2.9 3.1 2.5 2.3   Notes      decr GLV  recv'd 6/21; [facility]    incr GLV decr GLV     Date 8/6 8/13 8/20 8/27 9/3 9/10 9/17 9/24 10/1 10/8 10/15 10/22 10/29 11/5   Total WeeklyDose 14mg 15mg 15mg 14mg 14mg 15mg 14mg 14mg 14mg 14mg 14mg 14mg 15mg 16mg   INR 2.4 3.4 3.8 2.9 2.2 3.2 2.9 3.3 3.2 3.3 3.0 2.4 2.4 2.4   Notes decr GLV decr GLV    1xboost     call call 1x boost   incr VitK call 2x boost; call     Date 11/10 11/17 11/24 12/1 12/8 12/15 12/23 12/30 1/3/22 1/7 1/11 1/18 1/25 2/1   Total WeeklyDose 17mg 16mg 16mg 16mg 15mg 15 mg Pt did not call back 15mg 12 mg 13mg 15mg 15 mg 15 mg 15 mg   INR 3.7 3.3 3.9 4.0 2.6 3.4 4.0 4.9 3.6 2.4 3.3 2.8 2.7 2.9   Notes Dec GLV  Zero GLV call Red x1 call Less GLV   call  Less  Protein drink redx1  self held x1 (misdose)  Dec vit K fall, apap  Call    call       Date 2/8 2/15 2/22 3/1 3/8 3/15 3/22 3/29 4/5 4/12 4/19 4/26 5/3   Total WeeklyDose 15mg 14mg 14 mg 15 mg 15 mg 15 mg 14mg 13 mg 14 mg 14 mg 14mg 13mg 15mg   INR 4.0 4.0 2.8 2.9 2.9 4.2 3.9 3.3 2.9 3.0 3.8 2.4 3.8   Notes Call;  Dec GLV call Call; Mis-dose call  Call; no GLV Inc GLV. Less protein, apap  redx1 call  Less GLV rec'd 4/27, call Dec GLV     Phone Interview:  Tablet Strength: 2mg  Patient Contact Info: [phone] (Mobile) *preferred*  [email or web address]  Estimated OOP cost: Will send if patient comes to [place]  Verbal Release Authorization signed on 4/7/21 -- may speak with Tammy Bhumika (friend: [phone]), Ismael Michele (brother: [phone])  Lab Contact Info: [facility] ([facility])  ** will call once monthly or if INR is out of range**    Positives:  Change in diet/appetite   Negatives:  Signs/symptoms of thrombosis, Signs/symptoms of bleeding, Laboratory test error suspected, Change in health, Change in alcohol use, Change in activity, Upcoming invasive procedure, Emergency department visit, Upcoming dental procedure, Missed doses, Extra doses, Change in medications, Hospital admission, Bruising, Other complaints   Comments:  Missed 1x protein drink and missed a serving of GLV         Plan:  1. INR is SUPRAtherapeutic today at 3.8 (goal 2.5-3.5). Instructed Ms. Michele to resume normal vit K consumption and resume warfarin 2 mg oral daily until recheck.   2. Repeat INR in one week, 5/11/22.  3. Verbal information provided over the phone. Patient RBV dosing instructions, expresses understanding by teach back, and has no further questions at this time.  4. Lucie Michele understands the importance of calling the [facility] if she notices any s/sx of bleeding, stroke, or abnormal bruising, if any changes are made to her medications or medication doses (Rx, OTC, herbal), or if any upcoming procedures are scheduled. Lucie Michele will likewise let us know if any other changes, questions, or concerns arise regarding anticoagulation therapy. she understands the importance of seeking medical attention immediately if she experiences any falls, vehicle accidents, or abnormal bleeding or bruising.  Lucie Michele voiced understanding of this information and confirms that she has the [facility]'s contact information. Otherwise, we will plan to contact the patient once monthly or if her INR is out of range.    Jenni ShipmanD.  05/03/22   13:20 EDT

## 2022-05-11 ENCOUNTER — ANTICOAGULATION VISIT (OUTPATIENT)
Dept: PHARMACY | Facility: HOSPITAL | Age: 77
End: 2022-05-11

## 2022-05-11 DIAGNOSIS — I35.9 AORTIC VALVE DISEASE: Primary | ICD-10-CM

## 2022-05-11 LAB — INR PPP: 2.5

## 2022-05-11 NOTE — PROGRESS NOTES
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     Diet: 3x week: 1 cup of brussels sprouts or broccoli weekly (4/19/2022)  Premier Protein (or Equate brand) meal replacement ~2x/week 5/11/22  Alcohol: Seldom  Tobacco: None  OTC Pain Medication: APAP    INR History:  Date 4/5 4/19 4/26 5/5 5/11 6/2 6/15 6/18 6/25 7/2 7/9 7/19 7/23 7/30   Total Weekly Dose 15mg 15mg 14mg 14mg 14mg 14mg 14mg 14mg 14mg 14mg 14mg 14mg 14mg 14mg   INR 2.6 3.9 3.9 2.7 3.0 3.6 2.7 2.9 2.9 2.6 2.9 3.1 2.5 2.3   Notes      decr GLV  recv'd 6/21; [facility]    incr GLV decr GLV     Date 8/6 8/13 8/20 8/27 9/3 9/10 9/17 9/24 10/1 10/8 10/15 10/22 10/29 11/5   Total WeeklyDose 14mg 15mg 15mg 14mg 14mg 15mg 14mg 14mg 14mg 14mg 14mg 14mg 15mg 16mg   INR 2.4 3.4 3.8 2.9 2.2 3.2 2.9 3.3 3.2 3.3 3.0 2.4 2.4 2.4   Notes decr GLV decr GLV    1xboost     call call 1x boost   incr VitK call 2x boost; call     Date 11/10 11/17 11/24 12/1 12/8 12/15 12/23 12/30 1/3/22 1/7 1/11 1/18 1/25 2/1   Total WeeklyDose 17mg 16mg 16mg 16mg 15mg 15 mg Pt did not call back 15mg 12 mg 13mg 15mg 15 mg 15 mg 15 mg   INR 3.7 3.3 3.9 4.0 2.6 3.4 4.0 4.9 3.6 2.4 3.3 2.8 2.7 2.9   Notes Dec GLV  Zero GLV call Red x1 call Less GLV   call  Less  Protein drink redx1  self held x1 (misdose)  Dec vit K fall, apap  Call    call       Date 2/8 2/15 2/22 3/1 3/8 3/15 3/22 3/29 4/5 4/12 4/19 4/26 5/3 5/11   Total WeeklyDose 15mg 14mg 14 mg 15 mg 15 mg 15 mg 14mg 13 mg 14 mg 14 mg 14mg 13mg 15mg 14 mg   INR 4.0 4.0 2.8 2.9 2.9 4.2 3.9 3.3 2.9 3.0 3.8 2.4 3.8 2.5    Notes Call; Dec GLV call Call; Mis-dose call  Call; no GLV Inc GLV. Less protein, apap  redx1 call  Less GLV rec'd 4/27, call Dec GLV      Phone Interview:  Tablet Strength: 2mg  Patient Contact Info: [phone] (Mobile) *preferred*  [email or web address]  Estimated OOP cost: Will send if patient comes to [place]  Verbal Release Authorization signed on 4/7/21 -- may speak with Tammy Bhumika (friend: [phone]), Ismael Michele (brother: [phone])  Lab Contact Info: [facility] ([facility])  ** will call once monthly or if INR is out of range**    Patient Findings:  Positives:  Change in medications, Change in diet/appetite   Negatives:  Signs/symptoms of thrombosis, Signs/symptoms of bleeding, Laboratory test error suspected, Change in health, Change in alcohol use, Change in activity, Upcoming invasive procedure, Emergency department visit, Upcoming dental procedure, Missed doses, Extra doses, Hospital admission, Bruising, Other complaints   Comments:  Her diet has been off this week due to 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 She plans on resuming normal diet going forward. Also reports her Caltrate supplement has arrived and she has recently started taking that as well.     Plan:  1. INR is therapeutic today at [facility] (goal 2.5-3.5). Instructed Ms. Michele to take warfarin 2 mg daily excpt 3 mg on Wednesday this week. She will resume normal GLV intake.  2. Repeat INR in one week, 5/18/22.  3. Verbal information provided over the phone. Patient RBV dosing instructions, expresses understanding by teach back, and has no further questions at this time.  4. Lucie Michele understands the importance of calling the [facility] if she notices any s/sx of bleeding, stroke, or abnormal bruising, if any changes are made to her medications or medication doses (Rx, OTC, herbal), or if any upcoming procedures are scheduled. Lucie Michele will likewise let us  know if any other changes, questions, or concerns arise regarding anticoagulation therapy. she understands the importance of seeking medical attention immediately if she experiences any falls, vehicle accidents, or abnormal bleeding or bruising. Lucie Michele voiced understanding of this information and confirms that she has the [facility]'s contact information. Otherwise, we will plan to contact the patient once monthly or if her INR is out of range.    Cristina Mcelroy CPhT  5/11/2022  11:05 EDT    I, Shekhar Hawkins, Tammy, have reviewed the note in full and agree with the assessment and plan.  05/12/22  08:27 EDT

## 2022-05-18 ENCOUNTER — ANTICOAGULATION VISIT (OUTPATIENT)
Dept: PHARMACY | Facility: HOSPITAL | Age: 77
End: 2022-05-18

## 2022-05-18 DIAGNOSIS — I35.9 AORTIC VALVE DISEASE: Primary | ICD-10-CM

## 2022-05-18 LAB — INR PPP: 2.6

## 2022-05-18 NOTE — PROGRESS NOTES
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     Diet: 3x week: 1 cup of brussels sprouts or broccoli weekly (4/19/2022)  Premier Protein (or Equate brand) meal replacement ~2x/week 5/11/22  Alcohol: Seldom  Tobacco: None  OTC Pain Medication: APAP    INR History:  Date 4/5 4/19 4/26 5/5 5/11 6/2 6/15 6/18 6/25 7/2 7/9 7/19 7/23 7/30   Total Weekly Dose 15mg 15mg 14mg 14mg 14mg 14mg 14mg 14mg 14mg 14mg 14mg 14mg 14mg 14mg   INR 2.6 3.9 3.9 2.7 3.0 3.6 2.7 2.9 2.9 2.6 2.9 3.1 2.5 2.3   Notes      decr GLV  recv'd 6/21; [facility]    incr GLV decr GLV     Date 8/6 8/13 8/20 8/27 9/3 9/10 9/17 9/24 10/1 10/8 10/15 10/22 10/29 11/5   Total WeeklyDose 14mg 15mg 15mg 14mg 14mg 15mg 14mg 14mg 14mg 14mg 14mg 14mg 15mg 16mg   INR 2.4 3.4 3.8 2.9 2.2 3.2 2.9 3.3 3.2 3.3 3.0 2.4 2.4 2.4   Notes decr GLV decr GLV    1xboost     call call 1x boost   incr VitK call 2x boost; call     Date 11/10 11/17 11/24 12/1 12/8 12/15 12/23 12/30 1/3/22 1/7 1/11 1/18 1/25 2/1   Total WeeklyDose 17mg 16mg 16mg 16mg 15mg 15 mg Pt did not call back 15mg 12 mg 13mg 15mg 15 mg 15 mg 15 mg   INR 3.7 3.3 3.9 4.0 2.6 3.4 4.0 4.9 3.6 2.4 3.3 2.8 2.7 2.9   Notes Dec GLV  Zero GLV call Red x1 call Less GLV   call  Less  Protein drink redx1  self held x1 (misdose)  Dec vit K fall, apap  Call    call       Date 2/8 2/15 2/22 3/1 3/8 3/15 3/22 3/29 4/5 4/12 4/19 4/26 5/3 5/11   Total WeeklyDose 15mg 14mg 14 mg 15 mg 15 mg 15 mg 14mg 13 mg 14 mg 14 mg 14mg 13mg 15mg 14 mg   INR 4.0 4.0 2.8 2.9 2.9 4.2 3.9 3.3 2.9 3.0 3.8 2.4 3.8 2.5    Notes Call; Dec GLV call Call; Mis-dose call  Call; no GLV Inc GLV. Less protein, apap  redx1 call  Less GLV rec'd 4/27, call Dec GLV      Date 5/18                Total WeeklyDose 15 mg                INR 2.6                Notes                     Phone Interview:  Tablet Strength: 2mg  Patient Contact Info: [phone] (Mobile) *preferred*  [email or web address]  Estimated OOP cost: Will send if patient comes to [place]  Verbal Release Authorization signed on 4/7/21 -- may speak with Tammy Bai (friend: [phone]), Ismael Michele (brother: [phone])  Lab Contact Info: [facility] ([facility])  ** will call once monthly or if INR is out of range**    Patient Findings  Comments:  Pt was not contacted at this encounter.     Plan:  1. INR is therapeutic today at 2.6 (goal 2.5-3.5). Ms. Michele will continue warfarin 2 mg daily excpt 3 mg on Wednesday this week.   2. Repeat INR in one week, 5/25/22.  3. Lucie Michele understands the importance of calling the [facility] if she notices any s/sx of bleeding, stroke, or abnormal bruising, if any changes are made to her medications or medication doses (Rx, OTC, herbal), or if any upcoming procedures are scheduled. Lucie Michele will likewise let us know if any other changes, questions, or concerns arise regarding anticoagulation therapy. she understands the importance of seeking medical attention immediately if she experiences any falls, vehicle accidents, or abnormal bleeding or bruising. Lucie Michele voiced understanding of this information and confirms that she has the [facility]'s contact information. Otherwise, we will plan to contact the patient once monthly or if her INR is out of range.    Eric Hernandez CPhT  5/18/2022  11:22 EDT     I, Jesenia Garcia, PharmD, have reviewed the note in full and agree with the assessment and plan.  05/18/22  12:31 EDT

## 2022-05-21 NOTE — PROGRESS NOTES
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     Diet: 3x week: 1 cup of brussels sprouts or broccoli weekly (8/9/21)  Premier Protein (or Equate brand) meal replacement ~7x/week (10/29/21)  Alcohol: Seldom  Tobacco: None  OTC Pain Medication: APAP    INR History:  Date 4/5 4/19 4/26 5/5 5/11 6/2 6/15 6/18 6/25 7/2 7/9 7/19 7/23 7/30   Total Weekly Dose 15mg 15mg 14mg 14mg 14mg 14mg 14mg 14mg 14mg 14mg 14mg 14mg 14mg 14mg   INR 2.6 3.9 3.9 2.7 3.0 3.6 2.7 2.9 2.9 2.6 2.9 3.1 2.5 2.3   Notes      decr GLV  recv'd 6/21; [facility]    incr GLV decr GLV     Date 8/6 8/13 8/20 8/27 9/3 9/10 9/17 9/24 10/1 10/8 10/15 10/22 10/29 11/5   Total WeeklyDose 14mg 15mg 15mg 14mg 14mg 15mg 14mg 14mg 14mg 14mg 14mg 14mg 15mg 16mg   INR 2.4 3.4 3.8 2.9 2.2 3.2 2.9 3.3 3.2 3.3 3.0 2.4 2.4 2.4   Notes decr GLV decr GLV    1xboost     call call 1x boost   incr VitK call 2x boost; call     Date 11/10 11/17 11/24 12/1 12/8          Total WeeklyDose 17mg 16mg 16mg 16mg 15mg          INR 3.7 3.3 3.9 4.0 2.6          Notes Dec GLV  Zero GLV              Phone Interview:  Tablet Strength: 2mg  Patient Contact Info: [phone] (Mobile) *preferred*  [email or web address]  Estimated OOP cost: Will send if patient comes to [place]  Verbal Release Authorization signed on 4/7/21 -- may speak with Tammy Bai (friend: [phone]), Ismael Michele (brother: [phone])  Lab Contact Info: [facility] ([facility])  ** will call once monthly or if INR is out of range**    Patient Findings  Negatives:  Signs/symptoms of thrombosis, Signs/symptoms of  Ongoing issue over the last several weeks now leading to difficulty bearing weight secondary to pain  Encourage weight loss in the setting of morbid obesity  XR of knee without acute osseous abnormality, however, did reveal tricompartmental osteoarthritis similar to imaging last year  Appreciate orthopedic surgery input -> unsuccessful attempts at fluid aspiration -> recommending optimization of pain control -> may consider future joint steroid injection once acute sepsis/infection resolves  PT/OT recommending skilled rehab placement and patient agreeable - awaiting placement bleeding, Laboratory test error suspected, Change in health, Change in alcohol use, Change in activity, Upcoming invasive procedure, Emergency department visit, Upcoming dental procedure, Missed doses, Extra doses, Change in medications, Change in diet/appetite, Hospital admission, Bruising, Other complaints   Comments:  2 med changes: stopped ASA qd and one of her potassium supplements.  Only had GLV once this week and plans ot have tonight           Plan:  1. INR is therapeutic  today at 2.6 (Goal 2.5 - 3.5), significant decrease from previous encounter. Instructed Ms. Michele to  continue warfarin 2mg oral daily except 3mg MOnday until recheck.  2. Repeat INR in on Wed, 12/15  3. Verbal information provided over the phone. Lucie Michele RBV dosing instructions, expresses understanding by teach back, and has no further questions at this time.  4. Lucie Michele understands the importance of calling the [facility] if she notices any s/sx of bleeding, stroke, or abnormal bruising, if any changes are made to her medications or medication doses (Rx, OTC, herbal), or if any upcoming procedures are scheduled. Lucie Michele will likewise let us know if any other changes, questions, or concerns arise regarding anticoagulation therapy. she understands the importance of seeking medical attention immediately if she experiences any falls, vehicle accidents, or abnormal bleeding or bruising. Lucie Michele voiced understanding of this information and confirms that she has the [facility]'s contact information. Otherwise, we will plan to contact the patient once monthly or if her INR is out of range.      Lizet Marinelli, JenniD.  12/08/21   10:55 EST

## 2022-05-25 ENCOUNTER — ANTICOAGULATION VISIT (OUTPATIENT)
Dept: PHARMACY | Facility: HOSPITAL | Age: 77
End: 2022-05-25

## 2022-05-25 DIAGNOSIS — I35.9 AORTIC VALVE DISEASE: Primary | ICD-10-CM

## 2022-05-25 LAB — INR PPP: 2.3

## 2022-05-25 NOTE — PROGRESS NOTES
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     Diet: 3x week: 1 cup of brussels sprouts or broccoli weekly (4/19/2022)  Premier Protein (or Equate brand) meal replacement ~2x/week 5/11/22  Alcohol: Seldom  Tobacco: None  OTC Pain Medication: APAP    INR History:  Date 4/5 4/19 4/26 5/5 5/11 6/2 6/15 6/18 6/25 7/2 7/9 7/19 7/23 7/30   Total Weekly Dose 15mg 15mg 14mg 14mg 14mg 14mg 14mg 14mg 14mg 14mg 14mg 14mg 14mg 14mg   INR 2.6 3.9 3.9 2.7 3.0 3.6 2.7 2.9 2.9 2.6 2.9 3.1 2.5 2.3   Notes      decr GLV  recv'd 6/21; [facility]    incr GLV decr GLV     Date 8/6 8/13 8/20 8/27 9/3 9/10 9/17 9/24 10/1 10/8 10/15 10/22 10/29 11/5   Total WeeklyDose 14mg 15mg 15mg 14mg 14mg 15mg 14mg 14mg 14mg 14mg 14mg 14mg 15mg 16mg   INR 2.4 3.4 3.8 2.9 2.2 3.2 2.9 3.3 3.2 3.3 3.0 2.4 2.4 2.4   Notes decr GLV decr GLV    1xboost     call call 1x boost   incr VitK call 2x boost; call     Date 11/10 11/17 11/24 12/1 12/8 12/15 12/23 12/30 1/3/22 1/7 1/11 1/18 1/25 2/1   Total WeeklyDose 17mg 16mg 16mg 16mg 15mg 15 mg Pt did not call back 15mg 12 mg 13mg 15mg 15 mg 15 mg 15 mg   INR 3.7 3.3 3.9 4.0 2.6 3.4 4.0 4.9 3.6 2.4 3.3 2.8 2.7 2.9   Notes Dec GLV  Zero GLV call Red x1 call Less GLV   call  Less  Protein drink redx1  self held x1 (misdose)  Dec vit K fall, apap  Call    call       Date 2/8 2/15 2/22 3/1 3/8 3/15 3/22 3/29 4/5 4/12 4/19 4/26 5/3 5/11   Total WeeklyDose 15mg 14mg 14 mg 15 mg 15 mg 15 mg 14mg 13 mg 14 mg 14 mg 14mg 13mg 15mg 14 mg   INR 4.0 4.0 2.8 2.9 2.9 4.2 3.9 3.3 2.9 3.0 3.8 2.4 3.8 2.5    Notes Call; Dec GLV call Call; Mis-dose call  Call; no GLV Inc GLV. Less protein, apap  redx1 call  Less GLV rec'd 4/27, call Dec GLV      Date 5/18 5/25               Total WeeklyDose 15 mg 15mg               INR 2.6 2.3               Notes  Inc GLV  call                   Phone Interview:  Tablet Strength: 2mg  Patient Contact Info: [phone] (Mobile) *preferred*  [email or web address]  Estimated OOP cost: Will send if patient comes to [place]  Verbal Release Authorization signed on 4/7/21 -- may speak with Tammy Bhumika (friend: [phone]), Ismael Michele (brother: [phone])  Lab Contact Info: [facility] ([facility])  ** will call once monthly or if INR is out of range**    Patient Findings    Positives:  Change in diet/appetite   Negatives:  Signs/symptoms of thrombosis, Signs/symptoms of bleeding, Laboratory test error suspected, Change in health, Change in alcohol use, Change in activity, Upcoming invasive procedure, Emergency department visit, Upcoming dental procedure, Missed doses, Extra doses, Change in medications, Hospital admission, Bruising, Other complaints   Comments:  Had more GLV while on vacation this week; also, only had 1 protein drink. All other findings negative.      Plan:  1. INR is subtherapeutic today at 2.3 (goal 2.5-3.5). After speaking to Naz Brooks pharmD, instructed Ms. Michele to boost today's dose to 4mg, then continue warfarin 2 mg daily excpt 4 mg on Wednesday this week. (6.7% increase)  2. Repeat INR in one week, 6/1.  3. Lucie Michele understands the importance of calling the [facility] if she notices any s/sx of bleeding, stroke, or abnormal bruising, if any changes are made to her medications or medication doses (Rx, OTC, herbal), or if any upcoming procedures are scheduled. Lucie Michele will likewise let us know if any other changes, questions, or concerns arise regarding anticoagulation therapy. she understands the importance of  seeking medical attention immediately if she experiences any falls, vehicle accidents, or abnormal bleeding or bruising. Lucie Michele voiced understanding of this information and confirms that she has the [facility]'s contact information. Otherwise, we will plan to contact the patient once monthly or if her INR is out of range.    Eric Hernandez CPhT  5/25/2022  10:44 EDT     I, Justina Jara, PharmD, have reviewed the note in full and agree with the assessment and plan.  05/25/22  13:26 EDT

## 2022-06-01 ENCOUNTER — ANTICOAGULATION VISIT (OUTPATIENT)
Dept: PHARMACY | Facility: HOSPITAL | Age: 77
End: 2022-06-01

## 2022-06-01 DIAGNOSIS — I35.9 AORTIC VALVE DISEASE: Primary | ICD-10-CM

## 2022-06-01 LAB — INR PPP: 2.7

## 2022-06-01 NOTE — PROGRESS NOTES
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     Diet: 3x week: 1 cup of brussels sprouts or broccoli weekly (4/19/2022)  Premier Protein (or Equate brand) meal replacement ~2x/week 5/11/22  Alcohol: Seldom  Tobacco: None  OTC Pain Medication: APAP    INR History:  Date 4/5 4/19 4/26 5/5 5/11 6/2 6/15 6/18 6/25 7/2 7/9 7/19 7/23 7/30   Total Weekly Dose 15mg 15mg 14mg 14mg 14mg 14mg 14mg 14mg 14mg 14mg 14mg 14mg 14mg 14mg   INR 2.6 3.9 3.9 2.7 3.0 3.6 2.7 2.9 2.9 2.6 2.9 3.1 2.5 2.3   Notes      decr GLV  recv'd 6/21; [facility]    incr GLV decr GLV     Date 8/6 8/13 8/20 8/27 9/3 9/10 9/17 9/24 10/1 10/8 10/15 10/22 10/29 11/5   Total WeeklyDose 14mg 15mg 15mg 14mg 14mg 15mg 14mg 14mg 14mg 14mg 14mg 14mg 15mg 16mg   INR 2.4 3.4 3.8 2.9 2.2 3.2 2.9 3.3 3.2 3.3 3.0 2.4 2.4 2.4   Notes decr GLV decr GLV    1xboost     call call 1x boost   incr VitK call 2x boost; call     Date 11/10 11/17 11/24 12/1 12/8 12/15 12/23 12/30 1/3/22 1/7 1/11 1/18 1/25 2/1   Total WeeklyDose 17mg 16mg 16mg 16mg 15mg 15 mg Pt did not call back 15mg 12 mg 13mg 15mg 15 mg 15 mg 15 mg   INR 3.7 3.3 3.9 4.0 2.6 3.4 4.0 4.9 3.6 2.4 3.3 2.8 2.7 2.9   Notes Dec GLV  Zero GLV call Red x1 call Less GLV   call  Less  Protein drink redx1  self held x1 (misdose)  Dec vit K fall, apap  Call    call       Date 2/8 2/15 2/22 3/1 3/8 3/15 3/22 3/29 4/5 4/12 4/19 4/26 5/3 5/11   Total WeeklyDose 15mg 14mg 14 mg 15 mg 15 mg 15 mg 14mg 13 mg 14 mg 14 mg 14mg 13mg 15mg 14 mg   INR 4.0 4.0 2.8 2.9 2.9 4.2 3.9 3.3 2.9 3.0 3.8 2.4 3.8 2.5    Notes Call; Dec GLV call Call; Mis-dose call  Call; no GLV Inc GLV. Less protein, apap  redx1 call  Less GLV rec'd 4/27, call Dec GLV      Date 5/18 5/25 6/1              Total WeeklyDose 15 mg 15mg 16mg              INR 2.6 2.3 2.7              Notes  Inc GLV  call call                  Phone Interview:  Tablet Strength: 2mg  Patient Contact Info: [phone] (Mobile) *preferred*  [email or web address]  Estimated OOP cost: Will send if patient comes to [place]  Verbal Release Authorization signed on 4/7/21 -- may speak with Tammy Erika (friend: [phone]), Ismael Michele (brother: [phone])  Lab Contact Info: [facility] ([facility])  ** will call once monthly or if INR is out of range**    Patient Findings    Positives:  Change in diet/appetite   Negatives:  Signs/symptoms of thrombosis, Signs/symptoms of bleeding, Laboratory test error suspected, Change in health, Change in alcohol use, Change in activity, Upcoming invasive procedure, Emergency department visit, Upcoming dental procedure, Missed doses, Extra doses, Change in medications, Hospital admission, Bruising, Other complaints   Comments:  Pt has resumed normal diet after being on vacation. All other findings negative.      Plan:  1. INR is therapeutic today at 2.7 (goal 2.5-3.5). Per Lizet Marinelli, PharmD, instructed Ms. Michele to return to warfarin 2 mg daily excpt 3 mg on Wednesday this week.   2. Repeat INR in one week, 6/8.  3. Lucie Michele understands the importance of calling the [facility] if she notices any s/sx of bleeding, stroke, or abnormal bruising, if any changes are made to her medications or medication doses (Rx, OTC, herbal), or if any upcoming procedures are scheduled. Lucie Michele will likewise let us know if any other changes, questions, or concerns arise regarding anticoagulation therapy. she understands the importance of seeking medical attention immediately if she experiences any falls,  vehicle accidents, or abnormal bleeding or bruising. Lucie Michele voiced understanding of this information and confirms that she has the [facility]'s contact information. Otherwise, we will plan to contact the patient once monthly or if her INR is out of range.    Eric Hernandez CPhT  6/1/2022  11:45 EDT     I, Lizet Marinelli, PharmD, have reviewed the note in full and agree with the assessment and plan.  06/01/22  15:45 EDT

## 2022-06-08 ENCOUNTER — ANTICOAGULATION VISIT (OUTPATIENT)
Dept: PHARMACY | Facility: HOSPITAL | Age: 77
End: 2022-06-08

## 2022-06-08 DIAGNOSIS — I35.9 AORTIC VALVE DISEASE: Primary | ICD-10-CM

## 2022-06-08 LAB — INR PPP: 3.2

## 2022-06-08 NOTE — PROGRESS NOTES
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     Diet: 3x week: 1 cup of brussels sprouts or broccoli weekly (4/19/2022)  Premier Protein (or Equate brand) meal replacement ~2x/week 5/11/22  Alcohol: Seldom  Tobacco: None  OTC Pain Medication: APAP    INR History:  Date 4/5 4/19 4/26 5/5 5/11 6/2 6/15 6/18 6/25 7/2 7/9 7/19 7/23 7/30   Total Weekly Dose 15mg 15mg 14mg 14mg 14mg 14mg 14mg 14mg 14mg 14mg 14mg 14mg 14mg 14mg   INR 2.6 3.9 3.9 2.7 3.0 3.6 2.7 2.9 2.9 2.6 2.9 3.1 2.5 2.3   Notes      decr GLV  recv'd 6/21; [facility]    incr GLV decr GLV     Date 8/6 8/13 8/20 8/27 9/3 9/10 9/17 9/24 10/1 10/8 10/15 10/22 10/29 11/5   Total WeeklyDose 14mg 15mg 15mg 14mg 14mg 15mg 14mg 14mg 14mg 14mg 14mg 14mg 15mg 16mg   INR 2.4 3.4 3.8 2.9 2.2 3.2 2.9 3.3 3.2 3.3 3.0 2.4 2.4 2.4   Notes decr GLV decr GLV    1xboost     call call 1x boost   incr VitK call 2x boost; call     Date 11/10 11/17 11/24 12/1 12/8 12/15 12/23 12/30 1/3/22 1/7 1/11 1/18 1/25 2/1   Total WeeklyDose 17mg 16mg 16mg 16mg 15mg 15 mg Pt did not call back 15mg 12 mg 13mg 15mg 15 mg 15 mg 15 mg   INR 3.7 3.3 3.9 4.0 2.6 3.4 4.0 4.9 3.6 2.4 3.3 2.8 2.7 2.9   Notes Dec GLV  Zero GLV call Red x1 call Less GLV   call  Less  Protein drink redx1  self held x1 (misdose)  Dec vit K fall, apap  Call    call       Date 2/8 2/15 2/22 3/1 3/8 3/15 3/22 3/29 4/5 4/12 4/19 4/26 5/3 5/11   Total WeeklyDose 15mg 14mg 14 mg 15 mg 15 mg 15 mg 14mg 13 mg 14 mg 14 mg 14mg 13mg 15mg 14 mg   INR 4.0 4.0 2.8 2.9 2.9 4.2 3.9 3.3 2.9 3.0 3.8 2.4 3.8 2.5    Notes Call; Dec GLV call Call; Mis-dose call  Call; no GLV Inc GLV. Less protein, apap  redx1 call  Less GLV rec'd 4/27, call Dec GLV      Date 5/18 5/25 6/1 6/8             Total WeeklyDose 15 mg 15mg 16mg 16mg             INR 2.6 2.3 2.7 3.2             Notes  Inc GLV  call call                Phone Interview:  Tablet Strength: 2mg  Patient Contact Info: [phone] (Mobile) *preferred*  [email or web address]  Estimated OOP cost: Will send if patient comes to [place]  Verbal Release Authorization signed on 4/7/21 -- may speak with aTmmy Tyler (friend: [phone]), Ismael Michele (brother: [phone])  Lab Contact Info: [facility] ([facility])  ** will call once monthly or if INR is out of range**    Patient Findings  Negatives:  Signs/symptoms of thrombosis, Signs/symptoms of bleeding, Laboratory test error suspected, Change in health, Change in alcohol use, Change in activity, Upcoming invasive procedure, Emergency department visit, Upcoming dental procedure, Missed doses, Extra doses, Change in medications, Change in diet/appetite, Hospital admission, Bruising, Other complaints   Comments:  Pt did not drink her second protein drink this week.      Plan:  1. INR is therapeutic today at 3.2 (goal 2.5-3.5). Instructed Ms. Michele to continue warfarin 2 mg daily except 3 mg on Wednesday this week.   2. Repeat INR in one week, 6/8.  3. Lucie Michele understands the importance of calling the [facility] if she notices any s/sx of bleeding, stroke, or abnormal bruising, if any changes are made to her medications or medication doses (Rx, OTC, herbal), or if any upcoming procedures are scheduled. Lucie Michele will likewise let us know if any other changes, questions, or concerns arise regarding anticoagulation therapy. she understands the importance of seeking medical attention immediately if she experiences any falls, vehicle accidents, or abnormal bleeding or bruising. Lucie RODRIGUEZ Ryne  voiced understanding of this information and confirms that she has the [facility]'s contact information. Otherwise, we will plan to contact the patient once monthly or if her INR is out of range.    Ngoc Brooks, PharmD  6/8/2022  13:29 EDT

## 2022-06-15 ENCOUNTER — ANTICOAGULATION VISIT (OUTPATIENT)
Dept: PHARMACY | Facility: HOSPITAL | Age: 77
End: 2022-06-15

## 2022-06-15 DIAGNOSIS — I35.9 AORTIC VALVE DISEASE: Primary | ICD-10-CM

## 2022-06-15 LAB — INR PPP: 3

## 2022-06-15 NOTE — PROGRESS NOTES
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     Diet: 3x week: 1 cup of brussels sprouts or broccoli weekly (4/19/2022)  Premier Protein (or Equate brand) meal replacement ~2x/week 5/11/22  Alcohol: Seldom  Tobacco: None  OTC Pain Medication: APAP    INR History:  Date 4/5 4/19 4/26 5/5 5/11 6/2 6/15 6/18 6/25 7/2 7/9 7/19 7/23 7/30   Total Weekly Dose 15mg 15mg 14mg 14mg 14mg 14mg 14mg 14mg 14mg 14mg 14mg 14mg 14mg 14mg   INR 2.6 3.9 3.9 2.7 3.0 3.6 2.7 2.9 2.9 2.6 2.9 3.1 2.5 2.3   Notes      decr GLV  recv'd 6/21; [facility]    incr GLV decr GLV     Date 8/6 8/13 8/20 8/27 9/3 9/10 9/17 9/24 10/1 10/8 10/15 10/22 10/29 11/5   Total WeeklyDose 14mg 15mg 15mg 14mg 14mg 15mg 14mg 14mg 14mg 14mg 14mg 14mg 15mg 16mg   INR 2.4 3.4 3.8 2.9 2.2 3.2 2.9 3.3 3.2 3.3 3.0 2.4 2.4 2.4   Notes decr GLV decr GLV    1xboost     call call 1x boost   incr VitK call 2x boost; call     Date 11/10 11/17 11/24 12/1 12/8 12/15 12/23 12/30 1/3/22 1/7 1/11 1/18 1/25 2/1   Total WeeklyDose 17mg 16mg 16mg 16mg 15mg 15 mg Pt did not call back 15mg 12 mg 13mg 15mg 15 mg 15 mg 15 mg   INR 3.7 3.3 3.9 4.0 2.6 3.4 4.0 4.9 3.6 2.4 3.3 2.8 2.7 2.9   Notes Dec GLV  Zero GLV call Red x1 call Less GLV   call  Less  Protein drink redx1  self held x1 (misdose)  Dec vit K fall, apap  Call    call       Date 2/8 2/15 2/22 3/1 3/8 3/15 3/22 3/29 4/5 4/12 4/19 4/26 5/3 5/11   Total WeeklyDose 15mg 14mg 14 mg 15 mg 15 mg 15 mg 14mg 13 mg 14 mg 14 mg 14mg 13mg 15mg 14 mg   INR 4.0 4.0 2.8 2.9 2.9 4.2 3.9 3.3 2.9 3.0 3.8 2.4 3.8 2.5    Notes Call; Dec GLV call Call; Mis-dose call  Call; no GLV Inc GLV. Less protein, apap  redx1 call  Less GLV rec'd 4/27, call Dec GLV      Date 5/18 5/25 6/1 6/8 6/15            Total WeeklyDose 15 mg 15mg 16mg 15 mg 15 mg            INR 2.6 2.3 2.7 3.2 3.0            Notes  Inc GLV  call call                Phone Interview:  Tablet Strength: 2mg  Patient Contact Info: [phone] (Mobile) *preferred*  [email or web address]  Estimated OOP cost: Will send if patient comes to [place]  Verbal Release Authorization signed on 4/7/21 -- may speak with Tammy Bhumika (friend: [phone]), Ismael Michele (brother: [phone])  Lab Contact Info: [facility] ([facility])  ** will call once monthly or if INR is out of range**    Patient Findings    Comments:  Patient not contacted at this encounter.      Plan:  1. INR is therapeutic today at 3.0 (goal 2.5-3.5). Instructed Ms. Michele to continue warfarin 2 mg daily except 3 mg on Wednesday this week.   2. Repeat INR in one week, 6/22/22..  3. Lucie Michele understands the importance of calling the [facility] if she notices any s/sx of bleeding, stroke, or abnormal bruising, if any changes are made to her medications or medication doses (Rx, OTC, herbal), or if any upcoming procedures are scheduled. Lucie Michele will likewise let us know if any other changes, questions, or concerns arise regarding anticoagulation therapy. she understands the importance of seeking medical attention immediately if she experiences any falls, vehicle accidents, or abnormal bleeding or bruising. Lucie Michele voiced understanding of this information and confirms that she has the [facility]'s contact information. Otherwise, we will plan to contact the patient once monthly or if her INR is out of range.    Eric Hernandez CPhT  6/15/2022  11:24 EDT     I, Kamilla Felix, PharmD, have reviewed the note in full and agree with the assessment and  plan.  06/15/22  11:55 EDT

## 2022-06-22 ENCOUNTER — ANTICOAGULATION VISIT (OUTPATIENT)
Dept: PHARMACY | Facility: HOSPITAL | Age: 77
End: 2022-06-22

## 2022-06-22 DIAGNOSIS — I35.9 AORTIC VALVE DISEASE: Primary | ICD-10-CM

## 2022-06-22 LAB — INR PPP: 2.9

## 2022-06-22 NOTE — PROGRESS NOTES
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     Diet: 3x week: 1 cup of brussels sprouts or broccoli weekly (4/19/2022)  Premier Protein (or Equate brand) meal replacement ~2x/week 5/11/22  Alcohol: Seldom  Tobacco: None  OTC Pain Medication: APAP    INR History:  Date 4/5 4/19 4/26 5/5 5/11 6/2 6/15 6/18 6/25 7/2 7/9 7/19 7/23 7/30   Total Weekly Dose 15mg 15mg 14mg 14mg 14mg 14mg 14mg 14mg 14mg 14mg 14mg 14mg 14mg 14mg   INR 2.6 3.9 3.9 2.7 3.0 3.6 2.7 2.9 2.9 2.6 2.9 3.1 2.5 2.3   Notes      decr GLV  recv'd 6/21; [facility]    incr GLV decr GLV     Date 8/6 8/13 8/20 8/27 9/3 9/10 9/17 9/24 10/1 10/8 10/15 10/22 10/29 11/5   Total WeeklyDose 14mg 15mg 15mg 14mg 14mg 15mg 14mg 14mg 14mg 14mg 14mg 14mg 15mg 16mg   INR 2.4 3.4 3.8 2.9 2.2 3.2 2.9 3.3 3.2 3.3 3.0 2.4 2.4 2.4   Notes decr GLV decr GLV    1xboost     call call 1x boost   incr VitK call 2x boost; call     Date 11/10 11/17 11/24 12/1 12/8 12/15 12/23 12/30 1/3/22 1/7 1/11 1/18 1/25 2/1   Total WeeklyDose 17mg 16mg 16mg 16mg 15mg 15 mg Pt did not call back 15mg 12 mg 13mg 15mg 15 mg 15 mg 15 mg   INR 3.7 3.3 3.9 4.0 2.6 3.4 4.0 4.9 3.6 2.4 3.3 2.8 2.7 2.9   Notes Dec GLV  Zero GLV call Red x1 call Less GLV   call  Less  Protein drink redx1  self held x1 (misdose)  Dec vit K fall, apap  Call    call       Date 2/8 2/15 2/22 3/1 3/8 3/15 3/22 3/29 4/5 4/12 4/19 4/26 5/3 5/11   Total WeeklyDose 15mg 14mg 14 mg 15 mg 15 mg 15 mg 14mg 13 mg 14 mg 14 mg 14mg 13mg 15mg 14 mg   INR 4.0 4.0 2.8 2.9 2.9 4.2 3.9 3.3 2.9 3.0 3.8 2.4 3.8 2.5    Notes Call; Dec GLV call Call; Mis-dose call  Call; no GLV Inc GLV. Less protein, apap  redx1 call  Less GLV rec'd 4/27, call Dec GLV      Date 5/18 5/25 6/1 6/8 6/15 6/22           Total WeeklyDose 15 mg 15mg 16mg 15 mg 15 mg 15 mg           INR 2.6 2.3 2.7 3.2 3.0 2.9           Notes  Inc GLV  call call                Phone Interview:  Tablet Strength: 2mg  Patient Contact Info: [phone] (Mobile) *preferred*  [email or web address]  Estimated OOP cost: Will send if patient comes to [place]  Verbal Release Authorization signed on 4/7/21 -- may speak with Tammy Bhumika (friend: [phone]), Ismael Michele (brother: [phone])  Lab Contact Info: [facility] ([facility])  ** will call once monthly or if INR is out of range**    Patient Findings    Comments:  Patient not contacted at this encounter.      Plan:  1. INR is therapeutic today at 2.9 (goal 2.5-3.5). Instructed Ms. Michele to continue warfarin 2 mg daily except 3 mg on Wednesday this week.   2. Repeat INR in one week, 6/29/22..  3. Lucie Michele understands the importance of calling the [facility] if she notices any s/sx of bleeding, stroke, or abnormal bruising, if any changes are made to her medications or medication doses (Rx, OTC, herbal), or if any upcoming procedures are scheduled. Lucie Michele will likewise let us know if any other changes, questions, or concerns arise regarding anticoagulation therapy. she understands the importance of seeking medical attention immediately if she experiences any falls, vehicle accidents, or abnormal bleeding or bruising. Lucie Michele voiced understanding of this information and confirms that she has the [facility]'s contact information. Otherwise, we will plan to contact the patient once monthly or if her INR is out of range.    Eric Hernandez CPhT  6/22/2022  15:09 EDT     I, Kamilla Felix, PharmD, have reviewed the note in full and agree with the assessment and  plan.  06/22/22  16:26 EDT

## 2022-06-29 ENCOUNTER — ANTICOAGULATION VISIT (OUTPATIENT)
Dept: PHARMACY | Facility: HOSPITAL | Age: 77
End: 2022-06-29

## 2022-06-29 DIAGNOSIS — I35.9 AORTIC VALVE DISEASE: Primary | ICD-10-CM

## 2022-06-29 LAB — INR PPP: 2.6

## 2022-06-29 NOTE — PROGRESS NOTES
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     Diet: 3x week: 1 cup of brussels sprouts or broccoli weekly (4/19/2022)  Premier Protein (or Equate brand) meal replacement ~2x/week 5/11/22  Alcohol: Seldom  Tobacco: None  OTC Pain Medication: APAP    INR History:  Date 4/5 4/19 4/26 5/5 5/11 6/2 6/15 6/18 6/25 7/2 7/9 7/19 7/23 7/30   Total Weekly Dose 15mg 15mg 14mg 14mg 14mg 14mg 14mg 14mg 14mg 14mg 14mg 14mg 14mg 14mg   INR 2.6 3.9 3.9 2.7 3.0 3.6 2.7 2.9 2.9 2.6 2.9 3.1 2.5 2.3   Notes      decr GLV  recv'd 6/21; [facility]    incr GLV decr GLV     Date 8/6 8/13 8/20 8/27 9/3 9/10 9/17 9/24 10/1 10/8 10/15 10/22 10/29 11/5   Total WeeklyDose 14mg 15mg 15mg 14mg 14mg 15mg 14mg 14mg 14mg 14mg 14mg 14mg 15mg 16mg   INR 2.4 3.4 3.8 2.9 2.2 3.2 2.9 3.3 3.2 3.3 3.0 2.4 2.4 2.4   Notes decr GLV decr GLV    1xboost     call call 1x boost   incr VitK call 2x boost; call     Date 11/10 11/17 11/24 12/1 12/8 12/15 12/23 12/30 1/3/22 1/7 1/11 1/18 1/25 2/1   Total WeeklyDose 17mg 16mg 16mg 16mg 15mg 15 mg Pt did not call back 15mg 12 mg 13mg 15mg 15 mg 15 mg 15 mg   INR 3.7 3.3 3.9 4.0 2.6 3.4 4.0 4.9 3.6 2.4 3.3 2.8 2.7 2.9   Notes Dec GLV  Zero GLV call Red x1 call Less GLV   call  Less  Protein drink redx1  self held x1 (misdose)  Dec vit K fall, apap  Call    call       Date 2/8 2/15 2/22 3/1 3/8 3/15 3/22 3/29 4/5 4/12 4/19 4/26 5/3 5/11   Total WeeklyDose 15mg 14mg 14 mg 15 mg 15 mg 15 mg 14mg 13 mg 14 mg 14 mg 14mg 13mg 15mg 14 mg   INR 4.0 4.0 2.8 2.9 2.9 4.2 3.9 3.3 2.9 3.0 3.8 2.4 3.8 2.5    Notes Call; Dec GLV call Call; Mis-dose call  Call; no GLV Inc GLV. Less protein, apap  redx1 call  Less GLV rec'd 4/27, call Dec GLV      Date 5/18 5/25 6/1 6/8 6/15 6/22 6/29          Total WeeklyDose 15 mg 15mg 16mg 15 mg 15 mg 15 mg 15 mg          INR 2.6 2.3 2.7 3.2 3.0 2.9 2.6          Notes  Inc GLV  call call                Phone Interview:  Tablet Strength: 2mg  Patient Contact Info: [phone] (Mobile) *preferred*  [email or web address]  Estimated OOP cost: Will send if patient comes to [place]  Verbal Release Authorization signed on 4/7/21 -- may speak with Tammy Bhumika (friend: [phone]), Ismael Michele (brother: [phone])  Lab Contact Info: [facility] ([facility])  ** will call once monthly or if INR is out of range**    Patient Findings:  Comments:  Patient not contacted at this encounter.      Plan:  1. INR is therapeutic today at 2.6 (goal 2.5-3.5). Ms. Michele to continue warfarin 2 mg daily except 3 mg on Wednesday this week.   2. Repeat INR in one week, 7/6/22.  3. Lucie Michele understands the importance of calling the [facility] if she notices any s/sx of bleeding, stroke, or abnormal bruising, if any changes are made to her medications or medication doses (Rx, OTC, herbal), or if any upcoming procedures are scheduled. Lucie Michele will likewise let us know if any other changes, questions, or concerns arise regarding anticoagulation therapy. she understands the importance of seeking medical attention immediately if she experiences any falls, vehicle accidents, or abnormal bleeding or bruising. Lucie Michele voiced understanding of this information and confirms that she has the [facility]'s contact information. Otherwise, we will plan to contact the patient once monthly or if her INR is out of range.    Cristina Mcelroy CPhT  6/29/2022  11:13 EDT    I, Lizet Marinelli, PharmD, have reviewed the note in full and agree with the assessment  and plan.  06/29/22  14:06 EDT

## 2022-07-06 ENCOUNTER — ANTICOAGULATION VISIT (OUTPATIENT)
Dept: PHARMACY | Facility: HOSPITAL | Age: 77
End: 2022-07-06

## 2022-07-06 DIAGNOSIS — I35.9 AORTIC VALVE DISEASE: Primary | ICD-10-CM

## 2022-07-06 LAB — INR PPP: 2.7

## 2022-07-06 NOTE — PROGRESS NOTES
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     Diet: 3x week: 1 cup of brussels sprouts or broccoli weekly (4/19/2022)  Premier Protein (or Equate brand) meal replacement ~2x/week 7/6/22  Alcohol: Seldom  Tobacco: None  OTC Pain Medication: APAP    INR History:  Date 4/5 4/19 4/26 5/5 5/11 6/2 6/15 6/18 6/25 7/2 7/9 7/19 7/23 7/30   Total Weekly Dose 15mg 15mg 14mg 14mg 14mg 14mg 14mg 14mg 14mg 14mg 14mg 14mg 14mg 14mg   INR 2.6 3.9 3.9 2.7 3.0 3.6 2.7 2.9 2.9 2.6 2.9 3.1 2.5 2.3   Notes      decr GLV  recv'd 6/21; [facility]    incr GLV decr GLV     Date 8/6 8/13 8/20 8/27 9/3 9/10 9/17 9/24 10/1 10/8 10/15 10/22 10/29 11/5   Total WeeklyDose 14mg 15mg 15mg 14mg 14mg 15mg 14mg 14mg 14mg 14mg 14mg 14mg 15mg 16mg   INR 2.4 3.4 3.8 2.9 2.2 3.2 2.9 3.3 3.2 3.3 3.0 2.4 2.4 2.4   Notes decr GLV decr GLV    1xboost     call call 1x boost   incr VitK call 2x boost; call     Date 11/10 11/17 11/24 12/1 12/8 12/15 12/23 12/30 1/3/22 1/7 1/11 1/18 1/25 2/1   Total WeeklyDose 17mg 16mg 16mg 16mg 15mg 15 mg Pt did not call back 15mg 12 mg 13mg 15mg 15 mg 15 mg 15 mg   INR 3.7 3.3 3.9 4.0 2.6 3.4 4.0 4.9 3.6 2.4 3.3 2.8 2.7 2.9   Notes Dec GLV  Zero GLV call Red x1 call Less GLV   call  Less  Protein drink redx1  self held x1 (misdose)  Dec vit K fall, apap  Call    call       Date 2/8 2/15 2/22 3/1 3/8 3/15 3/22 3/29 4/5 4/12 4/19 4/26 5/3 5/11   Total WeeklyDose 15mg 14mg 14 mg 15 mg 15 mg 15 mg 14mg 13 mg 14 mg 14 mg 14mg 13mg 15mg 14 mg   INR 4.0 4.0 2.8 2.9 2.9 4.2 3.9 3.3 2.9 3.0 3.8 2.4 3.8 2.5    Notes Call; Dec GLV call Call; Mis-dose call  Call; no GLV Inc GLV. Less protein, apap  redx1 call  Less GLV rec'd 4/27, call Dec GLV      Date 5/18 5/25 6/1 6/8 6/15 6/22 6/29 7/6         Total WeeklyDose 15 mg 15mg 16mg 15 mg 15 mg 15 mg 15 mg 15 mg         INR 2.6 2.3 2.7 3.2 3.0 2.9 2.6 2.7         Notes  Inc GLV  call call call    call           Phone Interview:  Tablet Strength: 2mg  Patient Contact Info: [phone] (Mobile) *preferred*  [email or web address]  Estimated OOP cost: Will send if patient comes to [place]  Verbal Release Authorization signed on 4/7/21 -- may speak with Tammy Bhumika (friend: [phone]), Ismael Michele (brother: [phone])  Lab Contact Info: [facility] ([facility])  ** will call once monthly or if INR is out of range**    Patient Findings:  Negatives:  Signs/symptoms of thrombosis, Signs/symptoms of bleeding, Laboratory test error suspected, Change in health, Change in alcohol use, Change in activity, Upcoming invasive procedure, Emergency department visit, Upcoming dental procedure, Missed doses, Extra doses, Change in medications, Change in diet/appetite, Hospital admission, Bruising, Other complaints   Comments:  All findings negative per patient.      Plan:  1. INR is therapeutic today at 2.7 (goal 2.5-3.5). Ms. Michele to continue warfarin 2 mg daily except 3 mg on Wednesday this week.   2. Repeat INR in one week, 7/13/22.  3. Verbal information provided over the phone. Patient RBV dosing instructions, expresses understanding by teach back, and has no further questions at this time.  4. Lucie Michele understands the importance of calling the [facility] if she notices any s/sx of bleeding, stroke, or abnormal bruising, if any changes are made to her medications or medication doses (Rx, OTC, herbal), or if any upcoming procedures are scheduled. Lucie RODRIGUEZ Ryne will likewise let us know if any other changes, questions, or concerns arise regarding  anticoagulation therapy. she understands the importance of seeking medical attention immediately if she experiences any falls, vehicle accidents, or abnormal bleeding or bruising. Lucie Michele voiced understanding of this information and confirms that she has the [facility]'s contact information. Otherwise, we will plan to contact the patient once monthly or if her INR is out of range.    Cristina Mcelroy CPhT  7/6/2022  11:13 EDT    I, Tim Narvaez, PharmD, have reviewed the note in full and agree with the assessment and plan.  07/06/22  13:32 EDT

## 2022-07-12 DIAGNOSIS — E03.9 HYPOTHYROIDISM, UNSPECIFIED TYPE: Primary | ICD-10-CM

## 2022-07-12 RX ORDER — LEVOTHYROXINE SODIUM 0.12 MG/1
TABLET ORAL
Qty: 90 TABLET | Refills: 0 | Status: SHIPPED | OUTPATIENT
Start: 2022-07-12 | End: 2022-10-18

## 2022-07-12 NOTE — TELEPHONE ENCOUNTER
Last seen 04/14/22  Last bw 04/14/22   Pt sleeping comfortably, arouses easily, denies pain, SOB, or needs. Call light and personal possessions within reach, will continue to monitor.

## 2022-07-14 ENCOUNTER — ANTICOAGULATION VISIT (OUTPATIENT)
Dept: PHARMACY | Facility: HOSPITAL | Age: 77
End: 2022-07-14

## 2022-07-14 DIAGNOSIS — E03.9 HYPOTHYROIDISM, UNSPECIFIED TYPE: ICD-10-CM

## 2022-07-14 DIAGNOSIS — I35.9 AORTIC VALVE DISEASE: Primary | ICD-10-CM

## 2022-07-14 LAB — INR PPP: 3.6

## 2022-07-14 RX ORDER — LEVOTHYROXINE SODIUM 0.12 MG/1
TABLET ORAL
Qty: 90 TABLET | Refills: 0 | OUTPATIENT
Start: 2022-07-14

## 2022-07-14 NOTE — PROGRESS NOTES
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     Diet: 3x week: 1 cup of brussels sprouts or broccoli weekly (4/19/2022)  Premier Protein (or Equate brand) meal replacement ~2x/week 7/6/22  Alcohol: Seldom  Tobacco: None  OTC Pain Medication: APAP    INR History:  Date 4/5 4/19 4/26 5/5 5/11 6/2 6/15 6/18 6/25 7/2 7/9 7/19 7/23 7/30   Total Weekly Dose 15mg 15mg 14mg 14mg 14mg 14mg 14mg 14mg 14mg 14mg 14mg 14mg 14mg 14mg   INR 2.6 3.9 3.9 2.7 3.0 3.6 2.7 2.9 2.9 2.6 2.9 3.1 2.5 2.3   Notes      decr GLV  recv'd 6/21; [facility]    incr GLV decr GLV     Date 8/6 8/13 8/20 8/27 9/3 9/10 9/17 9/24 10/1 10/8 10/15 10/22 10/29 11/5   Total WeeklyDose 14mg 15mg 15mg 14mg 14mg 15mg 14mg 14mg 14mg 14mg 14mg 14mg 15mg 16mg   INR 2.4 3.4 3.8 2.9 2.2 3.2 2.9 3.3 3.2 3.3 3.0 2.4 2.4 2.4   Notes decr GLV decr GLV    1xboost     call call 1x boost   incr VitK call 2x boost; call     Date 11/10 11/17 11/24 12/1 12/8 12/15 12/23 12/30 1/3/22 1/7 1/11 1/18 1/25 2/1   Total WeeklyDose 17mg 16mg 16mg 16mg 15mg 15 mg Pt did not call back 15mg 12 mg 13mg 15mg 15 mg 15 mg 15 mg   INR 3.7 3.3 3.9 4.0 2.6 3.4 4.0 4.9 3.6 2.4 3.3 2.8 2.7 2.9   Notes Dec GLV  Zero GLV call Red x1 call Less GLV   call  Less  Protein drink redx1  self held x1 (misdose)  Dec vit K fall, apap  Call    call       Date 2/8 2/15 2/22 3/1 3/8 3/15 3/22 3/29 4/5 4/12 4/19 4/26 5/3 5/11   Total WeeklyDose 15mg 14mg 14 mg 15 mg 15 mg 15 mg 14mg 13 mg 14 mg 14 mg 14mg 13mg 15mg 14 mg   INR 4.0 4.0 2.8 2.9 2.9 4.2 3.9 3.3 2.9 3.0 3.8 2.4 3.8 2.5    Notes Call; Dec GLV call Call; Mis-dose call  Call; no GLV Inc GLV. Less protein, apap  redx1 call  Less GLV rec'd 4/27, call Dec GLV      Date 5/18 5/25 6/1 6/8 6/15 6/22 6/29 7/6 7/13        Total WeeklyDose 15 mg 15mg 16mg 15 mg 15 mg 15 mg 15 mg 15 mg 15mg        INR 2.6 2.3 2.7 3.2 3.0 2.9 2.6 2.7 3.6        Notes  Inc GLV  call call call    call 7/14-- call          Phone Interview:  Tablet Strength: 2mg  Patient Contact Info: [phone] (Mobile) *preferred*  [email or web address]  Estimated OOP cost: Will send if patient comes to [place]  Verbal Release Authorization signed on 4/7/21 -- may speak with Tammy Bai (friend: [phone]), Ismael Michele (brother: [phone])  Lab Contact Info: [facility] ([facility])  ** will call once monthly or if INR is out of range**    Patient Findings:      Positives:  Change in diet/appetite   Negatives:  Signs/symptoms of thrombosis, Signs/symptoms of bleeding, Laboratory test error suspected, Change in health, Change in alcohol use, Change in activity, Upcoming invasive procedure, Emergency department visit, Upcoming dental procedure, Missed doses, Extra doses, Change in medications, Hospital admission, Bruising, Other complaints   Comments:  Did not have her normal GLV (only one protein drink and 1x GLV). Self adj last nights dose to 2mg. Plans to resume her normal diet         Plan:  1. INR is SUPRAtherapeutic yesterday at 3.6 (goal 2.5-3.5). Considering patient self adjusted last night's dose to 2mg, Ms. Michele to resume warfarin 2 mg daily except 3 mg on Wednesday this week.   2. Repeat INR in one week, 7/20/22.  3. Verbal information provided over the phone. Patient RBV dosing instructions, expresses understanding by teach back, and has no further questions at this time.  4. Lucie Michele understands the importance of calling the [facility] if she notices any s/sx of bleeding, stroke, or abnormal bruising, if any changes are made to  her medications or medication doses (Rx, OTC, herbal), or if any upcoming procedures are scheduled. Lucie Michele will likewise let us know if any other changes, questions, or concerns arise regarding anticoagulation therapy. she understands the importance of seeking medical attention immediately if she experiences any falls, vehicle accidents, or abnormal bleeding or bruising. Lucie Michele voiced understanding of this information and confirms that she has the [facility]'s contact information. Otherwise, we will plan to contact the patient once monthly or if her INR is out of range.    Lizet Marinelli, JenniD.  07/14/22   09:24 EDT

## 2022-07-20 ENCOUNTER — ANTICOAGULATION VISIT (OUTPATIENT)
Dept: PHARMACY | Facility: HOSPITAL | Age: 77
End: 2022-07-20

## 2022-07-20 DIAGNOSIS — I35.9 AORTIC VALVE DISEASE: Primary | ICD-10-CM

## 2022-07-20 LAB — INR PPP: 3

## 2022-07-20 NOTE — PROGRESS NOTES
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     Diet: 3x week: 1 cup of brussels sprouts or broccoli weekly (4/19/2022)  Premier Protein (or Equate brand) meal replacement ~2x/week 7/6/22  Alcohol: Seldom  Tobacco: None  OTC Pain Medication: APAP    INR History:  Date 4/5 4/19 4/26 5/5 5/11 6/2 6/15 6/18 6/25 7/2 7/9 7/19 7/23 7/30   Total Weekly Dose 15mg 15mg 14mg 14mg 14mg 14mg 14mg 14mg 14mg 14mg 14mg 14mg 14mg 14mg   INR 2.6 3.9 3.9 2.7 3.0 3.6 2.7 2.9 2.9 2.6 2.9 3.1 2.5 2.3   Notes      decr GLV  recv'd 6/21; [facility]    incr GLV decr GLV     Date 8/6 8/13 8/20 8/27 9/3 9/10 9/17 9/24 10/1 10/8 10/15 10/22 10/29 11/5   Total WeeklyDose 14mg 15mg 15mg 14mg 14mg 15mg 14mg 14mg 14mg 14mg 14mg 14mg 15mg 16mg   INR 2.4 3.4 3.8 2.9 2.2 3.2 2.9 3.3 3.2 3.3 3.0 2.4 2.4 2.4   Notes decr GLV decr GLV    1xboost     call call 1x boost   incr VitK call 2x boost; call     Date 11/10 11/17 11/24 12/1 12/8 12/15 12/23 12/30 1/3/22 1/7 1/11 1/18 1/25 2/1   Total WeeklyDose 17mg 16mg 16mg 16mg 15mg 15 mg Pt did not call back 15mg 12 mg 13mg 15mg 15 mg 15 mg 15 mg   INR 3.7 3.3 3.9 4.0 2.6 3.4 4.0 4.9 3.6 2.4 3.3 2.8 2.7 2.9   Notes Dec GLV  Zero GLV call Red x1 call Less GLV   call  Less  Protein drink redx1  self held x1 (misdose)  Dec vit K fall, apap  Call    call       Date 2/8 2/15 2/22 3/1 3/8 3/15 3/22 3/29 4/5 4/12 4/19 4/26 5/3 5/11   Total WeeklyDose 15mg 14mg 14 mg 15 mg 15 mg 15 mg 14mg 13 mg 14 mg 14 mg 14mg 13mg 15mg 14 mg   INR 4.0 4.0 2.8 2.9 2.9 4.2 3.9 3.3 2.9 3.0 3.8 2.4 3.8 2.5    Notes Call; Dec GLV call Call; Mis-dose call  Call; no GLV Inc GLV. Less protein, apap  redx1 call  Less GLV rec'd 4/27, call Dec GLV      Date 5/18 5/25 6/1 6/8 6/15 6/22 6/29 7/6 7/13 7/20       Total WeeklyDose 15 mg 15mg 16mg 15 mg 15 mg 15 mg 15 mg 15 mg 15mg 14 mg       INR 2.6 2.3 2.7 3.2 3.0 2.9 2.6 2.7 3.6 3.0       Notes  Inc GLV  call call call    call 7/14-- call call         Phone Interview:  Tablet Strength: 2mg  Patient Contact Info: [phone] (Mobile) *preferred*  [email or web address]  Estimated OOP cost: Will send if patient comes to [place]  Verbal Release Authorization signed on 4/7/21 -- may speak with Tammy Bhumika (friend: [phone]), Ismael Michele (brother: [phone])  Lab Contact Info: [facility] ([place])  ** will call once monthly or if INR is out of range**    Patient Findings  Positives:  Bruising   Negatives:  Signs/symptoms of thrombosis, Signs/symptoms of bleeding, Laboratory test error suspected, Change in health, Change in alcohol use, Change in activity, Upcoming invasive procedure, Emergency department visit, Upcoming dental procedure, Missed doses, Extra doses, Change in medications, Change in diet/appetite, Hospital admission, Other complaints   Comments:  Bruising occurred overnight Saturday, getting better though. Discussed to monitor for s/sx of bleeding including epistaxis, hematuria, unusual bruising, hemoptysis, hematochezia as well as s/sx of stroke including impaired speech, unilateral paralysis, blurry vision as well as s/sx of a blood clot including severe pain, warmth or redness in calf or leg that worsens when you walk, SOA or blood tinged sputum and when to seek medical attention.      Plan:  1. INR is therapeutic yesterday at 3.0 (goal 2.5-3.5). Per Ngoc Mcneal, PharmD, instructed Ms. Michele to resume warfarin 2 mg daily except 3 mg on Wednesday this week.   2. Repeat INR in one week, 7/27/22.  3. Verbal information provided over the phone.  Patient RBV dosing instructions, expresses understanding by teach back, and has no further questions at this time.  4. Lucie Michele understands the importance of calling the [facility] if she notices any s/sx of bleeding, stroke, or abnormal bruising, if any changes are made to her medications or medication doses (Rx, OTC, herbal), or if any upcoming procedures are scheduled. Lucie Michele will likewise let us know if any other changes, questions, or concerns arise regarding anticoagulation therapy. she understands the importance of seeking medical attention immediately if she experiences any falls, vehicle accidents, or abnormal bleeding or bruising. Lucie Michele voiced understanding of this information and confirms that she has the [facility]'s contact information. Otherwise, we will plan to contact the patient once monthly or if her INR is out of range.    Eric Hernandez CPhT  7/20/2022  15:48 EDT     I, Ngoc Brooks, PharmD, have reviewed the note in full and agree with the assessment and plan.  07/21/22  09:25 EDT

## 2022-07-22 ENCOUNTER — ANTICOAGULATION VISIT (OUTPATIENT)
Dept: PHARMACY | Facility: HOSPITAL | Age: 77
End: 2022-07-22

## 2022-07-22 ENCOUNTER — CLINICAL SUPPORT (OUTPATIENT)
Dept: CARDIOLOGY | Facility: CLINIC | Age: 77
End: 2022-07-22

## 2022-07-22 DIAGNOSIS — I35.9 AORTIC VALVE DISEASE: Primary | ICD-10-CM

## 2022-07-22 DIAGNOSIS — I48.11 LONGSTANDING PERSISTENT ATRIAL FIBRILLATION: Primary | ICD-10-CM

## 2022-07-22 LAB — INR PPP: 3.6 (ref 0.9–1.1)

## 2022-07-22 NOTE — PROGRESS NOTES
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     Diet: 3x week: 1 cup of brussels sprouts or broccoli weekly (4/19/2022)  Premier Protein (or Equate brand) meal replacement ~2x/week 7/6/22  Alcohol: Seldom  Tobacco: None  OTC Pain Medication: APAP    INR History:  Date 4/5 4/19 4/26 5/5 5/11 6/2 6/15 6/18 6/25 7/2 7/9 7/19 7/23 7/30   Total Weekly Dose 15mg 15mg 14mg 14mg 14mg 14mg 14mg 14mg 14mg 14mg 14mg 14mg 14mg 14mg   INR 2.6 3.9 3.9 2.7 3.0 3.6 2.7 2.9 2.9 2.6 2.9 3.1 2.5 2.3   Notes      decr GLV  recv'd 6/21; [facility]    incr GLV decr GLV     Date 8/6 8/13 8/20 8/27 9/3 9/10 9/17 9/24 10/1 10/8 10/15 10/22 10/29 11/5   Total WeeklyDose 14mg 15mg 15mg 14mg 14mg 15mg 14mg 14mg 14mg 14mg 14mg 14mg 15mg 16mg   INR 2.4 3.4 3.8 2.9 2.2 3.2 2.9 3.3 3.2 3.3 3.0 2.4 2.4 2.4   Notes decr GLV decr GLV    1xboost     call call 1x boost   incr VitK call 2x boost; call     Date 11/10 11/17 11/24 12/1 12/8 12/15 12/23 12/30 1/3/22 1/7 1/11 1/18 1/25 2/1   Total WeeklyDose 17mg 16mg 16mg 16mg 15mg 15 mg Pt did not call back 15mg 12 mg 13mg 15mg 15 mg 15 mg 15 mg   INR 3.7 3.3 3.9 4.0 2.6 3.4 4.0 4.9 3.6 2.4 3.3 2.8 2.7 2.9   Notes Dec GLV  Zero GLV call Red x1 call Less GLV   call  Less  Protein drink redx1  self held x1 (misdose)  Dec vit K fall, apap  Call    call       Date 2/8 2/15 2/22 3/1 3/8 3/15 3/22 3/29 4/5 4/12 4/19 4/26 5/3 5/11   Total WeeklyDose 15mg 14mg 14 mg 15 mg 15 mg 15 mg 14mg 13 mg 14 mg 14 mg 14mg 13mg 15mg 14 mg   INR 4.0 4.0 2.8 2.9 2.9 4.2 3.9 3.3 2.9 3.0 3.8 2.4 3.8 2.5    Notes Call; Dec GLV call Call; Mis-dose call  Call; no GLV Inc GLV. Less protein, apap  redx1 call  Less GLV rec'd 4/27, call Dec GLV      Date 5/18 5/25 6/1 6/8 6/15 6/22 6/29 7/6 7/13 7/20 7/22      Total WeeklyDose 15 mg 15mg 16mg 15 mg 15 mg 15 mg 15 mg 15 mg 15mg 14 mg 15 mg      INR 2.6 2.3 2.7 3.2 3.0 2.9 2.6 2.7 3.6 3.0 3.6      Notes  Inc GLV  call call call    call 7/14-- call call call        Phone Interview:  Tablet Strength: 2mg  Patient Contact Info: [phone] (Mobile) *preferred*  [email or web address]  Estimated OOP cost: Will send if patient comes to [place]  Verbal Release Authorization signed on 4/7/21 -- may speak with Tammy Bai (friend: [phone]), Ismael Michele (brother: [phone])  Lab Contact Info: [facility] ([place])  ** will call once monthly or if INR is out of range**    Patient Findings    Positives:  Change in health, Change in diet/appetite, Bruising   Negatives:  Signs/symptoms of thrombosis, Signs/symptoms of bleeding, Laboratory test error suspected, Change in alcohol use, Change in activity, Upcoming invasive procedure, Emergency department visit, Upcoming dental procedure, Missed doses, Extra doses, Change in medications, Hospital admission, Other complaints   Comments:  Visited PCP this morning to access bruising on LLE that appeared overnight. PCP deferred warfarin to clinic and cleared pt.  Pt also reports only having one premier protein in last week. Discussed to monitor for s/sx of bleeding including epistaxis, hematuria, unusual bruising, hemoptysis, hematochezia as well as s/sx of stroke including impaired speech, unilateral paralysis, blurry vision as well as s/sx of a blood clot including severe pain, warmth or redness in calf or leg that worsens when you walk, SOA or blood tinged sputum and when to seek medical attention.      Plan:  1. INR is SUPRAtherapeutic today at 3.6 (goal 2.5-3.5). Per Ngoc Polen, PharmD, instructed Ms. Michele to  reduce tonights dose to 1 mg then resume warfarin 2 mg daily except 3 mg on Wednesday this week.   2. Repeat INR in one week, 7/27/22.  3. Verbal information provided over the phone. Patient RBV dosing instructions, expresses understanding by teach back, and has no further questions at this time.  4. Lucie Michele understands the importance of calling the [facility] if she notices any s/sx of bleeding, stroke, or abnormal bruising, if any changes are made to her medications or medication doses (Rx, OTC, herbal), or if any upcoming procedures are scheduled. Lucie Michele will likewise let us know if any other changes, questions, or concerns arise regarding anticoagulation therapy. she understands the importance of seeking medical attention immediately if she experiences any falls, vehicle accidents, or abnormal bleeding or bruising. Lucie Michele voiced understanding of this information and confirms that she has the [facility]'s contact information. Otherwise, we will plan to contact the patient once monthly or if her INR is out of range.    Eric Hernandez CPhT  7/22/2022  12:12 EDT     I, Lizet Marinelli, PharmD, have reviewed the note in full and agree with the assessment and plan.  07/22/22  13:21 EDT

## 2022-07-27 ENCOUNTER — ANTICOAGULATION VISIT (OUTPATIENT)
Dept: PHARMACY | Facility: HOSPITAL | Age: 77
End: 2022-07-27

## 2022-07-27 DIAGNOSIS — I35.9 AORTIC VALVE DISEASE: Primary | ICD-10-CM

## 2022-07-27 LAB — INR PPP: 3

## 2022-07-27 NOTE — PROGRESS NOTES
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     Diet: 3x week: 1 cup of brussels sprouts or broccoli weekly (4/19/2022)  Premier Protein (or Equate brand) meal replacement ~2x/week 7/6/22  Alcohol: Seldom  Tobacco: None  OTC Pain Medication: APAP    INR History:  Date 4/5 4/19 4/26 5/5 5/11 6/2 6/15 6/18 6/25 7/2 7/9 7/19 7/23 7/30   Total Weekly Dose 15mg 15mg 14mg 14mg 14mg 14mg 14mg 14mg 14mg 14mg 14mg 14mg 14mg 14mg   INR 2.6 3.9 3.9 2.7 3.0 3.6 2.7 2.9 2.9 2.6 2.9 3.1 2.5 2.3   Notes      decr GLV  recv'd 6/21; [facility]    incr GLV decr GLV     Date 8/6 8/13 8/20 8/27 9/3 9/10 9/17 9/24 10/1 10/8 10/15 10/22 10/29 11/5   Total WeeklyDose 14mg 15mg 15mg 14mg 14mg 15mg 14mg 14mg 14mg 14mg 14mg 14mg 15mg 16mg   INR 2.4 3.4 3.8 2.9 2.2 3.2 2.9 3.3 3.2 3.3 3.0 2.4 2.4 2.4   Notes decr GLV decr GLV    1xboost     call call 1x boost   incr VitK call 2x boost; call     Date 11/10 11/17 11/24 12/1 12/8 12/15 12/23 12/30 1/3/22 1/7 1/11 1/18 1/25 2/1   Total WeeklyDose 17mg 16mg 16mg 16mg 15mg 15 mg Pt did not call back 15mg 12 mg 13mg 15mg 15 mg 15 mg 15 mg   INR 3.7 3.3 3.9 4.0 2.6 3.4 4.0 4.9 3.6 2.4 3.3 2.8 2.7 2.9   Notes Dec GLV  Zero GLV call Red x1 call Less GLV   call  Less  Protein drink redx1  self held x1 (misdose)  Dec vit K fall, apap  Call    call       Date 2/8 2/15 2/22 3/1 3/8 3/15 3/22 3/29 4/5 4/12 4/19 4/26 5/3 5/11   Total WeeklyDose 15mg 14mg 14 mg 15 mg 15 mg 15 mg 14mg 13 mg 14 mg 14 mg 14mg 13mg 15mg 14 mg   INR 4.0 4.0 2.8 2.9 2.9 4.2 3.9 3.3 2.9 3.0 3.8 2.4 3.8 2.5    Notes Call; Dec GLV call Call; Mis-dose call  Call; no GLV Inc GLV. Less protein, apap  redx1 call  Less GLV rec'd 4/27, call Dec GLV      Date 5/18 5/25 6/1 6/8 6/15 6/22 6/29 7/6 7/13 7/20 7/22 7/27     Total WeeklyDose 15 mg 15mg 16mg 15 mg 15 mg 15 mg 15 mg 15 mg 15mg 14 mg 15 mg 14 mg     INR 2.6 2.3 2.7 3.2 3.0 2.9 2.6 2.7 3.6 3.0 3.6 3.0     Notes  Inc GLV  call call call    call 7/14-- call call call        Phone Interview:  Tablet Strength: 2mg  Patient Contact Info: [phone] (Mobile) *preferred*  [email or web address]  Estimated OOP cost: Will send if patient comes to [place]  Verbal Release Authorization signed on 4/7/21 -- may speak with Tammy Bai (friend: [phone]), Ismael Michele (brother: [phone])  Lab Contact Info: [facility] ([facility])  ** will call once monthly or if INR is out of range**    Patient Findings  Comments:  Patient not contacted at this encounter.      Plan:  1. INR is therapeutic today at 3.0 (goal 2.5-3.5). Ms. Michele to resume warfarin 2 mg daily until recheck.  2. Repeat INR in one week, 8/3/22.  3. Verbal information provided over the phone. Patient RBV dosing instructions, expresses understanding by teach back, and has no further questions at this time.  4. Lucie Michele understands the importance of calling the [facility] if she notices any s/sx of bleeding, stroke, or abnormal bruising, if any changes are made to her medications or medication doses (Rx, OTC, herbal), or if any upcoming procedures are scheduled. Lucie Michele will likewise let us know if any other changes, questions, or concerns arise regarding anticoagulation therapy. she understands the importance of seeking medical attention immediately if she experiences any falls, vehicle accidents, or abnormal bleeding or bruising. Lucie Michele voiced understanding of this information and confirms that she has the [facility]'s contact information. Otherwise, we  will plan to contact the patient once monthly or if her INR is out of range.    Eric Hernandez CPhT  7/27/2022  13:35 EDT

## 2022-07-27 NOTE — PROGRESS NOTES
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     Diet: 3x week: 1 cup of brussels sprouts or broccoli weekly (4/19/2022)  Premier Protein (or Equate brand) meal replacement ~2x/week 7/6/22  Alcohol: Seldom  Tobacco: None  OTC Pain Medication: APAP     INR History:  Date 4/5 4/19 4/26 5/5 5/11 6/2 6/15 6/18 6/25 7/2 7/9 7/19 7/23 7/30   Total Weekly Dose 15mg 15mg 14mg 14mg 14mg 14mg 14mg 14mg 14mg 14mg 14mg 14mg 14mg 14mg   INR 2.6 3.9 3.9 2.7 3.0 3.6 2.7 2.9 2.9 2.6 2.9 3.1 2.5 2.3   Notes           decr GLV   recv'd 6/21; [facility]       incr GLV decr GLV      Date 8/6 8/13 8/20 8/27 9/3 9/10 9/17 9/24 10/1 10/8 10/15 10/22 10/29 11/5   Total WeeklyDose 14mg 15mg 15mg 14mg 14mg 15mg 14mg 14mg 14mg 14mg 14mg 14mg 15mg 16mg   INR 2.4 3.4 3.8 2.9 2.2 3.2 2.9 3.3 3.2 3.3 3.0 2.4 2.4 2.4   Notes decr GLV decr GLV       1xboost         call call 1x boost   incr VitK call 2x boost; call      Date 11/10 11/17 11/24 12/1 12/8 12/15 12/23 12/30 1/3/22 1/7 1/11 1/18 1/25 2/1   Total WeeklyDose 17mg 16mg 16mg 16mg 15mg 15 mg Pt did not call back 15mg 12 mg 13mg 15mg 15 mg 15 mg 15 mg   INR 3.7 3.3 3.9 4.0 2.6 3.4 4.0 4.9 3.6 2.4 3.3 2.8 2.7 2.9   Notes Dec GLV   Zero GLV call Red x1 call Less GLV   call   Less  Protein drink redx1  self held x1 (misdose)   Dec vit K fall, apap  Call    call          Date 2/8 2/15 2/22 3/1 3/8 3/15 3/22 3/29 4/5 4/12 4/19 4/26 5/3 5/11   Total WeeklyDose 15mg 14mg 14 mg 15 mg 15 mg 15 mg 14mg 13 mg 14 mg 14 mg 14mg 13mg 15mg 14 mg   INR 4.0 4.0 2.8 2.9 2.9 4.2 3.9  3.3 2.9 3.0 3.8 2.4 3.8 2.5   Notes Call; Dec GLV call Call; Mis-dose call   Call; no GLV Inc GLV. Less protein, apap  redx1 call   Less GLV rec'd 4/27, call Dec GLV        Date 5/18 5/25 6/1 6/8 6/15 6/22 6/29 7/6 7/13 7/20 7/22 7/27       Total WeeklyDose 15 mg 15mg 16mg 15 mg 15 mg 15 mg 15 mg 15 mg 15mg 14 mg 15 mg 14 mg       INR 2.6 2.3 2.7 3.2 3.0 2.9 2.6 2.7 3.6 3.0 3.6 3.0       Notes   Inc GLV  call call call       call 7/14-- call call call  call          Phone Interview:  Tablet Strength: 2mg  Patient Contact Info: [phone] (Mobile) *preferred*  [email or web address]  Estimated OOP cost: Will send if patient comes to [place]  Verbal Release Authorization signed on 4/7/21 -- may speak with Tammy Bhumika (friend: [phone]), Ismael Michele (brother: [phone])  Lab Contact Info: [facility] ([facility])  ** will call once monthly or if INR is out of range**     Patient Findings    Negatives:  Signs/symptoms of thrombosis, Signs/symptoms of bleeding, Laboratory test error suspected, Change in health, Change in alcohol use, Change in activity, Upcoming invasive procedure, Emergency department visit, Upcoming dental procedure, Missed doses, Extra doses, Change in medications, Change in diet/appetite, Hospital admission, Bruising, Other complaints   Comments:  All findings negative per pt.      Plan:  1. INR is therapeutic today at 3.0 (goal 2.5-3.5). Instructed Ms. Michele to take warfarin 2 mg daily until recheck.  2. Repeat INR in one week, 8/3/22.  3. Verbal information provided over the phone. Patient RBV dosing instructions, expresses understanding by teach back, and has no further questions at this time.  4. Lucie Michele understands the importance of calling the [facility] if she notices any s/sx of bleeding, stroke, or abnormal bruising, if any changes are made to her medications or medication doses (Rx, OTC, herbal), or if any upcoming procedures are scheduled. Lucie RODRIGUEZ  Ryne will likewise let us know if any other changes, questions, or concerns arise regarding anticoagulation therapy. she understands the importance of seeking medical attention immediately if she experiences any falls, vehicle accidents, or abnormal bleeding or bruising. Lucie Michele voiced understanding of this information and confirms that she has the [facility]'s contact information. Otherwise, we will plan to contact the patient once monthly or if her INR is out of range.     Eric Hernandez CPhT  7/27/2022  13:35 EDT     I, Justina Jara, PharmD, have reviewed the note in full and agree with the assessment and plan.  07/27/22  15:54 EDT

## 2022-07-29 DIAGNOSIS — N18.31 STAGE 3A CHRONIC KIDNEY DISEASE: ICD-10-CM

## 2022-07-29 DIAGNOSIS — E03.9 ACQUIRED HYPOTHYROIDISM: ICD-10-CM

## 2022-07-29 DIAGNOSIS — I10 PRIMARY HYPERTENSION: ICD-10-CM

## 2022-07-29 DIAGNOSIS — E55.9 VITAMIN D DEFICIENCY: ICD-10-CM

## 2022-07-29 DIAGNOSIS — E07.9 DISORDER OF THYROID: ICD-10-CM

## 2022-07-29 DIAGNOSIS — I10 ESSENTIAL HYPERTENSION, BENIGN: ICD-10-CM

## 2022-07-29 DIAGNOSIS — R73.03 PREDIABETES: ICD-10-CM

## 2022-07-29 DIAGNOSIS — Z13.820 OSTEOPOROSIS SCREENING: ICD-10-CM

## 2022-07-29 DIAGNOSIS — M25.552 LEFT HIP PAIN: ICD-10-CM

## 2022-07-29 DIAGNOSIS — M85.80 OSTEOPENIA, UNSPECIFIED LOCATION: ICD-10-CM

## 2022-08-01 RX ORDER — FUROSEMIDE 40 MG/1
40 TABLET ORAL DAILY
Qty: 90 TABLET | Refills: 1 | Status: SHIPPED | OUTPATIENT
Start: 2022-08-01

## 2022-08-10 ENCOUNTER — ANTICOAGULATION VISIT (OUTPATIENT)
Dept: PHARMACY | Facility: HOSPITAL | Age: 77
End: 2022-08-10

## 2022-08-10 DIAGNOSIS — I35.9 AORTIC VALVE DISEASE: Primary | ICD-10-CM

## 2022-08-10 LAB — INR PPP: 2.4

## 2022-08-10 NOTE — PROGRESS NOTES
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     Diet: 3x week: 1 cup of brussels sprouts or broccoli weekly (4/19/2022)  Premier Protein (or Equate brand) meal replacement ~2x/week 7/6/22  Alcohol: Seldom  Tobacco: None  OTC Pain Medication: APAP     INR History:  Date 4/5 4/19 4/26 5/5 5/11 6/2 6/15 6/18 6/25 7/2 7/9 7/19 7/23 7/30   Total Weekly Dose 15mg 15mg 14mg 14mg 14mg 14mg 14mg 14mg 14mg 14mg 14mg 14mg 14mg 14mg   INR 2.6 3.9 3.9 2.7 3.0 3.6 2.7 2.9 2.9 2.6 2.9 3.1 2.5 2.3   Notes           decr GLV   recv'd 6/21; [facility]       incr GLV decr GLV      Date 8/6 8/13 8/20 8/27 9/3 9/10 9/17 9/24 10/1 10/8 10/15 10/22 10/29 11/5   Total WeeklyDose 14mg 15mg 15mg 14mg 14mg 15mg 14mg 14mg 14mg 14mg 14mg 14mg 15mg 16mg   INR 2.4 3.4 3.8 2.9 2.2 3.2 2.9 3.3 3.2 3.3 3.0 2.4 2.4 2.4   Notes decr GLV decr GLV       1xboost         call call 1x boost   incr VitK call 2x boost; call      Date 11/10 11/17 11/24 12/1 12/8 12/15 12/23 12/30 1/3/22 1/7 1/11 1/18 1/25 2/1   Total WeeklyDose 17mg 16mg 16mg 16mg 15mg 15 mg Pt did not call back 15mg 12 mg 13mg 15mg 15 mg 15 mg 15 mg   INR 3.7 3.3 3.9 4.0 2.6 3.4 4.0 4.9 3.6 2.4 3.3 2.8 2.7 2.9   Notes Dec GLV   Zero GLV call Red x1 call Less GLV   call   Less  Protein drink redx1  self held x1 (misdose)   Dec vit K fall, apap  Call    call          Date 2/8 2/15 2/22 3/1 3/8 3/15 3/22 3/29 4/5 4/12 4/19 4/26 5/3 5/11   Total WeeklyDose 15mg 14mg 14 mg 15 mg 15 mg 15 mg 14mg 13 mg 14 mg 14 mg 14mg 13mg 15mg 14 mg   INR 4.0 4.0 2.8 2.9 2.9 4.2 3.9  3.3 2.9 3.0 3.8 2.4 3.8 2.5   Notes Call; Dec GLV call Call; Mis-dose call   Call; no GLV Inc GLV. Less protein, apap  redx1 call   Less GLV rec'd 4/27, call Dec GLV        Date 5/18 5/25 6/1 6/8 6/15 6/22 6/29 7/6 7/13 7/20 7/22 7/27  8/10     Total WeeklyDose 15 mg 15mg 16mg 15 mg 15 mg 15 mg 15 mg 15 mg 15mg 14 mg 15 mg 14 mg  14 mg     INR 2.6 2.3 2.7 3.2 3.0 2.9 2.6 2.7 3.6 3.0 3.6 3.0  2.4     Notes   Inc GLV  call call call       call 7/14-- call call call  call  call        Phone Interview:  Tablet Strength: 2mg  Patient Contact Info: [phone] (Mobile) *preferred*  [email or web address]  Estimated OOP cost: Will send if patient comes to [place]  Verbal Release Authorization signed on 4/7/21 -- may speak with Tammy Bhumika (friend: [phone]), Ismael Michele (brother: [phone])  Lab Contact Info: [facility] ([facility])  ** will call once monthly or if INR is out of range**     Patient Findings  Positives:  Change in diet/appetite   Negatives:  Signs/symptoms of thrombosis, Signs/symptoms of bleeding, Laboratory test error suspected, Change in health, Change in alcohol use, Change in activity, Upcoming invasive procedure, Emergency department visit, Upcoming dental procedure, Missed doses, Extra doses, Change in medications, Hospital admission, Bruising, Other complaints   Comments:  Pt reports lack of appetite d/t friend being admitted to hospital over weekend. All other findings negative.      Plan:  1. INR is SUBtherapeutic today at 2.4 (goal 2.5-3.5). Per Ngoc Brooks, PharmD, instructed Ms. Michele to boost tonights warfarin dose to 3 mg then continue warfarin 2 mg daily until recheck.  2. Repeat INR in one week, 8/17/22.  3. Verbal information provided over the phone. Patient RBV dosing instructions, expresses understanding by teach back, and has no further questions at this time.  4. Lucie Michele understands the importance of calling the [facility]tico[facility] if she notices any  s/sx of bleeding, stroke, or abnormal bruising, if any changes are made to her medications or medication doses (Rx, OTC, herbal), or if any upcoming procedures are scheduled. Lucie Michele will likewise let us know if any other changes, questions, or concerns arise regarding anticoagulation therapy. she understands the importance of seeking medical attention immediately if she experiences any falls, vehicle accidents, or abnormal bleeding or bruising. Lucie Michele voiced understanding of this information and confirms that she has the [facility]'s contact information. Otherwise, we will plan to contact the patient once monthly or if her INR is out of range.     Eric Hernandez CPhT  8/10/2022  14:30 EDT     I, Vicky Blacbkurn, PharmD, have reviewed the note in full and agree with the assessment and plan.  08/10/22  14:42 EDT

## 2022-08-17 ENCOUNTER — ANTICOAGULATION VISIT (OUTPATIENT)
Dept: PHARMACY | Facility: HOSPITAL | Age: 77
End: 2022-08-17

## 2022-08-17 DIAGNOSIS — I35.9 AORTIC VALVE DISEASE: Primary | ICD-10-CM

## 2022-08-17 LAB — INR PPP: 3.4

## 2022-08-17 NOTE — PROGRESS NOTES
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     Diet: 3x week: 1 cup of brussels sprouts or broccoli weekly (4/19/2022)  Premier Protein (or Equate brand) meal replacement ~2x/week 7/6/22  Alcohol: Seldom  Tobacco: None  OTC Pain Medication: APAP     INR History:  Date 4/5 4/19 4/26 5/5 5/11 6/2 6/15 6/18 6/25 7/2 7/9 7/19 7/23 7/30   Total Weekly Dose 15mg 15mg 14mg 14mg 14mg 14mg 14mg 14mg 14mg 14mg 14mg 14mg 14mg 14mg   INR 2.6 3.9 3.9 2.7 3.0 3.6 2.7 2.9 2.9 2.6 2.9 3.1 2.5 2.3   Notes           decr GLV   recv'd 6/21; [facility]       incr GLV decr GLV      Date 8/6 8/13 8/20 8/27 9/3 9/10 9/17 9/24 10/1 10/8 10/15 10/22 10/29 11/5   Total WeeklyDose 14mg 15mg 15mg 14mg 14mg 15mg 14mg 14mg 14mg 14mg 14mg 14mg 15mg 16mg   INR 2.4 3.4 3.8 2.9 2.2 3.2 2.9 3.3 3.2 3.3 3.0 2.4 2.4 2.4   Notes decr GLV decr GLV       1xboost         call call 1x boost   incr VitK call 2x boost; call      Date 11/10 11/17 11/24 12/1 12/8 12/15 12/23 12/30 1/3/22 1/7 1/11 1/18 1/25 2/1   Total WeeklyDose 17mg 16mg 16mg 16mg 15mg 15 mg Pt did not call back 15mg 12 mg 13mg 15mg 15 mg 15 mg 15 mg   INR 3.7 3.3 3.9 4.0 2.6 3.4 4.0 4.9 3.6 2.4 3.3 2.8 2.7 2.9   Notes Dec GLV   Zero GLV call Red x1 call Less GLV   call   Less  Protein drink redx1  self held x1 (misdose)   Dec vit K fall, apap  Call    call          Date 2/8 2/15 2/22 3/1 3/8 3/15 3/22 3/29 4/5 4/12 4/19 4/26 5/3 5/11   Total WeeklyDose 15mg 14mg 14 mg 15 mg 15 mg 15 mg 14mg 13 mg 14 mg 14 mg 14mg 13mg 15mg 14 mg   INR 4.0 4.0 2.8 2.9 2.9 4.2 3.9 3.3  2.9 3.0 3.8 2.4 3.8 2.5   Notes Call; Dec GLV call Call; Mis-dose call   Call; no GLV Inc GLV. Less protein, apap  redx1 call   Less GLV rec'd 4/27, call Dec GLV        Date 5/18 5/25 6/1 6/8 6/15 6/22 6/29 7/6 7/13 7/20 7/22 7/27  8/10  8/17   Total WeeklyDose 15 mg 15mg 16mg 15 mg 15 mg 15 mg 15 mg 15 mg 15mg 14 mg 15 mg 14 mg  14 mg  15 mg   INR 2.6 2.3 2.7 3.2 3.0 2.9 2.6 2.7 3.6 3.0 3.6 3.0  2.4  3.4   Notes   Inc GLV  call call call       call 7/14-- call call call  call  call  call      Phone Interview:  Tablet Strength: 2mg  Patient Contact Info: [phone] (Mobile) *preferred*  [email or web address]  Estimated OOP cost: Will send if patient comes to [place]  Verbal Release Authorization signed on 4/7/21 -- may speak with Tammy Bhumika (friend: [phone]), Ismael Michele (brother: [phone])  Lab Contact Info: [facility] ([facility])  ** will call once monthly or if INR is out of range**      Patient Findings  Negatives:  Signs/symptoms of thrombosis, Signs/symptoms of bleeding, Laboratory test error suspected, Change in health, Change in alcohol use, Change in activity, Upcoming invasive procedure, Emergency department visit, Upcoming dental procedure, Missed doses, Extra doses, Change in medications, Change in diet/appetite, Hospital admission, Bruising, Other complaints   Comments:  Pt reports lack of appetite d/t friend being admitted to hospital over weekend. All other findings negative.      Plan:  1. INR is therapeutic today at 3.4 (goal 2.5-3.5). Per Ngoc Brooks, PharmD, instructed Ms. Michele to increase dose to warfarin 2 mg daily except 3mg Wednesday until recheck.  2. Repeat INR in one week, 8/24/22.  3. Verbal information provided over the phone. Patient RBV dosing instructions, expresses understanding by teach back, and has no further questions at this time.  4. Lucie Michele understands the importance of calling the [facility] if she notices any s/sx of  bleeding, stroke, or abnormal bruising, if any changes are made to her medications or medication doses (Rx, OTC, herbal), or if any upcoming procedures are scheduled. Lucie Michele will likewise let us know if any other changes, questions, or concerns arise regarding anticoagulation therapy. she understands the importance of seeking medical attention immediately if she experiences any falls, vehicle accidents, or abnormal bleeding or bruising. Lucie Michele voiced understanding of this information and confirms that she has the [facility]'s contact information. Otherwise, we will plan to contact the patient once monthly or if her INR is out of range.    Ngoc Brooks, PharmD  08/17/22  14:06 EDT

## 2022-08-19 RX ORDER — MELOXICAM 7.5 MG/1
TABLET ORAL
Qty: 30 TABLET | Refills: 0 | Status: SHIPPED | OUTPATIENT
Start: 2022-08-19 | End: 2022-09-13

## 2022-08-19 NOTE — TELEPHONE ENCOUNTER
Rx Refill Note    Requested Prescriptions     Pending Prescriptions Disp Refills   • meloxicam (MOBIC) 7.5 MG tablet [Pharmacy Med Name: MELOXICAM 7.5MG TABLETS] 30 tablet 0     Sig: TAKE 1 TABLET BY MOUTH EVERY DAY        Last office visit with prescribing clinician: 4/14/2022      Next office visit with prescribing clinician: 10/14/2022   Last labs:   Last refill: 11/29/2021   Pharmacy (be sure to add in Epic). correct

## 2022-08-24 ENCOUNTER — TELEPHONE (OUTPATIENT)
Dept: CARDIOLOGY | Facility: CLINIC | Age: 77
End: 2022-08-24

## 2022-08-24 NOTE — TELEPHONE ENCOUNTER
PLEASE CALL PT BACK - SHE CANT SEEM TO GET HER PT TO TAKE. WANTS TO KNOW IF SHE CAN COME IN AND HAVE THE OFFICE DO IT TOMORROW    [phone]

## 2022-08-25 ENCOUNTER — ANTICOAGULATION VISIT (OUTPATIENT)
Dept: PHARMACY | Facility: HOSPITAL | Age: 77
End: 2022-08-25

## 2022-08-25 DIAGNOSIS — I35.9 AORTIC VALVE DISEASE: Primary | ICD-10-CM

## 2022-08-25 LAB — INR PPP: 4.3

## 2022-08-25 NOTE — PROGRESS NOTES
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     Diet: 3x week: 1 cup of brussels sprouts or broccoli weekly (4/19/2022)  Premier Protein (or Equate brand) meal replacement ~2x/week 7/6/22  Alcohol: Seldom  Tobacco: None  OTC Pain Medication: APAP     INR History:  Date 4/5 4/19 4/26 5/5 5/11 6/2 6/15 6/18 6/25 7/2 7/9 7/19 7/23 7/30   Total Weekly Dose 15mg 15mg 14mg 14mg 14mg 14mg 14mg 14mg 14mg 14mg 14mg 14mg 14mg 14mg   INR 2.6 3.9 3.9 2.7 3.0 3.6 2.7 2.9 2.9 2.6 2.9 3.1 2.5 2.3   Notes           decr GLV   recv'd 6/21; [facility]       incr GLV decr GLV      Date 8/6 8/13 8/20 8/27 9/3 9/10 9/17 9/24 10/1 10/8 10/15 10/22 10/29 11/5   Total WeeklyDose 14mg 15mg 15mg 14mg 14mg 15mg 14mg 14mg 14mg 14mg 14mg 14mg 15mg 16mg   INR 2.4 3.4 3.8 2.9 2.2 3.2 2.9 3.3 3.2 3.3 3.0 2.4 2.4 2.4   Notes decr GLV decr GLV       1xboost         call call 1x boost   incr VitK call 2x boost; call      Date 11/10 11/17 11/24 12/1 12/8 12/15 12/23 12/30 1/3/22 1/7 1/11 1/18 1/25 2/1   Total WeeklyDose 17mg 16mg 16mg 16mg 15mg 15 mg Pt did not call back 15mg 12 mg 13mg 15mg 15 mg 15 mg 15 mg   INR 3.7 3.3 3.9 4.0 2.6 3.4 4.0 4.9 3.6 2.4 3.3 2.8 2.7 2.9   Notes Dec GLV   Zero GLV call Red x1 call Less GLV   call   Less  Protein drink redx1  self held x1 (misdose)   Dec vit K fall, apap  Call    call          Date 2/8 2/15 2/22 3/1 3/8 3/15 3/22 3/29 4/5 4/12 4/19 4/26 5/3 5/11   Total WeeklyDose 15mg 14mg 14 mg 15 mg 15 mg 15 mg 14mg 13 mg 14 mg 14 mg 14mg 13mg 15mg 14 mg   INR 4.0 4.0 2.8 2.9 2.9 4.2 3.9 3.3  2.9 3.0 3.8 2.4 3.8 2.5   Notes Call; Dec GLV call Call; Mis-dose call   Call; no GLV Inc GLV. Less protein, apap  redx1 call   Less GLV rec'd 4/27, call Dec GLV        Date 5/18 5/25 6/1 6/8 6/15 6/22 6/29 7/6 7/13 7/20 7/22 7/27  8/10  8/17   Total WeeklyDose 15 mg 15mg 16mg 15 mg 15 mg 15 mg 15 mg 15 mg 15mg 14 mg 15 mg 14 mg  14 mg  15 mg   INR 2.6 2.3 2.7 3.2 3.0 2.9 2.6 2.7 3.6 3.0 3.6 3.0  2.4  3.4   Notes   Inc GLV  call call call       call 7/14-- call call call  call  call  call      Date 8/25              Total WeeklyDose 14mg              INR 4.3              Notes Dec GLV                Phone Interview:  Tablet Strength: 2mg  Patient Contact Info: [phone] (Mobile) *preferred*  [email or web address]  Estimated OOP cost: Will send if patient comes to [place]  Verbal Release Authorization signed on 4/7/21 -- may speak with Tammy Bai (friend: [phone]), Ismael Michele (brother: [phone])  Lab Contact Info: [facility] ([facility])  ** will call once monthly or if INR is out of range**      Patient Findings    Positives:  Change in diet/appetite   Negatives:  Signs/symptoms of thrombosis, Signs/symptoms of bleeding, Laboratory test error suspected, Change in health, Change in alcohol use, Change in activity, Upcoming invasive procedure, Emergency department visit, Upcoming dental procedure, Missed doses, Extra doses, Change in medications, Hospital admission, Bruising, Other complaints   Comments:  Tried to text x3 yesterday and got error 6. Had no GLV this week and fewer protein drinks (1 vs 2-3 normally)           Plan:  1. INR is SUPRAtherapeutic today at 4.3 (goal 2.5-3.5). nstructed Ms. Michele to decrease dose tonight to 1mg then continue warfarin 2 mg daily except 3mg Wednesday until recheck. Will resume normal GLV  2. Repeat INR in one week, 8/31/22.  3. Verbal information provided over the phone. Patient RBV dosing instructions, expresses understanding by teach back, and has no  further questions at this time.  4. Lucie Michele understands the importance of calling the [facility] if she notices any s/sx of bleeding, stroke, or abnormal bruising, if any changes are made to her medications or medication doses (Rx, OTC, herbal), or if any upcoming procedures are scheduled. Lucie Michele will likewise let us know if any other changes, questions, or concerns arise regarding anticoagulation therapy. she understands the importance of seeking medical attention immediately if she experiences any falls, vehicle accidents, or abnormal bleeding or bruising. Lucie Michele voiced understanding of this information and confirms that she has the [facility]'s contact information. Otherwise, we will plan to contact the patient once monthly or if her INR is out of range.    Lizet Marinelli PharmD.  08/25/22   10:18 EDT

## 2022-08-31 ENCOUNTER — ANTICOAGULATION VISIT (OUTPATIENT)
Dept: PHARMACY | Facility: HOSPITAL | Age: 77
End: 2022-08-31

## 2022-08-31 DIAGNOSIS — I35.9 AORTIC VALVE DISEASE: Primary | ICD-10-CM

## 2022-08-31 LAB — INR PPP: 2.8

## 2022-08-31 NOTE — PROGRESS NOTES
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     Diet: 3x week: 1 cup of brussels sprouts or broccoli weekly (4/19/2022)  Premier Protein (or Equate brand) meal replacement ~2x/week 7/6/22  Alcohol: Seldom  Tobacco: None  OTC Pain Medication: APAP     INR History:  Date 4/5 4/19 4/26 5/5 5/11 6/2 6/15 6/18 6/25 7/2 7/9 7/19 7/23 7/30   Total Weekly Dose 15mg 15mg 14mg 14mg 14mg 14mg 14mg 14mg 14mg 14mg 14mg 14mg 14mg 14mg   INR 2.6 3.9 3.9 2.7 3.0 3.6 2.7 2.9 2.9 2.6 2.9 3.1 2.5 2.3   Notes           decr GLV   recv'd 6/21; [facility]       incr GLV decr GLV      Date 8/6 8/13 8/20 8/27 9/3 9/10 9/17 9/24 10/1 10/8 10/15 10/22 10/29 11/5   Total WeeklyDose 14mg 15mg 15mg 14mg 14mg 15mg 14mg 14mg 14mg 14mg 14mg 14mg 15mg 16mg   INR 2.4 3.4 3.8 2.9 2.2 3.2 2.9 3.3 3.2 3.3 3.0 2.4 2.4 2.4   Notes decr GLV decr GLV       1xboost         call call 1x boost   incr VitK call 2x boost; call      Date 11/10 11/17 11/24 12/1 12/8 12/15 12/23 12/30 1/3/22 1/7 1/11 1/18 1/25 2/1   Total WeeklyDose 17mg 16mg 16mg 16mg 15mg 15 mg Pt did not call back 15mg 12 mg 13mg 15mg 15 mg 15 mg 15 mg   INR 3.7 3.3 3.9 4.0 2.6 3.4 4.0 4.9 3.6 2.4 3.3 2.8 2.7 2.9   Notes Dec GLV   Zero GLV call Red x1 call Less GLV   call   Less  Protein drink redx1  self held x1 (misdose)   Dec vit K fall, apap  Call    call          Date 2/8 2/15 2/22 3/1 3/8 3/15 3/22 3/29 4/5 4/12 4/19 4/26 5/3 5/11   Total WeeklyDose 15mg 14mg 14 mg 15 mg 15 mg 15 mg 14mg 13 mg 14 mg 14 mg 14mg 13mg 15mg 14 mg   INR 4.0 4.0 2.8 2.9 2.9 4.2 3.9 3.3  2.9 3.0 3.8 2.4 3.8 2.5   Notes Call; Dec GLV call Call; Mis-dose call   Call; no GLV Inc GLV. Less protein, apap  redx1 call   Less GLV rec'd 4/27, call Dec GLV        Date 5/18 5/25 6/1 6/8 6/15 6/22 6/29 7/6 7/13 7/20 7/22 7/27  8/10  8/17   Total WeeklyDose 15 mg 15mg 16mg 15 mg 15 mg 15 mg 15 mg 15 mg 15mg 14 mg 15 mg 14 mg  14 mg  15 mg   INR 2.6 2.3 2.7 3.2 3.0 2.9 2.6 2.7 3.6 3.0 3.6 3.0  2.4  3.4   Notes   Inc GLV  call call call       call 7/14-- call call call  call  call  call      Date 8/25 8/31             Total WeeklyDose 14mg 13 mg             INR 4.3 2.8             Notes Dec GLV call               Phone Interview:  Tablet Strength: 2mg  Patient Contact Info: [phone] (Mobile) *preferred*  [email or web address]  Estimated OOP cost: Will send if patient comes to [place]  Verbal Release Authorization signed on 4/7/21 -- may speak with Tammy Bhumika (friend: [phone]), Ismael Michele (brother: [phone])  Lab Contact Info: [facility] ([facility])  ** will call once monthly or if INR is out of range**      Patient Findings    Negatives:  Signs/symptoms of thrombosis, Signs/symptoms of bleeding, Laboratory test error suspected, Change in health, Change in alcohol use, Change in activity, Upcoming invasive procedure, Emergency department visit, Upcoming dental procedure, Missed doses, Extra doses, Change in medications, Change in diet/appetite, Hospital admission, Bruising, Other complaints   Comments:  All findings negative per pt.      Plan:  1. INR is therapeutic today at 2.8 (goal 2.5-3.5). Per Ngoc Brooks, PharmD, instructed Ms. Michele to take warfarin 2mg every day until recheck.   2. Repeat INR in one week, 9/7//22.  3. Verbal information provided over the phone. Patient RBV dosing instructions, expresses understanding by teach back, and has no further questions at this time.  4. Lucie Michele understands the importance of calling the [facility] if she notices  any s/sx of bleeding, stroke, or abnormal bruising, if any changes are made to her medications or medication doses (Rx, OTC, herbal), or if any upcoming procedures are scheduled. Lucie Michele will likewise let us know if any other changes, questions, or concerns arise regarding anticoagulation therapy. she understands the importance of seeking medical attention immediately if she experiences any falls, vehicle accidents, or abnormal bleeding or bruising. Lucie Michele voiced understanding of this information and confirms that she has the [facility]'s contact information. Otherwise, we will plan to contact the patient once monthly or if her INR is out of range.    Eric Hernandez CPhT  8/31/2022  14:15 EDT     I, Shekhar Hawkins, PharmD, have reviewed the note in full and agree with the assessment and plan.  08/31/22  15:00 EDT

## 2022-08-31 NOTE — PROGRESS NOTES
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     Diet: 3x week: 1 cup of brussels sprouts or broccoli weekly (4/19/2022)  Premier Protein (or Equate brand) meal replacement ~2x/week 7/6/22  Alcohol: Seldom  Tobacco: None  OTC Pain Medication: APAP     INR History:  Date 4/5 4/19 4/26 5/5 5/11 6/2 6/15 6/18 6/25 7/2 7/9 7/19 7/23 7/30   Total Weekly Dose 15mg 15mg 14mg 14mg 14mg 14mg 14mg 14mg 14mg 14mg 14mg 14mg 14mg 14mg   INR 2.6 3.9 3.9 2.7 3.0 3.6 2.7 2.9 2.9 2.6 2.9 3.1 2.5 2.3   Notes           decr GLV   recv'd 6/21; [facility]       incr GLV decr GLV      Date 8/6 8/13 8/20 8/27 9/3 9/10 9/17 9/24 10/1 10/8 10/15 10/22 10/29 11/5   Total WeeklyDose 14mg 15mg 15mg 14mg 14mg 15mg 14mg 14mg 14mg 14mg 14mg 14mg 15mg 16mg   INR 2.4 3.4 3.8 2.9 2.2 3.2 2.9 3.3 3.2 3.3 3.0 2.4 2.4 2.4   Notes decr GLV decr GLV       1xboost         call call 1x boost   incr VitK call 2x boost; call      Date 11/10 11/17 11/24 12/1 12/8 12/15 12/23 12/30 1/3/22 1/7 1/11 1/18 1/25 2/1   Total WeeklyDose 17mg 16mg 16mg 16mg 15mg 15 mg Pt did not call back 15mg 12 mg 13mg 15mg 15 mg 15 mg 15 mg   INR 3.7 3.3 3.9 4.0 2.6 3.4 4.0 4.9 3.6 2.4 3.3 2.8 2.7 2.9   Notes Dec GLV   Zero GLV call Red x1 call Less GLV   call   Less  Protein drink redx1  self held x1 (misdose)   Dec vit K fall, apap  Call    call          Date 2/8 2/15 2/22 3/1 3/8 3/15 3/22 3/29 4/5 4/12 4/19 4/26 5/3 5/11   Total WeeklyDose 15mg 14mg 14 mg 15 mg 15 mg 15 mg 14mg 13 mg 14 mg 14 mg 14mg 13mg 15mg 14 mg   INR 4.0 4.0 2.8 2.9 2.9 4.2 3.9 3.3  2.9 3.0 3.8 2.4 3.8 2.5   Notes Call; Dec GLV call Call; Mis-dose call   Call; no GLV Inc GLV. Less protein, apap  redx1 call   Less GLV rec'd 4/27, call Dec GLV        Date 5/18 5/25 6/1 6/8 6/15 6/22 6/29 7/6 7/13 7/20 7/22 7/27  8/10  8/17   Total WeeklyDose 15 mg 15mg 16mg 15 mg 15 mg 15 mg 15 mg 15 mg 15mg 14 mg 15 mg 14 mg  14 mg  15 mg   INR 2.6 2.3 2.7 3.2 3.0 2.9 2.6 2.7 3.6 3.0 3.6 3.0  2.4  3.4   Notes   Inc GLV  call call call       call 7/14-- call call call  call  call  call      Date 8/25 8/31             Total WeeklyDose 14mg 13 mg             INR 4.3 2.8             Notes Dec GLV                Phone Interview:  Tablet Strength: 2mg  Patient Contact Info: [phone] (Mobile) *preferred*  [email or web address]  Estimated OOP cost: Will send if patient comes to [place]  Verbal Release Authorization signed on 4/7/21 -- may speak with Tammy Bhumika (friend: [phone]), Ismael Michele (brother: [phone])  Lab Contact Info: [facility] ([facility])  ** will call once monthly or if INR is out of range**     *LVM 8/31/2022 @ 13:13 EDT*    *DISREGARD FOLLOWING UNTIL PT IS CONTACTED*     Patient Findings    Positives:  Change in diet/appetite   Negatives:  Signs/symptoms of thrombosis, Signs/symptoms of bleeding, Laboratory test error suspected, Change in health, Change in alcohol use, Change in activity, Upcoming invasive procedure, Emergency department visit, Upcoming dental procedure, Missed doses, Extra doses, Change in medications, Hospital admission, Bruising, Other complaints   Comments:  Tried to text x3 yesterday and got error 6. Had no GLV this week and fewer protein drinks (1 vs 2-3 normally)           Plan:  1. INR is SUPRAtherapeutic today at 4.3 (goal 2.5-3.5). nstructed Ms. Noell to decrease dose tonight to 1mg then continue warfarin 2 mg daily except 3mg Wednesday until recheck. Will resume normal GLV  2. Repeat INR in one week, 8/31/22.  3. Verbal information provided over the  phone. Patient RBV dosing instructions, expresses understanding by teach back, and has no further questions at this time.  4. Lucie Michele understands the importance of calling the [facility] if she notices any s/sx of bleeding, stroke, or abnormal bruising, if any changes are made to her medications or medication doses (Rx, OTC, herbal), or if any upcoming procedures are scheduled. Lucie Michele will likewise let us know if any other changes, questions, or concerns arise regarding anticoagulation therapy. she understands the importance of seeking medical attention immediately if she experiences any falls, vehicle accidents, or abnormal bleeding or bruising. Lucie Michele voiced understanding of this information and confirms that she has the [facility]'s contact information. Otherwise, we will plan to contact the patient once monthly or if her INR is out of range.

## 2022-09-07 ENCOUNTER — ANTICOAGULATION VISIT (OUTPATIENT)
Dept: PHARMACY | Facility: HOSPITAL | Age: 77
End: 2022-09-07

## 2022-09-07 DIAGNOSIS — I35.9 AORTIC VALVE DISEASE: Primary | ICD-10-CM

## 2022-09-07 LAB — INR PPP: 1.7

## 2022-09-07 NOTE — PROGRESS NOTES
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     Diet: 3x week: 1 cup of brussels sprouts or broccoli weekly (4/19/2022)  Premier Protein (or Equate brand) meal replacement ~2x/week 7/6/22  Alcohol: Seldom  Tobacco: None  OTC Pain Medication: APAP     INR History:  Date 4/5 4/19 4/26 5/5 5/11 6/2 6/15 6/18 6/25 7/2 7/9 7/19 7/23 7/30   Total Weekly Dose 15mg 15mg 14mg 14mg 14mg 14mg 14mg 14mg 14mg 14mg 14mg 14mg 14mg 14mg   INR 2.6 3.9 3.9 2.7 3.0 3.6 2.7 2.9 2.9 2.6 2.9 3.1 2.5 2.3   Notes           decr GLV   recv'd 6/21; [facility]       incr GLV decr GLV      Date 8/6 8/13 8/20 8/27 9/3 9/10 9/17 9/24 10/1 10/8 10/15 10/22 10/29 11/5   Total WeeklyDose 14mg 15mg 15mg 14mg 14mg 15mg 14mg 14mg 14mg 14mg 14mg 14mg 15mg 16mg   INR 2.4 3.4 3.8 2.9 2.2 3.2 2.9 3.3 3.2 3.3 3.0 2.4 2.4 2.4   Notes decr GLV decr GLV       1xboost         call call 1x boost   incr VitK call 2x boost; call      Date 11/10 11/17 11/24 12/1 12/8 12/15 12/23 12/30 1/3/22 1/7 1/11 1/18 1/25 2/1   Total WeeklyDose 17mg 16mg 16mg 16mg 15mg 15 mg Pt did not call back 15mg 12 mg 13mg 15mg 15 mg 15 mg 15 mg   INR 3.7 3.3 3.9 4.0 2.6 3.4 4.0 4.9 3.6 2.4 3.3 2.8 2.7 2.9   Notes Dec GLV   Zero GLV call Red x1 call Less GLV   call   Less  Protein drink redx1  self held x1 (misdose)   Dec vit K fall, apap  Call    call          Date 2/8 2/15 2/22 3/1 3/8 3/15 3/22 3/29 4/5 4/12 4/19 4/26 5/3 5/11   Total WeeklyDose 15mg 14mg 14 mg 15 mg 15 mg 15 mg 14mg 13 mg 14 mg 14 mg 14mg 13mg 15mg 14 mg   INR 4.0 4.0 2.8 2.9 2.9 4.2 3.9 3.3  "2.9 3.0 3.8 2.4 3.8 2.5   Notes Call; Dec GLV call Call; Mis-dose call   Call; no GLV Inc GLV. Less protein, apap  redx1 call   Less GLV rec'd 4/27, call Dec GLV        Date 5/18 5/25 6/1 6/8 6/15 6/22 6/29 7/6 7/13 7/20 7/22 7/27  8/10  8/17   Total WeeklyDose 15 mg 15mg 16mg 15 mg 15 mg 15 mg 15 mg 15 mg 15mg 14 mg 15 mg 14 mg  14 mg  15 mg   INR 2.6 2.3 2.7 3.2 3.0 2.9 2.6 2.7 3.6 3.0 3.6 3.0  2.4  3.4   Notes   Inc GLV  call call call       call 7/14-- call call call  call  call  call      Date 8/25 8/31 9/7            Total WeeklyDose 14mg 13 mg 14 mg            INR 4.3 2.8 1.7            Notes Dec GLV call               Phone Interview:  Tablet Strength: 2mg  Patient Contact Info: [phone] (Mobile) *preferred*  [email or web address]  Estimated OOP cost: Will send if patient comes to [place]  Verbal Release Authorization signed on 4/7/21 -- may speak with Tammy Bai (friend: [phone]), Ismael Michele (brother: [phone])  Lab Contact Info: [facility] ([facility])  ** will call once monthly or if INR is out of range**   4/7/22 Scr: 0.8     Patient Findings    Positives:  Change in diet/appetite   Negatives:  Signs/symptoms of thrombosis, Signs/symptoms of bleeding, Laboratory test error suspected, Change in health, Change in alcohol use, Change in activity, Upcoming invasive procedure, Emergency department visit, Upcoming dental procedure, Missed doses, Extra doses, Change in medications, Hospital admission, Bruising, Other complaints   Comments:  Mrs Michele thinks that the only thing different is that she had 3 protein shakes this  week in stead of her normal 2/week. She drinks Equate shakes that have ~25mcg Vitamin K.     Recommended therapeutic Lovenox injections but patient refused.  She states last time she had to use them she \"thought she was bleeding to death\" because she started bleeding from injection site.  It frightenered her so much she states she went to the ER.       Plan:  1. " INR is subtherapeutic today at 1.7 (goal 2.5-3.5). Instructed Ms. Michele to BOOST today's dose to 4mg and tomorrow dose to 3mg, then take warfarin 2mg every day until recheck.   2. Repeat INR9/12/223. Verbal information provided over the phone. Patient RBV dosing instructions, expresses understanding by teach back, and has no further questions at this time.  4. Lucie Michele understands the importance of calling the [facility] if she notices any s/sx of bleeding, stroke, or abnormal bruising, if any changes are made to her medications or medication doses (Rx, OTC, herbal), or if any upcoming procedures are scheduled. Lucie Michele will likewise let us know if any other changes, questions, or concerns arise regarding anticoagulation therapy. she understands the importance of seeking medical attention immediately if she experiences any falls, vehicle accidents, or abnormal bleeding or bruising. Lucie Michele voiced understanding of this information and confirms that she has the [facility]'s contact information. Otherwise, we will plan to contact the patient once monthly or if her INR is out of range.    Justina Jara, PharmD, BCPS   9/7/2022  14:11 EDT

## 2022-09-12 ENCOUNTER — ANTICOAGULATION VISIT (OUTPATIENT)
Dept: PHARMACY | Facility: HOSPITAL | Age: 77
End: 2022-09-12

## 2022-09-12 DIAGNOSIS — I35.9 AORTIC VALVE DISEASE: Primary | ICD-10-CM

## 2022-09-12 LAB — INR PPP: 2.9

## 2022-09-12 NOTE — PROGRESS NOTES
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     Diet: 3x week: 1 cup of brussels sprouts or broccoli weekly (4/19/2022)  Premier Protein (or Equate brand) meal replacement ~2x/week 7/6/22  Alcohol: Seldom  Tobacco: None  OTC Pain Medication: APAP     INR History:  Date 4/5 4/19 4/26 5/5 5/11 6/2 6/15 6/18 6/25 7/2 7/9 7/19 7/23 7/30   Total Weekly Dose 15mg 15mg 14mg 14mg 14mg 14mg 14mg 14mg 14mg 14mg 14mg 14mg 14mg 14mg   INR 2.6 3.9 3.9 2.7 3.0 3.6 2.7 2.9 2.9 2.6 2.9 3.1 2.5 2.3   Notes           decr GLV   recv'd 6/21; [facility]       incr GLV decr GLV      Date 8/6 8/13 8/20 8/27 9/3 9/10 9/17 9/24 10/1 10/8 10/15 10/22 10/29 11/5   Total WeeklyDose 14mg 15mg 15mg 14mg 14mg 15mg 14mg 14mg 14mg 14mg 14mg 14mg 15mg 16mg   INR 2.4 3.4 3.8 2.9 2.2 3.2 2.9 3.3 3.2 3.3 3.0 2.4 2.4 2.4   Notes decr GLV decr GLV       1xboost         call call 1x boost   incr VitK call 2x boost; call      Date 11/10 11/17 11/24 12/1 12/8 12/15 12/23 12/30 1/3/22 1/7 1/11 1/18 1/25 2/1   Total WeeklyDose 17mg 16mg 16mg 16mg 15mg 15 mg Pt did not call back 15mg 12 mg 13mg 15mg 15 mg 15 mg 15 mg   INR 3.7 3.3 3.9 4.0 2.6 3.4 4.0 4.9 3.6 2.4 3.3 2.8 2.7 2.9   Notes Dec GLV   Zero GLV call Red x1 call Less GLV   call   Less  Protein drink redx1  self held x1 (misdose)   Dec vit K fall, apap  Call    call          Date 2/8 2/15 2/22 3/1 3/8 3/15 3/22 3/29 4/5 4/12 4/19 4/26 5/3 5/11   Total WeeklyDose 15mg 14mg 14 mg 15 mg 15 mg 15 mg 14mg 13 mg 14 mg 14 mg 14mg 13mg 15mg 14 mg   INR 4.0 4.0 2.8 2.9 2.9 4.2 3.9 3.3  2.9 3.0 3.8 2.4 3.8 2.5   Notes Call; Dec GLV call Call; Mis-dose call   Call; no GLV Inc GLV. Less protein, apap  redx1 call   Less GLV rec'd 4/27, call Dec GLV        Date 5/18 5/25 6/1 6/8 6/15 6/22 6/29 7/6 7/13 7/20 7/22 7/27  8/10  8/17   Total WeeklyDose 15 mg 15mg 16mg 15 mg 15 mg 15 mg 15 mg 15 mg 15mg 14 mg 15 mg 14 mg  14 mg  15 mg   INR 2.6 2.3 2.7 3.2 3.0 2.9 2.6 2.7 3.6 3.0 3.6 3.0  2.4  3.4   Notes   Inc GLV  call call call       call 7/14-- call call call  call  call  call      Date 8/25 8/31 9/7 9/12           Total WeeklyDose 14mg 13 mg 14 mg 17mg           INR 4.3 2.8 1.7 2.9           Notes Dec GLV call call call             Phone Interview:  Tablet Strength: 2mg  Patient Contact Info: [phone] (Mobile) *preferred*  [email or web address]  Verbal Release Authorization signed on 4/7/21 -- may speak with Tammy Bhumika (friend: [phone]), Ismael Michele (brother: [phone])  Lab Contact Info: [facility] ([facility])  ** will call once monthly or if INR is out of range**   4/7/22 Scr: 0.8     Patient Findings    Negatives:  Signs/symptoms of thrombosis, Signs/symptoms of bleeding, Laboratory test error suspected, Change in health, Change in alcohol use, Change in activity, Upcoming invasive procedure, Emergency department visit, Upcoming dental procedure, Missed doses, Extra doses, Change in medications, Change in diet/appetite, Hospital admission, Bruising, Other complaints   Comments:  Stayed off protein drinks this week       Plan:  1. INR is therapeutic today at 2.9 (goal 2.5-3.5). Instructed Ms. Michele to resume warfarin 2mg every day except  3mg Wed until recheck.   2. Repeat INR9/12/223. Verbal information provided over the phone. Patient RBV dosing instructions, expresses understanding by teach back, and has no further questions at this time.  3. Lucie Michele understands the importance of calling the [facility] if she notices any s/sx of bleeding, stroke, or  abnormal bruising, if any changes are made to her medications or medication doses (Rx, OTC, herbal), or if any upcoming procedures are scheduled. Lucie Michele will likewise let us know if any other changes, questions, or concerns arise regarding anticoagulation therapy. she understands the importance of seeking medical attention immediately if she experiences any falls, vehicle accidents, or abnormal bleeding or bruising. Lucie Michele voiced understanding of this information and confirms that she has the [facility]'s contact information. Otherwise, we will plan to contact the patient once monthly or if her INR is out of range.      Lizet Marinelli PharmD.  09/12/22   11:23 EDT

## 2022-09-13 RX ORDER — MELOXICAM 7.5 MG/1
TABLET ORAL
Qty: 30 TABLET | Refills: 0 | Status: SHIPPED | OUTPATIENT
Start: 2022-09-13 | End: 2022-10-14

## 2022-09-13 NOTE — TELEPHONE ENCOUNTER
Rx Refill Note    Requested Prescriptions     Pending Prescriptions Disp Refills   • meloxicam (MOBIC) 7.5 MG tablet [Pharmacy Med Name: MELOXICAM 7.5MG TABLETS] 30 tablet 0     Sig: TAKE 1 TABLET BY MOUTH EVERY DAY        Last office visit with prescribing clinician: 4/14/2022      Next office visit with prescribing clinician: 10/14/2022   Last labs: 04/14/2022  Last refill:  08/19/2022 for only 30 days  Pharmacy  [facility]

## 2022-09-19 ENCOUNTER — ANTICOAGULATION VISIT (OUTPATIENT)
Dept: PHARMACY | Facility: HOSPITAL | Age: 77
End: 2022-09-19

## 2022-09-19 DIAGNOSIS — I35.9 AORTIC VALVE DISEASE: Primary | ICD-10-CM

## 2022-09-19 LAB — INR PPP: 2.1

## 2022-09-19 NOTE — PROGRESS NOTES
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     Diet: 3x week: 1 cup of brussels sprouts or broccoli weekly (4/19/2022)  Premier Protein (or Equate brand) meal replacement ~2x/week 7/6/22  Alcohol: Seldom  Tobacco: None  OTC Pain Medication: APAP     INR History:  Date 4/5 4/19 4/26 5/5 5/11 6/2 6/15 6/18 6/25 7/2 7/9 7/19 7/23 7/30   Total Weekly Dose 15mg 15mg 14mg 14mg 14mg 14mg 14mg 14mg 14mg 14mg 14mg 14mg 14mg 14mg   INR 2.6 3.9 3.9 2.7 3.0 3.6 2.7 2.9 2.9 2.6 2.9 3.1 2.5 2.3   Notes           decr GLV   recv'd 6/21; [facility]       incr GLV decr GLV      Date 8/6 8/13 8/20 8/27 9/3 9/10 9/17 9/24 10/1 10/8 10/15 10/22 10/29 11/5   Total WeeklyDose 14mg 15mg 15mg 14mg 14mg 15mg 14mg 14mg 14mg 14mg 14mg 14mg 15mg 16mg   INR 2.4 3.4 3.8 2.9 2.2 3.2 2.9 3.3 3.2 3.3 3.0 2.4 2.4 2.4   Notes decr GLV decr GLV       1xboost         call call 1x boost   incr VitK call 2x boost; call      Date 11/10 11/17 11/24 12/1 12/8 12/15 12/23 12/30 1/3/22 1/7 1/11 1/18 1/25 2/1   Total WeeklyDose 17mg 16mg 16mg 16mg 15mg 15 mg Pt did not call back 15mg 12 mg 13mg 15mg 15 mg 15 mg 15 mg   INR 3.7 3.3 3.9 4.0 2.6 3.4 4.0 4.9 3.6 2.4 3.3 2.8 2.7 2.9   Notes Dec GLV   Zero GLV call Red x1 call Less GLV   call   Less  Protein drink redx1  self held x1 (misdose)   Dec vit K fall, apap  Call    call          Date 2/8 2/15 2/22 3/1 3/8 3/15 3/22 3/29 4/5 4/12 4/19 4/26 5/3 5/11   Total WeeklyDose 15mg 14mg 14 mg 15 mg 15 mg 15 mg 14mg 13 mg 14 mg 14 mg 14mg 13mg 15mg 14 mg   INR 4.0 4.0 2.8 2.9 2.9 4.2 3.9 3.3  2.9 3.0 3.8 2.4 3.8 2.5   Notes Call; Dec GLV call Call; Mis-dose call   Call; no GLV Inc GLV. Less protein, apap  redx1 call   Less GLV rec'd 4/27, call Dec GLV        Date 5/18 5/25 6/1 6/8 6/15 6/22 6/29 7/6 7/13 7/20 7/22 7/27  8/10  8/17   Total WeeklyDose 15 mg 15mg 16mg 15 mg 15 mg 15 mg 15 mg 15 mg 15mg 14 mg 15 mg 14 mg  14 mg  15 mg   INR 2.6 2.3 2.7 3.2 3.0 2.9 2.6 2.7 3.6 3.0 3.6 3.0  2.4  3.4   Notes   Inc GLV  call call call       call 7/14-- call call call  call  call  call      Date 8/25 8/31 9/7 9/12 9/19          Total WeeklyDose 14mg 13 mg 14 mg 17mg 15 mg          INR 4.3 2.8 1.7 2.9 2.1          Notes Dec GLV call Call refused enox; extra protein  Call; no protein drinks Call; less green tea, inc            Phone Interview:  Tablet Strength: 2mg  Patient Contact Info: [phone] (Mobile) *preferred*  [email or web address]  Verbal Release Authorization signed on 4/7/21 -- may speak with Tammy Bai (friend: [phone]), Ismael Michele (brother: [phone])  Lab Contact Info: [facility] ([facility])  ** will call once monthly or if INR is out of range**   4/7/22 Scr: 0.8    Patient Findings  Negatives:  Signs/symptoms of thrombosis, Signs/symptoms of bleeding, Laboratory test error suspected, Change in health, Change in alcohol use, Change in activity, Upcoming invasive procedure, Emergency department visit, Upcoming dental procedure, Missed doses, Extra doses, Change in medications, Change in diet/appetite, Hospital admission, Bruising, Other complaints   Comments:  She had one protein drink on Friday. She had a green tea on Saturday, she had been drinking one per day.  She would like to resume drinking as she has quit drinking diet coke. She had 1/2 cup of Broccoli on Thursday.   Discussed the importance of consistent intake of vitamin K; also, green tea may increase risk of bleeding. She will continue her green tea daily along with her two protein shakes per week and monitor her  INR closely.   Otherwise, above findings negative     Plan:    1. INR is SUB therapeutic today at 2.1 (goal 2.5-3.5). Ms. Michele will boost warfarin 3 mg tonight, then tomorrow resume warfarin 2mg oral daily except  3mg Wednesdays until recheck.  (16 mg/week, 6.7%)  2. Repeat INR on Friday, 9/23 to evaluate dosing needs.  3. Verbal information provided over the phone. Patient RBV dosing instructions, expresses understanding by teach back, and has no further questions at this time.  4. Lucie Michele understands the importance of calling the [facility] if she notices any s/sx of bleeding, stroke, or abnormal bruising, if any changes are made to her medications or medication doses (Rx, OTC, herbal), or if any upcoming procedures are scheduled. Lucie Michele will likewise let us know if any other changes, questions, or concerns arise regarding anticoagulation therapy. she understands the importance of seeking medical attention immediately if she experiences any falls, vehicle accidents, or abnormal bleeding or bruising. Lucie Michele voiced understanding of this information and confirms that she has the [facility]'s contact information. Otherwise, we will plan to contact the patient once monthly or if her INR is out of range.    Jesenia Garcia, PharmD  9/19/2022  14:15 EDT

## 2022-09-26 ENCOUNTER — ANTICOAGULATION VISIT (OUTPATIENT)
Dept: PHARMACY | Facility: HOSPITAL | Age: 77
End: 2022-09-26

## 2022-09-26 DIAGNOSIS — I35.9 AORTIC VALVE DISEASE: Primary | ICD-10-CM

## 2022-09-26 LAB — INR PPP: 3.4

## 2022-09-26 NOTE — PROGRESS NOTES
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     Diet: 3x week: 1 cup of brussels sprouts or broccoli weekly (4/19/2022)  Premier Protein (or Equate brand) meal replacement ~2x/week 7/6/22  Alcohol: Seldom  Tobacco: None  OTC Pain Medication: APAP     INR History:  Date 4/5 4/19 4/26 5/5 5/11 6/2 6/15 6/18 6/25 7/2 7/9 7/19 7/23 7/30   Total Weekly Dose 15mg 15mg 14mg 14mg 14mg 14mg 14mg 14mg 14mg 14mg 14mg 14mg 14mg 14mg   INR 2.6 3.9 3.9 2.7 3.0 3.6 2.7 2.9 2.9 2.6 2.9 3.1 2.5 2.3   Notes           decr GLV   recv'd 6/21; [facility]       incr GLV decr GLV      Date 8/6 8/13 8/20 8/27 9/3 9/10 9/17 9/24 10/1 10/8 10/15 10/22 10/29 11/5   Total WeeklyDose 14mg 15mg 15mg 14mg 14mg 15mg 14mg 14mg 14mg 14mg 14mg 14mg 15mg 16mg   INR 2.4 3.4 3.8 2.9 2.2 3.2 2.9 3.3 3.2 3.3 3.0 2.4 2.4 2.4   Notes decr GLV decr GLV       1xboost         call call 1x boost   incr VitK call 2x boost; call      Date 11/10 11/17 11/24 12/1 12/8 12/15 12/23 12/30 1/3/22 1/7 1/11 1/18 1/25 2/1   Total WeeklyDose 17mg 16mg 16mg 16mg 15mg 15 mg Pt did not call back 15mg 12 mg 13mg 15mg 15 mg 15 mg 15 mg   INR 3.7 3.3 3.9 4.0 2.6 3.4 4.0 4.9 3.6 2.4 3.3 2.8 2.7 2.9   Notes Dec GLV   Zero GLV call Red x1 call Less GLV   call   Less  Protein drink redx1  self held x1 (misdose)   Dec vit K fall, apap  Call    call          Date 2/8 2/15 2/22 3/1 3/8 3/15 3/22 3/29 4/5 4/12 4/19 4/26 5/3 5/11   Total WeeklyDose 15mg 14mg 14 mg 15 mg 15 mg 15 mg 14mg 13 mg 14 mg 14 mg 14mg 13mg 15mg 14 mg   INR 4.0 4.0 2.8 2.9 2.9 4.2 3.9 3.3  2.9 3.0 3.8 2.4 3.8 2.5   Notes Call; Dec GLV call Call; Mis-dose call   Call; no GLV Inc GLV. Less protein, apap  redx1 call   Less GLV rec'd 4/27, call Dec GLV        Date 5/18 5/25 6/1 6/8 6/15 6/22 6/29 7/6 7/13 7/20 7/22 7/27  8/10  8/17   Total WeeklyDose 15 mg 15mg 16mg 15 mg 15 mg 15 mg 15 mg 15 mg 15mg 14 mg 15 mg 14 mg  14 mg  15 mg   INR 2.6 2.3 2.7 3.2 3.0 2.9 2.6 2.7 3.6 3.0 3.6 3.0  2.4  3.4   Notes   Inc GLV  call call call       call 7/14-- call call call  call  call  call      Date 8/25 8/31 9/7 9/12 9/19 9/26         Total WeeklyDose 14mg 13 mg 14 mg 17mg 15 mg 16mg         INR 4.3 2.8 1.7 2.9 2.1 3.4         Notes Dec GLV call Call refused enox; extra protein  Call; no protein drinks Call; less green tea, inc            Phone Interview:  Tablet Strength: 2mg  Patient Contact Info: [phone] (Mobile) *preferred*  [email or web address]  Verbal Release Authorization signed on 4/7/21 -- may speak with Tammy Bhumika (friend: [phone]), Ismael Michele (brother: [phone])  Lab Contact Info: [facility] ([facility])  ** will call once monthly or if INR is out of range**   4/7/22 Scr: 0.8    Patient Findings  Negatives:  Signs/symptoms of thrombosis, Signs/symptoms of bleeding, Laboratory test error suspected, Change in health, Change in alcohol use, Change in activity, Upcoming invasive procedure, Emergency department visit, Upcoming dental procedure, Missed doses, Extra doses, Change in medications, Change in diet/appetite, Hospital admission, Bruising, Other complaints   Comments:  Normal GLV, continues green tea         Plan:    1. INR is therapeutic today at 3.4 (goal 2.5-3.5). Ms. Michele to resume warfarin 2mg oral daily except  3mg Wednesdays until recheck.   2. Repeat INR in 1 week  3. Verbal information provided over the phone. Patient RBV dosing instructions, expresses understanding by teach back, and has no further questions at this time.  4. Lucie RODRIGUEZ Ryne understands the  importance of calling the [facility] if she notices any s/sx of bleeding, stroke, or abnormal bruising, if any changes are made to her medications or medication doses (Rx, OTC, herbal), or if any upcoming procedures are scheduled. Lucie Michele will likewise let us know if any other changes, questions, or concerns arise regarding anticoagulation therapy. she understands the importance of seeking medical attention immediately if she experiences any falls, vehicle accidents, or abnormal bleeding or bruising. Lucie A Ryne voiced understanding of this information and confirms that she has the [facility]'s contact information. Otherwise, we will plan to contact the patient once monthly or if her INR is out of range.      Lizet Marinelli PharmD.  09/26/22   13:36 EDT

## 2022-10-03 ENCOUNTER — ANTICOAGULATION VISIT (OUTPATIENT)
Dept: PHARMACY | Facility: HOSPITAL | Age: 77
End: 2022-10-03

## 2022-10-03 DIAGNOSIS — I35.9 AORTIC VALVE DISEASE: Primary | ICD-10-CM

## 2022-10-03 LAB — INR PPP: 3.8

## 2022-10-03 NOTE — PROGRESS NOTES
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     Diet: 3x week: 1 cup of brussels sprouts or broccoli weekly (4/19/2022)  Premier Protein (or Equate brand) meal replacement ~2x/week 7/6/22  Alcohol: Seldom  Tobacco: None  OTC Pain Medication: APAP     INR History:  Date 4/5 4/19 4/26 5/5 5/11 6/2 6/15 6/18 6/25 7/2 7/9 7/19 7/23 7/30   Total Weekly Dose 15mg 15mg 14mg 14mg 14mg 14mg 14mg 14mg 14mg 14mg 14mg 14mg 14mg 14mg   INR 2.6 3.9 3.9 2.7 3.0 3.6 2.7 2.9 2.9 2.6 2.9 3.1 2.5 2.3   Notes           decr GLV   recv'd 6/21; [facility]       incr GLV decr GLV      Date 8/6 8/13 8/20 8/27 9/3 9/10 9/17 9/24 10/1 10/8 10/15 10/22 10/29 11/5   Total WeeklyDose 14mg 15mg 15mg 14mg 14mg 15mg 14mg 14mg 14mg 14mg 14mg 14mg 15mg 16mg   INR 2.4 3.4 3.8 2.9 2.2 3.2 2.9 3.3 3.2 3.3 3.0 2.4 2.4 2.4   Notes decr GLV decr GLV       1xboost         call call 1x boost   incr VitK call 2x boost; call      Date 11/10 11/17 11/24 12/1 12/8 12/15 12/23 12/30 1/3/22 1/7 1/11 1/18 1/25 2/1   Total WeeklyDose 17mg 16mg 16mg 16mg 15mg 15 mg Pt did not call back 15mg 12 mg 13mg 15mg 15 mg 15 mg 15 mg   INR 3.7 3.3 3.9 4.0 2.6 3.4 4.0 4.9 3.6 2.4 3.3 2.8 2.7 2.9   Notes Dec GLV   Zero GLV call Red x1 call Less GLV   call   Less  Protein drink redx1  self held x1 (misdose)   Dec vit K fall, apap  Call    call          Date 2/8 2/15 2/22 3/1 3/8 3/15 3/22 3/29 4/5 4/12 4/19 4/26 5/3 5/11   Total WeeklyDose 15mg 14mg 14 mg 15 mg 15 mg 15 mg 14mg 13 mg 14 mg 14 mg 14mg 13mg 15mg 14 mg   INR 4.0 4.0 2.8 2.9 2.9 4.2 3.9 3.3  2.9 3.0 3.8 2.4 3.8 2.5   Notes Call; Dec GLV call Call; Mis-dose call   Call; no GLV Inc GLV. Less protein, apap  redx1 call   Less GLV rec'd 4/27, call Dec GLV        Date 5/18 5/25 6/1 6/8 6/15 6/22 6/29 7/6 7/13 7/20 7/22 7/27  8/10  8/17   Total WeeklyDose 15 mg 15mg 16mg 15 mg 15 mg 15 mg 15 mg 15 mg 15mg 14 mg 15 mg 14 mg  14 mg  15 mg   INR 2.6 2.3 2.7 3.2 3.0 2.9 2.6 2.7 3.6 3.0 3.6 3.0  2.4  3.4   Notes   Inc GLV  call call call       call 7/14-- call call call  call  call  call      Date 8/25 8/31 9/7 9/12 9/19 9/26 10/3        Total WeeklyDose 14mg 13 mg 14 mg 17mg 15 mg 16mg 15mg        INR 4.3 2.8 1.7 2.9 2.1 3.4 3.8        Notes Dec GLV call Call refused enox; extra protein  Call; no protein drinks Call; less green tea, inc  Call; cranberries          Phone Interview:  Tablet Strength: 2mg  Patient Contact Info: [phone] (Mobile) *preferred*  [email or web address]  Verbal Release Authorization signed on 4/7/21 -- may speak with Tammy Bhumika (friend: [phone]), Ismael Michele (brother: [phone])  Lab Contact Info: [facility] ([facility])  ** will call once monthly or if INR is out of range**   4/7/22 Scr: 0.8    Patient Findings    Positives:  Change in diet/appetite   Negatives:  Signs/symptoms of thrombosis, Signs/symptoms of bleeding, Laboratory test error suspected, Change in health, Change in alcohol use, Change in activity, Upcoming invasive procedure, Emergency department visit, Upcoming dental procedure, Missed doses, Extra doses, Change in medications, Hospital admission, Bruising, Other complaints   Comments:  Has been eating dried cranberries            Plan:    1. INR is SUPRAtherapeutic today at 3.8 (goal 2.5-3.5). Ms. Michele to reduce dose to warfarin 2mg oral daily until recheck.   2. Repeat INR in 1 week  3. Verbal information provided over the phone. Patient RBV dosing instructions, expresses understanding by teach back, and has no further questions at this time.  4.  Lucie Michele understands the importance of calling the [facility] if she notices any s/sx of bleeding, stroke, or abnormal bruising, if any changes are made to her medications or medication doses (Rx, OTC, herbal), or if any upcoming procedures are scheduled. Lucie Michele will likewise let us know if any other changes, questions, or concerns arise regarding anticoagulation therapy. she understands the importance of seeking medical attention immediately if she experiences any falls, vehicle accidents, or abnormal bleeding or bruising. Lucie Michele voiced understanding of this information and confirms that she has the [facility]'s contact information. Otherwise, we will plan to contact the patient once monthly or if her INR is out of range.      Lizet Marinelli, JenniD.  10/03/22   12:09 EDT

## 2022-10-07 ENCOUNTER — LAB (OUTPATIENT)
Dept: FAMILY MEDICINE CLINIC | Facility: CLINIC | Age: 77
End: 2022-10-07

## 2022-10-07 DIAGNOSIS — E55.9 VITAMIN D DEFICIENCY: ICD-10-CM

## 2022-10-07 DIAGNOSIS — M85.80 OSTEOPENIA, UNSPECIFIED LOCATION: ICD-10-CM

## 2022-10-07 DIAGNOSIS — M25.552 LEFT HIP PAIN: ICD-10-CM

## 2022-10-07 DIAGNOSIS — I10 PRIMARY HYPERTENSION: ICD-10-CM

## 2022-10-07 DIAGNOSIS — Z13.820 OSTEOPOROSIS SCREENING: ICD-10-CM

## 2022-10-07 DIAGNOSIS — E07.9 DISORDER OF THYROID: ICD-10-CM

## 2022-10-07 DIAGNOSIS — N18.31 STAGE 3A CHRONIC KIDNEY DISEASE: ICD-10-CM

## 2022-10-07 DIAGNOSIS — I10 ESSENTIAL HYPERTENSION, BENIGN: ICD-10-CM

## 2022-10-07 DIAGNOSIS — E03.9 ACQUIRED HYPOTHYROIDISM: ICD-10-CM

## 2022-10-07 DIAGNOSIS — R73.03 PREDIABETES: ICD-10-CM

## 2022-10-07 PROCEDURE — 36415 COLL VENOUS BLD VENIPUNCTURE: CPT | Performed by: FAMILY MEDICINE

## 2022-10-08 LAB
25(OH)D3+25(OH)D2 SERPL-MCNC: 67.5 NG/ML (ref 30–100)
ALBUMIN SERPL-MCNC: 4 G/DL (ref 3.7–4.7)
ALBUMIN/GLOB SERPL: 1.9 {RATIO} (ref 1.2–2.2)
ALP SERPL-CCNC: 121 IU/L (ref 44–121)
ALT SERPL-CCNC: 20 IU/L (ref 0–32)
AST SERPL-CCNC: 31 IU/L (ref 0–40)
BASOPHILS # BLD AUTO: 0.1 X10E3/UL (ref 0–0.2)
BASOPHILS NFR BLD AUTO: 1 %
BILIRUB SERPL-MCNC: 0.5 MG/DL (ref 0–1.2)
BUN SERPL-MCNC: 9 MG/DL (ref 8–27)
BUN/CREAT SERPL: 11 (ref 12–28)
CALCIUM SERPL-MCNC: 8.7 MG/DL (ref 8.7–10.3)
CHLORIDE SERPL-SCNC: 98 MMOL/L (ref 96–106)
CHOLEST SERPL-MCNC: 198 MG/DL (ref 100–199)
CO2 SERPL-SCNC: 24 MMOL/L (ref 20–29)
CREAT SERPL-MCNC: 0.81 MG/DL (ref 0.57–1)
EGFRCR SERPLBLD CKD-EPI 2021: 75 ML/MIN/1.73
EOSINOPHIL # BLD AUTO: 0.4 X10E3/UL (ref 0–0.4)
EOSINOPHIL NFR BLD AUTO: 5 %
ERYTHROCYTE [DISTWIDTH] IN BLOOD BY AUTOMATED COUNT: 14.3 % (ref 11.7–15.4)
GLOBULIN SER CALC-MCNC: 2.1 G/DL (ref 1.5–4.5)
GLUCOSE SERPL-MCNC: 84 MG/DL (ref 70–99)
HBA1C MFR BLD: 5.6 % (ref 4.8–5.6)
HCT VFR BLD AUTO: 40.5 % (ref 34–46.6)
HDLC SERPL-MCNC: 70 MG/DL
HGB BLD-MCNC: 13.2 G/DL (ref 11.1–15.9)
IMM GRANULOCYTES # BLD AUTO: 0 X10E3/UL (ref 0–0.1)
IMM GRANULOCYTES NFR BLD AUTO: 1 %
LDLC SERPL CALC-MCNC: 106 MG/DL (ref 0–99)
LYMPHOCYTES # BLD AUTO: 2.2 X10E3/UL (ref 0.7–3.1)
LYMPHOCYTES NFR BLD AUTO: 31 %
MCH RBC QN AUTO: 29.7 PG (ref 26.6–33)
MCHC RBC AUTO-ENTMCNC: 32.6 G/DL (ref 31.5–35.7)
MCV RBC AUTO: 91 FL (ref 79–97)
MONOCYTES # BLD AUTO: 0.9 X10E3/UL (ref 0.1–0.9)
MONOCYTES NFR BLD AUTO: 12 %
NEUTROPHILS # BLD AUTO: 3.5 X10E3/UL (ref 1.4–7)
NEUTROPHILS NFR BLD AUTO: 50 %
PLATELET # BLD AUTO: 246 X10E3/UL (ref 150–450)
POTASSIUM SERPL-SCNC: 3.5 MMOL/L (ref 3.5–5.2)
PROT SERPL-MCNC: 6.1 G/DL (ref 6–8.5)
RBC # BLD AUTO: 4.44 X10E6/UL (ref 3.77–5.28)
SODIUM SERPL-SCNC: 138 MMOL/L (ref 134–144)
SPECIMEN STATUS: NORMAL
TRIGL SERPL-MCNC: 125 MG/DL (ref 0–149)
TSH SERPL DL<=0.005 MIU/L-ACNC: 3.73 UIU/ML (ref 0.45–4.5)
VIT B12 SERPL-MCNC: 446 PG/ML (ref 232–1245)
VLDLC SERPL CALC-MCNC: 22 MG/DL (ref 5–40)
WBC # BLD AUTO: 7.1 X10E3/UL (ref 3.4–10.8)

## 2022-10-10 ENCOUNTER — ANTICOAGULATION VISIT (OUTPATIENT)
Dept: PHARMACY | Facility: HOSPITAL | Age: 77
End: 2022-10-10

## 2022-10-10 DIAGNOSIS — I35.9 AORTIC VALVE DISEASE: Primary | ICD-10-CM

## 2022-10-10 LAB — INR PPP: 2.4

## 2022-10-10 NOTE — PROGRESS NOTES
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       incr GLV decr GLV      Date 8/6 8/13 8/20 8/27 9/3 9/10 9/17 9/24 10/1 10/8 10/15 10/22 10/29 11/5   Total WeeklyDose 14mg 15mg 15mg 14mg 14mg 15mg 14mg 14mg 14mg 14mg 14mg 14mg 15mg 16mg   INR 2.4 3.4 3.8 2.9 2.2 3.2 2.9 3.3 3.2 3.3 3.0 2.4 2.4 2.4   Notes decr GLV decr GLV       1xboost         call call 1x boost   incr VitK call 2x boost; call      Date 11/10 11/17 11/24 12/1 12/8 12/15 12/23 12/30 1/3/22 1/7 1/11 1/18 1/25 2/1   Total WeeklyDose 17mg 16mg 16mg 16mg 15mg 15 mg Pt did not call back 15mg 12 mg 13mg 15mg 15 mg 15 mg 15 mg   INR 3.7 3.3 3.9 4.0 2.6 3.4 4.0 4.9 3.6 2.4 3.3 2.8 2.7 2.9   Notes Dec GLV   Zero GLV call Red x1 call Less GLV   call   Less  Protein drink redx1  self held x1 (misdose)   Dec vit K fall, apap  Call    call          Date 2/8 2/15 2/22 3/1 3/8 3/15 3/22 3/29 4/5 4/12 4/19 4/26 5/3 5/11   Total WeeklyDose 15mg 14mg 14 mg 15 mg 15 mg 15 mg 14mg 13 mg 14 mg 14 mg 14mg 13mg 15mg 14 mg   INR 4.0 4.0 2.8 2.9 2.9 4.2 3.9 3.3  2.9 3.0 3.8 2.4 3.8 2.5   Notes Call; Dec GLV call Call; Mis-dose call   Call; no GLV Inc GLV. Less protein, apap  redx1 call   Less GLV rec'd 4/27, call Dec GLV        Date 5/18 5/25 6/1 6/8 6/15 6/22 6/29 7/6 7/13 7/20 7/22 7/27  8/10  8/17   Total WeeklyDose 15 mg 15mg 16mg 15 mg 15 mg 15 mg 15 mg 15 mg 15mg 14 mg 15 mg 14 mg  14 mg  15 mg   INR 2.6 2.3 2.7 3.2 3.0 2.9 2.6 2.7 3.6 3.0 3.6 3.0  2.4  3.4   Notes   Inc GLV  call call call       call 7/14-- call call call  call  call  call      Date 8/25 8/31 9/7 9/12 9/19 9/26 10/3 10/10       Total WeeklyDose 14mg 13 mg 14 mg 17mg 15 mg 16mg 15mg 14 mg       INR 4.3 2.8 1.7 2.9 2.1 3.4 3.8 2.4       Notes Dec GLV call Call refused enox; extra protein  Call; no protein drinks Call; less green tea, inc boostx1 Call; cranberries Redx1; call         Phone Interview:  Tablet Strength: 2mg  Patient Contact Info: [phone] (Mobile) *preferred*  [email or web address]  Verbal Release Authorization signed on 4/7/21 -- may speak with Tammy Bai (friend: [phone]), Ismael Michele (brother: [phone])  Lab Contact Info: [facility] ([facility])  ** will call once monthly or if INR is out of range**   4/7/22 Scr: 0.8    Patient Findings    Negatives:  Signs/symptoms of thrombosis, Signs/symptoms of bleeding, Laboratory test error suspected, Change in health, Change in alcohol use, Change in activity, Upcoming invasive procedure, Emergency department visit, Upcoming dental procedure, Missed doses, Extra doses, Change in medications, Change in diet/appetite, Hospital admission, Bruising, Other complaints   Comments:  She reported two protein drinks and 5 green teas and some green beans   Otherwise, above findings negative     Plan:    1. INR is slightly sub therapeutic today at 2.4 (goal 2.5-3.5). Ms. Michele to resume usual maintenance regimen of warfarin 2mg oral daily except 3 mg Monday until recheck.   2. Repeat INR in 1 week, 10/17.  She prefers testing on  Mondays   3. Verbal information provided over the phone. Patient RBV dosing instructions, expresses understanding by teach back, and has no further questions at this time.  4. Lucie Michele understands the importance of calling the [facility] if she notices any s/sx of bleeding, stroke, or abnormal bruising, if any changes are made to her medications or medication doses (Rx, OTC, herbal), or if any upcoming procedures are scheduled. Lucie Michele will likewise let us know if any other changes, questions, or concerns arise regarding anticoagulation therapy. she understands the importance of seeking medical attention immediately if she experiences any falls, vehicle accidents, or abnormal bleeding or bruising. Lucie Michele voiced understanding of this information and confirms that she has the [facility]'s contact information. Otherwise, we will plan to contact the patient once monthly or if her INR is out of range.      Jesenia Garcia, PharmD  10/10/22   15:08 EDT

## 2022-10-13 NOTE — TELEPHONE ENCOUNTER
Rx Refill Note    Requested Prescriptions     Pending Prescriptions Disp Refills   • meloxicam (MOBIC) 7.5 MG tablet [Pharmacy Med Name: MELOXICAM 7.5MG TABLETS] 30 tablet 0     Sig: TAKE 1 TABLET BY MOUTH EVERY DAY        Last office visit with prescribing clinician: 4/14/2022      Next office visit with prescribing clinician: 11/2/2022   Last labs:   Last refill: 09/13/2022   Pharmacy (be sure to add in Epic). correct

## 2022-10-14 RX ORDER — MELOXICAM 7.5 MG/1
TABLET ORAL
Qty: 30 TABLET | Refills: 0 | Status: SHIPPED | OUTPATIENT
Start: 2022-10-14 | End: 2023-01-04 | Stop reason: SDUPTHER

## 2022-10-17 ENCOUNTER — ANTICOAGULATION VISIT (OUTPATIENT)
Dept: PHARMACY | Facility: HOSPITAL | Age: 77
End: 2022-10-17

## 2022-10-17 DIAGNOSIS — I35.9 AORTIC VALVE DISEASE: Primary | ICD-10-CM

## 2022-10-17 LAB — INR PPP: 1.8

## 2022-10-17 NOTE — PROGRESS NOTES
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     Diet: 3x week: 1 cup of brussels sprouts or broccoli weekly (4/19/2022)  Premier Protein (or Equate brand) meal replacement ~2x/week 7/6/22  Alcohol: Seldom  Tobacco: None  OTC Pain Medication: APAP     INR History:  Date 4/5 4/19 4/26 5/5 5/11 6/2 6/15 6/18 6/25 7/2 7/9 7/19 7/23 7/30   Total Weekly Dose 15mg 15mg 14mg 14mg 14mg 14mg 14mg 14mg 14mg 14mg 14mg 14mg 14mg 14mg   INR 2.6 3.9 3.9 2.7 3.0 3.6 2.7 2.9 2.9 2.6 2.9 3.1 2.5 2.3   Notes           decr GLV   recv'd 6/21; [facility]       incr GLV decr GLV      Date 8/6 8/13 8/20 8/27 9/3 9/10 9/17 9/24 10/1 10/8 10/15 10/22 10/29 11/5   Total WeeklyDose 14mg 15mg 15mg 14mg 14mg 15mg 14mg 14mg 14mg 14mg 14mg 14mg 15mg 16mg   INR 2.4 3.4 3.8 2.9 2.2 3.2 2.9 3.3 3.2 3.3 3.0 2.4 2.4 2.4   Notes decr GLV decr GLV       1xboost         call call 1x boost   incr VitK call 2x boost; call      Date 11/10 11/17 11/24 12/1 12/8 12/15 12/23 12/30 1/3/22 1/7 1/11 1/18 1/25 2/1   Total WeeklyDose 17mg 16mg 16mg 16mg 15mg 15 mg Pt did not call back 15mg 12 mg 13mg 15mg 15 mg 15 mg 15 mg   INR 3.7 3.3 3.9 4.0 2.6 3.4 4.0 4.9 3.6 2.4 3.3 2.8 2.7 2.9   Notes Dec GLV   Zero GLV call Red x1 call Less GLV   call   Less  Protein drink redx1  self held x1 (misdose)   Dec vit K fall, apap  Call    call          Date 2/8 2/15 2/22 3/1 3/8 3/15 3/22 3/29 4/5 4/12 4/19 4/26 5/3 5/11   Total WeeklyDose 15mg 14mg 14 mg 15 mg 15 mg 15 mg 14mg 13 mg 14 mg 14 mg 14mg 13mg 15mg 14 mg   INR 4.0 4.0 2.8 2.9 2.9 4.2 3.9 3.3  2.9 3.0 3.8 2.4 3.8 2.5   Notes Call; Dec GLV call Call; Mis-dose call   Call; no GLV Inc GLV. Less protein, apap  redx1 call   Less GLV rec'd 4/27, call Dec GLV        Date 5/18 5/25 6/1 6/8 6/15 6/22 6/29 7/6 7/13 7/20 7/22 7/27  8/10  8/17   Total WeeklyDose 15 mg 15mg 16mg 15 mg 15 mg 15 mg 15 mg 15 mg 15mg 14 mg 15 mg 14 mg  14 mg  15 mg   INR 2.6 2.3 2.7 3.2 3.0 2.9 2.6 2.7 3.6 3.0 3.6 3.0  2.4  3.4   Notes   Inc GLV  call call call       call 7/14-- call call call  call  call  call      Date 8/25 8/31 9/7 9/12 9/19 9/26 10/3 10/10 10/17      Total WeeklyDose 14mg 13 mg 14 mg 17mg 15 mg 16mg 15mg 14 mg 13mg      INR 4.3 2.8 1.7 2.9 2.1 3.4 3.8 2.4 1.8      Notes Dec GLV call Call refused enox; extra protein  Call; no protein drinks Call; less green tea, inc boostx1 Call; cranberries Redx1; call 1x miss        Phone Interview:  Tablet Strength: 2mg  Patient Contact Info: [phone] (Mobile) *preferred*  [email or web address]  Verbal Release Authorization signed on 4/7/21 -- may speak with Tammy Bai (friend: [phone]), Ismael Michele (brother: [phone])  Lab Contact Info: [facility] ([facility])  ** will call once monthly or if INR is out of range**   4/7/22 Scr: 0.8    Patient Findings  Positives:  Missed doses   Negatives:  Signs/symptoms of thrombosis, Signs/symptoms of bleeding, Laboratory test error suspected, Change in health, Change in alcohol use, Change in activity, Upcoming invasive procedure, Emergency department visit, Upcoming dental procedure, Extra doses, Change in medications, Change in diet/appetite, Hospital admission, Bruising, Other complaints   Comments:  Per pt preference will boost dose instead of lovenox       Plan:    1. INR is sub therapeutic today at 1.8 (goal 2.5-3.5). Ms. Michele to BOOST tonight's dose to 4mg then resume usual maintenance regimen of warfarin 2mg oral daily except 3 mg Wednesday until recheck.   2. Repeat INR in 1 week, 10/24.  She prefers testing on  Mondays   3. Verbal information provided over the phone. Patient RBV dosing instructions, expresses understanding by teach back, and has no further questions at this time.  4. Lucie Michele understands the importance of calling the [facility] if she notices any s/sx of bleeding, stroke, or abnormal bruising, if any changes are made to her medications or medication doses (Rx, OTC, herbal), or if any upcoming procedures are scheduled. Lucie Michele will likewise let us know if any other changes, questions, or concerns arise regarding anticoagulation therapy. she understands the importance of seeking medical attention immediately if she experiences any falls, vehicle accidents, or abnormal bleeding or bruising. Lucie Michele voiced understanding of this information and confirms that she has the [facility]'s contact information. Otherwise, we will plan to contact the patient once monthly or if her INR is out of range.    Lizet Marinelli, JenniD.  10/17/22   14:12 EDT

## 2022-10-18 DIAGNOSIS — E03.9 HYPOTHYROIDISM, UNSPECIFIED TYPE: ICD-10-CM

## 2022-10-18 RX ORDER — LEVOTHYROXINE SODIUM 0.12 MG/1
TABLET ORAL
Qty: 90 TABLET | Refills: 0 | Status: SHIPPED | OUTPATIENT
Start: 2022-10-18 | End: 2023-01-04 | Stop reason: SDUPTHER

## 2022-10-18 NOTE — TELEPHONE ENCOUNTER
Rx Refill Note    Requested Prescriptions     Pending Prescriptions Disp Refills   • levothyroxine (SYNTHROID, LEVOTHROID) 125 MCG tablet [Pharmacy Med Name: LEVOTHYROXINE 0.125MG (125MCG) TAB] 90 tablet 0     Sig: TAKE 1 TABLET BY MOUTH EVERY DAY        Last office visit with prescribing clinician: 4/14/2022      Next office visit with prescribing clinician: 11/2/2022   Last labs:   Last refill: 07/12/2022   Pharmacy (be sure to add in Epic). correct

## 2022-10-19 DIAGNOSIS — E03.9 HYPOTHYROIDISM, UNSPECIFIED TYPE: ICD-10-CM

## 2022-10-19 RX ORDER — LEVOTHYROXINE SODIUM 0.12 MG/1
TABLET ORAL
Qty: 90 TABLET | Refills: 0 | OUTPATIENT
Start: 2022-10-19

## 2022-10-24 ENCOUNTER — ANTICOAGULATION VISIT (OUTPATIENT)
Dept: PHARMACY | Facility: HOSPITAL | Age: 77
End: 2022-10-24

## 2022-10-24 DIAGNOSIS — I35.9 AORTIC VALVE DISEASE: Primary | ICD-10-CM

## 2022-10-24 LAB — INR PPP: 3.7

## 2022-10-24 NOTE — PROGRESS NOTES
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     Diet: 3x week: 1 cup of brussels sprouts or broccoli weekly (4/19/2022)  Premier Protein (or Equate brand) meal replacement ~2x/week 7/6/22  Alcohol: Seldom  Tobacco: None  OTC Pain Medication: APAP     INR History:  Date 4/5 4/19 4/26 5/5 5/11 6/2 6/15 6/18 6/25 7/2 7/9 7/19 7/23 7/30   Total Weekly Dose 15mg 15mg 14mg 14mg 14mg 14mg 14mg 14mg 14mg 14mg 14mg 14mg 14mg 14mg   INR 2.6 3.9 3.9 2.7 3.0 3.6 2.7 2.9 2.9 2.6 2.9 3.1 2.5 2.3   Notes           decr GLV   recv'd 6/21; [facility]       incr GLV decr GLV      Date 8/6 8/13 8/20 8/27 9/3 9/10 9/17 9/24 10/1 10/8 10/15 10/22 10/29 11/5   Total WeeklyDose 14mg 15mg 15mg 14mg 14mg 15mg 14mg 14mg 14mg 14mg 14mg 14mg 15mg 16mg   INR 2.4 3.4 3.8 2.9 2.2 3.2 2.9 3.3 3.2 3.3 3.0 2.4 2.4 2.4   Notes decr GLV decr GLV       1xboost         call call 1x boost   incr VitK call 2x boost; call      Date 11/10 11/17 11/24 12/1 12/8 12/15 12/23 12/30 1/3/22 1/7 1/11 1/18 1/25 2/1   Total WeeklyDose 17mg 16mg 16mg 16mg 15mg 15 mg Pt did not call back 15mg 12 mg 13mg 15mg 15 mg 15 mg 15 mg   INR 3.7 3.3 3.9 4.0 2.6 3.4 4.0 4.9 3.6 2.4 3.3 2.8 2.7 2.9   Notes Dec GLV   Zero GLV call Red x1 call Less GLV   call   Less  Protein drink redx1  self held x1 (misdose)   Dec vit K fall, apap  Call    call          Date 2/8 2/15 2/22 3/1 3/8 3/15 3/22 3/29 4/5 4/12 4/19 4/26 5/3 5/11   Total WeeklyDose 15mg 14mg 14 mg 15 mg 15 mg 15 mg 14mg 13 mg 14 mg 14 mg 14mg 13mg 15mg 14 mg   INR 4.0 4.0 2.8 2.9 2.9 4.2 3.9 3.3  2.9 3.0 3.8 2.4 3.8 2.5   Notes Call; Dec GLV call Call; Mis-dose call   Call; no GLV Inc GLV. Less protein, apap  redx1 call   Less GLV rec'd 4/27, call Dec GLV        Date 5/18 5/25 6/1 6/8 6/15 6/22 6/29 7/6 7/13 7/20 7/22 7/27  8/10  8/17   Total WeeklyDose 15 mg 15mg 16mg 15 mg 15 mg 15 mg 15 mg 15 mg 15mg 14 mg 15 mg 14 mg  14 mg  15 mg   INR 2.6 2.3 2.7 3.2 3.0 2.9 2.6 2.7 3.6 3.0 3.6 3.0  2.4  3.4   Notes   Inc GLV  call call call       call 7/14-- call call call  call  call  call      Date 8/25 8/31 9/7 9/12 9/19 9/26 10/3 10/10 10/17 10/24     Total WeeklyDose 14mg 13 mg 14 mg 17mg 15 mg 16mg 15mg 14 mg 13mg 17 mg     INR 4.3 2.8 1.7 2.9 2.1 3.4 3.8 2.4 1.8 3.7     Notes Dec GLV call Call refused enox; extra protein  Call; no protein drinks Call; less green tea, inc boostx1 Call; cranberries Redx1; call 1x miss Call  Less protein       Phone Interview:  Tablet Strength: 2mg  Patient Contact Info: [phone] (Mobile) *preferred*  [email or web address]  Verbal Release Authorization signed on 4/7/21 -- may speak with Tammy Bhumika (friend: [phone]), Ismael Michele (brother: [phone])  Lab Contact Info: [facility] ([facility])  ** will call once monthly or if INR is out of range**   4/7/22 Scr: 0.8    Patient Findings  Positives:  Change in diet/appetite   Negatives:  Signs/symptoms of thrombosis, Signs/symptoms of bleeding, Laboratory test error suspected, Change in health, Change in alcohol use, Change in activity, Upcoming invasive procedure, Emergency department visit, Upcoming dental procedure, Missed doses, Extra doses, Change in medications, Hospital admission, Bruising, Other complaints   Comments:  Only had one protein drink last week.   She plans to resume her usual intake of two protein drinks per week. Discussed that they contain vitamin K.  She would like to resume a serving of GLV soon.  She will hold off until INR rechecked next week.    All other findings negative.       Plan:  1. INR is SUPRAtherapeutic today at 3.7 (goal 2.5-3.5).  Called back and instructed Ms. Michele to resume warfarin 2 mg oral daily except 3 mg Wednesday until recheck. She may require warfarin 2 mg oral daily except 3 mg on MonWed, held off until next week due to fluctuations in dosing recently.  2. Repeat INR in 1 week, 10/31/22.  She prefers testing on Mondays   3. Verbal information provided over the phone. Patient RBV dosing instructions, expresses understanding by teach back, and has no further questions at this time.  4. Lucie Micehle understands the importance of calling the [facility] if she notices any s/sx of bleeding, stroke, or abnormal bruising, if any changes are made to her medications or medication doses (Rx, OTC, herbal), or if any upcoming procedures are scheduled. Lucie Michele will likewise let us know if any other changes, questions, or concerns arise regarding anticoagulation therapy. she understands the importance of seeking medical attention immediately if she experiences any falls, vehicle accidents, or abnormal bleeding or bruising. Lucie Michele voiced understanding of this information and confirms that she has the [facility]'s contact information. Otherwise, we will plan to contact the patient once monthly or if her INR is out of range.    Jesenia Garcia, PharmD  10/24/2022  14:47 EDT

## 2022-10-26 ENCOUNTER — ANTICOAGULATION VISIT (OUTPATIENT)
Dept: PHARMACY | Facility: HOSPITAL | Age: 77
End: 2022-10-26

## 2022-10-26 DIAGNOSIS — I35.9 AORTIC VALVE DISEASE: Primary | ICD-10-CM

## 2022-10-31 ENCOUNTER — ANTICOAGULATION VISIT (OUTPATIENT)
Dept: PHARMACY | Facility: HOSPITAL | Age: 77
End: 2022-10-31

## 2022-10-31 DIAGNOSIS — I35.9 AORTIC VALVE DISEASE: Primary | ICD-10-CM

## 2022-10-31 LAB — INR PPP: 4.5

## 2022-10-31 NOTE — PROGRESS NOTES
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     Diet: 3x week: 1 cup of brussels sprouts or broccoli weekly (4/19/2022)  Premier Protein (or Equate brand) meal replacement ~2x/week 7/6/22  Alcohol: Seldom  Tobacco: None  OTC Pain Medication: APAP     INR History:  Date 4/5 4/19 4/26 5/5 5/11 6/2 6/15 6/18 6/25 7/2 7/9 7/19 7/23 7/30   Total Weekly Dose 15mg 15mg 14mg 14mg 14mg 14mg 14mg 14mg 14mg 14mg 14mg 14mg 14mg 14mg   INR 2.6 3.9 3.9 2.7 3.0 3.6 2.7 2.9 2.9 2.6 2.9 3.1 2.5 2.3   Notes           decr GLV   recv'd 6/21; [facility]       incr GLV decr GLV      Date 8/6 8/13 8/20 8/27 9/3 9/10 9/17 9/24 10/1 10/8 10/15 10/22 10/29 11/5   Total WeeklyDose 14mg 15mg 15mg 14mg 14mg 15mg 14mg 14mg 14mg 14mg 14mg 14mg 15mg 16mg   INR 2.4 3.4 3.8 2.9 2.2 3.2 2.9 3.3 3.2 3.3 3.0 2.4 2.4 2.4   Notes decr GLV decr GLV       1xboost         call call 1x boost   incr VitK call 2x boost; call      Date 11/10 11/17 11/24 12/1 12/8 12/15 12/23 12/30 1/3/22 1/7 1/11 1/18 1/25 2/1   Total WeeklyDose 17mg 16mg 16mg 16mg 15mg 15 mg Pt did not call back 15mg 12 mg 13mg 15mg 15 mg 15 mg 15 mg   INR 3.7 3.3 3.9 4.0 2.6 3.4 4.0 4.9 3.6 2.4 3.3 2.8 2.7 2.9   Notes Dec GLV   Zero GLV call Red x1 call Less GLV   call   Less  Protein drink redx1  self held x1 (misdose)   Dec vit K fall, apap  Call    call          Date 2/8 2/15 2/22 3/1 3/8 3/15 3/22 3/29 4/5 4/12 4/19 4/26 5/3 5/11   Total WeeklyDose 15mg 14mg 14 mg 15 mg 15 mg 15 mg 14mg 13 mg 14 mg 14 mg 14mg 13mg 15mg 14 mg   INR 4.0 4.0 2.8 2.9 2.9 4.2 3.9 3.3  2.9 3.0 3.8 2.4 3.8 2.5   Notes Call; Dec GLV call Call; Mis-dose call   Call; no GLV Inc GLV. Less protein, apap  redx1 call   Less GLV rec'd 4/27, call Dec GLV        Date 5/18 5/25 6/1 6/8 6/15 6/22 6/29 7/6 7/13 7/20 7/22 7/27  8/10  8/17   Total WeeklyDose 15 mg 15mg 16mg 15 mg 15 mg 15 mg 15 mg 15 mg 15mg 14 mg 15 mg 14 mg  14 mg  15 mg   INR 2.6 2.3 2.7 3.2 3.0 2.9 2.6 2.7 3.6 3.0 3.6 3.0  2.4  3.4   Notes   Inc GLV  call call call       call 7/14-- call call call  call  call  call      Date 8/25 8/31 9/7 9/12 9/19 9/26 10/3 10/10 10/17 10/24 10/31    Total WeeklyDose 14mg 13 mg 14 mg 17mg 15 mg 16mg 15mg 14 mg 13mg 17 mg 15mg    INR 4.3 2.8 1.7 2.9 2.1 3.4 3.8 2.4 1.8 3.7 4.5    Notes Dec GLV call Call refused enox; extra protein  Call; no protein drinks Call; less green tea, inc boostx1 Call; cranberries Redx1; call 1x miss Call  Less protein       Phone Interview:  Tablet Strength: 2mg  Patient Contact Info: [phone] (Mobile) *preferred*  [email or web address]  Verbal Release Authorization signed on 4/7/21 -- may speak with Tammy Bhumika (friend: [phone]), Ismael Michele (brother: [phone])  Lab Contact Info: [facility] ([facility])  ** will call once monthly or if INR is out of range**   4/7/22 Scr: 0.8    Patient Findings    Positives:  Bruising   Negatives:  Signs/symptoms of thrombosis, Signs/symptoms of bleeding, Laboratory test error suspected, Change in health, Change in alcohol use, Change in activity, Upcoming invasive procedure, Emergency department visit, Upcoming dental procedure, Missed doses, Extra doses, Change in medications, Change in diet/appetite, Hospital admission, Other complaints   Comments:  Only 1 protein drink this week, usually tries for 2         Plan:  1. INR is SUPRAtherapeutic again today at 4.5 (goal 2.5-3.5).  Called back and instructed Ms. Michele to reduce tonight's dose to 1mg then reduce dose to warfarin 2 mg oral daily  until recheck.   2. Repeat INR in  1 week, 11/7.  She prefers testing on Mondays   3. Verbal information provided over the phone. Patient RBV dosing instructions, expresses understanding by teach back, and has no further questions at this time.  4. Lucie Michele understands the importance of calling the [facility] if she notices any s/sx of bleeding, stroke, or abnormal bruising, if any changes are made to her medications or medication doses (Rx, OTC, herbal), or if any upcoming procedures are scheduled. Lucie Michele will likewise let us know if any other changes, questions, or concerns arise regarding anticoagulation therapy. she understands the importance of seeking medical attention immediately if she experiences any falls, vehicle accidents, or abnormal bleeding or bruising. Lucie Michele voiced understanding of this information and confirms that she has the [facility]'s contact information. Otherwise, we will plan to contact the patient once monthly or if her INR is out of range.        Lizet Marinelli PharmD.  10/31/22   15:37 EDT

## 2022-11-02 ENCOUNTER — OFFICE VISIT (OUTPATIENT)
Dept: FAMILY MEDICINE CLINIC | Facility: CLINIC | Age: 77
End: 2022-11-02

## 2022-11-02 VITALS
RESPIRATION RATE: 12 BRPM | BODY MASS INDEX: 32.89 KG/M2 | HEART RATE: 74 BPM | SYSTOLIC BLOOD PRESSURE: 128 MMHG | DIASTOLIC BLOOD PRESSURE: 84 MMHG | WEIGHT: 197.4 LBS | TEMPERATURE: 98.2 F | OXYGEN SATURATION: 97 % | HEIGHT: 65 IN

## 2022-11-02 DIAGNOSIS — E03.9 ACQUIRED HYPOTHYROIDISM: ICD-10-CM

## 2022-11-02 DIAGNOSIS — Z13.9 SCREENING DUE: ICD-10-CM

## 2022-11-02 DIAGNOSIS — E07.9 DISORDER OF THYROID: ICD-10-CM

## 2022-11-02 DIAGNOSIS — E78.2 MIXED HYPERLIPIDEMIA: ICD-10-CM

## 2022-11-02 DIAGNOSIS — Z13.820 OSTEOPOROSIS SCREENING: ICD-10-CM

## 2022-11-02 DIAGNOSIS — N18.31 STAGE 3A CHRONIC KIDNEY DISEASE: ICD-10-CM

## 2022-11-02 DIAGNOSIS — I10 PRIMARY HYPERTENSION: ICD-10-CM

## 2022-11-02 DIAGNOSIS — M85.80 OSTEOPENIA, UNSPECIFIED LOCATION: ICD-10-CM

## 2022-11-02 DIAGNOSIS — M25.552 LEFT HIP PAIN: ICD-10-CM

## 2022-11-02 DIAGNOSIS — Z79.899 HIGH RISK MEDICATION USE: Primary | ICD-10-CM

## 2022-11-02 DIAGNOSIS — R73.03 PREDIABETES: ICD-10-CM

## 2022-11-02 DIAGNOSIS — I71.21 ANEURYSM OF ASCENDING AORTA WITHOUT RUPTURE: ICD-10-CM

## 2022-11-02 DIAGNOSIS — R73.9 HYPERGLYCEMIA: ICD-10-CM

## 2022-11-02 DIAGNOSIS — E55.9 VITAMIN D DEFICIENCY: ICD-10-CM

## 2022-11-02 DIAGNOSIS — I10 ESSENTIAL HYPERTENSION, BENIGN: ICD-10-CM

## 2022-11-02 PROCEDURE — 99214 OFFICE O/P EST MOD 30 MIN: CPT | Performed by: FAMILY MEDICINE
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
## 2022-11-02 NOTE — ASSESSMENT & PLAN NOTE
GFR 75.  Patient is instructed to not take any NSAIDs.  Medicines as directed.  Stay well-hydrated.

## 2022-11-07 ENCOUNTER — ANTICOAGULATION VISIT (OUTPATIENT)
Dept: PHARMACY | Facility: HOSPITAL | Age: 77
End: 2022-11-07

## 2022-11-07 DIAGNOSIS — I35.9 AORTIC VALVE DISEASE: Primary | ICD-10-CM

## 2022-11-07 LAB — INR PPP: 3.2

## 2022-11-07 NOTE — PROGRESS NOTES
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     Diet: 3x week: 1 cup of brussels sprouts or broccoli weekly (4/19/2022)  Premier Protein (or Equate brand) meal replacement ~2x/week 7/6/22  Alcohol: Seldom  Tobacco: None  OTC Pain Medication: APAP     INR History:  Date 4/5 4/19 4/26 5/5 5/11 6/2 6/15 6/18 6/25 7/2 7/9 7/19 7/23 7/30   Total Weekly Dose 15mg 15mg 14mg 14mg 14mg 14mg 14mg 14mg 14mg 14mg 14mg 14mg 14mg 14mg   INR 2.6 3.9 3.9 2.7 3.0 3.6 2.7 2.9 2.9 2.6 2.9 3.1 2.5 2.3   Notes           decr GLV   recv'd 6/21; [facility]       incr GLV decr GLV      Date 8/6 8/13 8/20 8/27 9/3 9/10 9/17 9/24 10/1 10/8 10/15 10/22 10/29 11/5   Total WeeklyDose 14mg 15mg 15mg 14mg 14mg 15mg 14mg 14mg 14mg 14mg 14mg 14mg 15mg 16mg   INR 2.4 3.4 3.8 2.9 2.2 3.2 2.9 3.3 3.2 3.3 3.0 2.4 2.4 2.4   Notes decr GLV decr GLV       1xboost         call call 1x boost   incr VitK call 2x boost; call      Date 11/10 11/17 11/24 12/1 12/8 12/15 12/23 12/30 1/3/22 1/7 1/11 1/18 1/25 2/1   Total WeeklyDose 17mg 16mg 16mg 16mg 15mg 15 mg Pt did not call back 15mg 12 mg 13mg 15mg 15 mg 15 mg 15 mg   INR 3.7 3.3 3.9 4.0 2.6 3.4 4.0 4.9 3.6 2.4 3.3 2.8 2.7 2.9   Notes Dec GLV   Zero GLV call Red x1 call Less GLV   call   Less  Protein drink redx1  self held x1 (misdose)   Dec vit K fall, apap  Call    call          Date 2/8 2/15 2/22 3/1 3/8 3/15 3/22 3/29 4/5 4/12 4/19 4/26 5/3 5/11   Total WeeklyDose 15mg 14mg 14 mg 15 mg 15 mg 15 mg 14mg 13 mg 14 mg 14 mg 14mg 13mg 15mg 14 mg   INR 4.0 4.0 2.8 2.9 2.9 4.2 3.9 3.3  2.9 3.0 3.8 2.4 3.8 2.5   Notes Call; Dec GLV call Call; Mis-dose call   Call; no GLV Inc GLV. Less protein, apap  redx1 call   Less GLV rec'd 4/27, call Dec GLV        Date 5/18 5/25 6/1 6/8 6/15 6/22 6/29 7/6 7/13 7/20 7/22 7/27  8/10  8/17   Total WeeklyDose 15 mg 15mg 16mg 15 mg 15 mg 15 mg 15 mg 15 mg 15mg 14 mg 15 mg 14 mg  14 mg  15 mg   INR 2.6 2.3 2.7 3.2 3.0 2.9 2.6 2.7 3.6 3.0 3.6 3.0  2.4  3.4   Notes   Inc GLV  call call call       call 7/14-- call call call  call  call  call      Date 8/25 8/31 9/7 9/12 9/19 9/26 10/3 10/10 10/17 10/24 10/31   Total WeeklyDose 14mg 13 mg 14 mg 17mg 15 mg 16mg 15mg 14 mg 13mg 17 mg 15mg   INR 4.3 2.8 1.7 2.9 2.1 3.4 3.8 2.4 1.8 3.7 4.5   Notes Dec GLV call Call refused enox; extra protein  Call; no protein drinks Call; less green tea, inc boostx1 Call; cranberries Redx1; call 1x miss Call  Less protein      Date 11/7              Total WeeklyDose 13 mg              INR 3.2              Notes                 Phone Interview:  Tablet Strength: 2mg  Patient Contact Info: [phone] (Mobile) *preferred*  [email or web address]  Verbal Release Authorization signed on 4/7/21 -- may speak with Tammy Bhumika (friend: [phone]), Ismael Michele (brother: [phone])  Lab Contact Info: [facility] ([facility])  ** will call once monthly or if INR is out of range**   4/7/22 Scr: 0.8    Patient Findings  Negatives:  Signs/symptoms of thrombosis, Signs/symptoms of bleeding, Laboratory test error suspected, Change in health, Change in alcohol use, Change in activity, Upcoming invasive procedure, Emergency department visit, Upcoming dental procedure, Missed doses, Extra doses, Change in medications, Change in diet/appetite, Hospital admission, Bruising, Other complaints   Comments:  Patient had her normal 2 protein drinks (Thursday and Saturday, only had one week prior), mixed veggies, and small helping of peas for other GLV intake. No bruising/bleeding out of ordinary,  bruise improved from last week.       Plan:  1. INR is therapeutic today at 3.2 (goal 2.5-3.5), improved from 4.5 last week.  Instructed Ms. Michele to reduce tonight's take dose of warfarin 2 mg oral daily  until recheck (14 mg weekly).   2. Repeat INR in 1 week, 11/14.  She prefers testing on Mondays   3. Verbal information provided over the phone. Patient RBV dosing instructions, expresses understanding by teach back, and has no further questions at this time.  4. Lucie Michele understands the importance of calling the [facility] if she notices any s/sx of bleeding, stroke, or abnormal bruising, if any changes are made to her medications or medication doses (Rx, OTC, herbal), or if any upcoming procedures are scheduled. Lucie Michele will likewise let us know if any other changes, questions, or concerns arise regarding anticoagulation therapy. she understands the importance of seeking medical attention immediately if she experiences any falls, vehicle accidents, or abnormal bleeding or bruising. Lucie Michele voiced understanding of this information and confirms that she has the [facility]'s contact information. Otherwise, we will plan to contact the patient once monthly or if her INR is out of range.    Tyrone Hensley, PharmD, BCPS  11/7/2022  14:53 EST

## 2022-11-08 ENCOUNTER — ANTICOAGULATION VISIT (OUTPATIENT)
Dept: PHARMACY | Facility: HOSPITAL | Age: 77
End: 2022-11-08

## 2022-11-08 DIAGNOSIS — I35.9 AORTIC VALVE DISEASE: Primary | ICD-10-CM

## 2022-11-08 NOTE — PROGRESS NOTES
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     Diet: 3x week: 1 cup of brussels sprouts or broccoli weekly (4/19/2022)  Premier Protein (or Equate brand) meal replacement ~2x/week 7/6/22  Alcohol: Seldom  Tobacco: None  OTC Pain Medication: APAP     INR History:  Date 4/5 4/19 4/26 5/5 5/11 6/2 6/15 6/18 6/25 7/2 7/9 7/19 7/23 7/30   Total Weekly Dose 15mg 15mg 14mg 14mg 14mg 14mg 14mg 14mg 14mg 14mg 14mg 14mg 14mg 14mg   INR 2.6 3.9 3.9 2.7 3.0 3.6 2.7 2.9 2.9 2.6 2.9 3.1 2.5 2.3   Notes           decr GLV   recv'd 6/21; [facility]       incr GLV decr GLV      Date 8/6 8/13 8/20 8/27 9/3 9/10 9/17 9/24 10/1 10/8 10/15 10/22 10/29 11/5   Total WeeklyDose 14mg 15mg 15mg 14mg 14mg 15mg 14mg 14mg 14mg 14mg 14mg 14mg 15mg 16mg   INR 2.4 3.4 3.8 2.9 2.2 3.2 2.9 3.3 3.2 3.3 3.0 2.4 2.4 2.4   Notes decr GLV decr GLV       1xboost         call call 1x boost   incr VitK call 2x boost; call      Date 11/10 11/17 11/24 12/1 12/8 12/15 12/23 12/30 1/3/22 1/7 1/11 1/18 1/25 2/1   Total WeeklyDose 17mg 16mg 16mg 16mg 15mg 15 mg Pt did not call back 15mg 12 mg 13mg 15mg 15 mg 15 mg 15 mg   INR 3.7 3.3 3.9 4.0 2.6 3.4 4.0 4.9 3.6 2.4 3.3 2.8 2.7 2.9   Notes Dec GLV   Zero GLV call Red x1 call Less GLV   call   Less  Protein drink redx1  self held x1 (misdose)   Dec vit K fall, apap  Call    call          Date 2/8 2/15 2/22 3/1 3/8 3/15 3/22 3/29 4/5 4/12 4/19 4/26 5/3 5/11   Total WeeklyDose 15mg 14mg 14 mg 15 mg 15 mg 15 mg 14mg 13 mg 14 mg 14 mg 14mg 13mg 15mg 14 mg   INR 4.0 4.0 2.8 2.9 2.9 4.2 3.9 3.3  2.9 3.0 3.8 2.4 3.8 2.5   Notes Call; Dec GLV call Call; Mis-dose call   Call; no GLV Inc GLV. Less protein, apap  redx1 call   Less GLV rec'd 4/27, call Dec GLV        Date 5/18 5/25 6/1 6/8 6/15 6/22 6/29 7/6 7/13 7/20 7/22 7/27  8/10  8/17   Total WeeklyDose 15 mg 15mg 16mg 15 mg 15 mg 15 mg 15 mg 15 mg 15mg 14 mg 15 mg 14 mg  14 mg  15 mg   INR 2.6 2.3 2.7 3.2 3.0 2.9 2.6 2.7 3.6 3.0 3.6 3.0  2.4  3.4   Notes   Inc GLV  call call call       call 7/14-- call call call  call  call  call      Date 8/25 8/31 9/7 9/12 9/19 9/26 10/3 10/10 10/17 10/24 10/31 11/7    Total WeeklyDose 14mg 13 mg 14 mg 17mg 15 mg 16mg 15mg 14 mg 13mg 17 mg 15mg 13 mg    INR 4.3 2.8 1.7 2.9 2.1 3.4 3.8 2.4 1.8 3.7 4.5 3.2    Notes Dec GLV call Call refused enox; extra protein  Call; no protein drinks Call; less green tea, inc boostx1 Call; cranberries Redx1; call 1x miss Call  Less protein  redx1      Date                Total WeeklyDose               INR               Notes                 Phone Interview:  Tablet Strength: 2mg  Patient Contact Info: [phone] (Mobile) *preferred*  [email or web address]  Verbal Release Authorization signed on 4/7/21 -- may speak with Tammy Bai (friend: [phone]), Ismael Michele (brother: [phone])  Lab Contact Info: [facility] ([facility])  ** will call once monthly or if INR is out of range**   4/7/22 Scr: 0.8    Duplicate fax received from yesterday

## 2022-11-14 ENCOUNTER — ANTICOAGULATION VISIT (OUTPATIENT)
Dept: PHARMACY | Facility: HOSPITAL | Age: 77
End: 2022-11-14

## 2022-11-14 DIAGNOSIS — I35.9 AORTIC VALVE DISEASE: Primary | ICD-10-CM

## 2022-11-14 LAB — INR PPP: 3.3

## 2022-11-14 NOTE — PROGRESS NOTES
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     Diet: 3x week: 1 cup of brussels sprouts or broccoli weekly (4/19/2022)  Premier Protein (or Equate brand) meal replacement ~2x/week 7/6/22  Alcohol: Seldom  Tobacco: None  OTC Pain Medication: APAP     INR History:  Date 4/5 4/19 4/26 5/5 5/11 6/2 6/15 6/18 6/25 7/2 7/9 7/19 7/23 7/30   Total Weekly Dose 15mg 15mg 14mg 14mg 14mg 14mg 14mg 14mg 14mg 14mg 14mg 14mg 14mg 14mg   INR 2.6 3.9 3.9 2.7 3.0 3.6 2.7 2.9 2.9 2.6 2.9 3.1 2.5 2.3   Notes           decr GLV   recv'd 6/21; [facility]       incr GLV decr GLV      Date 8/6 8/13 8/20 8/27 9/3 9/10 9/17 9/24 10/1 10/8 10/15 10/22 10/29 11/5   Total WeeklyDose 14mg 15mg 15mg 14mg 14mg 15mg 14mg 14mg 14mg 14mg 14mg 14mg 15mg 16mg   INR 2.4 3.4 3.8 2.9 2.2 3.2 2.9 3.3 3.2 3.3 3.0 2.4 2.4 2.4   Notes decr GLV decr GLV       1xboost         call call 1x boost   incr VitK call 2x boost; call      Date 11/10 11/17 11/24 12/1 12/8 12/15 12/23 12/30 1/3/22 1/7 1/11 1/18 1/25 2/1   Total WeeklyDose 17mg 16mg 16mg 16mg 15mg 15 mg Pt did not call back 15mg 12 mg 13mg 15mg 15 mg 15 mg 15 mg   INR 3.7 3.3 3.9 4.0 2.6 3.4 4.0 4.9 3.6 2.4 3.3 2.8 2.7 2.9   Notes Dec GLV   Zero GLV call Red x1 call Less GLV   call   Less  Protein drink redx1  self held x1 (misdose)   Dec vit K fall, apap  Call    call          Date 2/8 2/15 2/22 3/1 3/8 3/15 3/22 3/29 4/5 4/12 4/19 4/26 5/3 5/11   Total WeeklyDose 15mg 14mg 14 mg 15 mg 15 mg 15 mg 14mg 13 mg 14 mg 14 mg 14mg 13mg 15mg 14 mg   INR 4.0 4.0 2.8 2.9 2.9 4.2 3.9 3.3  2.9 3.0 3.8 2.4 3.8 2.5   Notes Call; Dec GLV call Call; Mis-dose call   Call; no GLV Inc GLV. Less protein, apap  redx1 call   Less GLV rec'd 4/27, call Dec GLV        Date 5/18 5/25 6/1 6/8 6/15 6/22 6/29 7/6 7/13 7/20 7/22 7/27  8/10  8/17   Total WeeklyDose 15 mg 15mg 16mg 15 mg 15 mg 15 mg 15 mg 15 mg 15mg 14 mg 15 mg 14 mg  14 mg  15 mg   INR 2.6 2.3 2.7 3.2 3.0 2.9 2.6 2.7 3.6 3.0 3.6 3.0  2.4  3.4   Notes   Inc GLV  call call call       call 7/14-- call call call  call  call  call      Date 8/25 8/31 9/7 9/12 9/19 9/26 10/3 10/10 10/17 10/24 10/31   Total WeeklyDose 14mg 13 mg 14 mg 17mg 15 mg 16mg 15mg 14 mg 13mg 17 mg 15mg   INR 4.3 2.8 1.7 2.9 2.1 3.4 3.8 2.4 1.8 3.7 4.5   Notes Dec GLV call Call refused enox; extra protein  Call; no protein drinks Call; less green tea, inc boostx1 Call; cranberries Redx1; call 1x miss Call  Less protein      Date 11/7 11/14             Total WeeklyDose 13 mg 14 mg             INR 3.2 3.3             Notes  call               Phone Interview:  Tablet Strength: 2mg  Patient Contact Info: [phone] (Mobile) *preferred*  [email or web address]  Verbal Release Authorization signed on 4/7/21 -- may speak with Tammy Bai (friend: [phone]), Ismael Michele (brother: [phone])  Lab Contact Info: [facility] ([place])  ** will call once monthly or if INR is out of range**   4/7/22 Scr: 0.8      Patient Findings  Negatives:  Signs/symptoms of thrombosis, Signs/symptoms of bleeding, Laboratory test error suspected, Change in health, Change in alcohol use, Change in activity, Upcoming invasive procedure, Emergency department visit, Upcoming dental procedure, Missed doses, Extra doses, Change in medications, Change in diet/appetite, Hospital admission, Bruising, Other complaints   Comments:  All findings negative per patient        Plan:  1. INR is therapeutic today at 3.3 (goal 2.5-3.5). results received verbally per patient. Instructed MsDaylin Ryne to continue dose  of warfarin 2 mg oral daily  until recheck (14 mg weekly).   2. Repeat INR in 1 week, 11/21.  She prefers testing on Mondays   3. Verbal information provided over the phone. Patient RBV dosing instructions, expresses understanding by teach back, and has no further questions at this time.  4. Lucie Michele understands the importance of calling the [facility] if she notices any s/sx of bleeding, stroke, or abnormal bruising, if any changes are made to her medications or medication doses (Rx, OTC, herbal), or if any upcoming procedures are scheduled. Lucie Michele will likewise let us know if any other changes, questions, or concerns arise regarding anticoagulation therapy. she understands the importance of seeking medical attention immediately if she experiences any falls, vehicle accidents, or abnormal bleeding or bruising. Lucie Michele voiced understanding of this information and confirms that she has the [facility]'s contact information. Otherwise, we will plan to contact the patient once monthly or if her INR is out of range.    Harpal Branch, Pharmacy Technician  11/14/2022  13:18 EST       If therapeutic at next encounter can officially change to reduced maintenance dose    I, Sahra Javier, [facility], have reviewed the note in full and agree with the assessment and plan.  11/15/22  08:20 EST

## 2022-11-21 ENCOUNTER — ANTICOAGULATION VISIT (OUTPATIENT)
Dept: PHARMACY | Facility: HOSPITAL | Age: 77
End: 2022-11-21

## 2022-11-21 DIAGNOSIS — I35.9 AORTIC VALVE DISEASE: Primary | ICD-10-CM

## 2022-11-21 LAB — INR PPP: 3.4

## 2022-11-21 NOTE — PROGRESS NOTES
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     Diet: 3x week: 1 cup of brussels sprouts or broccoli weekly (4/19/2022)  Premier Protein (or Equate brand) meal replacement ~2x/week 7/6/22  Alcohol: Seldom  Tobacco: None  OTC Pain Medication: APAP     INR History:  Date 4/5 4/19 4/26 5/5 5/11 6/2 6/15 6/18 6/25 7/2 7/9 7/19 7/23 7/30   Total Weekly Dose 15mg 15mg 14mg 14mg 14mg 14mg 14mg 14mg 14mg 14mg 14mg 14mg 14mg 14mg   INR 2.6 3.9 3.9 2.7 3.0 3.6 2.7 2.9 2.9 2.6 2.9 3.1 2.5 2.3   Notes           decr GLV   recv'd 6/21; [facility]       incr GLV decr GLV      Date 8/6 8/13 8/20 8/27 9/3 9/10 9/17 9/24 10/1 10/8 10/15 10/22 10/29 11/5   Total WeeklyDose 14mg 15mg 15mg 14mg 14mg 15mg 14mg 14mg 14mg 14mg 14mg 14mg 15mg 16mg   INR 2.4 3.4 3.8 2.9 2.2 3.2 2.9 3.3 3.2 3.3 3.0 2.4 2.4 2.4   Notes decr GLV decr GLV       1xboost         call call 1x boost   incr VitK call 2x boost; call      Date 11/10 11/17 11/24 12/1 12/8 12/15 12/23 12/30 1/3/22 1/7 1/11 1/18 1/25 2/1   Total WeeklyDose 17mg 16mg 16mg 16mg 15mg 15 mg Pt did not call back 15mg 12 mg 13mg 15mg 15 mg 15 mg 15 mg   INR 3.7 3.3 3.9 4.0 2.6 3.4 4.0 4.9 3.6 2.4 3.3 2.8 2.7 2.9   Notes Dec GLV   Zero GLV call Red x1 call Less GLV   call   Less  Protein drink redx1  self held x1 (misdose)   Dec vit K fall, apap  Call    call          Date 2/8 2/15 2/22 3/1 3/8 3/15 3/22 3/29 4/5 4/12 4/19 4/26 5/3 5/11   Total WeeklyDose 15mg 14mg 14 mg 15 mg 15 mg 15 mg 14mg 13 mg 14 mg 14 mg 14mg 13mg 15mg 14 mg   INR 4.0 4.0 2.8 2.9 2.9 4.2 3.9 3.3  2.9 3.0 3.8 2.4 3.8 2.5   Notes Call; Dec GLV call Call; Mis-dose call   Call; no GLV Inc GLV. Less protein, apap  redx1 call   Less GLV rec'd 4/27, call Dec GLV        Date 5/18 5/25 6/1 6/8 6/15 6/22 6/29 7/6 7/13 7/20 7/22 7/27  8/10  8/17   Total WeeklyDose 15 mg 15mg 16mg 15 mg 15 mg 15 mg 15 mg 15 mg 15mg 14 mg 15 mg 14 mg  14 mg  15 mg   INR 2.6 2.3 2.7 3.2 3.0 2.9 2.6 2.7 3.6 3.0 3.6 3.0  2.4  3.4   Notes   Inc GLV  call call call       call 7/14-- call call call  call  call  call      Date 8/25 8/31 9/7 9/12 9/19 9/26 10/3 10/10 10/17 10/24 10/31   Total WeeklyDose 14mg 13 mg 14 mg 17mg 15 mg 16mg 15mg 14 mg 13mg 17 mg 15mg   INR 4.3 2.8 1.7 2.9 2.1 3.4 3.8 2.4 1.8 3.7 4.5   Notes Dec GLV call Call refused enox; extra protein  Call; no protein drinks Call; less green tea, inc boostx1 Call; cranberries Redx1; call 1x miss Call  Less protein      Date 11/7 11/14 11/21            Total WeeklyDose 13 mg 14 mg 14 mg            INR 3.2 3.3 3.4            Notes  call redx1              Phone Interview:  Tablet Strength: 2mg  Patient Contact Info: [phone] (Mobile) *preferred*  [email or web address]  Verbal Release Authorization signed on 4/7/21 -- may speak with Tammy Bai (friend: [phone]), Ismael Percyligia (brother: [phone])  Lab Contact Info: [facility] ([facility])  ** will call once monthly or if INR is out of range**   4/7/22 Scr: 0.8    Patient Findings  Negatives:  Signs/symptoms of thrombosis, Signs/symptoms of bleeding, Laboratory test error suspected, Change in health, Change in alcohol use, Change in activity, Upcoming invasive procedure, Emergency department visit, Upcoming dental procedure, Missed doses, Extra doses, Change in medications, Change in diet/appetite, Hospital admission, Bruising, Other complaints   Comments:  All findings negative per pt.      Plan:  1. INR is therapeutic today at 3.4 (goal 2.5-3.5). results received verbally per patient. Instructed Ms. Michele to  continue dose of warfarin 2 mg oral daily  until recheck (14 mg weekly).   2. Repeat INR in 1 week, 11/28. She prefers testing on Mondays   3. Verbal information provided over the phone. Patient RBV dosing instructions, expresses understanding by teach back, and has no further questions at this time.  4. Lucie Michele understands the importance of calling the [facility] if she notices any s/sx of bleeding, stroke, or abnormal bruising, if any changes are made to her medications or medication doses (Rx, OTC, herbal), or if any upcoming procedures are scheduled. Lucie Michele will likewise let us know if any other changes, questions, or concerns arise regarding anticoagulation therapy. she understands the importance of seeking medical attention immediately if she experiences any falls, vehicle accidents, or abnormal bleeding or bruising. Lucie Michele voiced understanding of this information and confirms that she has the [facility]'s contact information. Otherwise, we will plan to contact the patient once monthly or if her INR is out of range.    Sasha Marinelli CPHT  11/21/2022  10:06 Jesenia COLEY, PharmD, have reviewed the note in full and agree with the assessment and plan. Tracker updated to reflect dosing communicated to patient.  11/22/22  14:46 EST

## 2022-11-28 ENCOUNTER — ANTICOAGULATION VISIT (OUTPATIENT)
Dept: PHARMACY | Facility: HOSPITAL | Age: 77
End: 2022-11-28

## 2022-11-28 DIAGNOSIS — I35.9 AORTIC VALVE DISEASE: Primary | ICD-10-CM

## 2022-11-28 LAB
INR PPP: 3.4
INR PPP: 3.6

## 2022-11-28 NOTE — PROGRESS NOTES
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     Diet: 3x week: 1 cup of brussels sprouts or broccoli weekly (4/19/2022)  Premier Protein (or Equate brand) meal replacement ~2x/week 7/6/22  Alcohol: Seldom  Tobacco: None  OTC Pain Medication: APAP     INR History:  Date 4/5 4/19 4/26 5/5 5/11 6/2 6/15 6/18 6/25 7/2 7/9 7/19 7/23 7/30   Total Weekly Dose 15mg 15mg 14mg 14mg 14mg 14mg 14mg 14mg 14mg 14mg 14mg 14mg 14mg 14mg   INR 2.6 3.9 3.9 2.7 3.0 3.6 2.7 2.9 2.9 2.6 2.9 3.1 2.5 2.3   Notes           decr GLV   recv'd 6/21; [facility]       incr GLV decr GLV      Date 8/6 8/13 8/20 8/27 9/3 9/10 9/17 9/24 10/1 10/8 10/15 10/22 10/29 11/5   Total WeeklyDose 14mg 15mg 15mg 14mg 14mg 15mg 14mg 14mg 14mg 14mg 14mg 14mg 15mg 16mg   INR 2.4 3.4 3.8 2.9 2.2 3.2 2.9 3.3 3.2 3.3 3.0 2.4 2.4 2.4   Notes decr GLV decr GLV       1xboost         call call 1x boost   incr VitK call 2x boost; call      Date 11/10 11/17 11/24 12/1 12/8 12/15 12/23 12/30 1/3/22 1/7 1/11 1/18 1/25 2/1   Total WeeklyDose 17mg 16mg 16mg 16mg 15mg 15 mg Pt did not call back 15mg 12 mg 13mg 15mg 15 mg 15 mg 15 mg   INR 3.7 3.3 3.9 4.0 2.6 3.4 4.0 4.9 3.6 2.4 3.3 2.8 2.7 2.9   Notes Dec GLV   Zero GLV call Red x1 call Less GLV   call   Less  Protein drink redx1  self held x1 (misdose)   Dec vit K fall, apap  Call    call          Date 2/8 2/15 2/22 3/1 3/8 3/15 3/22 3/29 4/5 4/12 4/19 4/26 5/3 5/11   Total WeeklyDose 15mg 14mg 14 mg 15 mg 15 mg 15 mg 14mg 13 mg 14 mg 14 mg 14mg 13mg 15mg 14 mg   INR 4.0 4.0 2.8 2.9 2.9 4.2 3.9  3.3 2.9 3.0 3.8 2.4 3.8 2.5   Notes Call; Dec GLV call Call; Mis-dose call   Call; no GLV Inc GLV. Less protein, apap  redx1 call   Less GLV rec'd 4/27, call Dec GLV        Date 5/18 5/25 6/1 6/8 6/15 6/22 6/29 7/6 7/13 7/20 7/22 7/27  8/10  8/17   Total WeeklyDose 15 mg 15mg 16mg 15 mg 15 mg 15 mg 15 mg 15 mg 15mg 14 mg 15 mg 14 mg  14 mg  15 mg   INR 2.6 2.3 2.7 3.2 3.0 2.9 2.6 2.7 3.6 3.0 3.6 3.0  2.4  3.4   Notes   Inc GLV  call call call       call 7/14-- call call call  call  call  call      Date 8/25 8/31 9/7 9/12 9/19 9/26 10/3 10/10 10/17 10/24 10/31   Total WeeklyDose 14mg 13 mg 14 mg 17mg 15 mg 16mg 15mg 14 mg 13mg 17 mg 15mg   INR 4.3 2.8 1.7 2.9 2.1 3.4 3.8 2.4 1.8 3.7 4.5   Notes Dec GLV call Call refused enox; extra protein  Call; no protein drinks Call; less green tea, inc boostx1 Call; cranberries Redx1; call 1x miss Call  Less protein      Date 11/7 11/14 11/21 11/28           Total WeeklyDose 13 mg 14 mg 14 mg 14 mg           INR 3.2 3.3 3.4 3.6           Notes  call redx1 One less protein shake             Phone Interview:  Tablet Strength: 2mg  Patient Contact Info: [phone] (Mobile) *preferred*  [email or web address]  Verbal Release Authorization signed on 4/7/21 -- may speak with Tammy Bai (friend: [phone]), Ismael Michele (brother: [phone])  Lab Contact Info: [facility] ([facility])  ** will call once monthly or if INR is out of range**   4/7/22 Scr: 0.8    Patient Findings  Positives:  Change in diet/appetite   Negatives:  Signs/symptoms of thrombosis, Signs/symptoms of bleeding, Laboratory test error suspected, Change in health, Change in alcohol use, Change in activity, Upcoming invasive procedure, Emergency department visit, Upcoming dental procedure, Missed doses, Extra doses, Change in medications, Hospital admission, Bruising, Other complaints   Comments:  She did not have her second protein shake on Thursday as she ate a Thanksgiving meal.   Otherwise, above  findings negative      Plan:    1. INR is therapeutic today at 3.6 (goal 2.5-3.5). results received verbally per patient. Instructed Ms. Michele to continue usual regimen of warfarin 2 mg oral daily until recheck (14 mg weekly).   2. Repeat INR in 1 week, 12/5. She prefers testing on Mondays   3. Verbal information provided over the phone. Patient RBV dosing instructions, expresses understanding by teach back, and has no further questions at this time.  4. Lcuie Michele understands the importance of calling the [facility] if she notices any s/sx of bleeding, stroke, or abnormal bruising, if any changes are made to her medications or medication doses (Rx, OTC, herbal), or if any upcoming procedures are scheduled. Lucie Michele will likewise let us know if any other changes, questions, or concerns arise regarding anticoagulation therapy. she understands the importance of seeking medical attention immediately if she experiences any falls, vehicle accidents, or abnormal bleeding or bruising. Lucie Michele voiced understanding of this information and confirms that she has the [facility]'s contact information. Otherwise, we will plan to contact the patient once monthly or if her INR is out of range.    Jesenia Garcia, PharmD  11/28/2022  12:05 EST

## 2022-11-30 ENCOUNTER — TELEPHONE (OUTPATIENT)
Dept: NEUROSURGERY | Facility: CLINIC | Age: 77
End: 2022-11-30

## 2022-11-30 DIAGNOSIS — G50.0 TRIGEMINAL NEURALGIA: Primary | ICD-10-CM

## 2022-12-01 ENCOUNTER — OFFICE VISIT (OUTPATIENT)
Dept: CARDIOLOGY | Facility: CLINIC | Age: 77
End: 2022-12-01

## 2022-12-01 VITALS
RESPIRATION RATE: 18 BRPM | HEART RATE: 78 BPM | OXYGEN SATURATION: 98 % | BODY MASS INDEX: 33.05 KG/M2 | WEIGHT: 198.4 LBS | TEMPERATURE: 97.3 F | SYSTOLIC BLOOD PRESSURE: 122 MMHG | DIASTOLIC BLOOD PRESSURE: 70 MMHG | HEIGHT: 65 IN

## 2022-12-01 DIAGNOSIS — Z99.89 OSA ON CPAP: ICD-10-CM

## 2022-12-01 DIAGNOSIS — I50.32 CHRONIC DIASTOLIC CONGESTIVE HEART FAILURE: ICD-10-CM

## 2022-12-01 DIAGNOSIS — G47.33 OSA ON CPAP: ICD-10-CM

## 2022-12-01 DIAGNOSIS — Z86.79 S/P ASCENDING AORTIC ANEURYSM REPAIR: ICD-10-CM

## 2022-12-01 DIAGNOSIS — Z98.890 S/P ASCENDING AORTIC ANEURYSM REPAIR: ICD-10-CM

## 2022-12-01 DIAGNOSIS — Z95.2 HISTORY OF AORTIC VALVE REPLACEMENT: Primary | ICD-10-CM

## 2022-12-01 DIAGNOSIS — I10 ESSENTIAL HYPERTENSION, BENIGN: ICD-10-CM

## 2022-12-01 DIAGNOSIS — E78.2 MIXED HYPERLIPIDEMIA: ICD-10-CM

## 2022-12-01 PROBLEM — I38 HEART FAILURE DUE TO VALVULAR DISEASE, CHRONIC, DIASTOLIC: Status: RESOLVED | Noted: 2021-12-01 | Resolved: 2022-12-01

## 2022-12-01 PROBLEM — Z87.891 FORMER TOBACCO USE: Status: RESOLVED | Noted: 2022-04-13 | Resolved: 2022-12-01

## 2022-12-01 PROBLEM — Z79.899 HIGH RISK MEDICATION USE: Status: RESOLVED | Noted: 2022-04-13 | Resolved: 2022-12-01

## 2022-12-01 PROCEDURE — 99214 OFFICE O/P EST MOD 30 MIN: CPT | Performed by: INTERNAL MEDICINE

## 2022-12-01 PROCEDURE — 93000 ELECTROCARDIOGRAM COMPLETE: CPT | Performed by: INTERNAL MEDICINE

## 2022-12-01 RX ORDER — DAPAGLIFLOZIN 10 MG/1
10 TABLET, FILM COATED ORAL DAILY
Qty: 90 TABLET | Refills: 2 | Status: SHIPPED | OUTPATIENT
Start: 2022-12-01
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
## 2022-12-05 ENCOUNTER — ANTICOAGULATION VISIT (OUTPATIENT)
Dept: PHARMACY | Facility: HOSPITAL | Age: 77
End: 2022-12-05

## 2022-12-05 DIAGNOSIS — I35.9 AORTIC VALVE DISEASE: Primary | ICD-10-CM

## 2022-12-05 LAB — INR PPP: 2.5

## 2022-12-05 RX ORDER — MELOXICAM 7.5 MG/1
TABLET ORAL
Qty: 30 TABLET | Refills: 5 | OUTPATIENT
Start: 2022-12-05

## 2022-12-05 NOTE — TELEPHONE ENCOUNTER
Rx Refill Note    Requested Prescriptions     Pending Prescriptions Disp Refills   • meloxicam (MOBIC) 7.5 MG tablet [Pharmacy Med Name: MELOXICAM 7.5MG TABLETS] 30 tablet 0     Sig: TAKE 1 TABLET BY MOUTH EVERY DAY        Last office visit with prescribing clinician: 11/2/2022      Next office visit with prescribing clinician: 5/3/2023   Last labs:   Last refill:    Pharmacy [facility]

## 2022-12-05 NOTE — PROGRESS NOTES
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     Diet: 3x week: 1 cup of brussels sprouts or broccoli weekly (4/19/2022)  Premier Protein (or Equate brand) meal replacement ~2x/week 7/6/22  Alcohol: Seldom  Tobacco: None  OTC Pain Medication: APAP     INR History:  Date 4/5 4/19 4/26 5/5 5/11 6/2 6/15 6/18 6/25 7/2 7/9 7/19 7/23 7/30   Total Weekly Dose 15mg 15mg 14mg 14mg 14mg 14mg 14mg 14mg 14mg 14mg 14mg 14mg 14mg 14mg   INR 2.6 3.9 3.9 2.7 3.0 3.6 2.7 2.9 2.9 2.6 2.9 3.1 2.5 2.3   Notes           decr GLV   recv'd 6/21; [facility]       incr GLV decr GLV      Date 8/6 8/13 8/20 8/27 9/3 9/10 9/17 9/24 10/1 10/8 10/15 10/22 10/29 11/5   Total WeeklyDose 14mg 15mg 15mg 14mg 14mg 15mg 14mg 14mg 14mg 14mg 14mg 14mg 15mg 16mg   INR 2.4 3.4 3.8 2.9 2.2 3.2 2.9 3.3 3.2 3.3 3.0 2.4 2.4 2.4   Notes decr GLV decr GLV       1xboost         call call 1x boost   incr VitK call 2x boost; call      Date 11/10 11/17 11/24 12/1 12/8 12/15 12/23 12/30 1/3/22 1/7 1/11 1/18 1/25 2/1   Total WeeklyDose 17mg 16mg 16mg 16mg 15mg 15 mg Pt did not call back 15mg 12 mg 13mg 15mg 15 mg 15 mg 15 mg   INR 3.7 3.3 3.9 4.0 2.6 3.4 4.0 4.9 3.6 2.4 3.3 2.8 2.7 2.9   Notes Dec GLV   Zero GLV call Red x1 call Less GLV   call   Less  Protein drink redx1  self held x1 (misdose)   Dec vit K fall, apap  Call    call          Date 2/8 2/15 2/22 3/1 3/8 3/15 3/22 3/29 4/5 4/12 4/19 4/26 5/3 5/11   Total WeeklyDose 15mg 14mg 14 mg 15 mg 15 mg 15 mg 14mg 13 mg 14 mg 14 mg 14mg 13mg 15mg 14 mg   INR 4.0 4.0 2.8 2.9 2.9 4.2 3.9  3.3 2.9 3.0 3.8 2.4 3.8 2.5   Notes Call; Dec GLV call Call; Mis-dose call   Call; no GLV Inc GLV. Less protein, apap  redx1 call   Less GLV rec'd 4/27, call Dec GLV        Date 5/18 5/25 6/1 6/8 6/15 6/22 6/29 7/6 7/13 7/20 7/22 7/27  8/10  8/17   Total WeeklyDose 15 mg 15mg 16mg 15 mg 15 mg 15 mg 15 mg 15 mg 15mg 14 mg 15 mg 14 mg  14 mg  15 mg   INR 2.6 2.3 2.7 3.2 3.0 2.9 2.6 2.7 3.6 3.0 3.6 3.0  2.4  3.4   Notes   Inc GLV  call call call       call 7/14-- call call call  call  call  call      Date 8/25 8/31 9/7 9/12 9/19 9/26 10/3 10/10 10/17 10/24 10/31   Total WeeklyDose 14mg 13 mg 14 mg 17mg 15 mg 16mg 15mg 14 mg 13mg 17 mg 15mg   INR 4.3 2.8 1.7 2.9 2.1 3.4 3.8 2.4 1.8 3.7 4.5   Notes Dec GLV call Call refused enox; extra protein  Call; no protein drinks Call; less green tea, inc boostx1 Call; cranberries Redx1; call 1x miss Call  Less protein      Date 11/7 11/14 11/21 11/28 12/5          Total WeeklyDose 13 mg 14 mg 14 mg 14 mg 14 mg          INR 3.2 3.3 3.4 3.6 2.5          Notes  call redx1 One less protein shake             Phone Interview:  Tablet Strength: 2mg  Patient Contact Info: [phone] (Mobile) *preferred*  [email or web address]  Verbal Release Authorization signed on 4/7/21 -- may speak with Tammy Bai (friend: [phone]), Ismael Michele (brother: [phone])  Lab Contact Info: [facility] ([facility])  ** will call once monthly or if INR is out of range**   4/7/22 Scr: 0.8    Patient Findings    Negatives:  Signs/symptoms of thrombosis, Signs/symptoms of bleeding, Laboratory test error suspected, Change in health, Change in alcohol use, Change in activity, Upcoming invasive procedure, Emergency department visit, Upcoming dental procedure, Missed doses, Extra doses, Change in medications, Change in diet/appetite, Hospital admission, Bruising, Other complaints   Comments:  All findings negative per pt.      Plan:    1. INR is therapeutic today at 2.5 (goal 2.5-3.5). results  received verbally from patient. Instructed Ms. Michele to continue regular regimen of warfarin 2 mg oral daily until recheck (14 mg weekly).   2. Repeat INR in 1 week, 12/12/22; She prefers testing on Mondays   3. Verbal information provided over the phone. Patient RBV dosing instructions, expresses understanding by teach back, and has no further questions at this time.  4. Lucie Michele understands the importance of calling the [facility] if she notices any s/sx of bleeding, stroke, or abnormal bruising, if any changes are made to her medications or medication doses (Rx, OTC, herbal), or if any upcoming procedures are scheduled. Lucie Michele will likewise let us know if any other changes, questions, or concerns arise regarding anticoagulation therapy. she understands the importance of seeking medical attention immediately if she experiences any falls, vehicle accidents, or abnormal bleeding or bruising. Lucie Michele voiced understanding of this information and confirms that she has the [facility]'s contact information. Otherwise, we will plan to contact the patient once monthly or if her INR is out of range.    Eric Hernandez, Sondra  12/5/2022  14:33 Lizet COLEY, PharmD, have reviewed the note in full and agree with the assessment and plan.  12/06/22  10:44 EST

## 2022-12-06 RX ORDER — MELOXICAM 7.5 MG/1
7.5 TABLET ORAL DAILY
Qty: 30 TABLET | Refills: 2 | OUTPATIENT
Start: 2022-12-06

## 2022-12-06 NOTE — TELEPHONE ENCOUNTER
Rx Refill Note    Requested Prescriptions     Pending Prescriptions Disp Refills   • meloxicam (MOBIC) 7.5 MG tablet 30 tablet 0     Sig: Take 1 tablet by mouth Daily.        Last office visit with prescribing clinician: 11/2/2022      Next office visit with prescribing clinician: 5/3/2023   Last labs:   Last refill:    Pharmacy [facility]

## 2022-12-08 RX ORDER — POTASSIUM CHLORIDE 750 MG/1
10 TABLET, FILM COATED, EXTENDED RELEASE ORAL DAILY
Qty: 90 TABLET | Refills: 3 | Status: SHIPPED | OUTPATIENT
Start: 2022-12-08

## 2022-12-12 ENCOUNTER — ANTICOAGULATION VISIT (OUTPATIENT)
Dept: PHARMACY | Facility: HOSPITAL | Age: 77
End: 2022-12-12

## 2022-12-12 DIAGNOSIS — I35.9 AORTIC VALVE DISEASE: Primary | ICD-10-CM

## 2022-12-12 LAB — INR PPP: 4

## 2022-12-12 NOTE — PROGRESS NOTES
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     Diet: 3x week: 1 cup of brussels sprouts or broccoli weekly (4/19/2022)  Premier Protein (or Equate brand) meal replacement ~2x/week 7/6/22  Alcohol: Seldom  Tobacco: None  OTC Pain Medication: APAP     INR History:  Date 4/5 4/19 4/26 5/5 5/11 6/2 6/15 6/18 6/25 7/2 7/9 7/19 7/23 7/30   Total Weekly Dose 15mg 15mg 14mg 14mg 14mg 14mg 14mg 14mg 14mg 14mg 14mg 14mg 14mg 14mg   INR 2.6 3.9 3.9 2.7 3.0 3.6 2.7 2.9 2.9 2.6 2.9 3.1 2.5 2.3   Notes           decr GLV   recv'd 6/21; [facility]       incr GLV decr GLV      Date 8/6 8/13 8/20 8/27 9/3 9/10 9/17 9/24 10/1 10/8 10/15 10/22 10/29 11/5   Total WeeklyDose 14mg 15mg 15mg 14mg 14mg 15mg 14mg 14mg 14mg 14mg 14mg 14mg 15mg 16mg   INR 2.4 3.4 3.8 2.9 2.2 3.2 2.9 3.3 3.2 3.3 3.0 2.4 2.4 2.4   Notes decr GLV decr GLV       1xboost         call call 1x boost   incr VitK call 2x boost; call      Date 11/10 11/17 11/24 12/1 12/8 12/15 12/23 12/30 1/3/22 1/7 1/11 1/18 1/25 2/1   Total WeeklyDose 17mg 16mg 16mg 16mg 15mg 15 mg Pt did not call back 15mg 12 mg 13mg 15mg 15 mg 15 mg 15 mg   INR 3.7 3.3 3.9 4.0 2.6 3.4 4.0 4.9 3.6 2.4 3.3 2.8 2.7 2.9   Notes Dec GLV   Zero GLV call Red x1 call Less GLV   call   Less  Protein drink redx1  self held x1 (misdose)   Dec vit K fall, apap  Call    call          Date 2/8 2/15 2/22 3/1 3/8 3/15 3/22 3/29 4/5 4/12 4/19 4/26 5/3 5/11   Total WeeklyDose 15mg 14mg 14 mg 15 mg 15 mg 15 mg 14mg 13 mg 14 mg 14 mg 14mg 13mg 15mg 14 mg   INR 4.0 4.0 2.8 2.9 2.9 4.2 3.9  3.3 2.9 3.0 3.8 2.4 3.8 2.5   Notes Call; Dec GLV call Call; Mis-dose call   Call; no GLV Inc GLV. Less protein, apap  redx1 call   Less GLV rec'd 4/27, call Dec GLV        Date 5/18 5/25 6/1 6/8 6/15 6/22 6/29 7/6 7/13 7/20 7/22 7/27  8/10  8/17   Total WeeklyDose 15 mg 15mg 16mg 15 mg 15 mg 15 mg 15 mg 15 mg 15mg 14 mg 15 mg 14 mg  14 mg  15 mg   INR 2.6 2.3 2.7 3.2 3.0 2.9 2.6 2.7 3.6 3.0 3.6 3.0  2.4  3.4   Notes   Inc GLV  call call call       call 7/14-- call call call  call  call  call      Date 8/25 8/31 9/7 9/12 9/19 9/26 10/3 10/10 10/17 10/24 10/31   Total WeeklyDose 14mg 13 mg 14 mg 17mg 15 mg 16mg 15mg 14 mg 13mg 17 mg 15mg   INR 4.3 2.8 1.7 2.9 2.1 3.4 3.8 2.4 1.8 3.7 4.5   Notes Dec GLV call Call refused enox; extra protein  Call; no protein drinks Call; less green tea, inc boostx1 Call; cranberries Redx1; call 1x miss Call  Less protein      Date 11/7 11/14 11/21 11/28 12/5 12/12         Total WeeklyDose 13 mg 14 mg 14 mg 14 mg 14 mg 14 mg         INR 3.2 3.3 3.4 3.6 2.5 4.0         Notes  call redx1 One less protein shake   Inc GLV          Phone Interview:  Tablet Strength: 2mg  Patient Contact Info: [phone] (Mobile) *preferred*  [email or web address]  Verbal Release Authorization signed on 4/7/21 -- may speak with Tammy Bai (friend: [phone]), Ismael Michele (brother: [phone])  Lab Contact Info: [facility] ([place])  ** will call once monthly or if INR is out of range**   4/7/22 Scr: 0.8        Patient Findings  Positives:  Change in diet/appetite   Negatives:  Signs/symptoms of thrombosis, Signs/symptoms of bleeding, Laboratory test error suspected, Change in health, Change in alcohol use, Change in activity, Upcoming invasive procedure, Emergency department visit, Upcoming dental procedure, Missed doses, Extra doses, Change in medications, Hospital admission, Bruising, Other complaints   Comments:  No glv this past week.   All other findings negative per patient.           Plan:  1. INR is SUPRAherapeutic today at 4.0  (goal 2.5-3.5). results received verbally from patient. Per Ramana Bagley PharmD, Instructed Ms. Michele to continue regular regimen of warfarin 2 mg oral daily until recheck (14 mg weekly). Suggested patient to eat extra servings of GLV to bring INR WNL.   2. Repeat INR in 1 week, 12/19/22; She prefers testing on Mondays   3. Verbal information provided over the phone. Patient RBV dosing instructions, expresses understanding by teach back, and has no further questions at this time.  4. Lucie Michele understands the importance of calling the [facility] if she notices any s/sx of bleeding, stroke, or abnormal bruising, if any changes are made to her medications or medication doses (Rx, OTC, herbal), or if any upcoming procedures are scheduled. Lucie Michele will likewise let us know if any other changes, questions, or concerns arise regarding anticoagulation therapy. she understands the importance of seeking medical attention immediately if she experiences any falls, vehicle accidents, or abnormal bleeding or bruising. Lucie Michele voiced understanding of this information and confirms that she has the [facility]'s contact information. Otherwise, we will plan to contact the patient once monthly or if her INR is out of range.      Harpal Branch, Pharmacy Technician  12/12/2022  11:35 EST      Would have considered 1x dose of 1mg, however previously INR has decreased by > 1 without dose reduction    I, Lizet aMrinelli, PharmD, have reviewed the note in full and agree with the assessment and plan.  12/13/22  13:54 EST

## 2022-12-12 NOTE — TELEPHONE ENCOUNTER
Caller: Lucie Michele    Relationship: Self    Best call back number: [phone]    Requested Prescriptions:   Requested Prescriptions     Pending Prescriptions Disp Refills   • meloxicam (MOBIC) 7.5 MG tablet 30 tablet 0     Sig: Take 1 tablet by mouth Daily.        Pharmacy where request should be sent: [id number] - [facility] [place] - [address] [facility] [address]  [place] - [phone] [facility] [phone] FX     Additional details provided by patient:   APPOINTMENT SCHEDULED 01/04/2023  PATIENT ONLY HAS 3 DAYS LEFT OF MEDICATION     Does the patient have less than a 3 day supply:  [x] Yes  [] No    Would you like a call back once the refill request has been completed: [x] Yes [] No    If the office needs to give you a call back, can they leave a voicemail: [x] Yes [] No    Manjeet Soto Rep   12/12/22 15:04 EST

## 2022-12-13 RX ORDER — MELOXICAM 7.5 MG/1
7.5 TABLET ORAL DAILY
Qty: 90 TABLET | Refills: 1 | OUTPATIENT
Start: 2022-12-13

## 2022-12-13 NOTE — TELEPHONE ENCOUNTER
Rx Refill Note    Requested Prescriptions     Pending Prescriptions Disp Refills   • meloxicam (MOBIC) 7.5 MG tablet 30 tablet 0     Sig: Take 1 tablet by mouth Daily.        Last office visit with prescribing clinician: 11/2/2022      Next office visit with prescribing clinician: 1/4/2023   Last labs:   Last refill:    Pharmacy [facility]

## 2022-12-16 DIAGNOSIS — I50.30 DIASTOLIC CONGESTIVE HEART FAILURE, UNSPECIFIED HF CHRONICITY: Primary | ICD-10-CM

## 2022-12-19 ENCOUNTER — ANTICOAGULATION VISIT (OUTPATIENT)
Dept: PHARMACY | Facility: HOSPITAL | Age: 77
End: 2022-12-19

## 2022-12-19 DIAGNOSIS — I35.9 AORTIC VALVE DISEASE: Primary | ICD-10-CM

## 2022-12-19 LAB — INR PPP: 2.9

## 2022-12-19 NOTE — PROGRESS NOTES
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     Diet: 3x week: 1 cup of brussels sprouts or broccoli weekly (4/19/2022)  Premier Protein (or Equate brand) meal replacement ~2x/week 7/6/22  Alcohol: Seldom  Tobacco: None  OTC Pain Medication: APAP     INR History:  Date 4/5 4/19 4/26 5/5 5/11 6/2 6/15 6/18 6/25 7/2 7/9 7/19 7/23 7/30   Total Weekly Dose 15mg 15mg 14mg 14mg 14mg 14mg 14mg 14mg 14mg 14mg 14mg 14mg 14mg 14mg   INR 2.6 3.9 3.9 2.7 3.0 3.6 2.7 2.9 2.9 2.6 2.9 3.1 2.5 2.3   Notes           decr GLV   recv'd 6/21; [facility]       incr GLV decr GLV      Date 8/6 8/13 8/20 8/27 9/3 9/10 9/17 9/24 10/1 10/8 10/15 10/22 10/29 11/5   Total WeeklyDose 14mg 15mg 15mg 14mg 14mg 15mg 14mg 14mg 14mg 14mg 14mg 14mg 15mg 16mg   INR 2.4 3.4 3.8 2.9 2.2 3.2 2.9 3.3 3.2 3.3 3.0 2.4 2.4 2.4   Notes decr GLV decr GLV       1xboost         call call 1x boost   incr VitK call 2x boost; call      Date 11/10 11/17 11/24 12/1 12/8 12/15 12/23 12/30 1/3/22 1/7 1/11 1/18 1/25 2/1   Total WeeklyDose 17mg 16mg 16mg 16mg 15mg 15 mg Pt did not call back 15mg 12 mg 13mg 15mg 15 mg 15 mg 15 mg   INR 3.7 3.3 3.9 4.0 2.6 3.4 4.0 4.9 3.6 2.4 3.3 2.8 2.7 2.9   Notes Dec GLV   Zero GLV call Red x1 call Less GLV   call   Less  Protein drink redx1  self held x1 (misdose)   Dec vit K fall, apap  Call    call          Date 2/8 2/15 2/22 3/1 3/8 3/15 3/22 3/29 4/5 4/12 4/19 4/26 5/3 5/11   Total WeeklyDose 15mg 14mg 14 mg 15 mg 15 mg 15 mg 14mg 13 mg 14 mg 14 mg 14mg 13mg 15mg 14 mg   INR 4.0 4.0 2.8 2.9 2.9 4.2 3.9  3.3 2.9 3.0 3.8 2.4 3.8 2.5   Notes Call; Dec GLV call Call; Mis-dose call   Call; no GLV Inc GLV. Less protein, apap  redx1 call   Less GLV rec'd 4/27, call Dec GLV        Date 5/18 5/25 6/1 6/8 6/15 6/22 6/29 7/6 7/13 7/20 7/22 7/27  8/10  8/17   Total WeeklyDose 15 mg 15mg 16mg 15 mg 15 mg 15 mg 15 mg 15 mg 15mg 14 mg 15 mg 14 mg  14 mg  15 mg   INR 2.6 2.3 2.7 3.2 3.0 2.9 2.6 2.7 3.6 3.0 3.6 3.0  2.4  3.4   Notes   Inc GLV  call call call       call 7/14-- call call call  call  call  call      Date 8/25 8/31 9/7 9/12 9/19 9/26 10/3 10/10 10/17 10/24 10/31   Total WeeklyDose 14mg 13 mg 14 mg 17mg 15 mg 16mg 15mg 14 mg 13mg 17 mg 15mg   INR 4.3 2.8 1.7 2.9 2.1 3.4 3.8 2.4 1.8 3.7 4.5   Notes Dec GLV call Call refused enox; extra protein  Call; no protein drinks Call; less green tea, inc boostx1 Call; cranberries Redx1; call 1x miss Call  Less protein      Date 11/7 11/14 11/21 11/28 12/5 12/12 12/19        Total WeeklyDose 13 mg 14 mg 14 mg 14 mg 14 mg 14 mg 14mg        INR 3.2 3.3 3.4 3.6 2.5 4.0 2.9        Notes  call redx1 One less protein shake   Inc GLV          Phone Interview:  Tablet Strength: 2mg  Patient Contact Info: [phone] (Mobile) *preferred*  [email or web address]  Verbal Release Authorization signed on 4/7/21 -- may speak with Tammy Bai (friend: [phone]), Ismael Michele (brother: [phone])  Lab Contact Info: [facility] ([place])  ** will call once monthly or if INR is out of range**   4/7/22 Scr: 0.8        Patient Findings  Positives:  Change in diet/appetite   Negatives:  Signs/symptoms of thrombosis, Signs/symptoms of bleeding, Laboratory test error suspected, Change in health, Change in alcohol use, Change in activity, Upcoming invasive procedure, Emergency department visit, Upcoming dental procedure, Missed doses, Extra doses, Change in medications, Hospital admission, Bruising, Other complaints   Comments:  Inc GLV-- 2x servings green beans, 2x protein drinks          Plan:  1. INR is therapeutic today at 2.9  (goal 2.5-3.5). Instructed Ms. Michele to continue regular regimen of warfarin 2 mg oral daily until recheck (14 mg weekly).  2. Repeat INR in 1 week, 12/26/22; She prefers testing on Mondays -- aware clinic is closed  3. Verbal information provided over the phone. Patient RBV dosing instructions, expresses understanding by teach back, and has no further questions at this time.  4. Lucie Michele understands the importance of calling the [facility] if she notices any s/sx of bleeding, stroke, or abnormal bruising, if any changes are made to her medications or medication doses (Rx, OTC, herbal), or if any upcoming procedures are scheduled. Lucie Michele will likewise let us know if any other changes, questions, or concerns arise regarding anticoagulation therapy. she understands the importance of seeking medical attention immediately if she experiences any falls, vehicle accidents, or abnormal bleeding or bruising. Lucie Michele voiced understanding of this information and confirms that she has the [facility]'s contact information. Otherwise, we will plan to contact the patient once monthly or if her INR is out of range.    Lizet Marinelli, JenniD.  12/19/22   14:50 EST

## 2022-12-27 ENCOUNTER — ANTICOAGULATION VISIT (OUTPATIENT)
Dept: PHARMACY | Facility: HOSPITAL | Age: 77
End: 2022-12-27

## 2022-12-27 DIAGNOSIS — E55.9 VITAMIN D DEFICIENCY: ICD-10-CM

## 2022-12-27 DIAGNOSIS — I35.9 AORTIC VALVE DISEASE: Primary | ICD-10-CM

## 2022-12-27 DIAGNOSIS — M85.80 OSTEOPENIA, UNSPECIFIED LOCATION: ICD-10-CM

## 2022-12-27 DIAGNOSIS — R73.03 PREDIABETES: ICD-10-CM

## 2022-12-27 DIAGNOSIS — N18.31 STAGE 3A CHRONIC KIDNEY DISEASE: ICD-10-CM

## 2022-12-27 DIAGNOSIS — Z13.820 OSTEOPOROSIS SCREENING: ICD-10-CM

## 2022-12-27 DIAGNOSIS — M25.552 LEFT HIP PAIN: ICD-10-CM

## 2022-12-27 DIAGNOSIS — I10 PRIMARY HYPERTENSION: ICD-10-CM

## 2022-12-27 DIAGNOSIS — E07.9 DISORDER OF THYROID: ICD-10-CM

## 2022-12-27 DIAGNOSIS — I10 ESSENTIAL HYPERTENSION, BENIGN: ICD-10-CM

## 2022-12-27 DIAGNOSIS — E03.9 ACQUIRED HYPOTHYROIDISM: ICD-10-CM

## 2022-12-27 LAB — INR PPP: 4.1

## 2022-12-27 RX ORDER — METOPROLOL SUCCINATE 100 MG/1
100 TABLET, EXTENDED RELEASE ORAL DAILY
Qty: 90 TABLET | Refills: 1 | Status: SHIPPED | OUTPATIENT
Start: 2022-12-27 | End: 2023-04-03 | Stop reason: SDUPTHER

## 2022-12-27 NOTE — PROGRESS NOTES
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     Diet: 3x week: 1 cup of brussels sprouts or broccoli weekly (4/19/2022)  Premier Protein (or Equate brand) meal replacement ~2x/week 7/6/22  Alcohol: Seldom  Tobacco: None  OTC Pain Medication: APAP     INR History:  Date 4/5 4/19 4/26 5/5 5/11 6/2 6/15 6/18 6/25 7/2 7/9 7/19 7/23 7/30   Total Weekly Dose 15mg 15mg 14mg 14mg 14mg 14mg 14mg 14mg 14mg 14mg 14mg 14mg 14mg 14mg   INR 2.6 3.9 3.9 2.7 3.0 3.6 2.7 2.9 2.9 2.6 2.9 3.1 2.5 2.3   Notes           decr GLV   recv'd 6/21; [facility]       incr GLV decr GLV      Date 8/6 8/13 8/20 8/27 9/3 9/10 9/17 9/24 10/1 10/8 10/15 10/22 10/29 11/5   Total WeeklyDose 14mg 15mg 15mg 14mg 14mg 15mg 14mg 14mg 14mg 14mg 14mg 14mg 15mg 16mg   INR 2.4 3.4 3.8 2.9 2.2 3.2 2.9 3.3 3.2 3.3 3.0 2.4 2.4 2.4   Notes decr GLV decr GLV       1xboost         call call 1x boost   incr VitK call 2x boost; call      Date 11/10 11/17 11/24 12/1 12/8 12/15 12/23 12/30 1/3/22 1/7 1/11 1/18 1/25 2/1   Total WeeklyDose 17mg 16mg 16mg 16mg 15mg 15 mg Pt did not call back 15mg 12 mg 13mg 15mg 15 mg 15 mg 15 mg   INR 3.7 3.3 3.9 4.0 2.6 3.4 4.0 4.9 3.6 2.4 3.3 2.8 2.7 2.9   Notes Dec GLV   Zero GLV call Red x1 call Less GLV   call   Less  Protein drink redx1  self held x1 (misdose)   Dec vit K fall, apap  Call    call          Date 2/8 2/15 2/22 3/1 3/8 3/15 3/22 3/29 4/5 4/12 4/19 4/26 5/3 5/11   Total WeeklyDose 15mg 14mg 14 mg 15 mg 15 mg 15 mg 14mg 13 mg 14 mg 14 mg 14mg 13mg 15mg 14 mg   INR 4.0 4.0 2.8 2.9 2.9 4.2 3.9  3.3 2.9 3.0 3.8 2.4 3.8 2.5   Notes Call; Dec GLV call Call; Mis-dose call   Call; no GLV Inc GLV. Less protein, apap  redx1 call   Less GLV rec'd 4/27, call Dec GLV        Date 5/18 5/25 6/1 6/8 6/15 6/22 6/29 7/6 7/13 7/20 7/22 7/27  8/10  8/17   Total WeeklyDose 15 mg 15mg 16mg 15 mg 15 mg 15 mg 15 mg 15 mg 15mg 14 mg 15 mg 14 mg  14 mg  15 mg   INR 2.6 2.3 2.7 3.2 3.0 2.9 2.6 2.7 3.6 3.0 3.6 3.0  2.4  3.4   Notes   Inc GLV  call call call       call 7/14-- call call call  call  call  call      Date 8/25 8/31 9/7 9/12 9/19 9/26 10/3 10/10 10/17 10/24 10/31   Total WeeklyDose 14mg 13 mg 14 mg 17mg 15 mg 16mg 15mg 14 mg 13mg 17 mg 15mg   INR 4.3 2.8 1.7 2.9 2.1 3.4 3.8 2.4 1.8 3.7 4.5   Notes Dec GLV call Call refused enox; extra protein  Call; no protein drinks Call; less green tea, inc boostx1 Call; cranberries Redx1; call 1x miss Call  Less protein      Date 11/7 11/14 11/21 11/28 12/5 12/12 12/19 12/27       Total WeeklyDose 13 mg 14 mg 14 mg 14 mg 14 mg 14 mg 14mg 14 mg        INR 3.2 3.3 3.4 3.6 2.5 4.0 2.9 4.1       Notes  call redx1 One less protein shake   Inc GLV Decreased GLV         Phone Interview:  Tablet Strength: 2mg  Patient Contact Info: [phone] (Mobile) *preferred*  [email or web address]  Verbal Release Authorization signed on 4/7/21 -- may speak with Tammy Bai (friend: [phone]), Ismael Michele (brother: [phone])  Lab Contact Info: [facility] ([facility])  ** will call once monthly or if INR is out of range**   4/7/22 Scr: 0.8      Patient Findings  Positives:  Change in diet/appetite   Negatives:  Signs/symptoms of thrombosis, Signs/symptoms of bleeding, Laboratory test error suspected, Change in health, Change in alcohol use, Change in activity, Upcoming invasive procedure, Emergency department visit, Upcoming dental procedure, Missed doses, Extra doses, Change in medications, Hospital admission, Bruising, Other complaints   Comments:  Patient reports not having access  to usual GLV, and Ms. Michele ate some cranberry sauce with her dressing over the holiday.   All other findings negative per patient.  Patient did have drink her normal amount of premier protein       Plan:  1. INR is SUPRAtherapeutic today at 4.1  (goal 2.5-3.5). Per Shekhar Hawkins Instructed Ms. Michele toREDUCE tonight's dose of warfarin to 1 mg oral then continue warfarin 2 mg oral daily until recheck   2. Repeat INR in 1 week 1/3/23  She prefers testing on Mondays -- aware clinic is closed  3. Verbal information provided over the phone. Patient RBV dosing instructions, expresses understanding by teach back, and has no further questions at this time.  4. Lucie Michele understands the importance of calling the [facility] if she notices any s/sx of bleeding, stroke, or abnormal bruising, if any changes are made to her medications or medication doses (Rx, OTC, herbal), or if any upcoming procedures are scheduled. Lucie Michele will likewise let us know if any other changes, questions, or concerns arise regarding anticoagulation therapy. she understands the importance of seeking medical attention immediately if she experiences any falls, vehicle accidents, or abnormal bleeding or bruising. Lucie Michele voiced understanding of this information and confirms that she has the [facility]'s contact information. Otherwise, we will plan to contact the patient once monthly or if her INR is out of range.    Harpal Branch, Pharmacy Technician  12/27/2022  10:46 EST    I, Tim Narvaez, PharmD, have reviewed the note in full and agree with the assessment and plan.  12/27/22  15:09 EST

## 2022-12-27 NOTE — TELEPHONE ENCOUNTER
Rx Refill Note    Requested Prescriptions     Pending Prescriptions Disp Refills   • metoprolol succinate XL (TOPROL-XL) 100 MG 24 hr tablet [Pharmacy Med Name: METOPROLOL ER SUCCINATE 100MG TABS] 30 tablet 0     Sig: TAKE 1 TABLET BY MOUTH DAILY        Last office visit with prescribing clinician: 11/2/2022      Next office visit with prescribing clinician: 1/4/2023   Last labs:   Last refill: 04/14/2022   Pharmacy (be sure to add in Epic). correct

## 2022-12-29 ENCOUNTER — HOSPITAL ENCOUNTER (OUTPATIENT)
Dept: NUCLEAR MEDICINE | Facility: HOSPITAL | Age: 77
Discharge: HOME | End: 2022-12-29

## 2022-12-29 ENCOUNTER — ANTICOAGULATION VISIT (OUTPATIENT)
Dept: PHARMACY | Facility: HOSPITAL | Age: 77
End: 2022-12-29
Payer: MEDICARE

## 2022-12-29 DIAGNOSIS — I35.9 AORTIC VALVE DISEASE: Primary | ICD-10-CM

## 2022-12-29 DIAGNOSIS — E85.9 AMYLOID DISEASE: ICD-10-CM

## 2022-12-29 PROCEDURE — A9538 TC99M PYROPHOSPHATE: HCPCS | Performed by: INTERNAL MEDICINE

## 2022-12-29 PROCEDURE — 0 TECHNETIUM TC99M PYROPHOSPHATE: Performed by: INTERNAL MEDICINE

## 2022-12-29 PROCEDURE — 78800 RP LOCLZJ TUM 1 AREA 1 D IMG: CPT

## 2022-12-29 RX ADMIN — TECHNETIUM TC99M PYROPHOSPHATE 1 DOSE: 12 INJECTION INTRAVENOUS at 12:08

## 2023-01-03 ENCOUNTER — ANTICOAGULATION VISIT (OUTPATIENT)
Dept: PHARMACY | Facility: HOSPITAL | Age: 78
End: 2023-01-03
Payer: MEDICARE

## 2023-01-03 DIAGNOSIS — I35.9 AORTIC VALVE DISEASE: Primary | ICD-10-CM

## 2023-01-03 LAB — INR PPP: 3.6

## 2023-01-03 NOTE — PROGRESS NOTES
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     Diet: 3x week: 1 cup of brussels sprouts or broccoli weekly, green beans (1/3/23)  Premier Protein (or Equate brand) meal replacement ~2x/week 1/3/23  Alcohol: Seldom  Tobacco: None  OTC Pain Medication: APAP     INR History:  Date 4/5 4/19 4/26 5/5 5/11 6/2 6/15 6/18 6/25 7/2 7/9 7/19 7/23 7/30   Total Weekly Dose 15mg 15mg 14mg 14mg 14mg 14mg 14mg 14mg 14mg 14mg 14mg 14mg 14mg 14mg   INR 2.6 3.9 3.9 2.7 3.0 3.6 2.7 2.9 2.9 2.6 2.9 3.1 2.5 2.3   Notes           decr GLV   recv'd 6/21; [facility]       incr GLV decr GLV      Date 8/6 8/13 8/20 8/27 9/3 9/10 9/17 9/24 10/1 10/8 10/15 10/22 10/29 11/5   Total WeeklyDose 14mg 15mg 15mg 14mg 14mg 15mg 14mg 14mg 14mg 14mg 14mg 14mg 15mg 16mg   INR 2.4 3.4 3.8 2.9 2.2 3.2 2.9 3.3 3.2 3.3 3.0 2.4 2.4 2.4   Notes decr GLV decr GLV       1xboost         call call 1x boost   incr VitK call 2x boost; call      Date 11/10 11/17 11/24 12/1 12/8 12/15 12/23 12/30 1/3/22 1/7 1/11 1/18 1/25 2/1   Total WeeklyDose 17mg 16mg 16mg 16mg 15mg 15 mg Pt did not call back 15mg 12 mg 13mg 15mg 15 mg 15 mg 15 mg   INR 3.7 3.3 3.9 4.0 2.6 3.4 4.0 4.9 3.6 2.4 3.3 2.8 2.7 2.9   Notes Dec GLV   Zero GLV call Red x1 call Less GLV   call   Less  Protein drink redx1  self held x1 (misdose)   Dec vit K fall, apap  Call    call          Date 2/8 2/15 2/22 3/1 3/8 3/15 3/22 3/29 4/5 4/12 4/19 4/26 5/3 5/11   Total WeeklyDose 15mg 14mg 14 mg 15 mg 15 mg 15 mg 14mg 13 mg 14 mg 14 mg 14mg 13mg 15mg 14 mg   INR 4.0 4.0 2.8 2.9 2.9  4.2 3.9 3.3 2.9 3.0 3.8 2.4 3.8 2.5   Notes Call; Dec GLV call Call; Mis-dose call   Call; no GLV Inc GLV. Less protein, apap  redx1 call   Less GLV rec'd 4/27, call Dec GLV        Date 5/18 5/25 6/1 6/8 6/15 6/22 6/29 7/6 7/13 7/20 7/22 7/27  8/10  8/17   Total WeeklyDose 15 mg 15mg 16mg 15 mg 15 mg 15 mg 15 mg 15 mg 15mg 14 mg 15 mg 14 mg  14 mg  15 mg   INR 2.6 2.3 2.7 3.2 3.0 2.9 2.6 2.7 3.6 3.0 3.6 3.0  2.4  3.4   Notes   Inc GLV  call call call       call 7/14-- call call call  call  call  call      Date 8/25 8/31 9/7 9/12 9/19 9/26 10/3 10/10 10/17 10/24 10/31   Total WeeklyDose 14mg 13 mg 14 mg 17mg 15 mg 16mg 15mg 14 mg 13mg 17 mg 15mg   INR 4.3 2.8 1.7 2.9 2.1 3.4 3.8 2.4 1.8 3.7 4.5   Notes Dec GLV call Call refused enox; extra protein  Call; no protein drinks Call; less green tea, inc boostx1 Call; cranberries Redx1; call 1x miss Call  Less protein      Date 11/7 11/14 11/21 11/28 12/5 12/12 12/19 12/27 1/3      Total WeeklyDose 13 mg 14 mg 14 mg 14 mg 14 mg 14 mg 14mg 14 mg  13mg      INR 3.2 3.3 3.4 3.6 2.5 4.0 2.9 4.1 3.6      Notes  call redx1 One less protein shake   Inc GLV Decreased GLV W/o meloxicam  tramadol       Phone Interview:  Tablet Strength: 2mg  Patient Contact Info: [phone] (Mobile) *preferred*  [email or web address]  Verbal Release Authorization signed on 4/7/21 -- may speak with Tammy Bai (friend: [phone]), Ismael Michele (brother: [phone])  Lab Contact Info: [facility] ([facility])  ** will call once monthly or if INR is out of range**   4/7/22 Scr: 0.8      Patient Findings    Positives:  Change in medications   Negatives:  Signs/symptoms of thrombosis, Signs/symptoms of bleeding, Laboratory test error suspected, Change in health, Change in alcohol use, Change in activity, Upcoming invasive procedure, Emergency department visit, Upcoming dental procedure, Missed doses, Extra doses, Change in diet/appetite, Hospital admission, Bruising, Other complaints    Comments:  Patient reports no changes in diet. Confirmed home dose of warfarin.     Patient did state that she forgot to report during her phone call with the  clinic last week that she has been without meloxicam. She states that she has not been taking it for about 3 weeks now at the time of our phone call today. Ms. Michele does report she has an upcoming doctors appointment where she may receive new refill authorization. I instructed Ms. Michele to update us after her doctors vist and let us know if/when she starts taking meloxicam again.     I did review her current diet with her today. Patient reports that she still eats GLV x3 a week, and still is drinking the protein shakes. States she ate GLV 3 times since last INR check.           Plan:  1. INR is SUPRAtherapeutic today at 3.6  (goal 2.5-3.5).  Instructed Ms. Michele tocontinue half serving of GLV this week   2. Repeat INR in 1 week 1/9/23  She prefers testing on Mondays.  3. Verbal information provided over the phone. Patient RBV dosing instructions, expresses understanding by teach back, and has no further questions at this time.  4. Lucie Michele understands the importance of calling the [facility] if she notices any s/sx of bleeding, stroke, or abnormal bruising, if any changes are made to her medications or medication doses (Rx, OTC, herbal), or if any upcoming procedures are scheduled. Lucie Michele will likewise let us know if any other changes, questions, or concerns arise regarding anticoagulation therapy. she understands the importance of seeking medical attention immediately if she experiences any falls, vehicle accidents, or abnormal bleeding or bruising. Lucie Michele voiced understanding of this information and confirms that she has the [facility]'s contact information. Otherwise, we will plan to contact the patient once monthly or if her INR is out of range.    Kevin Campbell RPH  1/3/2023  10:51 EST          Spoke with patient she will be started tramadol BID x2 days then on the third day will take a dose of meloxicam. Instructed her to reduce tonight;s dose to 1mg    ILizet, PharmD, have reviewed the note in full and agree with the assessment and plan.  01/05/23  10:05 EST

## 2023-01-04 ENCOUNTER — OFFICE VISIT (OUTPATIENT)
Dept: FAMILY MEDICINE CLINIC | Facility: CLINIC | Age: 78
End: 2023-01-04
Payer: MEDICARE

## 2023-01-04 ENCOUNTER — TELEPHONE (OUTPATIENT)
Dept: FAMILY MEDICINE CLINIC | Facility: CLINIC | Age: 78
End: 2023-01-04

## 2023-01-04 VITALS
WEIGHT: 193 LBS | BODY MASS INDEX: 32.15 KG/M2 | HEART RATE: 85 BPM | HEIGHT: 65 IN | TEMPERATURE: 97.1 F | DIASTOLIC BLOOD PRESSURE: 88 MMHG | SYSTOLIC BLOOD PRESSURE: 130 MMHG | OXYGEN SATURATION: 98 % | RESPIRATION RATE: 14 BRPM

## 2023-01-04 DIAGNOSIS — M15.9 PRIMARY OSTEOARTHRITIS INVOLVING MULTIPLE JOINTS: ICD-10-CM

## 2023-01-04 DIAGNOSIS — E03.9 HYPOTHYROIDISM, UNSPECIFIED TYPE: ICD-10-CM

## 2023-01-04 DIAGNOSIS — J44.9 COPD MIXED TYPE: ICD-10-CM

## 2023-01-04 DIAGNOSIS — I27.20 PULMONARY HYPERTENSION: Primary | ICD-10-CM

## 2023-01-04 DIAGNOSIS — S81.802D WOUND OF LEFT LOWER EXTREMITY, SUBSEQUENT ENCOUNTER: ICD-10-CM

## 2023-01-04 DIAGNOSIS — E78.5 HYPERLIPIDEMIA, UNSPECIFIED HYPERLIPIDEMIA TYPE: ICD-10-CM

## 2023-01-04 PROBLEM — S81.802A WOUND OF LEFT LEG: Status: ACTIVE | Noted: 2023-01-04

## 2023-01-04 PROCEDURE — 3079F DIAST BP 80-89 MM HG: CPT | Performed by: FAMILY MEDICINE

## 2023-01-04 PROCEDURE — 3075F SYST BP GE 130 - 139MM HG: CPT | Performed by: FAMILY MEDICINE

## 2023-01-04 PROCEDURE — 1160F RVW MEDS BY RX/DR IN RCRD: CPT | Performed by: FAMILY MEDICINE

## 2023-01-04 PROCEDURE — 1159F MED LIST DOCD IN RCRD: CPT | Performed by: FAMILY MEDICINE

## 2023-01-04 PROCEDURE — 99214 OFFICE O/P EST MOD 30 MIN: CPT | Performed by: FAMILY MEDICINE

## 2023-01-04 RX ORDER — LEVOTHYROXINE SODIUM 0.12 MG/1
125 TABLET ORAL DAILY
Qty: 90 TABLET | Refills: 1 | Status: SHIPPED | OUTPATIENT
Start: 2023-01-04

## 2023-01-04 RX ORDER — MELOXICAM 7.5 MG/1
7.5 TABLET ORAL DAILY
Qty: 90 TABLET | Refills: 1 | Status: SHIPPED | OUTPATIENT
Start: 2023-01-04

## 2023-01-04 RX ORDER — ATORVASTATIN CALCIUM 20 MG/1
20 TABLET, FILM COATED ORAL DAILY
Qty: 90 TABLET | Refills: 1 | Status: SHIPPED | OUTPATIENT
Start: 2023-01-04

## 2023-01-04 RX ORDER — BUDESONIDE, GLYCOPYRROLATE, AND FORMOTEROL FUMARATE 160; 9; 4.8 UG/1; UG/1; UG/1
2 AEROSOL, METERED RESPIRATORY (INHALATION) 2 TIMES DAILY
Qty: 3 EACH | Refills: 1 | Status: CANCELLED | OUTPATIENT
Start: 2023-01-04

## 2023-01-04 RX ORDER — TRAMADOL HYDROCHLORIDE 50 MG/1
50 TABLET ORAL 2 TIMES DAILY PRN
Qty: 60 TABLET | Refills: 2 | Status: SHIPPED | OUTPATIENT
Start: 2023-01-04 | End: 2023-01-06 | Stop reason: SDUPTHER

## 2023-01-04 RX ORDER — BUDESONIDE, GLYCOPYRROLATE, AND FORMOTEROL FUMARATE 160; 9; 4.8 UG/1; UG/1; UG/1
2 AEROSOL, METERED RESPIRATORY (INHALATION) 2 TIMES DAILY
Qty: 10.7 EACH | Refills: 3 | Status: SHIPPED | OUTPATIENT
Start: 2023-01-04
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
## 2023-01-04 NOTE — TELEPHONE ENCOUNTER
[facility] and talked with Alexa, pt can only have 7 days fill and then 60 one month, because its a new script

## 2023-01-04 NOTE — ASSESSMENT & PLAN NOTE
Patient has been taking meloxicam and tolerated it fairly well.  Patient is on Coumadin.  I discussed with her at length difficulty with this.  She is going to take meloxicam every third day.  I am going to give her some tramadol to take as needed.  She can take Tylenol at length

## 2023-01-04 NOTE — TELEPHONE ENCOUNTER
Pharmacy Name: [facility] - [place], [place] - [address] [phone] [facility] [phone]      Pharmacy representative name:FOSTER   Pharmacy representative phone number: [phone]    What medication are you calling in regards to: TRAMADOL 50 MG    What question does the pharmacy have: FOSTER WITH [facility]POR[facility] THAT PER THE PATIENT'S PLAN, [employer] CAN ONLY FILL A ONE WEEK SUPPLY FOR THE PATIENT'S INITIAL FILL (14 TABLETS), THEN THEY CAN REFILL AT THE QUANTIFY OF 60 FOR THE ONE MONTH SUPPLY     Who is the provider that prescribed the medication:DR SALAZAR   Additional notes: PLEASE CALL IF ANY QUESTIONS

## 2023-01-04 NOTE — ASSESSMENT & PLAN NOTE
Reviewed last echocardiogram.  Patient is following up with Dr. Her mcdonnell.  I will recheck in 1 month.  I talked her about assisted living.  She is not up for it at this time.  She is having some shortness of breath

## 2023-01-05 ENCOUNTER — ANTICOAGULATION VISIT (OUTPATIENT)
Dept: PHARMACY | Facility: HOSPITAL | Age: 78
End: 2023-01-05
Payer: MEDICARE

## 2023-01-05 ENCOUNTER — TELEPHONE (OUTPATIENT)
Dept: CARDIOLOGY | Facility: CLINIC | Age: 78
End: 2023-01-05
Payer: MEDICARE

## 2023-01-05 DIAGNOSIS — E85.9 AMYLOIDOSIS, UNSPECIFIED TYPE: Primary | ICD-10-CM

## 2023-01-05 DIAGNOSIS — I35.9 AORTIC VALVE DISEASE: Primary | ICD-10-CM

## 2023-01-05 NOTE — TELEPHONE ENCOUNTER
Spoke with Ms. Michele and went over NM PYP test.  Advised it did not give a definite answer on the amyloidosis and Dr. Blake would like to send her for a cardiac MRI.  It would be done at [facility] in [place] and I did confirm with the MRI team that it can be done with her St Hank mechanical valve.  She is agreeable and I advised I would place order and Caren will authorize through the insurance.

## 2023-01-06 RX ORDER — TRAMADOL HYDROCHLORIDE 50 MG/1
50 TABLET ORAL 2 TIMES DAILY PRN
Qty: 60 TABLET | Refills: 2 | Status: SHIPPED | OUTPATIENT
Start: 2023-01-06 | End: 2023-09-11

## 2023-01-06 NOTE — TELEPHONE ENCOUNTER
Tramadol needs to go to [facility] pt only wants her inhaler [facility] go [facility]    Rx Refill Note    Requested Prescriptions     Pending Prescriptions Disp Refills   • traMADol (ULTRAM) 50 MG tablet 60 tablet 2     Sig: Take 1 tablet by mouth 2 (Two) Times a Day As Needed for Moderate Pain.        Last office visit with prescribing clinician: 1/4/2023      Next office visit with prescribing clinician: 2/6/2023   Last labs:   Last refill:    Pharmacy [facility]

## 2023-01-09 ENCOUNTER — ANTICOAGULATION VISIT (OUTPATIENT)
Dept: PHARMACY | Facility: HOSPITAL | Age: 78
End: 2023-01-09
Payer: MEDICARE

## 2023-01-09 DIAGNOSIS — I35.9 AORTIC VALVE DISEASE: Primary | ICD-10-CM

## 2023-01-09 LAB — INR PPP: 2.6

## 2023-01-09 NOTE — PROGRESS NOTES
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     Diet: 3x week: 1 cup of brussels sprouts or broccoli weekly, green beans (1/3/23)  Premier Protein (or Equate brand) meal replacement ~2x/week 1/3/23  Alcohol: Seldom  Tobacco: None  OTC Pain Medication: APAP     INR History:  Date 4/5 4/19 4/26 5/5 5/11 6/2 6/15 6/18 6/25 7/2 7/9 7/19 7/23 7/30   Total Weekly Dose 15mg 15mg 14mg 14mg 14mg 14mg 14mg 14mg 14mg 14mg 14mg 14mg 14mg 14mg   INR 2.6 3.9 3.9 2.7 3.0 3.6 2.7 2.9 2.9 2.6 2.9 3.1 2.5 2.3   Notes           decr GLV   recv'd 6/21; [facility]       incr GLV decr GLV      Date 8/6 8/13 8/20 8/27 9/3 9/10 9/17 9/24 10/1 10/8 10/15 10/22 10/29 11/5   Total WeeklyDose 14mg 15mg 15mg 14mg 14mg 15mg 14mg 14mg 14mg 14mg 14mg 14mg 15mg 16mg   INR 2.4 3.4 3.8 2.9 2.2 3.2 2.9 3.3 3.2 3.3 3.0 2.4 2.4 2.4   Notes decr GLV decr GLV       1xboost         call call 1x boost   incr VitK call 2x boost; call      Date 11/10 11/17 11/24 12/1 12/8 12/15 12/23 12/30 1/3/22 1/7 1/11 1/18 1/25 2/1   Total WeeklyDose 17mg 16mg 16mg 16mg 15mg 15 mg Pt did not call back 15mg 12 mg 13mg 15mg 15 mg 15 mg 15 mg   INR 3.7 3.3 3.9 4.0 2.6 3.4 4.0 4.9 3.6 2.4 3.3 2.8 2.7 2.9   Notes Dec GLV   Zero GLV call Red x1 call Less GLV   call   Less  Protein drink redx1  self held x1 (misdose)   Dec vit K fall, apap  Call    call          Date 2/8 2/15 2/22 3/1 3/8 3/15 3/22 3/29 4/5 4/12 4/19 4/26 5/3 5/11   Total WeeklyDose 15mg 14mg 14 mg 15 mg 15 mg 15 mg 14mg 13 mg 14 mg 14 mg 14mg 13mg 15mg 14 mg   INR 4.0 4.0 2.8 2.9 2.9  4.2 3.9 3.3 2.9 3.0 3.8 2.4 3.8 2.5   Notes Call; Dec GLV call Call; Mis-dose call   Call; no GLV Inc GLV. Less protein, apap  redx1 call   Less GLV rec'd 4/27, call Dec GLV        Date 5/18 5/25 6/1 6/8 6/15 6/22 6/29 7/6 7/13 7/20 7/22 7/27  8/10  8/17   Total WeeklyDose 15 mg 15mg 16mg 15 mg 15 mg 15 mg 15 mg 15 mg 15mg 14 mg 15 mg 14 mg  14 mg  15 mg   INR 2.6 2.3 2.7 3.2 3.0 2.9 2.6 2.7 3.6 3.0 3.6 3.0  2.4  3.4   Notes   Inc GLV  call call call       call 7/14-- call call call  call  call  call      Date 8/25 8/31 9/7 9/12 9/19 9/26 10/3 10/10 10/17 10/24 10/31   Total WeeklyDose 14mg 13 mg 14 mg 17mg 15 mg 16mg 15mg 14 mg 13mg 17 mg 15mg   INR 4.3 2.8 1.7 2.9 2.1 3.4 3.8 2.4 1.8 3.7 4.5   Notes Dec GLV call Call refused enox; extra protein  Call; no protein drinks Call; less green tea, inc boostx1 Call; cranberries Redx1; call 1x miss Call  Less protein      Date 11/7 11/14 11/21 11/28 12/5 12/12 12/19 12/27 1/3 1/9     Total WeeklyDose 13 mg 14 mg 14 mg 14 mg 14 mg 14 mg 14mg 14 mg  13mg 13 mg     INR 3.2 3.3 3.4 3.6 2.5 4.0 2.9 4.1 3.6 2.6     Notes  call redx1 One less protein shake   Inc GLV Decreased GLV W/o meloxicam  Tramadol,  meloxicam       Phone Interview:  Tablet Strength: 2mg  Patient Contact Info: [phone] (Mobile) *preferred*  [email or web address]  Verbal Release Authorization signed on 4/7/21 -- may speak with Tammy Fontanafriend: [phone]), Ismael Michele (brother: [phone])  Lab Contact Info: [facility] ([facility])  ** will call once monthly or if INR is out of range**   4/7/22 Scr: 0.8    Patient Findings  Positives:  Change in medications   Negatives:  Signs/symptoms of thrombosis, Signs/symptoms of bleeding, Laboratory test error suspected, Change in health, Change in alcohol use, Change in activity, Upcoming invasive procedure, Emergency department visit, Upcoming dental procedure, Missed doses, Extra doses, Change in diet/appetite, Hospital admission, Bruising,  Other complaints   Comments:  Patient reports no changes in diet .   Patient reports starting Farxiga three weeks ago, Per last note Ms Michele has started tramadol and meloxicam.   All other findings negative per patient       Plan:  1. INR is therapeutic today at 2.6  (goal 2.5-3.5).  Instructed Ms. Michele to continue warfarin 2 mg oral daily until recheck.  2. Repeat INR in 1 week 1/16/23  She prefers testing on Mondays.  3. Verbal information provided over the phone. Patient RBV dosing instructions, expresses understanding by teach back, and has no further questions at this time.  4. Lucie Michele understands the importance of calling the [facility] if she notices any s/sx of bleeding, stroke, or abnormal bruising, if any changes are made to her medications or medication doses (Rx, OTC, herbal), or if any upcoming procedures are scheduled. Lucie Michele will likewise let us know if any other changes, questions, or concerns arise regarding anticoagulation therapy. she understands the importance of seeking medical attention immediately if she experiences any falls, vehicle accidents, or abnormal bleeding or bruising. Lucie Michele voiced understanding of this information and confirms that she has the [facility]'s contact information. Otherwise, we will plan to contact the patient once monthly or if her INR is out of range.    Harpal Branch, Pharmacy Technician  1/9/2023  11:11 Ngoc COLEY, PharmD, have reviewed the note in full and agree with the assessment and plan.  01/10/23  11:43 EST

## 2023-01-11 ENCOUNTER — ANTICOAGULATION VISIT (OUTPATIENT)
Dept: PHARMACY | Facility: HOSPITAL | Age: 78
End: 2023-01-11
Payer: MEDICARE

## 2023-01-11 DIAGNOSIS — I35.9 AORTIC VALVE DISEASE: Primary | ICD-10-CM

## 2023-01-16 ENCOUNTER — ANTICOAGULATION VISIT (OUTPATIENT)
Dept: PHARMACY | Facility: HOSPITAL | Age: 78
End: 2023-01-16
Payer: MEDICARE

## 2023-01-16 DIAGNOSIS — I35.9 AORTIC VALVE DISEASE: Primary | ICD-10-CM

## 2023-01-16 LAB — INR PPP: 3.3

## 2023-01-16 NOTE — PROGRESS NOTES
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     Diet: 3x week: 1 cup of brussels sprouts or broccoli weekly, green beans (1/3/23)  Premier Protein (or Equate brand) meal replacement ~2x/week 1/3/23  Alcohol: Seldom  Tobacco: None  OTC Pain Medication: APAP     INR History:  Date 4/5 4/19 4/26 5/5 5/11 6/2 6/15 6/18 6/25 7/2 7/9 7/19 7/23 7/30   Total Weekly Dose 15mg 15mg 14mg 14mg 14mg 14mg 14mg 14mg 14mg 14mg 14mg 14mg 14mg 14mg   INR 2.6 3.9 3.9 2.7 3.0 3.6 2.7 2.9 2.9 2.6 2.9 3.1 2.5 2.3   Notes           decr GLV   recv'd 6/21; [facility]       incr GLV decr GLV      Date 8/6 8/13 8/20 8/27 9/3 9/10 9/17 9/24 10/1 10/8 10/15 10/22 10/29 11/5   Total WeeklyDose 14mg 15mg 15mg 14mg 14mg 15mg 14mg 14mg 14mg 14mg 14mg 14mg 15mg 16mg   INR 2.4 3.4 3.8 2.9 2.2 3.2 2.9 3.3 3.2 3.3 3.0 2.4 2.4 2.4   Notes decr GLV decr GLV       1xboost         call call 1x boost   incr VitK call 2x boost; call      Date 11/10 11/17 11/24 12/1 12/8 12/15 12/23 12/30 1/3/22 1/7 1/11 1/18 1/25 2/1   Total WeeklyDose 17mg 16mg 16mg 16mg 15mg 15 mg Pt did not call back 15mg 12 mg 13mg 15mg 15 mg 15 mg 15 mg   INR 3.7 3.3 3.9 4.0 2.6 3.4 4.0 4.9 3.6 2.4 3.3 2.8 2.7 2.9   Notes Dec GLV   Zero GLV call Red x1 call Less GLV   call   Less  Protein drink redx1  self held x1 (misdose)   Dec vit K fall, apap  Call    call          Date 2/8 2/15 2/22 3/1 3/8 3/15 3/22 3/29 4/5 4/12 4/19 4/26 5/3 5/11   Total WeeklyDose 15mg 14mg 14 mg 15 mg 15 mg 15 mg 14mg 13 mg 14 mg 14 mg 14mg 13mg 15mg 14 mg   INR 4.0 4.0 2.8 2.9 2.9  4.2 3.9 3.3 2.9 3.0 3.8 2.4 3.8 2.5   Notes Call; Dec GLV call Call; Mis-dose call   Call; no GLV Inc GLV. Less protein, apap  redx1 call   Less GLV rec'd 4/27, call Dec GLV        Date 5/18 5/25 6/1 6/8 6/15 6/22 6/29 7/6 7/13 7/20 7/22 7/27  8/10  8/17   Total WeeklyDose 15 mg 15mg 16mg 15 mg 15 mg 15 mg 15 mg 15 mg 15mg 14 mg 15 mg 14 mg  14 mg  15 mg   INR 2.6 2.3 2.7 3.2 3.0 2.9 2.6 2.7 3.6 3.0 3.6 3.0  2.4  3.4   Notes   Inc GLV  call call call       call 7/14-- call call call  call  call  call      Date 8/25 8/31 9/7 9/12 9/19 9/26 10/3 10/10 10/17 10/24 10/31   Total WeeklyDose 14mg 13 mg 14 mg 17mg 15 mg 16mg 15mg 14 mg 13mg 17 mg 15mg   INR 4.3 2.8 1.7 2.9 2.1 3.4 3.8 2.4 1.8 3.7 4.5   Notes Dec GLV call Call refused enox; extra protein  Call; no protein drinks Call; less green tea, inc boostx1 Call; cranberries Redx1; call 1x miss Call  Less protein    1/1  Date 11/7 11/14 11/21 11/28 12/5 12/12 12/19 12/27 1/3 1/9 1/16/23    Total WeeklyDose 13 mg 14 mg 14 mg 14 mg 14 mg 14 mg 14mg 14 mg  13mg 13 mg 14 mg     INR 3.2 3.3 3.4 3.6 2.5 4.0 2.9 4.1 3.6 2.6 3.3    Notes  call redx1 One less protein shake   Inc GLV Decreased GLV W/o meloxicam  Tramadol,  meloxicam Tramadol,  meloxicam      Phone Interview:  Tablet Strength: 2mg  Patient Contact Info: [phone] (Mobile) *preferred*  [email or web address]  Verbal Release Authorization signed on 4/7/21 -- may speak with Tammy Bai (friend: [phone]), Ismael Michele (brother: [phone])  Lab Contact Info: [facility] ([facility])  ** will call once monthly or if INR is out of range**   4/7/22 Scr: 0.8      Patient Findings  Positives:  Change in medications   Negatives:  Signs/symptoms of thrombosis, Signs/symptoms of bleeding, Laboratory test error suspected, Change in health, Change in alcohol use, Change in activity, Upcoming invasive procedure, Emergency department visit, Upcoming dental procedure, Missed doses, Extra doses, Change in  diet/appetite, Hospital admission, Bruising, Other complaints   Comments:  Patient on Meloxicam and Tramadol   Took an antibiotic before dental cleaning last week.   All other findings negative per patient          Plan:  1. INR is therapeutic today at 3.3  (goal 2.5-3.5). Per Linda Brooks PharmD Instructed Ms. Michele to continue warfarin 2 mg oral daily until recheck.  2. Repeat INR in 1 week 1/23/23  She prefers testing on Mondays.  3. Verbal information provided over the phone. Patient RBV dosing instructions, expresses understanding by teach back, and has no further questions at this time.  4. Lucie Michele understands the importance of calling the [facility] if she notices any s/sx of bleeding, stroke, or abnormal bruising, if any changes are made to her medications or medication doses (Rx, OTC, herbal), or if any upcoming procedures are scheduled. Lucie Michele will likewise let us know if any other changes, questions, or concerns arise regarding anticoagulation therapy. she understands the importance of seeking medical attention immediately if she experiences any falls, vehicle accidents, or abnormal bleeding or bruising. Lucie Michele voiced understanding of this information and confirms that she has the [facility]'s contact information. Otherwise, we will plan to contact the patient once monthly or if her INR is out of range.    Harpal Branch, Pharmacy Technician  1/16/2023  11:58 Tyrone COLEY, PharmD, have reviewed the note in full and agree with the assessment and plan. Patient called in result. Harpal to call patient to see if she can call result in to [facility] so we can get fax copy.  01/16/23  15:17 EST

## 2023-01-17 ENCOUNTER — TELEPHONE (OUTPATIENT)
Dept: FAMILY MEDICINE CLINIC | Facility: CLINIC | Age: 78
End: 2023-01-17

## 2023-01-17 NOTE — TELEPHONE ENCOUNTER
PATIENT SAID SHE DROPPED OFF A LETTER AT THE  TODAY AND WANTED TO TELL DR VIRK  THAT SHE CALLED [facility] AND TOOK CARE OF EVERYTHING SO HE DOES NOT HAVE TO TAKE ANY ACTION ON THE LETTER

## 2023-01-20 DIAGNOSIS — I71.20 THORACIC AORTIC ANEURYSM WITHOUT RUPTURE, UNSPECIFIED PART: Primary | ICD-10-CM

## 2023-01-23 ENCOUNTER — ANTICOAGULATION VISIT (OUTPATIENT)
Dept: PHARMACY | Facility: HOSPITAL | Age: 78
End: 2023-01-23
Payer: MEDICARE

## 2023-01-23 DIAGNOSIS — I35.9 AORTIC VALVE DISEASE: Primary | ICD-10-CM

## 2023-01-23 LAB — INR PPP: 2.7

## 2023-01-23 NOTE — PROGRESS NOTES
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     Diet: 3x week: 1 cup of brussels sprouts or broccoli weekly, green beans (1/3/23)  Premier Protein (or Equate brand) meal replacement ~2x/week 1/3/23  Alcohol: Seldom  Tobacco: None  OTC Pain Medication: APAP     INR History:  Date 4/5 4/19 4/26 5/5 5/11 6/2 6/15 6/18 6/25 7/2 7/9 7/19 7/23 7/30   Total Weekly Dose 15mg 15mg 14mg 14mg 14mg 14mg 14mg 14mg 14mg 14mg 14mg 14mg 14mg 14mg   INR 2.6 3.9 3.9 2.7 3.0 3.6 2.7 2.9 2.9 2.6 2.9 3.1 2.5 2.3   Notes           decr GLV   recv'd 6/21; [facility]       incr GLV decr GLV      Date 8/6 8/13 8/20 8/27 9/3 9/10 9/17 9/24 10/1 10/8 10/15 10/22 10/29 11/5   Total WeeklyDose 14mg 15mg 15mg 14mg 14mg 15mg 14mg 14mg 14mg 14mg 14mg 14mg 15mg 16mg   INR 2.4 3.4 3.8 2.9 2.2 3.2 2.9 3.3 3.2 3.3 3.0 2.4 2.4 2.4   Notes decr GLV decr GLV       1xboost         call call 1x boost   incr VitK call 2x boost; call      Date 11/10 11/17 11/24 12/1 12/8 12/15 12/23 12/30 1/3/22 1/7 1/11 1/18 1/25 2/1   Total WeeklyDose 17mg 16mg 16mg 16mg 15mg 15 mg Pt did not call back 15mg 12 mg 13mg 15mg 15 mg 15 mg 15 mg   INR 3.7 3.3 3.9 4.0 2.6 3.4 4.0 4.9 3.6 2.4 3.3 2.8 2.7 2.9   Notes Dec GLV   Zero GLV call Red x1 call Less GLV   call   Less  Protein drink redx1  self held x1 (misdose)   Dec vit K fall, apap  Call    call          Date 2/8 2/15 2/22 3/1 3/8 3/15 3/22 3/29 4/5 4/12 4/19 4/26 5/3 5/11   Total WeeklyDose 15mg 14mg 14 mg 15 mg 15 mg 15 mg 14mg 13 mg 14 mg 14 mg 14mg 13mg 15mg 14 mg   INR 4.0 4.0 2.8 2.9 2.9  4.2 3.9 3.3 2.9 3.0 3.8 2.4 3.8 2.5   Notes Call; Dec GLV call Call; Mis-dose call   Call; no GLV Inc GLV. Less protein, apap  redx1 call   Less GLV rec'd 4/27, call Dec GLV        Date 5/18 5/25 6/1 6/8 6/15 6/22 6/29 7/6 7/13 7/20 7/22 7/27  8/10  8/17   Total WeeklyDose 15 mg 15mg 16mg 15 mg 15 mg 15 mg 15 mg 15 mg 15mg 14 mg 15 mg 14 mg  14 mg  15 mg   INR 2.6 2.3 2.7 3.2 3.0 2.9 2.6 2.7 3.6 3.0 3.6 3.0  2.4  3.4   Notes   Inc GLV  call call call       call 7/14-- call call call  call  call  call      Date 8/25 8/31 9/7 9/12 9/19 9/26 10/3 10/10 10/17 10/24 10/31   Total WeeklyDose 14mg 13 mg 14 mg 17mg 15 mg 16mg 15mg 14 mg 13mg 17 mg 15mg   INR 4.3 2.8 1.7 2.9 2.1 3.4 3.8 2.4 1.8 3.7 4.5   Notes Dec GLV call Call refused enox; extra protein  Call; no protein drinks Call; less green tea, inc boostx1 Call; cranberries Redx1; call 1x miss Call  Less protein    1/1  Date 11/7 11/14 11/21 11/28 12/5 12/12 12/19 12/27 1/3 1/9 1/16/23 1/23    Total WeeklyDose 13 mg 14 mg 14 mg 14 mg 14 mg 14 mg 14mg 14 mg  13mg 13 mg 14 mg  14 mg    INR 3.2 3.3 3.4 3.6 2.5 4.0 2.9 4.1 3.6 2.6 3.3 2.7    Notes  call redx1 One less protein shake   Inc GLV Decreased GLV W/o meloxicam  Tramadol,  meloxicam Tramadol,  meloxicam Tramadol,  meloxicam      Phone Interview:  Tablet Strength: 2mg  Patient Contact Info: [phone] (Mobile) *preferred*  [email or web address]  Verbal Release Authorization signed on 4/7/21 -- may speak with Tammy Bhumika (friend: [phone]), Ismael Percyligia (brother: [phone])  Lab Contact Info: [facility] ([facility])  ** will call once monthly or if INR is out of range**   4/7/22 Scr: 0.8    Patient Findings  Positives:  Change in medications   Negatives:  Signs/symptoms of thrombosis, Signs/symptoms of bleeding, Laboratory test error suspected, Change in health, Change in alcohol use, Change in activity, Upcoming invasive procedure, Emergency department visit, Upcoming dental procedure, Missed  doses, Extra doses, Change in diet/appetite, Hospital admission, Bruising, Other complaints   Comments:  Pt on last week of rx tramadol, still taking meloxicam every 3rd day per pt.   All other findings negative per pt.        Plan:  1. INR is therapeutic today at 2.7  (goal 2.5-3.5).  Ms Michele called results in verbally.  Ms. Michele will continue warfarin 2 mg oral daily until recheck.  2. Repeat INR in 1 week 1/30/23  She prefers testing on Mondays.  3. Verbal information provided over the phone. Patient RBV dosing instructions, expresses understanding by teach back, and has no further questions at this time.  4. Lucie Michele understands the importance of calling the [facility] if she notices any s/sx of bleeding, stroke, or abnormal bruising, if any changes are made to her medications or medication doses (Rx, OTC, herbal), or if any upcoming procedures are scheduled. Lucie Michele will likewise let us know if any other changes, questions, or concerns arise regarding anticoagulation therapy. she understands the importance of seeking medical attention immediately if she experiences any falls, vehicle accidents, or abnormal bleeding or bruising. Lucie Michele voiced understanding of this information and confirms that she has the [facility]'s contact information. Otherwise, we will plan to contact the patient once monthly or if her INR is out of range.    Sasha Marinelli CPHT  1/23/2023  11:52 Jesenia COLEY, PharmD, have reviewed the note in full and agree with the assessment and plan.  01/23/23  14:17 EST

## 2023-01-30 ENCOUNTER — ANTICOAGULATION VISIT (OUTPATIENT)
Dept: PHARMACY | Facility: HOSPITAL | Age: 78
End: 2023-01-30
Payer: MEDICARE

## 2023-01-30 DIAGNOSIS — I35.9 AORTIC VALVE DISEASE: Primary | ICD-10-CM

## 2023-01-30 LAB — INR PPP: 3

## 2023-01-30 NOTE — PROGRESS NOTES
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     Diet: 3x week: 1 cup of brussels sprouts or broccoli weekly, green beans (1/3/23)  Premier Protein (or Equate brand) meal replacement ~2x/week 1/3/23  Alcohol: Seldom  Tobacco: None  OTC Pain Medication: APAP     INR History:  Date 4/5 4/19 4/26 5/5 5/11 6/2 6/15 6/18 6/25 7/2 7/9 7/19 7/23 7/30   Total Weekly Dose 15mg 15mg 14mg 14mg 14mg 14mg 14mg 14mg 14mg 14mg 14mg 14mg 14mg 14mg   INR 2.6 3.9 3.9 2.7 3.0 3.6 2.7 2.9 2.9 2.6 2.9 3.1 2.5 2.3   Notes           decr GLV   recv'd 6/21; [facility]       incr GLV decr GLV      Date 8/6 8/13 8/20 8/27 9/3 9/10 9/17 9/24 10/1 10/8 10/15 10/22 10/29 11/5   Total WeeklyDose 14mg 15mg 15mg 14mg 14mg 15mg 14mg 14mg 14mg 14mg 14mg 14mg 15mg 16mg   INR 2.4 3.4 3.8 2.9 2.2 3.2 2.9 3.3 3.2 3.3 3.0 2.4 2.4 2.4   Notes decr GLV decr GLV       1xboost         call call 1x boost   incr VitK call 2x boost; call      Date 11/10 11/17 11/24 12/1 12/8 12/15 12/23 12/30 1/3/22 1/7 1/11 1/18 1/25 2/1   Total WeeklyDose 17mg 16mg 16mg 16mg 15mg 15 mg Pt did not call back 15mg 12 mg 13mg 15mg 15 mg 15 mg 15 mg   INR 3.7 3.3 3.9 4.0 2.6 3.4 4.0 4.9 3.6 2.4 3.3 2.8 2.7 2.9   Notes Dec GLV   Zero GLV call Red x1 call Less GLV   call   Less  Protein drink redx1  self held x1 (misdose)   Dec vit K fall, apap  Call    call          Date 2/8 2/15 2/22 3/1 3/8 3/15 3/22 3/29 4/5 4/12 4/19 4/26 5/3 5/11   Total WeeklyDose 15mg 14mg 14 mg 15 mg 15 mg 15 mg 14mg 13 mg 14 mg 14 mg 14mg 13mg 15mg 14 mg   INR 4.0 4.0 2.8 2.9 2.9  4.2 3.9 3.3 2.9 3.0 3.8 2.4 3.8 2.5   Notes Call; Dec GLV call Call; Mis-dose call   Call; no GLV Inc GLV. Less protein, apap  redx1 call   Less GLV rec'd 4/27, call Dec GLV        Date 5/18 5/25 6/1 6/8 6/15 6/22 6/29 7/6 7/13 7/20 7/22 7/27  8/10  8/17   Total WeeklyDose 15 mg 15mg 16mg 15 mg 15 mg 15 mg 15 mg 15 mg 15mg 14 mg 15 mg 14 mg  14 mg  15 mg   INR 2.6 2.3 2.7 3.2 3.0 2.9 2.6 2.7 3.6 3.0 3.6 3.0  2.4  3.4   Notes   Inc GLV  call call call       call 7/14-- call call call  call  call  call      Date 8/25 8/31 9/7 9/12 9/19 9/26 10/3 10/10 10/17 10/24 10/31   Total WeeklyDose 14mg 13 mg 14 mg 17mg 15 mg 16mg 15mg 14 mg 13mg 17 mg 15mg   INR 4.3 2.8 1.7 2.9 2.1 3.4 3.8 2.4 1.8 3.7 4.5   Notes Dec GLV call Call refused enox; extra protein  Call; no protein drinks Call; less green tea, inc boostx1 Call; cranberries Redx1; call 1x miss Call  Less protein    1/1  Date 11/7 11/14 11/21 11/28 12/5 12/12 12/19 12/27 1/3 1/9 1/16/23 1/23 1/30/23   Total WeeklyDose 13 mg 14 mg 14 mg 14 mg 14 mg 14 mg 14mg 14 mg  13mg 13 mg 14 mg  14 mg 14 mg   INR 3.2 3.3 3.4 3.6 2.5 4.0 2.9 4.1 3.6 2.6 3.3 2.7 3.0   Notes  call redx1 One less protein shake   Inc GLV Decreased GLV W/o meloxicam  Tramadol,  meloxicam Tramadol,  meloxicam Tramadol,  meloxicam meloxicam     Phone Interview:  Tablet Strength: 2mg  Patient Contact Info: [phone] (Mobile) *preferred*  [email or web address]  Verbal Release Authorization signed on 4/7/21 -- may speak with Tammy Bai (friend: [phone]), Ismael Michele (brother: [phone])  Lab Contact Info: [facility] ([facility])  ** will call once monthly or if INR is out of range**   4/7/22 Scr: 0.8    Patient Findings  Positives:  Change in medications   Negatives:  Signs/symptoms of thrombosis, Signs/symptoms of bleeding, Laboratory test error suspected, Change in health, Change in alcohol use, Change in activity, Upcoming invasive procedure, Emergency department visit, Upcoming  dental procedure, Missed doses, Extra doses, Change in diet/appetite, Hospital admission, Bruising, Other complaints   Comments:  Patient reports she has no tramadol refills left, and is still taking meloxicam every 3 days.   All other findings negative per patient      Plan:  1. INR is therapeutic today at 3.0  (goal 2.5-3.5).  Instructed Ms. Michele to continue warfarin 2 mg oral daily until recheck.  2. Repeat INR in 1 week 2/6/23  She prefers testing on Mondays.  3. Verbal information provided over the phone. Patient RBV dosing instructions, expresses understanding by teach back, and has no further questions at this time.  4. Lucie Michele understands the importance of calling the [facility] if she notices any s/sx of bleeding, stroke, or abnormal bruising, if any changes are made to her medications or medication doses (Rx, OTC, herbal), or if any upcoming procedures are scheduled. Lucie Michele will likewise let us know if any other changes, questions, or concerns arise regarding anticoagulation therapy. she understands the importance of seeking medical attention immediately if she experiences any falls, vehicle accidents, or abnormal bleeding or bruising. Lucie Michele voiced understanding of this information and confirms that she has the [facility]'s contact information. Otherwise, we will plan to contact the patient once monthly or if her INR is out of range.    Harpal Branch, Pharmacy Technician  1/30/2023  11:23 Ngoc COLEY, PharmD, have reviewed the note in full and agree with the assessment and plan.  01/30/23  13:26 EST

## 2023-02-06 ENCOUNTER — OFFICE VISIT (OUTPATIENT)
Dept: FAMILY MEDICINE CLINIC | Facility: CLINIC | Age: 78
End: 2023-02-06
Payer: MEDICARE

## 2023-02-06 ENCOUNTER — HOSPITAL ENCOUNTER (OUTPATIENT)
Dept: CT IMAGING | Facility: HOSPITAL | Age: 78
Discharge: HOME | End: 2023-02-06
Admitting: NURSE PRACTITIONER
Payer: MEDICARE

## 2023-02-06 VITALS
TEMPERATURE: 97.4 F | SYSTOLIC BLOOD PRESSURE: 130 MMHG | OXYGEN SATURATION: 98 % | BODY MASS INDEX: 31.49 KG/M2 | HEIGHT: 65 IN | DIASTOLIC BLOOD PRESSURE: 80 MMHG | RESPIRATION RATE: 12 BRPM | WEIGHT: 189 LBS | HEART RATE: 78 BPM

## 2023-02-06 DIAGNOSIS — Z79.899 HIGH RISK MEDICATION USE: ICD-10-CM

## 2023-02-06 DIAGNOSIS — I27.20 PULMONARY HYPERTENSION: ICD-10-CM

## 2023-02-06 DIAGNOSIS — I10 ESSENTIAL HYPERTENSION, BENIGN: ICD-10-CM

## 2023-02-06 DIAGNOSIS — E03.9 ACQUIRED HYPOTHYROIDISM: ICD-10-CM

## 2023-02-06 DIAGNOSIS — R73.9 HYPERGLYCEMIA: ICD-10-CM

## 2023-02-06 DIAGNOSIS — E78.2 MIXED HYPERLIPIDEMIA: ICD-10-CM

## 2023-02-06 DIAGNOSIS — I71.20 THORACIC AORTIC ANEURYSM WITHOUT RUPTURE, UNSPECIFIED PART: ICD-10-CM

## 2023-02-06 DIAGNOSIS — M15.9 PRIMARY OSTEOARTHRITIS INVOLVING MULTIPLE JOINTS: Primary | ICD-10-CM

## 2023-02-06 DIAGNOSIS — E89.0 H/O PARTIAL THYROIDECTOMY: ICD-10-CM

## 2023-02-06 DIAGNOSIS — E55.9 VITAMIN D DEFICIENCY: ICD-10-CM

## 2023-02-06 DIAGNOSIS — N18.31 STAGE 3A CHRONIC KIDNEY DISEASE: ICD-10-CM

## 2023-02-06 LAB — CREAT BLDA-MCNC: 0.8 MG/DL (ref 0.6–1.3)

## 2023-02-06 PROCEDURE — 71275 CT ANGIOGRAPHY CHEST: CPT

## 2023-02-06 PROCEDURE — 99214 OFFICE O/P EST MOD 30 MIN: CPT | Performed by: FAMILY MEDICINE

## 2023-02-06 PROCEDURE — 82565 ASSAY OF CREATININE: CPT

## 2023-02-06 PROCEDURE — 0 IOPAMIDOL PER 1 ML: Performed by: NURSE PRACTITIONER

## 2023-02-06 RX ORDER — ALBUTEROL SULFATE 90 UG/1
2 AEROSOL, METERED RESPIRATORY (INHALATION) EVERY 4 HOURS PRN
Qty: 3 G | Refills: 1 | Status: SHIPPED | OUTPATIENT
Start: 2023-02-06

## 2023-02-06 RX ADMIN — IOPAMIDOL 95 ML: 755 INJECTION, SOLUTION INTRAVENOUS at 13:30
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
## 2023-02-07 ENCOUNTER — ANTICOAGULATION VISIT (OUTPATIENT)
Dept: PHARMACY | Facility: HOSPITAL | Age: 78
End: 2023-02-07
Payer: MEDICARE

## 2023-02-07 DIAGNOSIS — I35.9 AORTIC VALVE DISEASE: Primary | ICD-10-CM

## 2023-02-07 LAB — INR PPP: 2.9

## 2023-02-07 NOTE — PROGRESS NOTES
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     Diet: 3x week: 1 cup of brussels sprouts or broccoli weekly, green beans (1/3/23)  Premier Protein (or Equate brand) meal replacement ~2x/week 1/3/23  Alcohol: Seldom  Tobacco: None  OTC Pain Medication: APAP     INR History:  Date 4/5 4/19 4/26 5/5 5/11 6/2 6/15 6/18 6/25 7/2 7/9 7/19 7/23 7/30   Total Weekly Dose 15mg 15mg 14mg 14mg 14mg 14mg 14mg 14mg 14mg 14mg 14mg 14mg 14mg 14mg   INR 2.6 3.9 3.9 2.7 3.0 3.6 2.7 2.9 2.9 2.6 2.9 3.1 2.5 2.3   Notes           decr GLV   recv'd 6/21; [facility]       incr GLV decr GLV      Date 8/6 8/13 8/20 8/27 9/3 9/10 9/17 9/24 10/1 10/8 10/15 10/22 10/29 11/5   Total WeeklyDose 14mg 15mg 15mg 14mg 14mg 15mg 14mg 14mg 14mg 14mg 14mg 14mg 15mg 16mg   INR 2.4 3.4 3.8 2.9 2.2 3.2 2.9 3.3 3.2 3.3 3.0 2.4 2.4 2.4   Notes decr GLV decr GLV       1xboost         call call 1x boost   incr VitK call 2x boost; call      Date 11/10 11/17 11/24 12/1 12/8 12/15 12/23 12/30 1/3/22 1/7 1/11 1/18 1/25 2/1   Total WeeklyDose 17mg 16mg 16mg 16mg 15mg 15 mg Pt did not call back 15mg 12 mg 13mg 15mg 15 mg 15 mg 15 mg   INR 3.7 3.3 3.9 4.0 2.6 3.4 4.0 4.9 3.6 2.4 3.3 2.8 2.7 2.9   Notes Dec GLV   Zero GLV call Red x1 call Less GLV   call   Less  Protein drink redx1  self held x1 (misdose)   Dec vit K fall, apap  Call    call          Date 2/8 2/15 2/22 3/1 3/8 3/15 3/22 3/29 4/5 4/12 4/19 4/26 5/3 5/11   Total WeeklyDose 15mg 14mg 14 mg 15 mg 15 mg 15 mg 14mg 13 mg 14 mg 14 mg 14mg 13mg 15mg 14 mg   INR 4.0 4.0 2.8 2.9 2.9  4.2 3.9 3.3 2.9 3.0 3.8 2.4 3.8 2.5   Notes Call; Dec GLV call Call; Mis-dose call   Call; no GLV Inc GLV. Less protein, apap  redx1 call   Less GLV rec'd 4/27, call Dec GLV        Date 5/18 5/25 6/1 6/8 6/15 6/22 6/29 7/6 7/13 7/20 7/22 7/27  8/10  8/17   Total WeeklyDose 15 mg 15mg 16mg 15 mg 15 mg 15 mg 15 mg 15 mg 15mg 14 mg 15 mg 14 mg  14 mg  15 mg   INR 2.6 2.3 2.7 3.2 3.0 2.9 2.6 2.7 3.6 3.0 3.6 3.0  2.4  3.4   Notes   Inc GLV  call call call       call 7/14-- call call call  call  call  call      Date 8/25 8/31 9/7 9/12 9/19 9/26 10/3 10/10 10/17 10/24 10/31   Total WeeklyDose 14mg 13 mg 14 mg 17mg 15 mg 16mg 15mg 14 mg 13mg 17 mg 15mg   INR 4.3 2.8 1.7 2.9 2.1 3.4 3.8 2.4 1.8 3.7 4.5   Notes Dec GLV call Call refused enox; extra protein  Call; no protein drinks Call; less green tea, inc boostx1 Call; cranberries Redx1; call 1x miss Call  Less protein    1/1  Date 11/7 11/14 11/21 11/28 12/5 12/12 12/19 12/27 1/3 1/9 1/16/23 1/23 1/30/23   Total WeeklyDose 13 mg 14 mg 14 mg 14 mg 14 mg 14 mg 14mg 14 mg  13mg 13 mg 14 mg  14 mg 14 mg   INR 3.2 3.3 3.4 3.6 2.5 4.0 2.9 4.1 3.6 2.6 3.3 2.7 3.0   Notes  call redx1 One less protein shake   Inc GLV Decreased GLV W/o meloxicam  Tramadol,  meloxicam Tramadol,  meloxicam Tramadol,  meloxicam meloxicam     Date 2/7/23              Total WeeklyDose 14 mg              INR 2.9              Notes Call                 Phone Interview:  Tablet Strength: 2mg  Patient Contact Info: [phone] (Mobile) *preferred*  [email or web address]  Verbal Release Authorization signed on 4/7/21 -- may speak with Tammy hBumika (friend: [phone]), Ismael Michele (brother: [phone])  Lab Contact Info: [facility] ([facility])  ** will call once monthly or if INR is out of range**   4/7/22 Scr: 0.8      Patient Findings  Positives:  Change in medications   Negatives:  Signs/symptoms of thrombosis, Signs/symptoms of bleeding, Laboratory test error suspected, Change in health,  Change in alcohol use, Change in activity, Upcoming invasive procedure, Emergency department visit, Upcoming dental procedure, Missed doses, Extra doses, Change in diet/appetite, Hospital admission, Bruising, Other complaints   Comments:  Patient has Resumed Tramadol, she takes half a pill every two days.   All other findings negative per patient      Plan:    1. INR is therapeutic today at 2.9  (goal 2.5-3.5).  Instructed Ms. Michele to continue warfarin 2 mg oral daily until recheck.  2. Repeat INR in 1 week 2/13/23  She prefers testing on Mondays.  3. Verbal information provided over the phone. Patient RBV dosing instructions, expresses understanding by teach back, and has no further questions at this time.  4. Lucie Michele understands the importance of calling the [facility] if she notices any s/sx of bleeding, stroke, or abnormal bruising, if any changes are made to her medications or medication doses (Rx, OTC, herbal), or if any upcoming procedures are scheduled. Lucie Michele will likewise let us know if any other changes, questions, or concerns arise regarding anticoagulation therapy. she understands the importance of seeking medical attention immediately if she experiences any falls, vehicle accidents, or abnormal bleeding or bruising. Lucie Michele voiced understanding of this information and confirms that she has the [facility]'s contact information. Otherwise, we will plan to contact the patient once monthly or if her INR is out of range.    Harpal Branch, Pharmacy Technician  2/7/2023  09:18 EST    TG, Jesenia Garcia, PharmD, have reviewed the note in full and agree with the assessment and plan.  02/07/23  15:27 EST

## 2023-02-09 ENCOUNTER — OFFICE VISIT (OUTPATIENT)
Dept: CARDIAC SURGERY | Facility: CLINIC | Age: 78
End: 2023-02-09
Payer: MEDICARE

## 2023-02-09 VITALS
OXYGEN SATURATION: 98 % | TEMPERATURE: 98.7 F | WEIGHT: 188.6 LBS | DIASTOLIC BLOOD PRESSURE: 75 MMHG | SYSTOLIC BLOOD PRESSURE: 131 MMHG | HEART RATE: 72 BPM | BODY MASS INDEX: 31.38 KG/M2

## 2023-02-09 DIAGNOSIS — I71.20 THORACIC AORTIC ANEURYSM WITHOUT RUPTURE, UNSPECIFIED PART: Primary | ICD-10-CM

## 2023-02-09 PROCEDURE — 99213 OFFICE O/P EST LOW 20 MIN: CPT | Performed by: REGISTERED NURSE
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
## 2023-02-13 ENCOUNTER — DOCUMENTATION (OUTPATIENT)
Dept: CARDIAC SURGERY | Facility: CLINIC | Age: 78
End: 2023-02-13
Payer: MEDICARE

## 2023-02-13 ENCOUNTER — ANTICOAGULATION VISIT (OUTPATIENT)
Dept: PHARMACY | Facility: HOSPITAL | Age: 78
End: 2023-02-13
Payer: MEDICARE

## 2023-02-13 DIAGNOSIS — I35.9 AORTIC VALVE DISEASE: Primary | ICD-10-CM

## 2023-02-13 LAB — INR PPP: 3

## 2023-02-13 NOTE — PROGRESS NOTES
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     Diet: 3x week: 1 cup of brussels sprouts or broccoli weekly, green beans (1/3/23)  Premier Protein (or Equate brand) meal replacement ~2x/week 1/3/23  Alcohol: Seldom  Tobacco: None  OTC Pain Medication: APAP     INR History:  Date 4/5 4/19 4/26 5/5 5/11 6/2 6/15 6/18 6/25 7/2 7/9 7/19 7/23 7/30   Total Weekly Dose 15mg 15mg 14mg 14mg 14mg 14mg 14mg 14mg 14mg 14mg 14mg 14mg 14mg 14mg   INR 2.6 3.9 3.9 2.7 3.0 3.6 2.7 2.9 2.9 2.6 2.9 3.1 2.5 2.3   Notes           decr GLV   recv'd 6/21; [facility]       incr GLV decr GLV      Date 8/6 8/13 8/20 8/27 9/3 9/10 9/17 9/24 10/1 10/8 10/15 10/22 10/29 11/5   Total WeeklyDose 14mg 15mg 15mg 14mg 14mg 15mg 14mg 14mg 14mg 14mg 14mg 14mg 15mg 16mg   INR 2.4 3.4 3.8 2.9 2.2 3.2 2.9 3.3 3.2 3.3 3.0 2.4 2.4 2.4   Notes decr GLV decr GLV       1xboost         call call 1x boost   incr VitK call 2x boost; call      Date 11/10 11/17 11/24 12/1 12/8 12/15 12/23 12/30 1/3/22 1/7 1/11 1/18 1/25 2/1   Total WeeklyDose 17mg 16mg 16mg 16mg 15mg 15 mg Pt did not call back 15mg 12 mg 13mg 15mg 15 mg 15 mg 15 mg   INR 3.7 3.3 3.9 4.0 2.6 3.4 4.0 4.9 3.6 2.4 3.3 2.8 2.7 2.9   Notes Dec GLV   Zero GLV call Red x1 call Less GLV   call   Less  Protein drink redx1  self held x1 (misdose)   Dec vit K fall, apap  Call    call          Date 2/8 2/15 2/22 3/1 3/8 3/15 3/22 3/29 4/5 4/12 4/19 4/26 5/3 5/11   Total WeeklyDose 15mg 14mg 14 mg 15 mg 15 mg 15 mg 14mg 13 mg 14 mg 14 mg 14mg 13mg 15mg 14 mg   INR 4.0 4.0 2.8 2.9 2.9  4.2 3.9 3.3 2.9 3.0 3.8 2.4 3.8 2.5   Notes Call; Dec GLV call Call; Mis-dose call   Call; no GLV Inc GLV. Less protein, apap  redx1 call   Less GLV rec'd 4/27, call Dec GLV        Date 5/18 5/25 6/1 6/8 6/15 6/22 6/29 7/6 7/13 7/20 7/22 7/27  8/10  8/17   Total WeeklyDose 15 mg 15mg 16mg 15 mg 15 mg 15 mg 15 mg 15 mg 15mg 14 mg 15 mg 14 mg  14 mg  15 mg   INR 2.6 2.3 2.7 3.2 3.0 2.9 2.6 2.7 3.6 3.0 3.6 3.0  2.4  3.4   Notes   Inc GLV  call call call       call 7/14-- call call call  call  call  call      Date 8/25 8/31 9/7 9/12 9/19 9/26 10/3 10/10 10/17 10/24 10/31   Total WeeklyDose 14mg 13 mg 14 mg 17mg 15 mg 16mg 15mg 14 mg 13mg 17 mg 15mg   INR 4.3 2.8 1.7 2.9 2.1 3.4 3.8 2.4 1.8 3.7 4.5   Notes Dec GLV call Call refused enox; extra protein  Call; no protein drinks Call; less green tea, inc boostx1 Call; cranberries Redx1; call 1x miss Call  Less protein    1/1  Date 11/7 11/14 11/21 11/28 12/5 12/12 12/19 12/27 1/3 1/9 1/16/23 1/23 1/30/23   Total WeeklyDose 13 mg 14 mg 14 mg 14 mg 14 mg 14 mg 14mg 14 mg  13mg 13 mg 14 mg  14 mg 14 mg   INR 3.2 3.3 3.4 3.6 2.5 4.0 2.9 4.1 3.6 2.6 3.3 2.7 3.0   Notes  call redx1 One less protein shake   Inc GLV Decreased GLV W/o meloxicam  Tramadol,  meloxicam Tramadol,  meloxicam Tramadol,  meloxicam meloxicam     Date 2/7/23 2/13             Total WeeklyDose 14 mg 14 mg             INR 2.9 3.0             Notes Call  call               Phone Interview:  Tablet Strength: 2mg  Patient Contact Info: [phone] (Mobile) *preferred*  [email or web address]  Verbal Release Authorization signed on 4/7/21 -- may speak with Tammy Bhumika (friend: [phone]), Ismael Michele (brother: [phone])  Lab Contact Info: [facility] ([facility])  ** will call once monthly or if INR is out of range**   4/7/22 Scr: 0.8    Patient Findings    Positives:  Change in medications   Negatives:  Signs/symptoms of thrombosis, Signs/symptoms of bleeding, Laboratory test error suspected,  Change in health, Change in alcohol use, Change in activity, Upcoming invasive procedure, Emergency department visit, Upcoming dental procedure, Missed doses, Extra doses, Change in diet/appetite, Hospital admission, Bruising, Other complaints   Comments:  Continues taking tramadol and meloxicam. All other findings negative.      Plan:    1. INR is therapeutic today at 3.0  (goal 2.5-3.5), results received verbally from patient.  Instructed Ms. Michele to continue warfarin 2 mg oral daily until recheck.  2. Repeat INR in 1 week 2/20/23  She prefers testing on Mondays.  3. Verbal information provided over the phone. Patient RBV dosing instructions, expresses understanding by teach back, and has no further questions at this time.  4. Lucie Michele understands the importance of calling the [facility] if she notices any s/sx of bleeding, stroke, or abnormal bruising, if any changes are made to her medications or medication doses (Rx, OTC, herbal), or if any upcoming procedures are scheduled. Lucie Michele will likewise let us know if any other changes, questions, or concerns arise regarding anticoagulation therapy. she understands the importance of seeking medical attention immediately if she experiences any falls, vehicle accidents, or abnormal bleeding or bruising. Lucie Michele voiced understanding of this information and confirms that she has the [facility]'s contact information. Otherwise, we will plan to contact the patient once monthly or if her INR is out of range.    Eric Hernandez CPhT  2/13/2023  11:18 EST     TG, Mak Wolfe, [facility], have reviewed the note in full and agree with the assessment and plan.  02/13/23  11:43 EST

## 2023-02-13 NOTE — PROGRESS NOTES
[facility] Cardiothoracic Surgery     Date of Encounter: 2023     Name: Lucie Michele  : 1945       Patient was discussed with Dr. Hollis.  Dr. Hollis discussed patient and films with Dr. Dalal.  Ultimately decision was made to send films to [facility].  I personally contacted patient and updated.    Thank you for allowing me to participate in your care.  Best Regards,    SAGAR Woodward  [facility] Cardiothoracic Surgery  23  15:17 EST

## 2023-02-21 ENCOUNTER — ANTICOAGULATION VISIT (OUTPATIENT)
Dept: PHARMACY | Facility: HOSPITAL | Age: 78
End: 2023-02-21
Payer: MEDICARE

## 2023-02-21 DIAGNOSIS — I35.9 AORTIC VALVE DISEASE: Primary | ICD-10-CM

## 2023-02-21 LAB — INR PPP: 3.6

## 2023-02-21 NOTE — PROGRESS NOTES
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     Diet: 3x week: 1 cup of brussels sprouts or broccoli weekly, green beans (1/3/23)  Premier Protein (or Equate brand) meal replacement ~2x/week 1/3/23  Alcohol: Seldom  Tobacco: None  OTC Pain Medication: APAP     INR History:  Date 4/5 4/19 4/26 5/5 5/11 6/2 6/15 6/18 6/25 7/2 7/9 7/19 7/23 7/30   Total Weekly Dose 15mg 15mg 14mg 14mg 14mg 14mg 14mg 14mg 14mg 14mg 14mg 14mg 14mg 14mg   INR 2.6 3.9 3.9 2.7 3.0 3.6 2.7 2.9 2.9 2.6 2.9 3.1 2.5 2.3   Notes           decr GLV   recv'd 6/21; [facility]       incr GLV decr GLV      Date 8/6 8/13 8/20 8/27 9/3 9/10 9/17 9/24 10/1 10/8 10/15 10/22 10/29 11/5   Total WeeklyDose 14mg 15mg 15mg 14mg 14mg 15mg 14mg 14mg 14mg 14mg 14mg 14mg 15mg 16mg   INR 2.4 3.4 3.8 2.9 2.2 3.2 2.9 3.3 3.2 3.3 3.0 2.4 2.4 2.4   Notes decr GLV decr GLV       1xboost         call call 1x boost   incr VitK call 2x boost; call      Date 11/10 11/17 11/24 12/1 12/8 12/15 12/23 12/30 1/3/22 1/7 1/11 1/18 1/25 2/1   Total WeeklyDose 17mg 16mg 16mg 16mg 15mg 15 mg Pt did not call back 15mg 12 mg 13mg 15mg 15 mg 15 mg 15 mg   INR 3.7 3.3 3.9 4.0 2.6 3.4 4.0 4.9 3.6 2.4 3.3 2.8 2.7 2.9   Notes Dec GLV   Zero GLV call Red x1 call Less GLV   call   Less  Protein drink redx1  self held x1 (misdose)   Dec vit K fall, apap  Call    call          Date 2/8 2/15 2/22 3/1 3/8 3/15 3/22 3/29 4/5 4/12 4/19 4/26 5/3 5/11   Total WeeklyDose 15mg 14mg 14 mg 15 mg 15 mg 15 mg 14mg 13 mg 14 mg 14 mg 14mg 13mg 15mg 14 mg   INR 4.0 4.0 2.8 2.9 2.9  4.2 3.9 3.3 2.9 3.0 3.8 2.4 3.8 2.5   Notes Call; Dec GLV call Call; Mis-dose call   Call; no GLV Inc GLV. Less protein, apap  redx1 call   Less GLV rec'd 4/27, call Dec GLV        Date 5/18 5/25 6/1 6/8 6/15 6/22 6/29 7/6 7/13 7/20 7/22 7/27  8/10  8/17   Total WeeklyDose 15 mg 15mg 16mg 15 mg 15 mg 15 mg 15 mg 15 mg 15mg 14 mg 15 mg 14 mg  14 mg  15 mg   INR 2.6 2.3 2.7 3.2 3.0 2.9 2.6 2.7 3.6 3.0 3.6 3.0  2.4  3.4   Notes   Inc GLV  call call call       call 7/14-- call call call  call  call  call      Date 8/25 8/31 9/7 9/12 9/19 9/26 10/3 10/10 10/17 10/24 10/31   Total WeeklyDose 14mg 13 mg 14 mg 17mg 15 mg 16mg 15mg 14 mg 13mg 17 mg 15mg   INR 4.3 2.8 1.7 2.9 2.1 3.4 3.8 2.4 1.8 3.7 4.5   Notes Dec GLV call Call refused enox; extra protein  Call; no protein drinks Call; less green tea, inc boostx1 Call; cranberries Redx1; call 1x miss Call  Less protein    1/1  Date 11/7 11/14 11/21 11/28 12/5 12/12 12/19 12/27 1/3 1/9 1/16/23 1/23 1/30/23   Total WeeklyDose 13 mg 14 mg 14 mg 14 mg 14 mg 14 mg 14mg 14 mg  13mg 13 mg 14 mg  14 mg 14 mg   INR 3.2 3.3 3.4 3.6 2.5 4.0 2.9 4.1 3.6 2.6 3.3 2.7 3.0   Notes  call redx1 One less protein shake   Inc GLV Decreased GLV W/o meloxicam  Tramadol,  meloxicam Tramadol,  meloxicam Tramadol,  meloxicam meloxicam     Date 2/7/23 2/13 2/20            Total WeeklyDose 14 mg 14 mg 14 mg            INR 2.9 3.0 3.6            Notes Call  call rec 2/21              Phone Interview:  Tablet Strength: 2mg  Patient Contact Info: [phone] (Mobile) *preferred*  [email or web address]  Verbal Release Authorization signed on 4/7/21 -- may speak with Tammy Bhumika (friend: [phone]), Ismael Percyligia (brother: [phone])  Lab Contact Info: [facility] ([facility])  ** will call once monthly or if INR is out of range**   4/7/22 Scr: 0.8    Patient Findings    Positives:  Change in medications, Change in diet/appetite   Negatives:  Signs/symptoms of thrombosis, Signs/symptoms of  bleeding, Laboratory test error suspected, Change in health, Change in alcohol use, Change in activity, Upcoming invasive procedure, Emergency department visit, Upcoming dental procedure, Missed doses, Extra doses, Hospital admission, Bruising, Other complaints   Comments:  Continues taking tramadol and meloxicam. Only had one serving of GLV last week All other findings negative.      Plan:    1. INR was SUPRAtherapeutic yesterday at 3.6  (goal 2.5-3.5), results received verbally from patient.  Instructed Ms. Michele to continue warfarin 2 mg oral daily until recheck. Ms Michele will return to normal GLV intake this week.  2. Repeat INR in 1 week 2/27/23  She prefers testing on Mondays.  3. Verbal information provided over the phone. Patient RBV dosing instructions, expresses understanding by teach back, and has no further questions at this time.  4. Lucie Michele understands the importance of calling the [facility] if she notices any s/sx of bleeding, stroke, or abnormal bruising, if any changes are made to her medications or medication doses (Rx, OTC, herbal), or if any upcoming procedures are scheduled. Lucie Michele will likewise let us know if any other changes, questions, or concerns arise regarding anticoagulation therapy. she understands the importance of seeking medical attention immediately if she experiences any falls, vehicle accidents, or abnormal bleeding or bruising. Lucie Michele voiced understanding of this information and confirms that she has the [facility]'s contact information. Otherwise, we will plan to contact the patient once monthly or if her INR is out of range.    Eric Hernandez CPhT  2/21/2023  09:14 Lizet COLEY, PharmD, have reviewed the note in full and agree with the assessment and plan.  02/22/23  12:05 EST

## 2023-02-27 ENCOUNTER — ANTICOAGULATION VISIT (OUTPATIENT)
Dept: PHARMACY | Facility: HOSPITAL | Age: 78
End: 2023-02-27
Payer: MEDICARE

## 2023-02-27 DIAGNOSIS — I35.9 AORTIC VALVE DISEASE: Primary | ICD-10-CM

## 2023-02-27 LAB — INR PPP: 2.6

## 2023-02-27 NOTE — PROGRESS NOTES
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     Diet: 3x week: 1 cup of brussels sprouts or broccoli weekly, green beans (1/3/23)  Premier Protein (or Equate brand) meal replacement ~2x/week 1/3/23  Alcohol: Seldom  Tobacco: None  OTC Pain Medication: APAP     INR History:  Date 4/5 4/19 4/26 5/5 5/11 6/2 6/15 6/18 6/25 7/2 7/9 7/19 7/23 7/30   Total Weekly Dose 15mg 15mg 14mg 14mg 14mg 14mg 14mg 14mg 14mg 14mg 14mg 14mg 14mg 14mg   INR 2.6 3.9 3.9 2.7 3.0 3.6 2.7 2.9 2.9 2.6 2.9 3.1 2.5 2.3   Notes           decr GLV   recv'd 6/21; [facility]       incr GLV decr GLV      Date 8/6 8/13 8/20 8/27 9/3 9/10 9/17 9/24 10/1 10/8 10/15 10/22 10/29 11/5   Total WeeklyDose 14mg 15mg 15mg 14mg 14mg 15mg 14mg 14mg 14mg 14mg 14mg 14mg 15mg 16mg   INR 2.4 3.4 3.8 2.9 2.2 3.2 2.9 3.3 3.2 3.3 3.0 2.4 2.4 2.4   Notes decr GLV decr GLV       1xboost         call call 1x boost   incr VitK call 2x boost; call      Date 11/10 11/17 11/24 12/1 12/8 12/15 12/23 12/30 1/3/22 1/7 1/11 1/18 1/25 2/1   Total WeeklyDose 17mg 16mg 16mg 16mg 15mg 15 mg Pt did not call back 15mg 12 mg 13mg 15mg 15 mg 15 mg 15 mg   INR 3.7 3.3 3.9 4.0 2.6 3.4 4.0 4.9 3.6 2.4 3.3 2.8 2.7 2.9   Notes Dec GLV   Zero GLV call Red x1 call Less GLV   call   Less  Protein drink redx1  self held x1 (misdose)   Dec vit K fall, apap  Call    call          Date 2/8 2/15 2/22 3/1 3/8 3/15 3/22 3/29 4/5 4/12 4/19 4/26 5/3 5/11   Total WeeklyDose 15mg 14mg 14 mg 15 mg 15 mg 15 mg 14mg 13 mg 14 mg 14 mg 14mg 13mg 15mg 14 mg   INR 4.0 4.0 2.8 2.9 2.9  4.2 3.9 3.3 2.9 3.0 3.8 2.4 3.8 2.5   Notes Call; Dec GLV call Call; Mis-dose call   Call; no GLV Inc GLV. Less protein, apap  redx1 call   Less GLV rec'd 4/27, call Dec GLV        Date 5/18 5/25 6/1 6/8 6/15 6/22 6/29 7/6 7/13 7/20 7/22 7/27  8/10  8/17   Total WeeklyDose 15 mg 15mg 16mg 15 mg 15 mg 15 mg 15 mg 15 mg 15mg 14 mg 15 mg 14 mg  14 mg  15 mg   INR 2.6 2.3 2.7 3.2 3.0 2.9 2.6 2.7 3.6 3.0 3.6 3.0  2.4  3.4   Notes   Inc GLV  call call call       call 7/14-- call call call  call  call  call      Date 8/25 8/31 9/7 9/12 9/19 9/26 10/3 10/10 10/17 10/24 10/31   Total WeeklyDose 14mg 13 mg 14 mg 17mg 15 mg 16mg 15mg 14 mg 13mg 17 mg 15mg   INR 4.3 2.8 1.7 2.9 2.1 3.4 3.8 2.4 1.8 3.7 4.5   Notes Dec GLV call Call refused enox; extra protein  Call; no protein drinks Call; less green tea, inc boostx1 Call; cranberries Redx1; call 1x miss Call  Less protein    1/1  Date 11/7 11/14 11/21 11/28 12/5 12/12 12/19 12/27 1/3 1/9 1/16/23 1/23 1/30/23   Total WeeklyDose 13 mg 14 mg 14 mg 14 mg 14 mg 14 mg 14mg 14 mg  13mg 13 mg 14 mg  14 mg 14 mg   INR 3.2 3.3 3.4 3.6 2.5 4.0 2.9 4.1 3.6 2.6 3.3 2.7 3.0   Notes  call redx1 One less protein shake   Inc GLV Decreased GLV W/o meloxicam  Tramadol,  meloxicam Tramadol,  meloxicam Tramadol,  meloxicam meloxicam     Date 2/7/23 2/13 2/20 2/27/23           Total WeeklyDose 14 mg 14 mg 14 mg 14 mg            INR 2.9 3.0 3.6 2.6           Notes Call  call rec 2/21  Call               Phone Interview:  Tablet Strength: 2mg  Patient Contact Info: [phone] (Mobile) *preferred*  [email or web address]  Verbal Release Authorization signed on 4/7/21 -- may speak with Tammy Bai (friend: [phone]), Ismael Michele (brother: [phone])  Lab Contact Info: [facility] ([facility])  ** will call once monthly or if INR is out of range**   4/7/22 Scr: 0.8      Patient Findings  Comments:  Patient not contacted at this encounter        Plan:  1. INR was therapeutic today at 2.6   (goal 2.5-3.5). Ms. Michele will continue warfarin 2 mg oral daily until recheck.   2. Repeat INR in 1 week 3/6/23  She prefers testing on Mondays.  3. Verbal information provided over the phone. Patient RBV dosing instructions, expresses understanding by teach back, and has no further questions at this time.  4. Lucie Michele understands the importance of calling the [facility] if she notices any s/sx of bleeding, stroke, or abnormal bruising, if any changes are made to her medications or medication doses (Rx, OTC, herbal), or if any upcoming procedures are scheduled. Lucie Michele will likewise let us know if any other changes, questions, or concerns arise regarding anticoagulation therapy. she understands the importance of seeking medical attention immediately if she experiences any falls, vehicle accidents, or abnormal bleeding or bruising. Lucie Michele voiced understanding of this information and confirms that she has the [facility]'s contact information. Otherwise, we will plan to contact the patient once monthly or if her INR is out of range.    Harpal Branch, Pharmacy Technician  2/27/2023  15:30 Lizet COLEY, PharmD, have reviewed the note in full and agree with the assessment and plan.  02/28/23  16:15 EST

## 2023-03-02 ENCOUNTER — HOSPITAL ENCOUNTER (OUTPATIENT)
Dept: MRI IMAGING | Facility: HOSPITAL | Age: 78
Discharge: HOME | End: 2023-03-02
Admitting: INTERNAL MEDICINE
Payer: MEDICARE

## 2023-03-02 DIAGNOSIS — E85.9 AMYLOIDOSIS, UNSPECIFIED TYPE: ICD-10-CM

## 2023-03-02 PROCEDURE — 0 GADOBENATE DIMEGLUMINE 529 MG/ML SOLUTION: Performed by: INTERNAL MEDICINE

## 2023-03-02 PROCEDURE — 75561 CARDIAC MRI FOR MORPH W/DYE: CPT | Performed by: INTERNAL MEDICINE

## 2023-03-02 PROCEDURE — A9577 INJ MULTIHANCE: HCPCS | Performed by: INTERNAL MEDICINE

## 2023-03-02 PROCEDURE — 75561 CARDIAC MRI FOR MORPH W/DYE: CPT

## 2023-03-02 RX ADMIN — GADOBENATE DIMEGLUMINE 6 ML: 529 INJECTION, SOLUTION INTRAVENOUS at 11:03

## 2023-03-06 ENCOUNTER — ANTICOAGULATION VISIT (OUTPATIENT)
Dept: PHARMACY | Facility: HOSPITAL | Age: 78
End: 2023-03-06
Payer: MEDICARE

## 2023-03-06 ENCOUNTER — HOSPITAL ENCOUNTER (OUTPATIENT)
Dept: MRI IMAGING | Facility: HOSPITAL | Age: 78
Discharge: HOME | End: 2023-03-06
Admitting: NEUROLOGICAL SURGERY
Payer: MEDICARE

## 2023-03-06 DIAGNOSIS — G50.0 TRIGEMINAL NEURALGIA: ICD-10-CM

## 2023-03-06 DIAGNOSIS — I35.9 AORTIC VALVE DISEASE: Primary | ICD-10-CM

## 2023-03-06 LAB — INR PPP: 3

## 2023-03-06 PROCEDURE — A9577 INJ MULTIHANCE: HCPCS | Performed by: NEUROLOGICAL SURGERY

## 2023-03-06 PROCEDURE — 0 GADOBENATE DIMEGLUMINE 529 MG/ML SOLUTION: Performed by: NEUROLOGICAL SURGERY

## 2023-03-06 PROCEDURE — 70553 MRI BRAIN STEM W/O & W/DYE: CPT

## 2023-03-06 PROCEDURE — 82565 ASSAY OF CREATININE: CPT

## 2023-03-06 RX ADMIN — GADOBENATE DIMEGLUMINE 17 ML: 529 INJECTION, SOLUTION INTRAVENOUS at 15:17

## 2023-03-06 NOTE — PROGRESS NOTES
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     Diet: 3x week: 1 cup of brussels sprouts or broccoli weekly, green beans (1/3/23)  Premier Protein (or Equate brand) meal replacement ~2x/week 1/3/23  Alcohol: Seldom  Tobacco: None  OTC Pain Medication: APAP     INR History:  Date 4/5 4/19 4/26 5/5 5/11 6/2 6/15 6/18 6/25 7/2 7/9 7/19 7/23 7/30   Total Weekly Dose 15mg 15mg 14mg 14mg 14mg 14mg 14mg 14mg 14mg 14mg 14mg 14mg 14mg 14mg   INR 2.6 3.9 3.9 2.7 3.0 3.6 2.7 2.9 2.9 2.6 2.9 3.1 2.5 2.3   Notes           decr GLV   recv'd 6/21; [facility]       incr GLV decr GLV      Date 8/6 8/13 8/20 8/27 9/3 9/10 9/17 9/24 10/1 10/8 10/15 10/22 10/29 11/5   Total WeeklyDose 14mg 15mg 15mg 14mg 14mg 15mg 14mg 14mg 14mg 14mg 14mg 14mg 15mg 16mg   INR 2.4 3.4 3.8 2.9 2.2 3.2 2.9 3.3 3.2 3.3 3.0 2.4 2.4 2.4   Notes decr GLV decr GLV       1xboost         call call 1x boost   incr VitK call 2x boost; call      Date 11/10 11/17 11/24 12/1 12/8 12/15 12/23 12/30 1/3/22 1/7 1/11 1/18 1/25 2/1   Total WeeklyDose 17mg 16mg 16mg 16mg 15mg 15 mg Pt did not call back 15mg 12 mg 13mg 15mg 15 mg 15 mg 15 mg   INR 3.7 3.3 3.9 4.0 2.6 3.4 4.0 4.9 3.6 2.4 3.3 2.8 2.7 2.9   Notes Dec GLV   Zero GLV call Red x1 call Less GLV   call   Less  Protein drink redx1  self held x1 (misdose)   Dec vit K fall, apap  Call    call          Date 2/8 2/15 2/22 3/1 3/8 3/15 3/22 3/29 4/5 4/12 4/19 4/26 5/3 5/11   Total WeeklyDose 15mg 14mg 14 mg 15 mg 15 mg 15 mg 14mg 13 mg 14 mg 14 mg 14mg 13mg 15mg 14 mg   INR 4.0 4.0 2.8 2.9 2.9  4.2 3.9 3.3 2.9 3.0 3.8 2.4 3.8 2.5   Notes Call; Dec GLV call Call; Mis-dose call   Call; no GLV Inc GLV. Less protein, apap  redx1 call   Less GLV rec'd 4/27, call Dec GLV        Date 5/18 5/25 6/1 6/8 6/15 6/22 6/29 7/6 7/13 7/20 7/22 7/27  8/10  8/17   Total WeeklyDose 15 mg 15mg 16mg 15 mg 15 mg 15 mg 15 mg 15 mg 15mg 14 mg 15 mg 14 mg  14 mg  15 mg   INR 2.6 2.3 2.7 3.2 3.0 2.9 2.6 2.7 3.6 3.0 3.6 3.0  2.4  3.4   Notes   Inc GLV  call call call       call 7/14-- call call call  call  call  call      Date 8/25 8/31 9/7 9/12 9/19 9/26 10/3 10/10 10/17 10/24 10/31   Total WeeklyDose 14mg 13 mg 14 mg 17mg 15 mg 16mg 15mg 14 mg 13mg 17 mg 15mg   INR 4.3 2.8 1.7 2.9 2.1 3.4 3.8 2.4 1.8 3.7 4.5   Notes Dec GLV call Call refused enox; extra protein  Call; no protein drinks Call; less green tea, inc boostx1 Call; cranberries Redx1; call 1x miss Call  Less protein    1/1  Date 11/7 11/14 11/21 11/28 12/5 12/12 12/19 12/27 1/3 1/9 1/16/23 1/23 1/30/23   Total WeeklyDose 13 mg 14 mg 14 mg 14 mg 14 mg 14 mg 14mg 14 mg  13mg 13 mg 14 mg  14 mg 14 mg   INR 3.2 3.3 3.4 3.6 2.5 4.0 2.9 4.1 3.6 2.6 3.3 2.7 3.0   Notes  call redx1 One less protein shake   Inc GLV Decreased GLV W/o meloxicam  Tramadol,  meloxicam Tramadol,  meloxicam Tramadol,  meloxicam meloxicam     Date 2/7/23 2/13 2/20 2/27/23 3/6          Total WeeklyDose 14 mg 14 mg 14 mg 14 mg  14 mg          INR 2.9 3.0 3.6 2.6 3          Notes Call  call rec 2/21  Call   call            Phone Interview:  Tablet Strength: 2mg  Patient Contact Info: [phone] (Mobile) *preferred*  [email or web address]  Verbal Release Authorization signed on 4/7/21 -- may speak with Tammy Bai (friend: [phone]), Ismael Michele (brother: [phone])  Lab Contact Info: [facility] ([facility])  ** will call once monthly or if INR is out of range**   4/7/22 Scr: 0.8    Patient Findings  Positives:  Change in medications, Other complaints   Negatives:  Signs/symptoms of  thrombosis, Signs/symptoms of bleeding, Laboratory test error suspected, Change in health, Change in alcohol use, Change in activity, Upcoming invasive procedure, Emergency department visit, Upcoming dental procedure, Missed doses, Extra doses, Change in diet/appetite, Hospital admission, Bruising   Comments:  She stated that the [facility] Sherry is not currently working.   She stated that she continues to take Farxiga and is losing weight.   She had a heart scan last week and is having a brain scan for her meningioma today.  Otherwise, above findings negative     Plan:    1. INR was therapeutic today at 3  (goal 2.5-3.5).  Instructed Ms. Michele to continue warfarin 2 mg oral daily until recheck.    Results received verbally from Ms. Michele.  2. Repeat INR in on week on 3/13/23  She prefers testing on Mondays.  3. Verbal information provided over the phone. Patient RBV dosing instructions, expresses understanding by teach back, and has no further questions at this time.  4. Lucie Michele understands the importance of calling the [facility] if she notices any s/sx of bleeding, stroke, or abnormal bruising, if any changes are made to her medications or medication doses (Rx, OTC, herbal), or if any upcoming procedures are scheduled. Lucie Michele will likewise let us know if any other changes, questions, or concerns arise regarding anticoagulation therapy. she understands the importance of seeking medical attention immediately if she experiences any falls, vehicle accidents, or abnormal bleeding or bruising. Lucie Michele voiced understanding of this information and confirms that she has the [facility]'s contact information. Otherwise, we will plan to contact the patient once monthly or if her INR is out of range.    Jesenia Garcia, PharmD  3/6/2023  12:08 EST

## 2023-03-07 ENCOUNTER — TELEPHONE (OUTPATIENT)
Dept: CARDIOLOGY | Facility: CLINIC | Age: 78
End: 2023-03-07
Payer: MEDICARE

## 2023-03-07 ENCOUNTER — OFFICE VISIT (OUTPATIENT)
Dept: NEUROSURGERY | Facility: CLINIC | Age: 78
End: 2023-03-07
Payer: MEDICARE

## 2023-03-07 VITALS
BODY MASS INDEX: 31.32 KG/M2 | HEIGHT: 65 IN | SYSTOLIC BLOOD PRESSURE: 134 MMHG | DIASTOLIC BLOOD PRESSURE: 83 MMHG | HEART RATE: 66 BPM | OXYGEN SATURATION: 96 % | WEIGHT: 188 LBS | TEMPERATURE: 97.3 F

## 2023-03-07 DIAGNOSIS — D49.6 CAVERNOUS SINUS TUMOR: Primary | ICD-10-CM

## 2023-03-07 LAB — CREAT BLDA-MCNC: 0.8 MG/DL (ref 0.6–1.3)

## 2023-03-07 PROCEDURE — 99204 OFFICE O/P NEW MOD 45 MIN: CPT | Performed by: NEUROLOGICAL SURGERY

## 2023-03-07 NOTE — PROGRESS NOTES
"Subjective   Patient ID: Lucie Michele is a 77 y.o. female is here today for 5 year follow-up with a new MRI Brain done on 03/06/2023 @ [facility].    Today patient denies HA's, seizures, visual changes, but patient states that her ticks sometimes happen    Patient, Provider, and MA are all wearing a mask in our office today    History of Present Illness    This patient returns today.  She is still having some occasional tics with facial spasms on the right side.  She otherwise feels okay.    The following portions of the patient's history were reviewed and updated as appropriate: allergies, current medications, past family history, past medical history, past social history, past surgical history and problem list.    Review of Systems   Constitutional: Negative for chills and fever.   HENT: Negative for congestion.    Eyes: Negative for visual disturbance.   Neurological: Negative for seizures, speech difficulty and headaches.       I reviewed the review of systems listed by the patient and discussed by my MA    Objective     Vitals:    03/07/23 1205   BP: 134/83   Cuff Size: Adult   Pulse: 66   Temp: 97.3 °F (36.3 °C)   SpO2: 96%   Weight: 85.3 kg (188 lb)   Height: 165.1 cm (65\")     Body mass index is 31.28 kg/m².    Tobacco Use: Medium Risk   • Smoking Tobacco Use: Former   • Smokeless Tobacco Use: Never   • Passive Exposure: Not on file          Physical Exam  Neurological:      Mental Status: She is alert and oriented to person, place, and time.       Neurologic Exam     Mental Status   Oriented to person, place, and time.           Assessment & Plan   Independent Review of Radiographic Studies:      I personally reviewed the images from the following studies.    I reviewed an MRI of the brain done on March 6 of this year.  This shows an enhancing mass along the right cavernous sinus.  It is unchanged in size going back to an MRI in 2018.    Medical Decision Making:      The patient says she has been following " this since 2000.  It is not changed in size during that time and is still quite small.  As 77 years old this is unlikely to ever cause her problem at this point.  Consequently I would tend to just leave it alone.  She would call if she has any symptoms in the future we can always reevaluate her.    Diagnoses and all orders for this visit:    1. Cavernous sinus tumor (HCC) (Primary)      Return if symptoms worsen or fail to improve.

## 2023-03-07 NOTE — TELEPHONE ENCOUNTER
Spoke with Ms. Michele and advised her that the MRI she had for Dr. Blake does not show amyloidosis.  She states she also had a MRI for Dr. Hollis 2 or so weeks ago and they are sending those results off to [facility] for them to review.  She verbalized understanding.

## 2023-03-13 ENCOUNTER — ANTICOAGULATION VISIT (OUTPATIENT)
Dept: PHARMACY | Facility: HOSPITAL | Age: 78
End: 2023-03-13
Payer: MEDICARE

## 2023-03-13 DIAGNOSIS — I35.9 AORTIC VALVE DISEASE: Primary | ICD-10-CM

## 2023-03-13 LAB — INR PPP: 3.5

## 2023-03-13 NOTE — PROGRESS NOTES
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     Diet: 3x week: 1 cup of brussels sprouts or broccoli weekly, green beans (1/3/23)  Premier Protein (or Equate brand) meal replacement ~2x/week 1/3/23  Alcohol: Seldom  Tobacco: None  OTC Pain Medication: APAP     INR History:  Date 4/5 4/19 4/26 5/5 5/11 6/2 6/15 6/18 6/25 7/2 7/9 7/19 7/23 7/30   Total Weekly Dose 15mg 15mg 14mg 14mg 14mg 14mg 14mg 14mg 14mg 14mg 14mg 14mg 14mg 14mg   INR 2.6 3.9 3.9 2.7 3.0 3.6 2.7 2.9 2.9 2.6 2.9 3.1 2.5 2.3   Notes           decr GLV   recv'd 6/21; [facility]       incr GLV decr GLV      Date 8/6 8/13 8/20 8/27 9/3 9/10 9/17 9/24 10/1 10/8 10/15 10/22 10/29 11/5   Total WeeklyDose 14mg 15mg 15mg 14mg 14mg 15mg 14mg 14mg 14mg 14mg 14mg 14mg 15mg 16mg   INR 2.4 3.4 3.8 2.9 2.2 3.2 2.9 3.3 3.2 3.3 3.0 2.4 2.4 2.4   Notes decr GLV decr GLV       1xboost         call call 1x boost   incr VitK call 2x boost; call      Date 11/10 11/17 11/24 12/1 12/8 12/15 12/23 12/30 1/3/22 1/7 1/11 1/18 1/25 2/1   Total WeeklyDose 17mg 16mg 16mg 16mg 15mg 15 mg Pt did not call back 15mg 12 mg 13mg 15mg 15 mg 15 mg 15 mg   INR 3.7 3.3 3.9 4.0 2.6 3.4 4.0 4.9 3.6 2.4 3.3 2.8 2.7 2.9   Notes Dec GLV   Zero GLV call Red x1 call Less GLV   call   Less  Protein drink redx1  self held x1 (misdose)   Dec vit K fall, apap  Call    call          Date 2/8 2/15 2/22 3/1 3/8 3/15 3/22 3/29 4/5 4/12 4/19 4/26 5/3 5/11   Total WeeklyDose 15mg 14mg 14 mg 15 mg 15 mg 15 mg 14mg 13 mg 14 mg 14 mg 14mg 13mg 15mg 14 mg   INR 4.0 4.0 2.8 2.9 2.9  4.2 3.9 3.3 2.9 3.0 3.8 2.4 3.8 2.5   Notes Call; Dec GLV call Call; Mis-dose call   Call; no GLV Inc GLV. Less protein, apap  redx1 call   Less GLV rec'd 4/27, call Dec GLV        Date 5/18 5/25 6/1 6/8 6/15 6/22 6/29 7/6 7/13 7/20 7/22 7/27  8/10  8/17   Total WeeklyDose 15 mg 15mg 16mg 15 mg 15 mg 15 mg 15 mg 15 mg 15mg 14 mg 15 mg 14 mg  14 mg  15 mg   INR 2.6 2.3 2.7 3.2 3.0 2.9 2.6 2.7 3.6 3.0 3.6 3.0  2.4  3.4   Notes   Inc GLV  call call call       call 7/14-- call call call  call  call  call      Date 8/25 8/31 9/7 9/12 9/19 9/26 10/3 10/10 10/17 10/24 10/31   Total WeeklyDose 14mg 13 mg 14 mg 17mg 15 mg 16mg 15mg 14 mg 13mg 17 mg 15mg   INR 4.3 2.8 1.7 2.9 2.1 3.4 3.8 2.4 1.8 3.7 4.5   Notes Dec GLV call Call refused enox; extra protein  Call; no protein drinks Call; less green tea, inc boostx1 Call; cranberries Redx1; call 1x miss Call  Less protein    1/1  Date 11/7 11/14 11/21 11/28 12/5 12/12 12/19 12/27 1/3 1/9 1/16/23 1/23 1/30/23   Total WeeklyDose 13 mg 14 mg 14 mg 14 mg 14 mg 14 mg 14mg 14 mg  13mg 13 mg 14 mg  14 mg 14 mg   INR 3.2 3.3 3.4 3.6 2.5 4.0 2.9 4.1 3.6 2.6 3.3 2.7 3.0   Notes  call redx1 One less protein shake   Inc GLV Decreased GLV W/o meloxicam  Tramadol,  meloxicam Tramadol,  meloxicam Tramadol,  meloxicam meloxicam     Date 2/7/23 2/13 2/20 2/27/23 3/6 3/13/23         Total WeeklyDose 14 mg 14 mg 14 mg 14 mg  14 mg 14 mg          INR 2.9 3.0 3.6 2.6 3 3.5         Notes Call  call rec 2/21  Call   call Call            Phone Interview:  Tablet Strength: 2mg  Patient Contact Info: [phone] (Mobile) *preferred*  [email or web address]  Verbal Release Authorization signed on 4/7/21 -- may speak with Tammy Bai (friend: [phone]), Ismael Michele (brother: [phone])  Lab Contact Info: [facility] ([facility])  ** will call once monthly or if INR is out of range**   4/7/22 Scr: 0.8      Patient Findings  Positives:  Change in diet/appetite   Negatives:  Signs/symptoms of  thrombosis, Signs/symptoms of bleeding, Laboratory test error suspected, Change in health, Change in alcohol use, Change in activity, Upcoming invasive procedure, Emergency department visit, Upcoming dental procedure, Missed doses, Extra doses, Change in medications, Hospital admission, Bruising, Other complaints   Comments:  Less GLV  this past week   All other findings negative per patient      Plan:  1. INR was therapeutic today at 3.5  (goal 2.5-3.5).  Instructed Ms. Michele to continue warfarin 2 mg oral daily until recheck.  Advised patient to resume regular GLV intake.   Results received verbally from Ms. Michele.  2. Repeat INR in on week on 3/20/23  She prefers testing on Mondays.  3. Verbal information provided over the phone. Patient RBV dosing instructions, expresses understanding by teach back, and has no further questions at this time.  4. Lucie Michele understands the importance of calling the [facility] if she notices any s/sx of bleeding, stroke, or abnormal bruising, if any changes are made to her medications or medication doses (Rx, OTC, herbal), or if any upcoming procedures are scheduled. Lucie Michele will likewise let us know if any other changes, questions, or concerns arise regarding anticoagulation therapy. she understands the importance of seeking medical attention immediately if she experiences any falls, vehicle accidents, or abnormal bleeding or bruising. Lucie Michele voiced understanding of this information and confirms that she has the [facility]'s contact information. Otherwise, we will plan to contact the patient once monthly or if her INR is out of range.    Harpal Branch, Pharmacy Technician  3/13/2023  11:32 EDT    I, Ngoc Brooks, PharmD, have reviewed the note in full and agree with the assessment and plan.  03/13/23  14:52 EDT   No

## 2023-03-14 DIAGNOSIS — G50.0 TRIGEMINAL NEURALGIA: ICD-10-CM

## 2023-03-14 RX ORDER — OXCARBAZEPINE 150 MG/1
TABLET, FILM COATED ORAL
Qty: 270 TABLET | Refills: 3 | Status: SHIPPED | OUTPATIENT
Start: 2023-03-14

## 2023-03-16 ENCOUNTER — TELEPHONE (OUTPATIENT)
Dept: CARDIOLOGY | Facility: CLINIC | Age: 78
End: 2023-03-16
Payer: MEDICARE

## 2023-03-16 DIAGNOSIS — I35.9 AORTIC VALVE DISEASE: ICD-10-CM

## 2023-03-16 RX ORDER — WARFARIN SODIUM 2 MG/1
TABLET ORAL
Qty: 90 TABLET | Refills: 1 | Status: SHIPPED | OUTPATIENT
Start: 2023-03-16

## 2023-03-23 ENCOUNTER — ANTICOAGULATION VISIT (OUTPATIENT)
Dept: PHARMACY | Facility: HOSPITAL | Age: 78
End: 2023-03-23
Payer: MEDICARE

## 2023-03-23 DIAGNOSIS — I35.9 AORTIC VALVE DISEASE: Primary | ICD-10-CM

## 2023-03-23 LAB — INR PPP: 3.4

## 2023-03-23 NOTE — PROGRESS NOTES
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     Diet: 3x week: 1 cup of brussels sprouts or broccoli weekly, green beans (1/3/23)  Premier Protein (or Equate brand) meal replacement ~2x/week 1/3/23  Alcohol: Seldom  Tobacco: None  OTC Pain Medication: APAP     INR History:  Date 4/5 4/19 4/26 5/5 5/11 6/2 6/15 6/18 6/25 7/2 7/9 7/19 7/23 7/30   Total Weekly Dose 15mg 15mg 14mg 14mg 14mg 14mg 14mg 14mg 14mg 14mg 14mg 14mg 14mg 14mg   INR 2.6 3.9 3.9 2.7 3.0 3.6 2.7 2.9 2.9 2.6 2.9 3.1 2.5 2.3   Notes           decr GLV   recv'd 6/21; [facility]       incr GLV decr GLV      Date 8/6 8/13 8/20 8/27 9/3 9/10 9/17 9/24 10/1 10/8 10/15 10/22 10/29 11/5   Total WeeklyDose 14mg 15mg 15mg 14mg 14mg 15mg 14mg 14mg 14mg 14mg 14mg 14mg 15mg 16mg   INR 2.4 3.4 3.8 2.9 2.2 3.2 2.9 3.3 3.2 3.3 3.0 2.4 2.4 2.4   Notes decr GLV decr GLV       1xboost         call call 1x boost   incr VitK call 2x boost; call      Date 11/10 11/17 11/24 12/1 12/8 12/15 12/23 12/30 1/3/22 1/7 1/11 1/18 1/25 2/1   Total WeeklyDose 17mg 16mg 16mg 16mg 15mg 15 mg Pt did not call back 15mg 12 mg 13mg 15mg 15 mg 15 mg 15 mg   INR 3.7 3.3 3.9 4.0 2.6 3.4 4.0 4.9 3.6 2.4 3.3 2.8 2.7 2.9   Notes Dec GLV   Zero GLV call Red x1 call Less GLV   call   Less  Protein drink redx1  self held x1 (misdose)   Dec vit K fall, apap  Call    call          Date 2/8 2/15 2/22 3/1 3/8 3/15 3/22 3/29 4/5 4/12 4/19 4/26 5/3 5/11   Total WeeklyDose 15mg 14mg 14 mg 15 mg 15 mg 15 mg 14mg 13 mg 14 mg 14 mg 14mg 13mg 15mg 14 mg   INR 4.0 4.0 2.8 2.9 2.9  4.2 3.9 3.3 2.9 3.0 3.8 2.4 3.8 2.5   Notes Call; Dec GLV call Call; Mis-dose call   Call; no GLV Inc GLV. Less protein, apap  redx1 call   Less GLV rec'd 4/27, call Dec GLV        Date 5/18 5/25 6/1 6/8 6/15 6/22 6/29 7/6 7/13 7/20 7/22 7/27  8/10  8/17   Total WeeklyDose 15 mg 15mg 16mg 15 mg 15 mg 15 mg 15 mg 15 mg 15mg 14 mg 15 mg 14 mg  14 mg  15 mg   INR 2.6 2.3 2.7 3.2 3.0 2.9 2.6 2.7 3.6 3.0 3.6 3.0  2.4  3.4   Notes   Inc GLV  call call call       call 7/14-- call call call  call  call  call      Date 8/25 8/31 9/7 9/12 9/19 9/26 10/3 10/10 10/17 10/24 10/31   Total WeeklyDose 14mg 13 mg 14 mg 17mg 15 mg 16mg 15mg 14 mg 13mg 17 mg 15mg   INR 4.3 2.8 1.7 2.9 2.1 3.4 3.8 2.4 1.8 3.7 4.5   Notes Dec GLV call Call refused enox; extra protein  Call; no protein drinks Call; less green tea, inc boostx1 Call; cranberries Redx1; call 1x miss Call  Less protein    1/1  Date 11/7 11/14 11/21 11/28 12/5 12/12 12/19 12/27 1/3 1/9 1/16/23 1/23 1/30/23   Total WeeklyDose 13 mg 14 mg 14 mg 14 mg 14 mg 14 mg 14mg 14 mg  13mg 13 mg 14 mg  14 mg 14 mg   INR 3.2 3.3 3.4 3.6 2.5 4.0 2.9 4.1 3.6 2.6 3.3 2.7 3.0   Notes  call redx1 One less protein shake   Inc GLV Decreased GLV W/o meloxicam  Tramadol,  meloxicam Tramadol,  meloxicam Tramadol,  meloxicam meloxicam     Date 2/7/23 2/13 2/20 2/27/23 3/6 3/13/23 3/23        Total WeeklyDose 14 mg 14 mg 14 mg 14 mg  14 mg 14 mg  14 mg        INR 2.9 3.0 3.6 2.6 3 3.5 3.4        Notes Call  call rec 2/21  Call   call Call            Phone Interview:  Tablet Strength: 2mg  Patient Contact Info: [phone] (Mobile) *preferred*  [email or web address]  Verbal Release Authorization signed on 4/7/21 -- may speak with Tammy Bai (friend: [phone]), Ismael Michele (brother: [phone])  Lab Contact Info: [facility] ([facility])  ** will call once monthly or if INR is out of range**   4/7/22 Scr: 0.8    Patient Findings    Comments:  Patient not contacted at this encounter.       Plan:  1. INR was therapeutic today at 3.4 (goal 2.5-3.5). Ms. Michele will continue warfarin 2 mg oral daily until recheck.    2. Repeat INR on 3/27//23  She prefers testing on Mondays.  3. Verbal information provided over the phone. Patient RBV dosing instructions, expresses understanding by teach back, and has no further questions at this time.  4. Lucie Michele understands the importance of calling the [facility] if she notices any s/sx of bleeding, stroke, or abnormal bruising, if any changes are made to her medications or medication doses (Rx, OTC, herbal), or if any upcoming procedures are scheduled. Lucie Michele will likewise let us know if any other changes, questions, or concerns arise regarding anticoagulation therapy. she understands the importance of seeking medical attention immediately if she experiences any falls, vehicle accidents, or abnormal bleeding or bruising. Lucie Michele voiced understanding of this information and confirms that she has the [facility]'s contact information. Otherwise, we will plan to contact the patient once monthly or if her INR is out of range.    Eric Hernandez CPhT  3/23/2023  10:09 EDT     I, Shekhar Hawkins, PharmD, have reviewed the note in full and agree with the assessment and plan.  03/23/23  13:10 EDT

## 2023-03-27 ENCOUNTER — ANTICOAGULATION VISIT (OUTPATIENT)
Dept: PHARMACY | Facility: HOSPITAL | Age: 78
End: 2023-03-27
Payer: MEDICARE

## 2023-03-27 DIAGNOSIS — I35.9 AORTIC VALVE DISEASE: Primary | ICD-10-CM

## 2023-03-27 LAB — INR PPP: 2.4

## 2023-03-27 RX ORDER — ALENDRONATE SODIUM 70 MG/1
TABLET ORAL
Qty: 12 TABLET | Refills: 0 | Status: SHIPPED | OUTPATIENT
Start: 2023-03-27

## 2023-03-27 NOTE — PROGRESS NOTES
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     Diet: 3x week: 1 cup of brussels sprouts or broccoli weekly, green beans (1/3/23)  Premier Protein (or Equate brand) meal replacement ~2x/week 1/3/23  Alcohol: Seldom  Tobacco: None  OTC Pain Medication: APAP     INR History:  Date 4/5 4/19 4/26 5/5 5/11 6/2 6/15 6/18 6/25 7/2 7/9 7/19 7/23 7/30   Total Weekly Dose 15mg 15mg 14mg 14mg 14mg 14mg 14mg 14mg 14mg 14mg 14mg 14mg 14mg 14mg   INR 2.6 3.9 3.9 2.7 3.0 3.6 2.7 2.9 2.9 2.6 2.9 3.1 2.5 2.3   Notes           decr GLV   recv'd 6/21; [facility]       incr GLV decr GLV      Date 8/6 8/13 8/20 8/27 9/3 9/10 9/17 9/24 10/1 10/8 10/15 10/22 10/29 11/5   Total WeeklyDose 14mg 15mg 15mg 14mg 14mg 15mg 14mg 14mg 14mg 14mg 14mg 14mg 15mg 16mg   INR 2.4 3.4 3.8 2.9 2.2 3.2 2.9 3.3 3.2 3.3 3.0 2.4 2.4 2.4   Notes decr GLV decr GLV       1xboost         call call 1x boost   incr VitK call 2x boost; call      Date 11/10 11/17 11/24 12/1 12/8 12/15 12/23 12/30 1/3/22 1/7 1/11 1/18 1/25 2/1   Total WeeklyDose 17mg 16mg 16mg 16mg 15mg 15 mg Pt did not call back 15mg 12 mg 13mg 15mg 15 mg 15 mg 15 mg   INR 3.7 3.3 3.9 4.0 2.6 3.4 4.0 4.9 3.6 2.4 3.3 2.8 2.7 2.9   Notes Dec GLV   Zero GLV call Red x1 call Less GLV   call   Less  Protein drink redx1  self held x1 (misdose)   Dec vit K fall, apap  Call    call          Date 2/8 2/15 2/22 3/1 3/8 3/15 3/22 3/29 4/5 4/12 4/19 4/26 5/3 5/11   Total WeeklyDose 15mg 14mg 14 mg 15 mg 15 mg 15 mg 14mg 13 mg 14 mg 14 mg 14mg 13mg 15mg 14 mg   INR 4.0 4.0 2.8 2.9 2.9  4.2 3.9 3.3 2.9 3.0 3.8 2.4 3.8 2.5   Notes Call; Dec GLV call Call; Mis-dose call   Call; no GLV Inc GLV. Less protein, apap  redx1 call   Less GLV rec'd 4/27, call Dec GLV        Date 5/18 5/25 6/1 6/8 6/15 6/22 6/29 7/6 7/13 7/20 7/22 7/27  8/10  8/17   Total WeeklyDose 15 mg 15mg 16mg 15 mg 15 mg 15 mg 15 mg 15 mg 15mg 14 mg 15 mg 14 mg  14 mg  15 mg   INR 2.6 2.3 2.7 3.2 3.0 2.9 2.6 2.7 3.6 3.0 3.6 3.0  2.4  3.4   Notes   Inc GLV  call call call       call 7/14-- call call call  call  call  call      Date 8/25 8/31 9/7 9/12 9/19 9/26 10/3 10/10 10/17 10/24 10/31   Total WeeklyDose 14mg 13 mg 14 mg 17mg 15 mg 16mg 15mg 14 mg 13mg 17 mg 15mg   INR 4.3 2.8 1.7 2.9 2.1 3.4 3.8 2.4 1.8 3.7 4.5   Notes Dec GLV call Call refused enox; extra protein  Call; no protein drinks Call; less green tea, inc boostx1 Call; cranberries Redx1; call 1x miss Call  Less protein    1/1  Date 11/7 11/14 11/21 11/28 12/5 12/12 12/19 12/27 1/3 1/9 1/16/23 1/23 1/30/23   Total WeeklyDose 13 mg 14 mg 14 mg 14 mg 14 mg 14 mg 14mg 14 mg  13mg 13 mg 14 mg  14 mg 14 mg   INR 3.2 3.3 3.4 3.6 2.5 4.0 2.9 4.1 3.6 2.6 3.3 2.7 3.0   Notes  call redx1 One less protein shake   Inc GLV Decreased GLV W/o meloxicam  Tramadol,  meloxicam Tramadol,  meloxicam Tramadol,  meloxicam meloxicam     Date 2/7/23 2/13 2/20 2/27/23 3/6 3/13/23 3/23 3/27       Total WeeklyDose 14 mg 14 mg 14 mg 14 mg  14 mg 14 mg  14 mg 14 mg       INR 2.9 3.0 3.6 2.6 3 3.5 3.4 2.4       Notes Call  call rec 2/21  Call   call Call   call         Phone Interview:  Tablet Strength: 2mg  Patient Contact Info: [phone] (Mobile) *preferred*  [email or web address]  Verbal Release Authorization signed on 4/7/21 -- may speak with Tammy Bai (friend: [phone]), Ismael Michele (brother: [phone])  Lab Contact Info: [facility] ([facility])  ** will call once monthly or if INR is out of range**   4/7/22 Scr: 0.8    Patient Findings  Negatives:  Signs/symptoms of thrombosis,  Signs/symptoms of bleeding, Laboratory test error suspected, Change in health, Change in alcohol use, Change in activity, Upcoming invasive procedure, Emergency department visit, Upcoming dental procedure, Missed doses, Extra doses, Change in medications, Change in diet/appetite, Hospital admission, Bruising, Other complaints   Comments:  All findings negative per pt.      Plan:  1. INR was SUBtherapeutic today at 2.4 (goal 2.5-3.5). Per Lizet Marinelli, JenniD, instructed Ms. Michele to boost tonights warfarin dose to 3 mg then continue warfarin 2 mg oral daily until recheck.    2. Repeat INR in one week, 4/3/23. She prefers testing on Mondays.  3. Verbal information provided over the phone. Patient RBV dosing instructions, expresses understanding by teach back, and has no further questions at this time.  4. Lucie Michele understands the importance of calling the [facility] if she notices any s/sx of bleeding, stroke, or abnormal bruising, if any changes are made to her medications or medication doses (Rx, OTC, herbal), or if any upcoming procedures are scheduled. Lucie Michele will likewise let us know if any other changes, questions, or concerns arise regarding anticoagulation therapy. she understands the importance of seeking medical attention immediately if she experiences any falls, vehicle accidents, or abnormal bleeding or bruising. Lucie Michele voiced understanding of this information and confirms that she has the [facility]'s contact information. Otherwise, we will plan to contact the patient once monthly or if her INR is out of range.    Eric Hernandez CPhT  3/27/2023  11:16 EDT     I, Ngoc Brooks, PharmD, have reviewed the note in full and agree with the assessment and plan.  03/27/23  12:19 EDT

## 2023-03-27 NOTE — TELEPHONE ENCOUNTER
Rx Refill Note    Requested Prescriptions     Pending Prescriptions Disp Refills   • alendronate (FOSAMAX) 70 MG tablet [Pharmacy Med Name: ALENDRONATE 70MG TABLETS] 12 tablet 3     Sig: TAKE 1 TABLET BY MOUTH EVERY 7 DAYS        Last office visit with prescribing clinician: 2/6/2023      Next office visit with prescribing clinician: 5/3/2023   Last labs:   Last refill:    Pharmacy [facility]

## 2023-03-31 RX ORDER — ALENDRONATE SODIUM 70 MG/1
TABLET ORAL
Qty: 12 TABLET | Refills: 0 | OUTPATIENT
Start: 2023-03-31

## 2023-04-03 ENCOUNTER — ANTICOAGULATION VISIT (OUTPATIENT)
Dept: PHARMACY | Facility: HOSPITAL | Age: 78
End: 2023-04-03
Payer: MEDICARE

## 2023-04-03 ENCOUNTER — TELEPHONE (OUTPATIENT)
Dept: CARDIOLOGY | Facility: CLINIC | Age: 78
End: 2023-04-03
Payer: MEDICARE

## 2023-04-03 DIAGNOSIS — M85.80 OSTEOPENIA, UNSPECIFIED LOCATION: ICD-10-CM

## 2023-04-03 DIAGNOSIS — R73.03 PREDIABETES: ICD-10-CM

## 2023-04-03 DIAGNOSIS — I35.9 AORTIC VALVE DISEASE: Primary | ICD-10-CM

## 2023-04-03 DIAGNOSIS — E03.9 ACQUIRED HYPOTHYROIDISM: ICD-10-CM

## 2023-04-03 DIAGNOSIS — M25.552 LEFT HIP PAIN: ICD-10-CM

## 2023-04-03 DIAGNOSIS — Z13.820 OSTEOPOROSIS SCREENING: ICD-10-CM

## 2023-04-03 DIAGNOSIS — I10 PRIMARY HYPERTENSION: ICD-10-CM

## 2023-04-03 DIAGNOSIS — E55.9 VITAMIN D DEFICIENCY: ICD-10-CM

## 2023-04-03 DIAGNOSIS — N18.31 STAGE 3A CHRONIC KIDNEY DISEASE: ICD-10-CM

## 2023-04-03 DIAGNOSIS — I10 ESSENTIAL HYPERTENSION, BENIGN: ICD-10-CM

## 2023-04-03 DIAGNOSIS — E07.9 DISORDER OF THYROID: ICD-10-CM

## 2023-04-03 LAB — INR PPP: 2.8

## 2023-04-03 RX ORDER — METOPROLOL SUCCINATE 100 MG/1
100 TABLET, EXTENDED RELEASE ORAL DAILY
Qty: 90 TABLET | Refills: 2 | Status: SHIPPED | OUTPATIENT
Start: 2023-04-03

## 2023-04-03 NOTE — TELEPHONE ENCOUNTER
metoprolol succinate XL (TOPROL-XL) 100 MG 24 hr tablet    [id number] - [place], [place] - [address] AT [facility] [address] - [phone]  - [phone] FX    SHE IS OUT

## 2023-04-03 NOTE — PROGRESS NOTES
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     Diet: 3x week: 1 cup of brussels sprouts or broccoli weekly, green beans (1/3/23)  Premier Protein (or Equate brand) meal replacement ~2x/week 1/3/23  Alcohol: Seldom  Tobacco: None  OTC Pain Medication: APAP     INR History:  Date 4/5 4/19 4/26 5/5 5/11 6/2 6/15 6/18 6/25 7/2 7/9 7/19 7/23 7/30   Total Weekly Dose 15mg 15mg 14mg 14mg 14mg 14mg 14mg 14mg 14mg 14mg 14mg 14mg 14mg 14mg   INR 2.6 3.9 3.9 2.7 3.0 3.6 2.7 2.9 2.9 2.6 2.9 3.1 2.5 2.3   Notes           decr GLV   recv'd 6/21; [facility]       incr GLV decr GLV      Date 8/6 8/13 8/20 8/27 9/3 9/10 9/17 9/24 10/1 10/8 10/15 10/22 10/29 11/5   Total WeeklyDose 14mg 15mg 15mg 14mg 14mg 15mg 14mg 14mg 14mg 14mg 14mg 14mg 15mg 16mg   INR 2.4 3.4 3.8 2.9 2.2 3.2 2.9 3.3 3.2 3.3 3.0 2.4 2.4 2.4   Notes decr GLV decr GLV       1xboost         call call 1x boost   incr VitK call 2x boost; call      Date 11/10 11/17 11/24 12/1 12/8 12/15 12/23 12/30 1/3/22 1/7 1/11 1/18 1/25 2/1   Total WeeklyDose 17mg 16mg 16mg 16mg 15mg 15 mg Pt did not call back 15mg 12 mg 13mg 15mg 15 mg 15 mg 15 mg   INR 3.7 3.3 3.9 4.0 2.6 3.4 4.0 4.9 3.6 2.4 3.3 2.8 2.7 2.9   Notes Dec GLV   Zero GLV call Red x1 call Less GLV   call   Less  Protein drink redx1  self held x1 (misdose)   Dec vit K fall, apap  Call    call          Date 2/8 2/15 2/22 3/1 3/8 3/15 3/22 3/29 4/5 4/12 4/19 4/26 5/3 5/11   Total WeeklyDose 15mg 14mg 14 mg 15 mg 15 mg 15 mg 14mg 13 mg 14 mg 14 mg 14mg 13mg 15mg 14 mg   INR 4.0 4.0 2.8 2.9 2.9  4.2 3.9 3.3 2.9 3.0 3.8 2.4 3.8 2.5   Notes Call; Dec GLV call Call; Mis-dose call   Call; no GLV Inc GLV. Less protein, apap  redx1 call   Less GLV rec'd 4/27, call Dec GLV        Date 5/18 5/25 6/1 6/8 6/15 6/22 6/29 7/6 7/13 7/20 7/22 7/27  8/10  8/17   Total WeeklyDose 15 mg 15mg 16mg 15 mg 15 mg 15 mg 15 mg 15 mg 15mg 14 mg 15 mg 14 mg  14 mg  15 mg   INR 2.6 2.3 2.7 3.2 3.0 2.9 2.6 2.7 3.6 3.0 3.6 3.0  2.4  3.4   Notes   Inc GLV  call call call       call 7/14-- call call call  call  call  call      Date 8/25 8/31 9/7 9/12 9/19 9/26 10/3 10/10 10/17 10/24 10/31   Total WeeklyDose 14mg 13 mg 14 mg 17mg 15 mg 16mg 15mg 14 mg 13mg 17 mg 15mg   INR 4.3 2.8 1.7 2.9 2.1 3.4 3.8 2.4 1.8 3.7 4.5   Notes Dec GLV call Call refused enox; extra protein  Call; no protein drinks Call; less green tea, inc boostx1 Call; cranberries Redx1; call 1x miss Call  Less protein    1/1  Date 11/7 11/14 11/21 11/28 12/5 12/12 12/19 12/27 1/3 1/9 1/16/23 1/23 1/30/23   Total WeeklyDose 13 mg 14 mg 14 mg 14 mg 14 mg 14 mg 14mg 14 mg  13mg 13 mg 14 mg  14 mg 14 mg   INR 3.2 3.3 3.4 3.6 2.5 4.0 2.9 4.1 3.6 2.6 3.3 2.7 3.0   Notes  call redx1 One less protein shake   Inc GLV Decreased GLV W/o meloxicam  Tramadol,  meloxicam Tramadol,  meloxicam Tramadol,  meloxicam meloxicam     Date 2/7/23 2/13 2/20 2/27/23 3/6 3/13/23 3/23 3/27 4/3/23      Total WeeklyDose 14 mg 14 mg 14 mg 14 mg  14 mg 14 mg  14 mg 14 mg 15 mg       INR 2.9 3.0 3.6 2.6 3 3.5 3.4 2.4 2.8      Notes Call  call rec 2/21  Call   call Call   call Call         Phone Interview:  Tablet Strength: 2mg  Patient Contact Info: [phone] (Mobile) *preferred*  [email or web address]  Verbal Release Authorization signed on 4/7/21 -- may speak with Tammy Bai (friend: [phone]), Ismael Ryne (brother: [phone])  Lab Contact Info: [facility] ([facility])  ** will call once monthly or if INR is out of range**   4/7/22 Scr: 0.8    Patient Findings  Negatives:   Signs/symptoms of thrombosis, Signs/symptoms of bleeding, Laboratory test error suspected, Change in health, Change in alcohol use, Change in activity, Upcoming invasive procedure, Emergency department visit, Upcoming dental procedure, Missed doses, Extra doses, Change in medications, Change in diet/appetite, Hospital admission, Bruising, Other complaints   Comments:  All findings negative per pt.      Plan:  1. INR was therapeutic today at 2.8 (goal 2.5-3.5). received results verbally from patient. Instructed Ms. Michele to continue warfarin 2 mg oral daily until recheck.    2. Repeat INR in one week, 4/10/23. She prefers testing on Mondays.  3. Verbal information provided over the phone. Patient RBV dosing instructions, expresses understanding by teach back, and has no further questions at this time.  4. Lucie Michele understands the importance of calling the [facility] if she notices any s/sx of bleeding, stroke, or abnormal bruising, if any changes are made to her medications or medication doses (Rx, OTC, herbal), or if any upcoming procedures are scheduled. Lucie Michele will likewise let us know if any other changes, questions, or concerns arise regarding anticoagulation therapy. she understands the importance of seeking medical attention immediately if she experiences any falls, vehicle accidents, or abnormal bleeding or bruising. Lucie Michele voiced understanding of this information and confirms that she has the [facility]'s contact information. Otherwise, we will plan to contact the patient once monthly or if her INR is out of range.    [place], Pharmacy Technician  4/3/2023  11:34 EDT       I, Ngoc Brooks, PharmD, have reviewed the note in full and agree with the assessment and plan.  04/03/23  12:18 EDT

## 2023-04-04 DIAGNOSIS — E03.9 HYPOTHYROIDISM, UNSPECIFIED TYPE: ICD-10-CM

## 2023-04-04 RX ORDER — LEVOTHYROXINE SODIUM 0.12 MG/1
125 TABLET ORAL DAILY
Qty: 90 TABLET | Refills: 1 | OUTPATIENT
Start: 2023-04-04

## 2023-04-04 RX ORDER — MELOXICAM 7.5 MG/1
7.5 TABLET ORAL DAILY
Qty: 90 TABLET | Refills: 1 | OUTPATIENT
Start: 2023-04-04

## 2023-04-05 DIAGNOSIS — N18.31 STAGE 3A CHRONIC KIDNEY DISEASE: ICD-10-CM

## 2023-04-05 DIAGNOSIS — I10 PRIMARY HYPERTENSION: ICD-10-CM

## 2023-04-05 DIAGNOSIS — E55.9 VITAMIN D DEFICIENCY: ICD-10-CM

## 2023-04-05 DIAGNOSIS — M85.80 OSTEOPENIA, UNSPECIFIED LOCATION: ICD-10-CM

## 2023-04-05 DIAGNOSIS — M25.552 LEFT HIP PAIN: ICD-10-CM

## 2023-04-05 DIAGNOSIS — I10 ESSENTIAL HYPERTENSION, BENIGN: ICD-10-CM

## 2023-04-05 DIAGNOSIS — Z13.820 OSTEOPOROSIS SCREENING: ICD-10-CM

## 2023-04-05 DIAGNOSIS — E07.9 DISORDER OF THYROID: ICD-10-CM

## 2023-04-05 DIAGNOSIS — R73.03 PREDIABETES: ICD-10-CM

## 2023-04-05 DIAGNOSIS — E03.9 ACQUIRED HYPOTHYROIDISM: ICD-10-CM

## 2023-04-05 RX ORDER — OXYBUTYNIN CHLORIDE 5 MG/1
TABLET ORAL
Qty: 180 TABLET | Refills: 0 | Status: SHIPPED | OUTPATIENT
Start: 2023-04-05

## 2023-04-05 NOTE — TELEPHONE ENCOUNTER
Rx Refill Note    Requested Prescriptions     Pending Prescriptions Disp Refills   • oxybutynin (DITROPAN) 5 MG tablet [Pharmacy Med Name: OXYBUTYNIN 5MG TABLETS] 180 tablet 0     Sig: TAKE 1 TABLET BY MOUTH TWICE DAILY        Last office visit with prescribing clinician: 2/6/2023      Next office visit with prescribing clinician: 5/3/2023   Last labs:   Last refill: 04/14/2022   Pharmacy (be sure to add in Epic). correct

## 2023-04-08 DIAGNOSIS — N18.31 STAGE 3A CHRONIC KIDNEY DISEASE: ICD-10-CM

## 2023-04-08 DIAGNOSIS — E55.9 VITAMIN D DEFICIENCY: ICD-10-CM

## 2023-04-08 DIAGNOSIS — M25.552 LEFT HIP PAIN: ICD-10-CM

## 2023-04-08 DIAGNOSIS — M85.80 OSTEOPENIA, UNSPECIFIED LOCATION: ICD-10-CM

## 2023-04-08 DIAGNOSIS — E03.9 ACQUIRED HYPOTHYROIDISM: ICD-10-CM

## 2023-04-08 DIAGNOSIS — I10 ESSENTIAL HYPERTENSION, BENIGN: ICD-10-CM

## 2023-04-08 DIAGNOSIS — R73.03 PREDIABETES: ICD-10-CM

## 2023-04-08 DIAGNOSIS — E07.9 DISORDER OF THYROID: ICD-10-CM

## 2023-04-08 DIAGNOSIS — Z13.820 OSTEOPOROSIS SCREENING: ICD-10-CM

## 2023-04-08 DIAGNOSIS — I10 PRIMARY HYPERTENSION: ICD-10-CM

## 2023-04-10 ENCOUNTER — ANTICOAGULATION VISIT (OUTPATIENT)
Dept: PHARMACY | Facility: HOSPITAL | Age: 78
End: 2023-04-10
Payer: MEDICARE

## 2023-04-10 DIAGNOSIS — I35.9 AORTIC VALVE DISEASE: Primary | ICD-10-CM

## 2023-04-10 LAB — INR PPP: 2.5

## 2023-04-10 RX ORDER — OXYBUTYNIN CHLORIDE 5 MG/1
TABLET ORAL
Qty: 180 TABLET | Refills: 0 | OUTPATIENT
Start: 2023-04-10

## 2023-04-10 NOTE — PROGRESS NOTES
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     Diet: 3x week: 1 cup of brussels sprouts or broccoli weekly, green beans (1/3/23)  Premier Protein (or Equate brand) meal replacement ~2x/week 1/3/23  Alcohol: Seldom  Tobacco: None  OTC Pain Medication: APAP     INR History:  Date 4/5 4/19 4/26 5/5 5/11 6/2 6/15 6/18 6/25 7/2 7/9 7/19 7/23 7/30   Total Weekly Dose 15mg 15mg 14mg 14mg 14mg 14mg 14mg 14mg 14mg 14mg 14mg 14mg 14mg 14mg   INR 2.6 3.9 3.9 2.7 3.0 3.6 2.7 2.9 2.9 2.6 2.9 3.1 2.5 2.3   Notes           decr GLV   recv'd 6/21; [facility]       incr GLV decr GLV      Date 8/6 8/13 8/20 8/27 9/3 9/10 9/17 9/24 10/1 10/8 10/15 10/22 10/29 11/5   Total WeeklyDose 14mg 15mg 15mg 14mg 14mg 15mg 14mg 14mg 14mg 14mg 14mg 14mg 15mg 16mg   INR 2.4 3.4 3.8 2.9 2.2 3.2 2.9 3.3 3.2 3.3 3.0 2.4 2.4 2.4   Notes decr GLV decr GLV       1xboost         call call 1x boost   incr VitK call 2x boost; call      Date 11/10 11/17 11/24 12/1 12/8 12/15 12/23 12/30 1/3/22 1/7 1/11 1/18 1/25 2/1   Total WeeklyDose 17mg 16mg 16mg 16mg 15mg 15 mg Pt did not call back 15mg 12 mg 13mg 15mg 15 mg 15 mg 15 mg   INR 3.7 3.3 3.9 4.0 2.6 3.4 4.0 4.9 3.6 2.4 3.3 2.8 2.7 2.9   Notes Dec GLV   Zero GLV call Red x1 call Less GLV   call   Less  Protein drink redx1  self held x1 (misdose)   Dec vit K fall, apap  Call    call          Date 2/8 2/15 2/22 3/1 3/8 3/15 3/22 3/29 4/5 4/12 4/19 4/26 5/3 5/11   Total WeeklyDose 15mg 14mg 14 mg 15 mg 15 mg 15 mg 14mg 13 mg 14 mg 14 mg 14mg 13mg 15mg 14 mg   INR 4.0 4.0 2.8 2.9 2.9  4.2 3.9 3.3 2.9 3.0 3.8 2.4 3.8 2.5   Notes Call; Dec GLV call Call; Mis-dose call   Call; no GLV Inc GLV. Less protein, apap  redx1 call   Less GLV rec'd 4/27, call Dec GLV        Date 5/18 5/25 6/1 6/8 6/15 6/22 6/29 7/6 7/13 7/20 7/22 7/27  8/10  8/17   Total WeeklyDose 15 mg 15mg 16mg 15 mg 15 mg 15 mg 15 mg 15 mg 15mg 14 mg 15 mg 14 mg  14 mg  15 mg   INR 2.6 2.3 2.7 3.2 3.0 2.9 2.6 2.7 3.6 3.0 3.6 3.0  2.4  3.4   Notes   Inc GLV  call call call       call 7/14-- call call call  call  call  call      Date 8/25 8/31 9/7 9/12 9/19 9/26 10/3 10/10 10/17 10/24 10/31   Total WeeklyDose 14mg 13 mg 14 mg 17mg 15 mg 16mg 15mg 14 mg 13mg 17 mg 15mg   INR 4.3 2.8 1.7 2.9 2.1 3.4 3.8 2.4 1.8 3.7 4.5   Notes Dec GLV call Call refused enox; extra protein  Call; no protein drinks Call; less green tea, inc boostx1 Call; cranberries Redx1; call 1x miss Call  Less protein    1/1  Date 11/7 11/14 11/21 11/28 12/5 12/12 12/19 12/27 1/3 1/9 1/16/23 1/23 1/30/23   Total WeeklyDose 13 mg 14 mg 14 mg 14 mg 14 mg 14 mg 14mg 14 mg  13mg 13 mg 14 mg  14 mg 14 mg   INR 3.2 3.3 3.4 3.6 2.5 4.0 2.9 4.1 3.6 2.6 3.3 2.7 3.0   Notes  call redx1 One less protein shake   Inc GLV Decreased GLV W/o meloxicam  Tramadol,  meloxicam Tramadol,  meloxicam Tramadol,  meloxicam meloxicam     Date 2/7/23 2/13 2/20 2/27/23 3/6 3/13/23 3/23 3/27 4/3/23 4/10/23     Total WeeklyDose 14 mg 14 mg 14 mg 14 mg  14 mg 14 mg  14 mg 14 mg 15 mg  14mg     INR 2.9 3.0 3.6 2.6 3 3.5 3.4 2.4 2.8 2.5     Notes Call  call rec 2/21  Call   call Call   call Call  call       Phone Interview:  Tablet Strength: 2mg  Patient Contact Info: [phone] (Mobile) *preferred*  [email or web address]  Verbal Release Authorization signed on 4/7/21 -- may speak with Tammy Bai (friend: [phone]), Ismael Ryne (brother: [phone])  Lab Contact Info: [facility] ([facility])  ** will call once monthly or if INR is out of range**   4/7/22 Scr: 0.8    Patient  "Findings  Negatives:  Signs/symptoms of thrombosis, Signs/symptoms of bleeding, Laboratory test error suspected, Change in health, Change in alcohol use, Change in activity, Upcoming invasive procedure, Emergency department visit, Upcoming dental procedure, Missed doses, Extra doses, Change in medications, Change in diet/appetite, Hospital admission, Bruising, Other complaints   Comments:  All findings negative per pt.      Plan:  1. INR was therapeutic today at 2.5 (goal 2.5-3.5). Received results verbally from patient. Instructed Ms. Michele to take 3mg today continue warfarin 2 mg oral daily until recheck. Patient was concerned about it being \"on the low side\".  2. Repeat INR in one week, 4/17/23. She prefers testing on Mondays.  3. Verbal information provided over the phone. Patient RBV dosing instructions, expresses understanding by teach back, and has no further questions at this time.  4. Lucie Michele understands the importance of calling the [facility] if she notices any s/sx of bleeding, stroke, or abnormal bruising, if any changes are made to her medications or medication doses (Rx, OTC, herbal), or if any upcoming procedures are scheduled. Lucie Michele will likewise let us know if any other changes, questions, or concerns arise regarding anticoagulation therapy. she understands the importance of seeking medical attention immediately if she experiences any falls, vehicle accidents, or abnormal bleeding or bruising. Lucie Michele voiced understanding of this information and confirms that she has the [facility]'s contact information. Otherwise, we will plan to contact the patient once monthly or if her INR is out of range.    Ramana Bagley, PharmD  4/10/2023  11:44 EDT         "

## 2023-04-13 DIAGNOSIS — Z13.820 OSTEOPOROSIS SCREENING: ICD-10-CM

## 2023-04-13 DIAGNOSIS — E55.9 VITAMIN D DEFICIENCY: ICD-10-CM

## 2023-04-13 DIAGNOSIS — I10 ESSENTIAL HYPERTENSION, BENIGN: ICD-10-CM

## 2023-04-13 DIAGNOSIS — M85.80 OSTEOPENIA, UNSPECIFIED LOCATION: ICD-10-CM

## 2023-04-13 DIAGNOSIS — E03.9 ACQUIRED HYPOTHYROIDISM: ICD-10-CM

## 2023-04-13 DIAGNOSIS — N18.31 STAGE 3A CHRONIC KIDNEY DISEASE: ICD-10-CM

## 2023-04-13 DIAGNOSIS — R73.03 PREDIABETES: ICD-10-CM

## 2023-04-13 DIAGNOSIS — E07.9 DISORDER OF THYROID: ICD-10-CM

## 2023-04-13 DIAGNOSIS — M25.552 LEFT HIP PAIN: ICD-10-CM

## 2023-04-13 DIAGNOSIS — I10 PRIMARY HYPERTENSION: ICD-10-CM

## 2023-04-14 NOTE — TELEPHONE ENCOUNTER
Rx Refill Note    Requested Prescriptions     Pending Prescriptions Disp Refills   • omeprazole (priLOSEC) 20 MG capsule [Pharmacy Med Name: OMEPRAZOLE 20MG CAPSULES] 30 capsule 0     Sig: TAKE 1 CAPSULE BY MOUTH DAILY        Last office visit with prescribing clinician: 2/6/2023      Next office visit with prescribing clinician: 5/3/2023   Last labs:   Last refill: needs   Pharmacy (be sure to add in Epic). correct

## 2023-04-17 ENCOUNTER — ANTICOAGULATION VISIT (OUTPATIENT)
Dept: PHARMACY | Facility: HOSPITAL | Age: 78
End: 2023-04-17
Payer: MEDICARE

## 2023-04-17 ENCOUNTER — CLINICAL SUPPORT (OUTPATIENT)
Dept: CARDIOLOGY | Facility: CLINIC | Age: 78
End: 2023-04-17
Payer: MEDICARE

## 2023-04-17 DIAGNOSIS — Z86.79 S/P ASCENDING AORTIC ANEURYSM REPAIR: ICD-10-CM

## 2023-04-17 DIAGNOSIS — Z98.890 S/P ASCENDING AORTIC ANEURYSM REPAIR: ICD-10-CM

## 2023-04-17 DIAGNOSIS — I35.9 AORTIC VALVE DISEASE: Primary | ICD-10-CM

## 2023-04-17 DIAGNOSIS — I10 PRIMARY HYPERTENSION: Primary | ICD-10-CM

## 2023-04-17 LAB — INR PPP: 2.3 (ref 0.9–1.1)

## 2023-04-17 RX ORDER — OMEPRAZOLE 20 MG/1
20 CAPSULE, DELAYED RELEASE ORAL DAILY
Qty: 90 CAPSULE | Refills: 1 | Status: SHIPPED | OUTPATIENT
Start: 2023-04-17

## 2023-04-17 NOTE — PROGRESS NOTES
Capillary Blood Specimen Collection  Capillary blood collection performed in [facility] Ema Hamilton MA. Patient tolerated the procedure well without complications.   04/17/23   Ema Hamilton MA

## 2023-04-17 NOTE — PROGRESS NOTES
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     Diet: 3x week: 1 cup of brussels sprouts or broccoli weekly, green beans (1/3/23)  Premier Protein (or Equate brand) meal replacement ~2x/week 1/3/23  Alcohol: Seldom  Tobacco: None  OTC Pain Medication: APAP     INR History:  Date 4/5 4/19 4/26 5/5 5/11 6/2 6/15 6/18 6/25 7/2 7/9 7/19 7/23 7/30   Total Weekly Dose 15mg 15mg 14mg 14mg 14mg 14mg 14mg 14mg 14mg 14mg 14mg 14mg 14mg 14mg   INR 2.6 3.9 3.9 2.7 3.0 3.6 2.7 2.9 2.9 2.6 2.9 3.1 2.5 2.3   Notes           decr GLV   recv'd 6/21; [facility]       incr GLV decr GLV      Date 8/6 8/13 8/20 8/27 9/3 9/10 9/17 9/24 10/1 10/8 10/15 10/22 10/29 11/5   Total WeeklyDose 14mg 15mg 15mg 14mg 14mg 15mg 14mg 14mg 14mg 14mg 14mg 14mg 15mg 16mg   INR 2.4 3.4 3.8 2.9 2.2 3.2 2.9 3.3 3.2 3.3 3.0 2.4 2.4 2.4   Notes decr GLV decr GLV       1xboost         call call 1x boost   incr VitK call 2x boost; call      Date 11/10 11/17 11/24 12/1 12/8 12/15 12/23 12/30 1/3/22 1/7 1/11 1/18 1/25 2/1   Total WeeklyDose 17mg 16mg 16mg 16mg 15mg 15 mg Pt did not call back 15mg 12 mg 13mg 15mg 15 mg 15 mg 15 mg   INR 3.7 3.3 3.9 4.0 2.6 3.4 4.0 4.9 3.6 2.4 3.3 2.8 2.7 2.9   Notes Dec GLV   Zero GLV call Red x1 call Less GLV   call   Less  Protein drink redx1  self held x1 (misdose)   Dec vit K fall, apap  Call    call          Date 2/8 2/15 2/22 3/1 3/8 3/15 3/22 3/29 4/5 4/12 4/19 4/26 5/3 5/11   Total WeeklyDose 15mg 14mg 14 mg 15 mg 15 mg 15 mg 14mg 13 mg 14 mg 14 mg 14mg 13mg 15mg 14 mg   INR 4.0 4.0 2.8 2.9 2.9  4.2 3.9 3.3 2.9 3.0 3.8 2.4 3.8 2.5   Notes Call; Dec GLV call Call; Mis-dose call   Call; no GLV Inc GLV. Less protein, apap  redx1 call   Less GLV rec'd 4/27, call Dec GLV        Date 5/18 5/25 6/1 6/8 6/15 6/22 6/29 7/6 7/13 7/20 7/22 7/27  8/10  8/17   Total WeeklyDose 15 mg 15mg 16mg 15 mg 15 mg 15 mg 15 mg 15 mg 15mg 14 mg 15 mg 14 mg  14 mg  15 mg   INR 2.6 2.3 2.7 3.2 3.0 2.9 2.6 2.7 3.6 3.0 3.6 3.0  2.4  3.4   Notes   Inc GLV  call call call       call 7/14-- call call call  call  call  call      Date 8/25 8/31 9/7 9/12 9/19 9/26 10/3 10/10 10/17 10/24 10/31   Total WeeklyDose 14mg 13 mg 14 mg 17mg 15 mg 16mg 15mg 14 mg 13mg 17 mg 15mg   INR 4.3 2.8 1.7 2.9 2.1 3.4 3.8 2.4 1.8 3.7 4.5   Notes Dec GLV call Call refused enox; extra protein  Call; no protein drinks Call; less green tea, inc boostx1 Call; cranberries Redx1; call 1x miss Call  Less protein    1/1  Date 11/7 11/14 11/21 11/28 12/5 12/12 12/19 12/27 1/3 1/9 1/16/23 1/23 1/30/23   Total WeeklyDose 13 mg 14 mg 14 mg 14 mg 14 mg 14 mg 14mg 14 mg  13mg 13 mg 14 mg  14 mg 14 mg   INR 3.2 3.3 3.4 3.6 2.5 4.0 2.9 4.1 3.6 2.6 3.3 2.7 3.0   Notes  call redx1 One less protein shake   Inc GLV Decreased GLV W/o meloxicam  Tramadol,  meloxicam Tramadol,  meloxicam Tramadol,  meloxicam meloxicam     Date 2/7/23 2/13 2/20 2/27/23 3/6 3/13/23 3/23 3/27 4/3/23 4/10/23 4/17/23    Total WeeklyDose 14 mg 14 mg 14 mg 14 mg  14 mg 14 mg  14 mg 14 mg 15 mg  14mg 13 mg    INR 2.9 3.0 3.6 2.6 3 3.5 3.4 2.4 2.8 2.5 2.3    Notes Call  call rec 2/21  Call   call Call   call Call  call Missed dose      Phone Interview:  Tablet Strength: 2mg  Patient Contact Info: [phone] (Mobile) *preferred*  [email or web address]  Verbal Release Authorization signed on 4/7/21 -- may speak with Tammy Bai (friend: [phone]), Ismael Michele (brother: [phone])  Lab Contact Info: [facility] ([facility])  ** will call once monthly or if INR is out of range**   4/7/22 Scr:  0.8      Patient Findings  Positives:  Missed doses   Negatives:  Signs/symptoms of thrombosis, Signs/symptoms of bleeding, Laboratory test error suspected, Change in health, Change in alcohol use, Change in activity, Upcoming invasive procedure, Emergency department visit, Upcoming dental procedure, Extra doses, Change in medications, Change in diet/appetite, Hospital admission, Bruising, Other complaints   Comments:  Patient forgot to take her warfarin Friday 4/14     All other findings negative  per patient        Plan:  1. INR was SUBtherapeutic today at 2.3 (goal 2.5-3.5). Received results verbally from patient. Per Herberth Khanna [facility], Instructed Ms. Michele to BOOST tonight's dose of warfarin to 3 mg then continue warfarin 2 mg oral daily until recheck.  2. Repeat INR in one week, 4/24/23. She prefers testing on Mondays.  3. Verbal information provided over the phone. Patient RBV dosing instructions, expresses understanding by teach back, and has no further questions at this time.  4. Lucie Michele understands the importance of calling the [facility] if she notices any s/sx of bleeding, stroke, or abnormal bruising, if any changes are made to her medications or medication doses (Rx, OTC, herbal), or if any upcoming procedures are scheduled. Lucie Michele will likewise let us know if any other changes, questions, or concerns arise regarding anticoagulation therapy. she understands the importance of seeking medical attention immediately if she experiences any falls, vehicle accidents, or abnormal bleeding or bruising. Lucie Michele voiced understanding of this information and confirms that she has the [facility]'s contact information. Otherwise, we will plan to contact the patient once monthly or if her INR is out of range.    Harpal Branch, Pharmacy Technician  4/17/2023  10:01 EDT     I, Herberth Khanna, [facility], assisted in the patient interview. I have reviewed the note in  full and agree with the assessment and plan.  04/17/23  11:17 EDT

## 2023-04-24 ENCOUNTER — ANTICOAGULATION VISIT (OUTPATIENT)
Dept: PHARMACY | Facility: HOSPITAL | Age: 78
End: 2023-04-24
Payer: MEDICARE

## 2023-04-24 ENCOUNTER — CLINICAL SUPPORT (OUTPATIENT)
Dept: CARDIOLOGY | Facility: CLINIC | Age: 78
End: 2023-04-24
Payer: MEDICARE

## 2023-04-24 DIAGNOSIS — Z98.890 S/P ASCENDING AORTIC ANEURYSM REPAIR: Primary | ICD-10-CM

## 2023-04-24 DIAGNOSIS — Z86.79 S/P ASCENDING AORTIC ANEURYSM REPAIR: Primary | ICD-10-CM

## 2023-04-24 DIAGNOSIS — I35.9 AORTIC VALVE DISEASE: Primary | ICD-10-CM

## 2023-04-24 LAB — INR PPP: 3.2 (ref 0.9–1.1)

## 2023-04-24 NOTE — PROGRESS NOTES
Capillary Blood Specimen Collection  Capillary blood collection performed in clinic by Ema aHmilton MA. Patient tolerated the procedure well without complications.   04/24/23   Ema Hamilton MA

## 2023-04-24 NOTE — PROGRESS NOTES
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     Diet: 3x week: 1 cup of brussels sprouts or broccoli weekly, green beans (1/3/23)  Premier Protein (or Equate brand) meal replacement ~2x/week 1/3/23  Alcohol: Seldom  Tobacco: None  OTC Pain Medication: APAP     INR History:  Date 4/5 4/19 4/26 5/5 5/11 6/2 6/15 6/18 6/25 7/2 7/9 7/19 7/23 7/30   Total Weekly Dose 15mg 15mg 14mg 14mg 14mg 14mg 14mg 14mg 14mg 14mg 14mg 14mg 14mg 14mg   INR 2.6 3.9 3.9 2.7 3.0 3.6 2.7 2.9 2.9 2.6 2.9 3.1 2.5 2.3   Notes           decr GLV   recv'd 6/21; [facility]       incr GLV decr GLV      Date 8/6 8/13 8/20 8/27 9/3 9/10 9/17 9/24 10/1 10/8 10/15 10/22 10/29 11/5   Total WeeklyDose 14mg 15mg 15mg 14mg 14mg 15mg 14mg 14mg 14mg 14mg 14mg 14mg 15mg 16mg   INR 2.4 3.4 3.8 2.9 2.2 3.2 2.9 3.3 3.2 3.3 3.0 2.4 2.4 2.4   Notes decr GLV decr GLV       1xboost         call call 1x boost   incr VitK call 2x boost; call      Date 11/10 11/17 11/24 12/1 12/8 12/15 12/23 12/30 1/3/22 1/7 1/11 1/18 1/25 2/1   Total WeeklyDose 17mg 16mg 16mg 16mg 15mg 15 mg Pt did not call back 15mg 12 mg 13mg 15mg 15 mg 15 mg 15 mg   INR 3.7 3.3 3.9 4.0 2.6 3.4 4.0 4.9 3.6 2.4 3.3 2.8 2.7 2.9   Notes Dec GLV   Zero GLV call Red x1 call Less GLV   call   Less  Protein drink redx1  self held x1 (misdose)   Dec vit K fall, apap  Call    call          Date 2/8 2/15 2/22 3/1 3/8 3/15 3/22 3/29 4/5 4/12 4/19 4/26 5/3 5/11   Total WeeklyDose 15mg 14mg 14 mg 15 mg 15 mg 15 mg 14mg 13 mg 14 mg 14 mg 14mg 13mg 15mg 14 mg   INR 4.0 4.0 2.8 2.9 2.9  4.2 3.9 3.3 2.9 3.0 3.8 2.4 3.8 2.5   Notes Call; Dec GLV call Call; Mis-dose call   Call; no GLV Inc GLV. Less protein, apap  redx1 call   Less GLV rec'd 4/27, call Dec GLV        Date 5/18 5/25 6/1 6/8 6/15 6/22 6/29 7/6 7/13 7/20 7/22 7/27  8/10  8/17   Total WeeklyDose 15 mg 15mg 16mg 15 mg 15 mg 15 mg 15 mg 15 mg 15mg 14 mg 15 mg 14 mg  14 mg  15 mg   INR 2.6 2.3 2.7 3.2 3.0 2.9 2.6 2.7 3.6 3.0 3.6 3.0  2.4  3.4   Notes   Inc GLV  call call call       call 7/14-- call call call  call  call  call      Date 8/25 8/31 9/7 9/12 9/19 9/26 10/3 10/10 10/17 10/24 10/31   Total WeeklyDose 14mg 13 mg 14 mg 17mg 15 mg 16mg 15mg 14 mg 13mg 17 mg 15mg   INR 4.3 2.8 1.7 2.9 2.1 3.4 3.8 2.4 1.8 3.7 4.5   Notes Dec GLV call Call refused enox; extra protein  Call; no protein drinks Call; less green tea, inc boostx1 Call; cranberries Redx1; call 1x miss Call  Less protein    1/1  Date 11/7 11/14 11/21 11/28 12/5 12/12 12/19 12/27 1/3 1/9 1/16/23 1/23 1/30/23   Total WeeklyDose 13 mg 14 mg 14 mg 14 mg 14 mg 14 mg 14mg 14 mg  13mg 13 mg 14 mg  14 mg 14 mg   INR 3.2 3.3 3.4 3.6 2.5 4.0 2.9 4.1 3.6 2.6 3.3 2.7 3.0   Notes  call redx1 One less protein shake   Inc GLV Decreased GLV W/o meloxicam  Tramadol,  meloxicam Tramadol,  meloxicam Tramadol,  meloxicam meloxicam     Date 2/7/23 2/13 2/20 2/27/23 3/6 3/13/23 3/23 3/27 4/3/23 4/10/23 4/17/23 4/24   Total WeeklyDose 14 mg 14 mg 14 mg 14 mg  14 mg 14 mg  14 mg 14 mg 15 mg  14mg 13 mg 15 mg   INR 2.9 3.0 3.6 2.6 3 3.5 3.4 2.4 2.8 2.5 2.3 3.2   Notes Call  call rec 2/21  Call   call Call   call Call  call Missed dose 1x boost     Phone Interview:  Tablet Strength: 2mg  Patient Contact Info: [phone] (Mobile) *preferred*  [email or web address]  Verbal Release Authorization signed on 4/7/21 -- may speak with Tammy Bai (friend: [phone]), Ismael Percyligia (brother: [phone])  Lab Contact Info: [facility] ([facility])  ** will call once monthly or if INR is out of  range**   4/7/22 Scr: 0.8    Patient Findings    Comments:  Patient not contacted at this encounter.      Plan:  1. INR was therapeutic today at 3.2 (goal 2.5-3.5). Ms. Michele will resume warfarin 2 mg oral daily until recheck.  2. Repeat INR in one week, 5/1/23. She prefers testing on Mondays.  3. Verbal information provided over the phone. Patient RBV dosing instructions, expresses understanding by teach back, and has no further questions at this time.  4. Lucie Michele understands the importance of calling the [facility] if she notices any s/sx of bleeding, stroke, or abnormal bruising, if any changes are made to her medications or medication doses (Rx, OTC, herbal), or if any upcoming procedures are scheduled. Lucie Michele will likewise let us know if any other changes, questions, or concerns arise regarding anticoagulation therapy. she understands the importance of seeking medical attention immediately if she experiences any falls, vehicle accidents, or abnormal bleeding or bruising. Lucie Michele voiced understanding of this information and confirms that she has the [facility]'s contact information. Otherwise, we will plan to contact the patient once monthly or if her INR is out of range.    Eric Hernandez CPhT  4/24/2023  11:55 EDT       Negatives:  Signs/symptoms of thrombosis, Signs/symptoms of bleeding, Laboratory test error suspected, Change in health, Change in alcohol use, Change in activity, Upcoming invasive procedure, Emergency department visit, Upcoming dental procedure, Missed doses, Extra doses, Change in medications, Change in diet/appetite, Hospital admission, Bruising, Other complaints     Patient will resume 2mg daily    Lizet MAS, PharmD, have reviewed the note in full and agree with the assessment and plan.  04/24/23  15:15 EDT

## 2023-04-26 ENCOUNTER — LAB (OUTPATIENT)
Dept: FAMILY MEDICINE CLINIC | Facility: CLINIC | Age: 78
End: 2023-04-26
Payer: MEDICARE

## 2023-04-26 DIAGNOSIS — E78.2 MIXED HYPERLIPIDEMIA: ICD-10-CM

## 2023-04-26 DIAGNOSIS — R73.9 HYPERGLYCEMIA: ICD-10-CM

## 2023-04-26 DIAGNOSIS — E03.9 ACQUIRED HYPOTHYROIDISM: ICD-10-CM

## 2023-04-26 DIAGNOSIS — Z79.899 HIGH RISK MEDICATION USE: ICD-10-CM

## 2023-04-26 DIAGNOSIS — I71.21 ANEURYSM OF ASCENDING AORTA WITHOUT RUPTURE: ICD-10-CM

## 2023-04-26 DIAGNOSIS — E55.9 VITAMIN D DEFICIENCY: ICD-10-CM

## 2023-04-26 DIAGNOSIS — I10 PRIMARY HYPERTENSION: ICD-10-CM

## 2023-04-26 DIAGNOSIS — N18.31 STAGE 3A CHRONIC KIDNEY DISEASE: ICD-10-CM

## 2023-04-26 DIAGNOSIS — Z13.9 SCREENING DUE: ICD-10-CM

## 2023-04-27 ENCOUNTER — OFFICE VISIT (OUTPATIENT)
Dept: NEUROLOGY | Facility: CLINIC | Age: 78
End: 2023-04-27
Payer: MEDICARE

## 2023-04-27 VITALS
OXYGEN SATURATION: 98 % | SYSTOLIC BLOOD PRESSURE: 118 MMHG | WEIGHT: 184 LBS | HEART RATE: 59 BPM | DIASTOLIC BLOOD PRESSURE: 66 MMHG | BODY MASS INDEX: 30.66 KG/M2 | HEIGHT: 65 IN

## 2023-04-27 DIAGNOSIS — R41.9 COGNITIVE COMPLAINTS: ICD-10-CM

## 2023-04-27 DIAGNOSIS — G52.1 GLOSSOPHARYNGEAL NEURALGIA: Primary | ICD-10-CM

## 2023-04-27 LAB
25(OH)D3+25(OH)D2 SERPL-MCNC: 77.9 NG/ML (ref 30–100)
ALBUMIN SERPL-MCNC: 3.9 G/DL (ref 3.7–4.7)
ALBUMIN/GLOB SERPL: 1.8 {RATIO} (ref 1.2–2.2)
ALP SERPL-CCNC: 107 IU/L (ref 44–121)
ALT SERPL-CCNC: 18 IU/L (ref 0–32)
AST SERPL-CCNC: 31 IU/L (ref 0–40)
BASOPHILS # BLD AUTO: 0 X10E3/UL (ref 0–0.2)
BASOPHILS NFR BLD AUTO: 1 %
BILIRUB SERPL-MCNC: 0.4 MG/DL (ref 0–1.2)
BUN SERPL-MCNC: 9 MG/DL (ref 8–27)
BUN/CREAT SERPL: 11 (ref 12–28)
CALCIUM SERPL-MCNC: 8.6 MG/DL (ref 8.7–10.3)
CHLORIDE SERPL-SCNC: 100 MMOL/L (ref 96–106)
CHOLEST SERPL-MCNC: 178 MG/DL (ref 100–199)
CO2 SERPL-SCNC: 25 MMOL/L (ref 20–29)
CREAT SERPL-MCNC: 0.83 MG/DL (ref 0.57–1)
EGFRCR SERPLBLD CKD-EPI 2021: 73 ML/MIN/1.73
EOSINOPHIL # BLD AUTO: 0.3 X10E3/UL (ref 0–0.4)
EOSINOPHIL NFR BLD AUTO: 5 %
ERYTHROCYTE [DISTWIDTH] IN BLOOD BY AUTOMATED COUNT: 14.7 % (ref 11.7–15.4)
GLOBULIN SER CALC-MCNC: 2.2 G/DL (ref 1.5–4.5)
GLUCOSE SERPL-MCNC: 98 MG/DL (ref 70–99)
HBA1C MFR BLD: 5.9 % (ref 4.8–5.6)
HCT VFR BLD AUTO: 39 % (ref 34–46.6)
HCV IGG SERPL QL IA: NON REACTIVE
HDLC SERPL-MCNC: 70 MG/DL
HGB BLD-MCNC: 13.1 G/DL (ref 11.1–15.9)
IMM GRANULOCYTES # BLD AUTO: 0 X10E3/UL (ref 0–0.1)
IMM GRANULOCYTES NFR BLD AUTO: 0 %
LDLC SERPL CALC-MCNC: 85 MG/DL (ref 0–99)
LYMPHOCYTES # BLD AUTO: 2 X10E3/UL (ref 0.7–3.1)
LYMPHOCYTES NFR BLD AUTO: 30 %
MCH RBC QN AUTO: 29.7 PG (ref 26.6–33)
MCHC RBC AUTO-ENTMCNC: 33.6 G/DL (ref 31.5–35.7)
MCV RBC AUTO: 88 FL (ref 79–97)
MONOCYTES # BLD AUTO: 0.8 X10E3/UL (ref 0.1–0.9)
MONOCYTES NFR BLD AUTO: 11 %
NEUTROPHILS # BLD AUTO: 3.6 X10E3/UL (ref 1.4–7)
NEUTROPHILS NFR BLD AUTO: 53 %
PLATELET # BLD AUTO: 214 X10E3/UL (ref 150–450)
POTASSIUM SERPL-SCNC: 3 MMOL/L (ref 3.5–5.2)
PROT SERPL-MCNC: 6.1 G/DL (ref 6–8.5)
RBC # BLD AUTO: 4.41 X10E6/UL (ref 3.77–5.28)
SODIUM SERPL-SCNC: 141 MMOL/L (ref 134–144)
TRIGL SERPL-MCNC: 133 MG/DL (ref 0–149)
TSH SERPL DL<=0.005 MIU/L-ACNC: 1.12 UIU/ML (ref 0.45–4.5)
VIT B12 SERPL-MCNC: 296 PG/ML (ref 232–1245)
VLDLC SERPL CALC-MCNC: 23 MG/DL (ref 5–40)
WBC # BLD AUTO: 6.8 X10E3/UL (ref 3.4–10.8)

## 2023-04-27 PROCEDURE — 1160F RVW MEDS BY RX/DR IN RCRD: CPT | Performed by: NURSE PRACTITIONER

## 2023-04-27 PROCEDURE — 3078F DIAST BP <80 MM HG: CPT | Performed by: NURSE PRACTITIONER

## 2023-04-27 PROCEDURE — 3074F SYST BP LT 130 MM HG: CPT | Performed by: NURSE PRACTITIONER

## 2023-04-27 PROCEDURE — 99214 OFFICE O/P EST MOD 30 MIN: CPT | Performed by: NURSE PRACTITIONER

## 2023-04-27 PROCEDURE — 1159F MED LIST DOCD IN RCRD: CPT | Performed by: NURSE PRACTITIONER

## 2023-04-27 NOTE — LETTER
April 27, 2023       No Recipients    Patient: Lucie Michele   YOB: 1945   Date of Visit: 4/27/2023       Dear Dr. Matute Recipients:    Thank you for referring Lucie Michele to me for evaluation. Below are the relevant portions of my assessment and plan of care.    If you have questions, please do not hesitate to call me. I look forward to following Lucie along with you.         Sincerely,        SAGAR Lamas        CC:   No Recipients    Bam Walden APRN  04/27/23 1249  Signed  Subjective    Lucie Michele is a 77 y.o. female presenting for follow-up for glossopharyngeal neuralgia.  She is taking oxcarbazepine 150 mg 3 times a day.  She often forgets to take the middle of the day dose and when she does this her symptoms return, however she says that when she takes the medication as prescribed this works well for her.  The patient also was concerned about her memory.  She has a friend accompanying her today and her friend states that she often forgets things.  She has trouble remembering conversations and other simple things.  She denies a family history of dementia.  She does admit to some anxiety about the possibility of a memory problem.    History of Present Illness     Review of Systems   Allergic/Immunologic: Negative for environmental allergies, food allergies and immunocompromised state.   Neurological: Positive for facial asymmetry. Negative for dizziness, tremors, seizures, syncope, speech difficulty, weakness, light-headedness, numbness, headache, memory problem and confusion.       I have reviewed and confirmed the accuracy of the patient's history: Chief complaint, HPI, ROS, Subjective and Past Family Social History as entered by the MA/JULIOCESAR/RN. Celi Perea MA 04/27/23      Objective      Physical Examination:  General Appearance:  Well developed, well nourished, well groomed, alert, and cooperative.  HEENT: Normocephalic.    Neck and Spine: Normal range of  motion.  Normal alignment. No mass or tenderness. No bruits.  Cardiac: Regular rate and rhythm. No murmurs.  Peripheral Vasculature: Radial and pedal pulses are equal and symmetric.   Extremities: No edema or deformities. Normal joint ROM.  Skin: No rashes or birth marks.      Neurological examination:   Higher Integrative Function: Oriented to time, place, and person. Normal registration, recall attention span and concentration. Normal language including comprehension, spontaneous speech, repetition, reading, writing, naming and vocabulary. No neglect with normal visual-spatial function and construction. Normal fund of knowledge and higher integrative function.   CN II: Pupils are equal, round and reactive to light. Normal visual acuity and visual fields.   CN III IV VI: Extraocular movements are full without nystagmus.   CN V: Normal facial sensation and strength of muscles of mastication.   CN VII: Facial movements are symmetric. No weakness.   CN VIII: Auditory acuity is normal.  CN IX & X: Symmetric palatal movement.   CN XI: Sternocleidomastoid and trapezius are normal. No weakness.   CN XII: The tongue is midline. No atrophy or fasciculations.  Motor: Normal muscle strength, bulk and tone in upper and lower extremities. No fasciculations, rigidity, spasticity, or abnormal movements.   Reflexes: 2+ in the upper and lower extremities. Plantar responses are flexor.   Sensation: Normal light touch, pinprick, vibration, temperature, and proprioception in the arms and legs.   Station and Gait: Normal gait and station.   Coordination: Finger to nose test shows no dysmetria. Rapid alternating movements are normal. Heel to shin is normal.           Assessment & Plan   Diagnoses and all orders for this visit:    1. Glossopharyngeal neuralgia (Primary)    2. Cognitive complaints    The patient's glossopharyngeal neuralgia symptoms are well controlled with oxcarbazepine 150 mg 3 times a day.  We discussed Oxtellar and  that it is an extended release option, however this may not be approved for her diagnosis.  She would like to continue with her current regimen and will set a reminder on her phone to help with compliance.  There were no obvious memory deficits during her exam or during conversation today.  She did score a 26 out of 30 on the MoCA test.  She missed 4 out of 5 words on delayed recall and did not know the day of the month, however she was able to remember the month, year, day of the week, place, and city.  We are going to continue to monitor the symptoms and I recommended regular physical activity.  Also recommended that she stay socially and cognitively active.  She is going to follow-up in 6 months so we can reassess this and see how she is doing at that time.  She will call in the meantime with any problems.

## 2023-04-27 NOTE — PROGRESS NOTES
Subjective   Lucie Michele is a 77 y.o. female presenting for follow-up for glossopharyngeal neuralgia.  She is taking oxcarbazepine 150 mg 3 times a day.  She often forgets to take the middle of the day dose and when she does this her symptoms return, however she says that when she takes the medication as prescribed this works well for her.  The patient also was concerned about her memory.  She has a friend accompanying her today and her friend states that she often forgets things.  She has trouble remembering conversations and other simple things.  She denies a family history of dementia.  She does admit to some anxiety about the possibility of a memory problem.    History of Present Illness     Review of Systems   Allergic/Immunologic: Negative for environmental allergies, food allergies and immunocompromised state.   Neurological: Positive for facial asymmetry. Negative for dizziness, tremors, seizures, syncope, speech difficulty, weakness, light-headedness, numbness, headache, memory problem and confusion.       I have reviewed and confirmed the accuracy of the patient's history: Chief complaint, HPI, ROS, Subjective and Past Family Social History as entered by the MA/BRIGETTEN/RN. Celi Perea MA 04/27/23      Objective     Physical Examination:  General Appearance:  Well developed, well nourished, well groomed, alert, and cooperative.  HEENT: Normocephalic.    Neck and Spine: Normal range of motion.  Normal alignment. No mass or tenderness. No bruits.  Cardiac: Regular rate and rhythm. No murmurs.  Peripheral Vasculature: Radial and pedal pulses are equal and symmetric.   Extremities: No edema or deformities. Normal joint ROM.  Skin: No rashes or birth marks.      Neurological examination:   Higher Integrative Function: Oriented to time, place, and person. Normal registration, recall attention span and concentration. Normal language including comprehension, spontaneous speech, repetition, reading, writing,  naming and vocabulary. No neglect with normal visual-spatial function and construction. Normal fund of knowledge and higher integrative function.   CN II: Pupils are equal, round and reactive to light. Normal visual acuity and visual fields.   CN III IV VI: Extraocular movements are full without nystagmus.   CN V: Normal facial sensation and strength of muscles of mastication.   CN VII: Facial movements are symmetric. No weakness.   CN VIII: Auditory acuity is normal.  CN IX & X: Symmetric palatal movement.   CN XI: Sternocleidomastoid and trapezius are normal. No weakness.   CN XII: The tongue is midline. No atrophy or fasciculations.  Motor: Normal muscle strength, bulk and tone in upper and lower extremities. No fasciculations, rigidity, spasticity, or abnormal movements.   Reflexes: 2+ in the upper and lower extremities. Plantar responses are flexor.   Sensation: Normal light touch, pinprick, vibration, temperature, and proprioception in the arms and legs.   Station and Gait: Normal gait and station.   Coordination: Finger to nose test shows no dysmetria. Rapid alternating movements are normal. Heel to shin is normal.           Assessment & Plan   Diagnoses and all orders for this visit:    1. Glossopharyngeal neuralgia (Primary)    2. Cognitive complaints    The patient's glossopharyngeal neuralgia symptoms are well controlled with oxcarbazepine 150 mg 3 times a day.  We discussed Oxtellar and that it is an extended release option, however this may not be approved for her diagnosis.  She would like to continue with her current regimen and will set a reminder on her phone to help with compliance.  There were no obvious memory deficits during her exam or during conversation today.  She did score a 26 out of 30 on the MoCA test.  She missed 4 out of 5 words on delayed recall and did not know the day of the month, however she was able to remember the month, year, day of the week, place, and city.  We are going to  continue to monitor the symptoms and I recommended regular physical activity.  Also recommended that she stay socially and cognitively active.  She is going to follow-up in 6 months so we can reassess this and see how she is doing at that time.  She will call in the meantime with any problems.

## 2023-05-01 ENCOUNTER — ANTICOAGULATION VISIT (OUTPATIENT)
Dept: PHARMACY | Facility: HOSPITAL | Age: 78
End: 2023-05-01
Payer: MEDICARE

## 2023-05-01 ENCOUNTER — CLINICAL SUPPORT (OUTPATIENT)
Dept: CARDIOLOGY | Facility: CLINIC | Age: 78
End: 2023-05-01
Payer: MEDICARE

## 2023-05-01 DIAGNOSIS — Z98.890 S/P ASCENDING AORTIC ANEURYSM REPAIR: Primary | ICD-10-CM

## 2023-05-01 DIAGNOSIS — I35.9 AORTIC VALVE DISEASE: Primary | ICD-10-CM

## 2023-05-01 DIAGNOSIS — Z86.79 S/P ASCENDING AORTIC ANEURYSM REPAIR: Primary | ICD-10-CM

## 2023-05-01 LAB — INR PPP: 4.1 (ref 0.9–1.1)

## 2023-05-01 NOTE — PROGRESS NOTES
Capillary Blood Specimen Collection  Capillary blood collection performed in clinic by Ema Hamilton MA. Patient tolerated the procedure well without complications.   05/01/23   Ema Hamilton MA       Pt said she stopped drinking her protein shakes .

## 2023-05-01 NOTE — PROGRESS NOTES
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     Diet: 3x week: 1 cup of brussels sprouts or broccoli weekly, green beans (1/3/23)  Premier Protein (or Equate brand) meal replacement ~2x/week 1/3/23  Alcohol: Seldom  Tobacco: None  OTC Pain Medication: APAP     INR History:  Date 4/5 4/19 4/26 5/5 5/11 6/2 6/15 6/18 6/25 7/2 7/9 7/19 7/23 7/30   Total Weekly Dose 15mg 15mg 14mg 14mg 14mg 14mg 14mg 14mg 14mg 14mg 14mg 14mg 14mg 14mg   INR 2.6 3.9 3.9 2.7 3.0 3.6 2.7 2.9 2.9 2.6 2.9 3.1 2.5 2.3   Notes           decr GLV   recv'd 6/21; [facility]       incr GLV decr GLV      Date 8/6 8/13 8/20 8/27 9/3 9/10 9/17 9/24 10/1 10/8 10/15 10/22 10/29 11/5   Total WeeklyDose 14mg 15mg 15mg 14mg 14mg 15mg 14mg 14mg 14mg 14mg 14mg 14mg 15mg 16mg   INR 2.4 3.4 3.8 2.9 2.2 3.2 2.9 3.3 3.2 3.3 3.0 2.4 2.4 2.4   Notes decr GLV decr GLV       1xboost         call call 1x boost   incr VitK call 2x boost; call      Date 11/10 11/17 11/24 12/1 12/8 12/15 12/23 12/30 1/3/22 1/7 1/11 1/18 1/25 2/1   Total WeeklyDose 17mg 16mg 16mg 16mg 15mg 15 mg Pt did not call back 15mg 12 mg 13mg 15mg 15 mg 15 mg 15 mg   INR 3.7 3.3 3.9 4.0 2.6 3.4 4.0 4.9 3.6 2.4 3.3 2.8 2.7 2.9   Notes Dec GLV   Zero GLV call Red x1 call Less GLV   call   Less  Protein drink redx1  self held x1 (misdose)   Dec vit K fall, apap  Call    call          Date 2/8 2/15 2/22 3/1 3/8 3/15 3/22 3/29 4/5 4/12 4/19 4/26 5/3 5/11   Total WeeklyDose 15mg 14mg 14 mg 15 mg 15 mg 15 mg 14mg 13 mg 14 mg 14 mg 14mg 13mg 15mg 14 mg   INR 4.0 4.0 2.8 2.9 2.9  4.2 3.9 3.3 2.9 3.0 3.8 2.4 3.8 2.5   Notes Call; Dec GLV call Call; Mis-dose call   Call; no GLV Inc GLV. Less protein, apap  redx1 call   Less GLV rec'd 4/27, call Dec GLV        Date 5/18 5/25 6/1 6/8 6/15 6/22 6/29 7/6 7/13 7/20 7/22 7/27  8/10  8/17   Total WeeklyDose 15 mg 15mg 16mg 15 mg 15 mg 15 mg 15 mg 15 mg 15mg 14 mg 15 mg 14 mg  14 mg  15 mg   INR 2.6 2.3 2.7 3.2 3.0 2.9 2.6 2.7 3.6 3.0 3.6 3.0  2.4  3.4   Notes   Inc GLV  call call call       call 7/14-- call call call  call  call  call      Date 8/25 8/31 9/7 9/12 9/19 9/26 10/3 10/10 10/17 10/24 10/31   Total WeeklyDose 14mg 13 mg 14 mg 17mg 15 mg 16mg 15mg 14 mg 13mg 17 mg 15mg   INR 4.3 2.8 1.7 2.9 2.1 3.4 3.8 2.4 1.8 3.7 4.5   Notes Dec GLV call Call refused enox; extra protein  Call; no protein drinks Call; less green tea, inc boostx1 Call; cranberries Redx1; call 1x miss Call  Less protein    1/1  Date 11/7 11/14 11/21 11/28 12/5 12/12 12/19 12/27 1/3 1/9 1/16/23 1/23 1/30/23   Total WeeklyDose 13 mg 14 mg 14 mg 14 mg 14 mg 14 mg 14mg 14 mg  13mg 13 mg 14 mg  14 mg 14 mg   INR 3.2 3.3 3.4 3.6 2.5 4.0 2.9 4.1 3.6 2.6 3.3 2.7 3.0   Notes  call redx1 One less protein shake   Inc GLV Decreased GLV W/o meloxicam  Tramadol,  meloxicam Tramadol,  meloxicam Tramadol,  meloxicam meloxicam     Date 2/7/23 2/13 2/20 2/27/23 3/6 3/13/23 3/23 3/27 4/3/23 4/10/23 4/17/23 4/24   Total WeeklyDose 14 mg 14 mg 14 mg 14 mg  14 mg 14 mg  14 mg 14 mg 15 mg  14mg 13 mg 15 mg   INR 2.9 3.0 3.6 2.6 3 3.5 3.4 2.4 2.8 2.5 2.3 3.2   Notes Call  call rec 2/21  Call   call Call   call Call  call Missed dose 1x boost     Date 5/1      Total WeeklyDose 14 mg       INR 4.1      Notes Call           Phone Interview:  Tablet Strength: 2mg  Patient Contact Info: [phone] (Mobile) *preferred*  [email or web address]  Verbal Release Authorization signed on 4/7/21 -- may speak with Tammy Bai (friend: [phone]), Ismael Michele (brother: [phone])  Lab Contact Info:  [facility] ([facility])  ** will call once monthly or if INR is out of range**   4/7/22 Scr: 0.8    Patient Findings    Positives:  Change in diet/appetite   Negatives:  Signs/symptoms of thrombosis, Signs/symptoms of bleeding, Laboratory test error suspected, Change in health, Change in alcohol use, Change in activity, Upcoming invasive procedure, Emergency department visit, Upcoming dental procedure, Missed doses, Extra doses, Change in medications, Hospital admission, Bruising, Other complaints   Comments:  Patient missed both protein drinks this past week and also had reduced amount of GLV. Otherwise no changes to report, hasn't noted any signs of bleeding/bruising.        Plan:  1. INR was supratherapeutic today at 4.1 (goal 2.5-3.5). Ms. Michele will have an extra serving of GLV tonight and increase GLV to normal amounts this week and continue warfarin 2 mg oral daily until recheck. Patient usually stable on 14 mg/week dose.   2. Repeat INR in one week, 5/8/23. She prefers testing on Mondays. Note that she now is using [facility] to check INR.   3. Verbal information provided over the phone. Patient RBV dosing instructions, expresses understanding by teach back, and has no further questions at this time.  4. Lucie Michele understands the importance of calling the [facility] if she notices any s/sx of bleeding, stroke, or abnormal bruising, if any changes are made to her medications or medication doses (Rx, OTC, herbal), or if any upcoming procedures are scheduled. Lucie Michele will likewise let us know if any other changes, questions, or concerns arise regarding anticoagulation therapy. she understands the importance of seeking medical attention immediately if she experiences any falls, vehicle accidents, or abnormal bleeding or bruising. Lucie Michele voiced understanding of this information and confirms that she has the [facility]'s contact information.  Otherwise, we will plan to contact the patient once monthly or if her INR is out of range.      Herberth Khanna PharmD  05/01/23  12:02 EDT      Addendum: Venipuncture INR resulted at 9.2 on 5/2 even though POC INR was 4.1 on 5/1. Patient did take a dose of warfarin on 5/1 as instructed by clinic. Patient has had INR redrawn on 5/2 at the lab in order to verify accuracy of this result. Have instructed patient to hold tonight's dose of warfarin pending redraw result.     Herberth Khanna PharmD  05/02/23  16:21 EDT

## 2023-05-02 ENCOUNTER — CLINICAL SUPPORT (OUTPATIENT)
Dept: CARDIOLOGY | Facility: CLINIC | Age: 78
End: 2023-05-02
Payer: MEDICARE

## 2023-05-02 ENCOUNTER — TELEPHONE (OUTPATIENT)
Dept: CARDIOLOGY | Facility: CLINIC | Age: 78
End: 2023-05-02
Payer: MEDICARE

## 2023-05-02 DIAGNOSIS — E07.9 DISORDER OF THYROID: ICD-10-CM

## 2023-05-02 DIAGNOSIS — Z86.79 S/P ASCENDING AORTIC ANEURYSM REPAIR: Primary | ICD-10-CM

## 2023-05-02 DIAGNOSIS — N18.31 STAGE 3A CHRONIC KIDNEY DISEASE: ICD-10-CM

## 2023-05-02 DIAGNOSIS — E55.9 VITAMIN D DEFICIENCY: ICD-10-CM

## 2023-05-02 DIAGNOSIS — Z13.820 OSTEOPOROSIS SCREENING: ICD-10-CM

## 2023-05-02 DIAGNOSIS — I10 PRIMARY HYPERTENSION: ICD-10-CM

## 2023-05-02 DIAGNOSIS — M25.552 LEFT HIP PAIN: ICD-10-CM

## 2023-05-02 DIAGNOSIS — I10 ESSENTIAL HYPERTENSION, BENIGN: ICD-10-CM

## 2023-05-02 DIAGNOSIS — E03.9 ACQUIRED HYPOTHYROIDISM: ICD-10-CM

## 2023-05-02 DIAGNOSIS — R73.03 PREDIABETES: ICD-10-CM

## 2023-05-02 DIAGNOSIS — Z98.890 S/P ASCENDING AORTIC ANEURYSM REPAIR: Primary | ICD-10-CM

## 2023-05-02 DIAGNOSIS — M85.80 OSTEOPENIA, UNSPECIFIED LOCATION: ICD-10-CM

## 2023-05-02 LAB
INR PPP: 9.2 (ref 0.9–1.2)
PROTHROMBIN TIME: 84.3 SEC (ref 9.1–12)

## 2023-05-02 RX ORDER — FUROSEMIDE 40 MG/1
40 TABLET ORAL DAILY
Qty: 90 TABLET | Refills: 1 | Status: SHIPPED | OUTPATIENT
Start: 2023-05-02

## 2023-05-02 NOTE — TELEPHONE ENCOUNTER
Spoke with Neeraj at [facility] clinic in [place] and advised that I just faxed the [facility] results for Ms Michele that a critical INR came back with 9.1.  He advised he would get with a pharmacy and reach out to her.

## 2023-05-02 NOTE — TELEPHONE ENCOUNTER
Rx Refill Note    Requested Prescriptions     Pending Prescriptions Disp Refills   • furosemide (LASIX) 40 MG tablet [Pharmacy Med Name: FUROSEMIDE 40MG TABLETS] 90 tablet 1     Sig: TAKE 1 TABLET BY MOUTH DAILY        Last office visit with prescribing clinician: 2/6/2023      Next office visit with prescribing clinician: 5/3/2023   Last labs:   Last refill:    Pharmacy [facility]

## 2023-05-02 NOTE — PROGRESS NOTES
Venipuncture Blood Specimen Collection  Venipuncture performed in clinic by Margie Avina MA with good hemostasis. Patient tolerated the procedure well without complications.   05/02/23   Margie Avina MA

## 2023-05-03 ENCOUNTER — ANTICOAGULATION VISIT (OUTPATIENT)
Dept: PHARMACY | Facility: HOSPITAL | Age: 78
End: 2023-05-03
Payer: MEDICARE

## 2023-05-03 ENCOUNTER — OFFICE VISIT (OUTPATIENT)
Dept: FAMILY MEDICINE CLINIC | Facility: CLINIC | Age: 78
End: 2023-05-03
Payer: MEDICARE

## 2023-05-03 VITALS
DIASTOLIC BLOOD PRESSURE: 80 MMHG | WEIGHT: 187.4 LBS | HEIGHT: 65 IN | HEART RATE: 73 BPM | RESPIRATION RATE: 14 BRPM | OXYGEN SATURATION: 98 % | TEMPERATURE: 97.8 F | BODY MASS INDEX: 31.22 KG/M2 | SYSTOLIC BLOOD PRESSURE: 110 MMHG

## 2023-05-03 DIAGNOSIS — I10 ESSENTIAL HYPERTENSION, BENIGN: Primary | ICD-10-CM

## 2023-05-03 DIAGNOSIS — I35.9 AORTIC VALVE DISEASE: Primary | ICD-10-CM

## 2023-05-03 DIAGNOSIS — E87.6 HYPOKALEMIA: ICD-10-CM

## 2023-05-03 DIAGNOSIS — E03.9 ACQUIRED HYPOTHYROIDISM: ICD-10-CM

## 2023-05-03 DIAGNOSIS — R73.9 HYPERGLYCEMIA: ICD-10-CM

## 2023-05-03 DIAGNOSIS — E78.2 MIXED HYPERLIPIDEMIA: ICD-10-CM

## 2023-05-03 DIAGNOSIS — E55.9 VITAMIN D DEFICIENCY: ICD-10-CM

## 2023-05-03 LAB
INR PPP: 3.7 (ref 0.9–1.2)
PROTHROMBIN TIME: 35.6 SEC (ref 9.1–12)

## 2023-05-03 NOTE — PATIENT INSTRUCTIONS
Medicare Wellness  Personal Prevention Plan of Service     Date of Office Visit:    Encounter Provider:  Giovanni Lee MD  Place of Service:  [facility]  Patient Name: Lucie Michele  :  1945    As part of the Medicare Wellness portion of your visit today, we are providing you with this personalized preventive plan of services (PPPS). This plan is based upon recommendations of the [place] Preventive Services Task Force (USPSTF) and the Advisory Committee on Immunization Practices (ACIP).    This lists the preventive care services that should be considered, and provides dates of when you are due. Items listed as completed are up-to-date and do not require any further intervention.    Health Maintenance   Topic Date Due    ANNUAL WELLNESS VISIT  2023    INFLUENZA VACCINE  2023    COLORECTAL CANCER SCREENING  10/11/2023    MAMMOGRAM  2024    DXA SCAN  2024    LIPID PANEL  2024    TDAP/TD VACCINES (3 - Td or Tdap) 2031    HEPATITIS C SCREENING  Completed    COVID-19 Vaccine  Completed    Pneumococcal Vaccine 65+  Completed    ZOSTER VACCINE  Completed       Orders Placed This Encounter   Procedures    Comprehensive Metabolic Panel     Standing Status:   Future     Standing Expiration Date:   5/3/2024     Order Specific Question:   Release to patient     Answer:   Immediate       Return in about 6 months (around 11/3/2023) for Annual physical, Bloodwork 1 week prior to next appointment.

## 2023-05-03 NOTE — PROGRESS NOTES
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     Diet: 3x week: 1 cup of brussels sprouts or broccoli weekly, green beans (1/3/23)  Premier Protein (or Equate brand) meal replacement ~2x/week 1/3/23  Alcohol: Seldom  Tobacco: None  OTC Pain Medication: APAP     INR History:  Date 4/5 4/19 4/26 5/5 5/11 6/2 6/15 6/18 6/25 7/2 7/9 7/19 7/23 7/30   Total Weekly Dose 15mg 15mg 14mg 14mg 14mg 14mg 14mg 14mg 14mg 14mg 14mg 14mg 14mg 14mg   INR 2.6 3.9 3.9 2.7 3.0 3.6 2.7 2.9 2.9 2.6 2.9 3.1 2.5 2.3   Notes           decr GLV   recv'd 6/21; [facility]       incr GLV decr GLV      Date 8/6 8/13 8/20 8/27 9/3 9/10 9/17 9/24 10/1 10/8 10/15 10/22 10/29 11/5   Total WeeklyDose 14mg 15mg 15mg 14mg 14mg 15mg 14mg 14mg 14mg 14mg 14mg 14mg 15mg 16mg   INR 2.4 3.4 3.8 2.9 2.2 3.2 2.9 3.3 3.2 3.3 3.0 2.4 2.4 2.4   Notes decr GLV decr GLV       1xboost         call call 1x boost   incr VitK call 2x boost; call      Date 11/10 11/17 11/24 12/1 12/8 12/15 12/23 12/30 1/3/22 1/7 1/11 1/18 1/25 2/1   Total WeeklyDose 17mg 16mg 16mg 16mg 15mg 15 mg Pt did not call back 15mg 12 mg 13mg 15mg 15 mg 15 mg 15 mg   INR 3.7 3.3 3.9 4.0 2.6 3.4 4.0 4.9 3.6 2.4 3.3 2.8 2.7 2.9   Notes Dec GLV   Zero GLV call Red x1 call Less GLV   call   Less  Protein drink redx1  self held x1 (misdose)   Dec vit K fall, apap  Call    call          Date 2/8 2/15 2/22 3/1 3/8 3/15 3/22 3/29 4/5 4/12 4/19 4/26 5/3 5/11   Total WeeklyDose 15mg 14mg 14 mg 15 mg 15 mg 15 mg 14mg 13 mg 14 mg 14 mg 14mg 13mg 15mg 14 mg   INR 4.0 4.0 2.8 2.9 2.9  4.2 3.9 3.3 2.9 3.0 3.8 2.4 3.8 2.5   Notes Call; Dec GLV call Call; Mis-dose call   Call; no GLV Inc GLV. Less protein, apap  redx1 call   Less GLV rec'd 4/27, call Dec GLV        Date 5/18 5/25 6/1 6/8 6/15 6/22 6/29 7/6 7/13 7/20 7/22 7/27  8/10  8/17   Total WeeklyDose 15 mg 15mg 16mg 15 mg 15 mg 15 mg 15 mg 15 mg 15mg 14 mg 15 mg 14 mg  14 mg  15 mg   INR 2.6 2.3 2.7 3.2 3.0 2.9 2.6 2.7 3.6 3.0 3.6 3.0  2.4  3.4   Notes   Inc GLV  call call call       call 7/14-- call call call  call  call  call      Date 8/25 8/31 9/7 9/12 9/19 9/26 10/3 10/10 10/17 10/24 10/31   Total WeeklyDose 14mg 13 mg 14 mg 17mg 15 mg 16mg 15mg 14 mg 13mg 17 mg 15mg   INR 4.3 2.8 1.7 2.9 2.1 3.4 3.8 2.4 1.8 3.7 4.5   Notes Dec GLV call Call refused enox; extra protein  Call; no protein drinks Call; less green tea, inc boostx1 Call; cranberries Redx1; call 1x miss Call  Less protein    1/1  Date 11/7 11/14 11/21 11/28 12/5 12/12 12/19 12/27 1/3 1/9 1/16/23 1/23 1/30/23   Total WeeklyDose 13 mg 14 mg 14 mg 14 mg 14 mg 14 mg 14mg 14 mg  13mg 13 mg 14 mg  14 mg 14 mg   INR 3.2 3.3 3.4 3.6 2.5 4.0 2.9 4.1 3.6 2.6 3.3 2.7 3.0   Notes  call redx1 One less protein shake   Inc GLV Decreased GLV W/o meloxicam  Tramadol,  meloxicam Tramadol,  meloxicam Tramadol,  meloxicam meloxicam     Date 2/7/23 2/13 2/20 2/27/23 3/6 3/13/23 3/23 3/27 4/3/23 4/10/23 4/17/23 4/24   Total WeeklyDose 14 mg 14 mg 14 mg 14 mg  14 mg 14 mg  14 mg 14 mg 15 mg  14mg 13 mg 15 mg   INR 2.9 3.0 3.6 2.6 3 3.5 3.4 2.4 2.8 2.5 2.3 3.2   Notes Call  call rec 2/21  Call   call Call   call Call  call Missed dose 1x boost     Date 5/1 5/2     Total WeeklyDose 14 mg  12 mg     INR 4.1 3.7     Notes Call  rec 5/3  Call          Phone Interview:  Tablet Strength: 2mg  Patient Contact Info: [phone] (Mobile) *preferred*  [email or web address]  Verbal Release Authorization signed on 4/7/21 -- may speak with Tammy Bai (friend: [phone]), Ismael Michele (brother:  [phone])  Lab Contact Info: [facility] ([facility])  ** will call once monthly or if INR is out of range**   4/7/22 Scr: 0.8    Patient Findings    Negatives:  Signs/symptoms of thrombosis, Signs/symptoms of bleeding, Laboratory test error suspected, Change in health, Change in alcohol use, Change in activity, Upcoming invasive procedure, Emergency department visit, Upcoming dental procedure, Missed doses, Extra doses, Change in medications, Change in diet/appetite, Hospital admission, Bruising, Other complaints   Comments:  Repeat INR venipuncture from 5/2 was 3.7, 9.2 result from 5/1 appears to have been in error. Patient confirmed that she held dose of warfarin on 5/2 preventatively in case repeat INR result was significantly elevated. Continues to confirm no bleeding/bruising suggestive of elevated INR.        Plan:  1. INR repeat was slightly supratherapeutic yesterday at 3.7 (goal 2.5-3.5). Ms. Michele will take an additional warfarin 1 mg this morning and then continue warfarin 2 mg oral daily until recheck. Patient usually stable on 14 mg/week dose.   2. Repeat INR in one week, 5/8/23. She prefers testing on Mondays. Note that she now is using [facility] to check INR.   3. Verbal information provided over the phone. Patient RBV dosing instructions, expresses understanding by teach back, and has no further questions at this time.  4. Lucie Michele understands the importance of calling the [facility] if she notices any s/sx of bleeding, stroke, or abnormal bruising, if any changes are made to her medications or medication doses (Rx, OTC, herbal), or if any upcoming procedures are scheduled. Lucie Michele will likewise let us know if any other changes, questions, or concerns arise regarding anticoagulation therapy. she understands the importance of seeking medical attention immediately if she experiences any falls, vehicle accidents, or abnormal bleeding or bruising. Lucie  JENNIFER Ryne voiced understanding of this information and confirms that she has the [facility]'s contact information. Otherwise, we will plan to contact the patient once monthly or if her INR is out of range.      Herberth Khanna PharmD  05/03/23  08:42 EDT

## 2023-05-03 NOTE — PROGRESS NOTES
The ABCs of the Annual Wellness Visit  Subsequent Medicare Wellness Visit    Chief Complaint   Patient presents with   • Medicare Wellness-subsequent     Pt here for medicare wellness exam   • lab results     Pt here for lab results   • Hypertension   • Hyperlipidemia   • Hypothyroidism      Subjective    History of Present Illness:  Lucie Michele is a 77 y.o. female who presents for a Subsequent Medicare Wellness Visit.    The following portions of the patient's history were reviewed and   updated as appropriate: allergies, current medications, past family history, past medical history, past social history, past surgical history and problem list.    Compared to one year ago, the patient feels her physical   health is the same.    Compared to one year ago, the patient feels her mental   health is worse.    Recent Hospitalizations:  She was not admitted to the hospital during the last year.       Current Medical Providers:  Patient Care Team:  Giovanni Lee MD as PCP -   BlakeBlas cisse MD as Consulting Physician (Cardiology)  Bam Walden APRN as Nurse Practitioner (Nurse Practitioner)  Florentin Golden MD as Surgeon (Neurosurgery)  Korey Hollis MD as Surgeon (Cardiothoracic Surgery)    Outpatient Medications Prior to Visit   Medication Sig Dispense Refill   • albuterol sulfate  (90 Base) MCG/ACT inhaler Inhale 2 puffs Every 4 (Four) Hours As Needed for Wheezing. 3 g 1   • alendronate (FOSAMAX) 70 MG tablet TAKE 1 TABLET BY MOUTH EVERY 7 DAYS 12 tablet 0   • atorvastatin (LIPITOR) 20 MG tablet Take 1 tablet by mouth Daily. 90 tablet 1   • Budeson-Glycopyrrol-Formoterol (Breztri Aerosphere) 160-9-4.8 MCG/ACT aerosol inhaler Inhale 2 puffs 2 (Two) Times a Day. 10.7 each 3   • Calcium Carbonate-Vit D-Min (Caltrate 600+D Plus Minerals) 600-800 MG-UNIT tablet Take 600 mg by mouth 2 (Two) Times a Day. 180 tablet 3   • carbonyl iron (FEOSOL) 45 MG tablet tablet 1 po qd     •  Cholecalciferol 4000 units capsule 1 po qd OTC     • Coenzyme Q10 (CO Q-10) 50 MG capsule 1 po qd     • dapagliflozin Propanediol (Farxiga) 10 MG tablet Take 10 mg by mouth Daily. 90 tablet 2   • furosemide (LASIX) 40 MG tablet TAKE 1 TABLET BY MOUTH DAILY 90 tablet 1   • GLUCOSAMINE-CHONDROITIN DS PO Take  by mouth 2 (Two) Times a Day. 1500 mg     • L-Lysine 500 MG capsule 1 po qd     • levothyroxine (SYNTHROID, LEVOTHROID) 125 MCG tablet Take 1 tablet by mouth Daily. 90 tablet 1   • meloxicam (MOBIC) 7.5 MG tablet Take 1 tablet by mouth Daily. 90 tablet 1   • metoprolol succinate XL (TOPROL-XL) 100 MG 24 hr tablet Take 1 tablet by mouth Daily. 90 tablet 2   • omeprazole (priLOSEC) 20 MG capsule TAKE 1 CAPSULE BY MOUTH DAILY 90 capsule 1   • OXcarbazepine (TRILEPTAL) 150 MG tablet TAKE 1 TABLET BY MOUTH THREE TIMES DAILY 270 tablet 3   • oxybutynin (DITROPAN) 5 MG tablet TAKE 1 TABLET BY MOUTH TWICE DAILY (Patient taking differently: Take 2.5 mg by mouth 2 (Two) Times a Day. 1/2 in the morning and 1/2 in the afternoon) 180 tablet 0   • Pediatric Multivit-Minerals-C (CHEWABLES MULTIVITAMIN PO) 2 po qd OTC     • polycarbophil (calcium polycarbophil) 625 MG tablet tablet 1 po qd     • potassium chloride 10 MEQ CR tablet TAKE 1 TABLET BY MOUTH DAILY 90 tablet 3   • traMADol (ULTRAM) 50 MG tablet Take 1 tablet by mouth 2 (Two) Times a Day As Needed for Moderate Pain. (Patient taking differently: Take 25 mg by mouth Daily.) 60 tablet 2   • vitamin D (ERGOCALCIFEROL) 43970 units capsule capsule Take 1 capsule by mouth 1 (One) Time Per Week.     • vitamin E 400 UNIT capsule Take 180 Units by mouth Daily.     • warfarin (COUMADIN) 2 MG tablet Take 1 tablet by mouth once daily or as directed by the anticoagulation clinic 90 tablet 1     No facility-administered medications prior to visit.       Opioid medication/s are on active medication list.  and I have evaluated her active treatment plan and pain score trends (see  table).  Vitals:    05/03/23 0818   PainSc: 0-No pain     I have reviewed the chart for potential of high risk medication and harmful drug interactions in the elderly.            Aspirin is not on active medication list.  Aspirin use is contraindicated for this patient due to: current use of warfarin.  .    Patient Active Problem List   Diagnosis   • Cavernous sinus tumor   • Trigeminal neuralgia   • Depression   • Hypertonicity of bladder   • Hyperlipidemia   • Essential hypertension, benign   • Osteoarthritis   • Osteoporosis   • Vascular disorder   • JOSE LUIS on CPAP   • Hypothyroidism   • Thoracic aortic aneurysm without rupture   • Aortic valve disease   • Chronic diastolic congestive heart failure   • Acquired hypothyroidism   • Allergic rhinitis   • Biliary sludge   • Chronic anticoagulation   • Degenerative cervical spinal stenosis   • Duodenogastric reflux   • Gastroesophageal reflux disease without esophagitis   • High risk medication use   • History of aortic valve replacement   • History of atrial flutter   • History of postmenopausal HRT   • Major depression in remission   • Neoplasm of meninges   • Obesity (BMI 30.0-34.9)   • Osteopenia   • Prediabetes   • Primary osteoarthritis involving multiple joints   • Pulmonary hypertension   • Stage 3a chronic kidney disease   • Transient tic disorder   • Vitamin D deficiency   • Left hip pain   • Osteopenia of multiple sites   • COPD mixed type   • Hyperglycemia   • Osteoporosis screening   • S/P ascending aortic aneurysm repair   • Wound of left leg   • H/O partial thyroidectomy   • Hypokalemia     Advance Care Planning  Advance Directive is not on file.  ACP discussion was held with the patient during this visit. Patient has an advance directive (not in EMR), copy requested.    Review of Systems   Constitutional: Negative.    HENT: Negative.    Eyes: Negative.    Respiratory: Negative.    Cardiovascular: Negative.    Gastrointestinal: Negative.         Objective   "  Vitals:    05/03/23 0818   BP: 110/80   BP Location: Left arm   Patient Position: Sitting   Cuff Size: Adult   Pulse: 73   Resp: 14   Temp: 97.8 °F (36.6 °C)   TempSrc: Temporal   SpO2: 98%   Weight: 85 kg (187 lb 6.4 oz)   Height: 165.1 cm (65\")   PainSc: 0-No pain     Estimated body mass index is 31.18 kg/m² as calculated from the following:    Height as of this encounter: 165.1 cm (65\").    Weight as of this encounter: 85 kg (187 lb 6.4 oz).    BMI is >= 30 and <35. (Class 1 Obesity). The following options were offered after discussion;: exercise counseling/recommendations and nutrition counseling/recommendations      Does the patient have evidence of cognitive impairment? No    Physical Exam  Vitals and nursing note reviewed.   Constitutional:       Appearance: Normal appearance. She is normal weight.   HENT:      Head: Normocephalic and atraumatic.      Right Ear: Tympanic membrane, ear canal and external ear normal.      Left Ear: Tympanic membrane, ear canal and external ear normal.      Nose: Nose normal.      Mouth/Throat:      Mouth: Mucous membranes are dry.      Pharynx: Oropharynx is clear.   Eyes:      Extraocular Movements: Extraocular movements intact.      Conjunctiva/sclera: Conjunctivae normal.      Pupils: Pupils are equal, round, and reactive to light.   Cardiovascular:      Rate and Rhythm: Normal rate and regular rhythm.      Pulses: Normal pulses.      Heart sounds: Normal heart sounds.   Pulmonary:      Effort: Pulmonary effort is normal.      Breath sounds: Normal breath sounds.   Musculoskeletal:      Cervical back: Normal range of motion and neck supple.   Feet:      Comments:      Neurological:      Mental Status: She is alert.       Lab Results   Component Value Date    CHLPL 178 04/26/2023    TRIG 133 04/26/2023    HDL 70 04/26/2023    LDL 85 04/26/2023    VLDL 23 04/26/2023    HGBA1C 5.9 (H) 04/26/2023            HEALTH RISK ASSESSMENT    Smoking Status:  Social History     Tobacco " Use   Smoking Status Former   • Packs/day: 1.00   • Years: 4.00   • Pack years: 4.00   • Types: Cigarettes   • Start date:    • Quit date:    • Years since quittin.3   Smokeless Tobacco Never   Tobacco Comments    up to 3 ppd     Alcohol Consumption:  Social History     Substance and Sexual Activity   Alcohol Use Yes    Comment: 1 glass occasionally     Fall Risk Screen:    JEREMÍAS Fall Risk Assessment was completed, and patient is at LOW risk for falls.Assessment completed on:5/3/2023    Depression Screenin/3/2023     9:55 AM   PHQ-2/PHQ-9 Depression Screening   Little Interest or Pleasure in Doing Things 0-->not at all   Feeling Down, Depressed or Hopeless 0-->not at all   PHQ-9: Brief Depression Severity Measure Score 0       Health Habits and Functional and Cognitive Screenin/3/2023     9:56 AM   Functional & Cognitive Status   Do you have difficulty preparing food and eating? No   Do you have difficulty bathing yourself, getting dressed or grooming yourself? No   Do you have difficulty using the toilet? No   Do you have difficulty moving around from place to place? No   Do you have trouble with steps or getting out of a bed or a chair? No   Current Diet Well Balanced Diet   Dental Exam Up to date   Eye Exam Up to date   Exercise (times per week) 10 times per week   Current Exercises Include Walking   Do you need help using the phone?  No   Are you deaf or do you have serious difficulty hearing?  No   Do you need help with transportation? No   Do you need help shopping? No   Do you need help preparing meals?  No   Do you need help with housework?  No   Do you need help with laundry? No   Do you need help taking your medications? No   Do you need help managing money? No   Do you ever drive or ride in a car without wearing a seat belt? No   Have you felt unusual stress, anger or loneliness in the last month? No   Who do you live with? Child   If you need help, do you have trouble  finding someone available to you? No   Have you been bothered in the last four weeks by sexual problems? No   Do you have difficulty concentrating, remembering or making decisions? No       Age-appropriate Screening Schedule:  Refer to the list below for future screening recommendations based on patient's age, sex and/or medical conditions. Orders for these recommended tests are listed in the plan section. The patient has been provided with a written plan.    Health Maintenance   Topic Date Due   • ANNUAL WELLNESS VISIT  05/02/2023   • INFLUENZA VACCINE  08/01/2023   • COLORECTAL CANCER SCREENING  10/11/2023   • MAMMOGRAM  04/14/2024   • DXA SCAN  04/19/2024   • LIPID PANEL  04/26/2024   • TDAP/TD VACCINES (3 - Td or Tdap) 06/21/2031   • HEPATITIS C SCREENING  Completed   • COVID-19 Vaccine  Completed   • Pneumococcal Vaccine 65+  Completed   • ZOSTER VACCINE  Completed              Assessment & Plan   CMS Preventative Services Quick Reference  Risk Factors Identified During Encounter  None Identified  The above risks/problems have been discussed with the patient.  Follow up actions/plans if indicated are seen below in the Assessment/Plan Section.  Pertinent information has been shared with the patient in the After Visit Summary.    Diagnoses and all orders for this visit:    1. Essential hypertension, benign (Primary)  Assessment & Plan:  Discussed with patient to monitor their blood pressure and if systolic blood pressure goes above 140 or diastolic is above 90 to return to clinic.  Take medicines as directed, call for any problems, patient not having or any worrisome symptoms.          2. Hypokalemia  Assessment & Plan:  Potassium 3.0.  I am going to recheck it today as she is taking her potassium supplements.    Orders:  -     Comprehensive Metabolic Panel; Future    3. Mixed hyperlipidemia  Assessment & Plan:  HDL 70.  LDL 85.  Triglycerides 133.  We will follow.      4. Vitamin D deficiency  Assessment &  Plan:  Vitamin D 77.9.  We will follow.      5. Acquired hypothyroidism  Assessment & Plan:  TSH 1.120.  We will recheck in 6 months.      6. Hyperglycemia  Assessment & Plan:  A1c is 5.9.  Recheck in 6 months.        Follow Up:   No follow-ups on file.     An After Visit Summary and PPPS were made available to the patient.          I spent 15 minutes caring for Lucie on this date of service. This time includes time spent by me in the following activities:preparing for the visit, reviewing tests, obtaining and/or reviewing a separately obtained history, performing a medically appropriate examination and/or evaluation , counseling and educating the patient/family/caregiver, ordering medications, tests, or procedures, referring and communicating with other health care professionals , documenting information in the medical record, independently interpreting results and communicating that information with the patient/family/caregiver and care coordination

## 2023-05-04 LAB
ALBUMIN SERPL-MCNC: 4.5 G/DL (ref 3.7–4.7)
ALBUMIN/GLOB SERPL: 2 {RATIO} (ref 1.2–2.2)
ALP SERPL-CCNC: 107 IU/L (ref 44–121)
ALT SERPL-CCNC: 23 IU/L (ref 0–32)
AST SERPL-CCNC: 31 IU/L (ref 0–40)
BILIRUB SERPL-MCNC: 0.7 MG/DL (ref 0–1.2)
BUN SERPL-MCNC: 18 MG/DL (ref 8–27)
BUN/CREAT SERPL: 20 (ref 12–28)
CALCIUM SERPL-MCNC: 9.6 MG/DL (ref 8.7–10.3)
CHLORIDE SERPL-SCNC: 101 MMOL/L (ref 96–106)
CO2 SERPL-SCNC: 25 MMOL/L (ref 20–29)
CREAT SERPL-MCNC: 0.88 MG/DL (ref 0.57–1)
EGFRCR SERPLBLD CKD-EPI 2021: 68 ML/MIN/1.73
GLOBULIN SER CALC-MCNC: 2.3 G/DL (ref 1.5–4.5)
GLUCOSE SERPL-MCNC: 93 MG/DL (ref 70–99)
POTASSIUM SERPL-SCNC: 3.5 MMOL/L (ref 3.5–5.2)
PROT SERPL-MCNC: 6.8 G/DL (ref 6–8.5)
SODIUM SERPL-SCNC: 142 MMOL/L (ref 134–144)

## 2023-05-08 ENCOUNTER — CLINICAL SUPPORT (OUTPATIENT)
Dept: CARDIOLOGY | Facility: CLINIC | Age: 78
End: 2023-05-08
Payer: MEDICARE

## 2023-05-08 ENCOUNTER — ANTICOAGULATION VISIT (OUTPATIENT)
Dept: PHARMACY | Facility: HOSPITAL | Age: 78
End: 2023-05-08
Payer: MEDICARE

## 2023-05-08 DIAGNOSIS — I35.9 AORTIC VALVE DISEASE: Primary | ICD-10-CM

## 2023-05-08 LAB — INR PPP: 3.3 (ref 0.9–1.1)

## 2023-05-08 NOTE — PROGRESS NOTES
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     Diet: 3x week: 1 cup of brussels sprouts or broccoli weekly, green beans (1/3/23)  Premier Protein (or Equate brand) meal replacement ~2x/week 1/3/23  Alcohol: Seldom  Tobacco: None  OTC Pain Medication: APAP     INR History:  Date 4/5 4/19 4/26 5/5 5/11 6/2 6/15 6/18 6/25 7/2 7/9 7/19 7/23 7/30   Total Weekly Dose 15mg 15mg 14mg 14mg 14mg 14mg 14mg 14mg 14mg 14mg 14mg 14mg 14mg 14mg   INR 2.6 3.9 3.9 2.7 3.0 3.6 2.7 2.9 2.9 2.6 2.9 3.1 2.5 2.3   Notes           decr GLV   recv'd 6/21; [facility]       incr GLV decr GLV      Date 8/6 8/13 8/20 8/27 9/3 9/10 9/17 9/24 10/1 10/8 10/15 10/22 10/29 11/5   Total WeeklyDose 14mg 15mg 15mg 14mg 14mg 15mg 14mg 14mg 14mg 14mg 14mg 14mg 15mg 16mg   INR 2.4 3.4 3.8 2.9 2.2 3.2 2.9 3.3 3.2 3.3 3.0 2.4 2.4 2.4   Notes decr GLV decr GLV       1xboost         call call 1x boost   incr VitK call 2x boost; call      Date 11/10 11/17 11/24 12/1 12/8 12/15 12/23 12/30 1/3/22 1/7 1/11 1/18 1/25 2/1   Total WeeklyDose 17mg 16mg 16mg 16mg 15mg 15 mg Pt did not call back 15mg 12 mg 13mg 15mg 15 mg 15 mg 15 mg   INR 3.7 3.3 3.9 4.0 2.6 3.4 4.0 4.9 3.6 2.4 3.3 2.8 2.7 2.9   Notes Dec GLV   Zero GLV call Red x1 call Less GLV   call   Less  Protein drink redx1  self held x1 (misdose)   Dec vit K fall, apap  Call   call          Date 2/8 2/15 2/22 3/1 3/8 3/15 3/22 3/29 4/5 4/12 4/19 4/26 5/3 5/11   Total WeeklyDose 15mg 14mg 14 mg 15 mg 15 mg 15 mg 14mg 13 mg 14 mg 14 mg 14mg 13mg 15mg 14 mg   INR 4.0 4.0 2.8 2.9 2.9  4.2 3.9 3.3 2.9 3.0 3.8 2.4 3.8 2.5   Notes Call; Dec GLV call Call; Mis-dose call   Call; no GLV Inc GLV. Less protein, apap  redx1 call   Less GLV rec'd 4/27, call Dec GLV        Date 5/18 5/25 6/1 6/8 6/15 6/22 6/29 7/6 7/13 7/20 7/22 7/27  8/10  8/17   Total WeeklyDose 15 mg 15mg 16mg 15 mg 15 mg 15 mg 15 mg 15 mg 15mg 14 mg 15 mg 14 mg  14 mg  15 mg   INR 2.6 2.3 2.7 3.2 3.0 2.9 2.6 2.7 3.6 3.0 3.6 3.0  2.4  3.4   Notes   Inc GLV  call call call       call 7/14-- call call call  call  call  call      Date 8/25 8/31 9/7 9/12 9/19 9/26 10/3 10/10 10/17 10/24 10/31   Total WeeklyDose 14mg 13 mg 14 mg 17mg 15 mg 16mg 15mg 14 mg 13mg 17 mg 15mg   INR 4.3 2.8 1.7 2.9 2.1 3.4 3.8 2.4 1.8 3.7 4.5   Notes Dec GLV call Call refused enox; extra protein  Call; no protein drinks Call; less green tea, inc boostx1 Call; cranberries Redx1; call 1x miss Call  Less protein    1/1  Date 11/7 11/14 11/21 11/28 12/5 12/12 12/19 12/27 1/3 1/9 1/16/23 1/23 1/30/23   Total WeeklyDose 13 mg 14 mg 14 mg 14 mg 14 mg 14 mg 14mg 14 mg  13mg 13 mg 14 mg  14 mg 14 mg   INR 3.2 3.3 3.4 3.6 2.5 4.0 2.9 4.1 3.6 2.6 3.3 2.7 3.0   Notes  call redx1 One less protein shake   Inc GLV Decreased GLV W/o meloxicam  Tramadol,  meloxicam Tramadol,  meloxicam Tramadol,  meloxicam meloxicam     Date 2/7/23 2/13 2/20 2/27/23 3/6 3/13/23 3/23 3/27 4/3/23 4/10/23 4/17/23 4/24 5/1   Total WeeklyDose 14 mg 14 mg 14 mg 14 mg  14 mg 14 mg  14 mg 14 mg 15 mg  14mg 13 mg 15 mg 14 mg   INR 2.9 3.0 3.6 2.6 3 3.5 3.4 2.4 2.8 2.5 2.3 3.2  4.1 POCT   Notes Call  call rec 2/21  Call   call Call   call Call  call Missed dose 1x boost Call; missed protein drinks, less GLV     Date 5/2 5/8               Total WeeklyDose 12 mg 13 mg               INR 3.7 3.3               Notes rec 5/3  Call  Self-heldx1, boostx1                 Phone Interview:    Tablet Strength: 2mg  Patient Contact Info: [phone] (Mobile) *preferred*  [email or web address]  Verbal Release  Authorization signed on 4/7/21 -- may speak with Tammy Bia (friend: [phone]), Ismael Michele (brother: [phone])  Lab Contact Info: [facility] ([facility])  ** will call once monthly or if INR is out of range**   4/7/22 Scr: 0.8    Patient Findings  Negatives:  Signs/symptoms of thrombosis, Signs/symptoms of bleeding, Laboratory test error suspected, Change in health, Change in alcohol use, Change in activity, Upcoming invasive procedure, Emergency department visit, Upcoming dental procedure, Missed doses, Extra doses, Change in medications, Change in diet/appetite, Hospital admission, Bruising, Other complaints   Comments:  She has resumed her usual intake of protein drinks and GLV.   Otherwise, above findings negative     Plan:    1. INR is therapeutic today at 3.3 (goal 2.5-3.5).  Instructed Ms. Michele to resume warfarin 2 mg oral daily until recheck. Patient usually stable on 14 mg/week dose.   2. Repeat INR in one week, 5/15/23. She prefers testing on Mondays. Note that she now is using [facility] to check INR.   3. Verbal information provided over the phone. Patient RBV dosing instructions, expresses understanding by teach back, and has no further questions at this time.  4. Lucie Michele understands the importance of calling the [facility] if she notices any s/sx of bleeding, stroke, or abnormal bruising, if any changes are made to her medications or medication doses (Rx, OTC, herbal), or if any upcoming procedures are scheduled. Lucie Michele will likewise let us know if any other changes, questions, or concerns arise regarding anticoagulation therapy. she understands the importance of seeking medical attention immediately if she experiences any falls, vehicle accidents, or abnormal bleeding or bruising. Lucie Michele voiced understanding of this information and confirms that she has the [facility]'s contact information. Otherwise, we will plan to  contact the patient once monthly or if her INR is out of range.      Jesenia Garcia, PharmD  05/08/23  10:54 EDT

## 2023-05-08 NOTE — PROGRESS NOTES
Capillary Blood Specimen Collection  Capillary blood collection performed in clinic by Mitzi Joseph. Patient tolerated the procedure well without complications.   05/08/23   Mitzi Joseph

## 2023-05-12 ENCOUNTER — TELEPHONE (OUTPATIENT)
Dept: FAMILY MEDICINE CLINIC | Facility: CLINIC | Age: 78
End: 2023-05-12

## 2023-05-12 NOTE — TELEPHONE ENCOUNTER
Caller: Lucie Michele    Relationship: Self    Best call back number: [phone]    What is the best time to reach you: ANY TIME     Who are you requesting to speak with (clinical staff, provider,  specific staff member): CLINICAL      What was the call regarding: PATIENT CANCELED HER LAB APPOINTMENT ON 05/08/23 AS WELL AS HER APPOINTMENT WITH DR VIRK ON 05/15/23. PATIENT STATED SHE HAD HER LABS DONE THE END OF April AND IS NOT SURE WHY SHE WAS SCHEDULED AGAIN    PLEASE ADVISE AND CALL PATIENT IF SHE NEEDS TO SCHEDULE THESE AGAIN.     Do you require a callback: YES

## 2023-05-15 ENCOUNTER — CLINICAL SUPPORT (OUTPATIENT)
Dept: CARDIOLOGY | Facility: CLINIC | Age: 78
End: 2023-05-15
Payer: MEDICARE

## 2023-05-15 ENCOUNTER — ANTICOAGULATION VISIT (OUTPATIENT)
Dept: PHARMACY | Facility: HOSPITAL | Age: 78
End: 2023-05-15
Payer: MEDICARE

## 2023-05-15 DIAGNOSIS — I35.9 AORTIC VALVE DISEASE: Primary | ICD-10-CM

## 2023-05-15 LAB — INR PPP: 2.6 (ref 0.9–1.1)

## 2023-05-15 NOTE — PROGRESS NOTES
Capillary Blood Specimen Collection  Capillary blood collection performed in clinic by Ema Hamilton MA. Patient tolerated the procedure well without complications.   05/15/23   Ema Hamilton MA

## 2023-05-15 NOTE — PROGRESS NOTES
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     Diet: 3x week: 1 cup of brussels sprouts or broccoli weekly, green beans (1/3/23)  Premier Protein (or Equate brand) meal replacement ~2x/week 1/3/23  Alcohol: Seldom  Tobacco: None  OTC Pain Medication: APAP     INR History:  Date 4/5 4/19 4/26 5/5 5/11 6/2 6/15 6/18 6/25 7/2 7/9 7/19 7/23 7/30   Total Weekly Dose 15mg 15mg 14mg 14mg 14mg 14mg 14mg 14mg 14mg 14mg 14mg 14mg 14mg 14mg   INR 2.6 3.9 3.9 2.7 3.0 3.6 2.7 2.9 2.9 2.6 2.9 3.1 2.5 2.3   Notes           decr GLV   recv'd 6/21; [facility]       incr GLV decr GLV      Date 8/6 8/13 8/20 8/27 9/3 9/10 9/17 9/24 10/1 10/8 10/15 10/22 10/29 11/5   Total WeeklyDose 14mg 15mg 15mg 14mg 14mg 15mg 14mg 14mg 14mg 14mg 14mg 14mg 15mg 16mg   INR 2.4 3.4 3.8 2.9 2.2 3.2 2.9 3.3 3.2 3.3 3.0 2.4 2.4 2.4   Notes decr GLV decr GLV       1xboost         call call 1x boost   incr VitK call 2x boost; call      Date 11/10 11/17 11/24 12/1 12/8 12/15 12/23 12/30 1/3/22 1/7 1/11 1/18 1/25 2/1   Total WeeklyDose 17mg 16mg 16mg 16mg 15mg 15 mg Pt did not call back 15mg 12 mg 13mg 15mg 15 mg 15 mg 15 mg   INR 3.7 3.3 3.9 4.0 2.6 3.4 4.0 4.9 3.6 2.4 3.3 2.8 2.7 2.9   Notes Dec GLV   Zero GLV call Red x1 call Less GLV   call   Less  Protein drink redx1  self held x1 (misdose)   Dec vit K fall, apap  Call   call          Date 2/8 2/15 2/22 3/1 3/8 3/15 3/22 3/29 4/5 4/12 4/19 4/26 5/3 5/11   Total WeeklyDose 15mg 14mg 14 mg 15 mg 15 mg 15 mg 14mg 13 mg 14 mg 14 mg 14mg 13mg 15mg 14 mg   INR 4.0 4.0 2.8 2.9 2.9  4.2 3.9 3.3 2.9 3.0 3.8 2.4 3.8 2.5   Notes Call; Dec GLV call Call; Mis-dose call   Call; no GLV Inc GLV. Less protein, apap  redx1 call   Less GLV rec'd 4/27, call Dec GLV        Date 5/18 5/25 6/1 6/8 6/15 6/22 6/29 7/6 7/13 7/20 7/22 7/27  8/10  8/17   Total WeeklyDose 15 mg 15mg 16mg 15 mg 15 mg 15 mg 15 mg 15 mg 15mg 14 mg 15 mg 14 mg  14 mg  15 mg   INR 2.6 2.3 2.7 3.2 3.0 2.9 2.6 2.7 3.6 3.0 3.6 3.0  2.4  3.4   Notes   Inc GLV  call call call       call 7/14-- call call call  call  call  call      Date 8/25 8/31 9/7 9/12 9/19 9/26 10/3 10/10 10/17 10/24 10/31   Total WeeklyDose 14mg 13 mg 14 mg 17mg 15 mg 16mg 15mg 14 mg 13mg 17 mg 15mg   INR 4.3 2.8 1.7 2.9 2.1 3.4 3.8 2.4 1.8 3.7 4.5   Notes Dec GLV call Call refused enox; extra protein  Call; no protein drinks Call; less green tea, inc boostx1 Call; cranberries Redx1; call 1x miss Call  Less protein    1/1  Date 11/7 11/14 11/21 11/28 12/5 12/12 12/19 12/27 1/3 1/9 1/16/23 1/23 1/30/23   Total WeeklyDose 13 mg 14 mg 14 mg 14 mg 14 mg 14 mg 14mg 14 mg  13mg 13 mg 14 mg  14 mg 14 mg   INR 3.2 3.3 3.4 3.6 2.5 4.0 2.9 4.1 3.6 2.6 3.3 2.7 3.0   Notes  call redx1 One less protein shake   Inc GLV Decreased GLV W/o meloxicam  Tramadol,  meloxicam Tramadol,  meloxicam Tramadol,  meloxicam meloxicam     Date 2/7/23 2/13 2/20 2/27/23 3/6 3/13/23 3/23 3/27 4/3/23 4/10/23 4/17/23 4/24 5/1   Total WeeklyDose 14 mg 14 mg 14 mg 14 mg  14 mg 14 mg  14 mg 14 mg 15 mg  14mg 13 mg 15 mg 14 mg   INR 2.9 3.0 3.6 2.6 3 3.5 3.4 2.4 2.8 2.5 2.3 3.2  4.1 POCT   Notes Call  call rec 2/21  Call   call Call   call Call  call Missed dose 1x boost Call; missed protein drinks, less GLV     Date 5/2 5/8 5/15              Total WeeklyDose 12 mg 13 mg 14 mg              INR 3.7 3.3 2.6              Notes rec 5/3  Call  Self-heldx1, boostx1                 Phone Interview:    Tablet Strength: 2mg  Patient Contact Info: [phone] (Mobile) *preferred*  [email or web address]  Verbal  Release Authorization signed on 4/7/21 -- may speak with Tammy Bai (friend: [phone]), Ismael Michele (brother: [phone])  Lab Contact Info: [facility] ([facility])  ** will call once monthly or if INR is out of range**   4/7/22 Scr: 0.8    Patient Findings    Comments:  Patient not contacted at this encounter.      Plan:    1. INR is therapeutic today at 2.6 (goal 2.5-3.5).   Ms. Michele will continue warfarin 2 mg oral daily until recheck.  2. Repeat INR in one week, 5/22/23. She prefers testing on Mondays. Note that she now is using [facility] to check INR.   3. Verbal information provided over the phone. Patient RBV dosing instructions, expresses understanding by teach back, and has no further questions at this time.  4. Lucie Michele understands the importance of calling the [facility] if she notices any s/sx of bleeding, stroke, or abnormal bruising, if any changes are made to her medications or medication doses (Rx, OTC, herbal), or if any upcoming procedures are scheduled. Lucie Michele will likewise let us know if any other changes, questions, or concerns arise regarding anticoagulation therapy. she understands the importance of seeking medical attention immediately if she experiences any falls, vehicle accidents, or abnormal bleeding or bruising. Lucie Michele voiced understanding of this information and confirms that she has the [facility]'s contact information. Otherwise, we will plan to contact the patient once monthly or if her INR is out of range.    Eric Hernandez CPhT  5/15/2023  16:10 EDT       I, Lizet Marinelli, PharmD, have reviewed the note in full and agree with the assessment and plan.  05/16/23  09:27 EDT

## 2023-05-16 RX ORDER — ALBUTEROL SULFATE 90 UG/1
AEROSOL, METERED RESPIRATORY (INHALATION)
Qty: 25.5 G | Refills: 1 | Status: SHIPPED | OUTPATIENT
Start: 2023-05-16

## 2023-05-16 NOTE — TELEPHONE ENCOUNTER
Rx Refill Note    Requested Prescriptions     Pending Prescriptions Disp Refills   • albuterol sulfate  (90 Base) MCG/ACT inhaler [Pharmacy Med Name: ALBUTEROL HFA INH (200 PUFFS) 8.5GM] 25.5 g      Sig: INHALE 2 PUFFS BY MOUTH EVERY 4 HOURS AS NEEDED FOR WHEEZING        Last office visit with prescribing clinician: 5/3/2023      Next office visit with prescribing clinician: 11/1/2023   Last labs:   Last refill:    Pharmacy [facility]

## 2023-05-19 ENCOUNTER — OFFICE VISIT (OUTPATIENT)
Dept: FAMILY MEDICINE CLINIC | Facility: CLINIC | Age: 78
End: 2023-05-19
Payer: MEDICARE

## 2023-05-19 VITALS
HEIGHT: 65 IN | SYSTOLIC BLOOD PRESSURE: 132 MMHG | WEIGHT: 185.6 LBS | TEMPERATURE: 98.2 F | OXYGEN SATURATION: 90 % | BODY MASS INDEX: 30.92 KG/M2 | HEART RATE: 62 BPM | DIASTOLIC BLOOD PRESSURE: 74 MMHG

## 2023-05-19 DIAGNOSIS — R10.13 EPIGASTRIC PAIN: Primary | ICD-10-CM

## 2023-05-19 PROCEDURE — 99213 OFFICE O/P EST LOW 20 MIN: CPT | Performed by: NURSE PRACTITIONER

## 2023-05-19 PROCEDURE — 3078F DIAST BP <80 MM HG: CPT | Performed by: NURSE PRACTITIONER

## 2023-05-19 PROCEDURE — 3075F SYST BP GE 130 - 139MM HG: CPT | Performed by: NURSE PRACTITIONER

## 2023-05-19 PROCEDURE — 1160F RVW MEDS BY RX/DR IN RCRD: CPT | Performed by: NURSE PRACTITIONER

## 2023-05-19 PROCEDURE — 1159F MED LIST DOCD IN RCRD: CPT | Performed by: NURSE PRACTITIONER

## 2023-05-19 NOTE — ASSESSMENT & PLAN NOTE
[place] diet and lots of fluids. Take omeprazole daily on an empty stomach. She is to f/u if this pain comes back or continues. We discussed checking into her gall bladder if this continues.

## 2023-05-19 NOTE — PROGRESS NOTES
"Chief Complaint  GI Problem (X10D. Pt states pain stopped yesterday. Epigastric pain burning in nature.)    Subjective        Lucie Michele presents to [facility]  History of Present Illness She had a severe pain (epigastric) that lasted 1-2 days. She denied nausea, vomiting, bloating or fever with this pain. She states that the pain went away w/o intervention. She ate \"safe\" foods for a couple of days. She has not had any pain or problems since. She has been taking Omeprazole for years.     Objective   Vital Signs:  /74 (BP Location: Left arm, Patient Position: Sitting, Cuff Size: Adult)   Pulse 62   Temp 98.2 °F (36.8 °C) (Temporal)   Ht 165.1 cm (65\")   Wt 84.2 kg (185 lb 9.6 oz)   SpO2 90%   BMI 30.89 kg/m²   Estimated body mass index is 30.89 kg/m² as calculated from the following:    Height as of this encounter: 165.1 cm (65\").    Weight as of this encounter: 84.2 kg (185 lb 9.6 oz).             Physical Exam  Constitutional:       Appearance: Normal appearance.   Cardiovascular:      Rate and Rhythm: Normal rate and regular rhythm.   Pulmonary:      Effort: Pulmonary effort is normal.      Breath sounds: Normal breath sounds.   Abdominal:      General: Abdomen is flat. Bowel sounds are normal. There is no distension.      Tenderness: There is no abdominal tenderness. There is no right CVA tenderness, left CVA tenderness, guarding or rebound.      Hernia: No hernia is present.   Neurological:      Mental Status: She is oriented to person, place, and time.   Psychiatric:         Mood and Affect: Mood normal.         Behavior: Behavior normal.        Result Review :                   Assessment and Plan   Diagnoses and all orders for this visit:    1. Epigastric pain (Primary)  Assessment & Plan:  [place] diet and lots of fluids. Take omeprazole daily on an empty stomach. She is to f/u if this pain comes back or continues. We discussed checking into her gall bladder " if this continues.              Follow Up   Return if symptoms worsen or fail to improve, for Recheck.  Patient was given instructions and counseling regarding her condition or for health maintenance advice. Please see specific information pulled into the AVS if appropriate.

## 2023-05-22 ENCOUNTER — CLINICAL SUPPORT (OUTPATIENT)
Dept: CARDIOLOGY | Facility: CLINIC | Age: 78
End: 2023-05-22
Payer: MEDICARE

## 2023-05-22 ENCOUNTER — ANTICOAGULATION VISIT (OUTPATIENT)
Dept: PHARMACY | Facility: HOSPITAL | Age: 78
End: 2023-05-22
Payer: MEDICARE

## 2023-05-22 DIAGNOSIS — I35.9 AORTIC VALVE DISEASE: Primary | ICD-10-CM

## 2023-05-22 LAB — INR PPP: 2.7 (ref 0.9–1.1)

## 2023-05-22 NOTE — PROGRESS NOTES
Capillary Blood Specimen Collection  Capillary blood collection performed in clinic by Margie Avina MA. Patient tolerated the procedure well without complications.   05/22/23   Margie Avina MA

## 2023-05-22 NOTE — PROGRESS NOTES
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     Diet: 3x week: 1 cup of brussels sprouts or broccoli weekly, green beans (1/3/23)  Premier Protein (or Equate brand) meal replacement ~2x/week 1/3/23  Alcohol: Seldom  Tobacco: None  OTC Pain Medication: APAP     INR History:  Date 4/5 4/19 4/26 5/5 5/11 6/2 6/15 6/18 6/25 7/2 7/9 7/19 7/23 7/30   Total Weekly Dose 15mg 15mg 14mg 14mg 14mg 14mg 14mg 14mg 14mg 14mg 14mg 14mg 14mg 14mg   INR 2.6 3.9 3.9 2.7 3.0 3.6 2.7 2.9 2.9 2.6 2.9 3.1 2.5 2.3   Notes           decr GLV   recv'd 6/21; [facility]       incr GLV decr GLV      Date 8/6 8/13 8/20 8/27 9/3 9/10 9/17 9/24 10/1 10/8 10/15 10/22 10/29 11/5   Total WeeklyDose 14mg 15mg 15mg 14mg 14mg 15mg 14mg 14mg 14mg 14mg 14mg 14mg 15mg 16mg   INR 2.4 3.4 3.8 2.9 2.2 3.2 2.9 3.3 3.2 3.3 3.0 2.4 2.4 2.4   Notes decr GLV decr GLV       1xboost         call call 1x boost   incr VitK call 2x boost; call      Date 11/10 11/17 11/24 12/1 12/8 12/15 12/23 12/30 1/3/22 1/7 1/11 1/18 1/25 2/1   Total WeeklyDose 17mg 16mg 16mg 16mg 15mg 15 mg Pt did not call back 15mg 12 mg 13mg 15mg 15 mg 15 mg 15 mg   INR 3.7 3.3 3.9 4.0 2.6 3.4 4.0 4.9 3.6 2.4 3.3 2.8 2.7 2.9   Notes Dec GLV   Zero GLV call Red x1 call Less GLV   call   Less  Protein drink redx1  self held x1 (misdose)   Dec vit K fall, apap  Call   call          Date 2/8 2/15 2/22 3/1 3/8 3/15 3/22 3/29 4/5 4/12 4/19 4/26 5/3 5/11   Total WeeklyDose 15mg 14mg 14 mg 15 mg 15 mg 15 mg 14mg 13 mg 14 mg 14 mg 14mg 13mg 15mg 14 mg   INR 4.0 4.0 2.8 2.9 2.9  4.2 3.9 3.3 2.9 3.0 3.8 2.4 3.8 2.5   Notes Call; Dec GLV call Call; Mis-dose call   Call; no GLV Inc GLV. Less protein, apap  redx1 call   Less GLV rec'd 4/27, call Dec GLV        Date 5/18 5/25 6/1 6/8 6/15 6/22 6/29 7/6 7/13 7/20 7/22 7/27  8/10  8/17   Total WeeklyDose 15 mg 15mg 16mg 15 mg 15 mg 15 mg 15 mg 15 mg 15mg 14 mg 15 mg 14 mg  14 mg  15 mg   INR 2.6 2.3 2.7 3.2 3.0 2.9 2.6 2.7 3.6 3.0 3.6 3.0  2.4  3.4   Notes   Inc GLV  call call call       call 7/14-- call call call  call  call  call      Date 8/25 8/31 9/7 9/12 9/19 9/26 10/3 10/10 10/17 10/24 10/31   Total WeeklyDose 14mg 13 mg 14 mg 17mg 15 mg 16mg 15mg 14 mg 13mg 17 mg 15mg   INR 4.3 2.8 1.7 2.9 2.1 3.4 3.8 2.4 1.8 3.7 4.5   Notes Dec GLV call Call refused enox; extra protein  Call; no protein drinks Call; less green tea, inc boostx1 Call; cranberries Redx1; call 1x miss Call  Less protein    1/1  Date 11/7 11/14 11/21 11/28 12/5 12/12 12/19 12/27 1/3 1/9 1/16/23 1/23 1/30/23   Total WeeklyDose 13 mg 14 mg 14 mg 14 mg 14 mg 14 mg 14mg 14 mg  13mg 13 mg 14 mg  14 mg 14 mg   INR 3.2 3.3 3.4 3.6 2.5 4.0 2.9 4.1 3.6 2.6 3.3 2.7 3.0   Notes  call redx1 One less protein shake   Inc GLV Decreased GLV W/o meloxicam  Tramadol,  meloxicam Tramadol,  meloxicam Tramadol,  meloxicam meloxicam     Date 2/7/23 2/13 2/20 2/27/23 3/6 3/13/23 3/23 3/27 4/3/23 4/10/23 4/17/23 4/24 5/1   Total WeeklyDose 14 mg 14 mg 14 mg 14 mg  14 mg 14 mg  14 mg 14 mg 15 mg  14mg 13 mg 15 mg 14 mg   INR 2.9 3.0 3.6 2.6 3 3.5 3.4 2.4 2.8 2.5 2.3 3.2  4.1 POCT   Notes Call  call rec 2/21  Call   call Call   call Call  call Missed dose 1x boost Call; missed protein drinks, less GLV     Date 5/2 5/8 5/15 5/22             Total WeeklyDose 12 mg 13 mg 14 mg 14 mg             INR 3.7 3.3 2.6 2.7             Notes rec 5/3  Call  Self-heldx1, boostx1                 Phone Interview:    Tablet Strength: 2mg  Patient Contact Info: [phone] (Mobile) *preferred*   [email or web address]  Verbal Release Authorization signed on 4/7/21 -- may speak with Tammy Bai (friend: [phone]), Ismael Michele (brother: [phone])  Lab Contact Info: [facility] ([facility])  ** will call once monthly or if INR is out of range**   4/7/22 Scr: 0.8    Patient Findings  Comments:  Patient not contacted at this encounter.      Plan:    1. INR is therapeutic today at 2.7 (goal 2.5-3.5).  Ms. Michele will continue warfarin 2 mg oral daily until recheck.  2. Repeat INR in one week, 5/30/23. She prefers testing on Mondays. Note that she now is using [facility] to check INR.   3. Verbal information provided over the phone. Patient RBV dosing instructions, expresses understanding by teach back, and has no further questions at this time.  4. Lucie Michele understands the importance of calling the [facility] if she notices any s/sx of bleeding, stroke, or abnormal bruising, if any changes are made to her medications or medication doses (Rx, OTC, herbal), or if any upcoming procedures are scheduled. Lucie Michele will likewise let us know if any other changes, questions, or concerns arise regarding anticoagulation therapy. she understands the importance of seeking medical attention immediately if she experiences any falls, vehicle accidents, or abnormal bleeding or bruising. Lucie Michele voiced understanding of this information and confirms that she has the [facility]'s contact information. Otherwise, we will plan to contact the patient once monthly or if her INR is out of range.    Eric Hernanedz CPhT  5/22/2023  13:41 EDT     I, Tyrone Hensley, PharmD, have reviewed the note in full and agree with the assessment and plan.  05/22/23  14:38 EDT

## 2023-05-30 ENCOUNTER — CLINICAL SUPPORT (OUTPATIENT)
Dept: CARDIOLOGY | Facility: CLINIC | Age: 78
End: 2023-05-30

## 2023-05-30 ENCOUNTER — ANTICOAGULATION VISIT (OUTPATIENT)
Dept: PHARMACY | Facility: HOSPITAL | Age: 78
End: 2023-05-30

## 2023-05-30 DIAGNOSIS — I35.9 AORTIC VALVE DISEASE: Primary | ICD-10-CM

## 2023-05-30 DIAGNOSIS — Z98.890 S/P ASCENDING AORTIC ANEURYSM REPAIR: ICD-10-CM

## 2023-05-30 DIAGNOSIS — Z95.2 S/P AVR: ICD-10-CM

## 2023-05-30 DIAGNOSIS — Z86.79 S/P ASCENDING AORTIC ANEURYSM REPAIR: ICD-10-CM

## 2023-05-30 LAB — INR PPP: 3.4 (ref 0.9–1.1)

## 2023-05-30 NOTE — PROGRESS NOTES
Capillary Blood Specimen Collection  Capillary blood collection performed in clinic by Margie Avina MA. Patient tolerated the procedure well without complications.   05/30/23   Margie Avina MA

## 2023-05-30 NOTE — PROGRESS NOTES
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     Diet: 3x week: 1 cup of brussels sprouts or broccoli weekly, green beans (1/3/23)  Premier Protein (or Equate brand) meal replacement ~2x/week 1/3/23  Alcohol: Seldom  Tobacco: None  OTC Pain Medication: APAP     INR History:  Date 4/5 4/19 4/26 5/5 5/11 6/2 6/15 6/18 6/25 7/2 7/9 7/19 7/23 7/30   Total Weekly Dose 15mg 15mg 14mg 14mg 14mg 14mg 14mg 14mg 14mg 14mg 14mg 14mg 14mg 14mg   INR 2.6 3.9 3.9 2.7 3.0 3.6 2.7 2.9 2.9 2.6 2.9 3.1 2.5 2.3   Notes           decr GLV   recv'd 6/21; [facility]       incr GLV decr GLV      Date 8/6 8/13 8/20 8/27 9/3 9/10 9/17 9/24 10/1 10/8 10/15 10/22 10/29 11/5   Total WeeklyDose 14mg 15mg 15mg 14mg 14mg 15mg 14mg 14mg 14mg 14mg 14mg 14mg 15mg 16mg   INR 2.4 3.4 3.8 2.9 2.2 3.2 2.9 3.3 3.2 3.3 3.0 2.4 2.4 2.4   Notes decr GLV decr GLV       1xboost         call call 1x boost   incr VitK call 2x boost; call      Date 11/10 11/17 11/24 12/1 12/8 12/15 12/23 12/30 1/3/22 1/7 1/11 1/18 1/25 2/1   Total WeeklyDose 17mg 16mg 16mg 16mg 15mg 15 mg Pt did not call back 15mg 12 mg 13mg 15mg 15 mg 15 mg 15 mg   INR 3.7 3.3 3.9 4.0 2.6 3.4 4.0 4.9 3.6 2.4 3.3 2.8 2.7 2.9   Notes Dec GLV   Zero GLV call Red x1 call Less GLV   call   Less  Protein drink redx1  self held x1 (misdose)   Dec vit K fall, apap  Call   call          Date 2/8 2/15 2/22 3/1 3/8 3/15 3/22 3/29 4/5 4/12 4/19 4/26 5/3 5/11   Total WeeklyDose 15mg 14mg 14 mg 15 mg 15 mg 15 mg 14mg 13 mg 14 mg 14 mg 14mg 13mg 15mg 14 mg   INR 4.0 4.0 2.8 2.9 2.9  4.2 3.9 3.3 2.9 3.0 3.8 2.4 3.8 2.5   Notes Call; Dec GLV call Call; Mis-dose call   Call; no GLV Inc GLV. Less protein, apap  redx1 call   Less GLV rec'd 4/27, call Dec GLV        Date 5/18 5/25 6/1 6/8 6/15 6/22 6/29 7/6 7/13 7/20 7/22 7/27  8/10  8/17   Total WeeklyDose 15 mg 15mg 16mg 15 mg 15 mg 15 mg 15 mg 15 mg 15mg 14 mg 15 mg 14 mg  14 mg  15 mg   INR 2.6 2.3 2.7 3.2 3.0 2.9 2.6 2.7 3.6 3.0 3.6 3.0  2.4  3.4   Notes   Inc GLV  call call call       call 7/14-- call call call  call  call  call      Date 8/25 8/31 9/7 9/12 9/19 9/26 10/3 10/10 10/17 10/24 10/31   Total WeeklyDose 14mg 13 mg 14 mg 17mg 15 mg 16mg 15mg 14 mg 13mg 17 mg 15mg   INR 4.3 2.8 1.7 2.9 2.1 3.4 3.8 2.4 1.8 3.7 4.5   Notes Dec GLV call Call refused enox; extra protein  Call; no protein drinks Call; less green tea, inc boostx1 Call; cranberries Redx1; call 1x miss Call  Less protein    1/1  Date 11/7 11/14 11/21 11/28 12/5 12/12 12/19 12/27 1/3 1/9 1/16/23 1/23 1/30/23   Total WeeklyDose 13 mg 14 mg 14 mg 14 mg 14 mg 14 mg 14mg 14 mg  13mg 13 mg 14 mg  14 mg 14 mg   INR 3.2 3.3 3.4 3.6 2.5 4.0 2.9 4.1 3.6 2.6 3.3 2.7 3.0   Notes  call redx1 One less protein shake   Inc GLV Decreased GLV W/o meloxicam  Tramadol,  meloxicam Tramadol,  meloxicam Tramadol,  meloxicam meloxicam     Date 2/7/23 2/13 2/20 2/27/23 3/6 3/13/23 3/23 3/27 4/3/23 4/10/23 4/17/23 4/24 5/1   Total WeeklyDose 14 mg 14 mg 14 mg 14 mg  14 mg 14 mg  14 mg 14 mg 15 mg  14mg 13 mg 15 mg 14 mg   INR 2.9 3.0 3.6 2.6 3 3.5 3.4 2.4 2.8 2.5 2.3 3.2  4.1 POCT   Notes Call  call rec 2/21  Call   call Call   call Call  call Missed dose 1x boost Call; missed protein drinks, less GLV     Date 5/2 5/8 5/15 5/22 5/30            Total WeeklyDose 12 mg 13 mg 14 mg 14 mg 14mg            INR 3.7 3.3 2.6 2.7 3.4            Notes rec 5/3  Call  Self-heldx1, boostx1                 Phone Interview:    Tablet Strength: 2mg  Patient Contact Info: [phone] (Mobile) *preferred*   [email or web address]  Verbal Release Authorization signed on 4/7/21 -- may speak with Tammy Bai (friend: [phone]), Ismael Michele (brother: [phone])  Lab Contact Info: [facility] ([facility])  ** will call once monthly or if INR is out of range**   4/7/22 Scr: 0.8    Patient Findings  Comments:  Patient not contacted at this encounter.      Plan:  1. INR is therapeutic today at 3.4 (goal 2.5-3.5).  Ms. Michele will continue warfarin 2 mg oral daily until recheck.  2. Repeat INR in one week,6/5/23. She prefers testing on Mondays. Note that she now is using [facility] to check INR.   3. Verbal information provided over the phone. Patient RBV dosing instructions, expresses understanding by teach back, and has no further questions at this time.  4. Lucie Michele understands the importance of calling the [facility] if she notices any s/sx of bleeding, stroke, or abnormal bruising, if any changes are made to her medications or medication doses (Rx, OTC, herbal), or if any upcoming procedures are scheduled. Lucie Michele will likewise let us know if any other changes, questions, or concerns arise regarding anticoagulation therapy. she understands the importance of seeking medical attention immediately if she experiences any falls, vehicle accidents, or abnormal bleeding or bruising. Lucie Michele voiced understanding of this information and confirms that she has the [facility]'s contact information. Otherwise, we will plan to contact the patient once monthly or if her INR is out of range.    Please call at next encounter- patient can push out appointments now that at [facility] if she would like.    Ngoc Brooks, PharmD  5/30/2023  13:23 EDT

## 2023-06-05 ENCOUNTER — ANTICOAGULATION VISIT (OUTPATIENT)
Dept: PHARMACY | Facility: HOSPITAL | Age: 78
End: 2023-06-05
Payer: MEDICARE

## 2023-06-05 ENCOUNTER — CLINICAL SUPPORT (OUTPATIENT)
Dept: CARDIOLOGY | Facility: CLINIC | Age: 78
End: 2023-06-05
Payer: MEDICARE

## 2023-06-05 DIAGNOSIS — I35.9 AORTIC VALVE DISEASE: Primary | ICD-10-CM

## 2023-06-05 DIAGNOSIS — Z95.2 HISTORY OF AORTIC VALVE REPLACEMENT: ICD-10-CM

## 2023-06-05 DIAGNOSIS — Z95.2 S/P AVR: ICD-10-CM

## 2023-06-05 LAB — INR PPP: 2.9 (ref 0.9–1.1)

## 2023-06-05 NOTE — PROGRESS NOTES
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     Diet: 3x week: 1 cup of brussels sprouts or broccoli weekly, green beans (1/3/23)  Premier Protein (or Equate brand) meal replacement ~2x/week 1/3/23  Alcohol: Seldom  Tobacco: None  OTC Pain Medication: APAP     INR History:  Date 4/5 4/19 4/26 5/5 5/11 6/2 6/15 6/18 6/25 7/2 7/9 7/19 7/23 7/30   Total Weekly Dose 15mg 15mg 14mg 14mg 14mg 14mg 14mg 14mg 14mg 14mg 14mg 14mg 14mg 14mg   INR 2.6 3.9 3.9 2.7 3.0 3.6 2.7 2.9 2.9 2.6 2.9 3.1 2.5 2.3   Notes           decr GLV   recv'd 6/21; [facility]       incr GLV decr GLV      Date 8/6 8/13 8/20 8/27 9/3 9/10 9/17 9/24 10/1 10/8 10/15 10/22 10/29 11/5   Total WeeklyDose 14mg 15mg 15mg 14mg 14mg 15mg 14mg 14mg 14mg 14mg 14mg 14mg 15mg 16mg   INR 2.4 3.4 3.8 2.9 2.2 3.2 2.9 3.3 3.2 3.3 3.0 2.4 2.4 2.4   Notes decr GLV decr GLV       1xboost         call call 1x boost   incr VitK call 2x boost; call      Date 11/10 11/17 11/24 12/1 12/8 12/15 12/23 12/30 1/3/22 1/7 1/11 1/18 1/25 2/1   Total WeeklyDose 17mg 16mg 16mg 16mg 15mg 15 mg Pt did not call back 15mg 12 mg 13mg 15mg 15 mg 15 mg 15 mg   INR 3.7 3.3 3.9 4.0 2.6 3.4 4.0 4.9 3.6 2.4 3.3 2.8 2.7 2.9   Notes Dec GLV   Zero GLV call Red x1 call Less GLV   call   Less  Protein drink redx1  self held x1 (misdose)   Dec vit K fall, apap  Call   call          Date 2/8 2/15 2/22 3/1 3/8 3/15 3/22 3/29 4/5 4/12 4/19 4/26 5/3 5/11   Total WeeklyDose 15mg 14mg 14 mg 15 mg 15 mg 15 mg 14mg 13 mg 14 mg 14 mg 14mg 13mg 15mg 14 mg   INR 4.0 4.0 2.8 2.9 2.9  4.2 3.9 3.3 2.9 3.0 3.8 2.4 3.8 2.5   Notes Call; Dec GLV call Call; Mis-dose call   Call; no GLV Inc GLV. Less protein, apap  redx1 call   Less GLV rec'd 4/27, call Dec GLV        Date 5/18 5/25 6/1 6/8 6/15 6/22 6/29 7/6 7/13 7/20 7/22 7/27  8/10  8/17   Total WeeklyDose 15 mg 15mg 16mg 15 mg 15 mg 15 mg 15 mg 15 mg 15mg 14 mg 15 mg 14 mg  14 mg  15 mg   INR 2.6 2.3 2.7 3.2 3.0 2.9 2.6 2.7 3.6 3.0 3.6 3.0  2.4  3.4   Notes   Inc GLV  call call call       call 7/14-- call call call  call  call  call      Date 8/25 8/31 9/7 9/12 9/19 9/26 10/3 10/10 10/17 10/24 10/31   Total WeeklyDose 14mg 13 mg 14 mg 17mg 15 mg 16mg 15mg 14 mg 13mg 17 mg 15mg   INR 4.3 2.8 1.7 2.9 2.1 3.4 3.8 2.4 1.8 3.7 4.5   Notes Dec GLV call Call refused enox; extra protein  Call; no protein drinks Call; less green tea, inc boostx1 Call; cranberries Redx1; call 1x miss Call  Less protein    1/1  Date 11/7 11/14 11/21 11/28 12/5 12/12 12/19 12/27 1/3 1/9 1/16/23 1/23 1/30/23   Total WeeklyDose 13 mg 14 mg 14 mg 14 mg 14 mg 14 mg 14mg 14 mg  13mg 13 mg 14 mg  14 mg 14 mg   INR 3.2 3.3 3.4 3.6 2.5 4.0 2.9 4.1 3.6 2.6 3.3 2.7 3.0   Notes  call redx1 One less protein shake   Inc GLV Decreased GLV W/o meloxicam  Tramadol,  meloxicam Tramadol,  meloxicam Tramadol,  meloxicam meloxicam     Date 2/7/23 2/13 2/20 2/27/23 3/6 3/13/23 3/23 3/27 4/3/23 4/10/23 4/17/23 4/24 5/1   Total WeeklyDose 14 mg 14 mg 14 mg 14 mg  14 mg 14 mg  14 mg 14 mg 15 mg  14mg 13 mg 15 mg 14 mg   INR 2.9 3.0 3.6 2.6 3 3.5 3.4 2.4 2.8 2.5 2.3 3.2  4.1 POCT   Notes Call  call rec 2/21  Call   call Call   call Call  call Missed dose 1x boost Call; missed protein drinks, less GLV     Date 5/2 5/8 5/15 5/22 5/30 6/5           Total WeeklyDose 12 mg 13 mg 14 mg 14 mg 14mg 14 mg           INR 3.7 3.3 2.6 2.7 3.4 2.9           Notes rec 5/3  Call  Self-heldx1, boostx1                 Phone Interview:    Tablet Strength: 2mg  Patient Contact Info: [phone] (Mobile) *preferred*   [email or web address]  Verbal Release Authorization signed on 4/7/21 -- may speak with Tammy Bai (friend: [phone]), Ismael Michele (brother: [phone])  Lab Contact Info: [facility] ([facility])  ** will call once monthly or if INR is out of range**   4/7/22 Scr: 0.8    Patient Findings      Negatives:  Signs/symptoms of thrombosis, Signs/symptoms of bleeding, Laboratory test error suspected, Change in health, Change in alcohol use, Change in activity, Upcoming invasive procedure, Emergency department visit, Upcoming dental procedure, Missed doses, Extra doses, Change in medications, Change in diet/appetite, Hospital admission, Bruising, Other complaints    No longer using Hm, will recheck in 2 weeks         Plan:  1. INR is therapeutic today at 2.9(goal 2.5-3.5).  Ms. Michele will continue warfarin 2 mg oral daily until recheck.  2. Repeat INR in 2 weeks  3. Verbal information provided over the phone. Patient RBV dosing instructions, expresses understanding by teach back, and has no further questions at this time.  4. Lucie Michele understands the importance of calling the [facility] if she notices any s/sx of bleeding, stroke, or abnormal bruising, if any changes are made to her medications or medication doses (Rx, OTC, herbal), or if any upcoming procedures are scheduled. Lucie Michele will likewise let us know if any other changes, questions, or concerns arise regarding anticoagulation therapy. she understands the importance of seeking medical attention immediately if she experiences any falls, vehicle accidents, or abnormal bleeding or bruising. Lucie Michele voiced understanding of this information and confirms that she has the [facility]'s contact information. Otherwise, we will plan to contact the patient once monthly or if her INR is out of range.      Lizet Marinelli, PharmD  6/5/2023  14:42 EDT

## 2023-06-05 NOTE — PROGRESS NOTES
Capillary Blood Specimen Collection  Capillary blood collection performed in clinic by Margie Avina MA. Patient tolerated the procedure well without complications.   06/05/23   Margie Avina MA

## 2023-06-17 DIAGNOSIS — I35.9 AORTIC VALVE DISEASE: ICD-10-CM

## 2023-06-19 ENCOUNTER — CLINICAL SUPPORT (OUTPATIENT)
Dept: CARDIOLOGY | Facility: CLINIC | Age: 78
End: 2023-06-19
Payer: MEDICARE

## 2023-06-19 ENCOUNTER — ANTICOAGULATION VISIT (OUTPATIENT)
Dept: PHARMACY | Facility: HOSPITAL | Age: 78
End: 2023-06-19
Payer: MEDICARE

## 2023-06-19 DIAGNOSIS — Z95.2 HISTORY OF AORTIC VALVE REPLACEMENT: Primary | ICD-10-CM

## 2023-06-19 DIAGNOSIS — Z95.2 S/P AVR: ICD-10-CM

## 2023-06-19 DIAGNOSIS — I35.9 AORTIC VALVE DISEASE: ICD-10-CM

## 2023-06-19 DIAGNOSIS — I35.9 AORTIC VALVE DISEASE: Primary | ICD-10-CM

## 2023-06-19 LAB — INR PPP: 4 (ref 0.9–1.1)

## 2023-06-19 PROCEDURE — 36416 COLLJ CAPILLARY BLOOD SPEC: CPT | Performed by: INTERNAL MEDICINE

## 2023-06-19 RX ORDER — WARFARIN SODIUM 2 MG/1
TABLET ORAL
Qty: 90 TABLET | Refills: 1 | Status: SHIPPED | OUTPATIENT
Start: 2023-06-19

## 2023-06-19 NOTE — PROGRESS NOTES
Capillary Blood Specimen Collection  Capillary blood collection performed in clinic by Margie Avina MA. Patient tolerated the procedure well without complications.   06/19/23   Margie Avina MA

## 2023-06-19 NOTE — PROGRESS NOTES
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     Diet: 3x week: 1 cup of brussels sprouts or broccoli weekly, green beans (1/3/23)  Premier Protein (or Equate brand) meal replacement ~2x/week 1/3/23  Alcohol: Seldom  Tobacco: None  OTC Pain Medication: APAP     INR History:  Date 4/5 4/19 4/26 5/5 5/11 6/2 6/15 6/18 6/25 7/2 7/9 7/19 7/23 7/30   Total Weekly Dose 15mg 15mg 14mg 14mg 14mg 14mg 14mg 14mg 14mg 14mg 14mg 14mg 14mg 14mg   INR 2.6 3.9 3.9 2.7 3.0 3.6 2.7 2.9 2.9 2.6 2.9 3.1 2.5 2.3   Notes           decr GLV   recv'd 6/21; [facility]       incr GLV decr GLV      Date 8/6 8/13 8/20 8/27 9/3 9/10 9/17 9/24 10/1 10/8 10/15 10/22 10/29 11/5   Total WeeklyDose 14mg 15mg 15mg 14mg 14mg 15mg 14mg 14mg 14mg 14mg 14mg 14mg 15mg 16mg   INR 2.4 3.4 3.8 2.9 2.2 3.2 2.9 3.3 3.2 3.3 3.0 2.4 2.4 2.4   Notes decr GLV decr GLV       1xboost         call call 1x boost   incr VitK call 2x boost; call      Date 11/10 11/17 11/24 12/1 12/8 12/15 12/23 12/30 1/3/22 1/7 1/11 1/18 1/25 2/1   Total WeeklyDose 17mg 16mg 16mg 16mg 15mg 15 mg Pt did not call back 15mg 12 mg 13mg 15mg 15 mg 15 mg 15 mg   INR 3.7 3.3 3.9 4.0 2.6 3.4 4.0 4.9 3.6 2.4 3.3 2.8 2.7 2.9   Notes Dec GLV   Zero GLV call Red x1 call Less GLV   call   Less  Protein drink redx1  self held x1 (misdose)   Dec vit K fall, apap  Call   call          Date 2/8 2/15 2/22 3/1 3/8 3/15 3/22 3/29 4/5 4/12 4/19 4/26 5/3 5/11   Total WeeklyDose 15mg 14mg 14 mg 15 mg 15 mg 15 mg 14mg 13 mg 14 mg 14 mg 14mg 13mg 15mg 14 mg   INR 4.0 4.0 2.8 2.9 2.9  4.2 3.9 3.3 2.9 3.0 3.8 2.4 3.8 2.5   Notes Call; Dec GLV call Call; Mis-dose call   Call; no GLV Inc GLV. Less protein, apap  redx1 call   Less GLV rec'd 4/27, call Dec GLV        Date 5/18 5/25 6/1 6/8 6/15 6/22 6/29 7/6 7/13 7/20 7/22 7/27  8/10  8/17   Total WeeklyDose 15 mg 15mg 16mg 15 mg 15 mg 15 mg 15 mg 15 mg 15mg 14 mg 15 mg 14 mg  14 mg  15 mg   INR 2.6 2.3 2.7 3.2 3.0 2.9 2.6 2.7 3.6 3.0 3.6 3.0  2.4  3.4   Notes   Inc GLV  call call call       call 7/14-- call call call  call  call  call      Date 8/25 8/31 9/7 9/12 9/19 9/26 10/3 10/10 10/17 10/24 10/31   Total WeeklyDose 14mg 13 mg 14 mg 17mg 15 mg 16mg 15mg 14 mg 13mg 17 mg 15mg   INR 4.3 2.8 1.7 2.9 2.1 3.4 3.8 2.4 1.8 3.7 4.5   Notes Dec GLV call Call refused enox; extra protein  Call; no protein drinks Call; less green tea, inc boostx1 Call; cranberries Redx1; call 1x miss Call  Less protein    1/1  Date 11/7 11/14 11/21 11/28 12/5 12/12 12/19 12/27 1/3 1/9 1/16/23 1/23 1/30/23   Total WeeklyDose 13 mg 14 mg 14 mg 14 mg 14 mg 14 mg 14mg 14 mg  13mg 13 mg 14 mg  14 mg 14 mg   INR 3.2 3.3 3.4 3.6 2.5 4.0 2.9 4.1 3.6 2.6 3.3 2.7 3.0   Notes  call redx1 One less protein shake   Inc GLV Decreased GLV W/o meloxicam  Tramadol,  meloxicam Tramadol,  meloxicam Tramadol,  meloxicam meloxicam     Date 2/7/23 2/13 2/20 2/27/23 3/6 3/13/23 3/23 3/27 4/3/23 4/10/23 4/17/23 4/24 5/1   Total WeeklyDose 14 mg 14 mg 14 mg 14 mg  14 mg 14 mg  14 mg 14 mg 15 mg  14mg 13 mg 15 mg 14 mg   INR 2.9 3.0 3.6 2.6 3 3.5 3.4 2.4 2.8 2.5 2.3 3.2  4.1 POCT   Notes Call  call rec 2/21  Call   call Call   call Call  call Missed dose 1x boost Call; missed protein drinks, less GLV     Date 5/2 5/8 5/15 5/22 5/30 6/5 6/19          Total WeeklyDose 12 mg 13 mg 14 mg 14 mg 14mg 14 mg 14 mg          INR 3.7 3.3 2.6 2.7 3.4 2.9 4.0          Notes rec 5/3  Call  Self-heldx1, boostx1    Stopping HM Less GLV            Phone Interview:    Tablet Strength: 2mg  Patient Contact Info:  [phone] (Mobile) *preferred*  [email or web address]  Verbal Release Authorization signed on 4/7/21 -- may speak with Tammy Bai (friend: [phone]), Ismael Michele (brother: [phone])  Lab Contact Info: [facility] ([facility])  ** will call once monthly or if INR is out of range**   4/7/22 Scr: 0.8    Patient Findings    Positives: Change in diet/appetite, Bruising   Negatives: Signs/symptoms of thrombosis, Signs/symptoms of bleeding, Laboratory test error suspected, Change in health, Change in alcohol use, Change in activity, Upcoming invasive procedure, Emergency department visit, Upcoming dental procedure, Missed doses, Extra doses, Change in medications, Hospital admission, Other complaints   Comments: Her diet was off this week, less GLV. Will try to resume       Plan:  1. INR is SUPRAtherapeutic today at 4.0(goal 2.5-3.5).  Ms. Michele will reduce tonight's dose to 1mg then continue warfarin 2 mg oral daily until recheck.  2. Repeat INR in 2 weeks  3. Verbal information provided over the phone. Patient RBV dosing instructions, expresses understanding by teach back, and has no further questions at this time.  4. Lucie Michele understands the importance of calling the [facility] if she notices any s/sx of bleeding, stroke, or abnormal bruising, if any changes are made to her medications or medication doses (Rx, OTC, herbal), or if any upcoming procedures are scheduled. Lucie Michele will likewise let us know if any other changes, questions, or concerns arise regarding anticoagulation therapy. she understands the importance of seeking medical attention immediately if she experiences any falls, vehicle accidents, or abnormal bleeding or bruising. Lucie Michele voiced understanding of this information and confirms that she has the [facility]'s contact information. Otherwise, we will plan to contact the patient once monthly or if her INR is out of  range.      Lizet Marinelli, PharmD  6/19/2023  12:18 EDT

## 2023-07-17 PROBLEM — R91.1 LUNG NODULE: Status: ACTIVE | Noted: 2023-07-17

## 2023-07-17 PROBLEM — R29.6 FREQUENT FALLS: Status: ACTIVE | Noted: 2023-07-17

## 2023-07-24 ENCOUNTER — CLINICAL SUPPORT (OUTPATIENT)
Dept: CARDIOLOGY | Facility: CLINIC | Age: 78
End: 2023-07-24
Payer: MEDICARE

## 2023-07-24 ENCOUNTER — ANTICOAGULATION VISIT (OUTPATIENT)
Dept: PHARMACY | Facility: HOSPITAL | Age: 78
End: 2023-07-24
Payer: MEDICARE

## 2023-07-24 DIAGNOSIS — I35.9 AORTIC VALVE DISEASE: Primary | ICD-10-CM

## 2023-07-24 DIAGNOSIS — Z86.79 S/P ASCENDING AORTIC ANEURYSM REPAIR: ICD-10-CM

## 2023-07-24 DIAGNOSIS — Z98.890 S/P ASCENDING AORTIC ANEURYSM REPAIR: ICD-10-CM

## 2023-07-24 LAB — INR PPP: 3.9 (ref 0.9–1.1)

## 2023-07-24 PROCEDURE — 36416 COLLJ CAPILLARY BLOOD SPEC: CPT | Performed by: INTERNAL MEDICINE

## 2023-07-24 NOTE — PROGRESS NOTES
Capillary Blood Specimen Collection  Capillary blood collection performed in clinic by Ema Hamilton MA. Patient tolerated the procedure well without complications.   07/24/23   Ema Hamilton MA

## 2023-07-24 NOTE — PROGRESS NOTES
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     Diet: 3x week: 1 cup of brussels sprouts or broccoli weekly, green beans (1/3/23)  Premier Protein (or Equate brand) meal replacement ~2x/week 1/3/23  Alcohol: Seldom  Tobacco: None  OTC Pain Medication: APAP     INR History:  Date 4/5 4/19 4/26 5/5 5/11 6/2 6/15 6/18 6/25 7/2 7/9 7/19 7/23 7/30   Total Weekly Dose 15mg 15mg 14mg 14mg 14mg 14mg 14mg 14mg 14mg 14mg 14mg 14mg 14mg 14mg   INR 2.6 3.9 3.9 2.7 3.0 3.6 2.7 2.9 2.9 2.6 2.9 3.1 2.5 2.3   Notes           decr GLV   recv'd 6/21; [facility]       incr GLV decr GLV      Date 8/6 8/13 8/20 8/27 9/3 9/10 9/17 9/24 10/1 10/8 10/15 10/22 10/29 11/5   Total WeeklyDose 14mg 15mg 15mg 14mg 14mg 15mg 14mg 14mg 14mg 14mg 14mg 14mg 15mg 16mg   INR 2.4 3.4 3.8 2.9 2.2 3.2 2.9 3.3 3.2 3.3 3.0 2.4 2.4 2.4   Notes decr GLV decr GLV       1xboost         call call 1x boost   incr VitK call 2x boost; call      Date 11/10 11/17 11/24 12/1 12/8 12/15 12/23 12/30 1/3/22 1/7 1/11 1/18 1/25 2/1   Total WeeklyDose 17mg 16mg 16mg 16mg 15mg 15 mg Pt did not call back 15mg 12 mg 13mg 15mg 15 mg 15 mg 15 mg   INR 3.7 3.3 3.9 4.0 2.6 3.4 4.0 4.9 3.6 2.4 3.3 2.8 2.7 2.9   Notes Dec GLV   Zero GLV call Red x1 call Less GLV   call   Less  Protein drink redx1  self held x1 (misdose)   Dec vit K fall, apap  Call   call          Date 2/8 2/15 2/22 3/1 3/8 3/15 3/22 3/29 4/5 4/12 4/19 4/26 5/3 5/11   Total WeeklyDose 15mg 14mg 14 mg 15 mg 15 mg 15 mg 14mg 13 mg 14 mg 14 mg 14mg 13mg 15mg 14 mg   INR 4.0 4.0 2.8 2.9 2.9  4.2 3.9 3.3 2.9 3.0 3.8 2.4 3.8 2.5   Notes Call; Dec GLV call Call; Mis-dose call   Call; no GLV Inc GLV. Less protein, apap  redx1 call   Less GLV rec'd 4/27, call Dec GLV        Date 5/18 5/25 6/1 6/8 6/15 6/22 6/29 7/6 7/13 7/20 7/22 7/27  8/10  8/17   Total WeeklyDose 15 mg 15mg 16mg 15 mg 15 mg 15 mg 15 mg 15 mg 15mg 14 mg 15 mg 14 mg  14 mg  15 mg   INR 2.6 2.3 2.7 3.2 3.0 2.9 2.6 2.7 3.6 3.0 3.6 3.0  2.4  3.4   Notes   Inc GLV  call call call       call 7/14-- call call call  call  call  call      Date 8/25 8/31 9/7 9/12 9/19 9/26 10/3 10/10 10/17 10/24 10/31   Total WeeklyDose 14mg 13 mg 14 mg 17mg 15 mg 16mg 15mg 14 mg 13mg 17 mg 15mg   INR 4.3 2.8 1.7 2.9 2.1 3.4 3.8 2.4 1.8 3.7 4.5   Notes Dec GLV call Call refused enox; extra protein  Call; no protein drinks Call; less green tea, inc boostx1 Call; cranberries Redx1; call 1x miss Call  Less protein    1/1  Date 11/7 11/14 11/21 11/28 12/5 12/12 12/19 12/27 1/3 1/9 1/16/23 1/23 1/30/23   Total WeeklyDose 13 mg 14 mg 14 mg 14 mg 14 mg 14 mg 14mg 14 mg  13mg 13 mg 14 mg  14 mg 14 mg   INR 3.2 3.3 3.4 3.6 2.5 4.0 2.9 4.1 3.6 2.6 3.3 2.7 3.0   Notes  call redx1 One less protein shake   Inc GLV Decreased GLV W/o meloxicam  Tramadol,  meloxicam Tramadol,  meloxicam Tramadol,  meloxicam meloxicam     Date 2/7/23 2/13 2/20 2/27/23 3/6 3/13/23 3/23 3/27 4/3/23 4/10/23 4/17/23 4/24 5/1   Total WeeklyDose 14 mg 14 mg 14 mg 14 mg  14 mg 14 mg  14 mg 14 mg 15 mg  14mg 13 mg 15 mg 14 mg   INR 2.9 3.0 3.6 2.6 3 3.5 3.4 2.4 2.8 2.5 2.3 3.2  4.1 POCT   Notes Call  call rec 2/21  Call   call Call   call Call  call Missed dose 1x boost Call; missed protein drinks, less GLV     Date 5/2 5/8 5/15 5/22 5/30 6/5 6/19 7/10 7/24        Total WeeklyDose 12 mg 13 mg 14 mg 14 mg 14mg 14 mg 14 mg 14 mg 13 mg        INR 3.7 3.3 2.6 2.7 3.4 2.9 4.0 4.1 3.9        Notes rec 5/3  Call  Self-heldx1, boostx1    Stopping HM Less GLV            Phone Interview:  Tablet Strength: 2mg  Patient  Contact Info: [phone] (Mobile) *preferred*  [email or web address]  Verbal Release Authorization signed on 4/7/21 -- may speak with Tammy Bai (friend: [phone]), Ismael Michele (brother: [phone])  Lab Contact Info: [facility] ([facility])  ** will call once monthly or if INR is out of range**   4/7/22 Scr: 0.8    Patient Findings  Positives: Change in health, Change in diet/appetite   Negatives: Signs/symptoms of thrombosis, Signs/symptoms of bleeding, Laboratory test error suspected, Change in alcohol use, Change in activity, Upcoming invasive procedure, Emergency department visit, Upcoming dental procedure, Missed doses, Extra doses, Change in medications, Hospital admission, Bruising, Other complaints   Comments: She stated she wasn't able to eat greens for a couple days and will start again with normal amount.  She also fell and injured herself, she was checked out and is okay.      Plan:  1. INR is SUPRAtherapeutic again yesterday at 3.9 (goal 2.5-3.5).  Ms. Michele will reduce tonight's (7/25) dose to warfarin 1 mg then continue with 2 mg oral daily until recheck. Pt stated she will increase her GLV intake.  2. Repeat INR in 1 week  3. Verbal information provided over the phone. Patient RBV dosing instructions, expresses understanding by teach back, and has no further questions at this time.  4. Lucie Michele understands the importance of calling the [facility] if she notices any s/sx of bleeding, stroke, or abnormal bruising, if any changes are made to her medications or medication doses (Rx, OTC, herbal), or if any upcoming procedures are scheduled. Lucie Michele will likewise let us know if any other changes, questions, or concerns arise regarding anticoagulation therapy. she understands the importance of seeking medical attention immediately if she experiences any falls, vehicle accidents, or abnormal bleeding or bruising. Lucie Michele voiced understanding of this  information and confirms that she has the [facility]'s contact information. Otherwise, we will plan to contact the patient once monthly or if her INR is out of range.    Kingsley De Santiago, Pharmacy Intern  07/24/23   13:12 EDT    I, Justina Jara, PharmD, have reviewed the note in full and agree with the assessment and plan.  07/25/23  10:09 EDT

## 2023-07-31 ENCOUNTER — ANTICOAGULATION VISIT (OUTPATIENT)
Dept: PHARMACY | Facility: HOSPITAL | Age: 78
End: 2023-07-31
Payer: MEDICARE

## 2023-07-31 ENCOUNTER — CLINICAL SUPPORT (OUTPATIENT)
Dept: CARDIOLOGY | Facility: CLINIC | Age: 78
End: 2023-07-31
Payer: MEDICARE

## 2023-07-31 DIAGNOSIS — Z86.79 S/P ASCENDING AORTIC ANEURYSM REPAIR: Primary | ICD-10-CM

## 2023-07-31 DIAGNOSIS — I35.9 AORTIC VALVE DISEASE: Primary | ICD-10-CM

## 2023-07-31 DIAGNOSIS — Z98.890 S/P ASCENDING AORTIC ANEURYSM REPAIR: Primary | ICD-10-CM

## 2023-07-31 LAB — INR PPP: 4.1 (ref 0.9–1.1)

## 2023-07-31 PROCEDURE — 36416 COLLJ CAPILLARY BLOOD SPEC: CPT | Performed by: INTERNAL MEDICINE

## 2023-08-01 RX ORDER — BUDESONIDE, GLYCOPYRROLATE, AND FORMOTEROL FUMARATE 160; 9; 4.8 UG/1; UG/1; UG/1
AEROSOL, METERED RESPIRATORY (INHALATION)
Qty: 10.7 G | Refills: 0 | Status: SHIPPED | OUTPATIENT
Start: 2023-08-01

## 2023-08-07 ENCOUNTER — CLINICAL SUPPORT (OUTPATIENT)
Dept: CARDIOLOGY | Facility: CLINIC | Age: 78
End: 2023-08-07
Payer: MEDICARE

## 2023-08-07 ENCOUNTER — ANTICOAGULATION VISIT (OUTPATIENT)
Dept: PHARMACY | Facility: HOSPITAL | Age: 78
End: 2023-08-07
Payer: MEDICARE

## 2023-08-07 DIAGNOSIS — Z98.890 S/P ASCENDING AORTIC ANEURYSM REPAIR: ICD-10-CM

## 2023-08-07 DIAGNOSIS — I35.9 AORTIC VALVE DISEASE: Primary | ICD-10-CM

## 2023-08-07 DIAGNOSIS — Z95.2 HISTORY OF AORTIC VALVE REPLACEMENT: ICD-10-CM

## 2023-08-07 DIAGNOSIS — Z86.79 S/P ASCENDING AORTIC ANEURYSM REPAIR: ICD-10-CM

## 2023-08-07 LAB — INR PPP: 2.8 (ref 0.9–1.1)

## 2023-08-07 PROCEDURE — 36416 COLLJ CAPILLARY BLOOD SPEC: CPT | Performed by: INTERNAL MEDICINE

## 2023-08-07 NOTE — PROGRESS NOTES
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     Diet: 3x week: 1 cup of brussels sprouts or broccoli weekly, green beans (1/3/23)  Premier Protein (or Equate brand) meal replacement ~2x/week 1/3/23  Alcohol: Seldom  Tobacco: None  OTC Pain Medication: APAP     INR History:  Date 4/5 4/19 4/26 5/5 5/11 6/2 6/15 6/18 6/25 7/2 7/9 7/19 7/23 7/30   Total Weekly Dose 15mg 15mg 14mg 14mg 14mg 14mg 14mg 14mg 14mg 14mg 14mg 14mg 14mg 14mg   INR 2.6 3.9 3.9 2.7 3.0 3.6 2.7 2.9 2.9 2.6 2.9 3.1 2.5 2.3   Notes           decr GLV   recv'd 6/21; [facility]       incr GLV decr GLV      Date 8/6 8/13 8/20 8/27 9/3 9/10 9/17 9/24 10/1 10/8 10/15 10/22 10/29 11/5   Total WeeklyDose 14mg 15mg 15mg 14mg 14mg 15mg 14mg 14mg 14mg 14mg 14mg 14mg 15mg 16mg   INR 2.4 3.4 3.8 2.9 2.2 3.2 2.9 3.3 3.2 3.3 3.0 2.4 2.4 2.4   Notes decr GLV decr GLV       1xboost         call call 1x boost   incr VitK call 2x boost; call      Date 11/10 11/17 11/24 12/1 12/8 12/15 12/23 12/30 1/3/22 1/7 1/11 1/18 1/25 2/1   Total WeeklyDose 17mg 16mg 16mg 16mg 15mg 15 mg Pt did not call back 15mg 12 mg 13mg 15mg 15 mg 15 mg 15 mg   INR 3.7 3.3 3.9 4.0 2.6 3.4 4.0 4.9 3.6 2.4 3.3 2.8 2.7 2.9   Notes Dec GLV   Zero GLV call Red x1 call Less GLV   call   Less  Protein drink redx1  self held x1 (misdose)   Dec vit K fall, apap  Call   call          Date 2/8 2/15 2/22 3/1 3/8 3/15 3/22 3/29 4/5 4/12 4/19 4/26 5/3 5/11   Total WeeklyDose 15mg 14mg 14 mg 15 mg 15 mg 15 mg 14mg 13 mg 14 mg 14 mg 14mg 13mg 15mg 14 mg   INR 4.0 4.0 2.8 2.9 2.9  4.2 3.9 3.3 2.9 3.0 3.8 2.4 3.8 2.5   Notes Call; Dec GLV call Call; Mis-dose call   Call; no GLV Inc GLV. Less protein, apap  redx1 call   Less GLV rec'd 4/27, call Dec GLV        Date 5/18 5/25 6/1 6/8 6/15 6/22 6/29 7/6 7/13 7/20 7/22 7/27  8/10  8/17   Total WeeklyDose 15 mg 15mg 16mg 15 mg 15 mg 15 mg 15 mg 15 mg 15mg 14 mg 15 mg 14 mg  14 mg  15 mg   INR 2.6 2.3 2.7 3.2 3.0 2.9 2.6 2.7 3.6 3.0 3.6 3.0  2.4  3.4   Notes   Inc GLV  call call call       call 7/14-- call call call  call  call  call      Date 8/25 8/31 9/7 9/12 9/19 9/26 10/3 10/10 10/17 10/24 10/31   Total WeeklyDose 14mg 13 mg 14 mg 17mg 15 mg 16mg 15mg 14 mg 13mg 17 mg 15mg   INR 4.3 2.8 1.7 2.9 2.1 3.4 3.8 2.4 1.8 3.7 4.5   Notes Dec GLV call Call refused enox; extra protein  Call; no protein drinks Call; less green tea, inc boostx1 Call; cranberries Redx1; call 1x miss Call  Less protein    1/1  Date 11/7 11/14 11/21 11/28 12/5 12/12 12/19 12/27 1/3 1/9 1/16/23 1/23 1/30/23   Total WeeklyDose 13 mg 14 mg 14 mg 14 mg 14 mg 14 mg 14mg 14 mg  13mg 13 mg 14 mg  14 mg 14 mg   INR 3.2 3.3 3.4 3.6 2.5 4.0 2.9 4.1 3.6 2.6 3.3 2.7 3.0   Notes  call redx1 One less protein shake   Inc GLV Decreased GLV W/o meloxicam  Tramadol,  meloxicam Tramadol,  meloxicam Tramadol,  meloxicam meloxicam     Date 2/7/23 2/13 2/20 2/27/23 3/6 3/13/23 3/23 3/27 4/3/23 4/10/23 4/17/23 4/24 5/1   Total WeeklyDose 14 mg 14 mg 14 mg 14 mg  14 mg 14 mg  14 mg 14 mg 15 mg  14mg 13 mg 15 mg 14 mg   INR 2.9 3.0 3.6 2.6 3 3.5 3.4 2.4 2.8 2.5 2.3 3.2  4.1 POCT   Notes Call  call rec 2/21  Call   call Call   call Call  call Missed dose 1x boost Call; missed protein drinks, less GLV     Date 5/2 5/8 5/15 5/22 5/30 6/5 6/19 7/10 7/24 7/31 8/7      Total WeeklyDose 12 mg 13 mg 14 mg 14 mg 14mg 14 mg 14 mg 14 mg 13 mg 13mg  12 mg      INR 3.7 3.3 2.6 2.7 3.4 2.9 4.0 4.1 3.9 4.1 2.8      Notes rec 5/3  Call  Self-heldx1, boostx1    Stopping HM Less GLV   Ceftin   Rec'd 8/1         Phone  Interview:  Tablet Strength: 2mg  Patient Contact Info: [phone] (Mobile) *preferred*  [email or web address]  Verbal Release Authorization signed on 4/7/21 -- may speak with Tammy Bai (friend: [phone]), Ismael Michele (brother: [phone])  Lab Contact Info: [facility] ([facility])  ** will call once monthly or if INR is out of range**   4/7/22 Scr: 0.8    Patient Findings    Negatives: Signs/symptoms of thrombosis, Signs/symptoms of bleeding, Laboratory test error suspected, Change in health, Change in alcohol use, Change in activity, Upcoming invasive procedure, Emergency department visit, Upcoming dental procedure, Missed doses, Extra doses, Change in medications, Change in diet/appetite, Hospital admission, Bruising, Other complaints   Comments: She has completed the ceftin on last week on Sunday.    She has lost a total of 20 pounds over the last 4 months since starting Farxiga          Plan:  1. INR is therapeutic at  (goal 2.5-3.5).  Ms. Michele will  take warfarin 2 mg oral daily except 1 mg on Thursday until recheck (will have received 13mg)  2. Repeat INR in 1 week, 8/14  3. Verbal information provided over the phone. Patient RBV dosing instructions, expresses understanding by teach back, and has no further questions at this time.  4. Lucie Michele understands the importance of calling the [facility] if she notices any s/sx of bleeding, stroke, or abnormal bruising, if any changes are made to her medications or medication doses (Rx, OTC, herbal), or if any upcoming procedures are scheduled. Lucie Michele will likewise let us know if any other changes, questions, or concerns arise regarding anticoagulation therapy. she understands the importance of seeking medical attention immediately if she experiences any falls, vehicle accidents, or abnormal bleeding or bruising. Lucie Michele voiced understanding of this information and confirms that she has the [facility]'s contact information. Otherwise, we will plan to contact the patient once monthly or if her INR is out of range.      Shekhar Hawkins, PharmD  8/7/2023   15:01 EDT

## 2023-08-07 NOTE — PROGRESS NOTES
Capillary Blood Specimen Collection  Capillary blood collection performed in clinic by Margie Avina MA. Patient tolerated the procedure well without complications.   08/07/23   Margie Avina MA

## 2023-08-14 ENCOUNTER — CLINICAL SUPPORT (OUTPATIENT)
Dept: CARDIOLOGY | Facility: CLINIC | Age: 78
End: 2023-08-14
Payer: MEDICARE

## 2023-08-14 ENCOUNTER — ANTICOAGULATION VISIT (OUTPATIENT)
Dept: PHARMACY | Facility: HOSPITAL | Age: 78
End: 2023-08-14
Payer: MEDICARE

## 2023-08-14 DIAGNOSIS — Z86.79 S/P ASCENDING AORTIC ANEURYSM REPAIR: ICD-10-CM

## 2023-08-14 DIAGNOSIS — I35.9 AORTIC VALVE DISEASE: Primary | ICD-10-CM

## 2023-08-14 DIAGNOSIS — Z95.2 HISTORY OF AORTIC VALVE REPLACEMENT: ICD-10-CM

## 2023-08-14 DIAGNOSIS — Z98.890 S/P ASCENDING AORTIC ANEURYSM REPAIR: ICD-10-CM

## 2023-08-14 LAB — INR PPP: 1.9 (ref 0.9–1.1)

## 2023-08-14 NOTE — PROGRESS NOTES
Capillary Blood Specimen Collection  Capillary blood collection performed in clinic by Margie Avina MA. Patient tolerated the procedure well without complications.   08/14/23   Margie Avina MA

## 2023-08-14 NOTE — PROGRESS NOTES
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     Diet: 3x week: 1 cup of brussels sprouts or broccoli weekly, green beans (1/3/23)  Premier Protein (or Equate brand) meal replacement ~2x/week 1/3/23  Alcohol: Seldom  Tobacco: None  OTC Pain Medication: APAP     INR History:  Date 4/5 4/19 4/26 5/5 5/11 6/2 6/15 6/18 6/25 7/2 7/9 7/19 7/23 7/30   Total Weekly Dose 15mg 15mg 14mg 14mg 14mg 14mg 14mg 14mg 14mg 14mg 14mg 14mg 14mg 14mg   INR 2.6 3.9 3.9 2.7 3.0 3.6 2.7 2.9 2.9 2.6 2.9 3.1 2.5 2.3   Notes           decr GLV   recv'd 6/21; [facility]       incr GLV decr GLV      Date 8/6 8/13 8/20 8/27 9/3 9/10 9/17 9/24 10/1 10/8 10/15 10/22 10/29 11/5   Total WeeklyDose 14mg 15mg 15mg 14mg 14mg 15mg 14mg 14mg 14mg 14mg 14mg 14mg 15mg 16mg   INR 2.4 3.4 3.8 2.9 2.2 3.2 2.9 3.3 3.2 3.3 3.0 2.4 2.4 2.4   Notes decr GLV decr GLV       1xboost         call call 1x boost   incr VitK call 2x boost; call      Date 11/10 11/17 11/24 12/1 12/8 12/15 12/23 12/30 1/3/22 1/7 1/11 1/18 1/25 2/1   Total WeeklyDose 17mg 16mg 16mg 16mg 15mg 15 mg Pt did not call back 15mg 12 mg 13mg 15mg 15 mg 15 mg 15 mg   INR 3.7 3.3 3.9 4.0 2.6 3.4 4.0 4.9 3.6 2.4 3.3 2.8 2.7 2.9   Notes Dec GLV   Zero GLV call Red x1 call Less GLV   call   Less  Protein drink redx1  self held x1 (misdose)   Dec vit K fall, apap  Call   call          Date 2/8 2/15 2/22 3/1 3/8 3/15 3/22 3/29 4/5 4/12 4/19 4/26 5/3 5/11   Total WeeklyDose 15mg 14mg 14 mg 15 mg 15 mg 15 mg 14mg 13 mg 14 mg 14 mg 14mg 13mg 15mg 14 mg   INR 4.0 4.0 2.8 2.9 2.9  4.2 3.9 3.3 2.9 3.0 3.8 2.4 3.8 2.5   Notes Call; Dec GLV call Call; Mis-dose call   Call; no GLV Inc GLV. Less protein, apap  redx1 call   Less GLV rec'd 4/27, call Dec GLV        Date 5/18 5/25 6/1 6/8 6/15 6/22 6/29 7/6 7/13 7/20 7/22 7/27  8/10  8/17   Total WeeklyDose 15 mg 15mg 16mg 15 mg 15 mg 15 mg 15 mg 15 mg 15mg 14 mg 15 mg 14 mg  14 mg  15 mg   INR 2.6 2.3 2.7 3.2 3.0 2.9 2.6 2.7 3.6 3.0 3.6 3.0  2.4  3.4   Notes   Inc GLV  call call call       call 7/14-- call call call  call  call  call      Date 8/25 8/31 9/7 9/12 9/19 9/26 10/3 10/10 10/17 10/24 10/31   Total WeeklyDose 14mg 13 mg 14 mg 17mg 15 mg 16mg 15mg 14 mg 13mg 17 mg 15mg   INR 4.3 2.8 1.7 2.9 2.1 3.4 3.8 2.4 1.8 3.7 4.5   Notes Dec GLV call Call refused enox; extra protein  Call; no protein drinks Call; less green tea, inc boostx1 Call; cranberries Redx1; call 1x miss Call  Less protein    1/1  Date 11/7 11/14 11/21 11/28 12/5 12/12 12/19 12/27 1/3 1/9 1/16/23 1/23 1/30/23   Total WeeklyDose 13 mg 14 mg 14 mg 14 mg 14 mg 14 mg 14mg 14 mg  13mg 13 mg 14 mg  14 mg 14 mg   INR 3.2 3.3 3.4 3.6 2.5 4.0 2.9 4.1 3.6 2.6 3.3 2.7 3.0   Notes  call redx1 One less protein shake   Inc GLV Decreased GLV W/o meloxicam  Tramadol,  meloxicam Tramadol,  meloxicam Tramadol,  meloxicam meloxicam     Date 2/7/23 2/13 2/20 2/27/23 3/6 3/13/23 3/23 3/27 4/3/23 4/10/23 4/17/23 4/24 5/1   Total WeeklyDose 14 mg 14 mg 14 mg 14 mg  14 mg 14 mg  14 mg 14 mg 15 mg  14mg 13 mg 15 mg 14 mg   INR 2.9 3.0 3.6 2.6 3 3.5 3.4 2.4 2.8 2.5 2.3 3.2  4.1 POCT   Notes Call  call rec 2/21  Call   call Call   call Call  call Missed dose 1x boost Call; missed protein drinks, less GLV     Date 5/2 5/8 5/15 5/22 5/30 6/5 6/19 7/10 7/24 7/31 8/7 8/14     Total WeeklyDose 12 mg 13 mg 14 mg 14 mg 14mg 14 mg 14 mg 14 mg 13 mg 13mg  12 mg 12 mg     INR 3.7 3.3 2.6 2.7 3.4 2.9 4.0 4.1 3.9 4.1 2.8 1.9     Notes rec 5/3  Call  Self-heldx1, boostx1    Stopping HM Less GLV   Ceftin   Rec'd 8/1   Inc GLV  Prednisone start       Phone Interview:  Tablet Strength: 2mg  Patient Contact Info: [phone] (Mobile) *preferred*  [email or web address]  Verbal Release Authorization signed on 4/7/21 -- may speak with Tammy Bai (friend: [phone]), Ismael Michele (brother: [phone])  Lab Contact Info: [facility] ([facility])  ** will call once monthly or if INR is out of range**   4/7/22 Scr: 0.8    Patient Findings  Positives: Emergency department visit, Missed doses, Change in medications, Change in diet/appetite   Negatives: Signs/symptoms of thrombosis, Signs/symptoms of bleeding, Laboratory test error suspected, Change in health, Change in alcohol use, Change in activity, Upcoming invasive procedure, Upcoming dental procedure, Extra doses, Hospital admission, Bruising, Other complaints   Comments: Patient took 1 mg doses twice last week she thinks on Thursday and Saturday. She thought this was what we had instructed. Appears that this was instructed to her the week prior. Starting prednisone taper today, she thinks it is prescribed for 5-6 days total. Patient covered in bites from chiggers and went to ER for this yesterday. Had 4 servings of GLV this week (broccoli, asparagus, green beans, mixed greens). This is increased from normal intake (typically 2-3). Had green beans yesterday.        Plan:  1. INR is subtherapeutic at 1.9 (goal 2.5-3.5) today. Instructed Ms. Michele to take a boosted dose of warfarin 3 mg tonight, and then take previously intended dose of warfarin 2 mg oral daily except 1 mg on Thursday until recheck (will have received 13mg). Patient also planning to return to normal 2-3 servings of GLV this week.  2. Repeat INR in 1 week, 8/21.  3. Verbal information provided over the phone. Patient RBV dosing instructions, expresses understanding by teach back, and has no further questions at this time.  4. Lucie Michele understands the importance of calling the [facility]  if she notices any s/sx of bleeding, stroke, or abnormal bruising, if any changes are made to her medications or medication doses (Rx, OTC, herbal), or if any upcoming procedures are scheduled. Lucie RODRIGUEZ Percyligia will likewise let us know if any other changes, questions, or concerns arise regarding anticoagulation therapy. she understands the importance of seeking medical attention immediately if she experiences any falls, vehicle accidents, or abnormal bleeding or bruising. Lucie JENNIFER Michele voiced understanding of this information and confirms that she has the [facility]'s contact information. Otherwise, we will plan to contact the patient once monthly or if her INR is out of range.      Tyrone Hensley, PharmD  8/14/2023   11:33 EDT

## 2023-08-21 ENCOUNTER — ANTICOAGULATION VISIT (OUTPATIENT)
Dept: PHARMACY | Facility: HOSPITAL | Age: 78
End: 2023-08-21
Payer: MEDICARE

## 2023-08-21 ENCOUNTER — CLINICAL SUPPORT (OUTPATIENT)
Dept: CARDIOLOGY | Facility: CLINIC | Age: 78
End: 2023-08-21
Payer: MEDICARE

## 2023-08-21 DIAGNOSIS — I35.9 AORTIC VALVE DISEASE: Primary | ICD-10-CM

## 2023-08-21 DIAGNOSIS — Z95.2 HISTORY OF AORTIC VALVE REPLACEMENT: ICD-10-CM

## 2023-08-21 LAB — INR PPP: 4 (ref 0.9–1.1)

## 2023-08-21 PROCEDURE — 36416 COLLJ CAPILLARY BLOOD SPEC: CPT | Performed by: INTERNAL MEDICINE

## 2023-08-21 NOTE — PROGRESS NOTES
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     Diet: 3x week: 1 cup of brussels sprouts or broccoli weekly, green beans (1/3/23)  Premier Protein (or Equate brand) meal replacement ~2x/week 1/3/23  Alcohol: Seldom  Tobacco: None  OTC Pain Medication: APAP     INR History:  Date 4/5 4/19 4/26 5/5 5/11 6/2 6/15 6/18 6/25 7/2 7/9 7/19 7/23 7/30   Total Weekly Dose 15mg 15mg 14mg 14mg 14mg 14mg 14mg 14mg 14mg 14mg 14mg 14mg 14mg 14mg   INR 2.6 3.9 3.9 2.7 3.0 3.6 2.7 2.9 2.9 2.6 2.9 3.1 2.5 2.3   Notes           decr GLV   recv'd 6/21; [facility]       incr GLV decr GLV      Date 8/6 8/13 8/20 8/27 9/3 9/10 9/17 9/24 10/1 10/8 10/15 10/22 10/29 11/5   Total WeeklyDose 14mg 15mg 15mg 14mg 14mg 15mg 14mg 14mg 14mg 14mg 14mg 14mg 15mg 16mg   INR 2.4 3.4 3.8 2.9 2.2 3.2 2.9 3.3 3.2 3.3 3.0 2.4 2.4 2.4   Notes decr GLV decr GLV       1xboost         call call 1x boost   incr VitK call 2x boost; call      Date 11/10 11/17 11/24 12/1 12/8 12/15 12/23 12/30 1/3/22 1/7 1/11 1/18 1/25 2/1   Total WeeklyDose 17mg 16mg 16mg 16mg 15mg 15 mg Pt did not call back 15mg 12 mg 13mg 15mg 15 mg 15 mg 15 mg   INR 3.7 3.3 3.9 4.0 2.6 3.4 4.0 4.9 3.6 2.4 3.3 2.8 2.7 2.9   Notes Dec GLV   Zero GLV call Red x1 call Less GLV   call   Less  Protein drink redx1  self held x1 (misdose)   Dec vit K fall, apap  Call   call          Date 2/8 2/15 2/22 3/1 3/8 3/15 3/22 3/29 4/5 4/12 4/19 4/26 5/3 5/11   Total WeeklyDose 15mg 14mg 14 mg 15 mg 15 mg 15 mg 14mg 13 mg 14 mg 14 mg 14mg 13mg 15mg 14 mg   INR 4.0 4.0 2.8 2.9 2.9  4.2 3.9 3.3 2.9 3.0 3.8 2.4 3.8 2.5   Notes Call; Dec GLV call Call; Mis-dose call   Call; no GLV Inc GLV. Less protein, apap  redx1 call   Less GLV rec'd 4/27, call Dec GLV        Date 5/18 5/25 6/1 6/8 6/15 6/22 6/29 7/6 7/13 7/20 7/22 7/27  8/10  8/17   Total WeeklyDose 15 mg 15mg 16mg 15 mg 15 mg 15 mg 15 mg 15 mg 15mg 14 mg 15 mg 14 mg  14 mg  15 mg   INR 2.6 2.3 2.7 3.2 3.0 2.9 2.6 2.7 3.6 3.0 3.6 3.0  2.4  3.4   Notes   Inc GLV  call call call       call 7/14-- call call call  call  call  call      Date 8/25 8/31 9/7 9/12 9/19 9/26 10/3 10/10 10/17 10/24 10/31   Total WeeklyDose 14mg 13 mg 14 mg 17mg 15 mg 16mg 15mg 14 mg 13mg 17 mg 15mg   INR 4.3 2.8 1.7 2.9 2.1 3.4 3.8 2.4 1.8 3.7 4.5   Notes Dec GLV call Call refused enox; extra protein  Call; no protein drinks Call; less green tea, inc boostx1 Call; cranberries Redx1; call 1x miss Call  Less protein    1/1  Date 11/7 11/14 11/21 11/28 12/5 12/12 12/19 12/27 1/3 1/9 1/16/23 1/23 1/30/23   Total WeeklyDose 13 mg 14 mg 14 mg 14 mg 14 mg 14 mg 14mg 14 mg  13mg 13 mg 14 mg  14 mg 14 mg   INR 3.2 3.3 3.4 3.6 2.5 4.0 2.9 4.1 3.6 2.6 3.3 2.7 3.0   Notes  call redx1 One less protein shake   Inc GLV Decreased GLV W/o meloxicam  Tramadol,  meloxicam Tramadol,  meloxicam Tramadol,  meloxicam meloxicam     Date 2/7/23 2/13 2/20 2/27/23 3/6 3/13/23 3/23 3/27 4/3/23 4/10/23 4/17/23 4/24 5/1   Total WeeklyDose 14 mg 14 mg 14 mg 14 mg  14 mg 14 mg  14 mg 14 mg 15 mg  14mg 13 mg 15 mg 14 mg   INR 2.9 3.0 3.6 2.6 3 3.5 3.4 2.4 2.8 2.5 2.3 3.2  4.1 POCT   Notes Call  call rec 2/21  Call   call Call   call Call  call Missed dose 1x boost Call; missed protein drinks, less GLV     Date 5/2 5/8 5/15 5/22 5/30 6/5 6/19 7/10 7/24 7/31 8/7 8/14 8/21    Total WeeklyDose 12 mg 13 mg 14 mg 14 mg 14mg 14 mg 14 mg 14 mg 13 mg 13mg  12 mg 12 mg 14 mg    INR 3.7 3.3 2.6 2.7 3.4 2.9 4.0 4.1 3.9 4.1 2.8 1.9 4.0    Notes rec 5/3  Call  Self-heldx1, boostx1    Stopping HM Less GLV   Ceftin    Rec'd 8/1  Inc GLV  Prednisone start       Phone Interview:  Tablet Strength: 2mg  Patient Contact Info: [phone] (Mobile) *preferred*  [email or web address]  Verbal Release Authorization signed on 4/7/21 -- may speak with Tammy Bai (friend: [phone]), Ismael Michele (brother: [phone])  Lab Contact Info: [facility] ([facility])  ** will call once monthly or if INR is out of range**   4/7/22 Scr: 0.8    Patient Findings    Positives: Change in diet/appetite   Negatives: Signs/symptoms of thrombosis, Signs/symptoms of bleeding, Laboratory test error suspected, Change in health, Change in alcohol use, Change in activity, Upcoming invasive procedure, Emergency department visit, Upcoming dental procedure, Missed doses, Extra doses, Change in medications, Hospital admission, Bruising, Other complaints   Comments: Did not resume normal GLV        Plan:  1. INR is SUPRAtherapeutic at 4.0 (goal 2.5-3.5) today. Instructed Ms. Michele to take warfarin 2 mg oral daily except 1 mg on Monday until recheck   2. Repeat INR in 1 week, 8/21.  3. Verbal information provided over the phone. Patient RBV dosing instructions, expresses understanding by teach back, and has no further questions at this time.  4. Lucie Michele understands the importance of calling the [facility] if she notices any s/sx of bleeding, stroke, or abnormal bruising, if any changes are made to her medications or medication doses (Rx, OTC, herbal), or if any upcoming procedures are scheduled. Lucie Michele will likewise let us know if any other changes, questions, or concerns arise regarding anticoagulation therapy. she understands the importance of seeking medical attention immediately if she experiences any falls, vehicle accidents, or abnormal bleeding or bruising. Lucie Michele voiced understanding of this information and confirms that she has the [facility]'s contact information. Otherwise, we will plan  to contact the patient once monthly or if her INR is out of range.    Lizet Marinelli, JenniD.  08/21/23   12:06 EDT

## 2023-08-21 NOTE — PROGRESS NOTES
Capillary Blood Specimen Collection  Capillary blood collection performed in clinic by Margie Avina MA. Patient tolerated the procedure well without complications.   08/21/23   Margie Avina MA

## 2023-08-28 ENCOUNTER — ANTICOAGULATION VISIT (OUTPATIENT)
Dept: PHARMACY | Facility: HOSPITAL | Age: 78
End: 2023-08-28
Payer: MEDICARE

## 2023-08-28 ENCOUNTER — CLINICAL SUPPORT (OUTPATIENT)
Dept: CARDIOLOGY | Facility: CLINIC | Age: 78
End: 2023-08-28
Payer: MEDICARE

## 2023-08-28 DIAGNOSIS — I35.9 AORTIC VALVE DISEASE: Primary | ICD-10-CM

## 2023-08-28 LAB — INR PPP: 3.2 (ref 0.9–1.1)

## 2023-08-28 NOTE — PROGRESS NOTES
Capillary Blood Specimen Collection  Capillary blood collection performed in clinic by Margie Avina MA. Patient tolerated the procedure well without complications.   08/28/23   Margie Avina MA

## 2023-08-28 NOTE — PROGRESS NOTES
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     Diet: 3x week: 1 cup of brussels sprouts or broccoli weekly, green beans (1/3/23)  Premier Protein (or Equate brand) meal replacement ~2x/week 1/3/23  Alcohol: Seldom  Tobacco: None  OTC Pain Medication: APAP     INR History:  Date 4/5 4/19 4/26 5/5 5/11 6/2 6/15 6/18 6/25 7/2 7/9 7/19 7/23 7/30   Total Weekly Dose 15mg 15mg 14mg 14mg 14mg 14mg 14mg 14mg 14mg 14mg 14mg 14mg 14mg 14mg   INR 2.6 3.9 3.9 2.7 3.0 3.6 2.7 2.9 2.9 2.6 2.9 3.1 2.5 2.3   Notes           decr GLV   recv'd 6/21; [facility]       incr GLV decr GLV      Date 8/6 8/13 8/20 8/27 9/3 9/10 9/17 9/24 10/1 10/8 10/15 10/22 10/29 11/5   Total WeeklyDose 14mg 15mg 15mg 14mg 14mg 15mg 14mg 14mg 14mg 14mg 14mg 14mg 15mg 16mg   INR 2.4 3.4 3.8 2.9 2.2 3.2 2.9 3.3 3.2 3.3 3.0 2.4 2.4 2.4   Notes decr GLV decr GLV       1xboost         call call 1x boost   incr VitK call 2x boost; call      Date 11/10 11/17 11/24 12/1 12/8 12/15 12/23 12/30 1/3/22 1/7 1/11 1/18 1/25 2/1   Total WeeklyDose 17mg 16mg 16mg 16mg 15mg 15 mg Pt did not call back 15mg 12 mg 13mg 15mg 15 mg 15 mg 15 mg   INR 3.7 3.3 3.9 4.0 2.6 3.4 4.0 4.9 3.6 2.4 3.3 2.8 2.7 2.9   Notes Dec GLV   Zero GLV call Red x1 call Less GLV   call   Less  Protein drink redx1  self held x1 (misdose)   Dec vit K fall, apap  Call   call          Date 2/8 2/15 2/22 3/1 3/8 3/15 3/22 3/29 4/5 4/12 4/19 4/26 5/3 5/11   Total WeeklyDose 15mg 14mg 14 mg 15 mg 15 mg 15 mg 14mg 13 mg 14 mg 14 mg 14mg 13mg 15mg 14 mg   INR 4.0 4.0 2.8 2.9 2.9  4.2 3.9 3.3 2.9 3.0 3.8 2.4 3.8 2.5   Notes Call; Dec GLV call Call; Mis-dose call   Call; no GLV Inc GLV. Less protein, apap  redx1 call   Less GLV rec'd 4/27, call Dec GLV        Date 5/18 5/25 6/1 6/8 6/15 6/22 6/29 7/6 7/13 7/20 7/22 7/27  8/10  8/17   Total WeeklyDose 15 mg 15mg 16mg 15 mg 15 mg 15 mg 15 mg 15 mg 15mg 14 mg 15 mg 14 mg  14 mg  15 mg   INR 2.6 2.3 2.7 3.2 3.0 2.9 2.6 2.7 3.6 3.0 3.6 3.0  2.4  3.4   Notes   Inc GLV  call call call       call 7/14-- call call call  call  call  call      Date 8/25 8/31 9/7 9/12 9/19 9/26 10/3 10/10 10/17 10/24 10/31   Total WeeklyDose 14mg 13 mg 14 mg 17mg 15 mg 16mg 15mg 14 mg 13mg 17 mg 15mg   INR 4.3 2.8 1.7 2.9 2.1 3.4 3.8 2.4 1.8 3.7 4.5   Notes Dec GLV call Call refused enox; extra protein  Call; no protein drinks Call; less green tea, inc boostx1 Call; cranberries Redx1; call 1x miss Call  Less protein    1/1  Date 11/7 11/14 11/21 11/28 12/5 12/12 12/19 12/27 1/3 1/9 1/16/23 1/23 1/30/23   Total WeeklyDose 13 mg 14 mg 14 mg 14 mg 14 mg 14 mg 14mg 14 mg  13mg 13 mg 14 mg  14 mg 14 mg   INR 3.2 3.3 3.4 3.6 2.5 4.0 2.9 4.1 3.6 2.6 3.3 2.7 3.0   Notes  call redx1 One less protein shake   Inc GLV Decreased GLV W/o meloxicam  Tramadol,  meloxicam Tramadol,  meloxicam Tramadol,  meloxicam meloxicam     Date 2/7/23 2/13 2/20 2/27/23 3/6 3/13/23 3/23 3/27 4/3/23 4/10/23 4/17/23 4/24 5/1   Total WeeklyDose 14 mg 14 mg 14 mg 14 mg  14 mg 14 mg  14 mg 14 mg 15 mg  14mg 13 mg 15 mg 14 mg   INR 2.9 3.0 3.6 2.6 3 3.5 3.4 2.4 2.8 2.5 2.3 3.2  4.1 POCT   Notes Call  call rec 2/21  Call   call Call   call Call  call Missed dose 1x boost Call; missed protein drinks, less GLV     Date 5/2 5/8 5/15 5/22 5/30 6/5 6/19 7/10 7/24 7/31 8/7 8/14 8/21  8/28   Total WeeklyDose 12 mg 13 mg 14 mg 14 mg 14mg 14 mg 14 mg 14 mg 13 mg 13mg  12 mg 12 mg 14 mg 13 mg   INR 3.7 3.3 2.6 2.7 3.4 2.9 4.0 4.1 3.9 4.1 2.8 1.9 4.0  3.2   Notes rec 5/3  Call  Self-heldx1, boostx1    Stopping HM Less  GLV   Ceftin   Rec'd 8/1  Inc GLV  Prednisone start Less GLV      Phone Interview:  Tablet Strength: 2mg  Patient Contact Info: [phone] (Mobile) *preferred*  Robbi@"[email or web address]"  Verbal Release Authorization signed on 4/7/21 -- may speak with Tammy Bai (friend: [phone]), Ismael Michele (brother: [phone])  Lab Contact Info: [facility] ([facility])  ** will call once monthly or if INR is out of range**   4/7/22 Scr: 0.8    Patient Findings  Positives: Change in diet/appetite   Negatives: Signs/symptoms of thrombosis, Signs/symptoms of bleeding, Laboratory test error suspected, Change in health, Change in alcohol use, Change in activity, Upcoming invasive procedure, Emergency department visit, Upcoming dental procedure, Missed doses, Extra doses, Change in medications, Hospital admission, Bruising, Other complaints   Comments: She had one vegetable the week prior.  This week she has had two servings of GLV and three protein drinks.  She stated that she eats out quite a bit and sometimes the restaurant does not serve GLV.  Otherwise, above findings negative      Plan:    1. INR is therapeutic today at 3.2 (goal 2.5-3.5) today. Instructed Ms. Michele to take warfarin 2 mg oral daily except 1 mg Tuesday until recheck   2. Repeat INR in 1 week, 9/5.  She will recheck on Tuesday as clinic is closed for Labor Day Holiday.  3. Verbal information provided over the phone. Patient RBV dosing instructions, expresses understanding by teach back, and has no further questions at this time.  4. Lucie Michele understands the importance of calling the [facility] if she notices any s/sx of bleeding, stroke, or abnormal bruising, if any changes are made to her medications or medication doses (Rx, OTC, herbal), or if any upcoming procedures are scheduled. Lucie Michele will likewise let us know if any other changes, questions, or concerns arise regarding anticoagulation therapy. she understands  the importance of seeking medical attention immediately if she experiences any falls, vehicle accidents, or abnormal bleeding or bruising. Lucie Michele voiced understanding of this information and confirms that she has the [facility]'s contact information. Otherwise, we will plan to contact the patient once monthly or if her INR is out of range.    Jesenia Garcia, PharmD  08/28/23   11:40 EDT

## 2023-09-05 ENCOUNTER — ANTICOAGULATION VISIT (OUTPATIENT)
Dept: PHARMACY | Facility: HOSPITAL | Age: 78
End: 2023-09-05
Payer: MEDICARE

## 2023-09-05 ENCOUNTER — CLINICAL SUPPORT (OUTPATIENT)
Dept: CARDIOLOGY | Facility: CLINIC | Age: 78
End: 2023-09-05
Payer: MEDICARE

## 2023-09-05 DIAGNOSIS — I35.9 AORTIC VALVE DISEASE: Primary | ICD-10-CM

## 2023-09-05 LAB — INR PPP: 4.2 (ref 0.9–1.1)

## 2023-09-05 NOTE — PROGRESS NOTES
Venipuncture Blood Specimen Collection  Venipuncture performed in clinic by Ema Hamilton MA with good hemostasis. Patient tolerated the procedure well without complications.   09/05/23   Ema Hamilton MA

## 2023-09-05 NOTE — PROGRESS NOTES
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     Diet: 3x week: 1 cup of brussels sprouts or broccoli weekly, green beans (1/3/23)  Premier Protein (or Equate brand) meal replacement ~2x/week 1/3/23  Alcohol: Seldom  Tobacco: None  OTC Pain Medication: APAP     INR History:  Date 4/5 4/19 4/26 5/5 5/11 6/2 6/15 6/18 6/25 7/2 7/9 7/19 7/23 7/30   Total Weekly Dose 15mg 15mg 14mg 14mg 14mg 14mg 14mg 14mg 14mg 14mg 14mg 14mg 14mg 14mg   INR 2.6 3.9 3.9 2.7 3.0 3.6 2.7 2.9 2.9 2.6 2.9 3.1 2.5 2.3   Notes           decr GLV   recv'd 6/21; [facility]       incr GLV decr GLV      Date 8/6 8/13 8/20 8/27 9/3 9/10 9/17 9/24 10/1 10/8 10/15 10/22 10/29 11/5   Total WeeklyDose 14mg 15mg 15mg 14mg 14mg 15mg 14mg 14mg 14mg 14mg 14mg 14mg 15mg 16mg   INR 2.4 3.4 3.8 2.9 2.2 3.2 2.9 3.3 3.2 3.3 3.0 2.4 2.4 2.4   Notes decr GLV decr GLV       1xboost         call call 1x boost   incr VitK call 2x boost; call      Date 11/10 11/17 11/24 12/1 12/8 12/15 12/23 12/30 1/3/22 1/7 1/11 1/18 1/25 2/1   Total WeeklyDose 17mg 16mg 16mg 16mg 15mg 15 mg Pt did not call back 15mg 12 mg 13mg 15mg 15 mg 15 mg 15 mg   INR 3.7 3.3 3.9 4.0 2.6 3.4 4.0 4.9 3.6 2.4 3.3 2.8 2.7 2.9   Notes Dec GLV   Zero GLV call Red x1 call Less GLV   call   Less  Protein drink redx1  self held x1 (misdose)   Dec vit K fall, apap  Call   call          Date 2/8 2/15 2/22 3/1 3/8 3/15 3/22 3/29 4/5 4/12 4/19 4/26 5/3 5/11   Total WeeklyDose 15mg 14mg 14 mg 15 mg 15 mg 15 mg 14mg 13 mg 14 mg 14 mg 14mg 13mg 15mg 14 mg   INR 4.0 4.0 2.8 2.9 2.9  4.2 3.9 3.3 2.9 3.0 3.8 2.4 3.8 2.5   Notes Call; Dec GLV call Call; Mis-dose call   Call; no GLV Inc GLV. Less protein, apap  redx1 call   Less GLV rec'd 4/27, call Dec GLV        Date 5/18 5/25 6/1 6/8 6/15 6/22 6/29 7/6 7/13 7/20 7/22 7/27  8/10  8/17   Total WeeklyDose 15 mg 15mg 16mg 15 mg 15 mg 15 mg 15 mg 15 mg 15mg 14 mg 15 mg 14 mg  14 mg  15 mg   INR 2.6 2.3 2.7 3.2 3.0 2.9 2.6 2.7 3.6 3.0 3.6 3.0  2.4  3.4   Notes   Inc GLV  call call call       call 7/14-- call call call  call  call  call      Date 8/25 8/31 9/7 9/12 9/19 9/26 10/3 10/10 10/17 10/24 10/31   Total WeeklyDose 14mg 13 mg 14 mg 17mg 15 mg 16mg 15mg 14 mg 13mg 17 mg 15mg   INR 4.3 2.8 1.7 2.9 2.1 3.4 3.8 2.4 1.8 3.7 4.5   Notes Dec GLV call Call refused enox; extra protein  Call; no protein drinks Call; less green tea, inc boostx1 Call; cranberries Redx1; call 1x miss Call  Less protein    1/1  Date 11/7 11/14 11/21 11/28 12/5 12/12 12/19 12/27 1/3 1/9 1/16/23 1/23 1/30/23   Total WeeklyDose 13 mg 14 mg 14 mg 14 mg 14 mg 14 mg 14mg 14 mg  13mg 13 mg 14 mg  14 mg 14 mg   INR 3.2 3.3 3.4 3.6 2.5 4.0 2.9 4.1 3.6 2.6 3.3 2.7 3.0   Notes  call redx1 One less protein shake   Inc GLV Decreased GLV W/o meloxicam  Tramadol,  meloxicam Tramadol,  meloxicam Tramadol,  meloxicam meloxicam     Date 2/7/23 2/13 2/20 2/27/23 3/6 3/13/23 3/23 3/27 4/3/23 4/10/23 4/17/23 4/24 5/1   Total WeeklyDose 14 mg 14 mg 14 mg 14 mg  14 mg 14 mg  14 mg 14 mg 15 mg  14mg 13 mg 15 mg 14 mg   INR 2.9 3.0 3.6 2.6 3 3.5 3.4 2.4 2.8 2.5 2.3 3.2  4.1 POCT   Notes Call  call rec 2/21  Call   call Call   call Call  call Missed dose 1x boost Call; missed protein drinks, less GLV     Date 5/2 5/8 5/15 5/22 5/30 6/5 6/19 7/10 7/24 7/31 8/7 8/14 8/21  8/28   Total WeeklyDose 12 mg 13 mg 14 mg 14 mg 14mg 14 mg 14 mg 14 mg 13 mg 13mg  12 mg 12 mg 14 mg 13 mg   INR 3.7 3.3 2.6 2.7 3.4 2.9 4.0 4.1 3.9 4.1 2.8 1.9 4.0  3.2   Notes rec 5/3  Call  Self-heldx1, boostx1    Stopping HM Less  GLV   Ceftin   Rec'd 8/1  Inc GLV  Prednisone start Less GLV        Date 9/5           Total Weekly Dose 13mg           INR 4.2           Notes                Phone Interview:  Tablet Strength: 2mg  Patient Contact Info: [phone] (Mobile) *preferred*  [email or web address]  Verbal Release Authorization signed on 4/7/21 -- may speak with Tammy Bai (friend: [phone]), Ismael Michele (brother: [phone])  Lab Contact Info: [facility] ([facility])  ** will call once monthly or if INR is out of range**   4/7/22 Scr: 0.8    Patient Findings    Positives: Change in diet/appetite   Negatives: Signs/symptoms of thrombosis, Signs/symptoms of bleeding, Laboratory test error suspected, Change in health, Change in alcohol use, Change in activity, Upcoming invasive procedure, Emergency department visit, Upcoming dental procedure, Missed doses, Extra doses, Change in medications, Hospital admission, Bruising, Other complaints   Comments: Missed her protein drinks over the weekend and dec GLV          Plan:    1. INR is SUPRAtherapeutic today at 4.2 (goal 2.5-3.5) today. Instructed Ms. Michele to reduce tomorrow dose to 1mg then continue warfarin 2 mg oral daily except 1 mg Tuesday until recheck   2. Repeat INR in 2 weeks  3. Verbal information provided over the phone. Patient RBV dosing instructions, expresses understanding by teach back, and has no further questions at this time.  4. Lucie Michele understands the importance of calling the [facility] if she notices any s/sx of bleeding, stroke, or abnormal bruising, if any changes are made to her medications or medication doses (Rx, OTC, herbal), or if any upcoming procedures are scheduled. Lucie Michele will likewise let us know if any other changes, questions, or concerns arise regarding anticoagulation therapy. she understands the importance of seeking medical attention immediately if she experiences any falls, vehicle accidents, or abnormal  bleeding or bruising. Lucie Michele voiced understanding of this information and confirms that she has the [facility]'s contact information. Otherwise, we will plan to contact the patient once monthly or if her INR is out of range.    Lizet Marinelli, PharmD  09/05/23   13:34 EDT

## 2023-09-07 RX ORDER — DAPAGLIFLOZIN 10 MG/1
TABLET, FILM COATED ORAL
Qty: 90 TABLET | Refills: 2 | Status: SHIPPED | OUTPATIENT
Start: 2023-09-07

## 2023-09-11 DIAGNOSIS — M15.9 PRIMARY OSTEOARTHRITIS INVOLVING MULTIPLE JOINTS: ICD-10-CM

## 2023-09-11 RX ORDER — TRAMADOL HYDROCHLORIDE 50 MG/1
TABLET ORAL
Qty: 60 TABLET | Refills: 0 | Status: SHIPPED | OUTPATIENT
Start: 2023-09-11

## 2023-09-11 NOTE — TELEPHONE ENCOUNTER
Rx Refill Note    Requested Prescriptions     Pending Prescriptions Disp Refills    traMADol (ULTRAM) 50 MG tablet [Pharmacy Med Name: TRAMADOL 50MG TABLETS] 60 tablet 0     Sig: TAKE 1 TABLET BY MOUTH TWICE DAILY AS NEEDED FOR MODERATE PAIN        Last office visit with prescribing clinician: 7/17/2023      Next office visit with prescribing clinician: 11/1/2023   Last labs:   Last refill: 01/06/2023   Pharmacy (be sure to add in Epic). correct

## 2023-09-18 ENCOUNTER — ANTICOAGULATION VISIT (OUTPATIENT)
Dept: PHARMACY | Facility: HOSPITAL | Age: 78
End: 2023-09-18
Payer: MEDICARE

## 2023-09-18 ENCOUNTER — CLINICAL SUPPORT (OUTPATIENT)
Dept: CARDIOLOGY | Facility: CLINIC | Age: 78
End: 2023-09-18
Payer: MEDICARE

## 2023-09-18 DIAGNOSIS — Z95.2 HISTORY OF AORTIC VALVE REPLACEMENT: Primary | ICD-10-CM

## 2023-09-18 DIAGNOSIS — I35.9 AORTIC VALVE DISEASE: Primary | ICD-10-CM

## 2023-09-18 LAB — INR PPP: 4.2 (ref 0.9–1.1)

## 2023-09-18 PROCEDURE — 36416 COLLJ CAPILLARY BLOOD SPEC: CPT | Performed by: INTERNAL MEDICINE

## 2023-09-18 NOTE — PROGRESS NOTES
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     Diet: 3x week: 1 cup of brussels sprouts or broccoli weekly, green beans (1/3/23)  Premier Protein (or Equate brand) meal replacement ~2x/week 1/3/23  Alcohol: Seldom  Tobacco: None  OTC Pain Medication: APAP     INR History:  Date 4/5 4/19 4/26 5/5 5/11 6/2 6/15 6/18 6/25 7/2 7/9 7/19 7/23 7/30   Total Weekly Dose 15mg 15mg 14mg 14mg 14mg 14mg 14mg 14mg 14mg 14mg 14mg 14mg 14mg 14mg   INR 2.6 3.9 3.9 2.7 3.0 3.6 2.7 2.9 2.9 2.6 2.9 3.1 2.5 2.3   Notes           decr GLV   recv'd 6/21; [facility]       incr GLV decr GLV      Date 8/6 8/13 8/20 8/27 9/3 9/10 9/17 9/24 10/1 10/8 10/15 10/22 10/29 11/5   Total WeeklyDose 14mg 15mg 15mg 14mg 14mg 15mg 14mg 14mg 14mg 14mg 14mg 14mg 15mg 16mg   INR 2.4 3.4 3.8 2.9 2.2 3.2 2.9 3.3 3.2 3.3 3.0 2.4 2.4 2.4   Notes decr GLV decr GLV       1xboost         call call 1x boost   incr VitK call 2x boost; call      Date 11/10 11/17 11/24 12/1 12/8 12/15 12/23 12/30 1/3/22 1/7 1/11 1/18 1/25 2/1   Total WeeklyDose 17mg 16mg 16mg 16mg 15mg 15 mg Pt did not call back 15mg 12 mg 13mg 15mg 15 mg 15 mg 15 mg   INR 3.7 3.3 3.9 4.0 2.6 3.4 4.0 4.9 3.6 2.4 3.3 2.8 2.7 2.9   Notes Dec GLV   Zero GLV call Red x1 call Less GLV   call   Less  Protein drink redx1  self held x1 (misdose)   Dec vit K fall, apap  Call   call          Date 2/8 2/15 2/22 3/1 3/8 3/15 3/22 3/29 4/5 4/12 4/19 4/26 5/3 5/11   Total WeeklyDose 15mg 14mg 14 mg 15 mg 15 mg 15 mg 14mg 13 mg 14 mg 14 mg 14mg 13mg 15mg 14 mg   INR 4.0 4.0 2.8 2.9 2.9  4.2 3.9 3.3 2.9 3.0 3.8 2.4 3.8 2.5   Notes Call; Dec GLV call Call; Mis-dose call   Call; no GLV Inc GLV. Less protein, apap  redx1 call   Less GLV rec'd 4/27, call Dec GLV        Date 5/18 5/25 6/1 6/8 6/15 6/22 6/29 7/6 7/13 7/20 7/22 7/27  8/10  8/17   Total WeeklyDose 15 mg 15mg 16mg 15 mg 15 mg 15 mg 15 mg 15 mg 15mg 14 mg 15 mg 14 mg  14 mg  15 mg   INR 2.6 2.3 2.7 3.2 3.0 2.9 2.6 2.7 3.6 3.0 3.6 3.0  2.4  3.4   Notes   Inc GLV  call call call       call 7/14-- call call call  call  call  call      Date 8/25 8/31 9/7 9/12 9/19 9/26 10/3 10/10 10/17 10/24 10/31   Total WeeklyDose 14mg 13 mg 14 mg 17mg 15 mg 16mg 15mg 14 mg 13mg 17 mg 15mg   INR 4.3 2.8 1.7 2.9 2.1 3.4 3.8 2.4 1.8 3.7 4.5   Notes Dec GLV call Call refused enox; extra protein  Call; no protein drinks Call; less green tea, inc boostx1 Call; cranberries Redx1; call 1x miss Call  Less protein    1/1  Date 11/7 11/14 11/21 11/28 12/5 12/12 12/19 12/27 1/3 1/9 1/16/23 1/23 1/30/23   Total WeeklyDose 13 mg 14 mg 14 mg 14 mg 14 mg 14 mg 14mg 14 mg  13mg 13 mg 14 mg  14 mg 14 mg   INR 3.2 3.3 3.4 3.6 2.5 4.0 2.9 4.1 3.6 2.6 3.3 2.7 3.0   Notes  call redx1 One less protein shake   Inc GLV Decreased GLV W/o meloxicam  Tramadol,  meloxicam Tramadol,  meloxicam Tramadol,  meloxicam meloxicam     Date 2/7/23 2/13 2/20 2/27/23 3/6 3/13/23 3/23 3/27 4/3/23 4/10/23 4/17/23 4/24 5/1   Total WeeklyDose 14 mg 14 mg 14 mg 14 mg  14 mg 14 mg  14 mg 14 mg 15 mg  14mg 13 mg 15 mg 14 mg   INR 2.9 3.0 3.6 2.6 3 3.5 3.4 2.4 2.8 2.5 2.3 3.2  4.1 POCT   Notes Call  call rec 2/21  Call   call Call   call Call  call Missed dose 1x boost Call; missed protein drinks, less GLV     Date 5/2 5/8 5/15 5/22 5/30 6/5 6/19 7/10 7/24 7/31 8/7 8/14 8/21  8/28   Total WeeklyDose 12 mg 13 mg 14 mg 14 mg 14mg 14 mg 14 mg 14 mg 13 mg 13mg  12 mg 12 mg 14 mg 13 mg   INR 3.7 3.3 2.6 2.7 3.4 2.9 4.0 4.1 3.9 4.1 2.8 1.9 4.0  3.2   Notes rec 5/3  Call  Self-heldx1, boostx1    Stopping HM Less  GLV   Ceftin   Rec'd 8/1  Inc GLV  Prednisone start Less GLV        Date 9/5 9/18          Total Weekly Dose 13mg           INR 4.2 4.2          Notes                Phone Interview:  Tablet Strength: 2mg  Patient Contact Info: [phone] (Mobile) *preferred*  [email or web address]  Verbal Release Authorization signed on 4/7/21 -- may speak with Tammy Bai (friend: [phone]), Ismael Michele (brother: [phone])  Lab Contact Info: [facility] ([facility])  ** will call once monthly or if INR is out of range**   4/7/22 Scr: 0.8    Patient Findings  Positives: Extra doses, Change in diet/appetite   Negatives: Signs/symptoms of thrombosis, Signs/symptoms of bleeding, Laboratory test error suspected, Change in health, Change in alcohol use, Change in activity, Upcoming invasive procedure, Emergency department visit, Upcoming dental procedure, Missed doses, Change in medications, Hospital admission, Bruising, Other complaints   Comments: 9/7: clindamycin 300mg take 1 capsule by mouth three times daily for 10 days. Last dose today, has caused her to be nauseated and possible ate less  Has been taking 14mg/week instead of 13mg        Plan:    1. INR is SUPRAtherapeutic today at 4.2 (goal 2.5-3.5) today. Instructed Ms. Michele to reduce to warfarin 2 mg oral daily except 1 mg Monday until recheck   2. Repeat INR in 2 weeks  3. Verbal information provided over the phone. Patient RBV dosing instructions, expresses understanding by teach back, and has no further questions at this time.  4. Lucie Michele understands the importance of calling the [facility] if she notices any s/sx of bleeding, stroke, or abnormal bruising, if any changes are made to her medications or medication doses (Rx, OTC, herbal), or if any upcoming procedures are scheduled. Lucie Michele will likewise let us know if any other changes, questions, or concerns arise regarding anticoagulation therapy. she understands the  importance of seeking medical attention immediately if she experiences any falls, vehicle accidents, or abnormal bleeding or bruising. Lucie Michele voiced understanding of this information and confirms that she has the [facility]'s contact information. Otherwise, we will plan to contact the patient once monthly or if her INR is out of range.    Jenni ShipmanD.  09/18/23   14:50 EDT

## 2023-09-18 NOTE — PROGRESS NOTES
Capillary Blood Specimen Collection  Capillary blood collection performed in clinic by Amberly Kuo RN. Patient tolerated the procedure well without complications.   09/18/23   Amberly Kuo RN

## 2023-09-25 ENCOUNTER — CLINICAL SUPPORT (OUTPATIENT)
Dept: CARDIOLOGY | Facility: CLINIC | Age: 78
End: 2023-09-25

## 2023-09-25 ENCOUNTER — ANTICOAGULATION VISIT (OUTPATIENT)
Dept: PHARMACY | Facility: HOSPITAL | Age: 78
End: 2023-09-25

## 2023-09-25 DIAGNOSIS — I35.9 AORTIC VALVE DISEASE: Primary | ICD-10-CM

## 2023-09-25 DIAGNOSIS — Z95.2 HISTORY OF AORTIC VALVE REPLACEMENT: Primary | ICD-10-CM

## 2023-09-25 LAB — INR PPP: 4.3 (ref 0.9–1.1)

## 2023-09-25 NOTE — PROGRESS NOTES
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     Diet: 3x week: 1 cup of brussels sprouts or broccoli weekly, green beans (1/3/23)  Premier Protein (or Equate brand) meal replacement ~2x/week 1/3/23  Alcohol: Seldom  Tobacco: None  OTC Pain Medication: APAP     INR History:  Date 4/5 4/19 4/26 5/5 5/11 6/2 6/15 6/18 6/25 7/2 7/9 7/19 7/23 7/30   Total Weekly Dose 15mg 15mg 14mg 14mg 14mg 14mg 14mg 14mg 14mg 14mg 14mg 14mg 14mg 14mg   INR 2.6 3.9 3.9 2.7 3.0 3.6 2.7 2.9 2.9 2.6 2.9 3.1 2.5 2.3   Notes           decr GLV   recv'd 6/21; [facility]       incr GLV decr GLV      Date 8/6 8/13 8/20 8/27 9/3 9/10 9/17 9/24 10/1 10/8 10/15 10/22 10/29 11/5   Total WeeklyDose 14mg 15mg 15mg 14mg 14mg 15mg 14mg 14mg 14mg 14mg 14mg 14mg 15mg 16mg   INR 2.4 3.4 3.8 2.9 2.2 3.2 2.9 3.3 3.2 3.3 3.0 2.4 2.4 2.4   Notes decr GLV decr GLV       1xboost         call call 1x boost   incr VitK call 2x boost; call      Date 11/10 11/17 11/24 12/1 12/8 12/15 12/23 12/30 1/3/22 1/7 1/11 1/18 1/25 2/1   Total WeeklyDose 17mg 16mg 16mg 16mg 15mg 15 mg Pt did not call back 15mg 12 mg 13mg 15mg 15 mg 15 mg 15 mg   INR 3.7 3.3 3.9 4.0 2.6 3.4 4.0 4.9 3.6 2.4 3.3 2.8 2.7 2.9   Notes Dec GLV   Zero GLV call Red x1 call Less GLV   call   Less  Protein drink redx1  self held x1 (misdose)   Dec vit K fall, apap  Call   call          Date 2/8 2/15 2/22 3/1 3/8 3/15 3/22 3/29 4/5 4/12 4/19 4/26 5/3 5/11   Total WeeklyDose 15mg 14mg 14 mg 15 mg 15 mg 15 mg 14mg 13 mg 14 mg 14 mg 14mg 13mg 15mg 14 mg   INR 4.0 4.0 2.8 2.9 2.9  4.2 3.9 3.3 2.9 3.0 3.8 2.4 3.8 2.5   Notes Call; Dec GLV call Call; Mis-dose call   Call; no GLV Inc GLV. Less protein, apap  redx1 call   Less GLV rec'd 4/27, call Dec GLV        Date 5/18 5/25 6/1 6/8 6/15 6/22 6/29 7/6 7/13 7/20 7/22 7/27  8/10  8/17   Total WeeklyDose 15 mg 15mg 16mg 15 mg 15 mg 15 mg 15 mg 15 mg 15mg 14 mg 15 mg 14 mg  14 mg  15 mg   INR 2.6 2.3 2.7 3.2 3.0 2.9 2.6 2.7 3.6 3.0 3.6 3.0  2.4  3.4   Notes   Inc GLV  call call call       call 7/14-- call call call  call  call  call      Date 8/25 8/31 9/7 9/12 9/19 9/26 10/3 10/10 10/17 10/24 10/31   Total WeeklyDose 14mg 13 mg 14 mg 17mg 15 mg 16mg 15mg 14 mg 13mg 17 mg 15mg   INR 4.3 2.8 1.7 2.9 2.1 3.4 3.8 2.4 1.8 3.7 4.5   Notes Dec GLV call Call refused enox; extra protein  Call; no protein drinks Call; less green tea, inc boostx1 Call; cranberries Redx1; call 1x miss Call  Less protein    1/1  Date 11/7 11/14 11/21 11/28 12/5 12/12 12/19 12/27 1/3 1/9 1/16/23 1/23 1/30/23   Total WeeklyDose 13 mg 14 mg 14 mg 14 mg 14 mg 14 mg 14mg 14 mg  13mg 13 mg 14 mg  14 mg 14 mg   INR 3.2 3.3 3.4 3.6 2.5 4.0 2.9 4.1 3.6 2.6 3.3 2.7 3.0   Notes  call redx1 One less protein shake   Inc GLV Decreased GLV W/o meloxicam  Tramadol,  meloxicam Tramadol,  meloxicam Tramadol,  meloxicam meloxicam     Date 2/7/23 2/13 2/20 2/27/23 3/6 3/13/23 3/23 3/27 4/3/23 4/10/23 4/17/23 4/24 5/1   Total WeeklyDose 14 mg 14 mg 14 mg 14 mg  14 mg 14 mg  14 mg 14 mg 15 mg  14mg 13 mg 15 mg 14 mg   INR 2.9 3.0 3.6 2.6 3 3.5 3.4 2.4 2.8 2.5 2.3 3.2  4.1 POCT   Notes Call  call rec 2/21  Call   call Call   call Call  call Missed dose 1x boost Call; missed protein drinks, less GLV     Date 5/2 5/8 5/15 5/22 5/30 6/5 6/19 7/10 7/24 7/31 8/7 8/14 8/21  8/28   Total WeeklyDose 12 mg 13 mg 14 mg 14 mg 14mg 14 mg 14 mg 14 mg 13 mg 13mg  12 mg 12 mg 14 mg 13 mg   INR 3.7 3.3 2.6 2.7 3.4 2.9 4.0 4.1 3.9 4.1 2.8 1.9 4.0  3.2   Notes rec 5/3  Call  Self-heldx1, boostx1    Stopping HM Less  GLV   Ceftin   Rec'd 8/1  Inc GLV  Prednisone start Less GLV        Date 9/5 9/18 9/25         Total Weekly Dose 13mg 14 mg 12 mg         INR 4.2 4.2 4.3         Notes                Phone Interview:  Tablet Strength: 2mg  Patient Contact Info: [phone] (Mobile) *preferred*  [email or web address]  Verbal Release Authorization signed on 4/7/21 -- may speak with Tammy Bhumika (friend: [phone]), Ismael Michele (brother: [phone])  Lab Contact Info: [facility] ([facility])  ** will call once monthly or if INR is out of range**   4/7/22 Scr: 0.8    Patient Findings  Positives: Missed doses, Change in medications   Negatives: Signs/symptoms of thrombosis, Signs/symptoms of bleeding, Laboratory test error suspected, Change in health, Change in alcohol use, Change in activity, Upcoming invasive procedure, Emergency department visit, Upcoming dental procedure, Extra doses, Change in diet/appetite, Hospital admission, Bruising, Other complaints   Comments: Had typical GLV and had green tea last night. Had extra protein drink Saturday (3 total this week). Upon clarification of dose, patient took warfarin 1 mg last Monday and she thinks on Sunday but cannot remember other than that it was over this past weekend.        Plan:  1. INR is SUPRAtherapeutic today at 4.3 (goal 2.5-3.5) today altough patient took only 12 mg weekly dose. Instructed Ms. Michele to reduce dose to warfarin 2 mg oral daily except 1 mg MondayFriday until recheck.  2. Repeat INR in 1 week.  3. Verbal information provided over the phone. Patient RBV dosing instructions, expresses understanding by teach back, and has no further questions at this time.  4. Lucie RODRIGUEZ Ryne understands the importance of calling the [facility] if she notices any s/sx of bleeding, stroke, or abnormal bruising, if any changes are made to her medications or medication doses (Rx, OTC, herbal), or if any upcoming procedures are scheduled. Lucie Michele  will likewise let us know if any other changes, questions, or concerns arise regarding anticoagulation therapy. she understands the importance of seeking medical attention immediately if she experiences any falls, vehicle accidents, or abnormal bleeding or bruising. Lucie Michele voiced understanding of this information and confirms that she has the [facility]'s contact information. Otherwise, we will plan to contact the patient once monthly or if her INR is out of range.    Tyrone Hensley, JenniD, BCPS  9/25/2023  15:03 EDT

## 2023-09-25 NOTE — PROGRESS NOTES
Capillary Blood Specimen Collection  Capillary blood collection performed in clinic by Mitzi Joseph. Patient tolerated the procedure well without complications.   09/25/23   Mitzi Joseph

## 2023-10-02 ENCOUNTER — CLINICAL SUPPORT (OUTPATIENT)
Dept: CARDIOLOGY | Facility: CLINIC | Age: 78
End: 2023-10-02
Payer: MEDICARE

## 2023-10-02 ENCOUNTER — ANTICOAGULATION VISIT (OUTPATIENT)
Dept: PHARMACY | Facility: HOSPITAL | Age: 78
End: 2023-10-02
Payer: MEDICARE

## 2023-10-02 DIAGNOSIS — I35.9 AORTIC VALVE DISEASE: Primary | ICD-10-CM

## 2023-10-02 DIAGNOSIS — Z95.2 HISTORY OF AORTIC VALVE REPLACEMENT: Primary | ICD-10-CM

## 2023-10-02 LAB — INR PPP: 4.6 (ref 0.9–1.1)

## 2023-10-02 PROCEDURE — 36416 COLLJ CAPILLARY BLOOD SPEC: CPT | Performed by: INTERNAL MEDICINE

## 2023-10-02 RX ORDER — SACCHAROMYCES BOULARDII 250 MG
1 CAPSULE ORAL DAILY
COMMUNITY
Start: 2023-09-07 | End: 2023-10-06

## 2023-10-02 NOTE — PROGRESS NOTES
Capillary Blood Specimen Collection  Capillary blood collection performed in clinic by Hubert Crawford MA. Patient tolerated the procedure well without complications.   10/02/23   Hubert Crawford MA

## 2023-10-02 NOTE — PROGRESS NOTES
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     Diet: 3x week: 1 cup of brussels sprouts or broccoli weekly, green beans (1/3/23)  Premier Protein (or Equate brand) meal replacement ~2x/week 1/3/23  Alcohol: Seldom  Tobacco: None  OTC Pain Medication: APAP     INR History:  Date 4/5 4/19 4/26 5/5 5/11 6/2 6/15 6/18 6/25 7/2 7/9 7/19 7/23 7/30   Total Weekly Dose 15mg 15mg 14mg 14mg 14mg 14mg 14mg 14mg 14mg 14mg 14mg 14mg 14mg 14mg   INR 2.6 3.9 3.9 2.7 3.0 3.6 2.7 2.9 2.9 2.6 2.9 3.1 2.5 2.3   Notes           decr GLV   recv'd 6/21; [facility]       incr GLV decr GLV      Date 8/6 8/13 8/20 8/27 9/3 9/10 9/17 9/24 10/1 10/8 10/15 10/22 10/29 11/5   Total WeeklyDose 14mg 15mg 15mg 14mg 14mg 15mg 14mg 14mg 14mg 14mg 14mg 14mg 15mg 16mg   INR 2.4 3.4 3.8 2.9 2.2 3.2 2.9 3.3 3.2 3.3 3.0 2.4 2.4 2.4   Notes decr GLV decr GLV       1xboost         call call 1x boost   incr VitK call 2x boost; call      Date 11/10 11/17 11/24 12/1 12/8 12/15 12/23 12/30 1/3/22 1/7 1/11 1/18 1/25 2/1   Total WeeklyDose 17mg 16mg 16mg 16mg 15mg 15 mg Pt did not call back 15mg 12 mg 13mg 15mg 15 mg 15 mg 15 mg   INR 3.7 3.3 3.9 4.0 2.6 3.4 4.0 4.9 3.6 2.4 3.3 2.8 2.7 2.9   Notes Dec GLV   Zero GLV call Red x1 call Less GLV   call   Less  Protein drink redx1  self held x1 (misdose)   Dec vit K fall, apap  Call   call          Date 2/8 2/15 2/22 3/1 3/8 3/15 3/22 3/29 4/5 4/12 4/19 4/26 5/3 5/11   Total WeeklyDose 15mg 14mg 14 mg 15 mg 15 mg 15 mg 14mg 13 mg 14 mg 14 mg 14mg 13mg 15mg 14 mg   INR 4.0 4.0 2.8 2.9 2.9  4.2 3.9 3.3 2.9 3.0 3.8 2.4 3.8 2.5   Notes Call; Dec GLV call Call; Mis-dose call   Call; no GLV Inc GLV. Less protein, apap  redx1 call   Less GLV rec'd 4/27, call Dec GLV        Date 5/18 5/25 6/1 6/8 6/15 6/22 6/29 7/6 7/13 7/20 7/22 7/27  8/10  8/17   Total WeeklyDose 15 mg 15mg 16mg 15 mg 15 mg 15 mg 15 mg 15 mg 15mg 14 mg 15 mg 14 mg  14 mg  15 mg   INR 2.6 2.3 2.7 3.2 3.0 2.9 2.6 2.7 3.6 3.0 3.6 3.0  2.4  3.4   Notes   Inc GLV  call call call       call 7/14-- call call call  call  call  call      Date 8/25 8/31 9/7 9/12 9/19 9/26 10/3 10/10 10/17 10/24 10/31   Total WeeklyDose 14mg 13 mg 14 mg 17mg 15 mg 16mg 15mg 14 mg 13mg 17 mg 15mg   INR 4.3 2.8 1.7 2.9 2.1 3.4 3.8 2.4 1.8 3.7 4.5   Notes Dec GLV call Call refused enox; extra protein  Call; no protein drinks Call; less green tea, inc boostx1 Call; cranberries Redx1; call 1x miss Call  Less protein    1/1  Date 11/7 11/14 11/21 11/28 12/5 12/12 12/19 12/27 1/3 1/9 1/16/23 1/23 1/30/23   Total WeeklyDose 13 mg 14 mg 14 mg 14 mg 14 mg 14 mg 14mg 14 mg  13mg 13 mg 14 mg  14 mg 14 mg   INR 3.2 3.3 3.4 3.6 2.5 4.0 2.9 4.1 3.6 2.6 3.3 2.7 3.0   Notes  call redx1 One less protein shake   Inc GLV Decreased GLV W/o meloxicam  Tramadol,  meloxicam Tramadol,  meloxicam Tramadol,  meloxicam meloxicam     Date 2/7/23 2/13 2/20 2/27/23 3/6 3/13/23 3/23 3/27 4/3/23 4/10/23 4/17/23 4/24 5/1   Total WeeklyDose 14 mg 14 mg 14 mg 14 mg  14 mg 14 mg  14 mg 14 mg 15 mg  14mg 13 mg 15 mg 14 mg   INR 2.9 3.0 3.6 2.6 3 3.5 3.4 2.4 2.8 2.5 2.3 3.2  4.1 POCT   Notes Call  call rec 2/21  Call   call Call   call Call  call Missed dose 1x boost Call; missed protein drinks, less GLV     Date 5/2 5/8 5/15 5/22 5/30 6/5 6/19 7/10 7/24 7/31 8/7 8/14 8/21  8/28   Total WeeklyDose 12 mg 13 mg 14 mg 14 mg 14mg 14 mg 14 mg 14 mg 13 mg 13mg  12 mg 12 mg 14 mg 13 mg   INR 3.7 3.3 2.6 2.7 3.4 2.9 4.0 4.1 3.9 4.1 2.8 1.9 4.0  3.2   Notes rec 5/3  Call  Self-heldx1, boostx1    Stopping HM Less  GLV  redx1 Ceftin   Rec'd 8/1 redx1 Inc GLV  Prednisone start Less GLV      Date 9/5 9/18 9/25 10/2           Total Weekly Dose 13mg 14 mg 12 mg  12 mg           INR 4.2 4.2 4.3  4.6           Notes redx1 clindamycin redx1 redx2             Phone Interview:  Tablet Strength: 2mg  Patient Contact Info: [phone] (Mobile) *preferred*  [email or web address]  Verbal Release Authorization signed on 4/7/21 -- may speak with Tammy Bhumika (friend: [phone]), Ismael Michele (brother: [phone])  Lab Contact Info: [facility] ([facility])  ** will call once monthly or if INR is out of range**   4/7/22 Scr: 0.8    Patient Findings  Positives: Bruising, Other complaints   Negatives: Signs/symptoms of thrombosis, Signs/symptoms of bleeding, Laboratory test error suspected, Change in health, Change in alcohol use, Change in activity, Upcoming invasive procedure, Emergency department visit, Upcoming dental procedure, Missed doses, Extra doses, Change in medications, Change in diet/appetite, Hospital admission   Comments: Despite dose reductions, INR continues upward trend.  Green Tea may enhance the adverse/toxic effect of Warfarin.  She drinks this daily.  She will plan to reduce to two drinks daily as she drinks in place of diet coke.  She takes meloxicam every third day and tramadol 1/2 tablet other two days (this has been for quite awhile)  She has noticed more bruising than usual.  She will monitor closely.  Otherwise, above findings negative      Plan:    1. INR is SUPRAtherapeutic today at 4.6 despite dose reduction (goal 2.5-3.5). Instructed Ms. Michele to reduce dose to warfarin 2 mg oral daily except 1 mg MonWedFri until recheck.  2. Repeat INR on Friday, 10/6 as she volunteers at the hospital on TueThur.  3. Verbal information provided over the phone. Patient RBV dosing instructions, expresses understanding by teach back, and has no further questions at this time.  4. Lucie Michele understands the  importance of calling the [facility] if she notices any s/sx of bleeding, stroke, or abnormal bruising, if any changes are made to her medications or medication doses (Rx, OTC, herbal), or if any upcoming procedures are scheduled. Lucie RODRIGUEZ Ryne will likewise let us know if any other changes, questions, or concerns arise regarding anticoagulation therapy. she understands the importance of seeking medical attention immediately if she experiences any falls, vehicle accidents, or abnormal bleeding or bruising. Lucie JENNIFER Michele voiced understanding of this information and confirms that she has the [facility]'s contact information. Otherwise, we will plan to contact the patient once monthly or if her INR is out of range.    Jesenia Garcia, PharmD  10/2/2023  11:42 EDT

## 2023-10-03 RX ORDER — BUDESONIDE, GLYCOPYRROLATE, AND FORMOTEROL FUMARATE 160; 9; 4.8 UG/1; UG/1; UG/1
AEROSOL, METERED RESPIRATORY (INHALATION)
Qty: 10.7 G | Refills: 11 | Status: SHIPPED | OUTPATIENT
Start: 2023-10-03

## 2023-10-03 NOTE — TELEPHONE ENCOUNTER
Rx Refill Note    Requested Prescriptions     Pending Prescriptions Disp Refills    Breztri Aerosphere 160-9-4.8 MCG/ACT aerosol inhaler [Pharmacy Med Name: BREZTRI AEROSPHERE INH 10.7GM] 10.7 g 11     Sig: USE 2 INHALATIONS TWICE A DAY        Last office visit with prescribing clinician: 7/17/2023      Next office visit with prescribing clinician: 11/1/2023   Last labs: 4/26/2023  Last refill: 8/1/2023   Pharmacy (be sure to add in Epic). yes

## 2023-10-06 ENCOUNTER — ANTICOAGULATION VISIT (OUTPATIENT)
Dept: PHARMACY | Facility: HOSPITAL | Age: 78
End: 2023-10-06
Payer: MEDICARE

## 2023-10-06 ENCOUNTER — CLINICAL SUPPORT (OUTPATIENT)
Dept: CARDIOLOGY | Facility: CLINIC | Age: 78
End: 2023-10-06
Payer: MEDICARE

## 2023-10-06 DIAGNOSIS — I35.9 AORTIC VALVE DISEASE: Primary | ICD-10-CM

## 2023-10-06 DIAGNOSIS — Z95.2 HISTORY OF AORTIC VALVE REPLACEMENT: Primary | ICD-10-CM

## 2023-10-06 LAB — INR PPP: 2.6 (ref 0.9–1.1)

## 2023-10-06 NOTE — PROGRESS NOTES
Capillary Blood Specimen Collection  Capillary blood collection performed in clinic by Margie Avina MA. Patient tolerated the procedure well without complications.   10/06/23   Margie Avina MA

## 2023-10-06 NOTE — PROGRESS NOTES
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     Diet: 3x week: 1 cup of brussels sprouts or broccoli weekly, green beans (1/3/23)  Premier Protein (or Equate brand) meal replacement ~2x/week 1/3/23  Alcohol: Seldom  Tobacco: None  OTC Pain Medication: APAP     INR History:  Date 4/5 4/19 4/26 5/5 5/11 6/2 6/15 6/18 6/25 7/2 7/9 7/19 7/23 7/30   Total Weekly Dose 15mg 15mg 14mg 14mg 14mg 14mg 14mg 14mg 14mg 14mg 14mg 14mg 14mg 14mg   INR 2.6 3.9 3.9 2.7 3.0 3.6 2.7 2.9 2.9 2.6 2.9 3.1 2.5 2.3   Notes           decr GLV   recv'd 6/21; [facility]       incr GLV decr GLV      Date 8/6 8/13 8/20 8/27 9/3 9/10 9/17 9/24 10/1 10/8 10/15 10/22 10/29 11/5   Total WeeklyDose 14mg 15mg 15mg 14mg 14mg 15mg 14mg 14mg 14mg 14mg 14mg 14mg 15mg 16mg   INR 2.4 3.4 3.8 2.9 2.2 3.2 2.9 3.3 3.2 3.3 3.0 2.4 2.4 2.4   Notes decr GLV decr GLV       1xboost         call call 1x boost   incr VitK call 2x boost; call      Date 11/10 11/17 11/24 12/1 12/8 12/15 12/23 12/30 1/3/22 1/7 1/11 1/18 1/25 2/1   Total WeeklyDose 17mg 16mg 16mg 16mg 15mg 15 mg Pt did not call back 15mg 12 mg 13mg 15mg 15 mg 15 mg 15 mg   INR 3.7 3.3 3.9 4.0 2.6 3.4 4.0 4.9 3.6 2.4 3.3 2.8 2.7 2.9   Notes Dec GLV   Zero GLV call Red x1 call Less GLV   call   Less  Protein drink redx1  self held x1 (misdose)   Dec vit K fall, apap  Call   call          Date 2/8 2/15 2/22 3/1 3/8 3/15 3/22 3/29 4/5 4/12 4/19 4/26 5/3 5/11   Total WeeklyDose 15mg 14mg 14 mg 15 mg 15 mg 15 mg 14mg 13 mg 14 mg 14 mg 14mg 13mg 15mg 14 mg   INR 4.0 4.0 2.8 2.9 2.9  4.2 3.9 3.3 2.9 3.0 3.8 2.4 3.8 2.5   Notes Call; Dec GLV call Call; Mis-dose call   Call; no GLV Inc GLV. Less protein, apap  redx1 call   Less GLV rec'd 4/27, call Dec GLV        Date 5/18 5/25 6/1 6/8 6/15 6/22 6/29 7/6 7/13 7/20 7/22 7/27  8/10  8/17   Total WeeklyDose 15 mg 15mg 16mg 15 mg 15 mg 15 mg 15 mg 15 mg 15mg 14 mg 15 mg 14 mg  14 mg  15 mg   INR 2.6 2.3 2.7 3.2 3.0 2.9 2.6 2.7 3.6 3.0 3.6 3.0  2.4  3.4   Notes   Inc GLV  call call call       call 7/14-- call call call  call  call  call      Date 8/25 8/31 9/7 9/12 9/19 9/26 10/3 10/10 10/17 10/24 10/31   Total WeeklyDose 14mg 13 mg 14 mg 17mg 15 mg 16mg 15mg 14 mg 13mg 17 mg 15mg   INR 4.3 2.8 1.7 2.9 2.1 3.4 3.8 2.4 1.8 3.7 4.5   Notes Dec GLV call Call refused enox; extra protein  Call; no protein drinks Call; less green tea, inc boostx1 Call; cranberries Redx1; call 1x miss Call  Less protein    1/1  Date 11/7 11/14 11/21 11/28 12/5 12/12 12/19 12/27 1/3 1/9 1/16/23 1/23 1/30/23   Total WeeklyDose 13 mg 14 mg 14 mg 14 mg 14 mg 14 mg 14mg 14 mg  13mg 13 mg 14 mg  14 mg 14 mg   INR 3.2 3.3 3.4 3.6 2.5 4.0 2.9 4.1 3.6 2.6 3.3 2.7 3.0   Notes  call redx1 One less protein shake   Inc GLV Decreased GLV W/o meloxicam  Tramadol,  meloxicam Tramadol,  meloxicam Tramadol,  meloxicam meloxicam     Date 2/7/23 2/13 2/20 2/27/23 3/6 3/13/23 3/23 3/27 4/3/23 4/10/23 4/17/23 4/24 5/1   Total WeeklyDose 14 mg 14 mg 14 mg 14 mg  14 mg 14 mg  14 mg 14 mg 15 mg  14mg 13 mg 15 mg 14 mg   INR 2.9 3.0 3.6 2.6 3 3.5 3.4 2.4 2.8 2.5 2.3 3.2  4.1 POCT   Notes Call  call rec 2/21  Call   call Call   call Call  call Missed dose 1x boost Call; missed protein drinks, less GLV     Date 5/2 5/8 5/15 5/22 5/30 6/5 6/19 7/10 7/24 7/31 8/7 8/14 8/21  8/28   Total WeeklyDose 12 mg 13 mg 14 mg 14 mg 14mg 14 mg 14 mg 14 mg 13 mg 13mg  12 mg 12 mg 14 mg 13 mg   INR 3.7 3.3 2.6 2.7 3.4 2.9 4.0 4.1 3.9 4.1 2.8 1.9 4.0  3.2   Notes rec 5/3  Call  Self-heldx1, boostx1    Stopping HM Less  GLV  redx1 Ceftin   Rec'd 8/1 redx1 Inc GLV  Prednisone start Less GLV      Date 9/5 9/18 9/25 10/2 10/06          Total Weekly Dose 13mg 14 mg 12 mg  12 mg 11 mg          INR 4.2 4.2 4.3  4.6 2.6          Notes redx1 clindamycin redx1 redx2 Red x2; inc GLV            Phone Interview:  Tablet Strength: 2mg  Patient Contact Info: [phone] (Mobile) *preferred*  [email or web address]  Verbal Release Authorization signed on 4/7/21 -- may speak with Tammy Tyler (friend: [phone]), Ismael Michele (brother: [phone])  Lab Contact Info: [facility] ([facility])  ** will call once monthly or if INR is out of range**   4/7/22 Scr: 0.8    Patient Findings  Positives: Bruising, Other complaints   Negatives: Signs/symptoms of thrombosis, Signs/symptoms of bleeding, Laboratory test error suspected, Change in health, Change in alcohol use, Change in activity, Upcoming invasive procedure, Emergency department visit, Upcoming dental procedure, Missed doses, Extra doses, Change in medications, Change in diet/appetite, Hospital admission   Comments: Despite dose reductions, INR continues upward trend.  Green Tea may enhance the adverse/toxic effect of Warfarin.  She drinks this daily.  She will plan to reduce to two drinks daily as she drinks in place of diet coke.  She takes meloxicam every third day and tramadol 1/2 tablet other two days (this has been for quite awhile)  She has noticed more bruising than usual.  She will monitor closely.  Otherwise, above findings negative      Plan:  1. INR is therapeutic today at 2.6 (goal 2.5-3.5). Instructed Ms. Michele to slightly increase warfarin 2 mg oral daily except 1 mg MonThur until recheck.  2. Repeat INR on Friday, 10/13.  3. Verbal information provided over the phone. Patient RBV dosing instructions, expresses understanding by teach back, and has no further questions at this time.  4. Lucie Michele understands the importance of calling the [facility]  if she notices any s/sx of bleeding, stroke, or abnormal bruising, if any changes are made to her medications or medication doses (Rx, OTC, herbal), or if any upcoming procedures are scheduled. Lucie Michele will likewise let us know if any other changes, questions, or concerns arise regarding anticoagulation therapy. she understands the importance of seeking medical attention immediately if she experiences any falls, vehicle accidents, or abnormal bleeding or bruising. Lucieelva Michele voiced understanding of this information and confirms that she has the [facility]'s contact information. Otherwise, we will plan to contact the patient once monthly or if her INR is out of range.    Ngoc Brooks, PharmD  10/06/23  10:38 EDT

## 2023-10-12 DIAGNOSIS — E03.9 ACQUIRED HYPOTHYROIDISM: ICD-10-CM

## 2023-10-12 DIAGNOSIS — M85.80 OSTEOPENIA, UNSPECIFIED LOCATION: ICD-10-CM

## 2023-10-12 DIAGNOSIS — I10 ESSENTIAL HYPERTENSION, BENIGN: ICD-10-CM

## 2023-10-12 DIAGNOSIS — R73.03 PREDIABETES: ICD-10-CM

## 2023-10-12 DIAGNOSIS — I10 PRIMARY HYPERTENSION: ICD-10-CM

## 2023-10-12 DIAGNOSIS — E55.9 VITAMIN D DEFICIENCY: ICD-10-CM

## 2023-10-12 DIAGNOSIS — M25.552 LEFT HIP PAIN: ICD-10-CM

## 2023-10-12 DIAGNOSIS — Z13.820 OSTEOPOROSIS SCREENING: ICD-10-CM

## 2023-10-12 DIAGNOSIS — E07.9 DISORDER OF THYROID: ICD-10-CM

## 2023-10-12 DIAGNOSIS — N18.31 STAGE 3A CHRONIC KIDNEY DISEASE: ICD-10-CM

## 2023-10-12 RX ORDER — OMEPRAZOLE 20 MG/1
20 CAPSULE, DELAYED RELEASE ORAL DAILY
Qty: 90 CAPSULE | Refills: 0 | Status: SHIPPED | OUTPATIENT
Start: 2023-10-12

## 2023-10-12 RX ORDER — ALENDRONATE SODIUM 70 MG/1
70 TABLET ORAL
Qty: 12 TABLET | Refills: 0 | Status: SHIPPED | OUTPATIENT
Start: 2023-10-12

## 2023-10-12 NOTE — TELEPHONE ENCOUNTER
Rx Refill Note    Requested Prescriptions     Pending Prescriptions Disp Refills    alendronate (FOSAMAX) 70 MG tablet [Pharmacy Med Name: ALENDRONATE 70MG TABLETS] 12 tablet 0     Sig: TAKE 1 TABLET BY MOUTH EVERY 7 DAYS    omeprazole (priLOSEC) 20 MG capsule [Pharmacy Med Name: OMEPRAZOLE 20MG CAPSULES] 90 capsule 0     Sig: TAKE 1 CAPSULE BY MOUTH DAILY        Last office visit with prescribing clinician: 7/17/2023      Next office visit with prescribing clinician: 11/1/2023   Last labs:   Last refill: needs   Pharmacy (be sure to add in Epic). correct

## 2023-10-13 ENCOUNTER — CLINICAL SUPPORT (OUTPATIENT)
Dept: CARDIOLOGY | Facility: CLINIC | Age: 78
End: 2023-10-13
Payer: MEDICARE

## 2023-10-13 ENCOUNTER — ANTICOAGULATION VISIT (OUTPATIENT)
Dept: PHARMACY | Facility: HOSPITAL | Age: 78
End: 2023-10-13
Payer: MEDICARE

## 2023-10-13 DIAGNOSIS — Z95.2 HISTORY OF AORTIC VALVE REPLACEMENT: Primary | ICD-10-CM

## 2023-10-13 DIAGNOSIS — I35.9 AORTIC VALVE DISEASE: Primary | ICD-10-CM

## 2023-10-13 LAB — INR PPP: 3 (ref 0.9–1.1)

## 2023-10-13 NOTE — PROGRESS NOTES
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     Diet: 3x week: 1 cup of brussels sprouts or broccoli weekly, green beans (1/3/23)  Premier Protein (or Equate brand) meal replacement ~2x/week 1/3/23  Alcohol: Seldom  Tobacco: None  OTC Pain Medication: APAP     INR History:  Date 4/5 4/19 4/26 5/5 5/11 6/2 6/15 6/18 6/25 7/2 7/9 7/19 7/23 7/30   Total Weekly Dose 15mg 15mg 14mg 14mg 14mg 14mg 14mg 14mg 14mg 14mg 14mg 14mg 14mg 14mg   INR 2.6 3.9 3.9 2.7 3.0 3.6 2.7 2.9 2.9 2.6 2.9 3.1 2.5 2.3   Notes           decr GLV   recv'd 6/21; [facility]       incr GLV decr GLV      Date 8/6 8/13 8/20 8/27 9/3 9/10 9/17 9/24 10/1 10/8 10/15 10/22 10/29 11/5   Total WeeklyDose 14mg 15mg 15mg 14mg 14mg 15mg 14mg 14mg 14mg 14mg 14mg 14mg 15mg 16mg   INR 2.4 3.4 3.8 2.9 2.2 3.2 2.9 3.3 3.2 3.3 3.0 2.4 2.4 2.4   Notes decr GLV decr GLV       1xboost         call call 1x boost   incr VitK call 2x boost; call      Date 11/10 11/17 11/24 12/1 12/8 12/15 12/23 12/30 1/3/22 1/7 1/11 1/18 1/25 2/1   Total WeeklyDose 17mg 16mg 16mg 16mg 15mg 15 mg Pt did not call back 15mg 12 mg 13mg 15mg 15 mg 15 mg 15 mg   INR 3.7 3.3 3.9 4.0 2.6 3.4 4.0 4.9 3.6 2.4 3.3 2.8 2.7 2.9   Notes Dec GLV   Zero GLV call Red x1 call Less GLV   call   Less  Protein drink redx1  self held x1 (misdose)   Dec vit K fall, apap  Call   call          Date 2/8 2/15 2/22 3/1 3/8 3/15 3/22 3/29 4/5 4/12 4/19 4/26 5/3 5/11   Total WeeklyDose 15mg 14mg 14 mg 15 mg 15 mg 15 mg 14mg 13 mg 14 mg 14 mg 14mg 13mg 15mg 14 mg   INR 4.0 4.0 2.8 2.9 2.9  4.2 3.9 3.3 2.9 3.0 3.8 2.4 3.8 2.5   Notes Call; Dec GLV call Call; Mis-dose call   Call; no GLV Inc GLV. Less protein, apap  redx1 call   Less GLV rec'd 4/27, call Dec GLV        Date 5/18 5/25 6/1 6/8 6/15 6/22 6/29 7/6 7/13 7/20 7/22 7/27  8/10  8/17   Total WeeklyDose 15 mg 15mg 16mg 15 mg 15 mg 15 mg 15 mg 15 mg 15mg 14 mg 15 mg 14 mg  14 mg  15 mg   INR 2.6 2.3 2.7 3.2 3.0 2.9 2.6 2.7 3.6 3.0 3.6 3.0  2.4  3.4   Notes   Inc GLV  call call call       call 7/14-- call call call  call  call  call      Date 8/25 8/31 9/7 9/12 9/19 9/26 10/3 10/10 10/17 10/24 10/31   Total WeeklyDose 14mg 13 mg 14 mg 17mg 15 mg 16mg 15mg 14 mg 13mg 17 mg 15mg   INR 4.3 2.8 1.7 2.9 2.1 3.4 3.8 2.4 1.8 3.7 4.5   Notes Dec GLV call Call refused enox; extra protein  Call; no protein drinks Call; less green tea, inc boostx1 Call; cranberries Redx1; call 1x miss Call  Less protein    1/1  Date 11/7 11/14 11/21 11/28 12/5 12/12 12/19 12/27 1/3 1/9 1/16/23 1/23 1/30/23   Total WeeklyDose 13 mg 14 mg 14 mg 14 mg 14 mg 14 mg 14mg 14 mg  13mg 13 mg 14 mg  14 mg 14 mg   INR 3.2 3.3 3.4 3.6 2.5 4.0 2.9 4.1 3.6 2.6 3.3 2.7 3.0   Notes  call redx1 One less protein shake   Inc GLV Decreased GLV W/o meloxicam  Tramadol,  meloxicam Tramadol,  meloxicam Tramadol,  meloxicam meloxicam     Date 2/7/23 2/13 2/20 2/27/23 3/6 3/13/23 3/23 3/27 4/3/23 4/10/23 4/17/23 4/24 5/1   Total WeeklyDose 14 mg 14 mg 14 mg 14 mg  14 mg 14 mg  14 mg 14 mg 15 mg  14mg 13 mg 15 mg 14 mg   INR 2.9 3.0 3.6 2.6 3 3.5 3.4 2.4 2.8 2.5 2.3 3.2  4.1 POCT   Notes Call  call rec 2/21  Call   call Call   call Call  call Missed dose 1x boost Call; missed protein drinks, less GLV     Date 5/2 5/8 5/15 5/22 5/30 6/5 6/19 7/10 7/24 7/31 8/7 8/14 8/21  8/28   Total WeeklyDose 12 mg 13 mg 14 mg 14 mg 14mg 14 mg 14 mg 14 mg 13 mg 13mg  12 mg 12 mg 14 mg 13 mg   INR 3.7 3.3 2.6 2.7 3.4 2.9 4.0 4.1 3.9 4.1 2.8 1.9 4.0  3.2   Notes rec 5/3  Call  Self-heldx1, boostx1    Stopping HM Less  GLV  redx1 Ceftin   Rec'd 8/1 redx1 Inc GLV  Prednisone start Less GLV      Date 9/5 9/18 9/25 10/2 10/06 10/13         Total Weekly Dose 13mg 14 mg 12 mg  12 mg 11 mg 12 mg         INR 4.2 4.2 4.3  4.6 2.6 3.0         Notes redx1 clindamycin redx1 redx2 Red x2; inc GLV            Phone Interview:  Tablet Strength: 2mg  Patient Contact Info: [phone] (Mobile) *preferred*  [email or web address]  Verbal Release Authorization signed on 4/7/21 -- may speak with Tammy Erika (friend: [phone]), Ismael Michele (brother: [phone])  Lab Contact Info: [facility] ([facility])  ** will call once monthly or if INR is out of range**   4/7/22 Scr: 0.8    Patient Findings      Negatives:  Signs/symptoms of thrombosis, Signs/symptoms of bleeding, Laboratory test error suspected, Change in health, Change in alcohol use, Change in activity, Upcoming invasive procedure, Emergency department visit, Upcoming dental procedure, Missed doses, Extra doses, Change in medications, Change in diet/appetite, Hospital admission, Bruising, Other complaints          Plan:  1. INR is therapeutic today at 3.0 (goal 2.5-3.5). Instructed Ms. Michele to continue warfarin 2 mg oral daily except 1 mg MonThur until recheck.  2. Repeat INR in  2 weeks  3. Verbal information provided over the phone. Patient RBV dosing instructions, expresses understanding by teach back, and has no further questions at this time.  4. Lucie Michele understands the importance of calling the [facility] if she notices any s/sx of bleeding, stroke, or abnormal bruising, if any changes are made to her medications or medication doses (Rx, OTC, herbal), or if any upcoming procedures are scheduled. Lucie Michele will likewise let us know if any other changes, questions, or concerns arise regarding anticoagulation therapy. she understands the importance of seeking medical attention immediately if she experiences any falls, vehicle accidents, or  abnormal bleeding or bruising. Lucie Michele voiced understanding of this information and confirms that she has the [facility]'s contact information. Otherwise, we will plan to contact the patient once monthly or if her INR is out of range.    Lizet Marinelli, PharmD  10/13/23  11:42 EDT

## 2023-10-13 NOTE — PROGRESS NOTES
Capillary Blood Specimen Collection  Capillary blood collection performed in clinic by Amberly Kuo RN. Patient tolerated the procedure well without complications.   10/13/23   Amberly Kuo RN

## 2023-10-23 DIAGNOSIS — G50.0 TRIGEMINAL NEURALGIA: ICD-10-CM

## 2023-10-23 RX ORDER — OXCARBAZEPINE 150 MG/1
150 TABLET, FILM COATED ORAL 3 TIMES DAILY
Qty: 270 TABLET | Refills: 3 | Status: SHIPPED | OUTPATIENT
Start: 2023-10-23

## 2023-10-25 ENCOUNTER — LAB (OUTPATIENT)
Dept: FAMILY MEDICINE CLINIC | Facility: CLINIC | Age: 78
End: 2023-10-25
Payer: MEDICARE

## 2023-10-25 DIAGNOSIS — Z79.899 ENCOUNTER FOR LONG-TERM (CURRENT) USE OF OTHER MEDICATIONS: Primary | ICD-10-CM

## 2023-10-25 PROCEDURE — 36415 COLL VENOUS BLD VENIPUNCTURE: CPT | Performed by: FAMILY MEDICINE

## 2023-10-26 LAB
ALBUMIN SERPL-MCNC: 4.2 G/DL (ref 3.8–4.8)
ALBUMIN/GLOB SERPL: 1.8 {RATIO} (ref 1.2–2.2)
ALP SERPL-CCNC: 93 IU/L (ref 44–121)
ALT SERPL-CCNC: 22 IU/L (ref 0–32)
AST SERPL-CCNC: 40 IU/L (ref 0–40)
BASOPHILS # BLD AUTO: 0 X10E3/UL (ref 0–0.2)
BASOPHILS NFR BLD AUTO: 0 %
BILIRUB SERPL-MCNC: 0.6 MG/DL (ref 0–1.2)
BUN SERPL-MCNC: 11 MG/DL (ref 8–27)
BUN/CREAT SERPL: 13 (ref 12–28)
CALCIUM SERPL-MCNC: 8.6 MG/DL (ref 8.7–10.3)
CHLORIDE SERPL-SCNC: 99 MMOL/L (ref 96–106)
CHOLEST SERPL-MCNC: 177 MG/DL (ref 100–199)
CK SERPL-CCNC: 112 U/L (ref 32–182)
CO2 SERPL-SCNC: 25 MMOL/L (ref 20–29)
CREAT SERPL-MCNC: 0.86 MG/DL (ref 0.57–1)
EGFRCR SERPLBLD CKD-EPI 2021: 70 ML/MIN/1.73
EOSINOPHIL # BLD AUTO: 0.1 X10E3/UL (ref 0–0.4)
EOSINOPHIL NFR BLD AUTO: 1 %
ERYTHROCYTE [DISTWIDTH] IN BLOOD BY AUTOMATED COUNT: 14.9 % (ref 11.7–15.4)
GLOBULIN SER CALC-MCNC: 2.4 G/DL (ref 1.5–4.5)
GLUCOSE SERPL-MCNC: 96 MG/DL (ref 70–99)
HBA1C MFR BLD: 6 % (ref 4.8–5.6)
HCT VFR BLD AUTO: 43.7 % (ref 34–46.6)
HDLC SERPL-MCNC: 57 MG/DL
HGB BLD-MCNC: 14.3 G/DL (ref 11.1–15.9)
IMM GRANULOCYTES # BLD AUTO: 0.1 X10E3/UL (ref 0–0.1)
IMM GRANULOCYTES NFR BLD AUTO: 1 %
LDLC SERPL CALC-MCNC: 101 MG/DL (ref 0–99)
LYMPHOCYTES # BLD AUTO: 2.2 X10E3/UL (ref 0.7–3.1)
LYMPHOCYTES NFR BLD AUTO: 36 %
MCH RBC QN AUTO: 29.4 PG (ref 26.6–33)
MCHC RBC AUTO-ENTMCNC: 32.7 G/DL (ref 31.5–35.7)
MCV RBC AUTO: 90 FL (ref 79–97)
MONOCYTES # BLD AUTO: 0.7 X10E3/UL (ref 0.1–0.9)
MONOCYTES NFR BLD AUTO: 12 %
NEUTROPHILS # BLD AUTO: 3 X10E3/UL (ref 1.4–7)
NEUTROPHILS NFR BLD AUTO: 50 %
PLATELET # BLD AUTO: 218 X10E3/UL (ref 150–450)
POTASSIUM SERPL-SCNC: 2.9 MMOL/L (ref 3.5–5.2)
PROT SERPL-MCNC: 6.6 G/DL (ref 6–8.5)
RBC # BLD AUTO: 4.86 X10E6/UL (ref 3.77–5.28)
SODIUM SERPL-SCNC: 140 MMOL/L (ref 134–144)
TRIGL SERPL-MCNC: 105 MG/DL (ref 0–149)
TSH SERPL DL<=0.005 MIU/L-ACNC: 2.72 UIU/ML (ref 0.45–4.5)
VLDLC SERPL CALC-MCNC: 19 MG/DL (ref 5–40)
WBC # BLD AUTO: 6.1 X10E3/UL (ref 3.4–10.8)

## 2023-10-27 ENCOUNTER — CLINICAL SUPPORT (OUTPATIENT)
Dept: CARDIOLOGY | Facility: CLINIC | Age: 78
End: 2023-10-27
Payer: MEDICARE

## 2023-10-27 ENCOUNTER — ANTICOAGULATION VISIT (OUTPATIENT)
Dept: PHARMACY | Facility: HOSPITAL | Age: 78
End: 2023-10-27
Payer: MEDICARE

## 2023-10-27 DIAGNOSIS — I35.9 AORTIC VALVE DISEASE: Primary | ICD-10-CM

## 2023-10-27 LAB — INR PPP: 4.1 (ref 0.9–1.1)

## 2023-10-27 NOTE — PROGRESS NOTES
Capillary Blood Specimen Collection  Capillary blood collection performed in clinic by Ema Hamilton MA. Patient tolerated the procedure well without complications.   10/27/23   Ema Hamilton MA

## 2023-10-27 NOTE — PROGRESS NOTES
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     Diet: 3x week: 1 cup of brussels sprouts or broccoli weekly, green beans (1/3/23)  Premier Protein (or Equate brand) meal replacement ~2x/week 1/3/23  Alcohol: Seldom  Tobacco: None  OTC Pain Medication: APAP     INR History:  Date 4/5 4/19 4/26 5/5 5/11 6/2 6/15 6/18 6/25 7/2 7/9 7/19 7/23 7/30   Total Weekly Dose 15mg 15mg 14mg 14mg 14mg 14mg 14mg 14mg 14mg 14mg 14mg 14mg 14mg 14mg   INR 2.6 3.9 3.9 2.7 3.0 3.6 2.7 2.9 2.9 2.6 2.9 3.1 2.5 2.3   Notes           decr GLV   recv'd 6/21; [facility]       incr GLV decr GLV      Date 8/6 8/13 8/20 8/27 9/3 9/10 9/17 9/24 10/1 10/8 10/15 10/22 10/29 11/5   Total WeeklyDose 14mg 15mg 15mg 14mg 14mg 15mg 14mg 14mg 14mg 14mg 14mg 14mg 15mg 16mg   INR 2.4 3.4 3.8 2.9 2.2 3.2 2.9 3.3 3.2 3.3 3.0 2.4 2.4 2.4   Notes decr GLV decr GLV       1xboost         call call 1x boost   incr VitK call 2x boost; call      Date 11/10 11/17 11/24 12/1 12/8 12/15 12/23 12/30 1/3/22 1/7 1/11 1/18 1/25 2/1   Total WeeklyDose 17mg 16mg 16mg 16mg 15mg 15 mg Pt did not call back 15mg 12 mg 13mg 15mg 15 mg 15 mg 15 mg   INR 3.7 3.3 3.9 4.0 2.6 3.4 4.0 4.9 3.6 2.4 3.3 2.8 2.7 2.9   Notes Dec GLV   Zero GLV call Red x1 call Less GLV   call   Less  Protein drink redx1  self held x1 (misdose)   Dec vit K fall, apap  Call   call          Date 2/8 2/15 2/22 3/1 3/8 3/15 3/22 3/29 4/5 4/12 4/19 4/26 5/3 5/11   Total WeeklyDose 15mg 14mg 14 mg 15 mg 15 mg 15 mg 14mg 13 mg 14 mg 14 mg 14mg 13mg 15mg 14 mg   INR 4.0 4.0 2.8 2.9 2.9  4.2 3.9 3.3 2.9 3.0 3.8 2.4 3.8 2.5   Notes Call; Dec GLV call Call; Mis-dose call   Call; no GLV Inc GLV. Less protein, apap  redx1 call   Less GLV rec'd 4/27, call Dec GLV        Date 5/18 5/25 6/1 6/8 6/15 6/22 6/29 7/6 7/13 7/20 7/22 7/27  8/10  8/17   Total WeeklyDose 15 mg 15mg 16mg 15 mg 15 mg 15 mg 15 mg 15 mg 15mg 14 mg 15 mg 14 mg  14 mg  15 mg   INR 2.6 2.3 2.7 3.2 3.0 2.9 2.6 2.7 3.6 3.0 3.6 3.0  2.4  3.4   Notes   Inc GLV  call call call       call 7/14-- call call call  call  call  call      Date 8/25 8/31 9/7 9/12 9/19 9/26 10/3 10/10 10/17 10/24 10/31   Total WeeklyDose 14mg 13 mg 14 mg 17mg 15 mg 16mg 15mg 14 mg 13mg 17 mg 15mg   INR 4.3 2.8 1.7 2.9 2.1 3.4 3.8 2.4 1.8 3.7 4.5   Notes Dec GLV call Call refused enox; extra protein  Call; no protein drinks Call; less green tea, inc boostx1 Call; cranberries Redx1; call 1x miss Call  Less protein    1/1  Date 11/7 11/14 11/21 11/28 12/5 12/12 12/19 12/27 1/3 1/9 1/16/23 1/23 1/30/23   Total WeeklyDose 13 mg 14 mg 14 mg 14 mg 14 mg 14 mg 14mg 14 mg  13mg 13 mg 14 mg  14 mg 14 mg   INR 3.2 3.3 3.4 3.6 2.5 4.0 2.9 4.1 3.6 2.6 3.3 2.7 3.0   Notes  call redx1 One less protein shake   Inc GLV Decreased GLV W/o meloxicam  Tramadol,  meloxicam Tramadol,  meloxicam Tramadol,  meloxicam meloxicam     Date 2/7/23 2/13 2/20 2/27/23 3/6 3/13/23 3/23 3/27 4/3/23 4/10/23 4/17/23 4/24 5/1   Total WeeklyDose 14 mg 14 mg 14 mg 14 mg  14 mg 14 mg  14 mg 14 mg 15 mg  14mg 13 mg 15 mg 14 mg   INR 2.9 3.0 3.6 2.6 3 3.5 3.4 2.4 2.8 2.5 2.3 3.2  4.1 POCT   Notes Call  call rec 2/21  Call   call Call   call Call  call Missed dose 1x boost Call; missed protein drinks, less GLV     Date 5/2 5/8 5/15 5/22 5/30 6/5 6/19 7/10 7/24 7/31 8/7 8/14 8/21  8/28   Total WeeklyDose 12 mg 13 mg 14 mg 14 mg 14mg 14 mg 14 mg 14 mg 13 mg 13mg  12 mg 12 mg 14 mg 13 mg   INR 3.7 3.3 2.6 2.7 3.4 2.9 4.0 4.1 3.9 4.1 2.8 1.9 4.0  3.2   Notes rec 5/3  Call  Self-heldx1, boostx1    Stopping HM Less  GLV  redx1 Ceftin   Rec'd 8/1 redx1 Inc GLV  Prednisone start Less GLV      Date 9/5 9/18 9/25 10/2 10/06 10/13 10/27        Total Weekly Dose 13mg 14 mg 12 mg  12 mg 11 mg 12 mg 12 mg        INR 4.2 4.2 4.3  4.6 2.6 3.0 4.1        Notes redx1 clindamycin redx1 redx2 Red x2; inc GLV            Phone Interview:  Tablet Strength: 2mg  Patient Contact Info: [phone] (Mobile) *preferred*  [email or web address]  Verbal Release Authorization signed on 4/7/21 -- may speak with Tammy Bai (friend: [phone]), Ismael Michele (brother: [phone])  Lab Contact Info: [facility] ([facility])  ** will call once monthly or if INR is out of range**   4/7/22 Scr: 0.8    Patient Findings    Positives: Change in medications, Change in diet/appetite   Negatives: Signs/symptoms of thrombosis, Signs/symptoms of bleeding, Laboratory test error suspected, Change in health, Change in alcohol use, Change in activity, Upcoming invasive procedure, Emergency department visit, Upcoming dental procedure, Missed doses, Extra doses, Hospital admission, Bruising, Other complaints   Comments: Reduced appetite following the loss of her brother  On nystatin for thrush      Plan:  1. INR is SUPRAtherapeutic today at 4.1 (goal 2.5-3.5). Instructed Ms. Michele to reduce dose to warfarin 2 mg oral daily except 1 mg MonWedfri until recheck.  2. Repeat INR in  1 week  3. Verbal information provided over the phone. Patient RBV dosing instructions, expresses understanding by teach back, and has no further questions at this time.  4. Lucie Michele understands the importance of calling the [facility] if she notices any s/sx of bleeding, stroke, or abnormal bruising, if any changes are made to her medications or medication doses (Rx, OTC, herbal), or if any upcoming procedures are scheduled. Lucie Michele will likewise let us know if any other changes, questions, or concerns arise regarding anticoagulation therapy. she  understands the importance of seeking medical attention immediately if she experiences any falls, vehicle accidents, or abnormal bleeding or bruising. Lucie Michele voiced understanding of this information and confirms that she has the [facility]'s contact information. Otherwise, we will plan to contact the patient once monthly or if her INR is out of range.    Jenni ShipmanD.  10/27/23   14:46 EDT

## 2023-11-01 ENCOUNTER — ANTICOAGULATION VISIT (OUTPATIENT)
Dept: PHARMACY | Facility: HOSPITAL | Age: 78
End: 2023-11-01
Payer: MEDICARE

## 2023-11-01 ENCOUNTER — OFFICE VISIT (OUTPATIENT)
Dept: FAMILY MEDICINE CLINIC | Facility: CLINIC | Age: 78
End: 2023-11-01
Payer: MEDICARE

## 2023-11-01 ENCOUNTER — OFFICE VISIT (OUTPATIENT)
Dept: CARDIOLOGY | Facility: CLINIC | Age: 78
End: 2023-11-01
Payer: MEDICARE

## 2023-11-01 VITALS
HEIGHT: 65 IN | DIASTOLIC BLOOD PRESSURE: 68 MMHG | RESPIRATION RATE: 18 BRPM | WEIGHT: 169 LBS | BODY MASS INDEX: 28.16 KG/M2 | SYSTOLIC BLOOD PRESSURE: 122 MMHG | HEART RATE: 67 BPM | OXYGEN SATURATION: 95 %

## 2023-11-01 VITALS
HEART RATE: 78 BPM | HEIGHT: 65 IN | DIASTOLIC BLOOD PRESSURE: 84 MMHG | SYSTOLIC BLOOD PRESSURE: 128 MMHG | OXYGEN SATURATION: 96 % | BODY MASS INDEX: 28.89 KG/M2 | WEIGHT: 173.4 LBS | RESPIRATION RATE: 16 BRPM

## 2023-11-01 DIAGNOSIS — I48.92 ATRIAL FLUTTER WITH CONTROLLED RESPONSE: ICD-10-CM

## 2023-11-01 DIAGNOSIS — Z86.79 S/P ASCENDING AORTIC ANEURYSM REPAIR: ICD-10-CM

## 2023-11-01 DIAGNOSIS — E78.2 MIXED HYPERLIPIDEMIA: ICD-10-CM

## 2023-11-01 DIAGNOSIS — N18.31 STAGE 3A CHRONIC KIDNEY DISEASE: ICD-10-CM

## 2023-11-01 DIAGNOSIS — F43.20 ADJUSTMENT DISORDER, UNSPECIFIED TYPE: Primary | ICD-10-CM

## 2023-11-01 DIAGNOSIS — I10 ESSENTIAL HYPERTENSION, BENIGN: ICD-10-CM

## 2023-11-01 DIAGNOSIS — M15.9 PRIMARY OSTEOARTHRITIS INVOLVING MULTIPLE JOINTS: ICD-10-CM

## 2023-11-01 DIAGNOSIS — G47.33 OSA ON CPAP: ICD-10-CM

## 2023-11-01 DIAGNOSIS — I48.92 ATRIAL FLUTTER, UNSPECIFIED TYPE: ICD-10-CM

## 2023-11-01 DIAGNOSIS — R06.02 SHORTNESS OF BREATH: ICD-10-CM

## 2023-11-01 DIAGNOSIS — Z98.890 S/P ASCENDING AORTIC ANEURYSM REPAIR: ICD-10-CM

## 2023-11-01 DIAGNOSIS — R73.9 HYPERGLYCEMIA: ICD-10-CM

## 2023-11-01 DIAGNOSIS — E03.9 ACQUIRED HYPOTHYROIDISM: ICD-10-CM

## 2023-11-01 DIAGNOSIS — I35.9 AORTIC VALVE DISEASE: Primary | ICD-10-CM

## 2023-11-01 DIAGNOSIS — Z79.899 ENCOUNTER FOR LONG-TERM (CURRENT) USE OF OTHER MEDICATIONS: ICD-10-CM

## 2023-11-01 DIAGNOSIS — Z95.2 HISTORY OF AORTIC VALVE REPLACEMENT: Primary | ICD-10-CM

## 2023-11-01 DIAGNOSIS — E55.9 VITAMIN D DEFICIENCY: ICD-10-CM

## 2023-11-01 DIAGNOSIS — I50.32 CHRONIC DIASTOLIC CONGESTIVE HEART FAILURE: ICD-10-CM

## 2023-11-01 DIAGNOSIS — R29.6 FREQUENT FALLS: ICD-10-CM

## 2023-11-01 PROBLEM — E66.811 OBESITY (BMI 30.0-34.9): Status: RESOLVED | Noted: 2022-04-13 | Resolved: 2023-11-01

## 2023-11-01 PROBLEM — E66.9 OBESITY (BMI 30.0-34.9): Status: RESOLVED | Noted: 2022-04-13 | Resolved: 2023-11-01

## 2023-11-01 LAB
INR PPP: 4.3 (ref 0.9–1.1)
POC AMPHETAMINES: NEGATIVE
POC BARBITURATES: NEGATIVE
POC BENZODIAZEPHINES: NEGATIVE
POC COCAINE: NEGATIVE
POC METHADONE: NEGATIVE
POC METHAMPHETAMINE SCREEN URINE: NEGATIVE
POC OPIATES: NEGATIVE
POC OXYCODONE: NEGATIVE
POC PHENCYCLIDINE: NEGATIVE
POC PROPOXYPHENE: NEGATIVE
POC THC: NEGATIVE
POC TRICYCLIC ANTIDEPRESSANTS: NEGATIVE

## 2023-11-01 RX ORDER — TRAMADOL HYDROCHLORIDE 50 MG/1
50 TABLET ORAL 2 TIMES DAILY PRN
Qty: 60 TABLET | Refills: 0 | Status: SHIPPED | OUTPATIENT
Start: 2023-11-01

## 2023-11-01 RX ORDER — MELOXICAM 7.5 MG/1
7.5 TABLET ORAL DAILY
Qty: 90 TABLET | Refills: 1 | Status: SHIPPED | OUTPATIENT
Start: 2023-11-01 | End: 2023-11-01 | Stop reason: DRUGHIGH

## 2023-11-01 RX ORDER — POTASSIUM CHLORIDE 1500 MG/1
20 TABLET, EXTENDED RELEASE ORAL DAILY
Qty: 90 TABLET | Refills: 3 | Status: SHIPPED | OUTPATIENT
Start: 2023-11-01

## 2023-11-01 NOTE — ASSESSMENT & PLAN NOTE
GFR 70.  Patient is instructed to not take any NSAIDs.  Medicines as directed.  Stay well-hydrated.
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
## 2023-11-01 NOTE — ASSESSMENT & PLAN NOTE
Patient has new onset atrial flutter.  She has been having shortness of breath that is progressively getting worse.  I am going to have her see Dr. Blake today.  I have called and talked to him.

## 2023-11-01 NOTE — PROGRESS NOTES
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     Diet: 3x week: 1 cup of brussels sprouts or broccoli weekly, green beans (1/3/23)  Premier Protein (or Equate brand) meal replacement ~2x/week 1/3/23  Alcohol: Seldom  Tobacco: None  OTC Pain Medication: APAP     INR History:  Date 4/5 4/19 4/26 5/5 5/11 6/2 6/15 6/18 6/25 7/2 7/9 7/19 7/23 7/30   Total Weekly Dose 15mg 15mg 14mg 14mg 14mg 14mg 14mg 14mg 14mg 14mg 14mg 14mg 14mg 14mg   INR 2.6 3.9 3.9 2.7 3.0 3.6 2.7 2.9 2.9 2.6 2.9 3.1 2.5 2.3   Notes           decr GLV   recv'd 6/21; [facility]       incr GLV decr GLV      Date 8/6 8/13 8/20 8/27 9/3 9/10 9/17 9/24 10/1 10/8 10/15 10/22 10/29 11/5   Total WeeklyDose 14mg 15mg 15mg 14mg 14mg 15mg 14mg 14mg 14mg 14mg 14mg 14mg 15mg 16mg   INR 2.4 3.4 3.8 2.9 2.2 3.2 2.9 3.3 3.2 3.3 3.0 2.4 2.4 2.4   Notes decr GLV decr GLV       1xboost         call call 1x boost   incr VitK call 2x boost; call      Date 11/10 11/17 11/24 12/1 12/8 12/15 12/23 12/30 1/3/22 1/7 1/11 1/18 1/25 2/1   Total WeeklyDose 17mg 16mg 16mg 16mg 15mg 15 mg Pt did not call back 15mg 12 mg 13mg 15mg 15 mg 15 mg 15 mg   INR 3.7 3.3 3.9 4.0 2.6 3.4 4.0 4.9 3.6 2.4 3.3 2.8 2.7 2.9   Notes Dec GLV   Zero GLV call Red x1 call Less GLV   call   Less  Protein drink redx1  self held x1 (misdose)   Dec vit K fall, apap  Call   call          Date 2/8 2/15 2/22 3/1 3/8 3/15 3/22 3/29 4/5 4/12 4/19 4/26 5/3 5/11   Total WeeklyDose 15mg 14mg 14 mg 15 mg 15 mg 15 mg 14mg 13 mg 14 mg 14 mg 14mg 13mg 15mg 14 mg   INR 4.0 4.0 2.8 2.9 2.9  4.2 3.9 3.3 2.9 3.0 3.8 2.4 3.8 2.5   Notes Call; Dec GLV call Call; Mis-dose call   Call; no GLV Inc GLV. Less protein, apap  redx1 call   Less GLV rec'd 4/27, call Dec GLV        Date 5/18 5/25 6/1 6/8 6/15 6/22 6/29 7/6 7/13 7/20 7/22 7/27  8/10  8/17   Total WeeklyDose 15 mg 15mg 16mg 15 mg 15 mg 15 mg 15 mg 15 mg 15mg 14 mg 15 mg 14 mg  14 mg  15 mg   INR 2.6 2.3 2.7 3.2 3.0 2.9 2.6 2.7 3.6 3.0 3.6 3.0  2.4  3.4   Notes   Inc GLV  call call call       call 7/14-- call call call  call  call  call      Date 8/25 8/31 9/7 9/12 9/19 9/26 10/3 10/10 10/17 10/24 10/31   Total WeeklyDose 14mg 13 mg 14 mg 17mg 15 mg 16mg 15mg 14 mg 13mg 17 mg 15mg   INR 4.3 2.8 1.7 2.9 2.1 3.4 3.8 2.4 1.8 3.7 4.5   Notes Dec GLV call Call refused enox; extra protein  Call; no protein drinks Call; less green tea, inc boostx1 Call; cranberries Redx1; call 1x miss Call  Less protein    1/1  Date 11/7 11/14 11/21 11/28 12/5 12/12 12/19 12/27 1/3 1/9 1/16/23 1/23 1/30/23   Total WeeklyDose 13 mg 14 mg 14 mg 14 mg 14 mg 14 mg 14mg 14 mg  13mg 13 mg 14 mg  14 mg 14 mg   INR 3.2 3.3 3.4 3.6 2.5 4.0 2.9 4.1 3.6 2.6 3.3 2.7 3.0   Notes  call redx1 One less protein shake   Inc GLV Decreased GLV W/o meloxicam  Tramadol,  meloxicam Tramadol,  meloxicam Tramadol,  meloxicam meloxicam     Date 2/7/23 2/13 2/20 2/27/23 3/6 3/13/23 3/23 3/27 4/3/23 4/10/23 4/17/23 4/24 5/1   Total WeeklyDose 14 mg 14 mg 14 mg 14 mg  14 mg 14 mg  14 mg 14 mg 15 mg  14mg 13 mg 15 mg 14 mg   INR 2.9 3.0 3.6 2.6 3 3.5 3.4 2.4 2.8 2.5 2.3 3.2  4.1 POCT   Notes Call  call rec 2/21  Call   call Call   call Call  call Missed dose 1x boost Call; missed protein drinks, less GLV     Date 5/2 5/8 5/15 5/22 5/30 6/5 6/19 7/10 7/24 7/31 8/7 8/14 8/21  8/28   Total WeeklyDose 12 mg 13 mg 14 mg 14 mg 14mg 14 mg 14 mg 14 mg 13 mg 13mg  12 mg 12 mg 14 mg 13 mg   INR 3.7 3.3 2.6 2.7 3.4 2.9 4.0 4.1 3.9 4.1 2.8 1.9 4.0  3.2   Notes rec 5/3  Call  Self-heldx1, boostx1    Stopping HM Less  GLV  redx1 Ceftin   Rec'd 8/1 redx1 Inc GLV  Prednisone start Less GLV      Date 9/5 9/18 9/25 10/2 10/06 10/13 10/27 11/1       Total Weekly Dose 13mg 14 mg 12 mg  12 mg 11 mg 12 mg 12 mg 12 mg       INR 4.2 4.2 4.3  4.6 2.6 3.0 4.1 4.3       Notes redx1 clindamycin redx1 redx2 Red x2; inc GLV            Phone Interview:  Tablet Strength: 2mg  Patient Contact Info: [phone] (Mobile) *preferred*  [email or web address]  Verbal Release Authorization signed on 4/7/21 -- may speak with Tammy Bhumika (friend: [phone]), Ismael Michele (brother: [phone])  Lab Contact Info: [facility] ([facility])  ** will call once monthly or if INR is out of range**   4/7/22 Scr: 0.8    Patient Findings    Positives: Change in health, Change in medications   Negatives: Signs/symptoms of thrombosis, Signs/symptoms of bleeding, Laboratory test error suspected, Change in alcohol use, Change in activity, Upcoming invasive procedure, Emergency department visit, Upcoming dental procedure, Missed doses, Extra doses, Change in diet/appetite, Hospital admission, Bruising, Other complaints    having EKG done today-- will d/c meloxicam and increase potassium  Cardioversion Tuesday        Plan:  1. INR is SUPRAtherapeutic today at 4.3 (goal 2.5-3.5). Instructed Ms. Michele to reduce dose to warfarin 2 mg oral daily except 1 mg MonWedfri until recheck.  2. Repeat INR in  1 week  3. Verbal information provided over the phone. Patient RBV dosing instructions, expresses understanding by teach back, and has no further questions at this time.  4. Lucie Michele understands the importance of calling the [facility] if she notices any s/sx of bleeding, stroke, or abnormal bruising, if any changes are made to her medications or medication doses (Rx, OTC, herbal), or if any upcoming procedures are scheduled. Lucie A Ryne will likewise let us know if any other changes, questions, or concerns arise regarding anticoagulation  therapy. she understands the importance of seeking medical attention immediately if she experiences any falls, vehicle accidents, or abnormal bleeding or bruising. Lucie Michele voiced understanding of this information and confirms that she has the [facility]'s contact information. Otherwise, we will plan to contact the patient once monthly or if her INR is out of range.    Lizet Marinelli, JenniD.  11/01/23   11:46 EDT
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
## 2023-11-02 ENCOUNTER — TELEPHONE (OUTPATIENT)
Dept: CARDIOLOGY | Facility: CLINIC | Age: 78
End: 2023-11-02
Payer: MEDICARE

## 2023-11-02 ENCOUNTER — OFFICE VISIT (OUTPATIENT)
Dept: NEUROLOGY | Facility: CLINIC | Age: 78
End: 2023-11-02
Payer: MEDICARE

## 2023-11-02 VITALS
BODY MASS INDEX: 28.16 KG/M2 | OXYGEN SATURATION: 99 % | WEIGHT: 169 LBS | DIASTOLIC BLOOD PRESSURE: 82 MMHG | SYSTOLIC BLOOD PRESSURE: 130 MMHG | HEIGHT: 65 IN

## 2023-11-02 DIAGNOSIS — R41.3 COMPLAINTS OF MEMORY DISTURBANCE: ICD-10-CM

## 2023-11-02 DIAGNOSIS — G52.1 GLOSSOPHARYNGEAL NEURALGIA: Primary | ICD-10-CM

## 2023-11-02 PROCEDURE — 1160F RVW MEDS BY RX/DR IN RCRD: CPT | Performed by: NURSE PRACTITIONER

## 2023-11-02 PROCEDURE — 3079F DIAST BP 80-89 MM HG: CPT | Performed by: NURSE PRACTITIONER

## 2023-11-02 PROCEDURE — 3075F SYST BP GE 130 - 139MM HG: CPT | Performed by: NURSE PRACTITIONER

## 2023-11-02 PROCEDURE — 99214 OFFICE O/P EST MOD 30 MIN: CPT | Performed by: NURSE PRACTITIONER

## 2023-11-02 PROCEDURE — 1159F MED LIST DOCD IN RCRD: CPT | Performed by: NURSE PRACTITIONER

## 2023-11-02 NOTE — LETTER
November 2, 2023       No Recipients    Patient: Lucie Michele   YOB: 1945   Date of Visit: 11/2/2023     Dear Giovanni Lee MD:       Thank you for referring Lucie Michele to me for evaluation. Below are the relevant portions of my assessment and plan of care.    If you have questions, please do not hesitate to call me. I look forward to following Lucie along with you.         Sincerely,        SAGAR Lamas        CC:   No Recipients    Bam Walden APRN  11/02/23 1332  Sign when Signing Visit  Subjective   Lucie Michele is a 78 y.o. female.  She has a history of glossopharyngeal neuralgia.  She is taking oxcarbazepine 150 mg 3 times a day.  This is working well for her.  At her last visit 6 months ago she expressed concerns about her memory.  She states that she was having trouble remembering more recent things.  She scored a 26 out of 30 on the [place].  Lab work has been normal.  She takes a B complex vitamin daily.  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  This is unchanged in size going back to MRIs from 2000.  She last saw neurosurgery 6 months ago as well and it was recommended to stop imaging and follow-up as needed.  She states today that she attributes some of her memory problems to stress.  Her brother recently passed away and she has had some other family stressors.    History of Present Illness     Review of Systems   Neurological:  Negative for dizziness, tremors, seizures, syncope, facial asymmetry, speech difficulty, weakness, light-headedness, numbness, headache, memory problem and confusion.     I have reviewed and confirmed the accuracy of the patient's history: Chief complaint, HPI, ROS, Subjective, and Past Family Social History as entered by the MA/JULIOCESAR/RN. Celi Perea MA 11/02/23      Objective     Physical Examination:  General Appearance:  Well developed, well nourished, well  groomed, alert, and cooperative.  HEENT: Normocephalic.    Neck and Spine: Normal range of motion.  Normal alignment. No mass or tenderness. No bruits.  Cardiac: Regular rate and rhythm. No murmurs.  Peripheral Vasculature: Radial and pedal pulses are equal and symmetric.   Extremities: No edema or deformities. Normal joint ROM.  Skin: No rashes or birth marks.      Neurological examination:   Higher Integrative Function: Oriented to time, place, and person. Normal registration, recall attention span and concentration. Normal language including comprehension, spontaneous speech, repetition, reading, writing, naming and vocabulary. No neglect with normal visual-spatial function and construction. Normal fund of knowledge and higher integrative function.   CN II: Pupils are equal, round and reactive to light. Normal visual acuity and visual fields.   CN III IV VI: Extraocular movements are full without nystagmus.   CN V: Normal facial sensation and strength of muscles of mastication.   CN VII: Facial movements are symmetric. No weakness.   CN VIII: Auditory acuity is normal.  CN IX & X: Symmetric palatal movement.   CN XI: Sternocleidomastoid and trapezius are normal. No weakness.   CN XII: The tongue is midline. No atrophy or fasciculations.  Motor: Normal muscle strength, bulk and tone in upper and lower extremities. No fasciculations, rigidity, spasticity, or abnormal movements.   Reflexes: 2+ in the upper and lower extremities. Plantar responses are flexor.   Sensation: Normal light touch, pinprick, vibration, temperature, and proprioception in the arms and legs.   Station and Gait: Normal gait and station.   Coordination: Finger to nose test shows no dysmetria. Rapid alternating movements are normal. Heel to shin is normal.           Assessment & Plan   Diagnoses and all orders for this visit:    1. Glossopharyngeal neuralgia (Primary)    2. Complaints of memory disturbance    We reviewed her MRI results as well  as lab work. Discussed neuropsychology testing but she would like to hold off at this time. Will continue to monitor memory. Will call with any problems. Continue oxcarbazepine 150 mg TID for glossopharyngeal neuralgia.      I spent 35 minutes caring for patient on todays date of service. This time includes time spent by me in the following activities: obtaining and/or reviewing a separately obtained history, performing a medically appropriate examination and/or evaluation, counseling and educating the patient on diagnosis and treatment, ordering medications, tests, or procedures, referring and communicating with other health care professionals and documenting information in the medical record.         Follow up 1 year, sooner if needed.

## 2023-11-02 NOTE — PROGRESS NOTES
Subjective   Lucie Michele is a 78 y.o. female.  She has a history of glossopharyngeal neuralgia.  She is taking oxcarbazepine 150 mg 3 times a day.  This is working well for her.  At her last visit 6 months ago she expressed concerns about her memory.  She states that she was having trouble remembering more recent things.  She scored a 26 out of 30 on the Swisher.  Lab work has been normal.  She takes a B complex vitamin daily.  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  This is unchanged in size going back to MRIs from 2000.  She last saw neurosurgery 6 months ago as well and it was recommended to stop imaging and follow-up as needed.  She states today that she attributes some of her memory problems to stress.  Her brother recently passed away and she has had some other family stressors.    History of Present Illness     Review of Systems   Neurological:  Negative for dizziness, tremors, seizures, syncope, facial asymmetry, speech difficulty, weakness, light-headedness, numbness, headache, memory problem and confusion.     I have reviewed and confirmed the accuracy of the patient's history: Chief complaint, HPI, ROS, Subjective, and Past Family Social History as entered by the MA/BRIGETTEN/RN. Celi Perea MA 11/02/23      Objective     Physical Examination:  General Appearance:  Well developed, well nourished, well groomed, alert, and cooperative.  HEENT: Normocephalic.    Neck and Spine: Normal range of motion.  Normal alignment. No mass or tenderness. No bruits.  Cardiac: Regular rate and rhythm. No murmurs.  Peripheral Vasculature: Radial and pedal pulses are equal and symmetric.   Extremities: No edema or deformities. Normal joint ROM.  Skin: No rashes or birth marks.      Neurological examination:   Higher Integrative Function: Oriented to time, place, and person. Normal registration, recall attention span and concentration. Normal language  including comprehension, spontaneous speech, repetition, reading, writing, naming and vocabulary. No neglect with normal visual-spatial function and construction. Normal fund of knowledge and higher integrative function.   CN II: Pupils are equal, round and reactive to light. Normal visual acuity and visual fields.   CN III IV VI: Extraocular movements are full without nystagmus.   CN V: Normal facial sensation and strength of muscles of mastication.   CN VII: Facial movements are symmetric. No weakness.   CN VIII: Auditory acuity is normal.  CN IX & X: Symmetric palatal movement.   CN XI: Sternocleidomastoid and trapezius are normal. No weakness.   CN XII: The tongue is midline. No atrophy or fasciculations.  Motor: Normal muscle strength, bulk and tone in upper and lower extremities. No fasciculations, rigidity, spasticity, or abnormal movements.   Reflexes: 2+ in the upper and lower extremities. Plantar responses are flexor.   Sensation: Normal light touch, pinprick, vibration, temperature, and proprioception in the arms and legs.   Station and Gait: Normal gait and station.   Coordination: Finger to nose test shows no dysmetria. Rapid alternating movements are normal. Heel to shin is normal.           Assessment & Plan   Diagnoses and all orders for this visit:    1. Glossopharyngeal neuralgia (Primary)    2. Complaints of memory disturbance    We reviewed her MRI results as well as lab work. Discussed neuropsychology testing but she would like to hold off at this time. Will continue to monitor memory. Will call with any problems. Continue oxcarbazepine 150 mg TID for glossopharyngeal neuralgia.      I spent 35 minutes caring for patient on todays date of service. This time includes time spent by me in the following activities: obtaining and/or reviewing a separately obtained history, performing a medically appropriate examination and/or evaluation, counseling and educating the patient on diagnosis and  treatment, ordering medications, tests, or procedures, referring and communicating with other health care professionals and documenting information in the medical record.         Follow up 1 year, sooner if needed.

## 2023-11-02 NOTE — TELEPHONE ENCOUNTER
Ms. Michele [place] [facility] time of 0630. She understands nothing to eat/drink after MN and to take pill in the morning with a sip of water. Will need a  due to sedation.

## 2023-11-02 NOTE — TELEPHONE ENCOUNTER
Left a message for Ms Michele to call back and speak with Loretta in reference to her carioversion date and time at [facility] [place]

## 2023-11-06 ENCOUNTER — ANTICOAGULATION VISIT (OUTPATIENT)
Dept: PHARMACY | Facility: HOSPITAL | Age: 78
End: 2023-11-06
Payer: MEDICARE

## 2023-11-06 ENCOUNTER — CLINICAL SUPPORT (OUTPATIENT)
Dept: CARDIOLOGY | Facility: CLINIC | Age: 78
End: 2023-11-06
Payer: MEDICARE

## 2023-11-06 DIAGNOSIS — I48.92 ATRIAL FLUTTER WITH CONTROLLED RESPONSE: ICD-10-CM

## 2023-11-06 DIAGNOSIS — Z95.2 HISTORY OF AORTIC VALVE REPLACEMENT: Primary | ICD-10-CM

## 2023-11-06 DIAGNOSIS — I35.9 AORTIC VALVE DISEASE: ICD-10-CM

## 2023-11-06 LAB
INR PPP: 3.7 (ref 0.9–1.1)
PROTHROMBIN TIME: 3.7 SECONDS

## 2023-11-06 PROCEDURE — 36416 COLLJ CAPILLARY BLOOD SPEC: CPT | Performed by: INTERNAL MEDICINE

## 2023-11-06 NOTE — PROGRESS NOTES
Capillary Blood Specimen Collection  Capillary blood collection performed in clinic by Hubert Crawford MA. Patient tolerated the procedure well without complications.   11/06/23   Hubert Crawford MA     ECG 12 Lead    Date/Time: 11/7/2023 8:40 AM  Performed by: Blas Blake MD    Authorized by: Blas Blake MD  Comparison: compared with previous ECG from 11/1/2023  Similar to previous ECG  Rhythm: atrial flutter  Rate: normal  BPM: 66  QRS axis: normal    Clinical impression: abnormal EKG

## 2023-11-06 NOTE — PROGRESS NOTES
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     Diet: 3x week: 1 cup of brussels sprouts or broccoli weekly, green beans (1/3/23)  Premier Protein (or Equate brand) meal replacement ~2x/week 1/3/23  Alcohol: Seldom  Tobacco: None  OTC Pain Medication: APAP     INR History:  Date 4/5 4/19 4/26 5/5 5/11 6/2 6/15 6/18 6/25 7/2 7/9 7/19 7/23 7/30   Total Weekly Dose 15mg 15mg 14mg 14mg 14mg 14mg 14mg 14mg 14mg 14mg 14mg 14mg 14mg 14mg   INR 2.6 3.9 3.9 2.7 3.0 3.6 2.7 2.9 2.9 2.6 2.9 3.1 2.5 2.3   Notes           decr GLV   recv'd 6/21; [facility]       incr GLV decr GLV      Date 8/6 8/13 8/20 8/27 9/3 9/10 9/17 9/24 10/1 10/8 10/15 10/22 10/29 11/5   Total WeeklyDose 14mg 15mg 15mg 14mg 14mg 15mg 14mg 14mg 14mg 14mg 14mg 14mg 15mg 16mg   INR 2.4 3.4 3.8 2.9 2.2 3.2 2.9 3.3 3.2 3.3 3.0 2.4 2.4 2.4   Notes decr GLV decr GLV       1xboost         call call 1x boost   incr VitK call 2x boost; call      Date 11/10 11/17 11/24 12/1 12/8 12/15 12/23 12/30 1/3/22 1/7 1/11 1/18 1/25 2/1   Total WeeklyDose 17mg 16mg 16mg 16mg 15mg 15 mg Pt did not call back 15mg 12 mg 13mg 15mg 15 mg 15 mg 15 mg   INR 3.7 3.3 3.9 4.0 2.6 3.4 4.0 4.9 3.6 2.4 3.3 2.8 2.7 2.9   Notes Dec GLV   Zero GLV call Red x1 call Less GLV   call   Less  Protein drink redx1  self held x1 (misdose)   Dec vit K fall, apap  Call   call          Date 2/8 2/15 2/22 3/1 3/8 3/15 3/22 3/29 4/5 4/12 4/19 4/26 5/3 5/11   Total WeeklyDose 15mg 14mg 14 mg 15 mg 15 mg 15 mg 14mg 13 mg 14 mg 14 mg 14mg 13mg 15mg 14 mg   INR 4.0 4.0 2.8 2.9 2.9  4.2 3.9 3.3 2.9 3.0 3.8 2.4 3.8 2.5   Notes Call; Dec GLV call Call; Mis-dose call   Call; no GLV Inc GLV. Less protein, apap  redx1 call   Less GLV rec'd 4/27, call Dec GLV        Date 5/18 5/25 6/1 6/8 6/15 6/22 6/29 7/6 7/13 7/20 7/22 7/27  8/10  8/17   Total WeeklyDose 15 mg 15mg 16mg 15 mg 15 mg 15 mg 15 mg 15 mg 15mg 14 mg 15 mg 14 mg  14 mg  15 mg   INR 2.6 2.3 2.7 3.2 3.0 2.9 2.6 2.7 3.6 3.0 3.6 3.0  2.4  3.4   Notes   Inc GLV  call call call       call 7/14-- call call call  call  call  call      Date 8/25 8/31 9/7 9/12 9/19 9/26 10/3 10/10 10/17 10/24 10/31   Total WeeklyDose 14mg 13 mg 14 mg 17mg 15 mg 16mg 15mg 14 mg 13mg 17 mg 15mg   INR 4.3 2.8 1.7 2.9 2.1 3.4 3.8 2.4 1.8 3.7 4.5   Notes Dec GLV call Call refused enox; extra protein  Call; no protein drinks Call; less green tea, inc boostx1 Call; cranberries Redx1; call 1x miss Call  Less protein    1/1  Date 11/7 11/14 11/21 11/28 12/5 12/12 12/19 12/27 1/3 1/9 1/16/23 1/23 1/30/23   Total WeeklyDose 13 mg 14 mg 14 mg 14 mg 14 mg 14 mg 14mg 14 mg  13mg 13 mg 14 mg  14 mg 14 mg   INR 3.2 3.3 3.4 3.6 2.5 4.0 2.9 4.1 3.6 2.6 3.3 2.7 3.0   Notes  call redx1 One less protein shake   Inc GLV Decreased GLV W/o meloxicam  Tramadol,  meloxicam Tramadol,  meloxicam Tramadol,  meloxicam meloxicam     Date 2/7/23 2/13 2/20 2/27/23 3/6 3/13/23 3/23 3/27 4/3/23 4/10/23 4/17/23 4/24 5/1   Total WeeklyDose 14 mg 14 mg 14 mg 14 mg  14 mg 14 mg  14 mg 14 mg 15 mg  14mg 13 mg 15 mg 14 mg   INR 2.9 3.0 3.6 2.6 3 3.5 3.4 2.4 2.8 2.5 2.3 3.2  4.1 POCT   Notes Call  call rec 2/21  Call   call Call   call Call  call Missed dose 1x boost Call; missed protein drinks, less GLV     Date 5/2 5/8 5/15 5/22 5/30 6/5 6/19 7/10 7/24 7/31 8/7 8/14 8/21  8/28   Total WeeklyDose 12 mg 13 mg 14 mg 14 mg 14mg 14 mg 14 mg 14 mg 13 mg 13mg  12 mg 12 mg 14 mg 13 mg   INR 3.7 3.3 2.6 2.7 3.4 2.9 4.0 4.1 3.9 4.1 2.8 1.9 4.0  3.2   Notes rec 5/3  Call  Self-heldx1, boostx1    Stopping HM Less  GLV  redx1 Ceftin   Rec'd 8/1 redx1 Inc GLV  Prednisone start Less GLV      Date 9/5 9/18 9/25 10/2 10/06 10/13 10/27 11/1 11/6      Total Weekly Dose 13mg 14 mg 12 mg  12 mg 11 mg 12 mg 12 mg 12 mg 11mg      INR 4.2 4.2 4.3  4.6 2.6 3.0 4.1 4.3 3.7      Notes redx1 clindamycin redx1 redx2 Red x2; inc GLV            Phone Interview:  Tablet Strength: 2mg  Patient Contact Info: [phone] (Mobile) *preferred*  [email or web address]  Verbal Release Authorization signed on 4/7/21 -- may speak with Tammy Bhumika (friend: [phone]), Ismael Michele (brother: [phone])  Lab Contact Info: [facility] ([facility])  ** will call once monthly or if INR is out of range**   4/7/22 Scr: 0.8    Patient Findings  Negatives: Signs/symptoms of thrombosis, Signs/symptoms of bleeding, Laboratory test error suspected, Change in health, Change in alcohol use, Change in activity, Upcoming invasive procedure, Emergency department visit, Upcoming dental procedure, Missed doses, Extra doses, Change in medications, Change in diet/appetite, Hospital admission, Bruising, Other complaints   Comments: Ms Michele could not remember if she took a 1mg or 2mg on 11/1. She has a cardioversion planned 11/8.        Plan:  1. INR is SUPRAtherapeutic today at 3.7 (goal 2.5-3.5). Instructed Ms. Michele to continue warfarin 2 mg oral daily except 1 mg MonWedfri until recheck.  2. Repeat INR 11/13.  3. Verbal information provided over the phone. Patient RBV dosing instructions, expresses understanding by teach back, and has no further questions at this time.  4. Lucie Michele understands the importance of calling the [facility] if she notices any s/sx of bleeding, stroke, or abnormal bruising, if any changes are made to her medications or medication doses (Rx, OTC, herbal), or if any upcoming procedures are scheduled. Lucie Michele will likewise let us know if any other changes, questions, or concerns arise regarding  anticoagulation therapy. she understands the importance of seeking medical attention immediately if she experiences any falls, vehicle accidents, or abnormal bleeding or bruising. Lucie Michele voiced understanding of this information and confirms that she has the [facility]'s contact information. Otherwise, we will plan to contact the patient once monthly or if her INR is out of range.    Tim Narvaez, PharmD  11/6/2023  11:38 EST

## 2023-11-08 ENCOUNTER — OUTSIDE FACILITY SERVICE (OUTPATIENT)
Dept: CARDIOLOGY | Facility: CLINIC | Age: 78
End: 2023-11-08
Payer: MEDICARE

## 2023-11-08 PROCEDURE — 92960 CARDIOVERSION ELECTRIC EXT: CPT | Performed by: INTERNAL MEDICINE

## 2023-11-09 ENCOUNTER — TELEPHONE (OUTPATIENT)
Dept: CARDIOLOGY | Facility: CLINIC | Age: 78
End: 2023-11-09
Payer: MEDICARE

## 2023-11-09 RX ORDER — SOTALOL HYDROCHLORIDE 80 MG/1
80 TABLET ORAL 2 TIMES DAILY
COMMUNITY
End: 2023-11-09 | Stop reason: SDUPTHER

## 2023-11-09 RX ORDER — SOTALOL HYDROCHLORIDE 80 MG/1
80 TABLET ORAL 2 TIMES DAILY
Qty: 180 TABLET | Refills: 1 | Status: SHIPPED | OUTPATIENT
Start: 2023-11-09

## 2023-11-09 NOTE — TELEPHONE ENCOUNTER
Caller: Lucie Michele    Relationship: Self    Best call back number: [phone]     Which medication are you concerned about: SOTALOL    Who prescribed you this medication: MD EVITA     When did you start taking this medication: HAS NOT STARTED     What are your concerns: PT HAD A CARDIOVERSION/ HEART SHOCK ON 11/8/23 AND WAS TOLD OUR OFFICE WOULD SEND IN AN RX FOR SOTALOL. WENT TO [facility] AND THEY STATE THEY HAVE NOT RECEIVED THIS ORDER. PT HAD NOT HAD HEARD MEDS SINCE 11/8/23. IF THEY ARE GOING TO SEND IT TODAY, SEND THIS TO [facility].     How long have you had these concerns: HAS NOT TAKEN THIS YET

## 2023-11-09 NOTE — TELEPHONE ENCOUNTER
----- Message from Blas Blake MD sent at 11/9/2023  8:24 AM EST -----  Potassium was low still at the hospital , double the potassium and will recheck  next week again

## 2023-11-09 NOTE — TELEPHONE ENCOUNTER
Spoke with Ms Michele and advised I have sent the prescription to [facility] [place] [address] per her request.  I have her scheduled for next Wednesday at 0915

## 2023-11-13 ENCOUNTER — CLINICAL SUPPORT (OUTPATIENT)
Dept: CARDIOLOGY | Facility: CLINIC | Age: 78
End: 2023-11-13
Payer: MEDICARE

## 2023-11-13 ENCOUNTER — ANTICOAGULATION VISIT (OUTPATIENT)
Dept: PHARMACY | Facility: HOSPITAL | Age: 78
End: 2023-11-13
Payer: MEDICARE

## 2023-11-13 DIAGNOSIS — Z95.2 HISTORY OF AORTIC VALVE REPLACEMENT: Primary | ICD-10-CM

## 2023-11-13 LAB — INR PPP: 2.1 (ref 0.9–1.1)

## 2023-11-13 PROCEDURE — 36416 COLLJ CAPILLARY BLOOD SPEC: CPT | Performed by: INTERNAL MEDICINE

## 2023-11-13 NOTE — PROGRESS NOTES
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     Diet: 3x week: 1 cup of brussels sprouts or broccoli weekly, green beans (1/3/23)  Premier Protein (or Equate brand) meal replacement ~2x/week 1/3/23  Alcohol: Seldom  Tobacco: None  OTC Pain Medication: APAP     INR History:  Date 4/5 4/19 4/26 5/5 5/11 6/2 6/15 6/18 6/25 7/2 7/9 7/19 7/23 7/30   Total Weekly Dose 15mg 15mg 14mg 14mg 14mg 14mg 14mg 14mg 14mg 14mg 14mg 14mg 14mg 14mg   INR 2.6 3.9 3.9 2.7 3.0 3.6 2.7 2.9 2.9 2.6 2.9 3.1 2.5 2.3   Notes           decr GLV   recv'd 6/21; [facility]       incr GLV decr GLV      Date 8/6 8/13 8/20 8/27 9/3 9/10 9/17 9/24 10/1 10/8 10/15 10/22 10/29 11/5   Total WeeklyDose 14mg 15mg 15mg 14mg 14mg 15mg 14mg 14mg 14mg 14mg 14mg 14mg 15mg 16mg   INR 2.4 3.4 3.8 2.9 2.2 3.2 2.9 3.3 3.2 3.3 3.0 2.4 2.4 2.4   Notes decr GLV decr GLV       1xboost         call call 1x boost   incr VitK call 2x boost; call      Date 11/10 11/17 11/24 12/1 12/8 12/15 12/23 12/30 1/3/22 1/7 1/11 1/18 1/25 2/1   Total WeeklyDose 17mg 16mg 16mg 16mg 15mg 15 mg Pt did not call back 15mg 12 mg 13mg 15mg 15 mg 15 mg 15 mg   INR 3.7 3.3 3.9 4.0 2.6 3.4 4.0 4.9 3.6 2.4 3.3 2.8 2.7 2.9   Notes Dec GLV   Zero GLV call Red x1 call Less GLV   call   Less  Protein drink redx1  self held x1 (misdose)   Dec vit K fall, apap  Call   call          Date 2/8 2/15 2/22 3/1 3/8 3/15 3/22 3/29 4/5 4/12 4/19 4/26 5/3 5/11   Total WeeklyDose 15mg 14mg 14 mg 15 mg 15 mg 15 mg 14mg 13 mg 14 mg 14 mg 14mg 13mg 15mg 14 mg   INR 4.0 4.0 2.8 2.9 2.9  4.2 3.9 3.3 2.9 3.0 3.8 2.4 3.8 2.5   Notes Call; Dec GLV call Call; Mis-dose call   Call; no GLV Inc GLV. Less protein, apap  redx1 call   Less GLV rec'd 4/27, call Dec GLV        Date 5/18 5/25 6/1 6/8 6/15 6/22 6/29 7/6 7/13 7/20 7/22 7/27  8/10  8/17   Total WeeklyDose 15 mg 15mg 16mg 15 mg 15 mg 15 mg 15 mg 15 mg 15mg 14 mg 15 mg 14 mg  14 mg  15 mg   INR 2.6 2.3 2.7 3.2 3.0 2.9 2.6 2.7 3.6 3.0 3.6 3.0  2.4  3.4   Notes   Inc GLV  call call call       call 7/14-- call call call  call  call  call      Date 8/25 8/31 9/7 9/12 9/19 9/26 10/3 10/10 10/17 10/24 10/31   Total WeeklyDose 14mg 13 mg 14 mg 17mg 15 mg 16mg 15mg 14 mg 13mg 17 mg 15mg   INR 4.3 2.8 1.7 2.9 2.1 3.4 3.8 2.4 1.8 3.7 4.5   Notes Dec GLV call Call refused enox; extra protein  Call; no protein drinks Call; less green tea, inc boostx1 Call; cranberries Redx1; call 1x miss Call  Less protein    1/1  Date 11/7 11/14 11/21 11/28 12/5 12/12 12/19 12/27 1/3 1/9 1/16/23 1/23 1/30/23   Total WeeklyDose 13 mg 14 mg 14 mg 14 mg 14 mg 14 mg 14mg 14 mg  13mg 13 mg 14 mg  14 mg 14 mg   INR 3.2 3.3 3.4 3.6 2.5 4.0 2.9 4.1 3.6 2.6 3.3 2.7 3.0   Notes  call redx1 One less protein shake   Inc GLV Decreased GLV W/o meloxicam  Tramadol,  meloxicam Tramadol,  meloxicam Tramadol,  meloxicam meloxicam     Date 2/7/23 2/13 2/20 2/27/23 3/6 3/13/23 3/23 3/27 4/3/23 4/10/23 4/17/23 4/24 5/1   Total WeeklyDose 14 mg 14 mg 14 mg 14 mg  14 mg 14 mg  14 mg 14 mg 15 mg  14mg 13 mg 15 mg 14 mg   INR 2.9 3.0 3.6 2.6 3 3.5 3.4 2.4 2.8 2.5 2.3 3.2  4.1 POCT   Notes Call  call rec 2/21  Call   call Call   call Call  call Missed dose 1x boost Call; missed protein drinks, less GLV     Date 5/2 5/8 5/15 5/22 5/30 6/5 6/19 7/10 7/24 7/31 8/7 8/14 8/21  8/28   Total WeeklyDose 12 mg 13 mg 14 mg 14 mg 14mg 14 mg 14 mg 14 mg 13 mg 13mg  12 mg 12 mg 14 mg 13 mg   INR 3.7 3.3 2.6 2.7 3.4 2.9 4.0 4.1 3.9 4.1 2.8 1.9 4.0  3.2   Notes rec 5/3  Call  Self-heldx1, boostx1    Stopping HM Less  GLV  redx1 Ceftin   Rec'd 8/1 redx1 Inc GLV  Prednisone start Less GLV      Date 9/5 9/18 9/25 10/2 10/06 10/13 10/27 11/1 11/6 11/13     Total Weekly Dose 13mg 14 mg 12 mg  12 mg 11 mg 12 mg 12 mg 12 mg 11mg 11 mg     INR 4.2 4.2 4.3  4.6 2.6 3.0 4.1 4.3 3.7 2.1     Notes redx1 clindamycin redx1 redx2 Red x2; inc GLV            Phone Interview:  Tablet Strength: 2mg  Patient Contact Info: [phone] (Mobile) *preferred*  [email or web address]  Verbal Release Authorization signed on 4/7/21 -- may speak with Tammy Bhumika (friend: [phone]), Ismael Michele (brother: [phone])  Lab Contact Info: [facility] ([facility])  ** will call once monthly or if INR is out of range**   4/7/22 Scr: 0.8    Patient Findings  Positives: Change in medications, Change in diet/appetite, Bruising   Negatives: Signs/symptoms of thrombosis, Signs/symptoms of bleeding, Laboratory test error suspected, Change in health, Change in alcohol use, Change in activity, Upcoming invasive procedure, Emergency department visit, Upcoming dental procedure, Missed doses, Extra doses, Hospital admission, Other complaints   Comments: Had coleslaw last night.  Has bruises but thinks it is where she had the cardioversion pressure cup on too tight.  Is taking an extra potassium tablet every day and is now on sotalol BID.        Plan:  1. INR is SUBtherapeutic today at 2.1 (goal 2.5-3.5). Instructed Ms. Michele to increase dose to warfarin 2 mg oral daily except 1 mg WedFri until recheck for now.  2. Repeat INR 11/17.  3. Verbal information provided over the phone. Patient RBV dosing instructions, expresses understanding by teach back, and has no further questions at this time.  4. Lucie Michele understands the importance of calling the [facility] if she notices any s/sx of bleeding, stroke, or abnormal bruising, if any changes are made to her medications or medication doses (Rx, OTC, herbal), or if any upcoming procedures are  scheduled. Lucie Michele will likewise let us know if any other changes, questions, or concerns arise regarding anticoagulation therapy. she understands the importance of seeking medical attention immediately if she experiences any falls, vehicle accidents, or abnormal bleeding or bruising. Lucie Michele voiced understanding of this information and confirms that she has the [facility]'s contact information. Otherwise, we will plan to contact the patient once monthly or if her INR is out of range.    Edward Amaro  Pharmacy Intern  11/13/2023 10:21 EST     Needs lovenox bridge if INR remains low.  I, Ngoc Brooks, PharmD, have reviewed the note in full and agree with the assessment and plan.  11/13/23  15:38 EST

## 2023-11-13 NOTE — PROGRESS NOTES
Venipuncture Blood Specimen Collection  Venipuncture performed in clinic by Mitzi Joseph with good hemostasis. Patient tolerated the procedure well without complications.   11/13/23   Mitzi Joseph

## 2023-11-15 ENCOUNTER — OFFICE VISIT (OUTPATIENT)
Dept: CARDIOLOGY | Facility: CLINIC | Age: 78
End: 2023-11-15
Payer: MEDICARE

## 2023-11-15 VITALS
HEART RATE: 64 BPM | DIASTOLIC BLOOD PRESSURE: 86 MMHG | WEIGHT: 171 LBS | BODY MASS INDEX: 28.49 KG/M2 | HEIGHT: 65 IN | OXYGEN SATURATION: 93 % | TEMPERATURE: 96.9 F | SYSTOLIC BLOOD PRESSURE: 132 MMHG

## 2023-11-15 DIAGNOSIS — Z86.79 S/P ASCENDING AORTIC ANEURYSM REPAIR: ICD-10-CM

## 2023-11-15 DIAGNOSIS — E78.2 MIXED HYPERLIPIDEMIA: ICD-10-CM

## 2023-11-15 DIAGNOSIS — I50.32 CHRONIC DIASTOLIC CONGESTIVE HEART FAILURE: ICD-10-CM

## 2023-11-15 DIAGNOSIS — Z98.890 S/P ASCENDING AORTIC ANEURYSM REPAIR: ICD-10-CM

## 2023-11-15 DIAGNOSIS — I48.92 ATRIAL FLUTTER WITH CONTROLLED RESPONSE: Primary | ICD-10-CM

## 2023-11-15 DIAGNOSIS — I10 ESSENTIAL HYPERTENSION, BENIGN: ICD-10-CM

## 2023-11-15 DIAGNOSIS — Z95.2 HISTORY OF AORTIC VALVE REPLACEMENT: ICD-10-CM

## 2023-11-15 DIAGNOSIS — G47.33 OSA ON CPAP: ICD-10-CM

## 2023-11-15 PROBLEM — R06.02 SHORTNESS OF BREATH: Status: RESOLVED | Noted: 2023-11-01 | Resolved: 2023-11-15
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
## 2023-11-16 ENCOUNTER — TELEPHONE (OUTPATIENT)
Dept: CARDIOLOGY | Facility: CLINIC | Age: 78
End: 2023-11-16
Payer: MEDICARE

## 2023-11-16 LAB
BUN SERPL-MCNC: 8 MG/DL (ref 8–27)
BUN/CREAT SERPL: 11 (ref 12–28)
CALCIUM SERPL-MCNC: 9.3 MG/DL (ref 8.7–10.3)
CHLORIDE SERPL-SCNC: 101 MMOL/L (ref 96–106)
CO2 SERPL-SCNC: 27 MMOL/L (ref 20–29)
CREAT SERPL-MCNC: 0.71 MG/DL (ref 0.57–1)
EGFRCR SERPLBLD CKD-EPI 2021: 88 ML/MIN/1.73
GLUCOSE SERPL-MCNC: 99 MG/DL (ref 70–99)
POTASSIUM SERPL-SCNC: 4.1 MMOL/L (ref 3.5–5.2)
SODIUM SERPL-SCNC: 141 MMOL/L (ref 134–144)

## 2023-11-16 NOTE — TELEPHONE ENCOUNTER
Your potassium is good, go back to what you were taking before, eat tomatoes,broccoli Per Blake .      Pt understood message and said thank you

## 2023-11-16 NOTE — TELEPHONE ENCOUNTER
----- Message from Blas Blake MD sent at 11/16/2023  1:07 PM EST -----  Your potassium is good, go back to what you were taking before, eat tomatoes,broccoli

## 2023-11-17 ENCOUNTER — CLINICAL SUPPORT (OUTPATIENT)
Dept: CARDIOLOGY | Facility: CLINIC | Age: 78
End: 2023-11-17
Payer: MEDICARE

## 2023-11-17 ENCOUNTER — ANTICOAGULATION VISIT (OUTPATIENT)
Dept: PHARMACY | Facility: HOSPITAL | Age: 78
End: 2023-11-17
Payer: MEDICARE

## 2023-11-17 DIAGNOSIS — Z95.2 HISTORY OF AORTIC VALVE REPLACEMENT: Primary | ICD-10-CM

## 2023-11-17 LAB — INR PPP: 2.7 (ref 0.9–1.1)

## 2023-11-17 NOTE — PROGRESS NOTES
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     Diet: 3x week: 1 cup of brussels sprouts or broccoli weekly, green beans (1/3/23)  Premier Protein (or Equate brand) meal replacement ~2x/week 1/3/23  Alcohol: Seldom  Tobacco: None  OTC Pain Medication: APAP     INR History:  Date 4/5 4/19 4/26 5/5 5/11 6/2 6/15 6/18 6/25 7/2 7/9 7/19 7/23 7/30   Total Weekly Dose 15mg 15mg 14mg 14mg 14mg 14mg 14mg 14mg 14mg 14mg 14mg 14mg 14mg 14mg   INR 2.6 3.9 3.9 2.7 3.0 3.6 2.7 2.9 2.9 2.6 2.9 3.1 2.5 2.3   Notes           decr GLV   recv'd 6/21; [facility]       incr GLV decr GLV      Date 8/6 8/13 8/20 8/27 9/3 9/10 9/17 9/24 10/1 10/8 10/15 10/22 10/29 11/5   Total WeeklyDose 14mg 15mg 15mg 14mg 14mg 15mg 14mg 14mg 14mg 14mg 14mg 14mg 15mg 16mg   INR 2.4 3.4 3.8 2.9 2.2 3.2 2.9 3.3 3.2 3.3 3.0 2.4 2.4 2.4   Notes decr GLV decr GLV       1xboost         call call 1x boost   incr VitK call 2x boost; call      Date 11/10 11/17 11/24 12/1 12/8 12/15 12/23 12/30 1/3/22 1/7 1/11 1/18 1/25 2/1   Total WeeklyDose 17mg 16mg 16mg 16mg 15mg 15 mg Pt did not call back 15mg 12 mg 13mg 15mg 15 mg 15 mg 15 mg   INR 3.7 3.3 3.9 4.0 2.6 3.4 4.0 4.9 3.6 2.4 3.3 2.8 2.7 2.9   Notes Dec GLV   Zero GLV call Red x1 call Less GLV   call   Less  Protein drink redx1  self held x1 (misdose)   Dec vit K fall, apap  Call   call          Date 2/8 2/15 2/22 3/1 3/8 3/15 3/22 3/29 4/5 4/12 4/19 4/26 5/3 5/11   Total WeeklyDose 15mg 14mg 14 mg 15 mg 15 mg 15 mg 14mg 13 mg 14 mg 14 mg 14mg 13mg 15mg 14 mg   INR 4.0 4.0 2.8 2.9 2.9  4.2 3.9 3.3 2.9 3.0 3.8 2.4 3.8 2.5   Notes Call; Dec GLV call Call; Mis-dose call   Call; no GLV Inc GLV. Less protein, apap  redx1 call   Less GLV rec'd 4/27, call Dec GLV        Date 5/18 5/25 6/1 6/8 6/15 6/22 6/29 7/6 7/13 7/20 7/22 7/27  8/10  8/17   Total WeeklyDose 15 mg 15mg 16mg 15 mg 15 mg 15 mg 15 mg 15 mg 15mg 14 mg 15 mg 14 mg  14 mg  15 mg   INR 2.6 2.3 2.7 3.2 3.0 2.9 2.6 2.7 3.6 3.0 3.6 3.0  2.4  3.4   Notes   Inc GLV  call call call       call 7/14-- call call call  call  call  call      Date 8/25 8/31 9/7 9/12 9/19 9/26 10/3 10/10 10/17 10/24 10/31   Total WeeklyDose 14mg 13 mg 14 mg 17mg 15 mg 16mg 15mg 14 mg 13mg 17 mg 15mg   INR 4.3 2.8 1.7 2.9 2.1 3.4 3.8 2.4 1.8 3.7 4.5   Notes Dec GLV call Call refused enox; extra protein  Call; no protein drinks Call; less green tea, inc boostx1 Call; cranberries Redx1; call 1x miss Call  Less protein    1/1  Date 11/7 11/14 11/21 11/28 12/5 12/12 12/19 12/27 1/3 1/9 1/16/23 1/23 1/30/23   Total WeeklyDose 13 mg 14 mg 14 mg 14 mg 14 mg 14 mg 14mg 14 mg  13mg 13 mg 14 mg  14 mg 14 mg   INR 3.2 3.3 3.4 3.6 2.5 4.0 2.9 4.1 3.6 2.6 3.3 2.7 3.0   Notes  call redx1 One less protein shake   Inc GLV Decreased GLV W/o meloxicam  Tramadol,  meloxicam Tramadol,  meloxicam Tramadol,  meloxicam meloxicam     Date 2/7/23 2/13 2/20 2/27/23 3/6 3/13/23 3/23 3/27 4/3/23 4/10/23 4/17/23 4/24 5/1   Total WeeklyDose 14 mg 14 mg 14 mg 14 mg  14 mg 14 mg  14 mg 14 mg 15 mg  14mg 13 mg 15 mg 14 mg   INR 2.9 3.0 3.6 2.6 3 3.5 3.4 2.4 2.8 2.5 2.3 3.2  4.1 POCT   Notes Call  call rec 2/21  Call   call Call   call Call  call Missed dose 1x boost Call; missed protein drinks, less GLV     Date 5/2 5/8 5/15 5/22 5/30 6/5 6/19 7/10 7/24 7/31 8/7 8/14 8/21  8/28   Total WeeklyDose 12 mg 13 mg 14 mg 14 mg 14mg 14 mg 14 mg 14 mg 13 mg 13mg  12 mg 12 mg 14 mg 13 mg   INR 3.7 3.3 2.6 2.7 3.4 2.9 4.0 4.1 3.9 4.1 2.8 1.9 4.0  3.2   Notes rec 5/3  Call  Self-heldx1, boostx1    Stopping HM Less  GLV  redx1 Ceftin   Rec'd 8/1 redx1 Inc GLV  Prednisone start Less GLV      Date 9/5 9/18 9/25 10/2 10/06 10/13 10/27 11/1 11/6 11/13 11/17    Total Weekly Dose 13mg 14 mg 12 mg  12 mg 11 mg 12 mg 12 mg 12 mg 11mg 11 mg 12 mg    INR 4.2 4.2 4.3  4.6 2.6 3.0 4.1 4.3 3.7 2.1 2.7    Notes redx1 clindamycin redx1 redx2 Red x2; inc GLV            Phone Interview:  Tablet Strength: 2mg  Patient Contact Info: [phone] (Mobile) *preferred*  [email or web address]  Verbal Release Authorization signed on 4/7/21 -- may speak with Tammy Bhumika (friend: [phone]), Ismael Michele (brother: [phone])  Lab Contact Info: [facility] ([place])  ** will call once monthly or if INR is out of range**   4/7/22 Scr: 0.8    Patient Findings  Positives: Change in medications   Negatives: Signs/symptoms of thrombosis, Signs/symptoms of bleeding, Laboratory test error suspected, Change in health, Change in alcohol use, Change in activity, Upcoming invasive procedure, Emergency department visit, Upcoming dental procedure, Missed doses, Extra doses, Change in diet/appetite, Hospital admission, Bruising, Other complaints   Comments: Previously taken off meloxicam and tramadol at beginning of month, was instructed to take nothing stronger than tylenol extra strength Tylenol. Seen by Dr Blake on Wednesday: metoprolol changed to sotalol. Also diagnosed with thrush and taking nystatin swish/swallow, holding Farxiga x2 weeks per Dr Blake.      Plan:  1. INR is therapeutic today at 2.7 (goal 2.5-3.5). Instructed Ms. Michele to take a dose of warfarin 2 mg oral daily except 1 mg TueFri until recheck. Same 12 mg weekly dose, days adjusted to evenly spread throughout week.  2. Repeat INR ~11/27. Would have patient recheck next week but clinic closed for Thanksgiving. Patient aware response may be delayed given high patient volume due to closure for holiday.  3. Verbal information provided over the phone. Patient RBV dosing  instructions, expresses understanding by teach back, and has no further questions at this time.  4. Lucie Michele understands the importance of calling the [facility] if she notices any s/sx of bleeding, stroke, or abnormal bruising, if any changes are made to her medications or medication doses (Rx, OTC, herbal), or if any upcoming procedures are scheduled. Lucie Michele will likewise let us know if any other changes, questions, or concerns arise regarding anticoagulation therapy. she understands the importance of seeking medical attention immediately if she experiences any falls, vehicle accidents, or abnormal bleeding or bruising. Lucie Michele voiced understanding of this information and confirms that she has the [facility]'s contact information. Otherwise, we will plan to contact the patient once monthly or if her INR is out of range.    Tyrone Hensley, PharmD, BCPS  11/17/2023  08:49 EST

## 2023-11-17 NOTE — PROGRESS NOTES
Capillary Blood Specimen Collection  Capillary blood collection performed in clinic   by Amberly Kuo RN. Patient tolerated the procedure well without complications.   11/17/23   Amberly Kuo RN

## 2023-11-27 ENCOUNTER — ANTICOAGULATION VISIT (OUTPATIENT)
Dept: PHARMACY | Facility: HOSPITAL | Age: 78
End: 2023-11-27
Payer: MEDICARE

## 2023-11-27 ENCOUNTER — CLINICAL SUPPORT (OUTPATIENT)
Dept: CARDIOLOGY | Facility: CLINIC | Age: 78
End: 2023-11-27
Payer: MEDICARE

## 2023-11-27 DIAGNOSIS — I50.32 CHRONIC DIASTOLIC CONGESTIVE HEART FAILURE: ICD-10-CM

## 2023-11-27 DIAGNOSIS — Z95.2 HISTORY OF AORTIC VALVE REPLACEMENT: Primary | ICD-10-CM

## 2023-11-27 DIAGNOSIS — I35.9 AORTIC VALVE DISEASE: ICD-10-CM

## 2023-11-27 LAB
INR PPP: 4.8 (ref 0.9–1.1)
PROTHROMBIN TIME: 4.8 SECONDS

## 2023-11-27 PROCEDURE — 36416 COLLJ CAPILLARY BLOOD SPEC: CPT | Performed by: INTERNAL MEDICINE

## 2023-11-27 NOTE — PROGRESS NOTES
Capillary Blood Specimen Collection  Capillary blood collection performed in clinic by Hubert Crawford MA. Patient tolerated the procedure well without complications.   11/27/23   Hubert Crawford MA

## 2023-11-27 NOTE — PROGRESS NOTES
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     Diet: 3x week: 1 cup of brussels sprouts or broccoli weekly, green beans (1/3/23)  Premier Protein (or Equate brand) meal replacement ~2x/week 1/3/23  Alcohol: Seldom  Tobacco: None  OTC Pain Medication: APAP     INR History:  Date 4/5 4/19 4/26 5/5 5/11 6/2 6/15 6/18 6/25 7/2 7/9 7/19 7/23 7/30   Total Weekly Dose 15mg 15mg 14mg 14mg 14mg 14mg 14mg 14mg 14mg 14mg 14mg 14mg 14mg 14mg   INR 2.6 3.9 3.9 2.7 3.0 3.6 2.7 2.9 2.9 2.6 2.9 3.1 2.5 2.3   Notes           decr GLV   recv'd 6/21; [facility]       incr GLV decr GLV      Date 8/6 8/13 8/20 8/27 9/3 9/10 9/17 9/24 10/1 10/8 10/15 10/22 10/29 11/5   Total WeeklyDose 14mg 15mg 15mg 14mg 14mg 15mg 14mg 14mg 14mg 14mg 14mg 14mg 15mg 16mg   INR 2.4 3.4 3.8 2.9 2.2 3.2 2.9 3.3 3.2 3.3 3.0 2.4 2.4 2.4   Notes decr GLV decr GLV       1xboost         call call 1x boost   incr VitK call 2x boost; call      Date 11/10 11/17 11/24 12/1 12/8 12/15 12/23 12/30 1/3/22 1/7 1/11 1/18 1/25 2/1   Total WeeklyDose 17mg 16mg 16mg 16mg 15mg 15 mg Pt did not call back 15mg 12 mg 13mg 15mg 15 mg 15 mg 15 mg   INR 3.7 3.3 3.9 4.0 2.6 3.4 4.0 4.9 3.6 2.4 3.3 2.8 2.7 2.9   Notes Dec GLV   Zero GLV call Red x1 call Less GLV   call   Less  Protein drink redx1  self held x1 (misdose)   Dec vit K fall, apap  Call   call          Date 2/8 2/15 2/22 3/1 3/8 3/15 3/22 3/29 4/5 4/12 4/19 4/26 5/3 5/11   Total WeeklyDose 15mg 14mg 14 mg 15 mg 15 mg 15 mg 14mg 13 mg 14 mg 14 mg 14mg 13mg 15mg 14 mg   INR 4.0 4.0 2.8 2.9 2.9  4.2 3.9 3.3 2.9 3.0 3.8 2.4 3.8 2.5   Notes Call; Dec GLV call Call; Mis-dose call   Call; no GLV Inc GLV. Less protein, apap  redx1 call   Less GLV rec'd 4/27, call Dec GLV        Date 5/18 5/25 6/1 6/8 6/15 6/22 6/29 7/6 7/13 7/20 7/22 7/27  8/10  8/17   Total WeeklyDose 15 mg 15mg 16mg 15 mg 15 mg 15 mg 15 mg 15 mg 15mg 14 mg 15 mg 14 mg  14 mg  15 mg   INR 2.6 2.3 2.7 3.2 3.0 2.9 2.6 2.7 3.6 3.0 3.6 3.0  2.4  3.4   Notes   Inc GLV  call call call       call 7/14-- call call call  call  call  call      Date 8/25 8/31 9/7 9/12 9/19 9/26 10/3 10/10 10/17 10/24 10/31   Total WeeklyDose 14mg 13 mg 14 mg 17mg 15 mg 16mg 15mg 14 mg 13mg 17 mg 15mg   INR 4.3 2.8 1.7 2.9 2.1 3.4 3.8 2.4 1.8 3.7 4.5   Notes Dec GLV call Call refused enox; extra protein  Call; no protein drinks Call; less green tea, inc boostx1 Call; cranberries Redx1; call 1x miss Call  Less protein    1/1  Date 11/7 11/14 11/21 11/28 12/5 12/12 12/19 12/27 1/3 1/9 1/16/23 1/23 1/30/23   Total WeeklyDose 13 mg 14 mg 14 mg 14 mg 14 mg 14 mg 14mg 14 mg  13mg 13 mg 14 mg  14 mg 14 mg   INR 3.2 3.3 3.4 3.6 2.5 4.0 2.9 4.1 3.6 2.6 3.3 2.7 3.0   Notes  call redx1 One less protein shake   Inc GLV Decreased GLV W/o meloxicam  Tramadol,  meloxicam Tramadol,  meloxicam Tramadol,  meloxicam meloxicam     Date 2/7/23 2/13 2/20 2/27/23 3/6 3/13/23 3/23 3/27 4/3/23 4/10/23 4/17/23 4/24 5/1   Total WeeklyDose 14 mg 14 mg 14 mg 14 mg  14 mg 14 mg  14 mg 14 mg 15 mg  14mg 13 mg 15 mg 14 mg   INR 2.9 3.0 3.6 2.6 3 3.5 3.4 2.4 2.8 2.5 2.3 3.2  4.1 POCT   Notes Call  call rec 2/21  Call   call Call   call Call  call Missed dose 1x boost Call; missed protein drinks, less GLV     Date 5/2 5/8 5/15 5/22 5/30 6/5 6/19 7/10 7/24 7/31 8/7 8/14 8/21  8/28   Total WeeklyDose 12 mg 13 mg 14 mg 14 mg 14mg 14 mg 14 mg 14 mg 13 mg 13mg  12 mg 12 mg 14 mg 13 mg   INR 3.7 3.3 2.6 2.7 3.4 2.9 4.0 4.1 3.9 4.1 2.8 1.9 4.0  3.2   Notes rec 5/3  Call  Self-heldx1, boostx1    Stopping HM Less  GLV  redx1 Ceftin   Rec'd 8/1 redx1 Inc GLV  Prednisone start Less GLV      Date 9/5 9/18 9/25 10/2 10/06 10/13 10/27 11/1 11/6 11/13 11/17 11/27   Total Weekly Dose 13mg 14 mg 12 mg  12 mg 11 mg 12 mg 12 mg 12 mg 11mg 11 mg 12 mg 12mg   INR 4.2 4.2 4.3  4.6 2.6 3.0 4.1 4.3 3.7 2.1 2.7 4.8   Notes redx1 clindamycin redx1 redx2 Red x2; inc GLV            Phone Interview:  Tablet Strength: 2mg  Patient Contact Info: [phone] (Mobile) *preferred*  [email or web address]  Verbal Release Authorization signed on 4/7/21 -- may speak with Tammy Bhumika (friend: [phone]), Ismael Michele (brother: [phone])  Lab Contact Info: [facility] ([facility])  ** will call once monthly or if INR is out of range**   4/7/22 Scr: 0.8    Patient Findings  Negatives: Signs/symptoms of thrombosis, Signs/symptoms of bleeding, Laboratory test error suspected, Change in health, Change in alcohol use, Change in activity, Upcoming invasive procedure, Emergency department visit, Upcoming dental procedure, Missed doses, Extra doses, Change in medications, Change in diet/appetite, Hospital admission, Bruising, Other complaints   Comments: Patient report she had some cranberry. Patient did not drink her protein drinks last week. She did eat green beans and one serving of broccoli. She reports that she ate more GLV than normal.      Plan:  1. INR is SUPRAtherapeutic today at 4.8 (goal 2.5-3.5). Instructed Ms. Michele to eat an extra serving of GLV (green beans) today and tomorrow and decrease dose to 2mg daily except 1mg MWF.   2. Repeat INR 12/4.   3. Verbal information provided over the phone. Patient RBV dosing instructions, expresses understanding by teach back, and has no further questions at this time.  4. Lucie Michele understands the importance of calling the [facility] if she notices any s/sx of bleeding, stroke, or abnormal bruising, if any changes are made to her medications or medication doses (Rx, OTC,  herbal), or if any upcoming procedures are scheduled. Lucie Michele will likewise let us know if any other changes, questions, or concerns arise regarding anticoagulation therapy. she understands the importance of seeking medical attention immediately if she experiences any falls, vehicle accidents, or abnormal bleeding or bruising. Lucie Michele voiced understanding of this information and confirms that she has the [facility]'s contact information. Otherwise, we will plan to contact the patient once monthly or if her INR is out of range.    Ngoc Brooks, PharmD  11/27/2023  14:53 EST

## 2023-12-01 ENCOUNTER — ANTICOAGULATION VISIT (OUTPATIENT)
Dept: PHARMACY | Facility: HOSPITAL | Age: 78
End: 2023-12-01
Payer: MEDICARE

## 2023-12-01 ENCOUNTER — OFFICE VISIT (OUTPATIENT)
Dept: FAMILY MEDICINE CLINIC | Facility: CLINIC | Age: 78
End: 2023-12-01
Payer: MEDICARE

## 2023-12-01 ENCOUNTER — OFFICE VISIT (OUTPATIENT)
Dept: CARDIOLOGY | Facility: CLINIC | Age: 78
End: 2023-12-01
Payer: MEDICARE

## 2023-12-01 VITALS
WEIGHT: 171.4 LBS | HEIGHT: 65 IN | BODY MASS INDEX: 28.56 KG/M2 | RESPIRATION RATE: 16 BRPM | DIASTOLIC BLOOD PRESSURE: 80 MMHG | HEART RATE: 68 BPM | SYSTOLIC BLOOD PRESSURE: 142 MMHG | OXYGEN SATURATION: 98 %

## 2023-12-01 VITALS
RESPIRATION RATE: 18 BRPM | HEART RATE: 68 BPM | OXYGEN SATURATION: 99 % | SYSTOLIC BLOOD PRESSURE: 104 MMHG | BODY MASS INDEX: 28.16 KG/M2 | WEIGHT: 169 LBS | HEIGHT: 65 IN | DIASTOLIC BLOOD PRESSURE: 80 MMHG

## 2023-12-01 DIAGNOSIS — M15.9 PRIMARY OSTEOARTHRITIS INVOLVING MULTIPLE JOINTS: ICD-10-CM

## 2023-12-01 DIAGNOSIS — I35.9 AORTIC VALVE DISEASE: Primary | ICD-10-CM

## 2023-12-01 DIAGNOSIS — B37.0 THRUSH: Primary | ICD-10-CM

## 2023-12-01 DIAGNOSIS — I10 HYPERTENSION, ESSENTIAL: ICD-10-CM

## 2023-12-01 DIAGNOSIS — I48.92 PAROXYSMAL ATRIAL FLUTTER: ICD-10-CM

## 2023-12-01 DIAGNOSIS — I50.32 CHRONIC DIASTOLIC CONGESTIVE HEART FAILURE: ICD-10-CM

## 2023-12-01 DIAGNOSIS — Z86.79 S/P ASCENDING AORTIC ANEURYSM REPAIR: ICD-10-CM

## 2023-12-01 DIAGNOSIS — Z98.890 S/P ASCENDING AORTIC ANEURYSM REPAIR: ICD-10-CM

## 2023-12-01 DIAGNOSIS — Z95.2 HISTORY OF AORTIC VALVE REPLACEMENT: ICD-10-CM

## 2023-12-01 LAB — INR PPP: 2.4 (ref 0.9–1.1)

## 2023-12-01 PROCEDURE — 99214 OFFICE O/P EST MOD 30 MIN: CPT | Performed by: FAMILY MEDICINE

## 2023-12-01 PROCEDURE — 3077F SYST BP >= 140 MM HG: CPT | Performed by: FAMILY MEDICINE

## 2023-12-01 PROCEDURE — 3079F DIAST BP 80-89 MM HG: CPT | Performed by: FAMILY MEDICINE

## 2023-12-01 RX ORDER — HYDROCODONE BITARTRATE AND ACETAMINOPHEN 5; 325 MG/1; MG/1
1 TABLET ORAL EVERY 6 HOURS PRN
Qty: 60 TABLET | Refills: 0 | Status: CANCELLED | OUTPATIENT
Start: 2023-12-01
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
## 2023-12-01 NOTE — ASSESSMENT & PLAN NOTE
Patient was taken off her tramadol and her meloxicam because she was having trouble controlling her INR.  She does not want any narcotics.  She is to take Tylenol for pain and if this does not help we may have to go to hydrocodone.

## 2023-12-01 NOTE — ASSESSMENT & PLAN NOTE
Patient has had thrush for the last 3 weeks.  I think is due to Breztri.  She is on take her Breztri every other day.  I Vanna refill her nystatin.  Come back in 3 weeks for recheck
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
## 2023-12-01 NOTE — PROGRESS NOTES
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     Diet: 3x week: 1 cup of brussels sprouts or broccoli weekly, green beans (1/3/23)  Premier Protein (or Equate brand) meal replacement ~2x/week 1/3/23  Alcohol: Seldom  Tobacco: None  OTC Pain Medication: APAP     INR History:  Date 4/5 4/19 4/26 5/5 5/11 6/2 6/15 6/18 6/25 7/2 7/9 7/19 7/23 7/30   Total Weekly Dose 15mg 15mg 14mg 14mg 14mg 14mg 14mg 14mg 14mg 14mg 14mg 14mg 14mg 14mg   INR 2.6 3.9 3.9 2.7 3.0 3.6 2.7 2.9 2.9 2.6 2.9 3.1 2.5 2.3   Notes           decr GLV   recv'd 6/21; [facility]       incr GLV decr GLV      Date 8/6 8/13 8/20 8/27 9/3 9/10 9/17 9/24 10/1 10/8 10/15 10/22 10/29 11/5   Total WeeklyDose 14mg 15mg 15mg 14mg 14mg 15mg 14mg 14mg 14mg 14mg 14mg 14mg 15mg 16mg   INR 2.4 3.4 3.8 2.9 2.2 3.2 2.9 3.3 3.2 3.3 3.0 2.4 2.4 2.4   Notes decr GLV decr GLV       1xboost         call call 1x boost   incr VitK call 2x boost; call      Date 11/10 11/17 11/24 12/1 12/8 12/15 12/23 12/30 1/3/22 1/7 1/11 1/18 1/25 2/1   Total WeeklyDose 17mg 16mg 16mg 16mg 15mg 15 mg Pt did not call back 15mg 12 mg 13mg 15mg 15 mg 15 mg 15 mg   INR 3.7 3.3 3.9 4.0 2.6 3.4 4.0 4.9 3.6 2.4 3.3 2.8 2.7 2.9   Notes Dec GLV   Zero GLV call Red x1 call Less GLV   call   Less  Protein drink redx1  self held x1 (misdose)   Dec vit K fall, apap  Call   call          Date 2/8 2/15 2/22 3/1 3/8 3/15 3/22 3/29 4/5 4/12 4/19 4/26 5/3 5/11   Total WeeklyDose 15mg 14mg 14 mg 15 mg 15 mg 15 mg 14mg 13 mg 14 mg 14 mg 14mg 13mg 15mg 14 mg   INR 4.0 4.0 2.8 2.9 2.9  4.2 3.9 3.3 2.9 3.0 3.8 2.4 3.8 2.5   Notes Call; Dec GLV call Call; Mis-dose call   Call; no GLV Inc GLV. Less protein, apap  redx1 call   Less GLV rec'd 4/27, call Dec GLV        Date 5/18 5/25 6/1 6/8 6/15 6/22 6/29 7/6 7/13 7/20 7/22 7/27  8/10  8/17   Total WeeklyDose 15 mg 15mg 16mg 15 mg 15 mg 15 mg 15 mg 15 mg 15mg 14 mg 15 mg 14 mg  14 mg  15 mg   INR 2.6 2.3 2.7 3.2 3.0 2.9 2.6 2.7 3.6 3.0 3.6 3.0  2.4  3.4   Notes   Inc GLV  call call call       call 7/14-- call call call  call  call  call      Date 8/25 8/31 9/7 9/12 9/19 9/26 10/3 10/10 10/17 10/24 10/31   Total WeeklyDose 14mg 13 mg 14 mg 17mg 15 mg 16mg 15mg 14 mg 13mg 17 mg 15mg   INR 4.3 2.8 1.7 2.9 2.1 3.4 3.8 2.4 1.8 3.7 4.5   Notes Dec GLV call Call refused enox; extra protein  Call; no protein drinks Call; less green tea, inc boostx1 Call; cranberries Redx1; call 1x miss Call  Less protein    1/1  Date 11/7 11/14 11/21 11/28 12/5 12/12 12/19 12/27 1/3 1/9 1/16/23 1/23 1/30/23   Total WeeklyDose 13 mg 14 mg 14 mg 14 mg 14 mg 14 mg 14mg 14 mg  13mg 13 mg 14 mg  14 mg 14 mg   INR 3.2 3.3 3.4 3.6 2.5 4.0 2.9 4.1 3.6 2.6 3.3 2.7 3.0   Notes  call redx1 One less protein shake   Inc GLV Decreased GLV W/o meloxicam  Tramadol,  meloxicam Tramadol,  meloxicam Tramadol,  meloxicam meloxicam     Date 2/7/23 2/13 2/20 2/27/23 3/6 3/13/23 3/23 3/27 4/3/23 4/10/23 4/17/23 4/24 5/1   Total WeeklyDose 14 mg 14 mg 14 mg 14 mg  14 mg 14 mg  14 mg 14 mg 15 mg  14mg 13 mg 15 mg 14 mg   INR 2.9 3.0 3.6 2.6 3 3.5 3.4 2.4 2.8 2.5 2.3 3.2  4.1 POCT   Notes Call  call rec 2/21  Call   call Call   call Call  call Missed dose 1x boost Call; missed protein drinks, less GLV     Date 5/2 5/8 5/15 5/22 5/30 6/5 6/19 7/10 7/24 7/31 8/7 8/14 8/21  8/28   Total WeeklyDose 12 mg 13 mg 14 mg 14 mg 14mg 14 mg 14 mg 14 mg 13 mg 13mg  12 mg 12 mg 14 mg 13 mg   INR 3.7 3.3 2.6 2.7 3.4 2.9 4.0 4.1 3.9 4.1 2.8 1.9 4.0  3.2   Notes rec 5/3  Call  Self-heldx1, boostx1    Stopping HM Less  GLV  redx1 Ceftin   Rec'd 8/1 redx1 Inc GLV  Prednisone start Less GLV      Date 9/5 9/18 9/25 10/2 10/06 10/13 10/27 11/1 11/6 11/13 11/17 11/27   Total Weekly Dose 13mg 14 mg 12 mg  12 mg 11 mg 12 mg 12 mg 12 mg 11mg 11 mg 12 mg 12mg   INR 4.2 4.2 4.3  4.6 2.6 3.0 4.1 4.3 3.7 2.1 2.7 4.8   Notes redx1 clindamycin redx1 redx2 Red x2; inc GLV            Date 12/01              Total Weekly Dose 11 mg              INR 2.4              Notes                     Phone Interview:  Tablet Strength: 2mg  Patient Contact Info: [phone] (Mobile) *preferred*  [email or web address]  Verbal Release Authorization signed on 4/7/21 -- may speak with Tammy Bhumika (friend: [phone]), Ismael Michele (brother: [phone])  Lab Contact Info: [facility] ([facility])  ** will call once monthly or if INR is out of range**   4/7/22 Scr: 0.8    Patient Findings  Negatives: Signs/symptoms of thrombosis, Signs/symptoms of bleeding, Laboratory test error suspected, Change in health, Change in alcohol use, Change in activity, Upcoming invasive procedure, Emergency department visit, Upcoming dental procedure, Missed doses, Extra doses, Change in medications, Change in diet/appetite, Hospital admission, Bruising, Other complaints   Comments: Patient report she had some cranberry. Patient did not drink her protein drinks last week. She did eat green beans and one serving of broccoli. She reports that she ate more GLV than normal.      Plan:  1. INR is SUBtherapeutic today at 2.4 (goal 2.5-3.5). Instructed Ms. Michele to BOOST todday's dose to 2 mg, then continue 2mg daily except 1mg MWF.   2. Repeat INR 12/8.   3. Verbal information provided over the phone. Patient RBV dosing instructions, expresses understanding by teach back, and has no further questions at this time.  4. Lucie Michele understands the importance of calling the [facility] if she notices any s/sx of bleeding, stroke, or abnormal bruising, if any  changes are made to her medications or medication doses (Rx, OTC, herbal), or if any upcoming procedures are scheduled. Lucie Michele will likewise let us know if any other changes, questions, or concerns arise regarding anticoagulation therapy. she understands the importance of seeking medical attention immediately if she experiences any falls, vehicle accidents, or abnormal bleeding or bruising. Lucie Michele voiced understanding of this information and confirms that she has the [facility]'s contact information. Otherwise, we will plan to contact the patient once monthly or if her INR is out of range.    Edward Amaro  Pharmacy Intern  12/1/2023 14:58 EST

## 2023-12-02 LAB
ALBUMIN SERPL-MCNC: 4.3 G/DL (ref 3.8–4.8)
ALBUMIN/GLOB SERPL: 1.8 {RATIO} (ref 1.2–2.2)
ALP SERPL-CCNC: 100 IU/L (ref 44–121)
ALT SERPL-CCNC: 35 IU/L (ref 0–32)
AST SERPL-CCNC: 41 IU/L (ref 0–40)
BASOPHILS # BLD AUTO: 0.1 X10E3/UL (ref 0–0.2)
BASOPHILS NFR BLD AUTO: 1 %
BILIRUB SERPL-MCNC: 0.7 MG/DL (ref 0–1.2)
BUN SERPL-MCNC: 13 MG/DL (ref 8–27)
BUN/CREAT SERPL: 18 (ref 12–28)
CALCIUM SERPL-MCNC: 9.6 MG/DL (ref 8.7–10.3)
CHLORIDE SERPL-SCNC: 99 MMOL/L (ref 96–106)
CO2 SERPL-SCNC: 29 MMOL/L (ref 20–29)
CREAT SERPL-MCNC: 0.71 MG/DL (ref 0.57–1)
EGFRCR SERPLBLD CKD-EPI 2021: 88 ML/MIN/1.73
EOSINOPHIL # BLD AUTO: 0.1 X10E3/UL (ref 0–0.4)
EOSINOPHIL NFR BLD AUTO: 1 %
ERYTHROCYTE [DISTWIDTH] IN BLOOD BY AUTOMATED COUNT: 15 % (ref 11.7–15.4)
GLOBULIN SER CALC-MCNC: 2.4 G/DL (ref 1.5–4.5)
GLUCOSE SERPL-MCNC: 96 MG/DL (ref 70–99)
HCT VFR BLD AUTO: 41.8 % (ref 34–46.6)
HGB BLD-MCNC: 13.7 G/DL (ref 11.1–15.9)
IMM GRANULOCYTES # BLD AUTO: 0.1 X10E3/UL (ref 0–0.1)
IMM GRANULOCYTES NFR BLD AUTO: 1 %
LYMPHOCYTES # BLD AUTO: 1.8 X10E3/UL (ref 0.7–3.1)
LYMPHOCYTES NFR BLD AUTO: 20 %
MCH RBC QN AUTO: 29.5 PG (ref 26.6–33)
MCHC RBC AUTO-ENTMCNC: 32.8 G/DL (ref 31.5–35.7)
MCV RBC AUTO: 90 FL (ref 79–97)
MONOCYTES # BLD AUTO: 1 X10E3/UL (ref 0.1–0.9)
MONOCYTES NFR BLD AUTO: 11 %
NEUTROPHILS # BLD AUTO: 5.9 X10E3/UL (ref 1.4–7)
NEUTROPHILS NFR BLD AUTO: 66 %
PLATELET # BLD AUTO: 270 X10E3/UL (ref 150–450)
POTASSIUM SERPL-SCNC: 3.4 MMOL/L (ref 3.5–5.2)
PROT SERPL-MCNC: 6.7 G/DL (ref 6–8.5)
RBC # BLD AUTO: 4.64 X10E6/UL (ref 3.77–5.28)
SODIUM SERPL-SCNC: 143 MMOL/L (ref 134–144)
WBC # BLD AUTO: 9 X10E3/UL (ref 3.4–10.8)

## 2023-12-11 ENCOUNTER — ANTICOAGULATION VISIT (OUTPATIENT)
Dept: PHARMACY | Facility: HOSPITAL | Age: 78
End: 2023-12-11
Payer: MEDICARE

## 2023-12-11 ENCOUNTER — CLINICAL SUPPORT (OUTPATIENT)
Dept: CARDIOLOGY | Facility: CLINIC | Age: 78
End: 2023-12-11
Payer: MEDICARE

## 2023-12-11 DIAGNOSIS — I35.9 AORTIC VALVE DISEASE: Primary | ICD-10-CM

## 2023-12-11 LAB — INR PPP: 3 (ref 0.9–1.1)

## 2023-12-11 PROCEDURE — 36416 COLLJ CAPILLARY BLOOD SPEC: CPT | Performed by: INTERNAL MEDICINE

## 2023-12-11 NOTE — PROGRESS NOTES
Capillary Blood Specimen Collection  Capillary blood collection performed in clinic by Ema Hamilton MA. Patient tolerated the procedure well without complications.   12/11/23   Ema Hamilton MA

## 2023-12-11 NOTE — PROGRESS NOTES
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     Diet: 3x week: 1 cup of brussels sprouts or broccoli weekly, green beans (1/3/23)  Premier Protein (or Equate brand) meal replacement ~2x/week 1/3/23  Alcohol: Seldom  Tobacco: None  OTC Pain Medication: APAP     INR History:  Date 4/5 4/19 4/26 5/5 5/11 6/2 6/15 6/18 6/25 7/2 7/9 7/19 7/23 7/30   Total Weekly Dose 15mg 15mg 14mg 14mg 14mg 14mg 14mg 14mg 14mg 14mg 14mg 14mg 14mg 14mg   INR 2.6 3.9 3.9 2.7 3.0 3.6 2.7 2.9 2.9 2.6 2.9 3.1 2.5 2.3   Notes           decr GLV   recv'd 6/21; [facility]       incr GLV decr GLV      Date 8/6 8/13 8/20 8/27 9/3 9/10 9/17 9/24 10/1 10/8 10/15 10/22 10/29 11/5   Total WeeklyDose 14mg 15mg 15mg 14mg 14mg 15mg 14mg 14mg 14mg 14mg 14mg 14mg 15mg 16mg   INR 2.4 3.4 3.8 2.9 2.2 3.2 2.9 3.3 3.2 3.3 3.0 2.4 2.4 2.4   Notes decr GLV decr GLV       1xboost         call call 1x boost   incr VitK call 2x boost; call      Date 11/10 11/17 11/24 12/1 12/8 12/15 12/23 12/30 1/3/22 1/7 1/11 1/18 1/25 2/1   Total WeeklyDose 17mg 16mg 16mg 16mg 15mg 15 mg Pt did not call back 15mg 12 mg 13mg 15mg 15 mg 15 mg 15 mg   INR 3.7 3.3 3.9 4.0 2.6 3.4 4.0 4.9 3.6 2.4 3.3 2.8 2.7 2.9   Notes Dec GLV   Zero GLV call Red x1 call Less GLV   call   Less  Protein drink redx1  self held x1 (misdose)   Dec vit K fall, apap  Call   call          Date 2/8 2/15 2/22 3/1 3/8 3/15 3/22 3/29 4/5 4/12 4/19 4/26 5/3 5/11   Total WeeklyDose 15mg 14mg 14 mg 15 mg 15 mg 15 mg 14mg 13 mg 14 mg 14 mg 14mg 13mg 15mg 14 mg   INR 4.0 4.0 2.8 2.9 2.9  4.2 3.9 3.3 2.9 3.0 3.8 2.4 3.8 2.5   Notes Call; Dec GLV call Call; Mis-dose call   Call; no GLV Inc GLV. Less protein, apap  redx1 call   Less GLV rec'd 4/27, call Dec GLV        Date 5/18 5/25 6/1 6/8 6/15 6/22 6/29 7/6 7/13 7/20 7/22 7/27  8/10  8/17   Total WeeklyDose 15 mg 15mg 16mg 15 mg 15 mg 15 mg 15 mg 15 mg 15mg 14 mg 15 mg 14 mg  14 mg  15 mg   INR 2.6 2.3 2.7 3.2 3.0 2.9 2.6 2.7 3.6 3.0 3.6 3.0  2.4  3.4   Notes   Inc GLV  call call call       call 7/14-- call call call  call  call  call      Date 8/25 8/31 9/7 9/12 9/19 9/26 10/3 10/10 10/17 10/24 10/31   Total WeeklyDose 14mg 13 mg 14 mg 17mg 15 mg 16mg 15mg 14 mg 13mg 17 mg 15mg   INR 4.3 2.8 1.7 2.9 2.1 3.4 3.8 2.4 1.8 3.7 4.5   Notes Dec GLV call Call refused enox; extra protein  Call; no protein drinks Call; less green tea, inc boostx1 Call; cranberries Redx1; call 1x miss Call  Less protein    1/1  Date 11/7 11/14 11/21 11/28 12/5 12/12 12/19 12/27 1/3 1/9 1/16/23 1/23 1/30/23   Total WeeklyDose 13 mg 14 mg 14 mg 14 mg 14 mg 14 mg 14mg 14 mg  13mg 13 mg 14 mg  14 mg 14 mg   INR 3.2 3.3 3.4 3.6 2.5 4.0 2.9 4.1 3.6 2.6 3.3 2.7 3.0   Notes  call redx1 One less protein shake   Inc GLV Decreased GLV W/o meloxicam  Tramadol,  meloxicam Tramadol,  meloxicam Tramadol,  meloxicam meloxicam     Date 2/7/23 2/13 2/20 2/27/23 3/6 3/13/23 3/23 3/27 4/3/23 4/10/23 4/17/23 4/24 5/1   Total WeeklyDose 14 mg 14 mg 14 mg 14 mg  14 mg 14 mg  14 mg 14 mg 15 mg  14mg 13 mg 15 mg 14 mg   INR 2.9 3.0 3.6 2.6 3 3.5 3.4 2.4 2.8 2.5 2.3 3.2  4.1 POCT   Notes Call  call rec 2/21  Call   call Call   call Call  call Missed dose 1x boost Call; missed protein drinks, less GLV     Date 5/2 5/8 5/15 5/22 5/30 6/5 6/19 7/10 7/24 7/31 8/7 8/14 8/21  8/28   Total WeeklyDose 12 mg 13 mg 14 mg 14 mg 14mg 14 mg 14 mg 14 mg 13 mg 13mg  12 mg 12 mg 14 mg 13 mg   INR 3.7 3.3 2.6 2.7 3.4 2.9 4.0 4.1 3.9 4.1 2.8 1.9 4.0  3.2   Notes rec 5/3  Call  Self-heldx1, boostx1    Stopping HM Less  GLV  redx1 Ceftin   Rec'd 8/1 redx1 Inc GLV  Prednisone start Less GLV      Date 9/5 9/18 9/25 10/2 10/06 10/13 10/27 11/1 11/6 11/13 11/17 11/27   Total Weekly Dose 13mg 14 mg 12 mg  12 mg 11 mg 12 mg 12 mg 12 mg 11mg 11 mg 12 mg 12mg   INR 4.2 4.2 4.3  4.6 2.6 3.0 4.1 4.3 3.7 2.1 2.7 4.8   Notes redx1 clindamycin redx1 redx2 Red x2; inc GLV            Date 12/01 12/11             Total Weekly Dose 11 mg 11 mg             INR 2.4 3.0             Notes                 Phone Interview:  Tablet Strength: 2mg  Patient Contact Info: [phone] (Mobile) *preferred*  [email or web address]  Verbal Release Authorization signed on 4/7/21 -- may speak with Tammy Bhumika (friend: [phone]), Ismael Michele (brother: [phone])  Lab Contact Info: [facility] ([facility])  ** will call once monthly or if INR is out of range**   4/7/22 Scr: 0.8    Patient Findings    Negatives: Signs/symptoms of thrombosis, Signs/symptoms of bleeding, Laboratory test error suspected, Change in health, Change in alcohol use, Change in activity, Upcoming invasive procedure, Emergency department visit, Upcoming dental procedure, Missed doses, Extra doses, Change in medications, Change in diet/appetite, Hospital admission, Bruising, Other complaints   Comments: Reports her thrush has resolved        Plan:  1. INR is therapeutic today at 3.0 (goal 2.5-3.5). Instructed Ms. Michele to  continue warfarin 2mg daily except 1mg MWF.   2. Repeat INR in ~2 weeks, aware clinic closed 12/25  3. Verbal information provided over the phone. Patient RBV dosing instructions, expresses understanding by teach back, and has no further questions at this time.  4. Lucie Michele understands the importance of calling the [facility] if she notices any s/sx of bleeding, stroke, or abnormal bruising, if any changes are made to her medications or medication doses (Rx, OTC, herbal), or if any upcoming procedures are scheduled. Lucie Michele will  likewise let us know if any other changes, questions, or concerns arise regarding anticoagulation therapy. she understands the importance of seeking medical attention immediately if she experiences any falls, vehicle accidents, or abnormal bleeding or bruising. Lucie Michele voiced understanding of this information and confirms that she has the [facility]'s contact information. Otherwise, we will plan to contact the patient once monthly or if her INR is out of range.    Lizet Marinelli, JenniD.  12/11/23   14:20 EST

## 2023-12-15 ENCOUNTER — CLINICAL SUPPORT (OUTPATIENT)
Dept: CARDIOLOGY | Facility: CLINIC | Age: 78
End: 2023-12-15
Payer: MEDICARE

## 2023-12-15 ENCOUNTER — ANTICOAGULATION VISIT (OUTPATIENT)
Dept: PHARMACY | Facility: HOSPITAL | Age: 78
End: 2023-12-15
Payer: MEDICARE

## 2023-12-15 DIAGNOSIS — I35.9 AORTIC VALVE DISEASE: Primary | ICD-10-CM

## 2023-12-15 LAB — INR PPP: 2.7 (ref 0.9–1.1)

## 2023-12-15 NOTE — PROGRESS NOTES
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     Diet: 3x week: 1 cup of brussels sprouts or broccoli weekly, green beans (1/3/23)  Premier Protein (or Equate brand) meal replacement ~2x/week 1/3/23  Alcohol: Seldom  Tobacco: None  OTC Pain Medication: APAP     INR History:  Date 4/5 4/19 4/26 5/5 5/11 6/2 6/15 6/18 6/25 7/2 7/9 7/19 7/23 7/30   Total Weekly Dose 15mg 15mg 14mg 14mg 14mg 14mg 14mg 14mg 14mg 14mg 14mg 14mg 14mg 14mg   INR 2.6 3.9 3.9 2.7 3.0 3.6 2.7 2.9 2.9 2.6 2.9 3.1 2.5 2.3   Notes           decr GLV   recv'd 6/21; [facility]       incr GLV decr GLV      Date 8/6 8/13 8/20 8/27 9/3 9/10 9/17 9/24 10/1 10/8 10/15 10/22 10/29 11/5   Total WeeklyDose 14mg 15mg 15mg 14mg 14mg 15mg 14mg 14mg 14mg 14mg 14mg 14mg 15mg 16mg   INR 2.4 3.4 3.8 2.9 2.2 3.2 2.9 3.3 3.2 3.3 3.0 2.4 2.4 2.4   Notes decr GLV decr GLV       1xboost         call call 1x boost   incr VitK call 2x boost; call      Date 11/10 11/17 11/24 12/1 12/8 12/15 12/23 12/30 1/3/22 1/7 1/11 1/18 1/25 2/1   Total WeeklyDose 17mg 16mg 16mg 16mg 15mg 15 mg Pt did not call back 15mg 12 mg 13mg 15mg 15 mg 15 mg 15 mg   INR 3.7 3.3 3.9 4.0 2.6 3.4 4.0 4.9 3.6 2.4 3.3 2.8 2.7 2.9   Notes Dec GLV   Zero GLV call Red x1 call Less GLV   call   Less  Protein drink redx1  self held x1 (misdose)   Dec vit K fall, apap  Call   call          Date 2/8 2/15 2/22 3/1 3/8 3/15 3/22 3/29 4/5 4/12 4/19 4/26 5/3 5/11   Total WeeklyDose 15mg 14mg 14 mg 15 mg 15 mg 15 mg 14mg 13 mg 14 mg 14 mg 14mg 13mg 15mg 14 mg   INR 4.0 4.0 2.8 2.9 2.9  4.2 3.9 3.3 2.9 3.0 3.8 2.4 3.8 2.5   Notes Call; Dec GLV call Call; Mis-dose call   Call; no GLV Inc GLV. Less protein, apap  redx1 call   Less GLV rec'd 4/27, call Dec GLV        Date 5/18 5/25 6/1 6/8 6/15 6/22 6/29 7/6 7/13 7/20 7/22 7/27  8/10  8/17   Total WeeklyDose 15 mg 15mg 16mg 15 mg 15 mg 15 mg 15 mg 15 mg 15mg 14 mg 15 mg 14 mg  14 mg  15 mg   INR 2.6 2.3 2.7 3.2 3.0 2.9 2.6 2.7 3.6 3.0 3.6 3.0  2.4  3.4   Notes   Inc GLV  call call call       call 7/14-- call call call  call  call  call      Date 8/25 8/31 9/7 9/12 9/19 9/26 10/3 10/10 10/17 10/24 10/31   Total WeeklyDose 14mg 13 mg 14 mg 17mg 15 mg 16mg 15mg 14 mg 13mg 17 mg 15mg   INR 4.3 2.8 1.7 2.9 2.1 3.4 3.8 2.4 1.8 3.7 4.5   Notes Dec GLV call Call refused enox; extra protein  Call; no protein drinks Call; less green tea, inc boostx1 Call; cranberries Redx1; call 1x miss Call  Less protein    1/1  Date 11/7 11/14 11/21 11/28 12/5 12/12 12/19 12/27 1/3 1/9 1/16/23 1/23 1/30/23   Total WeeklyDose 13 mg 14 mg 14 mg 14 mg 14 mg 14 mg 14mg 14 mg  13mg 13 mg 14 mg  14 mg 14 mg   INR 3.2 3.3 3.4 3.6 2.5 4.0 2.9 4.1 3.6 2.6 3.3 2.7 3.0   Notes  call redx1 One less protein shake   Inc GLV Decreased GLV W/o meloxicam  Tramadol,  meloxicam Tramadol,  meloxicam Tramadol,  meloxicam meloxicam     Date 2/7/23 2/13 2/20 2/27/23 3/6 3/13/23 3/23 3/27 4/3/23 4/10/23 4/17/23 4/24 5/1   Total WeeklyDose 14 mg 14 mg 14 mg 14 mg  14 mg 14 mg  14 mg 14 mg 15 mg  14mg 13 mg 15 mg 14 mg   INR 2.9 3.0 3.6 2.6 3 3.5 3.4 2.4 2.8 2.5 2.3 3.2  4.1 POCT   Notes Call  call rec 2/21  Call   call Call   call Call  call Missed dose 1x boost Call; missed protein drinks, less GLV     Date 5/2 5/8 5/15 5/22 5/30 6/5 6/19 7/10 7/24 7/31 8/7 8/14 8/21  8/28   Total WeeklyDose 12 mg 13 mg 14 mg 14 mg 14mg 14 mg 14 mg 14 mg 13 mg 13mg  12 mg 12 mg 14 mg 13 mg   INR 3.7 3.3 2.6 2.7 3.4 2.9 4.0 4.1 3.9 4.1 2.8 1.9 4.0  3.2   Notes rec 5/3  Call  Self-heldx1, boostx1    Stopping HM Less  GLV  redx1 Ceftin   Rec'd 8/1 redx1 Inc GLV  Prednisone start Less GLV      Date 9/5 9/18 9/25 10/2 10/06 10/13 10/27 11/1 11/6 11/13 11/17 11/27   Total Weekly Dose 13mg 14 mg 12 mg  12 mg 11 mg 12 mg 12 mg 12 mg 11mg 11 mg 12 mg 12mg   INR 4.2 4.2 4.3  4.6 2.6 3.0 4.1 4.3 3.7 2.1 2.7 4.8   Notes redx1 clindamycin redx1 redx2 Red x2; inc GLV            Date 12/01 12/11 12/15            Total Weekly Dose 11 mg 11 mg 11 mg            INR 2.4 3.0 2.7            Notes  call               Phone Interview:  Tablet Strength: 2mg  Patient Contact Info: [phone] (Mobile) *preferred*  [email or web address]  Verbal Release Authorization signed on 4/7/21 -- may speak with Tammy Bai (friend: [phone]), Ismael Michele (brother: [phone])  Lab Contact Info: [facility] ([facility])  ** will call once monthly or if INR is out of range**   4/7/22 Scr: 0.8    Patient Findings  Comments: Patient not contacted during this encounter.      Plan:  1. INR is therapeutic today at 2.7 (goal 2.5-3.5). Instructed Ms. Michele to  continue warfarin 2mg daily except 1mg MWF.   2. Repeat INR in ~2 weeks, 12/29  3. Verbal information provided over the phone. Patient RBV dosing instructions, expresses understanding by teach back, and has no further questions at this time.  4. Lucie Michele understands the importance of calling the [facility] if she notices any s/sx of bleeding, stroke, or abnormal bruising, if any changes are made to her medications or medication doses (Rx, OTC, herbal), or if any upcoming procedures are scheduled. Lucie Michele will likewise let us know if any other changes, questions, or concerns arise regarding anticoagulation therapy. she understands the importance of seeking medical attention immediately if she experiences any falls, vehicle accidents, or abnormal bleeding or bruising. Lucie Michele voiced understanding of this information and confirms that she has the [facility]'s contact information. Otherwise, we will plan to contact the patient once monthly or if her INR is out of range.    Edward Amaro  Pharmacy Intern  12/15/23 10:09 Ngoc COLEY, PharmD, have reviewed the note in full and agree with the assessment and plan.  12/15/23  11:34 EST

## 2023-12-15 NOTE — PROGRESS NOTES
Capillary Blood Specimen Collection  Capillary blood collection performed in clinic by Amberly Kuo RN. Patient tolerated the procedure well without complications.   12/15/23   Amberly Kuo RN

## 2023-12-22 ENCOUNTER — CLINICAL SUPPORT (OUTPATIENT)
Dept: CARDIOLOGY | Facility: CLINIC | Age: 78
End: 2023-12-22
Payer: MEDICARE

## 2023-12-22 ENCOUNTER — ANTICOAGULATION VISIT (OUTPATIENT)
Dept: PHARMACY | Facility: HOSPITAL | Age: 78
End: 2023-12-22
Payer: MEDICARE

## 2023-12-22 ENCOUNTER — OFFICE VISIT (OUTPATIENT)
Dept: FAMILY MEDICINE CLINIC | Facility: CLINIC | Age: 78
End: 2023-12-22
Payer: MEDICARE

## 2023-12-22 VITALS
RESPIRATION RATE: 16 BRPM | DIASTOLIC BLOOD PRESSURE: 84 MMHG | WEIGHT: 169.8 LBS | SYSTOLIC BLOOD PRESSURE: 122 MMHG | HEIGHT: 65 IN | BODY MASS INDEX: 28.29 KG/M2 | OXYGEN SATURATION: 98 % | HEART RATE: 68 BPM

## 2023-12-22 DIAGNOSIS — B37.0 THRUSH: ICD-10-CM

## 2023-12-22 DIAGNOSIS — M25.552 LEFT HIP PAIN: ICD-10-CM

## 2023-12-22 DIAGNOSIS — I35.9 AORTIC VALVE DISEASE: Primary | ICD-10-CM

## 2023-12-22 DIAGNOSIS — Z95.2 HISTORY OF AORTIC VALVE REPLACEMENT: ICD-10-CM

## 2023-12-22 DIAGNOSIS — I10 HYPERTENSION, ESSENTIAL: ICD-10-CM

## 2023-12-22 DIAGNOSIS — R41.3 MEMORY CHANGE: ICD-10-CM

## 2023-12-22 DIAGNOSIS — M15.9 PRIMARY OSTEOARTHRITIS INVOLVING MULTIPLE JOINTS: Primary | ICD-10-CM

## 2023-12-22 LAB — INR PPP: 2.7 (ref 0.9–1.1)

## 2023-12-22 PROCEDURE — 3074F SYST BP LT 130 MM HG: CPT | Performed by: FAMILY MEDICINE

## 2023-12-22 PROCEDURE — 3079F DIAST BP 80-89 MM HG: CPT | Performed by: FAMILY MEDICINE

## 2023-12-22 PROCEDURE — 99214 OFFICE O/P EST MOD 30 MIN: CPT | Performed by: FAMILY MEDICINE

## 2023-12-22 NOTE — PROGRESS NOTES
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     Diet: 3x week: 1 cup of brussels sprouts or broccoli weekly, green beans (1/3/23)  Premier Protein (or Equate brand) meal replacement ~2x/week 1/3/23  Alcohol: Seldom  Tobacco: None  OTC Pain Medication: APAP     INR History:  Date 4/5 4/19 4/26 5/5 5/11 6/2 6/15 6/18 6/25 7/2 7/9 7/19 7/23 7/30   Total Weekly Dose 15mg 15mg 14mg 14mg 14mg 14mg 14mg 14mg 14mg 14mg 14mg 14mg 14mg 14mg   INR 2.6 3.9 3.9 2.7 3.0 3.6 2.7 2.9 2.9 2.6 2.9 3.1 2.5 2.3   Notes           decr GLV   recv'd 6/21; [facility]       incr GLV decr GLV      Date 8/6 8/13 8/20 8/27 9/3 9/10 9/17 9/24 10/1 10/8 10/15 10/22 10/29 11/5   Total WeeklyDose 14mg 15mg 15mg 14mg 14mg 15mg 14mg 14mg 14mg 14mg 14mg 14mg 15mg 16mg   INR 2.4 3.4 3.8 2.9 2.2 3.2 2.9 3.3 3.2 3.3 3.0 2.4 2.4 2.4   Notes decr GLV decr GLV       1xboost         call call 1x boost   incr VitK call 2x boost; call      Date 11/10 11/17 11/24 12/1 12/8 12/15 12/23 12/30 1/3/22 1/7 1/11 1/18 1/25 2/1   Total WeeklyDose 17mg 16mg 16mg 16mg 15mg 15 mg Pt did not call back 15mg 12 mg 13mg 15mg 15 mg 15 mg 15 mg   INR 3.7 3.3 3.9 4.0 2.6 3.4 4.0 4.9 3.6 2.4 3.3 2.8 2.7 2.9   Notes Dec GLV   Zero GLV call Red x1 call Less GLV   call   Less  Protein drink redx1  self held x1 (misdose)   Dec vit K fall, apap  Call   call          Date 2/8 2/15 2/22 3/1 3/8 3/15 3/22 3/29 4/5 4/12 4/19 4/26 5/3 5/11   Total WeeklyDose 15mg 14mg 14 mg 15 mg 15 mg 15 mg 14mg 13 mg 14 mg 14 mg 14mg 13mg 15mg 14 mg   INR 4.0 4.0 2.8 2.9 2.9  4.2 3.9 3.3 2.9 3.0 3.8 2.4 3.8 2.5   Notes Call; Dec GLV call Call; Mis-dose call   Call; no GLV Inc GLV. Less protein, apap  redx1 call   Less GLV rec'd 4/27, call Dec GLV        Date 5/18 5/25 6/1 6/8 6/15 6/22 6/29 7/6 7/13 7/20 7/22 7/27  8/10  8/17   Total WeeklyDose 15 mg 15mg 16mg 15 mg 15 mg 15 mg 15 mg 15 mg 15mg 14 mg 15 mg 14 mg  14 mg  15 mg   INR 2.6 2.3 2.7 3.2 3.0 2.9 2.6 2.7 3.6 3.0 3.6 3.0  2.4  3.4   Notes   Inc GLV  call call call       call 7/14-- call call call  call  call  call      Date 8/25 8/31 9/7 9/12 9/19 9/26 10/3 10/10 10/17 10/24 10/31   Total WeeklyDose 14mg 13 mg 14 mg 17mg 15 mg 16mg 15mg 14 mg 13mg 17 mg 15mg   INR 4.3 2.8 1.7 2.9 2.1 3.4 3.8 2.4 1.8 3.7 4.5   Notes Dec GLV call Call refused enox; extra protein  Call; no protein drinks Call; less green tea, inc boostx1 Call; cranberries Redx1; call 1x miss Call  Less protein    1/1  Date 11/7 11/14 11/21 11/28 12/5 12/12 12/19 12/27 1/3 1/9 1/16/23 1/23 1/30/23   Total WeeklyDose 13 mg 14 mg 14 mg 14 mg 14 mg 14 mg 14mg 14 mg  13mg 13 mg 14 mg  14 mg 14 mg   INR 3.2 3.3 3.4 3.6 2.5 4.0 2.9 4.1 3.6 2.6 3.3 2.7 3.0   Notes  call redx1 One less protein shake   Inc GLV Decreased GLV W/o meloxicam  Tramadol,  meloxicam Tramadol,  meloxicam Tramadol,  meloxicam meloxicam     Date 2/7/23 2/13 2/20 2/27/23 3/6 3/13/23 3/23 3/27 4/3/23 4/10/23 4/17/23 4/24 5/1   Total WeeklyDose 14 mg 14 mg 14 mg 14 mg  14 mg 14 mg  14 mg 14 mg 15 mg  14mg 13 mg 15 mg 14 mg   INR 2.9 3.0 3.6 2.6 3 3.5 3.4 2.4 2.8 2.5 2.3 3.2  4.1 POCT   Notes Call  call rec 2/21  Call   call Call   call Call  call Missed dose 1x boost Call; missed protein drinks, less GLV     Date 5/2 5/8 5/15 5/22 5/30 6/5 6/19 7/10 7/24 7/31 8/7 8/14 8/21  8/28   Total WeeklyDose 12 mg 13 mg 14 mg 14 mg 14mg 14 mg 14 mg 14 mg 13 mg 13mg  12 mg 12 mg 14 mg 13 mg   INR 3.7 3.3 2.6 2.7 3.4 2.9 4.0 4.1 3.9 4.1 2.8 1.9 4.0  3.2   Notes rec 5/3  Call  Self-heldx1, boostx1    Stopping HM Less  GLV  redx1 Ceftin   Rec'd 8/1 redx1 Inc GLV  Prednisone start Less GLV      Date 9/5 9/18 9/25 10/2 10/06 10/13 10/27 11/1 11/6 11/13 11/17 11/27   Total Weekly Dose 13mg 14 mg 12 mg  12 mg 11 mg 12 mg 12 mg 12 mg 11mg 11 mg 12 mg 12mg   INR 4.2 4.2 4.3  4.6 2.6 3.0 4.1 4.3 3.7 2.1 2.7 4.8   Notes redx1 clindamycin redx1 redx2 Red x2; inc GLV            Date 12/01 12/11 12/15 12/22           Total Weekly Dose 11 mg 11 mg 11 mg 11 mg           INR 2.4 3.0 2.7 2.7           Notes  call               Phone Interview:  Tablet Strength: 2mg  Patient Contact Info: [phone] (Mobile) *preferred*  [email or web address]  Verbal Release Authorization signed on 4/7/21 -- may speak with Tammy Bai (friend: [phone]), Ismael Michele (brother: [phone])  Lab Contact Info: [facility] ([facility])  ** will call once monthly or if INR is out of range**   4/7/22 Scr: 0.8    Patient Findings    Comments: Patient was not contacted during this encounter.        Plan:  1. INR is therapeutic today at 2.7 (goal 2.5-3.5). Instructed Ms. Michele to  continue warfarin 2mg daily except 1mg MWF.   2. Repeat INR in ~2 weeks, 12/29  3. Verbal information provided over the phone. Patient RBV dosing instructions, expresses understanding by teach back, and has no further questions at this time.  4. Lucie Michele understands the importance of calling the [facility] if she notices any s/sx of bleeding, stroke, or abnormal bruising, if any changes are made to her medications or medication doses (Rx, OTC, herbal), or if any upcoming procedures are scheduled. Lucie Michele will likewise let us know if any other changes, questions, or concerns arise regarding anticoagulation therapy. she understands the importance of seeking medical attention immediately if she experiences any falls, vehicle accidents, or abnormal bleeding or bruising. Lucie Michele voiced understanding of this information and confirms that she has the  [facility]'s contact information. Otherwise, we will plan to contact the patient once monthly or if her INR is out of range.    Gerald Little   [facility]  12/22/2023 11:47 EST      I, Ngoc Brooks, PharmD, have reviewed the note in full and agree with the assessment and plan.  12/22/23  11:55 EST

## 2023-12-22 NOTE — PROGRESS NOTES
Capillary Blood Specimen Collection  Capillary blood collection performed in clinic by Margie Avina MA. Patient tolerated the procedure well without complications.   12/22/23   Margie Avina MA
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
## 2023-12-22 NOTE — ASSESSMENT & PLAN NOTE
Left hip x-ray.  Will also send to Dr. Graham.  She does not anything stronger than Tylenol at this time.  But this is not adequately controlling her pain.

## 2023-12-29 ENCOUNTER — ANTICOAGULATION VISIT (OUTPATIENT)
Dept: PHARMACY | Facility: HOSPITAL | Age: 78
End: 2023-12-29
Payer: MEDICARE

## 2023-12-29 ENCOUNTER — CLINICAL SUPPORT (OUTPATIENT)
Dept: CARDIOLOGY | Facility: CLINIC | Age: 78
End: 2023-12-29
Payer: MEDICARE

## 2023-12-29 DIAGNOSIS — Z95.2 HISTORY OF AORTIC VALVE REPLACEMENT: ICD-10-CM

## 2023-12-29 DIAGNOSIS — I35.9 AORTIC VALVE DISEASE: Primary | ICD-10-CM

## 2023-12-29 LAB — INR PPP: 3.2 (ref 0.9–1.1)

## 2023-12-29 NOTE — PROGRESS NOTES
Capillary Blood Specimen Collection  Capillary blood collection performed in clinic by Margie Avina MA. Patient tolerated the procedure well without complications.   12/29/23   Margie Avina MA

## 2023-12-29 NOTE — PROGRESS NOTES
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     Diet: 3x week: 1 cup of brussels sprouts or broccoli weekly, green beans (1/3/23)  Premier Protein (or Equate brand) meal replacement ~2x/week 1/3/23  Alcohol: Seldom  Tobacco: None  OTC Pain Medication: APAP     INR History:  Date 4/5 4/19 4/26 5/5 5/11 6/2 6/15 6/18 6/25 7/2 7/9 7/19 7/23 7/30   Total Weekly Dose 15mg 15mg 14mg 14mg 14mg 14mg 14mg 14mg 14mg 14mg 14mg 14mg 14mg 14mg   INR 2.6 3.9 3.9 2.7 3.0 3.6 2.7 2.9 2.9 2.6 2.9 3.1 2.5 2.3   Notes           decr GLV   recv'd 6/21; [facility]       incr GLV decr GLV      Date 8/6 8/13 8/20 8/27 9/3 9/10 9/17 9/24 10/1 10/8 10/15 10/22 10/29 11/5   Total WeeklyDose 14mg 15mg 15mg 14mg 14mg 15mg 14mg 14mg 14mg 14mg 14mg 14mg 15mg 16mg   INR 2.4 3.4 3.8 2.9 2.2 3.2 2.9 3.3 3.2 3.3 3.0 2.4 2.4 2.4   Notes decr GLV decr GLV       1xboost         call call 1x boost   incr VitK call 2x boost; call      Date 11/10 11/17 11/24 12/1 12/8 12/15 12/23 12/30 1/3/22 1/7 1/11 1/18 1/25 2/1   Total WeeklyDose 17mg 16mg 16mg 16mg 15mg 15 mg Pt did not call back 15mg 12 mg 13mg 15mg 15 mg 15 mg 15 mg   INR 3.7 3.3 3.9 4.0 2.6 3.4 4.0 4.9 3.6 2.4 3.3 2.8 2.7 2.9   Notes Dec GLV   Zero GLV call Red x1 call Less GLV   call   Less  Protein drink redx1  self held x1 (misdose)   Dec vit K fall, apap  Call   call          Date 2/8 2/15 2/22 3/1 3/8 3/15 3/22 3/29 4/5 4/12 4/19 4/26 5/3 5/11   Total WeeklyDose 15mg 14mg 14 mg 15 mg 15 mg 15 mg 14mg 13 mg 14 mg 14 mg 14mg 13mg 15mg 14 mg   INR 4.0 4.0 2.8 2.9 2.9  4.2 3.9 3.3 2.9 3.0 3.8 2.4 3.8 2.5   Notes Call; Dec GLV call Call; Mis-dose call   Call; no GLV Inc GLV. Less protein, apap  redx1 call   Less GLV rec'd 4/27, call Dec GLV        Date 5/18 5/25 6/1 6/8 6/15 6/22 6/29 7/6 7/13 7/20 7/22 7/27  8/10  8/17   Total WeeklyDose 15 mg 15mg 16mg 15 mg 15 mg 15 mg 15 mg 15 mg 15mg 14 mg 15 mg 14 mg  14 mg  15 mg   INR 2.6 2.3 2.7 3.2 3.0 2.9 2.6 2.7 3.6 3.0 3.6 3.0  2.4  3.4   Notes   Inc GLV  call call call       call 7/14-- call call call  call  call  call      Date 8/25 8/31 9/7 9/12 9/19 9/26 10/3 10/10 10/17 10/24 10/31   Total WeeklyDose 14mg 13 mg 14 mg 17mg 15 mg 16mg 15mg 14 mg 13mg 17 mg 15mg   INR 4.3 2.8 1.7 2.9 2.1 3.4 3.8 2.4 1.8 3.7 4.5   Notes Dec GLV call Call refused enox; extra protein  Call; no protein drinks Call; less green tea, inc boostx1 Call; cranberries Redx1; call 1x miss Call  Less protein    1/1  Date 11/7 11/14 11/21 11/28 12/5 12/12 12/19 12/27 1/3 1/9 1/16/23 1/23 1/30/23   Total WeeklyDose 13 mg 14 mg 14 mg 14 mg 14 mg 14 mg 14mg 14 mg  13mg 13 mg 14 mg  14 mg 14 mg   INR 3.2 3.3 3.4 3.6 2.5 4.0 2.9 4.1 3.6 2.6 3.3 2.7 3.0   Notes  call redx1 One less protein shake   Inc GLV Decreased GLV W/o meloxicam  Tramadol,  meloxicam Tramadol,  meloxicam Tramadol,  meloxicam meloxicam     Date 2/7/23 2/13 2/20 2/27/23 3/6 3/13/23 3/23 3/27 4/3/23 4/10/23 4/17/23 4/24 5/1   Total WeeklyDose 14 mg 14 mg 14 mg 14 mg  14 mg 14 mg  14 mg 14 mg 15 mg  14mg 13 mg 15 mg 14 mg   INR 2.9 3.0 3.6 2.6 3 3.5 3.4 2.4 2.8 2.5 2.3 3.2  4.1 POCT   Notes Call  call rec 2/21  Call   call Call   call Call  call Missed dose 1x boost Call; missed protein drinks, less GLV     Date 5/2 5/8 5/15 5/22 5/30 6/5 6/19 7/10 7/24 7/31 8/7 8/14 8/21  8/28   Total WeeklyDose 12 mg 13 mg 14 mg 14 mg 14mg 14 mg 14 mg 14 mg 13 mg 13mg  12 mg 12 mg 14 mg 13 mg   INR 3.7 3.3 2.6 2.7 3.4 2.9 4.0 4.1 3.9 4.1 2.8 1.9 4.0  3.2   Notes rec 5/3  Call  Self-heldx1, boostx1    Stopping HM Less  GLV  redx1 Ceftin   Rec'd 8/1 redx1 Inc GLV  Prednisone start Less GLV      Date 9/5 9/18 9/25 10/2 10/06 10/13 10/27 11/1 11/6 11/13 11/17 11/27   Total Weekly Dose 13mg 14 mg 12 mg  12 mg 11 mg 12 mg 12 mg 12 mg 11mg 11 mg 12 mg 12mg   INR 4.2 4.2 4.3  4.6 2.6 3.0 4.1 4.3 3.7 2.1 2.7 4.8   Notes redx1 clindamycin redx1 redx2 Red x2; inc GLV            Date 12/01 12/11 12/15 12/22 12/29          Total Weekly Dose 11 mg 11 mg 11 mg 11 mg 11 mg          INR 2.4 3.0 2.7 2.7 3.2          Notes  call               Phone Interview:  Tablet Strength: 2mg  Patient Contact Info: [phone] (Mobile) *preferred*  [email or web address]  Verbal Release Authorization signed on 4/7/21 -- may speak with Tammy Bai (friend: [phone]), Ismael Michele (brother: [phone])  Lab Contact Info: [facility] ([facility])  ** will call once monthly or if INR is out of range**   4/7/22 Scr: 0.8      Patient Findings    Comments: Patient was not contacted during this encounter.          Plan:  1. INR is therapeutic today at 3.2 (goal 2.5-3.5). Instructed Ms. Michele to  continue warfarin 2mg daily except 1mg MWF.   2. Repeat INR in ~1 week, 1/5/24  3. Verbal information provided over the phone. Patient RBV dosing instructions, expresses understanding by teach back, and has no further questions at this time.  4. Lucie Michele understands the importance of calling the [facility] if she notices any s/sx of bleeding, stroke, or abnormal bruising, if any changes are made to her medications or medication doses (Rx, OTC, herbal), or if any upcoming procedures are scheduled. Lucie Michele will likewise let us know if any other changes, questions, or concerns arise regarding anticoagulation therapy. she understands the importance of seeking medical attention immediately if she experiences any falls, vehicle accidents, or abnormal bleeding or bruising. Lucie Michele voiced understanding of this information and confirms  that she has the [facility]'s contact information. Otherwise, we will plan to contact the patient once monthly or if her INR is out of range.    Gerald Little   [facility]  12/29/2023 11:51 Sahra COLEY [facility], have reviewed the note in full and agree with the assessment and plan.  12/29/23  13:06 EST

## 2024-01-05 ENCOUNTER — CLINICAL SUPPORT (OUTPATIENT)
Dept: CARDIOLOGY | Facility: CLINIC | Age: 79
End: 2024-01-05
Payer: MEDICARE

## 2024-01-05 ENCOUNTER — ANTICOAGULATION VISIT (OUTPATIENT)
Dept: PHARMACY | Facility: HOSPITAL | Age: 79
End: 2024-01-05
Payer: MEDICARE

## 2024-01-05 DIAGNOSIS — Z95.2 HISTORY OF AORTIC VALVE REPLACEMENT: ICD-10-CM

## 2024-01-05 DIAGNOSIS — I35.9 AORTIC VALVE DISEASE: Primary | ICD-10-CM

## 2024-01-05 LAB — INR PPP: 3 (ref 0.9–1.1)

## 2024-01-05 NOTE — Clinical Note
Patient is wondering why her readings always say abnormal when she looks at them on the my chart when 3.0 falls within her range.

## 2024-01-05 NOTE — PROGRESS NOTES
Capillary Blood Specimen Collection  Capillary blood collection performed in clinic by Amberly Kuo RN. Patient tolerated the procedure well without complications.   01/05/24   Amberly Kuo RN

## 2024-01-05 NOTE — PROGRESS NOTES
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     Diet: 3x week: 1 cup of brussels sprouts or broccoli weekly, green beans (1/3/23)  Premier Protein (or Equate brand) meal replacement ~2x/week 1/3/23  Alcohol: Seldom  Tobacco: None  OTC Pain Medication: APAP     INR History:  Date 4/5 4/19 4/26 5/5 5/11 6/2 6/15 6/18 6/25 7/2 7/9 7/19 7/23 7/30   Total Weekly Dose 15mg 15mg 14mg 14mg 14mg 14mg 14mg 14mg 14mg 14mg 14mg 14mg 14mg 14mg   INR 2.6 3.9 3.9 2.7 3.0 3.6 2.7 2.9 2.9 2.6 2.9 3.1 2.5 2.3   Notes           decr GLV   recv'd 6/21; [facility]       incr GLV decr GLV      Date 8/6 8/13 8/20 8/27 9/3 9/10 9/17 9/24 10/1 10/8 10/15 10/22 10/29 11/5   Total WeeklyDose 14mg 15mg 15mg 14mg 14mg 15mg 14mg 14mg 14mg 14mg 14mg 14mg 15mg 16mg   INR 2.4 3.4 3.8 2.9 2.2 3.2 2.9 3.3 3.2 3.3 3.0 2.4 2.4 2.4   Notes decr GLV decr GLV       1xboost         call call 1x boost   incr VitK call 2x boost; call      Date 11/10 11/17 11/24 12/1 12/8 12/15 12/23 12/30 1/3/22 1/7 1/11 1/18 1/25 2/1   Total WeeklyDose 17mg 16mg 16mg 16mg 15mg 15 mg Pt did not call back 15mg 12 mg 13mg 15mg 15 mg 15 mg 15 mg   INR 3.7 3.3 3.9 4.0 2.6 3.4 4.0 4.9 3.6 2.4 3.3 2.8 2.7 2.9   Notes Dec GLV   Zero GLV call Red x1 call Less GLV   call   Less  Protein drink redx1  self held x1 (misdose)   Dec vit K fall, apap  Call   call          Date 2/8 2/15 2/22 3/1 3/8 3/15 3/22 3/29 4/5 4/12 4/19 4/26 5/3 5/11   Total WeeklyDose 15mg 14mg 14 mg 15 mg 15 mg 15 mg 14mg 13 mg 14 mg 14 mg 14mg 13mg 15mg 14 mg   INR 4.0 4.0 2.8 2.9 2.9  4.2 3.9 3.3 2.9 3.0 3.8 2.4 3.8 2.5   Notes Call; Dec GLV call Call; Mis-dose call   Call; no GLV Inc GLV. Less protein, apap  redx1 call   Less GLV rec'd 4/27, call Dec GLV        Date 5/18 5/25 6/1 6/8 6/15 6/22 6/29 7/6 7/13 7/20 7/22 7/27  8/10  8/17   Total WeeklyDose 15 mg 15mg 16mg 15 mg 15 mg 15 mg 15 mg 15 mg 15mg 14 mg 15 mg 14 mg  14 mg  15 mg   INR 2.6 2.3 2.7 3.2 3.0 2.9 2.6 2.7 3.6 3.0 3.6 3.0  2.4  3.4   Notes   Inc GLV  call call call       call 7/14-- call call call  call  call  call      Date 8/25 8/31 9/7 9/12 9/19 9/26 10/3 10/10 10/17 10/24 10/31   Total WeeklyDose 14mg 13 mg 14 mg 17mg 15 mg 16mg 15mg 14 mg 13mg 17 mg 15mg   INR 4.3 2.8 1.7 2.9 2.1 3.4 3.8 2.4 1.8 3.7 4.5   Notes Dec GLV call Call refused enox; extra protein  Call; no protein drinks Call; less green tea, inc boostx1 Call; cranberries Redx1; call 1x miss Call  Less protein    1/1  Date 11/7 11/14 11/21 11/28 12/5 12/12 12/19 12/27 1/3 1/9 1/16/23 1/23 1/30/23   Total WeeklyDose 13 mg 14 mg 14 mg 14 mg 14 mg 14 mg 14mg 14 mg  13mg 13 mg 14 mg  14 mg 14 mg   INR 3.2 3.3 3.4 3.6 2.5 4.0 2.9 4.1 3.6 2.6 3.3 2.7 3.0   Notes  call redx1 One less protein shake   Inc GLV Decreased GLV W/o meloxicam  Tramadol,  meloxicam Tramadol,  meloxicam Tramadol,  meloxicam meloxicam     Date 2/7/23 2/13 2/20 2/27/23 3/6 3/13/23 3/23 3/27 4/3/23 4/10/23 4/17/23 4/24 5/1   Total WeeklyDose 14 mg 14 mg 14 mg 14 mg  14 mg 14 mg  14 mg 14 mg 15 mg  14mg 13 mg 15 mg 14 mg   INR 2.9 3.0 3.6 2.6 3 3.5 3.4 2.4 2.8 2.5 2.3 3.2  4.1 POCT   Notes Call  call rec 2/21  Call   call Call   call Call  call Missed dose 1x boost Call; missed protein drinks, less GLV     Date 5/2 5/8 5/15 5/22 5/30 6/5 6/19 7/10 7/24 7/31 8/7 8/14 8/21  8/28   Total WeeklyDose 12 mg 13 mg 14 mg 14 mg 14mg 14 mg 14 mg 14 mg 13 mg 13mg  12 mg 12 mg 14 mg 13 mg   INR 3.7 3.3 2.6 2.7 3.4 2.9 4.0 4.1 3.9 4.1 2.8 1.9 4.0  3.2   Notes rec 5/3  Call  Self-heldx1, boostx1    Stopping HM Less  GLV  redx1 Ceftin   Rec'd 8/1 redx1 Inc GLV  Prednisone start Less GLV      Date 9/5 9/18 9/25 10/2 10/06 10/13 10/27 11/1 11/6 11/13 11/17 11/27   Total Weekly Dose 13mg 14 mg 12 mg  12 mg 11 mg 12 mg 12 mg 12 mg 11mg 11 mg 12 mg 12mg   INR 4.2 4.2 4.3  4.6 2.6 3.0 4.1 4.3 3.7 2.1 2.7 4.8   Notes redx1 clindamycin redx1 redx2 Red x2; inc GLV            Date 12/01 12/11 12/15 12/22 12/29 1/5         Total Weekly Dose 11 mg 11 mg 11 mg 11 mg 11 mg 11 mg         INR 2.4 3.0 2.7 2.7 3.2 3.0         Notes  call    call           Phone Interview:  Tablet Strength: 2mg  Patient Contact Info: [phone] (Mobile) *preferred*  [email or web address]  Verbal Release Authorization signed on 4/7/21 -- may speak with Tammy Bhumika (friend: [phone]), Ismael Michele (brother: [phone])  Lab Contact Info: [facility] ([facility])  ** will call once monthly or if INR is out of range**   4/7/22 Scr: 0.8      Patient Findings  Negatives: Signs/symptoms of thrombosis, Signs/symptoms of bleeding, Laboratory test error suspected, Change in health, Change in alcohol use, Change in activity, Upcoming invasive procedure, Emergency department visit, Upcoming dental procedure, Missed doses, Extra doses, Change in medications, Change in diet/appetite, Hospital admission, Bruising, Other complaints   Comments: Mixed up doses this week for same weekly total          Plan:  1. INR is therapeutic today at 3.0 (goal 2.5-3.5). Instructed Ms. Michele to  continue warfarin 2mg daily except 1mg MWF.   2. Repeat INR in ~1 week  3. Verbal information provided over the phone. Patient RBV dosing instructions, expresses understanding by teach back, and has no further questions at this time.  4. Lucie Michele understands the importance of calling the [facility] if she notices any s/sx of bleeding, stroke, or abnormal bruising, if any changes are made to her medications or medication doses (Rx, OTC, herbal), or if any upcoming  procedures are scheduled. Lucie Michele will likewise let us know if any other changes, questions, or concerns arise regarding anticoagulation therapy. she understands the importance of seeking medical attention immediately if she experiences any falls, vehicle accidents, or abnormal bleeding or bruising. Lucie Michele voiced understanding of this information and confirms that she has the [facility]'s contact information. Otherwise, we will plan to contact the patient once monthly or if her INR is out of range.    Lizet Marinelli, PharmD.  01/05/24   14:36 EST

## 2024-01-12 ENCOUNTER — CLINICAL SUPPORT (OUTPATIENT)
Dept: CARDIOLOGY | Facility: CLINIC | Age: 79
End: 2024-01-12
Payer: MEDICARE

## 2024-01-12 ENCOUNTER — ANTICOAGULATION VISIT (OUTPATIENT)
Dept: PHARMACY | Facility: HOSPITAL | Age: 79
End: 2024-01-12
Payer: MEDICARE

## 2024-01-12 DIAGNOSIS — Z95.2 HISTORY OF AORTIC VALVE REPLACEMENT: Primary | ICD-10-CM

## 2024-01-12 LAB — INR PPP: 2.4 (ref 0.9–1.1)

## 2024-01-12 NOTE — PROGRESS NOTES
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     Diet: 3x week: 1 cup of brussels sprouts or broccoli weekly, green beans (1/3/23)  Premier Protein (or Equate brand) meal replacement ~2x/week 1/3/23  Alcohol: Seldom  Tobacco: None  OTC Pain Medication: APAP     INR History:  Date 4/5 4/19 4/26 5/5 5/11 6/2 6/15 6/18 6/25 7/2 7/9 7/19 7/23 7/30   Total Weekly Dose 15mg 15mg 14mg 14mg 14mg 14mg 14mg 14mg 14mg 14mg 14mg 14mg 14mg 14mg   INR 2.6 3.9 3.9 2.7 3.0 3.6 2.7 2.9 2.9 2.6 2.9 3.1 2.5 2.3   Notes           decr GLV   recv'd 6/21; [facility]       incr GLV decr GLV      Date 8/6 8/13 8/20 8/27 9/3 9/10 9/17 9/24 10/1 10/8 10/15 10/22 10/29 11/5   Total WeeklyDose 14mg 15mg 15mg 14mg 14mg 15mg 14mg 14mg 14mg 14mg 14mg 14mg 15mg 16mg   INR 2.4 3.4 3.8 2.9 2.2 3.2 2.9 3.3 3.2 3.3 3.0 2.4 2.4 2.4   Notes decr GLV decr GLV       1xboost         call call 1x boost   incr VitK call 2x boost; call      Date 11/10 11/17 11/24 12/1 12/8 12/15 12/23 12/30 1/3/22 1/7 1/11 1/18 1/25 2/1   Total WeeklyDose 17mg 16mg 16mg 16mg 15mg 15 mg Pt did not call back 15mg 12 mg 13mg 15mg 15 mg 15 mg 15 mg   INR 3.7 3.3 3.9 4.0 2.6 3.4 4.0 4.9 3.6 2.4 3.3 2.8 2.7 2.9   Notes Dec GLV   Zero GLV call Red x1 call Less GLV   call   Less  Protein drink redx1  self held x1 (misdose)   Dec vit K fall, apap  Call   call          Date 2/8 2/15 2/22 3/1 3/8 3/15 3/22 3/29 4/5 4/12 4/19 4/26 5/3 5/11   Total WeeklyDose 15mg 14mg 14 mg 15 mg 15 mg 15 mg 14mg 13 mg 14 mg 14 mg 14mg 13mg 15mg 14 mg   INR 4.0 4.0 2.8 2.9 2.9  4.2 3.9 3.3 2.9 3.0 3.8 2.4 3.8 2.5   Notes Call; Dec GLV call Call; Mis-dose call   Call; no GLV Inc GLV. Less protein, apap  redx1 call   Less GLV rec'd 4/27, call Dec GLV        Date 5/18 5/25 6/1 6/8 6/15 6/22 6/29 7/6 7/13 7/20 7/22 7/27  8/10  8/17   Total WeeklyDose 15 mg 15mg 16mg 15 mg 15 mg 15 mg 15 mg 15 mg 15mg 14 mg 15 mg 14 mg  14 mg  15 mg   INR 2.6 2.3 2.7 3.2 3.0 2.9 2.6 2.7 3.6 3.0 3.6 3.0  2.4  3.4   Notes   Inc GLV  call call call       call 7/14-- call call call  call  call  call      Date 8/25 8/31 9/7 9/12 9/19 9/26 10/3 10/10 10/17 10/24 10/31   Total WeeklyDose 14mg 13 mg 14 mg 17mg 15 mg 16mg 15mg 14 mg 13mg 17 mg 15mg   INR 4.3 2.8 1.7 2.9 2.1 3.4 3.8 2.4 1.8 3.7 4.5   Notes Dec GLV call Call refused enox; extra protein  Call; no protein drinks Call; less green tea, inc boostx1 Call; cranberries Redx1; call 1x miss Call  Less protein    1/1  Date 11/7 11/14 11/21 11/28 12/5 12/12 12/19 12/27 1/3 1/9 1/16/23 1/23 1/30/23   Total WeeklyDose 13 mg 14 mg 14 mg 14 mg 14 mg 14 mg 14mg 14 mg  13mg 13 mg 14 mg  14 mg 14 mg   INR 3.2 3.3 3.4 3.6 2.5 4.0 2.9 4.1 3.6 2.6 3.3 2.7 3.0   Notes  call redx1 One less protein shake   Inc GLV Decreased GLV W/o meloxicam  Tramadol,  meloxicam Tramadol,  meloxicam Tramadol,  meloxicam meloxicam     Date 2/7/23 2/13 2/20 2/27/23 3/6 3/13/23 3/23 3/27 4/3/23 4/10/23 4/17/23 4/24 5/1   Total WeeklyDose 14 mg 14 mg 14 mg 14 mg  14 mg 14 mg  14 mg 14 mg 15 mg  14mg 13 mg 15 mg 14 mg   INR 2.9 3.0 3.6 2.6 3 3.5 3.4 2.4 2.8 2.5 2.3 3.2  4.1 POCT   Notes Call  call rec 2/21  Call   call Call   call Call  call Missed dose 1x boost Call; missed protein drinks, less GLV     Date 5/2 5/8 5/15 5/22 5/30 6/5 6/19 7/10 7/24 7/31 8/7 8/14 8/21  8/28   Total WeeklyDose 12 mg 13 mg 14 mg 14 mg 14mg 14 mg 14 mg 14 mg 13 mg 13mg  12 mg 12 mg 14 mg 13 mg   INR 3.7 3.3 2.6 2.7 3.4 2.9 4.0 4.1 3.9 4.1 2.8 1.9 4.0  3.2   Notes rec 5/3  Call  Self-heldx1, boostx1    Stopping HM Less  GLV  redx1 Ceftin   Rec'd 8/1 redx1 Inc GLV  Prednisone start Less GLV      Date 9/5 9/18 9/25 10/2 10/06 10/13 10/27 11/1 11/6 11/13 11/17 11/27   Total Weekly Dose 13mg 14 mg 12 mg  12 mg 11 mg 12 mg 12 mg 12 mg 11mg 11 mg 12 mg 12mg   INR 4.2 4.2 4.3  4.6 2.6 3.0 4.1 4.3 3.7 2.1 2.7 4.8   Notes redx1 clindamycin redx1 redx2 Red x2; inc GLV            Date 12/01 12/11 12/15 12/22 12/29 1/5 1/12        Total Weekly Dose 11 mg 11 mg 11 mg 11 mg 11 mg 11 mg 11 mg        INR 2.4 3.0 2.7 2.7 3.2 3.0 2.4        Notes  call    call           Phone Interview:  Tablet Strength: 2mg  Patient Contact Info: [phone] (Mobile) *preferred*  [email or web address]  Verbal Release Authorization signed on 4/7/21 -- may speak with Tammy Bhumika (friend: [phone]), Ismael Michele (brother: [phone])  Lab Contact Info: [facility] ([facility])  ** will call once monthly or if INR is out of range**   4/7/22 Scr: 0.8      Patient Findings      Negatives:  Signs/symptoms of thrombosis, Signs/symptoms of bleeding, Laboratory test error suspected, Change in health, Change in alcohol use, Change in activity, Upcoming invasive procedure, Emergency department visit, Upcoming dental procedure, Missed doses, Extra doses, Change in medications, Change in diet/appetite, Hospital admission, Bruising, Other complaints        Plan:  1. INR is slightly subtherapeutic today at 2.4 (goal 2.5-3.5). Instructed Ms. Michele to boost tongihts dose to 2mg then continue warfarin 2mg daily except 1mg MWF.   2. Repeat INR in 2 weeks  3. Verbal information provided over the phone. Patient RBV dosing instructions, expresses understanding by teach back, and has no further questions at this time.  4. Lucie Michele understands the importance of calling the [facility] if she notices any s/sx of bleeding, stroke, or abnormal bruising, if any changes are made to her medications or medication doses (Rx, OTC, herbal), or if any upcoming  procedures are scheduled. Lucie Michele will likewise let us know if any other changes, questions, or concerns arise regarding anticoagulation therapy. she understands the importance of seeking medical attention immediately if she experiences any falls, vehicle accidents, or abnormal bleeding or bruising. Lucie Michele voiced understanding of this information and confirms that she has the [facility]'s contact information. Otherwise, we will plan to contact the patient once monthly or if her INR is out of range.        Lizet Marinelli, PharmD.  01/12/24   13:58 EST

## 2024-01-12 NOTE — PROGRESS NOTES
Capillary Blood Specimen Collection  Capillary blood collection performed in clinic by Ema Hamilton MA. Patient tolerated the procedure well without complications.   01/12/24   Ema Hamilton MA

## 2024-01-16 ENCOUNTER — OFFICE VISIT (OUTPATIENT)
Dept: ORTHOPEDIC SURGERY | Facility: CLINIC | Age: 79
End: 2024-01-16
Payer: MEDICARE

## 2024-01-16 VITALS
SYSTOLIC BLOOD PRESSURE: 120 MMHG | DIASTOLIC BLOOD PRESSURE: 88 MMHG | HEIGHT: 65 IN | BODY MASS INDEX: 28.28 KG/M2 | WEIGHT: 169.75 LBS

## 2024-01-16 DIAGNOSIS — M16.12 PRIMARY OSTEOARTHRITIS OF LEFT HIP: Primary | ICD-10-CM

## 2024-01-16 DIAGNOSIS — E03.9 HYPOTHYROIDISM, UNSPECIFIED TYPE: ICD-10-CM

## 2024-01-16 DIAGNOSIS — M76.892 HIP FLEXOR TENDINITIS, LEFT: ICD-10-CM
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
## 2024-01-17 RX ORDER — LEVOTHYROXINE SODIUM 0.12 MG/1
125 TABLET ORAL DAILY
Qty: 90 TABLET | Refills: 0 | Status: SHIPPED | OUTPATIENT
Start: 2024-01-17

## 2024-01-18 DIAGNOSIS — E78.5 HYPERLIPIDEMIA, UNSPECIFIED HYPERLIPIDEMIA TYPE: ICD-10-CM

## 2024-01-18 RX ORDER — ATORVASTATIN CALCIUM 20 MG/1
20 TABLET, FILM COATED ORAL DAILY
Qty: 90 TABLET | Refills: 2 | Status: SHIPPED | OUTPATIENT
Start: 2024-01-18

## 2024-01-26 ENCOUNTER — CLINICAL SUPPORT (OUTPATIENT)
Dept: CARDIOLOGY | Facility: CLINIC | Age: 79
End: 2024-01-26
Payer: MEDICARE

## 2024-01-26 ENCOUNTER — ANTICOAGULATION VISIT (OUTPATIENT)
Dept: PHARMACY | Facility: HOSPITAL | Age: 79
End: 2024-01-26
Payer: MEDICARE

## 2024-01-26 DIAGNOSIS — Z95.2 HISTORY OF AORTIC VALVE REPLACEMENT: Primary | ICD-10-CM

## 2024-01-26 LAB — INR PPP: 2.3 (ref 0.9–1.1)

## 2024-01-26 NOTE — PROGRESS NOTES
Capillary Blood Specimen Collection  Capillary blood collection performed in clinic by Mitzi Joseph. Patient tolerated the procedure well without complications.   01/26/24   Mitzi Joseph

## 2024-01-26 NOTE — PROGRESS NOTES
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     Diet: 3x week: 1 cup of brussels sprouts or broccoli weekly, green beans (1/3/23)  Premier Protein (or Equate brand) meal replacement ~2x/week 1/3/23  Alcohol: Seldom  Tobacco: None  OTC Pain Medication: APAP     INR History:  Date 4/5 4/19 4/26 5/5 5/11 6/2 6/15 6/18 6/25 7/2 7/9 7/19 7/23 7/30   Total Weekly Dose 15mg 15mg 14mg 14mg 14mg 14mg 14mg 14mg 14mg 14mg 14mg 14mg 14mg 14mg   INR 2.6 3.9 3.9 2.7 3.0 3.6 2.7 2.9 2.9 2.6 2.9 3.1 2.5 2.3   Notes           decr GLV   recv'd 6/21; [facility]       incr GLV decr GLV      Date 8/6 8/13 8/20 8/27 9/3 9/10 9/17 9/24 10/1 10/8 10/15 10/22 10/29 11/5   Total WeeklyDose 14mg 15mg 15mg 14mg 14mg 15mg 14mg 14mg 14mg 14mg 14mg 14mg 15mg 16mg   INR 2.4 3.4 3.8 2.9 2.2 3.2 2.9 3.3 3.2 3.3 3.0 2.4 2.4 2.4   Notes decr GLV decr GLV       1xboost         call call 1x boost   incr VitK call 2x boost; call      Date 11/10 11/17 11/24 12/1 12/8 12/15 12/23 12/30 1/3/22 1/7 1/11 1/18 1/25 2/1   Total WeeklyDose 17mg 16mg 16mg 16mg 15mg 15 mg Pt did not call back 15mg 12 mg 13mg 15mg 15 mg 15 mg 15 mg   INR 3.7 3.3 3.9 4.0 2.6 3.4 4.0 4.9 3.6 2.4 3.3 2.8 2.7 2.9   Notes Dec GLV   Zero GLV call Red x1 call Less GLV   call   Less  Protein drink redx1  self held x1 (misdose)   Dec vit K fall, apap  Call   call          Date 2/8 2/15 2/22 3/1 3/8 3/15 3/22 3/29 4/5 4/12 4/19 4/26 5/3 5/11   Total WeeklyDose 15mg 14mg 14 mg 15 mg 15 mg 15 mg 14mg 13 mg 14 mg 14 mg 14mg 13mg 15mg 14 mg   INR 4.0 4.0 2.8 2.9 2.9  4.2 3.9 3.3 2.9 3.0 3.8 2.4 3.8 2.5   Notes Call; Dec GLV call Call; Mis-dose call   Call; no GLV Inc GLV. Less protein, apap  redx1 call   Less GLV rec'd 4/27, call Dec GLV        Date 5/18 5/25 6/1 6/8 6/15 6/22 6/29 7/6 7/13 7/20 7/22 7/27  8/10  8/17   Total WeeklyDose 15 mg 15mg 16mg 15 mg 15 mg 15 mg 15 mg 15 mg 15mg 14 mg 15 mg 14 mg  14 mg  15 mg   INR 2.6 2.3 2.7 3.2 3.0 2.9 2.6 2.7 3.6 3.0 3.6 3.0  2.4  3.4   Notes   Inc GLV  call call call       call 7/14-- call call call  call  call  call      Date 8/25 8/31 9/7 9/12 9/19 9/26 10/3 10/10 10/17 10/24 10/31   Total WeeklyDose 14mg 13 mg 14 mg 17mg 15 mg 16mg 15mg 14 mg 13mg 17 mg 15mg   INR 4.3 2.8 1.7 2.9 2.1 3.4 3.8 2.4 1.8 3.7 4.5   Notes Dec GLV call Call refused enox; extra protein  Call; no protein drinks Call; less green tea, inc boostx1 Call; cranberries Redx1; call 1x miss Call  Less protein    1/1  Date 11/7 11/14 11/21 11/28 12/5 12/12 12/19 12/27 1/3 1/9 1/16/23 1/23 1/30/23   Total WeeklyDose 13 mg 14 mg 14 mg 14 mg 14 mg 14 mg 14mg 14 mg  13mg 13 mg 14 mg  14 mg 14 mg   INR 3.2 3.3 3.4 3.6 2.5 4.0 2.9 4.1 3.6 2.6 3.3 2.7 3.0   Notes  call redx1 One less protein shake   Inc GLV Decreased GLV W/o meloxicam  Tramadol,  meloxicam Tramadol,  meloxicam Tramadol,  meloxicam meloxicam     Date 2/7/23 2/13 2/20 2/27/23 3/6 3/13/23 3/23 3/27 4/3/23 4/10/23 4/17/23 4/24 5/1   Total WeeklyDose 14 mg 14 mg 14 mg 14 mg  14 mg 14 mg  14 mg 14 mg 15 mg  14mg 13 mg 15 mg 14 mg   INR 2.9 3.0 3.6 2.6 3 3.5 3.4 2.4 2.8 2.5 2.3 3.2  4.1 POCT   Notes Call  call rec 2/21  Call   call Call   call Call  call Missed dose 1x boost Call; missed protein drinks, less GLV     Date 5/2 5/8 5/15 5/22 5/30 6/5 6/19 7/10 7/24 7/31 8/7 8/14 8/21  8/28   Total WeeklyDose 12 mg 13 mg 14 mg 14 mg 14mg 14 mg 14 mg 14 mg 13 mg 13mg  12 mg 12 mg 14 mg 13 mg   INR 3.7 3.3 2.6 2.7 3.4 2.9 4.0 4.1 3.9 4.1 2.8 1.9 4.0  3.2   Notes rec 5/3  Call  Self-heldx1, boostx1    Stopping HM Less  GLV  redx1 Ceftin   Rec'd 8/1 redx1 Inc GLV  Prednisone start Less GLV      Date 9/5 9/18 9/25 10/2 10/06 10/13 10/27 11/1 11/6 11/13 11/17 11/27   Total Weekly Dose 13mg 14 mg 12 mg  12 mg 11 mg 12 mg 12 mg 12 mg 11mg 11 mg 12 mg 12mg   INR 4.2 4.2 4.3  4.6 2.6 3.0 4.1 4.3 3.7 2.1 2.7 4.8   Notes redx1 clindamycin redx1 redx2 Red x2; inc GLV            Date 12/01 12/11 12/15 12/22 12/29 1/5 1/12 1/26       Total Weekly Dose 11 mg 11 mg 11 mg 11 mg 11 mg 11 mg 11 mg        INR 2.4 3.0 2.7 2.7 3.2 3.0 2.4        Notes  call    call           Phone Interview:  Tablet Strength: 2mg  Patient Contact Info: [phone] (Mobile) *preferred*  [email or web address]  Verbal Release Authorization signed on 4/7/21 -- may speak with Tammy Bhumika (friend: [phone]), Ismael Michele (brother: [phone])  Lab Contact Info: [facility] ([facility])  ** will call once monthly or if INR is out of range**   4/7/22 Scr: 0.8      Patient Findings      Negatives: Signs/symptoms of thrombosis, Signs/symptoms of bleeding, Laboratory test error suspected, Change in health, Change in alcohol use, Change in activity, Upcoming invasive procedure, Emergency department visit, Upcoming dental procedure, Missed doses, Extra doses, Change in medications, Change in diet/appetite, Hospital admission, Bruising, Other complaints   Comments: Small serving of green beans last night-- pretty normal for her diet        Plan:  1. INR is again slightly subtherapeutic today at 2.3 (goal 2.5-3.5). Instructed Ms. Michele to increase dose to warfarin 2mg daily except 1mg MW   2. Repeat INR in 2 weeks  3. Verbal information provided over the phone. Patient RBV dosing instructions, expresses understanding by teach back, and has no further questions at this time.  4. Lucie Michele understands the importance of calling the [facility] if she notices any s/sx of bleeding, stroke, or abnormal bruising, if any changes are made to her  medications or medication doses (Rx, OTC, herbal), or if any upcoming procedures are scheduled. Lucie Michele will likewise let us know if any other changes, questions, or concerns arise regarding anticoagulation therapy. she understands the importance of seeking medical attention immediately if she experiences any falls, vehicle accidents, or abnormal bleeding or bruising. Lucie Michele voiced understanding of this information and confirms that she has the [facility]'s contact information. Otherwise, we will plan to contact the patient once monthly or if her INR is out of range.    Lizet Marinelli, PharmD.  01/26/24   12:07 EST

## 2024-02-09 ENCOUNTER — CLINICAL SUPPORT (OUTPATIENT)
Dept: CARDIOLOGY | Facility: CLINIC | Age: 79
End: 2024-02-09
Payer: MEDICARE

## 2024-02-09 ENCOUNTER — ANTICOAGULATION VISIT (OUTPATIENT)
Dept: PHARMACY | Facility: HOSPITAL | Age: 79
End: 2024-02-09
Payer: MEDICARE

## 2024-02-09 DIAGNOSIS — Z95.2 HISTORY OF AORTIC VALVE REPLACEMENT: Primary | ICD-10-CM

## 2024-02-09 LAB — INR PPP: 3.3 (ref 0.9–1.1)

## 2024-02-09 NOTE — PROGRESS NOTES
Capillary Blood Specimen Collection  Capillary blood collection performed in clinic by Ema Hamilton MA. Patient tolerated the procedure well without complications.   02/09/24   Ema Hamilton MA

## 2024-02-13 DIAGNOSIS — E03.9 HYPOTHYROIDISM, UNSPECIFIED TYPE: ICD-10-CM

## 2024-02-14 RX ORDER — LEVOTHYROXINE SODIUM 0.12 MG/1
125 TABLET ORAL DAILY
Qty: 90 TABLET | Refills: 0 | Status: SHIPPED | OUTPATIENT
Start: 2024-02-14

## 2024-02-16 DIAGNOSIS — N18.31 STAGE 3A CHRONIC KIDNEY DISEASE: ICD-10-CM

## 2024-02-16 DIAGNOSIS — M25.552 LEFT HIP PAIN: ICD-10-CM

## 2024-02-16 DIAGNOSIS — R73.03 PREDIABETES: ICD-10-CM

## 2024-02-16 DIAGNOSIS — I10 ESSENTIAL HYPERTENSION, BENIGN: ICD-10-CM

## 2024-02-16 DIAGNOSIS — Z13.820 OSTEOPOROSIS SCREENING: ICD-10-CM

## 2024-02-16 DIAGNOSIS — E03.9 ACQUIRED HYPOTHYROIDISM: ICD-10-CM

## 2024-02-16 DIAGNOSIS — E55.9 VITAMIN D DEFICIENCY: ICD-10-CM

## 2024-02-16 DIAGNOSIS — E07.9 DISORDER OF THYROID: ICD-10-CM

## 2024-02-16 DIAGNOSIS — M85.80 OSTEOPENIA, UNSPECIFIED LOCATION: ICD-10-CM

## 2024-02-16 DIAGNOSIS — I10 PRIMARY HYPERTENSION: ICD-10-CM

## 2024-02-16 RX ORDER — OMEPRAZOLE 20 MG/1
20 CAPSULE, DELAYED RELEASE ORAL DAILY
Qty: 90 CAPSULE | Refills: 0 | Status: SHIPPED | OUTPATIENT
Start: 2024-02-16

## 2024-02-26 ENCOUNTER — ANTICOAGULATION VISIT (OUTPATIENT)
Dept: PHARMACY | Facility: HOSPITAL | Age: 79
End: 2024-02-26
Payer: MEDICARE

## 2024-02-26 ENCOUNTER — CLINICAL SUPPORT (OUTPATIENT)
Dept: CARDIOLOGY | Facility: CLINIC | Age: 79
End: 2024-02-26
Payer: MEDICARE

## 2024-02-26 DIAGNOSIS — Z95.2 HISTORY OF AORTIC VALVE REPLACEMENT: Primary | ICD-10-CM

## 2024-02-26 LAB — INR PPP: 4.3 (ref 0.9–1.1)

## 2024-02-26 PROCEDURE — 36416 COLLJ CAPILLARY BLOOD SPEC: CPT | Performed by: INTERNAL MEDICINE

## 2024-02-26 RX ORDER — SOTALOL HYDROCHLORIDE 80 MG/1
80 TABLET ORAL EVERY 12 HOURS SCHEDULED
COMMUNITY
Start: 2024-02-04 | End: 2024-03-01 | Stop reason: SDUPTHER

## 2024-02-26 NOTE — PROGRESS NOTES
Capillary Blood Specimen Collection  Capillary blood collection performed in clinic by Margie Avina MA. Patient tolerated the procedure well without complications.   02/26/24   Margie Avina MA

## 2024-02-26 NOTE — PROGRESS NOTES
Anticoagulation Clinic - Remote Progress Note  mdINR Home monitor  Testing Frequency: weekly     Indication: St Hank Mechanical Aortic Valve  Referring Provider: Blas Blake [last appt 12/1/23]  Initial Warfarin Start Date: 3/24/2011  Goal INR: 2.5-3.5   Current Drug Interactions: levothyroxine, MVI, glucosamine- chondroitin, Vit C, co- Q10;  meloxicam  Bleed Risk: No hx of bleed per patient  Other: Lovenox bridge hx; of note patient has an artificial root     Diet: 2-3x week: 1 cup of brussels sprouts or broccoli weekly, green beans (1/3/23)  Premier Protein (or Equate brand) meal replacement ~3x/week 2/12/24  Alcohol: Seldom  Tobacco: None  OTC Pain Medication: APAP     INR History:  Date 4/5 4/19 4/26 5/5 5/11 6/2 6/15 6/18 6/25 7/2 7/9 7/19 7/23 7/30   Total Weekly Dose 15mg 15mg 14mg 14mg 14mg 14mg 14mg 14mg 14mg 14mg 14mg 14mg 14mg 14mg   INR 2.6 3.9 3.9 2.7 3.0 3.6 2.7 2.9 2.9 2.6 2.9 3.1 2.5 2.3   Notes           decr GLV   recv'd 6/21; HM HM       incr GLV decr GLV      Date 8/6 8/13 8/20 8/27 9/3 9/10 9/17 9/24 10/1 10/8 10/15 10/22 10/29 11/5   Total WeeklyDose 14mg 15mg 15mg 14mg 14mg 15mg 14mg 14mg 14mg 14mg 14mg 14mg 15mg 16mg   INR 2.4 3.4 3.8 2.9 2.2 3.2 2.9 3.3 3.2 3.3 3.0 2.4 2.4 2.4   Notes decr GLV decr GLV       1xboost         call call 1x boost   incr VitK call 2x boost; call      Date 11/10 11/17 11/24 12/1 12/8 12/15 12/23 12/30 1/3/22 1/7 1/11 1/18 1/25 2/1   Total WeeklyDose 17mg 16mg 16mg 16mg 15mg 15 mg Pt did not call back 15mg 12 mg 13mg 15mg 15 mg 15 mg 15 mg   INR 3.7 3.3 3.9 4.0 2.6 3.4 4.0 4.9 3.6 2.4 3.3 2.8 2.7 2.9   Notes Dec GLV   Zero GLV call Red x1 call Less GLV   call   Less  Protein drink redx1  self held x1 (misdose)   Dec vit K fall, apap  Call   call          Date 2/8 2/15 2/22 3/1 3/8 3/15 3/22 3/29 4/5 4/12 4/19 4/26 5/3 5/11   Total WeeklyDose 15mg 14mg 14 mg 15 mg 15 mg 15 mg 14mg 13 mg 14 mg 14 mg 14mg 13mg 15mg 14 mg   INR 4.0 4.0 2.8 2.9 2.9 4.2 3.9 3.3 2.9  3.0 3.8 2.4 3.8 2.5   Notes Call; Dec GLV call Call; Mis-dose call   Call; no GLV Inc GLV. Less protein, apap  redx1 call   Less GLV rec'd 4/27, call Dec GLV        Date 5/18 5/25 6/1 6/8 6/15 6/22 6/29 7/6 7/13 7/20 7/22 7/27  8/10  8/17   Total WeeklyDose 15 mg 15mg 16mg 15 mg 15 mg 15 mg 15 mg 15 mg 15mg 14 mg 15 mg 14 mg  14 mg  15 mg   INR 2.6 2.3 2.7 3.2 3.0 2.9 2.6 2.7 3.6 3.0 3.6 3.0  2.4  3.4   Notes   Inc GLV  call call call       call 7/14-- call call call  call  call  call      Date 8/25 8/31 9/7 9/12 9/19 9/26 10/3 10/10 10/17 10/24 10/31   Total WeeklyDose 14mg 13 mg 14 mg 17mg 15 mg 16mg 15mg 14 mg 13mg 17 mg 15mg   INR 4.3 2.8 1.7 2.9 2.1 3.4 3.8 2.4 1.8 3.7 4.5   Notes Dec GLV call Call refused enox; extra protein  Call; no protein drinks Call; less green tea, inc boostx1 Call; cranberries Redx1; call 1x miss Call  Less protein    1/1  Date 11/7 11/14 11/21 11/28 12/5 12/12 12/19 12/27 1/3 1/9 1/16/23 1/23 1/30/23   Total WeeklyDose 13 mg 14 mg 14 mg 14 mg 14 mg 14 mg 14mg 14 mg  13mg 13 mg 14 mg  14 mg 14 mg   INR 3.2 3.3 3.4 3.6 2.5 4.0 2.9 4.1 3.6 2.6 3.3 2.7 3.0   Notes  call redx1 One less protein shake   Inc GLV Decreased GLV W/o meloxicam  Tramadol,  meloxicam Tramadol,  meloxicam Tramadol,  meloxicam meloxicam     Date 2/7/23 2/13 2/20 2/27/23 3/6 3/13/23 3/23 3/27 4/3/23 4/10/23 4/17/23 4/24 5/1   Total WeeklyDose 14 mg 14 mg 14 mg 14 mg  14 mg 14 mg  14 mg 14 mg 15 mg  14mg 13 mg 15 mg 14 mg   INR 2.9 3.0 3.6 2.6 3 3.5 3.4 2.4 2.8 2.5 2.3 3.2  4.1 POCT   Notes Call  call rec 2/21  Call   call Call   call Call  call Missed dose 1x boost Call; missed protein drinks, less GLV     Date 5/2 5/8 5/15 5/22 5/30 6/5 6/19 7/10 7/24 7/31 8/7 8/14 8/21  8/28   Total WeeklyDose 12 mg 13 mg 14 mg 14 mg 14mg 14 mg 14 mg 14 mg 13 mg 13mg  12 mg 12 mg 14 mg 13 mg   INR 3.7 3.3 2.6 2.7 3.4 2.9 4.0 4.1 3.9 4.1 2.8 1.9 4.0  3.2   Notes rec 5/3  Call  Self-heldx1, boostx1    Stopping HM Less GLV  redx1 Ceftin    Rec'd 8/1 redx1 Inc GLV  Prednisone start Less GLV      Date 9/5 9/18 9/25 10/2 10/06 10/13 10/27 11/1 11/6 11/13 11/17 11/27   Total Weekly Dose 13mg 14 mg 12 mg  12 mg 11 mg 12 mg 12 mg 12 mg 11mg 11 mg 12 mg 12mg   INR 4.2 4.2 4.3  4.6 2.6 3.0 4.1 4.3 3.7 2.1 2.7 4.8   Notes redx1 clindamycin redx1 redx2 Red x2; inc GLV            Date 12/01 12/11 12/15 12/22 12/29 1/5/2024 1/12 1/26 2/9  2/26      Total Weekly Dose 11 mg 11 mg 11 mg 11 mg 11 mg 11 mg 11 mg 11 mg  12 mg   12 mg      INR 2.4 3.0 2.7 2.7 3.2 3.0 2.4 2.3 3.3  4.3      Notes  call    call    No GLV, apap        Date                 Total Weekly  Dose                INR                Notes                  Phone Interview:  Tablet Strength: 2mg  Patient Contact Info: [phone] (Mobile) *preferred*  [email or web address]  Verbal Release Authorization signed on 4/7/21 -- may speak with Tammy Bhumika (friend: [phone]), Ismael Ryne (brother: [phone])  Lab Contact Info: [facility] ([facility])  ** will call once monthly or if INR is out of range**   4/7/22 Scr: 0.8      Patient Findings  Positives: Change in medications, Change in diet/appetite, Bruising   Negatives: Signs/symptoms of thrombosis, Signs/symptoms of bleeding, Laboratory test error suspected, Change in health, Change in alcohol use, Change in activity, Upcoming invasive procedure, Emergency department visit, Upcoming dental procedure, Missed doses, Extra doses, Hospital admission, Other complaints   Comments: She cannot think of any changes.  She has not had any GLV this past week as she had been eating out several times and were not available.  She has been working in the basement and has some minor bruising.  She has taken acetaminophen for the past couple days.    Patient had an INR reading of 4.3  Patient was educated about the variability of the test strip readings at values >4.5 and was also educated to monitor for any signs excessive bleeding (including in the  urine, stool, emesis, etc.). '    Otherwise, above findings negative        Plan:    1. INR is supra therapeutic today at 4.3 (goal 2.5-3.5). Instructed Ms. Michele to reduce today and tomorrow to warfarin 1 mg, then warfarin 2 mg on Wednesday and Thursday prior to recheck on Friday.  Maintenance regimen has been warfarin 2 mg oral daily except 1mg on MonWed until recheck.    2. Repeat INR on Friday, 3/4 per patient preference  3. Verbal information provided over the phone. Patient RBV dosing instructions, expresses understanding by teach back, and has no further questions at this time.  4. Lucie Michele understands the importance of calling the [facility] if she notices any s/sx of bleeding, stroke, or abnormal bruising, if any changes are made to her medications or medication doses (Rx, OTC, herbal), or if any upcoming procedures are scheduled. Lucie Michele will likewise let us know if any other changes, questions, or concerns arise regarding anticoagulation therapy. she understands the importance of seeking medical attention immediately if she experiences any falls, vehicle accidents, or abnormal bleeding or bruising. Lucie Michele voiced understanding of this information and confirms that she has the [facility]'s contact information. Otherwise, we will plan to contact the patient once monthly or if her INR is out of range.  5.  Discussed with Ms. Michele on 2/26/24 to consider warfarin 1 mg tablets to allow for smaller dose adjustments as sensitive to small dose changes.      Jesenia Garcia, PharmD  2/26/2024   11:30 EST

## 2024-03-01 ENCOUNTER — ANTICOAGULATION VISIT (OUTPATIENT)
Dept: PHARMACY | Facility: HOSPITAL | Age: 79
End: 2024-03-01
Payer: MEDICARE

## 2024-03-01 ENCOUNTER — OFFICE VISIT (OUTPATIENT)
Dept: CARDIOLOGY | Facility: CLINIC | Age: 79
End: 2024-03-01
Payer: MEDICARE

## 2024-03-01 VITALS
OXYGEN SATURATION: 97 % | HEIGHT: 65 IN | DIASTOLIC BLOOD PRESSURE: 60 MMHG | HEART RATE: 70 BPM | BODY MASS INDEX: 27.66 KG/M2 | SYSTOLIC BLOOD PRESSURE: 98 MMHG | RESPIRATION RATE: 16 BRPM | WEIGHT: 166 LBS

## 2024-03-01 DIAGNOSIS — M25.552 LEFT HIP PAIN: ICD-10-CM

## 2024-03-01 DIAGNOSIS — E78.2 MIXED HYPERLIPIDEMIA: ICD-10-CM

## 2024-03-01 DIAGNOSIS — I48.92 PAROXYSMAL ATRIAL FLUTTER: ICD-10-CM

## 2024-03-01 DIAGNOSIS — E78.5 HYPERLIPIDEMIA, UNSPECIFIED HYPERLIPIDEMIA TYPE: ICD-10-CM

## 2024-03-01 DIAGNOSIS — Z86.79 S/P ASCENDING AORTIC ANEURYSM REPAIR: ICD-10-CM

## 2024-03-01 DIAGNOSIS — Z98.890 S/P ASCENDING AORTIC ANEURYSM REPAIR: ICD-10-CM

## 2024-03-01 DIAGNOSIS — E03.9 ACQUIRED HYPOTHYROIDISM: ICD-10-CM

## 2024-03-01 DIAGNOSIS — I10 HYPERTENSION, ESSENTIAL: ICD-10-CM

## 2024-03-01 DIAGNOSIS — I10 PRIMARY HYPERTENSION: ICD-10-CM

## 2024-03-01 DIAGNOSIS — I50.32 CHRONIC DIASTOLIC CONGESTIVE HEART FAILURE: ICD-10-CM

## 2024-03-01 DIAGNOSIS — R73.03 PREDIABETES: ICD-10-CM

## 2024-03-01 DIAGNOSIS — Z13.820 OSTEOPOROSIS SCREENING: ICD-10-CM

## 2024-03-01 DIAGNOSIS — E07.9 DISORDER OF THYROID: ICD-10-CM

## 2024-03-01 DIAGNOSIS — G47.33 OSA ON CPAP: ICD-10-CM

## 2024-03-01 DIAGNOSIS — M85.80 OSTEOPENIA, UNSPECIFIED LOCATION: ICD-10-CM

## 2024-03-01 DIAGNOSIS — N18.31 STAGE 3A CHRONIC KIDNEY DISEASE: ICD-10-CM

## 2024-03-01 DIAGNOSIS — E55.9 VITAMIN D DEFICIENCY: ICD-10-CM

## 2024-03-01 DIAGNOSIS — Z95.2 HISTORY OF AORTIC VALVE REPLACEMENT: Primary | ICD-10-CM

## 2024-03-01 PROBLEM — I71.20 THORACIC AORTIC ANEURYSM WITHOUT RUPTURE: Status: RESOLVED | Noted: 2017-04-13 | Resolved: 2024-03-01

## 2024-03-01 LAB — INR PPP: 2.4 (ref 0.9–1.1)

## 2024-03-01 RX ORDER — SOTALOL HYDROCHLORIDE 80 MG/1
80 TABLET ORAL EVERY 12 HOURS SCHEDULED
Qty: 180 TABLET | Refills: 3 | Status: SHIPPED | OUTPATIENT
Start: 2024-03-01

## 2024-03-01 RX ORDER — FUROSEMIDE 40 MG/1
40 TABLET ORAL DAILY
Qty: 90 TABLET | Refills: 3 | Status: SHIPPED | OUTPATIENT
Start: 2024-03-01

## 2024-03-01 RX ORDER — ATORVASTATIN CALCIUM 20 MG/1
20 TABLET, FILM COATED ORAL DAILY
Qty: 90 TABLET | Refills: 3 | Status: SHIPPED | OUTPATIENT
Start: 2024-03-01

## 2024-03-01 NOTE — PROGRESS NOTES
Anticoagulation Clinic - Remote Progress Note  mdINR Home monitor  Testing Frequency: weekly     Indication: St Hank Mechanical Aortic Valve  Referring Provider: Blas Blake [last appt 12/1/23]  Initial Warfarin Start Date: 3/24/2011  Goal INR: 2.5-3.5   Current Drug Interactions: levothyroxine, MVI, glucosamine- chondroitin, Vit C, co- Q10;  meloxicam  Bleed Risk: No hx of bleed per patient  Other: Lovenox bridge hx; of note patient has an artificial root     Diet: 2-3x week: 1 cup of brussels sprouts or broccoli weekly, green beans (1/3/23)  Premier Protein (or Equate brand) meal replacement ~3x/week 2/12/24  Alcohol: Seldom  Tobacco: None  OTC Pain Medication: APAP     INR History:  Date 4/5 4/19 4/26 5/5 5/11 6/2 6/15 6/18 6/25 7/2 7/9 7/19 7/23 7/30   Total Weekly Dose 15mg 15mg 14mg 14mg 14mg 14mg 14mg 14mg 14mg 14mg 14mg 14mg 14mg 14mg   INR 2.6 3.9 3.9 2.7 3.0 3.6 2.7 2.9 2.9 2.6 2.9 3.1 2.5 2.3   Notes           decr GLV   recv'd 6/21; HM HM       incr GLV decr GLV      Date 8/6 8/13 8/20 8/27 9/3 9/10 9/17 9/24 10/1 10/8 10/15 10/22 10/29 11/5   Total WeeklyDose 14mg 15mg 15mg 14mg 14mg 15mg 14mg 14mg 14mg 14mg 14mg 14mg 15mg 16mg   INR 2.4 3.4 3.8 2.9 2.2 3.2 2.9 3.3 3.2 3.3 3.0 2.4 2.4 2.4   Notes decr GLV decr GLV       1xboost         call call 1x boost   incr VitK call 2x boost; call      Date 11/10 11/17 11/24 12/1 12/8 12/15 12/23 12/30 1/3/22 1/7 1/11 1/18 1/25 2/1   Total WeeklyDose 17mg 16mg 16mg 16mg 15mg 15 mg Pt did not call back 15mg 12 mg 13mg 15mg 15 mg 15 mg 15 mg   INR 3.7 3.3 3.9 4.0 2.6 3.4 4.0 4.9 3.6 2.4 3.3 2.8 2.7 2.9   Notes Dec GLV   Zero GLV call Red x1 call Less GLV   call   Less  Protein drink redx1  self held x1 (misdose)   Dec vit K fall, apap  Call   call          Date 2/8 2/15 2/22 3/1 3/8 3/15 3/22 3/29 4/5 4/12 4/19 4/26 5/3 5/11   Total WeeklyDose 15mg 14mg 14 mg 15 mg 15 mg 15 mg 14mg 13 mg 14 mg 14 mg 14mg 13mg 15mg 14 mg   INR 4.0 4.0 2.8 2.9 2.9 4.2 3.9 3.3 2.9  3.0 3.8 2.4 3.8 2.5   Notes Call; Dec GLV call Call; Mis-dose call   Call; no GLV Inc GLV. Less protein, apap  redx1 call   Less GLV rec'd 4/27, call Dec GLV        Date 5/18 5/25 6/1 6/8 6/15 6/22 6/29 7/6 7/13 7/20 7/22 7/27  8/10  8/17   Total WeeklyDose 15 mg 15mg 16mg 15 mg 15 mg 15 mg 15 mg 15 mg 15mg 14 mg 15 mg 14 mg  14 mg  15 mg   INR 2.6 2.3 2.7 3.2 3.0 2.9 2.6 2.7 3.6 3.0 3.6 3.0  2.4  3.4   Notes   Inc GLV  call call call       call 7/14-- call call call  call  call  call      Date 8/25 8/31 9/7 9/12 9/19 9/26 10/3 10/10 10/17 10/24 10/31   Total WeeklyDose 14mg 13 mg 14 mg 17mg 15 mg 16mg 15mg 14 mg 13mg 17 mg 15mg   INR 4.3 2.8 1.7 2.9 2.1 3.4 3.8 2.4 1.8 3.7 4.5   Notes Dec GLV call Call refused enox; extra protein  Call; no protein drinks Call; less green tea, inc boostx1 Call; cranberries Redx1; call 1x miss Call  Less protein    1/1  Date 11/7 11/14 11/21 11/28 12/5 12/12 12/19 12/27 1/3 1/9 1/16/23 1/23 1/30/23   Total WeeklyDose 13 mg 14 mg 14 mg 14 mg 14 mg 14 mg 14mg 14 mg  13mg 13 mg 14 mg  14 mg 14 mg   INR 3.2 3.3 3.4 3.6 2.5 4.0 2.9 4.1 3.6 2.6 3.3 2.7 3.0   Notes  call redx1 One less protein shake   Inc GLV Decreased GLV W/o meloxicam  Tramadol,  meloxicam Tramadol,  meloxicam Tramadol,  meloxicam meloxicam     Date 2/7/23 2/13 2/20 2/27/23 3/6 3/13/23 3/23 3/27 4/3/23 4/10/23 4/17/23 4/24 5/1   Total WeeklyDose 14 mg 14 mg 14 mg 14 mg  14 mg 14 mg  14 mg 14 mg 15 mg  14mg 13 mg 15 mg 14 mg   INR 2.9 3.0 3.6 2.6 3 3.5 3.4 2.4 2.8 2.5 2.3 3.2  4.1 POCT   Notes Call  call rec 2/21  Call   call Call   call Call  call Missed dose 1x boost Call; missed protein drinks, less GLV     Date 5/2 5/8 5/15 5/22 5/30 6/5 6/19 7/10 7/24 7/31 8/7 8/14 8/21  8/28   Total WeeklyDose 12 mg 13 mg 14 mg 14 mg 14mg 14 mg 14 mg 14 mg 13 mg 13mg  12 mg 12 mg 14 mg 13 mg   INR 3.7 3.3 2.6 2.7 3.4 2.9 4.0 4.1 3.9 4.1 2.8 1.9 4.0  3.2   Notes rec 5/3  Call  Self-heldx1, boostx1    Stopping HM Less GLV  redx1 Ceftin    Rec'd 8/1 redx1 Inc GLV  Prednisone start Less GLV      Date 9/5 9/18 9/25 10/2 10/06 10/13 10/27 11/1 11/6 11/13 11/17 11/27   Total Weekly Dose 13mg 14 mg 12 mg  12 mg 11 mg 12 mg 12 mg 12 mg 11mg 11 mg 12 mg 12mg   INR 4.2 4.2 4.3  4.6 2.6 3.0 4.1 4.3 3.7 2.1 2.7 4.8   Notes redx1 clindamycin redx1 redx2 Red x2; inc GLV            Date 12/01 12/11 12/15 12/22 12/29 1/5/2024 1/12 1/26 2/9  2/26 3/1     Total Weekly Dose 11 mg 11 mg 11 mg 11 mg 11 mg 11 mg 11 mg 11 mg  12 mg   12 mg 12mg     INR 2.4 3.0 2.7 2.7 3.2 3.0 2.4 2.3 3.3  4.3 2.4     Notes  call    call    No GLV, apap        Date                 Total Weekly  Dose                INR                Notes                  Phone Interview:  Tablet Strength: 2mg  Patient Contact Info: [phone] (Mobile) *preferred*  [email or web address]  Verbal Release Authorization signed on 4/7/21 -- may speak with Tammy Bhumika (friend: [phone]), Ismael Michele (brother: [phone])  Lab Contact Info: [facility] ([facility])  ** will call once monthly or if INR is out of range**   4/7/22 Scr: 0.8    Patient Findings    Positives: Change in diet/appetite   Negatives: Signs/symptoms of thrombosis, Signs/symptoms of bleeding, Laboratory test error suspected, Change in health, Change in alcohol use, Change in activity, Upcoming invasive procedure, Emergency department visit, Upcoming dental procedure, Missed doses, Extra doses, Change in medications, Hospital admission, Bruising, Other complaints   Comments: Ms Michele states that she had extra servings of broccoli this week in addition to a reduction in her dose.        Plan:    1. INR is sub therapeutic today at 4.4 (goal 2.5-3.5). Instructed Ms. Michele to take 2mg of warfarin daily except for 1mg on MWF. (11mg weekly dose). She will reduce her intake of GLV to normal levels.   2. Repeat INR on Friday, 3/8  3. Verbal information provided over the phone. Patient RBV dosing instructions, expresses understanding  by teach back, and has no further questions at this time.  4. Lucie Michele understands the importance of calling the [facility] if she notices any s/sx of bleeding, stroke, or abnormal bruising, if any changes are made to her medications or medication doses (Rx, OTC, herbal), or if any upcoming procedures are scheduled. Lucie Michele will likewise let us know if any other changes, questions, or concerns arise regarding anticoagulation therapy. she understands the importance of seeking medical attention immediately if she experiences any falls, vehicle accidents, or abnormal bleeding or bruising. Lucie Michele voiced understanding of this information and confirms that she has the [facility]'s contact information. Otherwise, we will plan to contact the patient once monthly or if her INR is out of range.  5.  Discussed with Ms. Michele on 2/26/24 to consider warfarin 1 mg tablets to allow for smaller dose adjustments as sensitive to small dose changes. Not discussed this time-- consider for 3/8 encounter.      Thank you,    Ramana ArteagaD, NANCI, BCPS  3/1/2024  15:35 EST
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
## 2024-03-03 DIAGNOSIS — N18.31 STAGE 3A CHRONIC KIDNEY DISEASE: ICD-10-CM

## 2024-03-03 DIAGNOSIS — I10 ESSENTIAL HYPERTENSION, BENIGN: ICD-10-CM

## 2024-03-03 DIAGNOSIS — E03.9 ACQUIRED HYPOTHYROIDISM: ICD-10-CM

## 2024-03-03 DIAGNOSIS — E55.9 VITAMIN D DEFICIENCY: ICD-10-CM

## 2024-03-03 DIAGNOSIS — E07.9 DISORDER OF THYROID: ICD-10-CM

## 2024-03-03 DIAGNOSIS — Z13.820 OSTEOPOROSIS SCREENING: ICD-10-CM

## 2024-03-03 DIAGNOSIS — I10 PRIMARY HYPERTENSION: ICD-10-CM

## 2024-03-03 DIAGNOSIS — M25.552 LEFT HIP PAIN: ICD-10-CM

## 2024-03-03 DIAGNOSIS — M85.80 OSTEOPENIA, UNSPECIFIED LOCATION: ICD-10-CM

## 2024-03-03 DIAGNOSIS — R73.03 PREDIABETES: ICD-10-CM

## 2024-03-04 RX ORDER — OXYBUTYNIN CHLORIDE 5 MG/1
5 TABLET ORAL 2 TIMES DAILY
Qty: 180 TABLET | Refills: 0 | Status: SHIPPED | OUTPATIENT
Start: 2024-03-04

## 2024-03-11 ENCOUNTER — CLINICAL SUPPORT (OUTPATIENT)
Dept: CARDIOLOGY | Facility: CLINIC | Age: 79
End: 2024-03-11
Payer: MEDICARE

## 2024-03-11 ENCOUNTER — ANTICOAGULATION VISIT (OUTPATIENT)
Dept: PHARMACY | Facility: HOSPITAL | Age: 79
End: 2024-03-11
Payer: MEDICARE

## 2024-03-11 DIAGNOSIS — Z95.2 HISTORY OF AORTIC VALVE REPLACEMENT: Primary | ICD-10-CM

## 2024-03-11 LAB — INR PPP: 3.4 (ref 0.9–1.1)

## 2024-03-11 PROCEDURE — 36416 COLLJ CAPILLARY BLOOD SPEC: CPT | Performed by: INTERNAL MEDICINE

## 2024-03-11 NOTE — PROGRESS NOTES
Capillary Blood Specimen Collection  Capillary blood collection performed in clinic by Margie Avina MA. Patient tolerated the procedure well without complications.   03/11/24   Margie Avina MA

## 2024-03-11 NOTE — PROGRESS NOTES
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     Diet: 2-3x week: 1 cup of brussels sprouts or broccoli weekly, green beans (3/11/23)  Premier Protein (or Equate brand) meal replacement ~3x/week 3/11/24  Alcohol: Seldom  Tobacco: None  OTC Pain Medication: APAP     INR History:  Date 4/5 4/19 4/26 5/5 5/11 6/2 6/15 6/18 6/25 7/2 7/9 7/19 7/23 7/30   Total Weekly Dose 15mg 15mg 14mg 14mg 14mg 14mg 14mg 14mg 14mg 14mg 14mg 14mg 14mg 14mg   INR 2.6 3.9 3.9 2.7 3.0 3.6 2.7 2.9 2.9 2.6 2.9 3.1 2.5 2.3   Notes           decr GLV   recv'd 6/21; HM HM       incr GLV decr GLV      Date 8/6 8/13 8/20 8/27 9/3 9/10 9/17 9/24 10/1 10/8 10/15 10/22 10/29 11/5   Total WeeklyDose 14mg 15mg 15mg 14mg 14mg 15mg 14mg 14mg 14mg 14mg 14mg 14mg 15mg 16mg   INR 2.4 3.4 3.8 2.9 2.2 3.2 2.9 3.3 3.2 3.3 3.0 2.4 2.4 2.4   Notes decr GLV decr GLV       1xboost         call call 1x boost   incr VitK call 2x boost; call      Date 11/10 11/17 11/24 12/1 12/8 12/15 12/23 12/30 1/3/22 1/7 1/11 1/18 1/25 2/1   Total WeeklyDose 17mg 16mg 16mg 16mg 15mg 15 mg Pt did not call back 15mg 12 mg 13mg 15mg 15 mg 15 mg 15 mg   INR 3.7 3.3 3.9 4.0 2.6 3.4 4.0 4.9 3.6 2.4 3.3 2.8 2.7 2.9   Notes Dec GLV   Zero GLV call Red x1 call Less GLV   call   Less  Protein drink redx1  self held x1 (misdose)   Dec vit K fall, apap  Call   call          Date 2/8 2/15 2/22 3/1 3/8 3/15 3/22 3/29 4/5 4/12 4/19 4/26 5/3 5/11   Total WeeklyDose 15mg 14mg 14 mg 15 mg 15 mg 15 mg 14mg 13 mg 14 mg 14 mg 14mg 13mg 15mg 14 mg   INR 4.0 4.0 2.8 2.9 2.9 4.2 3.9 3.3 2.9  3.0 3.8 2.4 3.8 2.5   Notes Call; Dec GLV call Call; Mis-dose call   Call; no GLV Inc GLV. Less protein, apap  redx1 call   Less GLV rec'd 4/27, call Dec GLV        Date 5/18 5/25 6/1 6/8 6/15 6/22 6/29 7/6 7/13 7/20 7/22 7/27  8/10  8/17   Total WeeklyDose 15 mg 15mg 16mg 15 mg 15 mg 15 mg 15 mg 15 mg 15mg 14 mg 15 mg 14 mg  14 mg  15 mg   INR 2.6 2.3 2.7 3.2 3.0 2.9 2.6 2.7 3.6 3.0 3.6 3.0  2.4  3.4   Notes   Inc GLV  call call call       call 7/14-- call call call  call  call  call      Date 8/25 8/31 9/7 9/12 9/19 9/26 10/3 10/10 10/17 10/24 10/31   Total WeeklyDose 14mg 13 mg 14 mg 17mg 15 mg 16mg 15mg 14 mg 13mg 17 mg 15mg   INR 4.3 2.8 1.7 2.9 2.1 3.4 3.8 2.4 1.8 3.7 4.5   Notes Dec GLV call Call refused enox; extra protein  Call; no protein drinks Call; less green tea, inc boostx1 Call; cranberries Redx1; call 1x miss Call  Less protein    1/1  Date 11/7 11/14 11/21 11/28 12/5 12/12 12/19 12/27 1/3 1/9 1/16/23 1/23 1/30/23   Total WeeklyDose 13 mg 14 mg 14 mg 14 mg 14 mg 14 mg 14mg 14 mg  13mg 13 mg 14 mg  14 mg 14 mg   INR 3.2 3.3 3.4 3.6 2.5 4.0 2.9 4.1 3.6 2.6 3.3 2.7 3.0   Notes  call redx1 One less protein shake   Inc GLV Decreased GLV W/o meloxicam  Tramadol,  meloxicam Tramadol,  meloxicam Tramadol,  meloxicam meloxicam     Date 2/7/23 2/13 2/20 2/27/23 3/6 3/13/23 3/23 3/27 4/3/23 4/10/23 4/17/23 4/24 5/1   Total WeeklyDose 14 mg 14 mg 14 mg 14 mg  14 mg 14 mg  14 mg 14 mg 15 mg  14mg 13 mg 15 mg 14 mg   INR 2.9 3.0 3.6 2.6 3 3.5 3.4 2.4 2.8 2.5 2.3 3.2  4.1 POCT   Notes Call  call rec 2/21  Call   call Call   call Call  call Missed dose 1x boost Call; missed protein drinks, less GLV     Date 5/2 5/8 5/15 5/22 5/30 6/5 6/19 7/10 7/24 7/31 8/7 8/14 8/21  8/28   Total WeeklyDose 12 mg 13 mg 14 mg 14 mg 14mg 14 mg 14 mg 14 mg 13 mg 13mg  12 mg 12 mg 14 mg 13 mg   INR 3.7 3.3 2.6 2.7 3.4 2.9 4.0 4.1 3.9 4.1 2.8 1.9 4.0  3.2   Notes rec 5/3  Call  Self-heldx1, boostx1    Stopping HM Less GLV  redx1 Ceftin    Rec'd 8/1 redx1 Inc GLV  Prednisone start Less GLV      Date 9/5 9/18 9/25 10/2 10/06 10/13 10/27 11/1 11/6 11/13 11/17 11/27   Total Weekly Dose 13mg 14 mg 12 mg  12 mg 11 mg 12 mg 12 mg 12 mg 11mg 11 mg 12 mg 12mg   INR 4.2 4.2 4.3  4.6 2.6 3.0 4.1 4.3 3.7 2.1 2.7 4.8   Notes redx1 clindamycin redx1 redx2 Red x2; inc GLV            Date 12/01 12/11 12/15 12/22 12/29 1/5/2024 1/12 1/26 2/9  2/26 3/1 3/11     Total Weekly Dose 11 mg 11 mg 11 mg 11 mg 11 mg 11 mg 11 mg 11 mg  12 mg   12 mg 12mg 11 mg     INR 2.4 3.0 2.7 2.7 3.2 3.0 2.4 2.3 3.3  4.3 2.4 3.4    Notes  call    call    No GLV, apap        Date                 Total Weekly  Dose                INR                Notes                  Phone Interview:  Tablet Strength: 2mg  Patient Contact Info: [phone] (Mobile) *preferred*  [email or web address]  Verbal Release Authorization signed on 4/7/21 -- may speak with Tammy Bai (friend: [phone]), Ismael Michele (brother: [phone])  Lab Contact Info: [facility] ([facility])  ** will call once monthly or if INR is out of range**   4/7/22 Scr: 0.8      Patient Findings    Negatives: Signs/symptoms of thrombosis, Signs/symptoms of bleeding, Laboratory test error suspected, Change in health, Change in alcohol use, Change in activity, Upcoming invasive procedure, Emergency department visit, Upcoming dental procedure, Missed doses, Extra doses, Change in medications, Change in diet/appetite, Hospital admission, Bruising, Other complaints   Comments: Ms. Michele denies all the above listed findings.        Plan:    1. INR is therapeutic today at 3.4 (goal 2.5-3.5). Instructed Ms. Michele to continue warfarin 2 mg oral daily except warfarin 1 mg on Monday, Wednesday and Friday until recheck.    2. Repeat INR on Monday, 3/18. If therapeutic at this time can likely extend recheck interval.   3. Verbal information provided over the phone. Patient RBV dosing instructions, expresses understanding by teach  back, and has no further questions at this time.  4. Lucie Michele understands the importance of calling the [facility] if she notices any s/sx of bleeding, stroke, or abnormal bruising, if any changes are made to her medications or medication doses (Rx, OTC, herbal), or if any upcoming procedures are scheduled. Lucie Michele will likewise let us know if any other changes, questions, or concerns arise regarding anticoagulation therapy. she understands the importance of seeking medical attention immediately if she experiences any falls, vehicle accidents, or abnormal bleeding or bruising. Lucie Michele voiced understanding of this information and confirms that she has the [facility]'s contact information. Otherwise, we will plan to contact the patient once monthly or if her INR is out of range.    Sahra Javier, JenniD  3/11/2024   12:28 EDT

## 2024-03-18 ENCOUNTER — ANTICOAGULATION VISIT (OUTPATIENT)
Dept: PHARMACY | Facility: HOSPITAL | Age: 79
End: 2024-03-18
Payer: MEDICARE

## 2024-03-18 ENCOUNTER — CLINICAL SUPPORT (OUTPATIENT)
Dept: CARDIOLOGY | Facility: CLINIC | Age: 79
End: 2024-03-18
Payer: MEDICARE

## 2024-03-18 DIAGNOSIS — Z95.2 HISTORY OF AORTIC VALVE REPLACEMENT: Primary | ICD-10-CM

## 2024-03-18 LAB
INR PPP: 2.3 (ref 0.9–1.1)
INR PPP: 2.6

## 2024-03-18 PROCEDURE — 36416 COLLJ CAPILLARY BLOOD SPEC: CPT | Performed by: INTERNAL MEDICINE

## 2024-03-18 RX ORDER — ALENDRONATE SODIUM 70 MG/1
70 TABLET ORAL
Qty: 12 TABLET | Refills: 0 | Status: SHIPPED | OUTPATIENT
Start: 2024-03-18

## 2024-03-18 NOTE — PROGRESS NOTES
Capillary Blood Specimen Collection  Capillary blood collection performed in clinic by Amberly Kuo RN. Patient tolerated the procedure well without complications.   03/18/24   Amberly Kuo RN

## 2024-03-18 NOTE — PROGRESS NOTES
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     Diet: 2-3x week: 1 cup of brussels sprouts or broccoli weekly, green beans (3/11/23)  Premier Protein (or Equate brand) meal replacement ~3x/week 3/11/24  Alcohol: Seldom  Tobacco: None  OTC Pain Medication: APAP     INR History:  Date 4/5 4/19 4/26 5/5 5/11 6/2 6/15 6/18 6/25 7/2 7/9 7/19 7/23 7/30   Total Weekly Dose 15mg 15mg 14mg 14mg 14mg 14mg 14mg 14mg 14mg 14mg 14mg 14mg 14mg 14mg   INR 2.6 3.9 3.9 2.7 3.0 3.6 2.7 2.9 2.9 2.6 2.9 3.1 2.5 2.3   Notes           decr GLV   recv'd 6/21; HM HM       incr GLV decr GLV      Date 8/6 8/13 8/20 8/27 9/3 9/10 9/17 9/24 10/1 10/8 10/15 10/22 10/29 11/5   Total WeeklyDose 14mg 15mg 15mg 14mg 14mg 15mg 14mg 14mg 14mg 14mg 14mg 14mg 15mg 16mg   INR 2.4 3.4 3.8 2.9 2.2 3.2 2.9 3.3 3.2 3.3 3.0 2.4 2.4 2.4   Notes decr GLV decr GLV       1xboost         call call 1x boost   incr VitK call 2x boost; call      Date 11/10 11/17 11/24 12/1 12/8 12/15 12/23 12/30 1/3/22 1/7 1/11 1/18 1/25 2/1   Total WeeklyDose 17mg 16mg 16mg 16mg 15mg 15 mg Pt did not call back 15mg 12 mg 13mg 15mg 15 mg 15 mg 15 mg   INR 3.7 3.3 3.9 4.0 2.6 3.4 4.0 4.9 3.6 2.4 3.3 2.8 2.7 2.9   Notes Dec GLV   Zero GLV call Red x1 call Less GLV   call   Less  Protein drink redx1  self held x1 (misdose)   Dec vit K fall, apap  Call   call          Date 2/8 2/15 2/22 3/1 3/8 3/15 3/22 3/29 4/5 4/12 4/19 4/26 5/3 5/11   Total WeeklyDose 15mg 14mg 14 mg 15 mg 15 mg 15 mg 14mg 13 mg 14 mg 14 mg 14mg 13mg 15mg 14 mg   INR 4.0 4.0 2.8 2.9 2.9 4.2 3.9 3.3 2.9  3.0 3.8 2.4 3.8 2.5   Notes Call; Dec GLV call Call; Mis-dose call   Call; no GLV Inc GLV. Less protein, apap  redx1 call   Less GLV rec'd 4/27, call Dec GLV        Date 5/18 5/25 6/1 6/8 6/15 6/22 6/29 7/6 7/13 7/20 7/22 7/27  8/10  8/17   Total WeeklyDose 15 mg 15mg 16mg 15 mg 15 mg 15 mg 15 mg 15 mg 15mg 14 mg 15 mg 14 mg  14 mg  15 mg   INR 2.6 2.3 2.7 3.2 3.0 2.9 2.6 2.7 3.6 3.0 3.6 3.0  2.4  3.4   Notes   Inc GLV  call call call       call 7/14-- call call call  call  call  call      Date 8/25 8/31 9/7 9/12 9/19 9/26 10/3 10/10 10/17 10/24 10/31   Total WeeklyDose 14mg 13 mg 14 mg 17mg 15 mg 16mg 15mg 14 mg 13mg 17 mg 15mg   INR 4.3 2.8 1.7 2.9 2.1 3.4 3.8 2.4 1.8 3.7 4.5   Notes Dec GLV call Call refused enox; extra protein  Call; no protein drinks Call; less green tea, inc boostx1 Call; cranberries Redx1; call 1x miss Call  Less protein    1/1  Date 11/7 11/14 11/21 11/28 12/5 12/12 12/19 12/27 1/3 1/9 1/16/23 1/23 1/30/23   Total WeeklyDose 13 mg 14 mg 14 mg 14 mg 14 mg 14 mg 14mg 14 mg  13mg 13 mg 14 mg  14 mg 14 mg   INR 3.2 3.3 3.4 3.6 2.5 4.0 2.9 4.1 3.6 2.6 3.3 2.7 3.0   Notes  call redx1 One less protein shake   Inc GLV Decreased GLV W/o meloxicam  Tramadol,  meloxicam Tramadol,  meloxicam Tramadol,  meloxicam meloxicam     Date 2/7/23 2/13 2/20 2/27/23 3/6 3/13/23 3/23 3/27 4/3/23 4/10/23 4/17/23 4/24 5/1   Total WeeklyDose 14 mg 14 mg 14 mg 14 mg  14 mg 14 mg  14 mg 14 mg 15 mg  14mg 13 mg 15 mg 14 mg   INR 2.9 3.0 3.6 2.6 3 3.5 3.4 2.4 2.8 2.5 2.3 3.2  4.1 POCT   Notes Call  call rec 2/21  Call   call Call   call Call  call Missed dose 1x boost Call; missed protein drinks, less GLV     Date 5/2 5/8 5/15 5/22 5/30 6/5 6/19 7/10 7/24 7/31 8/7 8/14 8/21  8/28   Total WeeklyDose 12 mg 13 mg 14 mg 14 mg 14mg 14 mg 14 mg 14 mg 13 mg 13mg  12 mg 12 mg 14 mg 13 mg   INR 3.7 3.3 2.6 2.7 3.4 2.9 4.0 4.1 3.9 4.1 2.8 1.9 4.0  3.2   Notes rec 5/3  Call  Self-heldx1, boostx1    Stopping HM Less GLV  redx1 Ceftin    Rec'd 8/1 redx1 Inc GLV  Prednisone start Less GLV      Date 9/5 9/18 9/25 10/2 10/06 10/13 10/27 11/1 11/6 11/13 11/17 11/27   Total Weekly Dose 13mg 14 mg 12 mg  12 mg 11 mg 12 mg 12 mg 12 mg 11mg 11 mg 12 mg 12mg   INR 4.2 4.2 4.3  4.6 2.6 3.0 4.1 4.3 3.7 2.1 2.7 4.8   Notes redx1 clindamycin redx1 redx2 Red x2; inc GLV            Date 12/01 12/11 12/15 12/22 12/29 1/5/2024 1/12 1/26 2/9  2/26 3/1 3/11  3/18   Total Weekly Dose 11 mg 11 mg 11 mg 11 mg 11 mg 11 mg 11 mg 11 mg  12 mg   12 mg 12mg 11 mg  11 mg   INR 2.4 3.0 2.7 2.7 3.2 3.0 2.4 2.3 3.3  4.3 2.4 3.4 2.6   Notes  call    call    No GLV, apap   call     Date                 Total Weekly  Dose                INR                Notes                  Phone Interview:  Tablet Strength: 2mg  Patient Contact Info: [phone] (Mobile) *preferred*  [email or web address]  Verbal Release Authorization signed on 4/7/21 -- may speak with Tammy Bai (friend: [phone]), Ismael Michele (brother: [phone])  Lab Contact Info: [facility] ([facility])  ** will call once monthly or if INR is out of range**   4/7/22 Scr: 0.8    Patient Findings  Comments: Pt not contacted   [facility] called 3/18 to let me know they entered in Lucie INR wrong and it was actually in range at a 2.6      Plan:    1. INR is therapeutic today at 2.6 (goal 2.5-3.5). Ms. Michele to continue warfarin 2 mg oral daily except warfarin 1 mg MonWedFri until recheck.    2. Repeat INR on Monday, 3/25. If therapeutic at this time can likely extend recheck interval.   3. Lucie Michele understands the importance of calling the [facility] if she notices any s/sx of bleeding, stroke, or abnormal bruising, if any changes are made to her medications or medication doses (Rx, OTC, herbal), or if any upcoming procedures are scheduled. Lucie Michele will likewise let us know if any other changes, questions, or concerns arise regarding anticoagulation therapy. she  understands the importance of seeking medical attention immediately if she experiences any falls, vehicle accidents, or abnormal bleeding or bruising. Lucie Michele voiced understanding of this information and confirms that she has the [facility]'s contact information. Otherwise, we will plan to contact the patient once monthly or if her INR is out of range.    Rakel Jean-Baptiste  Pharmacy Technician   3/18/2024 11:25 EDT    I, Jesenia Garcia, PharmD, have reviewed the note in full and agree with the assessment and plan.  03/18/24  12:31 EDT

## 2024-03-22 ENCOUNTER — OFFICE VISIT (OUTPATIENT)
Dept: FAMILY MEDICINE CLINIC | Facility: CLINIC | Age: 79
End: 2024-03-22
Payer: MEDICARE

## 2024-03-22 VITALS
OXYGEN SATURATION: 98 % | WEIGHT: 163 LBS | HEART RATE: 72 BPM | SYSTOLIC BLOOD PRESSURE: 134 MMHG | DIASTOLIC BLOOD PRESSURE: 84 MMHG | HEIGHT: 65 IN | RESPIRATION RATE: 16 BRPM | BODY MASS INDEX: 27.16 KG/M2

## 2024-03-22 DIAGNOSIS — R41.3 MEMORY CHANGE: ICD-10-CM

## 2024-03-22 DIAGNOSIS — E28.310 SYMPTOMATIC PREMATURE MENOPAUSE: ICD-10-CM

## 2024-03-22 DIAGNOSIS — R29.6 FREQUENT FALLS: ICD-10-CM

## 2024-03-22 DIAGNOSIS — R73.9 HYPERGLYCEMIA: ICD-10-CM

## 2024-03-22 DIAGNOSIS — E55.9 VITAMIN D DEFICIENCY: ICD-10-CM

## 2024-03-22 DIAGNOSIS — N18.31 STAGE 3A CHRONIC KIDNEY DISEASE: ICD-10-CM

## 2024-03-22 DIAGNOSIS — E07.9 DISORDER OF THYROID: ICD-10-CM

## 2024-03-22 DIAGNOSIS — I10 PRIMARY HYPERTENSION: Primary | ICD-10-CM

## 2024-03-22 RX ORDER — DAPAGLIFLOZIN 10 MG/1
1 TABLET, FILM COATED ORAL DAILY
COMMUNITY
Start: 2024-03-05
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
## 2024-03-22 NOTE — ASSESSMENT & PLAN NOTE
Discussed with patient to monitor their blood pressure and if systolic blood pressure goes above 140 or diastolic is above 90 to return to clinic.  Take medicines as directed, call for any problems, patient not having or any worrisome symptoms.        Blood pressure borderline today.

## 2024-03-26 DIAGNOSIS — I35.9 AORTIC VALVE DISEASE: ICD-10-CM

## 2024-03-26 RX ORDER — WARFARIN SODIUM 2 MG/1
TABLET ORAL
Qty: 90 TABLET | Refills: 1 | Status: SHIPPED | OUTPATIENT
Start: 2024-03-26

## 2024-04-01 ENCOUNTER — ANTICOAGULATION VISIT (OUTPATIENT)
Dept: PHARMACY | Facility: HOSPITAL | Age: 79
End: 2024-04-01
Payer: MEDICARE

## 2024-04-01 ENCOUNTER — CLINICAL SUPPORT (OUTPATIENT)
Dept: CARDIOLOGY | Facility: CLINIC | Age: 79
End: 2024-04-01
Payer: MEDICARE

## 2024-04-01 DIAGNOSIS — Z95.2 HISTORY OF AORTIC VALVE REPLACEMENT: Primary | ICD-10-CM

## 2024-04-01 LAB — INR PPP: 2 (ref 0.9–1.1)

## 2024-04-01 PROCEDURE — 85610 PROTHROMBIN TIME: CPT | Performed by: INTERNAL MEDICINE

## 2024-04-01 PROCEDURE — 36416 COLLJ CAPILLARY BLOOD SPEC: CPT | Performed by: INTERNAL MEDICINE

## 2024-04-01 NOTE — PROGRESS NOTES
Capillary Blood Specimen Collection  Capillary blood collection performed in clinic by Ema Hamilton MA. Patient tolerated the procedure well without complications.   04/01/24   Ema Hamilton MA

## 2024-04-01 NOTE — PROGRESS NOTES
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     Diet: 2-3x week: 1 cup of brussels sprouts or broccoli weekly, green beans (3/11/23)  Premier Protein (or Equate brand) meal replacement ~3x/week 3/11/24  Alcohol: Seldom  Tobacco: None  OTC Pain Medication: APAP     INR History:  Date 4/5 4/19 4/26 5/5 5/11 6/2 6/15 6/18 6/25 7/2 7/9 7/19 7/23 7/30   Total Weekly Dose 15mg 15mg 14mg 14mg 14mg 14mg 14mg 14mg 14mg 14mg 14mg 14mg 14mg 14mg   INR 2.6 3.9 3.9 2.7 3.0 3.6 2.7 2.9 2.9 2.6 2.9 3.1 2.5 2.3   Notes           decr GLV   recv'd 6/21; HM HM       incr GLV decr GLV      Date 8/6 8/13 8/20 8/27 9/3 9/10 9/17 9/24 10/1 10/8 10/15 10/22 10/29 11/5   Total WeeklyDose 14mg 15mg 15mg 14mg 14mg 15mg 14mg 14mg 14mg 14mg 14mg 14mg 15mg 16mg   INR 2.4 3.4 3.8 2.9 2.2 3.2 2.9 3.3 3.2 3.3 3.0 2.4 2.4 2.4   Notes decr GLV decr GLV       1xboost         call call 1x boost   incr VitK call 2x boost; call      Date 11/10 11/17 11/24 12/1 12/8 12/15 12/23 12/30 1/3/22 1/7 1/11 1/18 1/25 2/1   Total WeeklyDose 17mg 16mg 16mg 16mg 15mg 15 mg Pt did not call back 15mg 12 mg 13mg 15mg 15 mg 15 mg 15 mg   INR 3.7 3.3 3.9 4.0 2.6 3.4 4.0 4.9 3.6 2.4 3.3 2.8 2.7 2.9   Notes Dec GLV   Zero GLV call Red x1 call Less GLV   call   Less  Protein drink redx1  self held x1 (misdose)   Dec vit K fall, apap  Call   call          Date 2/8 2/15 2/22 3/1 3/8 3/15 3/22 3/29 4/5 4/12 4/19 4/26 5/3 5/11   Total WeeklyDose 15mg 14mg 14 mg 15 mg 15 mg 15 mg 14mg 13 mg 14 mg 14 mg 14mg 13mg 15mg 14 mg   INR 4.0 4.0 2.8 2.9 2.9 4.2 3.9 3.3 2.9  3.0 3.8 2.4 3.8 2.5   Notes Call; Dec GLV call Call; Mis-dose call   Call; no GLV Inc GLV. Less protein, apap  redx1 call   Less GLV rec'd 4/27, call Dec GLV        Date 5/18 5/25 6/1 6/8 6/15 6/22 6/29 7/6 7/13 7/20 7/22 7/27  8/10  8/17   Total WeeklyDose 15 mg 15mg 16mg 15 mg 15 mg 15 mg 15 mg 15 mg 15mg 14 mg 15 mg 14 mg  14 mg  15 mg   INR 2.6 2.3 2.7 3.2 3.0 2.9 2.6 2.7 3.6 3.0 3.6 3.0  2.4  3.4   Notes   Inc GLV  call call call       call 7/14-- call call call  call  call  call      Date 8/25 8/31 9/7 9/12 9/19 9/26 10/3 10/10 10/17 10/24 10/31   Total WeeklyDose 14mg 13 mg 14 mg 17mg 15 mg 16mg 15mg 14 mg 13mg 17 mg 15mg   INR 4.3 2.8 1.7 2.9 2.1 3.4 3.8 2.4 1.8 3.7 4.5   Notes Dec GLV call Call refused enox; extra protein  Call; no protein drinks Call; less green tea, inc boostx1 Call; cranberries Redx1; call 1x miss Call  Less protein    1/1  Date 11/7 11/14 11/21 11/28 12/5 12/12 12/19 12/27 1/3 1/9 1/16/23 1/23 1/30/23   Total WeeklyDose 13 mg 14 mg 14 mg 14 mg 14 mg 14 mg 14mg 14 mg  13mg 13 mg 14 mg  14 mg 14 mg   INR 3.2 3.3 3.4 3.6 2.5 4.0 2.9 4.1 3.6 2.6 3.3 2.7 3.0   Notes  call redx1 One less protein shake   Inc GLV Decreased GLV W/o meloxicam  Tramadol,  meloxicam Tramadol,  meloxicam Tramadol,  meloxicam meloxicam     Date 2/7/23 2/13 2/20 2/27/23 3/6 3/13/23 3/23 3/27 4/3/23 4/10/23 4/17/23 4/24 5/1   Total WeeklyDose 14 mg 14 mg 14 mg 14 mg  14 mg 14 mg  14 mg 14 mg 15 mg  14mg 13 mg 15 mg 14 mg   INR 2.9 3.0 3.6 2.6 3 3.5 3.4 2.4 2.8 2.5 2.3 3.2  4.1 POCT   Notes Call  call rec 2/21  Call   call Call   call Call  call Missed dose 1x boost Call; missed protein drinks, less GLV     Date 5/2 5/8 5/15 5/22 5/30 6/5 6/19 7/10 7/24 7/31 8/7 8/14 8/21  8/28   Total WeeklyDose 12 mg 13 mg 14 mg 14 mg 14mg 14 mg 14 mg 14 mg 13 mg 13mg  12 mg 12 mg 14 mg 13 mg   INR 3.7 3.3 2.6 2.7 3.4 2.9 4.0 4.1 3.9 4.1 2.8 1.9 4.0  3.2   Notes rec 5/3  Call  Self-heldx1, boostx1    Stopping HM Less GLV  redx1 Ceftin    Rec'd 8/1 redx1 Inc GLV  Prednisone start Less GLV      Date 9/5 9/18 9/25 10/2 10/06 10/13 10/27 11/1 11/6 11/13 11/17 11/27   Total Weekly Dose 13mg 14 mg 12 mg  12 mg 11 mg 12 mg 12 mg 12 mg 11mg 11 mg 12 mg 12mg   INR 4.2 4.2 4.3  4.6 2.6 3.0 4.1 4.3 3.7 2.1 2.7 4.8   Notes redx1 clindamycin redx1 redx2 Red x2; inc GLV            Date 12/01 12/11 12/15 12/22 12/29 1/5/2024 1/12 1/26 2/9  2/26 3/1 3/11  3/18   Total Weekly Dose 11 mg 11 mg 11 mg 11 mg 11 mg 11 mg 11 mg 11 mg  12 mg   12 mg 12mg 11 mg  11 mg   INR 2.4 3.0 2.7 2.7 3.2 3.0 2.4 2.3 3.3  4.3 2.4 3.4 2.6   Notes  call    call    No GLV, apap   call     Date  4/1               Total Weekly  Dose 11 mg               INR 2.0               Notes                  Phone Interview:  Tablet Strength: 2mg  Patient Contact Info: [phone] (Mobile) *preferred*  [email or web address]  Verbal Release Authorization signed on 4/7/21 -- may speak with Tammy Bai (friend: [phone]), Ismael Michele (brother: [phone])  Lab Contact Info: [facility] ([facility])  ** will call once monthly or if INR is out of range**   4/7/22 Scr: 0.8    Patient Findings  Negatives: Signs/symptoms of thrombosis, Signs/symptoms of bleeding, Laboratory test error suspected, Change in health, Change in alcohol use, Change in activity, Upcoming invasive procedure, Emergency department visit, Upcoming dental procedure, Missed doses, Extra doses, Change in medications, Change in diet/appetite, Hospital admission, Bruising, Other complaints   Comments: Missed dose last Tuesday, took 1.5 tabs (3 mg) the day following. No other changes.        Plan:  1. INR is subtherapeutic today at 2.0 (goal 2.5-3.5). Instructed Ms. Michele to take a boosted dose of warfarin 2 mg today, and then resume prior dose of warfarin 2 mg oral daily except warfarin 1 mg MonWedFri until recheck.    2. Repeat INR ine one week on Monday, 4/8.   3. Lucie Michele understands the importance of calling the  [facility] if she notices any s/sx of bleeding, stroke, or abnormal bruising, if any changes are made to her medications or medication doses (Rx, OTC, herbal), or if any upcoming procedures are scheduled. Lucie Michele will likewise let us know if any other changes, questions, or concerns arise regarding anticoagulation therapy. she understands the importance of seeking medical attention immediately if she experiences any falls, vehicle accidents, or abnormal bleeding or bruising. Lucie Michele voiced understanding of this information and confirms that she has the [facility]'s contact information. Otherwise, we will plan to contact the patient once monthly or if her INR is out of range.    Tyrone Hensley, PharmD, BCPS  4/1/2024  13:50 EDT

## 2024-04-08 ENCOUNTER — CLINICAL SUPPORT (OUTPATIENT)
Dept: CARDIOLOGY | Facility: CLINIC | Age: 79
End: 2024-04-08
Payer: MEDICARE

## 2024-04-08 ENCOUNTER — ANTICOAGULATION VISIT (OUTPATIENT)
Dept: PHARMACY | Facility: HOSPITAL | Age: 79
End: 2024-04-08
Payer: MEDICARE

## 2024-04-08 DIAGNOSIS — Z95.2 HISTORY OF AORTIC VALVE REPLACEMENT: Primary | ICD-10-CM

## 2024-04-08 LAB — INR PPP: 2.5 (ref 0.9–1.1)

## 2024-04-08 PROCEDURE — 36416 COLLJ CAPILLARY BLOOD SPEC: CPT | Performed by: INTERNAL MEDICINE

## 2024-04-08 NOTE — PROGRESS NOTES
Capillary Blood Specimen Collection  Capillary blood collection performed in clinic by Margie Avina MA. Patient tolerated the procedure well without complications.   04/08/24   Margie Avina MA

## 2024-04-10 ENCOUNTER — HOSPITAL ENCOUNTER (OUTPATIENT)
Dept: BONE DENSITY | Facility: HOSPITAL | Age: 79
Discharge: HOME | End: 2024-04-10
Payer: MEDICARE

## 2024-04-15 ENCOUNTER — CLINICAL SUPPORT (OUTPATIENT)
Dept: CARDIOLOGY | Facility: CLINIC | Age: 79
End: 2024-04-15
Payer: MEDICARE

## 2024-04-15 ENCOUNTER — ANTICOAGULATION VISIT (OUTPATIENT)
Dept: PHARMACY | Facility: HOSPITAL | Age: 79
End: 2024-04-15
Payer: MEDICARE

## 2024-04-15 DIAGNOSIS — Z95.2 HISTORY OF AORTIC VALVE REPLACEMENT: Primary | ICD-10-CM

## 2024-04-15 LAB — INR PPP: 2.5 (ref 0.9–1.1)

## 2024-04-15 PROCEDURE — 36416 COLLJ CAPILLARY BLOOD SPEC: CPT | Performed by: INTERNAL MEDICINE

## 2024-04-15 NOTE — PROGRESS NOTES
Capillary Blood Specimen Collection  Capillary blood collection performed in clinic by Margie Avina MA. Patient tolerated the procedure well without complications.   04/15/24   Margie Avina MA

## 2024-04-15 NOTE — PROGRESS NOTES
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     Diet: 2-3x week: 1 cup of brussels sprouts or broccoli weekly, green beans (3/11/23)  Premier Protein (or Equate brand) meal replacement ~3x/week 3/11/24  Alcohol: Seldom  Tobacco: None  OTC Pain Medication: APAP     INR History:  Date 4/5 4/19 4/26 5/5 5/11 6/2 6/15 6/18 6/25 7/2 7/9 7/19 7/23 7/30   Total Weekly Dose 15mg 15mg 14mg 14mg 14mg 14mg 14mg 14mg 14mg 14mg 14mg 14mg 14mg 14mg   INR 2.6 3.9 3.9 2.7 3.0 3.6 2.7 2.9 2.9 2.6 2.9 3.1 2.5 2.3   Notes           decr GLV   recv'd 6/21; HM HM       incr GLV decr GLV      Date 8/6 8/13 8/20 8/27 9/3 9/10 9/17 9/24 10/1 10/8 10/15 10/22 10/29 11/5   Total WeeklyDose 14mg 15mg 15mg 14mg 14mg 15mg 14mg 14mg 14mg 14mg 14mg 14mg 15mg 16mg   INR 2.4 3.4 3.8 2.9 2.2 3.2 2.9 3.3 3.2 3.3 3.0 2.4 2.4 2.4   Notes decr GLV decr GLV       1xboost         call call 1x boost   incr VitK call 2x boost; call      Date 11/10 11/17 11/24 12/1 12/8 12/15 12/23 12/30 1/3/22 1/7 1/11 1/18 1/25 2/1   Total WeeklyDose 17mg 16mg 16mg 16mg 15mg 15 mg Pt did not call back 15mg 12 mg 13mg 15mg 15 mg 15 mg 15 mg   INR 3.7 3.3 3.9 4.0 2.6 3.4 4.0 4.9 3.6 2.4 3.3 2.8 2.7 2.9   Notes Dec GLV   Zero GLV call Red x1 call Less GLV   call   Less  Protein drink redx1  self held x1 (misdose)   Dec vit K fall, apap  Call   call          Date 2/8 2/15 2/22 3/1 3/8 3/15 3/22 3/29 4/5 4/12 4/19 4/26 5/3 5/11   Total WeeklyDose 15mg 14mg 14 mg 15 mg 15 mg 15 mg 14mg 13 mg 14 mg 14 mg 14mg 13mg 15mg 14 mg   INR 4.0 4.0 2.8 2.9 2.9 4.2 3.9 3.3 2.9  3.0 3.8 2.4 3.8 2.5   Notes Call; Dec GLV call Call; Mis-dose call   Call; no GLV Inc GLV. Less protein, apap  redx1 call   Less GLV rec'd 4/27, call Dec GLV        Date 5/18 5/25 6/1 6/8 6/15 6/22 6/29 7/6 7/13 7/20 7/22 7/27  8/10  8/17   Total WeeklyDose 15 mg 15mg 16mg 15 mg 15 mg 15 mg 15 mg 15 mg 15mg 14 mg 15 mg 14 mg  14 mg  15 mg   INR 2.6 2.3 2.7 3.2 3.0 2.9 2.6 2.7 3.6 3.0 3.6 3.0  2.4  3.4   Notes   Inc GLV  call call call       call 7/14-- call call call  call  call  call      Date 8/25 8/31 9/7 9/12 9/19 9/26 10/3 10/10 10/17 10/24 10/31   Total WeeklyDose 14mg 13 mg 14 mg 17mg 15 mg 16mg 15mg 14 mg 13mg 17 mg 15mg   INR 4.3 2.8 1.7 2.9 2.1 3.4 3.8 2.4 1.8 3.7 4.5   Notes Dec GLV call Call refused enox; extra protein  Call; no protein drinks Call; less green tea, inc boostx1 Call; cranberries Redx1; call 1x miss Call  Less protein    1/1  Date 11/7 11/14 11/21 11/28 12/5 12/12 12/19 12/27 1/3 1/9 1/16/23 1/23 1/30/23   Total WeeklyDose 13 mg 14 mg 14 mg 14 mg 14 mg 14 mg 14mg 14 mg  13mg 13 mg 14 mg  14 mg 14 mg   INR 3.2 3.3 3.4 3.6 2.5 4.0 2.9 4.1 3.6 2.6 3.3 2.7 3.0   Notes  call redx1 One less protein shake   Inc GLV Decreased GLV W/o meloxicam  Tramadol,  meloxicam Tramadol,  meloxicam Tramadol,  meloxicam meloxicam     Date 2/7/23 2/13 2/20 2/27/23 3/6 3/13/23 3/23 3/27 4/3/23 4/10/23 4/17/23 4/24 5/1   Total WeeklyDose 14 mg 14 mg 14 mg 14 mg  14 mg 14 mg  14 mg 14 mg 15 mg  14mg 13 mg 15 mg 14 mg   INR 2.9 3.0 3.6 2.6 3 3.5 3.4 2.4 2.8 2.5 2.3 3.2  4.1 POCT   Notes Call  call rec 2/21  Call   call Call   call Call  call Missed dose 1x boost Call; missed protein drinks, less GLV     Date 5/2 5/8 5/15 5/22 5/30 6/5 6/19 7/10 7/24 7/31 8/7 8/14 8/21  8/28   Total WeeklyDose 12 mg 13 mg 14 mg 14 mg 14mg 14 mg 14 mg 14 mg 13 mg 13mg  12 mg 12 mg 14 mg 13 mg   INR 3.7 3.3 2.6 2.7 3.4 2.9 4.0 4.1 3.9 4.1 2.8 1.9 4.0  3.2   Notes rec 5/3  Call  Self-heldx1, boostx1    Stopping HM Less GLV  redx1 Ceftin    Rec'd 8/1 redx1 Inc GLV  Prednisone start Less GLV      Date 9/5 9/18 9/25 10/2 10/06 10/13 10/27 11/1 11/6 11/13 11/17 11/27   Total Weekly Dose 13mg 14 mg 12 mg  12 mg 11 mg 12 mg 12 mg 12 mg 11mg 11 mg 12 mg 12mg   INR 4.2 4.2 4.3  4.6 2.6 3.0 4.1 4.3 3.7 2.1 2.7 4.8   Notes redx1 clindamycin redx1 redx2 Red x2; inc GLV            Date 12/01 12/11 12/15 12/22 12/29 1/5/2024 1/12 1/26 2/9  2/26 3/1 3/11  3/18   Total Weekly Dose 11 mg 11 mg 11 mg 11 mg 11 mg 11 mg 11 mg 11 mg  12 mg   12 mg 12mg 11 mg  11 mg   INR 2.4 3.0 2.7 2.7 3.2 3.0 2.4 2.3 3.3  4.3 2.4 3.4 2.6   Notes  call    call    No GLV, apap   call     Date  4/1 4/8/24 4/15             Total Weekly  Dose 11 mg 12mg 11 mg             INR 2.0 2.5 2.5             Notes Call  Missed x1 No contact  Boost x 1 No contact               Phone Interview:  Tablet Strength: 2mg  Patient Contact Info: [phone] (Mobile) *preferred*  [email or web address]  Verbal Release Authorization signed on 4/7/21 -- may speak with Tammy Bai (friend: [phone]), Ismael Michele (brother: [phone])  Lab Contact Info: [facility] ([place])  ** will call once monthly or if INR is out of range**   4/7/22 Scr: 0.8    Patient Findings  Comments: Patient not contacted for this encounter.      Plan:  1. INR is therapeutic today at 2.5 (goal 2.5-3.5). Ms. Michele will continue warfarin 2 mg oral daily except 1 mg MonWedFri until recheck.    2. Repeat INR in 1 week, 4/22/24.  3. Lucie Michele understands the importance of calling the [facility] if she notices any s/sx of bleeding, stroke, or abnormal bruising, if any changes are made to her medications or medication doses (Rx, OTC, herbal), or if any upcoming procedures are scheduled. Lucie Michele will likewise let us know if any other changes, questions, or concerns arise regarding anticoagulation therapy. she understands the importance of seeking medical attention immediately if she experiences any  falls, vehicle accidents, or abnormal bleeding or bruising. Lucie Michele voiced understanding of this information and confirms that she has the [facility]'s contact information. Otherwise, we will plan to contact the patient once monthly or if her INR is out of range.      Elva Langford CPhT   12:21 EDT 4/15/2024    IJesenia, PharmD, have reviewed the note in full and agree with the assessment and plan.  04/15/24  16:09 EDT

## 2024-04-22 ENCOUNTER — HOSPITAL ENCOUNTER (OUTPATIENT)
Dept: BONE DENSITY | Facility: HOSPITAL | Age: 79
Discharge: HOME | End: 2024-04-22
Admitting: FAMILY MEDICINE
Payer: MEDICARE

## 2024-04-22 ENCOUNTER — ANTICOAGULATION VISIT (OUTPATIENT)
Dept: PHARMACY | Facility: HOSPITAL | Age: 79
End: 2024-04-22
Payer: MEDICARE

## 2024-04-22 ENCOUNTER — CLINICAL SUPPORT (OUTPATIENT)
Dept: CARDIOLOGY | Facility: CLINIC | Age: 79
End: 2024-04-22
Payer: MEDICARE

## 2024-04-22 DIAGNOSIS — Z95.2 HISTORY OF AORTIC VALVE REPLACEMENT: Primary | ICD-10-CM

## 2024-04-22 DIAGNOSIS — I35.9 AORTIC VALVE DISEASE: ICD-10-CM

## 2024-04-22 DIAGNOSIS — I48.92 PAROXYSMAL ATRIAL FLUTTER: ICD-10-CM

## 2024-04-22 DIAGNOSIS — I50.32 CHRONIC DIASTOLIC CONGESTIVE HEART FAILURE: ICD-10-CM

## 2024-04-22 LAB — INR PPP: 3.2 (ref 0.9–1.1)

## 2024-04-22 PROCEDURE — 77080 DXA BONE DENSITY AXIAL: CPT

## 2024-04-22 PROCEDURE — 36416 COLLJ CAPILLARY BLOOD SPEC: CPT | Performed by: INTERNAL MEDICINE

## 2024-04-22 NOTE — PROGRESS NOTES
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     Diet: 2-3x week: 1 cup of brussels sprouts or broccoli weekly, green beans (3/11/23)  Premier Protein (or Equate brand) meal replacement ~3x/week 3/11/24  Alcohol: Seldom  Tobacco: None  OTC Pain Medication: APAP     INR History:  Date 4/5 4/19 4/26 5/5 5/11 6/2 6/15 6/18 6/25 7/2 7/9 7/19 7/23 7/30   Total Weekly Dose 15mg 15mg 14mg 14mg 14mg 14mg 14mg 14mg 14mg 14mg 14mg 14mg 14mg 14mg   INR 2.6 3.9 3.9 2.7 3.0 3.6 2.7 2.9 2.9 2.6 2.9 3.1 2.5 2.3   Notes           decr GLV   recv'd 6/21; HM HM       incr GLV decr GLV      Date 8/6 8/13 8/20 8/27 9/3 9/10 9/17 9/24 10/1 10/8 10/15 10/22 10/29 11/5   Total WeeklyDose 14mg 15mg 15mg 14mg 14mg 15mg 14mg 14mg 14mg 14mg 14mg 14mg 15mg 16mg   INR 2.4 3.4 3.8 2.9 2.2 3.2 2.9 3.3 3.2 3.3 3.0 2.4 2.4 2.4   Notes decr GLV decr GLV       1xboost         call call 1x boost   incr VitK call 2x boost; call      Date 11/10 11/17 11/24 12/1 12/8 12/15 12/23 12/30 1/3/22 1/7 1/11 1/18 1/25 2/1   Total WeeklyDose 17mg 16mg 16mg 16mg 15mg 15 mg Pt did not call back 15mg 12 mg 13mg 15mg 15 mg 15 mg 15 mg   INR 3.7 3.3 3.9 4.0 2.6 3.4 4.0 4.9 3.6 2.4 3.3 2.8 2.7 2.9   Notes Dec GLV   Zero GLV call Red x1 call Less GLV   call   Less  Protein drink redx1  self held x1 (misdose)   Dec vit K fall, apap  Call   call          Date 2/8 2/15 2/22 3/1 3/8 3/15 3/22 3/29 4/5 4/12 4/19 4/26 5/3 5/11   Total WeeklyDose 15mg 14mg 14 mg 15 mg 15 mg 15 mg 14mg 13 mg 14 mg 14 mg 14mg 13mg 15mg 14 mg   INR 4.0 4.0 2.8 2.9 2.9 4.2 3.9 3.3 2.9  3.0 3.8 2.4 3.8 2.5   Notes Call; Dec GLV call Call; Mis-dose call   Call; no GLV Inc GLV. Less protein, apap  redx1 call   Less GLV rec'd 4/27, call Dec GLV        Date 5/18 5/25 6/1 6/8 6/15 6/22 6/29 7/6 7/13 7/20 7/22 7/27  8/10  8/17   Total WeeklyDose 15 mg 15mg 16mg 15 mg 15 mg 15 mg 15 mg 15 mg 15mg 14 mg 15 mg 14 mg  14 mg  15 mg   INR 2.6 2.3 2.7 3.2 3.0 2.9 2.6 2.7 3.6 3.0 3.6 3.0  2.4  3.4   Notes   Inc GLV  call call call       call 7/14-- call call call  call  call  call      Date 8/25 8/31 9/7 9/12 9/19 9/26 10/3 10/10 10/17 10/24 10/31   Total WeeklyDose 14mg 13 mg 14 mg 17mg 15 mg 16mg 15mg 14 mg 13mg 17 mg 15mg   INR 4.3 2.8 1.7 2.9 2.1 3.4 3.8 2.4 1.8 3.7 4.5   Notes Dec GLV call Call refused enox; extra protein  Call; no protein drinks Call; less green tea, inc boostx1 Call; cranberries Redx1; call 1x miss Call  Less protein    1/1  Date 11/7 11/14 11/21 11/28 12/5 12/12 12/19 12/27 1/3 1/9 1/16/23 1/23 1/30/23   Total WeeklyDose 13 mg 14 mg 14 mg 14 mg 14 mg 14 mg 14mg 14 mg  13mg 13 mg 14 mg  14 mg 14 mg   INR 3.2 3.3 3.4 3.6 2.5 4.0 2.9 4.1 3.6 2.6 3.3 2.7 3.0   Notes  call redx1 One less protein shake   Inc GLV Decreased GLV W/o meloxicam  Tramadol,  meloxicam Tramadol,  meloxicam Tramadol,  meloxicam meloxicam     Date 2/7/23 2/13 2/20 2/27/23 3/6 3/13/23 3/23 3/27 4/3/23 4/10/23 4/17/23 4/24 5/1   Total WeeklyDose 14 mg 14 mg 14 mg 14 mg  14 mg 14 mg  14 mg 14 mg 15 mg  14mg 13 mg 15 mg 14 mg   INR 2.9 3.0 3.6 2.6 3 3.5 3.4 2.4 2.8 2.5 2.3 3.2  4.1 POCT   Notes Call  call rec 2/21  Call   call Call   call Call  call Missed dose 1x boost Call; missed protein drinks, less GLV     Date 5/2 5/8 5/15 5/22 5/30 6/5 6/19 7/10 7/24 7/31 8/7 8/14 8/21  8/28   Total WeeklyDose 12 mg 13 mg 14 mg 14 mg 14mg 14 mg 14 mg 14 mg 13 mg 13mg  12 mg 12 mg 14 mg 13 mg   INR 3.7 3.3 2.6 2.7 3.4 2.9 4.0 4.1 3.9 4.1 2.8 1.9 4.0  3.2   Notes rec 5/3  Call  Self-heldx1, boostx1    Stopping HM Less GLV  redx1 Ceftin    Rec'd 8/1 redx1 Inc GLV  Prednisone start Less GLV      Date 9/5 9/18 9/25 10/2 10/06 10/13 10/27 11/1 11/6 11/13 11/17 11/27   Total Weekly Dose 13mg 14 mg 12 mg  12 mg 11 mg 12 mg 12 mg 12 mg 11mg 11 mg 12 mg 12mg   INR 4.2 4.2 4.3  4.6 2.6 3.0 4.1 4.3 3.7 2.1 2.7 4.8   Notes redx1 clindamycin redx1 redx2 Red x2; inc GLV            Date 12/01 12/11 12/15 12/22 12/29 1/5/2024 1/12 1/26 2/9  2/26 3/1 3/11  3/18   Total Weekly Dose 11 mg 11 mg 11 mg 11 mg 11 mg 11 mg 11 mg 11 mg  12 mg   12 mg 12mg 11 mg  11 mg   INR 2.4 3.0 2.7 2.7 3.2 3.0 2.4 2.3 3.3  4.3 2.4 3.4 2.6   Notes  call    call    No GLV, apap   call     Date  4/1 4/8/24 4/15 4/22            Total Weekly  Dose 11 mg 12mg 11 mg 11 mg            INR 2.0 2.5 2.5 3.2            Notes Call  Missed x1 No contact  Boost x 1 No contact No contact              Phone Interview:  Tablet Strength: 2mg  Patient Contact Info: [phone] (Mobile) *preferred*  [email or web address]  Verbal Release Authorization signed on 4/7/21 -- may speak with Tammy Bai (friend: [phone]), Ismael Michele (brother: [phone])  Lab Contact Info: [facility] ([facility])  ** will call once monthly or if INR is out of range**   4/7/22 Scr: 0.8    Patient Findings    Negatives: Signs/symptoms of thrombosis, Signs/symptoms of bleeding, Laboratory test error suspected, Change in health, Change in alcohol use, Change in activity, Upcoming invasive procedure, Emergency department visit, Upcoming dental procedure, Missed doses, Extra doses, Change in medications, Change in diet/appetite, Hospital admission, Bruising, Other complaints   Comments: Patient not contacted for this encounter.      Plan:  1. INR is therapeutic today at 3.2 (goal 2.5-3.5). Ms. Michele to continue warfarin 2 mg oral daily except 1 mg MonWedFri until recheck.    2. Repeat INR in 1 week, 4/29/24.  3. Lucie Michele understands the importance of calling the [facility] if she notices any  s/sx of bleeding, stroke, or abnormal bruising, if any changes are made to her medications or medication doses (Rx, OTC, herbal), or if any upcoming procedures are scheduled. Lucie Michele will likewise let us know if any other changes, questions, or concerns arise regarding anticoagulation therapy. she understands the importance of seeking medical attention immediately if she experiences any falls, vehicle accidents, or abnormal bleeding or bruising. Lucie Michele voiced understanding of this information and confirms that she has the [facility]'s contact information. Otherwise, we will plan to contact the patient once monthly or if her INR is out of range.      Elva Langford CPhT   11:34 EDT 4/22/2024    I, Herberth Lin, [facility], have reviewed the note in full and agree with the assessment and plan.  04/22/24  15:06 EDT

## 2024-04-22 NOTE — PROGRESS NOTES
Capillary Blood Specimen Collection  Capillary blood collection performed in clinic by Hubert Crawford MA. Patient tolerated the procedure well without complications.   04/22/24   Hubert Crawford MA

## 2024-04-29 ENCOUNTER — CLINICAL SUPPORT (OUTPATIENT)
Dept: CARDIOLOGY | Facility: CLINIC | Age: 79
End: 2024-04-29
Payer: MEDICARE

## 2024-04-29 DIAGNOSIS — I48.92 PAROXYSMAL ATRIAL FLUTTER: ICD-10-CM

## 2024-04-29 DIAGNOSIS — Z95.2 HISTORY OF AORTIC VALVE REPLACEMENT: Primary | ICD-10-CM

## 2024-04-29 LAB — INR PPP: 1.1 (ref 0.9–1.1)

## 2024-04-29 PROCEDURE — 36416 COLLJ CAPILLARY BLOOD SPEC: CPT | Performed by: INTERNAL MEDICINE

## 2024-04-29 NOTE — PROGRESS NOTES
Capillary Blood Specimen Collection  Capillary blood collection performed in clinic by Margie Avina MA. Patient tolerated the procedure well without complications.   04/29/24   Margie Avina MA

## 2024-04-30 ENCOUNTER — ANTICOAGULATION VISIT (OUTPATIENT)
Dept: PHARMACY | Facility: HOSPITAL | Age: 79
End: 2024-04-30
Payer: MEDICARE

## 2024-04-30 ENCOUNTER — TELEPHONE (OUTPATIENT)
Dept: CARDIOLOGY | Facility: CLINIC | Age: 79
End: 2024-04-30
Payer: MEDICARE

## 2024-04-30 RX ORDER — ENOXAPARIN SODIUM 100 MG/ML
70 INJECTION SUBCUTANEOUS EVERY 12 HOURS SCHEDULED
Qty: 8 ML | Refills: 1 | Status: SHIPPED | OUTPATIENT
Start: 2024-04-30 | End: 2024-04-30 | Stop reason: SDUPTHER

## 2024-04-30 RX ORDER — ENOXAPARIN SODIUM 100 MG/ML
70 INJECTION SUBCUTANEOUS EVERY 12 HOURS SCHEDULED
Qty: 8 ML | Refills: 1 | Status: SHIPPED | OUTPATIENT
Start: 2024-04-30

## 2024-04-30 RX ORDER — ENOXAPARIN SODIUM 100 MG/ML
80 INJECTION SUBCUTANEOUS EVERY 12 HOURS SCHEDULED
Qty: 8 ML | Refills: 1 | Status: SHIPPED | OUTPATIENT
Start: 2024-04-30 | End: 2024-04-30

## 2024-04-30 NOTE — PROGRESS NOTES
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     Diet: 2-3x week: 1 cup of brussels sprouts or broccoli weekly, green beans (3/11/23)  Premier Protein (or Equate brand) meal replacement ~3x/week 3/11/24  Alcohol: Seldom  Tobacco: None  OTC Pain Medication: APAP     INR History:  Date 4/5 4/19 4/26 5/5 5/11 6/2 6/15 6/18 6/25 7/2 7/9 7/19 7/23 7/30   Total Weekly Dose 15mg 15mg 14mg 14mg 14mg 14mg 14mg 14mg 14mg 14mg 14mg 14mg 14mg 14mg   INR 2.6 3.9 3.9 2.7 3.0 3.6 2.7 2.9 2.9 2.6 2.9 3.1 2.5 2.3   Notes           decr GLV   recv'd 6/21; HM HM       incr GLV decr GLV      Date 8/6 8/13 8/20 8/27 9/3 9/10 9/17 9/24 10/1 10/8 10/15 10/22 10/29 11/5   Total WeeklyDose 14mg 15mg 15mg 14mg 14mg 15mg 14mg 14mg 14mg 14mg 14mg 14mg 15mg 16mg   INR 2.4 3.4 3.8 2.9 2.2 3.2 2.9 3.3 3.2 3.3 3.0 2.4 2.4 2.4   Notes decr GLV decr GLV       1xboost         call call 1x boost   incr VitK call 2x boost; call      Date 11/10 11/17 11/24 12/1 12/8 12/15 12/23 12/30 1/3/22 1/7 1/11 1/18 1/25 2/1   Total WeeklyDose 17mg 16mg 16mg 16mg 15mg 15 mg Pt did not call back 15mg 12 mg 13mg 15mg 15 mg 15 mg 15 mg   INR 3.7 3.3 3.9 4.0 2.6 3.4 4.0 4.9 3.6 2.4 3.3 2.8 2.7 2.9   Notes Dec GLV   Zero GLV call Red x1 call Less GLV   call   Less  Protein drink redx1  self held x1 (misdose)   Dec vit K fall, apap  Call   call          Date 2/8 2/15 2/22 3/1 3/8 3/15 3/22 3/29 4/5 4/12 4/19 4/26 5/3 5/11   Total WeeklyDose 15mg 14mg 14 mg 15 mg 15 mg 15 mg 14mg 13 mg 14 mg 14 mg 14mg 13mg 15mg 14 mg   INR 4.0 4.0 2.8 2.9 2.9 4.2 3.9 3.3 2.9  3.0 3.8 2.4 3.8 2.5   Notes Call; Dec GLV call Call; Mis-dose call   Call; no GLV Inc GLV. Less protein, apap  redx1 call   Less GLV rec'd 4/27, call Dec GLV        Date 5/18 5/25 6/1 6/8 6/15 6/22 6/29 7/6 7/13 7/20 7/22 7/27  8/10  8/17   Total WeeklyDose 15 mg 15mg 16mg 15 mg 15 mg 15 mg 15 mg 15 mg 15mg 14 mg 15 mg 14 mg  14 mg  15 mg   INR 2.6 2.3 2.7 3.2 3.0 2.9 2.6 2.7 3.6 3.0 3.6 3.0  2.4  3.4   Notes   Inc GLV  call call call       call 7/14-- call call call  call  call  call      Date 8/25 8/31 9/7 9/12 9/19 9/26 10/3 10/10 10/17 10/24 10/31   Total WeeklyDose 14mg 13 mg 14 mg 17mg 15 mg 16mg 15mg 14 mg 13mg 17 mg 15mg   INR 4.3 2.8 1.7 2.9 2.1 3.4 3.8 2.4 1.8 3.7 4.5   Notes Dec GLV call Call refused enox; extra protein  Call; no protein drinks Call; less green tea, inc boostx1 Call; cranberries Redx1; call 1x miss Call  Less protein    1/1  Date 11/7 11/14 11/21 11/28 12/5 12/12 12/19 12/27 1/3 1/9 1/16/23 1/23 1/30/23   Total WeeklyDose 13 mg 14 mg 14 mg 14 mg 14 mg 14 mg 14mg 14 mg  13mg 13 mg 14 mg  14 mg 14 mg   INR 3.2 3.3 3.4 3.6 2.5 4.0 2.9 4.1 3.6 2.6 3.3 2.7 3.0   Notes  call redx1 One less protein shake   Inc GLV Decreased GLV W/o meloxicam  Tramadol,  meloxicam Tramadol,  meloxicam Tramadol,  meloxicam meloxicam     Date 2/7/23 2/13 2/20 2/27/23 3/6 3/13/23 3/23 3/27 4/3/23 4/10/23 4/17/23 4/24 5/1   Total WeeklyDose 14 mg 14 mg 14 mg 14 mg  14 mg 14 mg  14 mg 14 mg 15 mg  14mg 13 mg 15 mg 14 mg   INR 2.9 3.0 3.6 2.6 3 3.5 3.4 2.4 2.8 2.5 2.3 3.2  4.1 POCT   Notes Call  call rec 2/21  Call   call Call   call Call  call Missed dose 1x boost Call; missed protein drinks, less GLV     Date 5/2 5/8 5/15 5/22 5/30 6/5 6/19 7/10 7/24 7/31 8/7 8/14 8/21  8/28   Total WeeklyDose 12 mg 13 mg 14 mg 14 mg 14mg 14 mg 14 mg 14 mg 13 mg 13mg  12 mg 12 mg 14 mg 13 mg   INR 3.7 3.3 2.6 2.7 3.4 2.9 4.0 4.1 3.9 4.1 2.8 1.9 4.0  3.2   Notes rec 5/3  Call  Self-heldx1, boostx1    Stopping HM Less GLV  redx1 Ceftin    Rec'd 8/1 redx1 Inc GLV  Prednisone start Less GLV      Date 9/5 9/18 9/25 10/2 10/06 10/13 10/27 11/1 11/6 11/13 11/17 11/27   Total Weekly Dose 13mg 14 mg 12 mg  12 mg 11 mg 12 mg 12 mg 12 mg 11mg 11 mg 12 mg 12mg   INR 4.2 4.2 4.3  4.6 2.6 3.0 4.1 4.3 3.7 2.1 2.7 4.8   Notes redx1 clindamycin redx1 redx2 Red x2; inc GLV            Date 12/01 12/11 12/15 12/22 12/29 1/5/2024 1/12 1/26 2/9  2/26 3/1 3/11  3/18   Total Weekly Dose 11 mg 11 mg 11 mg 11 mg 11 mg 11 mg 11 mg 11 mg  12 mg   12 mg 12mg 11 mg  11 mg   INR 2.4 3.0 2.7 2.7 3.2 3.0 2.4 2.3 3.3  4.3 2.4 3.4 2.6   Notes  call    call    No GLV, apap   call     Date  4/1 4/8/24 4/15 4/22 4/29           Total Weekly  Dose 11 mg 12mg 11 mg 11 mg ???  Missed Sunday maybe more           INR 2.0 2.5 2.5 3.2 1.1           Notes Call  Missed x1 No contact  Boost x 1 No contact No contact  lovenox            Phone Interview:  Tablet Strength: 2mg  Patient Contact Info: [phone] (Mobile) *preferred*  [email or web address]  Verbal Release Authorization signed on 4/7/21 -- may speak with Tammy Bhumika (friend: [phone]), Ismael Michele (brother: [phone])  Lab Contact Info: [facility] ([facility])  ** will call once monthly or if INR is out of range**    12/1/23 Scr: 77 ml/min    Patient Findings    Positives: Missed doses, Change in medications   Negatives: Signs/symptoms of thrombosis, Signs/symptoms of bleeding, Laboratory test error suspected, Change in health, Change in alcohol use, Change in activity, Upcoming invasive procedure, Emergency department visit, Upcoming dental procedure, Extra doses, Change in diet/appetite, Hospital admission, Bruising, Other complaints   Comments: Took APAP Friday afternoon because her back hurt. She missed her dose on Sunday and possibly another another dose last week. She said her family has been stressing her out some and that is why she has been forgetting doses. She also did not have any GLV last week but she  thought that would have helped her INR. She denies any other changes.        Plan:  1. INR was SuBtherapeutic yesterday at 1.1 (goal 2.5-3.5). Ms. Michele to boost tonight's dose warfarin to 4 mg then take 2mg on Wed and Thurs and recheck Friday. Also instructed her to use lovenox 70 mg BID subcutaneously starting this evening until INR > 2.      Addendum:   [facility] could not get the 80 mg dose until late tomorrow which will delay therapy. I sent in a lower dose of 60 mg lovenox since they have that in stock now. The 80 mg is still on her file should she need it at a later time. She will use 60 mg sub q lovenox BID until Friday's recheck.    See today's phone encounter routed to Dr. Blake's office. 5/1/24           2. Repeat INR on Friday, 5/3/24.  3. Lucie Michele understands the importance of calling the [facility] if she notices any s/sx of bleeding, stroke, or abnormal bruising, if any changes are made to her medications or medication doses (Rx, OTC, herbal), or if any upcoming procedures are scheduled. Lucie Michele will likewise let us know if any other changes, questions, or concerns arise regarding anticoagulation therapy. she understands the importance of seeking medical attention immediately if she experiences any falls, vehicle accidents, or abnormal bleeding or bruising. Lucie Michele voiced understanding of this information and confirms that she has the [facility]'s contact information. Otherwise, we will plan to contact the patient once monthly or if her INR is out of range.    Rhea Barrera, PharmD  4/30/2024  13:00 EDT

## 2024-04-30 NOTE — TELEPHONE ENCOUNTER
PT CALLED STATING SOMEONE FROM OUR OFFICE CALLED ABOUT HER LOW INR    SHE IS WONDERING ABOUT WHO CALLED HER AND ABOUT THE MEDICATION/SYRINGE THAT WAS PRESCRIBED     PLEASE ADVISE PT

## 2024-05-01 ENCOUNTER — TELEPHONE (OUTPATIENT)
Dept: PHARMACY | Facility: HOSPITAL | Age: 79
End: 2024-05-01
Payer: MEDICARE

## 2024-05-01 ENCOUNTER — TELEPHONE (OUTPATIENT)
Dept: CARDIOLOGY | Facility: CLINIC | Age: 79
End: 2024-05-01

## 2024-05-01 RX ORDER — ENOXAPARIN SODIUM 100 MG/ML
60 INJECTION SUBCUTANEOUS EVERY 12 HOURS SCHEDULED
Qty: 3 ML | Refills: 0 | Status: SHIPPED | OUTPATIENT
Start: 2024-05-01 | End: 2024-05-03 | Stop reason: SDUPTHER

## 2024-05-01 NOTE — TELEPHONE ENCOUNTER
Dr. Blake or Margie Avina    Sent in lovenox to [facility] in [place] and it was requiring a PA. I will have the PA form sent to Dr. Blake's office. I also found a good rx coupon that would make the prescription run the patient around $56. Ms. Michele said she would be ok with that price if the PA does not go through before she returns to the pharmacy. I have instructed the patient on how to use and have spoke to the pharmacist at [facility] to ensure she knows how to dose since she has not used it in a while. She will recheck her INR again Friday in hopes it is above 2 so lovenox will not be needed any longer. If someone in Dr. Blake's office has any luck getting that PA to go through let me know and I will have the pharmacy run the insurance instead of good rx to get the cost down further. Patient expressed gratitude and appreciation for our attention to this matter.    Thank  you    Rhea Barrera, PharmD  5/1/2024  09:30 EDT      Addendum:   [facility] until late tomorrow which will delay therapy. I sent in a lower dose of 60 mg lovenox since they have that in stock now. The 80 mg is still on her file should she need it at a later time.

## 2024-05-01 NOTE — TELEPHONE ENCOUNTER
Lovenox denied per insurance phone call to pt no answer left detailed message regarding denial and to use her Good RX coupon and to call if she needs anything.

## 2024-05-01 NOTE — TELEPHONE ENCOUNTER
Caller: Lucie Michele    Relationship: Self    Best call back number: [phone]     What is the best time to reach you: ANYTIME    Who are you requesting to speak with (clinical staff, provider,  specific staff member): JENNIFER    What was the call regarding: PATIENT PICKED UP THE LOVENOX WITH THE COUPON CARD. IT WAS LESS THAN $22.

## 2024-05-01 NOTE — TELEPHONE ENCOUNTER
Phone call to pt and due to low INR the [facility] clinic ordered her Lovenox injections and she is going to call them and see what the next step is and will let us know if we need to do anything.

## 2024-05-03 ENCOUNTER — ANTICOAGULATION VISIT (OUTPATIENT)
Dept: PHARMACY | Facility: HOSPITAL | Age: 79
End: 2024-05-03
Payer: MEDICARE

## 2024-05-03 ENCOUNTER — CLINICAL SUPPORT (OUTPATIENT)
Dept: CARDIOLOGY | Facility: CLINIC | Age: 79
End: 2024-05-03
Payer: MEDICARE

## 2024-05-03 DIAGNOSIS — Z95.2 HISTORY OF AORTIC VALVE REPLACEMENT: Primary | ICD-10-CM

## 2024-05-03 LAB — INR PPP: 1.5 (ref 0.9–1.1)

## 2024-05-03 RX ORDER — ENOXAPARIN SODIUM 100 MG/ML
60 INJECTION SUBCUTANEOUS EVERY 12 HOURS SCHEDULED
Qty: 6 ML | Refills: 0 | Status: SHIPPED | OUTPATIENT
Start: 2024-05-03

## 2024-05-03 NOTE — PROGRESS NOTES
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     Diet: 2-3x week: 1 cup of brussels sprouts or broccoli weekly, green beans (3/11/23)  Premier Protein (or Equate brand) meal replacement ~3x/week 3/11/24  Alcohol: Seldom  Tobacco: None  OTC Pain Medication: APAP     INR History:  Date 4/5 4/19 4/26 5/5 5/11 6/2 6/15 6/18 6/25 7/2 7/9 7/19 7/23 7/30   Total Weekly Dose 15mg 15mg 14mg 14mg 14mg 14mg 14mg 14mg 14mg 14mg 14mg 14mg 14mg 14mg   INR 2.6 3.9 3.9 2.7 3.0 3.6 2.7 2.9 2.9 2.6 2.9 3.1 2.5 2.3   Notes           decr GLV   recv'd 6/21; HM HM       incr GLV decr GLV      Date 8/6 8/13 8/20 8/27 9/3 9/10 9/17 9/24 10/1 10/8 10/15 10/22 10/29 11/5   Total WeeklyDose 14mg 15mg 15mg 14mg 14mg 15mg 14mg 14mg 14mg 14mg 14mg 14mg 15mg 16mg   INR 2.4 3.4 3.8 2.9 2.2 3.2 2.9 3.3 3.2 3.3 3.0 2.4 2.4 2.4   Notes decr GLV decr GLV       1xboost         call call 1x boost   incr VitK call 2x boost; call      Date 11/10 11/17 11/24 12/1 12/8 12/15 12/23 12/30 1/3/22 1/7 1/11 1/18 1/25 2/1   Total WeeklyDose 17mg 16mg 16mg 16mg 15mg 15 mg Pt did not call back 15mg 12 mg 13mg 15mg 15 mg 15 mg 15 mg   INR 3.7 3.3 3.9 4.0 2.6 3.4 4.0 4.9 3.6 2.4 3.3 2.8 2.7 2.9   Notes Dec GLV   Zero GLV call Red x1 call Less GLV   call   Less  Protein drink redx1  self held x1 (misdose)   Dec vit K fall, apap  Call   call          Date 2/8 2/15 2/22 3/1 3/8 3/15 3/22 3/29 4/5 4/12 4/19 4/26 5/3 5/11   Total WeeklyDose 15mg 14mg 14 mg 15 mg 15 mg 15 mg 14mg 13 mg 14 mg 14 mg 14mg 13mg 15mg 14 mg   INR 4.0 4.0 2.8 2.9 2.9 4.2 3.9 3.3 2.9  3.0 3.8 2.4 3.8 2.5   Notes Call; Dec GLV call Call; Mis-dose call   Call; no GLV Inc GLV. Less protein, apap  redx1 call   Less GLV rec'd 4/27, call Dec GLV        Date 5/18 5/25 6/1 6/8 6/15 6/22 6/29 7/6 7/13 7/20 7/22 7/27  8/10  8/17   Total WeeklyDose 15 mg 15mg 16mg 15 mg 15 mg 15 mg 15 mg 15 mg 15mg 14 mg 15 mg 14 mg  14 mg  15 mg   INR 2.6 2.3 2.7 3.2 3.0 2.9 2.6 2.7 3.6 3.0 3.6 3.0  2.4  3.4   Notes   Inc GLV  call call call       call 7/14-- call call call  call  call  call      Date 8/25 8/31 9/7 9/12 9/19 9/26 10/3 10/10 10/17 10/24 10/31   Total WeeklyDose 14mg 13 mg 14 mg 17mg 15 mg 16mg 15mg 14 mg 13mg 17 mg 15mg   INR 4.3 2.8 1.7 2.9 2.1 3.4 3.8 2.4 1.8 3.7 4.5   Notes Dec GLV call Call refused enox; extra protein  Call; no protein drinks Call; less green tea, inc boostx1 Call; cranberries Redx1; call 1x miss Call  Less protein    1/1  Date 11/7 11/14 11/21 11/28 12/5 12/12 12/19 12/27 1/3 1/9 1/16/23 1/23 1/30/23   Total WeeklyDose 13 mg 14 mg 14 mg 14 mg 14 mg 14 mg 14mg 14 mg  13mg 13 mg 14 mg  14 mg 14 mg   INR 3.2 3.3 3.4 3.6 2.5 4.0 2.9 4.1 3.6 2.6 3.3 2.7 3.0   Notes  call redx1 One less protein shake   Inc GLV Decreased GLV W/o meloxicam  Tramadol,  meloxicam Tramadol,  meloxicam Tramadol,  meloxicam meloxicam     Date 2/7/23 2/13 2/20 2/27/23 3/6 3/13/23 3/23 3/27 4/3/23 4/10/23 4/17/23 4/24 5/1   Total WeeklyDose 14 mg 14 mg 14 mg 14 mg  14 mg 14 mg  14 mg 14 mg 15 mg  14mg 13 mg 15 mg 14 mg   INR 2.9 3.0 3.6 2.6 3 3.5 3.4 2.4 2.8 2.5 2.3 3.2  4.1 POCT   Notes Call  call rec 2/21  Call   call Call   call Call  call Missed dose 1x boost Call; missed protein drinks, less GLV     Date 5/2 5/8 5/15 5/22 5/30 6/5 6/19 7/10 7/24 7/31 8/7 8/14 8/21  8/28   Total WeeklyDose 12 mg 13 mg 14 mg 14 mg 14mg 14 mg 14 mg 14 mg 13 mg 13mg  12 mg 12 mg 14 mg 13 mg   INR 3.7 3.3 2.6 2.7 3.4 2.9 4.0 4.1 3.9 4.1 2.8 1.9 4.0  3.2   Notes rec 5/3  Call  Self-heldx1, boostx1    Stopping HM Less GLV  redx1 Ceftin    Rec'd 8/1 redx1 Inc GLV  Prednisone start Less GLV      Date 9/5 9/18 9/25 10/2 10/06 10/13 10/27 11/1 11/6 11/13 11/17 11/27   Total Weekly Dose 13mg 14 mg 12 mg  12 mg 11 mg 12 mg 12 mg 12 mg 11mg 11 mg 12 mg 12mg   INR 4.2 4.2 4.3  4.6 2.6 3.0 4.1 4.3 3.7 2.1 2.7 4.8   Notes redx1 clindamycin redx1 redx2 Red x2; inc GLV            Date 12/01 12/11 12/15 12/22 12/29 1/5/2024 1/12 1/26 2/9  2/26 3/1 3/11  3/18   Total Weekly Dose 11 mg 11 mg 11 mg 11 mg 11 mg 11 mg 11 mg 11 mg  12 mg   12 mg 12mg 11 mg  11 mg   INR 2.4 3.0 2.7 2.7 3.2 3.0 2.4 2.3 3.3  4.3 2.4 3.4 2.6   Notes  call    call    No GLV, apap   call     Date  4/1 4/8/24 4/15 4/22 4/29 5/3          Total Weekly  Dose 11 mg 12mg 11 mg 11 mg ???  Missed Sunday maybe more 12 mg          INR 2.0 2.5 2.5 3.2 1.1 1.5          Notes Call  Missed x1 No contact  Boost x 1 No contact No contact  Lovenox  Boost x 2            Phone Interview:  Tablet Strength: 2mg  Patient Contact Info: [phone] (Mobile) *preferred*  [email or web address]  Verbal Release Authorization signed on 4/7/21 -- may speak with Tammy Bai (friend: [phone]), Ismael Michele (brother: [phone])  Lab Contact Info: [facility] ([facility])  ** will call once monthly or if INR is out of range**    12/1/23 Scr: 77 ml/min    Patient Findings    Negatives: Signs/symptoms of thrombosis, Signs/symptoms of bleeding, Laboratory test error suspected, Change in health, Change in alcohol use, Change in activity, Upcoming invasive procedure, Emergency department visit, Upcoming dental procedure, Missed doses, Extra doses, Change in medications, Change in diet/appetite, Hospital admission, Bruising, Other complaints   Comments: Lucieelva Michele has been working out in the yard and has not had to take APAP the last few days.      Plan:  1. INR was SuBtherapeutic again today at 1.5 (goal 2.5-3.5) despite 2 boosted doses. Ms. Michele to boost tonight's dose warfarin to 3 mg then take 2mg on  Sat/Sun and recheck Monday. Also instructed her to continue lovenox 60 mg BID subcutaneously until INR > 2.      2. Repeat INR on Monday, 5/6/24.  3. Lucie Michele understands the importance of calling the [facility] if she notices any s/sx of bleeding, stroke, or abnormal bruising, if any changes are made to her medications or medication doses (Rx, OTC, herbal), or if any upcoming procedures are scheduled. Lucie Michele will likewise let us know if any other changes, questions, or concerns arise regarding anticoagulation therapy. she understands the importance of seeking medical attention immediately if she experiences any falls, vehicle accidents, or abnormal bleeding or bruising. Lucie Michele voiced understanding of this information and confirms that she has the [facility]'s contact information. Otherwise, we will plan to contact the patient once monthly or if her INR is out of range.    Rhea Barrera, PharmD  5/3/2024  12:12 EDT

## 2024-05-03 NOTE — PROGRESS NOTES
Capillary Blood Specimen Collection  Capillary blood collection performed in clinic by Amberly Kuo RN. Patient tolerated the procedure well without complications.   05/03/24   Amberly Kuo RN

## 2024-05-06 ENCOUNTER — CLINICAL SUPPORT (OUTPATIENT)
Dept: CARDIOLOGY | Facility: CLINIC | Age: 79
End: 2024-05-06
Payer: MEDICARE

## 2024-05-06 ENCOUNTER — ANTICOAGULATION VISIT (OUTPATIENT)
Dept: PHARMACY | Facility: HOSPITAL | Age: 79
End: 2024-05-06
Payer: MEDICARE

## 2024-05-06 DIAGNOSIS — Z95.2 HISTORY OF AORTIC VALVE REPLACEMENT: Primary | ICD-10-CM

## 2024-05-06 LAB — INR PPP: 2.5 (ref 0.9–1.1)

## 2024-05-06 RX ORDER — SOTALOL HYDROCHLORIDE 80 MG/1
80 TABLET ORAL EVERY 12 HOURS SCHEDULED
Qty: 180 TABLET | Refills: 3 | Status: SHIPPED | OUTPATIENT
Start: 2024-05-06

## 2024-05-13 ENCOUNTER — ANTICOAGULATION VISIT (OUTPATIENT)
Dept: PHARMACY | Facility: HOSPITAL | Age: 79
End: 2024-05-13
Payer: MEDICARE

## 2024-05-13 ENCOUNTER — CLINICAL SUPPORT (OUTPATIENT)
Dept: CARDIOLOGY | Facility: CLINIC | Age: 79
End: 2024-05-13
Payer: MEDICARE

## 2024-05-13 DIAGNOSIS — Z95.2 HISTORY OF AORTIC VALVE REPLACEMENT: Primary | ICD-10-CM

## 2024-05-13 LAB — INR PPP: 3.3 (ref 0.9–1.1)

## 2024-05-13 NOTE — PROGRESS NOTES
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     Diet: 2-3x week: 1 cup of brussels sprouts or broccoli weekly, green beans (3/11/23)  Premier Protein (or Equate brand) meal replacement ~3x/week 3/11/24  Alcohol: Seldom  Tobacco: None  OTC Pain Medication: APAP     INR History:  Date 4/5 4/19 4/26 5/5 5/11 6/2 6/15 6/18 6/25 7/2 7/9 7/19 7/23 7/30   Total Weekly Dose 15mg 15mg 14mg 14mg 14mg 14mg 14mg 14mg 14mg 14mg 14mg 14mg 14mg 14mg   INR 2.6 3.9 3.9 2.7 3.0 3.6 2.7 2.9 2.9 2.6 2.9 3.1 2.5 2.3   Notes           decr GLV   recv'd 6/21; HM HM       incr GLV decr GLV      Date 8/6 8/13 8/20 8/27 9/3 9/10 9/17 9/24 10/1 10/8 10/15 10/22 10/29 11/5   Total WeeklyDose 14mg 15mg 15mg 14mg 14mg 15mg 14mg 14mg 14mg 14mg 14mg 14mg 15mg 16mg   INR 2.4 3.4 3.8 2.9 2.2 3.2 2.9 3.3 3.2 3.3 3.0 2.4 2.4 2.4   Notes decr GLV decr GLV       1xboost         call call 1x boost   incr VitK call 2x boost; call      Date 11/10 11/17 11/24 12/1 12/8 12/15 12/23 12/30 1/3/22 1/7 1/11 1/18 1/25 2/1   Total WeeklyDose 17mg 16mg 16mg 16mg 15mg 15 mg Pt did not call back 15mg 12 mg 13mg 15mg 15 mg 15 mg 15 mg   INR 3.7 3.3 3.9 4.0 2.6 3.4 4.0 4.9 3.6 2.4 3.3 2.8 2.7 2.9   Notes Dec GLV   Zero GLV call Red x1 call Less GLV   call   Less  Protein drink redx1  self held x1 (misdose)   Dec vit K fall, apap  Call   call          Date 2/8 2/15 2/22 3/1 3/8 3/15 3/22 3/29 4/5 4/12 4/19 4/26 5/3 5/11   Total WeeklyDose 15mg 14mg 14 mg 15 mg 15 mg 15 mg 14mg 13 mg 14 mg 14 mg 14mg 13mg 15mg 14 mg   INR 4.0 4.0 2.8 2.9 2.9 4.2 3.9 3.3 2.9  3.0 3.8 2.4 3.8 2.5   Notes Call; Dec GLV call Call; Mis-dose call   Call; no GLV Inc GLV. Less protein, apap  redx1 call   Less GLV rec'd 4/27, call Dec GLV        Date 5/18 5/25 6/1 6/8 6/15 6/22 6/29 7/6 7/13 7/20 7/22 7/27  8/10  8/17   Total WeeklyDose 15 mg 15mg 16mg 15 mg 15 mg 15 mg 15 mg 15 mg 15mg 14 mg 15 mg 14 mg  14 mg  15 mg   INR 2.6 2.3 2.7 3.2 3.0 2.9 2.6 2.7 3.6 3.0 3.6 3.0  2.4  3.4   Notes   Inc GLV  call call call       call 7/14-- call call call  call  call  call      Date 8/25 8/31 9/7 9/12 9/19 9/26 10/3 10/10 10/17 10/24 10/31   Total WeeklyDose 14mg 13 mg 14 mg 17mg 15 mg 16mg 15mg 14 mg 13mg 17 mg 15mg   INR 4.3 2.8 1.7 2.9 2.1 3.4 3.8 2.4 1.8 3.7 4.5   Notes Dec GLV call Call refused enox; extra protein  Call; no protein drinks Call; less green tea, inc boostx1 Call; cranberries Redx1; call 1x miss Call  Less protein    1/1  Date 11/7 11/14 11/21 11/28 12/5 12/12 12/19 12/27 1/3 1/9 1/16/23 1/23 1/30/23   Total WeeklyDose 13 mg 14 mg 14 mg 14 mg 14 mg 14 mg 14mg 14 mg  13mg 13 mg 14 mg  14 mg 14 mg   INR 3.2 3.3 3.4 3.6 2.5 4.0 2.9 4.1 3.6 2.6 3.3 2.7 3.0   Notes  call redx1 One less protein shake   Inc GLV Decreased GLV W/o meloxicam  Tramadol,  meloxicam Tramadol,  meloxicam Tramadol,  meloxicam meloxicam     Date 2/7/23 2/13 2/20 2/27/23 3/6 3/13/23 3/23 3/27 4/3/23 4/10/23 4/17/23 4/24 5/1   Total WeeklyDose 14 mg 14 mg 14 mg 14 mg  14 mg 14 mg  14 mg 14 mg 15 mg  14mg 13 mg 15 mg 14 mg   INR 2.9 3.0 3.6 2.6 3 3.5 3.4 2.4 2.8 2.5 2.3 3.2  4.1 POCT   Notes Call  call rec 2/21  Call   call Call   call Call  call Missed dose 1x boost Call; missed protein drinks, less GLV     Date 5/2 5/8 5/15 5/22 5/30 6/5 6/19 7/10 7/24 7/31 8/7 8/14 8/21  8/28   Total WeeklyDose 12 mg 13 mg 14 mg 14 mg 14mg 14 mg 14 mg 14 mg 13 mg 13mg  12 mg 12 mg 14 mg 13 mg   INR 3.7 3.3 2.6 2.7 3.4 2.9 4.0 4.1 3.9 4.1 2.8 1.9 4.0  3.2   Notes rec 5/3  Call  Self-heldx1, boostx1    Stopping HM Less GLV  redx1 Ceftin    Rec'd 8/1 redx1 Inc GLV  Prednisone start Less GLV      Date 9/5 9/18 9/25 10/2 10/06 10/13 10/27 11/1 11/6 11/13 11/17 11/27   Total Weekly Dose 13mg 14 mg 12 mg  12 mg 11 mg 12 mg 12 mg 12 mg 11mg 11 mg 12 mg 12mg   INR 4.2 4.2 4.3  4.6 2.6 3.0 4.1 4.3 3.7 2.1 2.7 4.8   Notes redx1 clindamycin redx1 redx2 Red x2; inc GLV            Date 12/01 12/11 12/15 12/22 12/29 1/5/2024 1/12 1/26 2/9  2/26 3/1 3/11  3/18   Total Weekly Dose 11 mg 11 mg 11 mg 11 mg 11 mg 11 mg 11 mg 11 mg  12 mg   12 mg 12mg 11 mg  11 mg   INR 2.4 3.0 2.7 2.7 3.2 3.0 2.4 2.3 3.3  4.3 2.4 3.4 2.6   Notes  call    call    No GLV, apap   call     Date  4/1 4/8/24 4/15 4/22 4/29 5/3 5/6 5/13        Total Weekly  Dose 11 mg 12mg 11 mg 11 mg ???  Missed Sunday maybe more 12 mg 14 mg 14 mg        INR 2.0 2.5 2.5 3.2 1.1 1.5 2.5 3.3        Notes Call  Missed x1 No contact  Boost x 1 No contact No contact  Lovenox  Boost x 2 Lovenox             Phone Interview:  Tablet Strength: 2mg  Patient Contact Info: [phone] (Mobile) *preferred*  [email or web address]  Verbal Release Authorization signed on 4/7/21 -- may speak with Tammy Bai (friend: [phone]), Ismael Michele (brother: [phone])  Lab Contact Info: [facility] ([facility])  ** will call once monthly or if INR is out of range**    12/1/23 Scr: 77 ml/min    Patient Findings    Positives: Bruising   Negatives: Signs/symptoms of thrombosis, Signs/symptoms of bleeding, Laboratory test error suspected, Change in health, Change in alcohol use, Change in activity, Upcoming invasive procedure, Emergency department visit, Upcoming dental procedure, Missed doses, Extra doses, Change in medications, Change in diet/appetite, Hospital admission, Other complaints   Comments: Patient says she has some bruising from lovenox injections. All other findings negative per patient.     Plan:  1. INR is therapeutic today at 3.3 (goal 2.5-3.5) Per Sammy Anderson, instructed Ms. Michele to  continue with taking warfarin 2mg daily until recheck Friday (14 mg weekly).   2. Repeat INR in 1 week, 5/20/24  3. Lucie Michele understands the importance of calling the [facility] if she notices any s/sx of bleeding, stroke, or abnormal bruising, if any changes are made to her medications or medication doses (Rx, OTC, herbal), or if any upcoming procedures are scheduled. Lucie Michele will likewise let us know if any other changes, questions, or concerns arise regarding anticoagulation therapy. she understands the importance of seeking medical attention immediately if she experiences any falls, vehicle accidents, or abnormal bleeding or bruising. Lucie Michele voiced understanding of this information and confirms that she has the [facility]'s contact information. Otherwise, we will plan to contact the patient once monthly or if her INR is out of range.      Elva Langford CPhT   13:30 EDT 5/13/2024      I, [facility], have reviewed the note in full and agree with the assessment and plan.  05/13/24  15:38 EDT

## 2024-05-13 NOTE — PROGRESS NOTES
Capillary Blood Specimen Collection  Capillary blood collection performed in clinic by Mitzi Joseph. Patient tolerated the procedure well without complications.   05/13/24   Mitzi Joseph

## 2024-05-15 DIAGNOSIS — Z13.820 OSTEOPOROSIS SCREENING: ICD-10-CM

## 2024-05-15 DIAGNOSIS — R73.03 PREDIABETES: ICD-10-CM

## 2024-05-15 DIAGNOSIS — N18.31 STAGE 3A CHRONIC KIDNEY DISEASE: ICD-10-CM

## 2024-05-15 DIAGNOSIS — E07.9 DISORDER OF THYROID: ICD-10-CM

## 2024-05-15 DIAGNOSIS — E03.9 ACQUIRED HYPOTHYROIDISM: ICD-10-CM

## 2024-05-15 DIAGNOSIS — M85.80 OSTEOPENIA, UNSPECIFIED LOCATION: ICD-10-CM

## 2024-05-15 DIAGNOSIS — E55.9 VITAMIN D DEFICIENCY: ICD-10-CM

## 2024-05-15 DIAGNOSIS — I10 ESSENTIAL HYPERTENSION, BENIGN: ICD-10-CM

## 2024-05-15 DIAGNOSIS — M25.552 LEFT HIP PAIN: ICD-10-CM

## 2024-05-15 DIAGNOSIS — I10 PRIMARY HYPERTENSION: ICD-10-CM

## 2024-05-16 RX ORDER — OMEPRAZOLE 20 MG/1
20 CAPSULE, DELAYED RELEASE ORAL DAILY
Qty: 90 CAPSULE | Refills: 0 | Status: SHIPPED | OUTPATIENT
Start: 2024-05-16

## 2024-05-16 NOTE — TELEPHONE ENCOUNTER
Rx Refill Note    Requested Prescriptions     Pending Prescriptions Disp Refills    omeprazole (priLOSEC) 20 MG capsule [Pharmacy Med Name: OMEPRAZOLE 20MG CAPSULES] 90 capsule 0     Sig: TAKE 1 CAPSULE BY MOUTH DAILY        Last office visit with prescribing clinician: 3/22/2024      Next office visit with prescribing clinician: 6/24/2024   Last labs:   Last refill: 02/16/2024   Pharmacy (be sure to add in Epic). correct

## 2024-05-20 ENCOUNTER — CLINICAL SUPPORT (OUTPATIENT)
Dept: CARDIOLOGY | Facility: CLINIC | Age: 79
End: 2024-05-20
Payer: MEDICARE

## 2024-05-20 ENCOUNTER — ANTICOAGULATION VISIT (OUTPATIENT)
Dept: PHARMACY | Facility: HOSPITAL | Age: 79
End: 2024-05-20
Payer: MEDICARE

## 2024-05-20 DIAGNOSIS — Z79.01 CHRONIC ANTICOAGULATION: Primary | ICD-10-CM

## 2024-05-20 LAB — INR PPP: 3.4 (ref 0.9–1.1)

## 2024-05-20 NOTE — PROGRESS NOTES
Capillary Blood Specimen Collection  Capillary blood collection performed in clinic by Mitzi Joseph. Patient tolerated the procedure well without complications.   05/20/24   Mitzi Joseph

## 2024-05-20 NOTE — PROGRESS NOTES
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     Diet: 2-3x week: 1 cup of brussels sprouts or broccoli weekly, green beans (3/11/23)  Premier Protein (or Equate brand) meal replacement ~3x/week 3/11/24  Alcohol: Seldom  Tobacco: None  OTC Pain Medication: APAP     INR History:  Date 4/5 4/19 4/26 5/5 5/11 6/2 6/15 6/18 6/25 7/2 7/9 7/19 7/23 7/30   Total Weekly Dose 15mg 15mg 14mg 14mg 14mg 14mg 14mg 14mg 14mg 14mg 14mg 14mg 14mg 14mg   INR 2.6 3.9 3.9 2.7 3.0 3.6 2.7 2.9 2.9 2.6 2.9 3.1 2.5 2.3   Notes           decr GLV   recv'd 6/21; HM HM       incr GLV decr GLV      Date 8/6 8/13 8/20 8/27 9/3 9/10 9/17 9/24 10/1 10/8 10/15 10/22 10/29 11/5   Total WeeklyDose 14mg 15mg 15mg 14mg 14mg 15mg 14mg 14mg 14mg 14mg 14mg 14mg 15mg 16mg   INR 2.4 3.4 3.8 2.9 2.2 3.2 2.9 3.3 3.2 3.3 3.0 2.4 2.4 2.4   Notes decr GLV decr GLV       1xboost         call call 1x boost   incr VitK call 2x boost; call      Date 11/10 11/17 11/24 12/1 12/8 12/15 12/23 12/30 1/3/22 1/7 1/11 1/18 1/25 2/1   Total WeeklyDose 17mg 16mg 16mg 16mg 15mg 15 mg Pt did not call back 15mg 12 mg 13mg 15mg 15 mg 15 mg 15 mg   INR 3.7 3.3 3.9 4.0 2.6 3.4 4.0 4.9 3.6 2.4 3.3 2.8 2.7 2.9   Notes Dec GLV   Zero GLV call Red x1 call Less GLV   call   Less  Protein drink redx1  self held x1 (misdose)   Dec vit K fall, apap  Call   call          Date 2/8 2/15 2/22 3/1 3/8 3/15 3/22 3/29 4/5 4/12 4/19 4/26 5/3 5/11   Total WeeklyDose 15mg 14mg 14 mg 15 mg 15 mg 15 mg 14mg 13 mg 14 mg 14 mg 14mg 13mg 15mg 14 mg   INR 4.0 4.0 2.8 2.9 2.9 4.2 3.9 3.3 2.9  3.0 3.8 2.4 3.8 2.5   Notes Call; Dec GLV call Call; Mis-dose call   Call; no GLV Inc GLV. Less protein, apap  redx1 call   Less GLV rec'd 4/27, call Dec GLV        Date 5/18 5/25 6/1 6/8 6/15 6/22 6/29 7/6 7/13 7/20 7/22 7/27  8/10  8/17   Total WeeklyDose 15 mg 15mg 16mg 15 mg 15 mg 15 mg 15 mg 15 mg 15mg 14 mg 15 mg 14 mg  14 mg  15 mg   INR 2.6 2.3 2.7 3.2 3.0 2.9 2.6 2.7 3.6 3.0 3.6 3.0  2.4  3.4   Notes   Inc GLV  call call call       call 7/14-- call call call  call  call  call      Date 8/25 8/31 9/7 9/12 9/19 9/26 10/3 10/10 10/17 10/24 10/31   Total WeeklyDose 14mg 13 mg 14 mg 17mg 15 mg 16mg 15mg 14 mg 13mg 17 mg 15mg   INR 4.3 2.8 1.7 2.9 2.1 3.4 3.8 2.4 1.8 3.7 4.5   Notes Dec GLV call Call refused enox; extra protein  Call; no protein drinks Call; less green tea, inc boostx1 Call; cranberries Redx1; call 1x miss Call  Less protein    1/1  Date 11/7 11/14 11/21 11/28 12/5 12/12 12/19 12/27 1/3 1/9 1/16/23 1/23 1/30/23   Total WeeklyDose 13 mg 14 mg 14 mg 14 mg 14 mg 14 mg 14mg 14 mg  13mg 13 mg 14 mg  14 mg 14 mg   INR 3.2 3.3 3.4 3.6 2.5 4.0 2.9 4.1 3.6 2.6 3.3 2.7 3.0   Notes  call redx1 One less protein shake   Inc GLV Decreased GLV W/o meloxicam  Tramadol,  meloxicam Tramadol,  meloxicam Tramadol,  meloxicam meloxicam     Date 2/7/23 2/13 2/20 2/27/23 3/6 3/13/23 3/23 3/27 4/3/23 4/10/23 4/17/23 4/24 5/1   Total WeeklyDose 14 mg 14 mg 14 mg 14 mg  14 mg 14 mg  14 mg 14 mg 15 mg  14mg 13 mg 15 mg 14 mg   INR 2.9 3.0 3.6 2.6 3 3.5 3.4 2.4 2.8 2.5 2.3 3.2  4.1 POCT   Notes Call  call rec 2/21  Call   call Call   call Call  call Missed dose 1x boost Call; missed protein drinks, less GLV     Date 5/2 5/8 5/15 5/22 5/30 6/5 6/19 7/10 7/24 7/31 8/7 8/14 8/21  8/28   Total WeeklyDose 12 mg 13 mg 14 mg 14 mg 14mg 14 mg 14 mg 14 mg 13 mg 13mg  12 mg 12 mg 14 mg 13 mg   INR 3.7 3.3 2.6 2.7 3.4 2.9 4.0 4.1 3.9 4.1 2.8 1.9 4.0  3.2   Notes rec 5/3  Call  Self-heldx1, boostx1    Stopping HM Less GLV  redx1 Ceftin    Rec'd 8/1 redx1 Inc GLV  Prednisone start Less GLV      Date 9/5 9/18 9/25 10/2 10/06 10/13 10/27 11/1 11/6 11/13 11/17 11/27   Total Weekly Dose 13mg 14 mg 12 mg  12 mg 11 mg 12 mg 12 mg 12 mg 11mg 11 mg 12 mg 12mg   INR 4.2 4.2 4.3  4.6 2.6 3.0 4.1 4.3 3.7 2.1 2.7 4.8   Notes redx1 clindamycin redx1 redx2 Red x2; inc GLV            Date 12/01 12/11 12/15 12/22 12/29 1/5/2024 1/12 1/26 2/9  2/26 3/1 3/11  3/18   Total Weekly Dose 11 mg 11 mg 11 mg 11 mg 11 mg 11 mg 11 mg 11 mg  12 mg   12 mg 12mg 11 mg  11 mg   INR 2.4 3.0 2.7 2.7 3.2 3.0 2.4 2.3 3.3  4.3 2.4 3.4 2.6   Notes  call    call    No GLV, apap   call     Date  4/1 4/8/24 4/15 4/22 4/29 5/3 5/6 5/13 5/20       Total Weekly  Dose 11 mg 12mg 11 mg 11 mg ???  Missed Sunday maybe more 12 mg 14 mg 14 mg 14 mg       INR 2.0 2.5 2.5 3.2 1.1 1.5 2.5 3.3 3.4       Notes Call  Missed x1 No contact  Boost x 1 No contact No contact  Lovenox  Boost x 2  Call Lovenox             Phone Interview:  Tablet Strength: 2mg  Patient Contact Info: [phone] (Mobile) *preferred*  [email or web address]  Verbal Release Authorization signed on 4/7/21 -- may speak with Tammy Bai (friend: [phone]), Ismael Michele (brother: [phone])  Lab Contact Info: [facility] ([place])  ** will call once monthly or if INR is out of range**    12/1/23 Scr: 77 ml/min    Patient Findings    Comments: Patient not contacted for this encounter.     Plan:  1. INR is therapeutic today at 3.4 (goal 2.5-3.5) Ms. Michele to continue with taking warfarin 2mg daily until recheck Friday (14 mg weekly).   2. Repeat INR 5/28  3. Lucie Michele understands the importance of calling the [facility] if she notices any s/sx of bleeding, stroke, or abnormal bruising, if any changes are made to her medications or medication doses (Rx, OTC, herbal), or if any upcoming procedures are scheduled. Lucie Michele will likewise let us know if any other changes, questions, or concerns  arise regarding anticoagulation therapy. she understands the importance of seeking medical attention immediately if she experiences any falls, vehicle accidents, or abnormal bleeding or bruising. Lucie Michele voiced understanding of this information and confirms that she has the [facility]'s contact information. Otherwise, we will plan to contact the patient once monthly or if her INR is out of range.      Elva Langford CPhT   12:07 EDT 5/20/2024      I, Herberth Khanna, [facility], have reviewed the note in full and agree with the assessment and plan.  05/20/24  14:27 EDT

## 2024-05-27 DIAGNOSIS — N18.31 STAGE 3A CHRONIC KIDNEY DISEASE: ICD-10-CM

## 2024-05-27 DIAGNOSIS — E03.9 ACQUIRED HYPOTHYROIDISM: ICD-10-CM

## 2024-05-27 DIAGNOSIS — R73.03 PREDIABETES: ICD-10-CM

## 2024-05-27 DIAGNOSIS — E07.9 DISORDER OF THYROID: ICD-10-CM

## 2024-05-27 DIAGNOSIS — Z13.820 OSTEOPOROSIS SCREENING: ICD-10-CM

## 2024-05-27 DIAGNOSIS — M25.552 LEFT HIP PAIN: ICD-10-CM

## 2024-05-27 DIAGNOSIS — I10 PRIMARY HYPERTENSION: ICD-10-CM

## 2024-05-27 DIAGNOSIS — M85.80 OSTEOPENIA, UNSPECIFIED LOCATION: ICD-10-CM

## 2024-05-27 DIAGNOSIS — I10 ESSENTIAL HYPERTENSION, BENIGN: ICD-10-CM

## 2024-05-27 DIAGNOSIS — E55.9 VITAMIN D DEFICIENCY: ICD-10-CM

## 2024-05-28 ENCOUNTER — CLINICAL SUPPORT (OUTPATIENT)
Dept: CARDIOLOGY | Facility: CLINIC | Age: 79
End: 2024-05-28
Payer: MEDICARE

## 2024-05-28 ENCOUNTER — ANTICOAGULATION VISIT (OUTPATIENT)
Dept: PHARMACY | Facility: HOSPITAL | Age: 79
End: 2024-05-28
Payer: MEDICARE

## 2024-05-28 DIAGNOSIS — Z95.2 HISTORY OF AORTIC VALVE REPLACEMENT: Primary | ICD-10-CM

## 2024-05-28 LAB — INR PPP: 4 (ref 0.9–1.1)

## 2024-05-28 PROCEDURE — 36416 COLLJ CAPILLARY BLOOD SPEC: CPT | Performed by: INTERNAL MEDICINE

## 2024-05-28 PROCEDURE — 85610 PROTHROMBIN TIME: CPT | Performed by: INTERNAL MEDICINE

## 2024-05-28 RX ORDER — OXYBUTYNIN CHLORIDE 5 MG/1
5 TABLET ORAL 2 TIMES DAILY
Qty: 180 TABLET | Refills: 0 | Status: SHIPPED | OUTPATIENT
Start: 2024-05-28

## 2024-05-28 NOTE — PROGRESS NOTES
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     Diet: 2-3x week: 1 cup of brussels sprouts or broccoli weekly, green beans (3/11/23)  Premier Protein (or Equate brand) meal replacement ~2x/week with occasional 3rd 5/28/24  Alcohol: Seldom  Tobacco: None  OTC Pain Medication: APAP     INR History:  Date 4/5 4/19 4/26 5/5 5/11 6/2 6/15 6/18 6/25 7/2 7/9 7/19 7/23 7/30   Total Weekly Dose 15mg 15mg 14mg 14mg 14mg 14mg 14mg 14mg 14mg 14mg 14mg 14mg 14mg 14mg   INR 2.6 3.9 3.9 2.7 3.0 3.6 2.7 2.9 2.9 2.6 2.9 3.1 2.5 2.3   Notes           decr GLV   recv'd 6/21; HM HM       incr GLV decr GLV      Date 8/6 8/13 8/20 8/27 9/3 9/10 9/17 9/24 10/1 10/8 10/15 10/22 10/29 11/5   Total WeeklyDose 14mg 15mg 15mg 14mg 14mg 15mg 14mg 14mg 14mg 14mg 14mg 14mg 15mg 16mg   INR 2.4 3.4 3.8 2.9 2.2 3.2 2.9 3.3 3.2 3.3 3.0 2.4 2.4 2.4   Notes decr GLV decr GLV       1xboost         call call 1x boost   incr VitK call 2x boost; call      Date 11/10 11/17 11/24 12/1 12/8 12/15 12/23 12/30 1/3/22 1/7 1/11 1/18 1/25 2/1   Total WeeklyDose 17mg 16mg 16mg 16mg 15mg 15 mg Pt did not call back 15mg 12 mg 13mg 15mg 15 mg 15 mg 15 mg   INR 3.7 3.3 3.9 4.0 2.6 3.4 4.0 4.9 3.6 2.4 3.3 2.8 2.7 2.9   Notes Dec GLV   Zero GLV call Red x1 call Less GLV   call   Less  Protein drink redx1  self held x1 (misdose)   Dec vit K fall, apap  Call   call          Date 2/8 2/15 2/22 3/1 3/8 3/15 3/22 3/29 4/5 4/12 4/19 4/26 5/3 5/11   Total WeeklyDose 15mg 14mg 14 mg 15 mg 15 mg 15 mg 14mg 13 mg 14 mg 14 mg 14mg 13mg 15mg 14 mg   INR 4.0 4.0 2.8 2.9 2.9 4.2 3.9 3.3  2.9 3.0 3.8 2.4 3.8 2.5   Notes Call; Dec GLV call Call; Mis-dose call   Call; no GLV Inc GLV. Less protein, apap  redx1 call   Less GLV rec'd 4/27, call Dec GLV        Date 5/18 5/25 6/1 6/8 6/15 6/22 6/29 7/6 7/13 7/20 7/22 7/27  8/10  8/17   Total WeeklyDose 15 mg 15mg 16mg 15 mg 15 mg 15 mg 15 mg 15 mg 15mg 14 mg 15 mg 14 mg  14 mg  15 mg   INR 2.6 2.3 2.7 3.2 3.0 2.9 2.6 2.7 3.6 3.0 3.6 3.0  2.4  3.4   Notes   Inc GLV  call call call       call 7/14-- call call call  call  call  call      Date 8/25 8/31 9/7 9/12 9/19 9/26 10/3 10/10 10/17 10/24 10/31   Total WeeklyDose 14mg 13 mg 14 mg 17mg 15 mg 16mg 15mg 14 mg 13mg 17 mg 15mg   INR 4.3 2.8 1.7 2.9 2.1 3.4 3.8 2.4 1.8 3.7 4.5   Notes Dec GLV call Call refused enox; extra protein  Call; no protein drinks Call; less green tea, inc boostx1 Call; cranberries Redx1; call 1x miss Call  Less protein    1/1  Date 11/7 11/14 11/21 11/28 12/5 12/12 12/19 12/27 1/3 1/9 1/16/23 1/23 1/30/23   Total WeeklyDose 13 mg 14 mg 14 mg 14 mg 14 mg 14 mg 14mg 14 mg  13mg 13 mg 14 mg  14 mg 14 mg   INR 3.2 3.3 3.4 3.6 2.5 4.0 2.9 4.1 3.6 2.6 3.3 2.7 3.0   Notes  call redx1 One less protein shake   Inc GLV Decreased GLV W/o meloxicam  Tramadol,  meloxicam Tramadol,  meloxicam Tramadol,  meloxicam meloxicam     Date 2/7/23 2/13 2/20 2/27/23 3/6 3/13/23 3/23 3/27 4/3/23 4/10/23 4/17/23 4/24 5/1   Total WeeklyDose 14 mg 14 mg 14 mg 14 mg  14 mg 14 mg  14 mg 14 mg 15 mg  14mg 13 mg 15 mg 14 mg   INR 2.9 3.0 3.6 2.6 3 3.5 3.4 2.4 2.8 2.5 2.3 3.2  4.1 POCT   Notes Call  call rec 2/21  Call   call Call   call Call  call Missed dose 1x boost Call; missed protein drinks, less GLV     Date 5/2 5/8 5/15 5/22 5/30 6/5 6/19 7/10 7/24 7/31 8/7 8/14 8/21  8/28   Total WeeklyDose 12 mg 13 mg 14 mg 14 mg 14mg 14 mg 14 mg 14 mg 13 mg 13mg  12 mg 12 mg 14 mg 13 mg   INR 3.7 3.3 2.6 2.7 3.4 2.9 4.0 4.1 3.9 4.1 2.8 1.9 4.0  3.2   Notes rec 5/3  Call  Self-heldx1, boostx1    Stopping HM Less GLV  redx1  Ceftin   Rec'd 8/1 redx1 Inc GLV  Prednisone start Less GLV      Date 9/5 9/18 9/25 10/2 10/06 10/13 10/27 11/1 11/6 11/13 11/17 11/27   Total Weekly Dose 13mg 14 mg 12 mg  12 mg 11 mg 12 mg 12 mg 12 mg 11mg 11 mg 12 mg 12mg   INR 4.2 4.2 4.3  4.6 2.6 3.0 4.1 4.3 3.7 2.1 2.7 4.8   Notes redx1 clindamycin redx1 redx2 Red x2; inc GLV            Date 12/01 12/11 12/15 12/22 12/29 1/5/2024 1/12 1/26 2/9  2/26 3/1 3/11  3/18   Total Weekly Dose 11 mg 11 mg 11 mg 11 mg 11 mg 11 mg 11 mg 11 mg  12 mg   12 mg 12mg 11 mg  11 mg   INR 2.4 3.0 2.7 2.7 3.2 3.0 2.4 2.3 3.3  4.3 2.4 3.4 2.6   Notes  call    call    No GLV, apap   call     Date  4/1 4/8/24 4/15 4/22 4/29 5/3 5/6 5/13 5/20 5/28      Total Weekly  Dose 11 mg 12mg 11 mg 11 mg ???  Missed Sunday maybe more 12 mg 14 mg 14 mg 14 mg 14 mg      INR 2.0 2.5 2.5 3.2 1.1 1.5 2.5 3.3 3.4 4.0      Notes Call  Missed x1 No contact  Boost x 1 No contact No contact  Lovenox  Boost x 2  Call Lovenox     call        Phone Interview:  Tablet Strength: 2mg  Patient Contact Info: [phone] (Mobile) *preferred*  [email or web address]  Verbal Release Authorization signed on 4/7/21 -- may speak with Tammy Bai (friend: [phone]), Ismael Michele (brother: [phone])  Lab Contact Info: [facility] ([facility])  ** will call once monthly or if INR is out of range**    12/1/23 Scr: 77 ml/min  Patient Findings    Positives: Signs/symptoms of bleeding   Negatives: Signs/symptoms of thrombosis, Laboratory test error suspected, Change in health, Change in alcohol use, Change in activity, Upcoming invasive procedure, Emergency department visit, Upcoming dental procedure, Missed doses, Extra doses, Change in medications, Change in diet/appetite, Hospital admission, Bruising, Other complaints   Comments: Patient reports being bit by a spider on her left upper arm. She reports that it was itchy and developed into a blister which 'popped' and bled some. She says it is okay now and is  now swollen or painful. Advised her to monitor and follow with a healthcare provider         Plan:  1. INR is supratherapeutic today at 4.0 (goal 2.5-3.5) Ms. Michele to take 1mg today and continue with taking warfarin 2mg daily until recheck Friday (14 mg weekly).   2. Repeat INR 6/3  3. Lucie Michele understands the importance of calling the [facility] if she notices any s/sx of bleeding, stroke, or abnormal bruising, if any changes are made to her medications or medication doses (Rx, OTC, herbal), or if any upcoming procedures are scheduled. Lucie Michele will likewise let us know if any other changes, questions, or concerns arise regarding anticoagulation therapy. she understands the importance of seeking medical attention immediately if she experiences any falls, vehicle accidents, or abnormal bleeding or bruising. Lucie Michele voiced understanding of this information and confirms that she has the [facility]'s contact information. Otherwise, we will plan to contact the patient once monthly or if her INR is out of range.    Thank you,    Ramana ArteagaD, NANCI, BCPS  5/28/2024  16:39 EDT

## 2024-05-28 NOTE — PROGRESS NOTES
Capillary Blood Specimen Collection  Capillary blood collection performed in clinic by Amberly Kuo RN. Patient tolerated the procedure well without complications.   05/28/24   Amberly Kuo RN

## 2024-06-03 ENCOUNTER — CLINICAL SUPPORT (OUTPATIENT)
Dept: CARDIOLOGY | Facility: CLINIC | Age: 79
End: 2024-06-03
Payer: MEDICARE

## 2024-06-03 ENCOUNTER — ANTICOAGULATION VISIT (OUTPATIENT)
Dept: PHARMACY | Facility: HOSPITAL | Age: 79
End: 2024-06-03
Payer: MEDICARE

## 2024-06-03 DIAGNOSIS — Z95.2 HISTORY OF AORTIC VALVE REPLACEMENT: Primary | ICD-10-CM

## 2024-06-03 LAB — INR PPP: 4 (ref 0.9–1.1)

## 2024-06-03 PROCEDURE — 36416 COLLJ CAPILLARY BLOOD SPEC: CPT | Performed by: INTERNAL MEDICINE

## 2024-06-03 NOTE — PROGRESS NOTES
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     Diet: 2-3x week: 1 cup of brussels sprouts or broccoli weekly, green beans, peas  (3/11/23)  Premier Protein (or Equate brand) meal replacement ~2x/week with occasional 3rd 5/28/24  Alcohol: Seldom  Tobacco: None  OTC Pain Medication: APAP      INR History:  Date 4/5 4/19 4/26 5/5 5/11 6/2 6/15 6/18 6/25 7/2 7/9 7/19 7/23 7/30   Total Weekly Dose 15mg 15mg 14mg 14mg 14mg 14mg 14mg 14mg 14mg 14mg 14mg 14mg 14mg 14mg   INR 2.6 3.9 3.9 2.7 3.0 3.6 2.7 2.9 2.9 2.6 2.9 3.1 2.5 2.3   Notes           decr GLV   recv'd 6/21; [facility]       incr GLV decr GLV      Date 8/6 8/13 8/20 8/27 9/3 9/10 9/17 9/24 10/1 10/8 10/15 10/22 10/29 11/5   Total WeeklyDose 14mg 15mg 15mg 14mg 14mg 15mg 14mg 14mg 14mg 14mg 14mg 14mg 15mg 16mg   INR 2.4 3.4 3.8 2.9 2.2 3.2 2.9 3.3 3.2 3.3 3.0 2.4 2.4 2.4   Notes decr GLV decr GLV       1xboost         call call 1x boost   incr VitK call 2x boost; call      Date 11/10 11/17 11/24 12/1 12/8 12/15 12/23 12/30 1/3/22 1/7 1/11 1/18 1/25 2/1   Total WeeklyDose 17mg 16mg 16mg 16mg 15mg 15 mg Pt did not call back 15mg 12 mg 13mg 15mg 15 mg 15 mg 15 mg   INR 3.7 3.3 3.9 4.0 2.6 3.4 4.0 4.9 3.6 2.4 3.3 2.8 2.7 2.9   Notes Dec GLV   Zero GLV call Red x1 call Less GLV   call   Less  Protein drink redx1  self held x1 (misdose)   Dec vit K fall, apap  Call   call          Date 2/8 2/15 2/22 3/1 3/8 3/15 3/22 3/29 4/5 4/12 4/19 4/26 5/3 5/11   Total WeeklyDose 15mg 14mg 14 mg 15 mg 15 mg 15 mg 14mg 13 mg 14 mg 14 mg 14mg 13mg 15mg 14 mg   INR 4.0 4.0 2.8 2.9 2.9 4.2  3.9 3.3 2.9 3.0 3.8 2.4 3.8 2.5   Notes Call; Dec GLV call Call; Mis-dose call   Call; no GLV Inc GLV. Less protein, apap  redx1 call   Less GLV rec'd 4/27, call Dec GLV        Date 5/18 5/25 6/1 6/8 6/15 6/22 6/29 7/6 7/13 7/20 7/22 7/27  8/10  8/17   Total WeeklyDose 15 mg 15mg 16mg 15 mg 15 mg 15 mg 15 mg 15 mg 15mg 14 mg 15 mg 14 mg  14 mg  15 mg   INR 2.6 2.3 2.7 3.2 3.0 2.9 2.6 2.7 3.6 3.0 3.6 3.0  2.4  3.4   Notes   Inc GLV  call call call       call 7/14-- call call call  call  call  call      Date 8/25 8/31 9/7 9/12 9/19 9/26 10/3 10/10 10/17 10/24 10/31   Total WeeklyDose 14mg 13 mg 14 mg 17mg 15 mg 16mg 15mg 14 mg 13mg 17 mg 15mg   INR 4.3 2.8 1.7 2.9 2.1 3.4 3.8 2.4 1.8 3.7 4.5   Notes Dec GLV call Call refused enox; extra protein  Call; no protein drinks Call; less green tea, inc boostx1 Call; cranberries Redx1; call 1x miss Call  Less protein    1/1  Date 11/7 11/14 11/21 11/28 12/5 12/12 12/19 12/27 1/3 1/9 1/16/23 1/23 1/30/23   Total WeeklyDose 13 mg 14 mg 14 mg 14 mg 14 mg 14 mg 14mg 14 mg  13mg 13 mg 14 mg  14 mg 14 mg   INR 3.2 3.3 3.4 3.6 2.5 4.0 2.9 4.1 3.6 2.6 3.3 2.7 3.0   Notes  call redx1 One less protein shake   Inc GLV Decreased GLV W/o meloxicam  Tramadol,  meloxicam Tramadol,  meloxicam Tramadol,  meloxicam meloxicam     Date 2/7/23 2/13 2/20 2/27/23 3/6 3/13/23 3/23 3/27 4/3/23 4/10/23 4/17/23 4/24 5/1   Total WeeklyDose 14 mg 14 mg 14 mg 14 mg  14 mg 14 mg  14 mg 14 mg 15 mg  14mg 13 mg 15 mg 14 mg   INR 2.9 3.0 3.6 2.6 3 3.5 3.4 2.4 2.8 2.5 2.3 3.2  4.1 POCT   Notes Call  call rec 2/21  Call   call Call   call Call  call Missed dose 1x boost Call; missed protein drinks, less GLV     Date 5/2 5/8 5/15 5/22 5/30 6/5 6/19 7/10 7/24 7/31 8/7 8/14 8/21  8/28   Total WeeklyDose 12 mg 13 mg 14 mg 14 mg 14mg 14 mg 14 mg 14 mg 13 mg 13mg  12 mg 12 mg 14 mg 13 mg   INR 3.7 3.3 2.6 2.7 3.4 2.9 4.0 4.1 3.9 4.1 2.8 1.9 4.0  3.2   Notes rec 5/3  Call  Self-heldx1, boostx1    Stopping HM Less GLV   redx1 Ceftin   Rec'd 8/1 redx1 Inc GLV  Prednisone start Less GLV      Date 9/5 9/18 9/25 10/2 10/06 10/13 10/27 11/1 11/6 11/13 11/17 11/27   Total Weekly Dose 13mg 14 mg 12 mg  12 mg 11 mg 12 mg 12 mg 12 mg 11mg 11 mg 12 mg 12mg   INR 4.2 4.2 4.3  4.6 2.6 3.0 4.1 4.3 3.7 2.1 2.7 4.8   Notes redx1 clindamycin redx1 redx2 Red x2; inc GLV            Date 12/01 12/11 12/15 12/22 12/29 1/5/2024 1/12 1/26 2/9  2/26 3/1 3/11  3/18   Total Weekly Dose 11 mg 11 mg 11 mg 11 mg 11 mg 11 mg 11 mg 11 mg  12 mg   12 mg 12mg 11 mg  11 mg   INR 2.4 3.0 2.7 2.7 3.2 3.0 2.4 2.3 3.3  4.3 2.4 3.4 2.6   Notes  call    call    No GLV, apap   call     Date  4/1 4/8/24 4/15 4/22 4/29 5/3 5/6 5/13 5/20 5/28 6/3     Total Weekly  Dose 11 mg 12mg 11 mg 11 mg ???  Missed Sunday maybe more 12 mg 14 mg 14 mg 14 mg 14 mg 13 mg      INR 2.0 2.5 2.5 3.2 1.1 1.5 2.5 3.3 3.4 4.0 4.0     Notes Call  Missed x1 No contact  Boost x 1 No contact No contact  Lovenox  Boost x 2  Call Lovenox     call 1 x decrease  APAP  Call        Phone Interview:  Tablet Strength: 2mg  Patient Contact Info: [phone] (Mobile) *preferred*  [email or web address]  Verbal Release Authorization signed on 4/7/21 -- may speak with Tammy Bai (friend: [phone]), Ismael Michele (brother: [phone])  Lab Contact Info: [facility] ([facility])  ** will call once monthly or if INR is out of range**    12/1/23 Scr: 77 ml/min      Patient Findings    Negatives: Signs/symptoms of thrombosis, Signs/symptoms of bleeding, Laboratory test error suspected, Change in health, Change in alcohol use, Change in activity, Upcoming invasive procedure, Emergency department visit, Upcoming dental procedure, Missed doses, Extra doses, Change in medications, Change in diet/appetite, Hospital admission, Bruising, Other complaints   Comments: Ms. Michele stated that she hit her leg on the latch of a child gate at her son's house on Thursday, 5/30 so she has a new bruise on her mid thigh  that is the size of a tennis ball. She will continue to monitor it and make sure it does not grow. Since that incident she has taken acetaminophen twice. All other findings listed above are negative.       Plan:  1. INR remains supratherapeutic today at 4.0 despite decreased dose (goal 2.5-3.5). Instructed Ms. Michele to take warfarin 2 mg daily except warfarin 1 mg on Monday and Wednesday until recheck (12 mg/wk).   2. Repeat INR 6/10.   3. Lucie Michele understands the importance of calling the [facility] if she notices any s/sx of bleeding, stroke, or abnormal bruising, if any changes are made to her medications or medication doses (Rx, OTC, herbal), or if any upcoming procedures are scheduled. Lucie Michele will likewise let us know if any other changes, questions, or concerns arise regarding anticoagulation therapy. she understands the importance of seeking medical attention immediately if she experiences any falls, vehicle accidents, or abnormal bleeding or bruising. Lucie Michele voiced understanding of this information and confirms that she has the [facility]'s contact information. Otherwise, we will plan to contact the patient once monthly or if her INR is out of range.    Sahra Javier, JenniD  6/3/2024   16:04 EDT

## 2024-06-03 NOTE — PROGRESS NOTES
Capillary Blood Specimen Collection  Capillary blood collection performed in clinic by Ema Hamilton MA. Patient tolerated the procedure well without complications.   06/03/24   Ema Hamilton MA

## 2024-06-10 ENCOUNTER — CLINICAL SUPPORT (OUTPATIENT)
Dept: CARDIOLOGY | Facility: CLINIC | Age: 79
End: 2024-06-10
Payer: MEDICARE

## 2024-06-10 ENCOUNTER — ANTICOAGULATION VISIT (OUTPATIENT)
Dept: PHARMACY | Facility: HOSPITAL | Age: 79
End: 2024-06-10
Payer: MEDICARE

## 2024-06-10 DIAGNOSIS — Z79.01 CHRONIC ANTICOAGULATION: Primary | ICD-10-CM

## 2024-06-10 LAB — INR PPP: 3.6 (ref 0.9–1.1)

## 2024-06-10 PROCEDURE — 36416 COLLJ CAPILLARY BLOOD SPEC: CPT | Performed by: INTERNAL MEDICINE

## 2024-06-10 RX ORDER — ALENDRONATE SODIUM 70 MG/1
70 TABLET ORAL
Qty: 4 TABLET | Refills: 0 | Status: SHIPPED | OUTPATIENT
Start: 2024-06-10

## 2024-06-10 NOTE — PROGRESS NOTES
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     Diet: 2-3x week: 1 cup of brussels sprouts or broccoli weekly, green beans, peas  (3/11/23)  Premier Protein (or Equate brand) meal replacement ~2x/week with occasional 3rd 5/28/24  Alcohol: Seldom  Tobacco: None  OTC Pain Medication: APAP      INR History:  Date 4/5 4/19 4/26 5/5 5/11 6/2 6/15 6/18 6/25 7/2 7/9 7/19 7/23 7/30   Total Weekly Dose 15mg 15mg 14mg 14mg 14mg 14mg 14mg 14mg 14mg 14mg 14mg 14mg 14mg 14mg   INR 2.6 3.9 3.9 2.7 3.0 3.6 2.7 2.9 2.9 2.6 2.9 3.1 2.5 2.3   Notes           decr GLV   recv'd 6/21; [facility]       incr GLV decr GLV      Date 8/6 8/13 8/20 8/27 9/3 9/10 9/17 9/24 10/1 10/8 10/15 10/22 10/29 11/5   Total WeeklyDose 14mg 15mg 15mg 14mg 14mg 15mg 14mg 14mg 14mg 14mg 14mg 14mg 15mg 16mg   INR 2.4 3.4 3.8 2.9 2.2 3.2 2.9 3.3 3.2 3.3 3.0 2.4 2.4 2.4   Notes decr GLV decr GLV       1xboost         call call 1x boost   incr VitK call 2x boost; call      Date 11/10 11/17 11/24 12/1 12/8 12/15 12/23 12/30 1/3/22 1/7 1/11 1/18 1/25 2/1   Total WeeklyDose 17mg 16mg 16mg 16mg 15mg 15 mg Pt did not call back 15mg 12 mg 13mg 15mg 15 mg 15 mg 15 mg   INR 3.7 3.3 3.9 4.0 2.6 3.4 4.0 4.9 3.6 2.4 3.3 2.8 2.7 2.9   Notes Dec GLV   Zero GLV call Red x1 call Less GLV   call   Less  Protein drink redx1  self held x1 (misdose)   Dec vit K fall, apap  Call   call          Date 2/8 2/15 2/22 3/1 3/8 3/15 3/22 3/29 4/5 4/12 4/19 4/26 5/3 5/11   Total WeeklyDose 15mg 14mg 14 mg 15 mg 15 mg 15 mg 14mg 13 mg 14 mg 14 mg 14mg 13mg 15mg 14 mg   INR 4.0 4.0 2.8 2.9 2.9 4.2  3.9 3.3 2.9 3.0 3.8 2.4 3.8 2.5   Notes Call; Dec GLV call Call; Mis-dose call   Call; no GLV Inc GLV. Less protein, apap  redx1 call   Less GLV rec'd 4/27, call Dec GLV        Date 5/18 5/25 6/1 6/8 6/15 6/22 6/29 7/6 7/13 7/20 7/22 7/27  8/10  8/17   Total WeeklyDose 15 mg 15mg 16mg 15 mg 15 mg 15 mg 15 mg 15 mg 15mg 14 mg 15 mg 14 mg  14 mg  15 mg   INR 2.6 2.3 2.7 3.2 3.0 2.9 2.6 2.7 3.6 3.0 3.6 3.0  2.4  3.4   Notes   Inc GLV  call call call       call 7/14-- call call call  call  call  call      Date 8/25 8/31 9/7 9/12 9/19 9/26 10/3 10/10 10/17 10/24 10/31   Total WeeklyDose 14mg 13 mg 14 mg 17mg 15 mg 16mg 15mg 14 mg 13mg 17 mg 15mg   INR 4.3 2.8 1.7 2.9 2.1 3.4 3.8 2.4 1.8 3.7 4.5   Notes Dec GLV call Call refused enox; extra protein  Call; no protein drinks Call; less green tea, inc boostx1 Call; cranberries Redx1; call 1x miss Call  Less protein    1/1  Date 11/7 11/14 11/21 11/28 12/5 12/12 12/19 12/27 1/3 1/9 1/16/23 1/23 1/30/23   Total WeeklyDose 13 mg 14 mg 14 mg 14 mg 14 mg 14 mg 14mg 14 mg  13mg 13 mg 14 mg  14 mg 14 mg   INR 3.2 3.3 3.4 3.6 2.5 4.0 2.9 4.1 3.6 2.6 3.3 2.7 3.0   Notes  call redx1 One less protein shake   Inc GLV Decreased GLV W/o meloxicam  Tramadol,  meloxicam Tramadol,  meloxicam Tramadol,  meloxicam meloxicam     Date 2/7/23 2/13 2/20 2/27/23 3/6 3/13/23 3/23 3/27 4/3/23 4/10/23 4/17/23 4/24 5/1   Total WeeklyDose 14 mg 14 mg 14 mg 14 mg  14 mg 14 mg  14 mg 14 mg 15 mg  14mg 13 mg 15 mg 14 mg   INR 2.9 3.0 3.6 2.6 3 3.5 3.4 2.4 2.8 2.5 2.3 3.2  4.1 POCT   Notes Call  call rec 2/21  Call   call Call   call Call  call Missed dose 1x boost Call; missed protein drinks, less GLV     Date 5/2 5/8 5/15 5/22 5/30 6/5 6/19 7/10 7/24 7/31 8/7 8/14 8/21  8/28   Total WeeklyDose 12 mg 13 mg 14 mg 14 mg 14mg 14 mg 14 mg 14 mg 13 mg 13mg  12 mg 12 mg 14 mg 13 mg   INR 3.7 3.3 2.6 2.7 3.4 2.9 4.0 4.1 3.9 4.1 2.8 1.9 4.0  3.2   Notes rec 5/3  Call  Self-heldx1, boostx1    Stopping HM Less GLV   redx1 Ceftin   Rec'd 8/1 redx1 Inc GLV  Prednisone start Less GLV      Date 9/5 9/18 9/25 10/2 10/06 10/13 10/27 11/1 11/6 11/13 11/17 11/27   Total Weekly Dose 13mg 14 mg 12 mg  12 mg 11 mg 12 mg 12 mg 12 mg 11mg 11 mg 12 mg 12mg   INR 4.2 4.2 4.3  4.6 2.6 3.0 4.1 4.3 3.7 2.1 2.7 4.8   Notes redx1 clindamycin redx1 redx2 Red x2; inc GLV            Date 12/01 12/11 12/15 12/22 12/29 1/5/2024 1/12 1/26 2/9  2/26 3/1 3/11  3/18   Total Weekly Dose 11 mg 11 mg 11 mg 11 mg 11 mg 11 mg 11 mg 11 mg  12 mg   12 mg 12mg 11 mg  11 mg   INR 2.4 3.0 2.7 2.7 3.2 3.0 2.4 2.3 3.3  4.3 2.4 3.4 2.6   Notes  call    call    No GLV, apap   call     Date  4/1 4/8/24 4/15 4/22 4/29 5/3 5/6 5/13 5/20 5/28 6/3 6/10    Total Weekly  Dose 11 mg 12mg 11 mg 11 mg ???  Missed Sunday maybe more 12 mg 14 mg 14 mg 14 mg 14 mg 13 mg  12 mg    INR 2.0 2.5 2.5 3.2 1.1 1.5 2.5 3.3 3.4 4.0 4.0 3.6    Notes Call  Missed x1 No contact  Boost x 1 No contact No contact  Lovenox  Boost x 2  Call Lovenox     call 1 x decrease  APAP  Call  APAP      Phone Interview:  Tablet Strength: 2mg  Patient Contact Info: [phone] (Mobile) *preferred*  [email or web address]  Verbal Release Authorization signed on 4/7/21 -- may speak with Tammy Bai (friend: [phone]), Ismael Michele (brother: [phone])  Lab Contact Info: [facility] ([facility])  ** will call once monthly or if INR is out of range**    12/1/23 Scr: 77 ml/min      Patient Findings  Positives: Change in health, Change in medications, Bruising   Negatives: Signs/symptoms of thrombosis, Signs/symptoms of bleeding, Laboratory test error suspected, Change in alcohol use, Change in activity, Upcoming invasive procedure, Emergency department visit, Upcoming dental procedure, Missed doses, Extra doses, Change in diet/appetite, Hospital admission, Other complaints   Comments: Taking APAP due to stiff neck since Friday, 2 extra strength tabs in AM and PM. Still has bruise on knee but it has  improved (last week was size of tennis ball).       Plan:  1. INR slightly supratherapeutic today at 3.6 (goal 2.5-3.5). Instructed Ms. Michele to take warfarin 2 mg daily except warfarin 1 mg on Monday and Friday until recheck (12 mg/wk). Same weekly dose but now spread out evenly throughout week.  2. Repeat INR Monday 6/17.  3. Lucie Michele understands the importance of calling the [facility] if she notices any s/sx of bleeding, stroke, or abnormal bruising, if any changes are made to her medications or medication doses (Rx, OTC, herbal), or if any upcoming procedures are scheduled. Lucie Michele will likewise let us know if any other changes, questions, or concerns arise regarding anticoagulation therapy. she understands the importance of seeking medical attention immediately if she experiences any falls, vehicle accidents, or abnormal bleeding or bruising. Lucie Michele voiced understanding of this information and confirms that she has the [facility]'s contact information. Otherwise, we will plan to contact the patient once monthly or if her INR is out of range.    Tyrone Hensley, JenniD, BCPS  6/10/2024  15:26 EDT

## 2024-06-10 NOTE — PROGRESS NOTES
Capillary Blood Specimen Collection  Capillary blood collection performed in clinic by Mitzi Joseph. Patient tolerated the procedure well without complications.   06/10/24   Mitzi Joseph

## 2024-06-17 ENCOUNTER — LAB (OUTPATIENT)
Dept: FAMILY MEDICINE CLINIC | Facility: CLINIC | Age: 79
End: 2024-06-17
Payer: MEDICARE

## 2024-06-17 ENCOUNTER — CLINICAL SUPPORT (OUTPATIENT)
Dept: CARDIOLOGY | Facility: CLINIC | Age: 79
End: 2024-06-17
Payer: MEDICARE

## 2024-06-17 ENCOUNTER — ANTICOAGULATION VISIT (OUTPATIENT)
Dept: PHARMACY | Facility: HOSPITAL | Age: 79
End: 2024-06-17
Payer: MEDICARE

## 2024-06-17 DIAGNOSIS — R73.9 HYPERGLYCEMIA: ICD-10-CM

## 2024-06-17 DIAGNOSIS — Z79.01 CHRONIC ANTICOAGULATION: Primary | ICD-10-CM

## 2024-06-17 DIAGNOSIS — N18.31 STAGE 3A CHRONIC KIDNEY DISEASE: ICD-10-CM

## 2024-06-17 DIAGNOSIS — I10 PRIMARY HYPERTENSION: ICD-10-CM

## 2024-06-17 DIAGNOSIS — R41.3 MEMORY CHANGE: ICD-10-CM

## 2024-06-17 DIAGNOSIS — E07.9 DISORDER OF THYROID: ICD-10-CM

## 2024-06-17 DIAGNOSIS — E55.9 VITAMIN D DEFICIENCY: ICD-10-CM

## 2024-06-17 LAB
INR PPP: 3.2
INR PPP: 3.2 (ref 0.9–1.1)

## 2024-06-17 PROCEDURE — 36416 COLLJ CAPILLARY BLOOD SPEC: CPT | Performed by: INTERNAL MEDICINE

## 2024-06-17 PROCEDURE — 36415 COLL VENOUS BLD VENIPUNCTURE: CPT | Performed by: FAMILY MEDICINE

## 2024-06-17 NOTE — PROGRESS NOTES
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     Diet: 2-3x week: 1 cup of brussels sprouts or broccoli weekly, green beans, peas  (3/11/23)  Premier Protein (or Equate brand) meal replacement ~2x/week with occasional 3rd 5/28/24  Alcohol: Seldom  Tobacco: None  OTC Pain Medication: APAP      INR History:  Date 4/5 4/19 4/26 5/5 5/11 6/2 6/15 6/18 6/25 7/2 7/9 7/19 7/23 7/30   Total Weekly Dose 15mg 15mg 14mg 14mg 14mg 14mg 14mg 14mg 14mg 14mg 14mg 14mg 14mg 14mg   INR 2.6 3.9 3.9 2.7 3.0 3.6 2.7 2.9 2.9 2.6 2.9 3.1 2.5 2.3   Notes           decr GLV   recv'd 6/21; [facility]       incr GLV decr GLV      Date 8/6 8/13 8/20 8/27 9/3 9/10 9/17 9/24 10/1 10/8 10/15 10/22 10/29 11/5   Total WeeklyDose 14mg 15mg 15mg 14mg 14mg 15mg 14mg 14mg 14mg 14mg 14mg 14mg 15mg 16mg   INR 2.4 3.4 3.8 2.9 2.2 3.2 2.9 3.3 3.2 3.3 3.0 2.4 2.4 2.4   Notes decr GLV decr GLV       1xboost         call call 1x boost   incr VitK call 2x boost; call      Date 11/10 11/17 11/24 12/1 12/8 12/15 12/23 12/30 1/3/22 1/7 1/11 1/18 1/25 2/1   Total WeeklyDose 17mg 16mg 16mg 16mg 15mg 15 mg Pt did not call back 15mg 12 mg 13mg 15mg 15 mg 15 mg 15 mg   INR 3.7 3.3 3.9 4.0 2.6 3.4 4.0 4.9 3.6 2.4 3.3 2.8 2.7 2.9   Notes Dec GLV   Zero GLV call Red x1 call Less GLV   call   Less  Protein drink redx1  self held x1 (misdose)   Dec vit K fall, apap  Call   call          Date 2/8 2/15 2/22 3/1 3/8 3/15 3/22 3/29 4/5 4/12 4/19 4/26 5/3 5/11   Total WeeklyDose 15mg 14mg 14 mg 15 mg 15 mg 15 mg 14mg 13 mg 14 mg 14 mg 14mg 13mg 15mg 14 mg   INR 4.0 4.0 2.8 2.9 2.9 4.2  3.9 3.3 2.9 3.0 3.8 2.4 3.8 2.5   Notes Call; Dec GLV call Call; Mis-dose call   Call; no GLV Inc GLV. Less protein, apap  redx1 call   Less GLV rec'd 4/27, call Dec GLV        Date 5/18 5/25 6/1 6/8 6/15 6/22 6/29 7/6 7/13 7/20 7/22 7/27  8/10  8/17   Total WeeklyDose 15 mg 15mg 16mg 15 mg 15 mg 15 mg 15 mg 15 mg 15mg 14 mg 15 mg 14 mg  14 mg  15 mg   INR 2.6 2.3 2.7 3.2 3.0 2.9 2.6 2.7 3.6 3.0 3.6 3.0  2.4  3.4   Notes   Inc GLV  call call call       call 7/14-- call call call  call  call  call      Date 8/25 8/31 9/7 9/12 9/19 9/26 10/3 10/10 10/17 10/24 10/31   Total WeeklyDose 14mg 13 mg 14 mg 17mg 15 mg 16mg 15mg 14 mg 13mg 17 mg 15mg   INR 4.3 2.8 1.7 2.9 2.1 3.4 3.8 2.4 1.8 3.7 4.5   Notes Dec GLV call Call refused enox; extra protein  Call; no protein drinks Call; less green tea, inc boostx1 Call; cranberries Redx1; call 1x miss Call  Less protein    1/1  Date 11/7 11/14 11/21 11/28 12/5 12/12 12/19 12/27 1/3 1/9 1/16/23 1/23 1/30/23   Total WeeklyDose 13 mg 14 mg 14 mg 14 mg 14 mg 14 mg 14mg 14 mg  13mg 13 mg 14 mg  14 mg 14 mg   INR 3.2 3.3 3.4 3.6 2.5 4.0 2.9 4.1 3.6 2.6 3.3 2.7 3.0   Notes  call redx1 One less protein shake   Inc GLV Decreased GLV W/o meloxicam  Tramadol,  meloxicam Tramadol,  meloxicam Tramadol,  meloxicam meloxicam     Date 2/7/23 2/13 2/20 2/27/23 3/6 3/13/23 3/23 3/27 4/3/23 4/10/23 4/17/23 4/24 5/1   Total WeeklyDose 14 mg 14 mg 14 mg 14 mg  14 mg 14 mg  14 mg 14 mg 15 mg  14mg 13 mg 15 mg 14 mg   INR 2.9 3.0 3.6 2.6 3 3.5 3.4 2.4 2.8 2.5 2.3 3.2  4.1 POCT   Notes Call  call rec 2/21  Call   call Call   call Call  call Missed dose 1x boost Call; missed protein drinks, less GLV     Date 5/2 5/8 5/15 5/22 5/30 6/5 6/19 7/10 7/24 7/31 8/7 8/14 8/21  8/28   Total WeeklyDose 12 mg 13 mg 14 mg 14 mg 14mg 14 mg 14 mg 14 mg 13 mg 13mg  12 mg 12 mg 14 mg 13 mg   INR 3.7 3.3 2.6 2.7 3.4 2.9 4.0 4.1 3.9 4.1 2.8 1.9 4.0  3.2   Notes rec 5/3  Call  Self-heldx1, boostx1    Stopping HM Less GLV   redx1 Ceftin   Rec'd 8/1 redx1 Inc GLV  Prednisone start Less GLV      Date 9/5 9/18 9/25 10/2 10/06 10/13 10/27 11/1 11/6 11/13 11/17 11/27   Total Weekly Dose 13mg 14 mg 12 mg  12 mg 11 mg 12 mg 12 mg 12 mg 11mg 11 mg 12 mg 12mg   INR 4.2 4.2 4.3  4.6 2.6 3.0 4.1 4.3 3.7 2.1 2.7 4.8   Notes redx1 clindamycin redx1 redx2 Red x2; inc GLV            Date 12/01 12/11 12/15 12/22 12/29 1/5/2024 1/12 1/26 2/9  2/26 3/1 3/11  3/18   Total Weekly Dose 11 mg 11 mg 11 mg 11 mg 11 mg 11 mg 11 mg 11 mg  12 mg   12 mg 12mg 11 mg  11 mg   INR 2.4 3.0 2.7 2.7 3.2 3.0 2.4 2.3 3.3  4.3 2.4 3.4 2.6   Notes  call    call    No GLV, apap   call     Date  4/1 4/8/24 4/15 4/22 4/29 5/3 5/6 5/13 5/20 5/28 6/3 6/10 6/17   Total Weekly  Dose 11 mg 12mg 11 mg 11 mg ???  Missed Sunday maybe more 12 mg 14 mg 14 mg 14 mg 14 mg 13 mg  12 mg 14 mg   INR 2.0 2.5 2.5 3.2 1.1 1.5 2.5 3.3 3.4 4.0 4.0 3.6 3.2   Notes Call  Missed x1 No contact  Boost x 1 No contact No contact  Lovenox  Boost x 2  Call Lovenox     call 1 x decrease  APAP  Call  APAP call     Date               Total WeeklyDose               INR               Notes                   Phone Interview:  Tablet Strength: 2mg  Patient Contact Info: [phone] (Mobile) *preferred*  [email or web address]  Verbal Release Authorization signed on 4/7/21 -- may speak with Tammy Bai (friend: [phone]), Ismael Michele (brother: [phone])  Lab Contact Info: [facility] ([place])  ** will call once monthly or if INR is out of range**    12/1/23 Scr: 77 ml/min    Patient Findings  Negatives: Signs/symptoms of thrombosis, Signs/symptoms of bleeding, Laboratory test error suspected, Change in health, Change in alcohol use, Change in activity, Upcoming invasive procedure, Emergency department visit, Upcoming dental procedure, Missed doses, Extra doses, Change in medications, Change in diet/appetite, Hospital admission, Bruising, Other complaints   Comments: All findings negative per  pt     Plan:  1. INR therapeutic today at 3.2 (goal 2.5-3.5). Instructed Ms. Michele to continue the warfarin 2 mg daily except warfarin 1 mg on Monday and Friday until recheck (12 mg/wk). Same weekly dose but now spread out evenly throughout week.  2. Repeat INR Monday 6/24.  3. Lucie Michele understands the importance of calling the [facility] if she notices any s/sx of bleeding, stroke, or abnormal bruising, if any changes are made to her medications or medication doses (Rx, OTC, herbal), or if any upcoming procedures are scheduled. Lucie Michele will likewise let us know if any other changes, questions, or concerns arise regarding anticoagulation therapy. she understands the importance of seeking medical attention immediately if she experiences any falls, vehicle accidents, or abnormal bleeding or bruising. Lucie Michele voiced understanding of this information and confirms that she has the [facility]'s contact information. Otherwise, we will plan to contact the patient once monthly or if her INR is out of range.    Rakel Jean-Baptiste  Pharmacy Technician   6/17/2024 10:35 EDT    I, Ramana Bagley, PharmD, have reviewed the note in full and agree with the assessment and plan.  06/17/24  16:31 EDT

## 2024-06-17 NOTE — PROGRESS NOTES
Capillary Blood Specimen Collection  Capillary blood collection performed in clinic by Ema Hamilton MA. Patient tolerated the procedure well without complications.   06/17/24   Ema Hamilton MA

## 2024-06-18 LAB
25(OH)D3+25(OH)D2 SERPL-MCNC: 64.2 NG/ML (ref 30–100)
ALBUMIN SERPL-MCNC: 4.4 G/DL (ref 3.8–4.8)
ALP SERPL-CCNC: 115 IU/L (ref 44–121)
ALT SERPL-CCNC: 30 IU/L (ref 0–32)
AST SERPL-CCNC: 45 IU/L (ref 0–40)
BASOPHILS # BLD AUTO: 0.1 X10E3/UL (ref 0–0.2)
BASOPHILS NFR BLD AUTO: 1 %
BILIRUB SERPL-MCNC: 0.4 MG/DL (ref 0–1.2)
BUN SERPL-MCNC: 12 MG/DL (ref 8–27)
BUN/CREAT SERPL: 15 (ref 12–28)
CALCIUM SERPL-MCNC: 9 MG/DL (ref 8.7–10.3)
CHLORIDE SERPL-SCNC: 104 MMOL/L (ref 96–106)
CHOLEST SERPL-MCNC: 234 MG/DL (ref 100–199)
CO2 SERPL-SCNC: 25 MMOL/L (ref 20–29)
CREAT SERPL-MCNC: 0.81 MG/DL (ref 0.57–1)
EGFRCR SERPLBLD CKD-EPI 2021: 74 ML/MIN/1.73
EOSINOPHIL # BLD AUTO: 0.2 X10E3/UL (ref 0–0.4)
EOSINOPHIL NFR BLD AUTO: 2 %
ERYTHROCYTE [DISTWIDTH] IN BLOOD BY AUTOMATED COUNT: 14.7 % (ref 11.7–15.4)
GLOBULIN SER CALC-MCNC: 2.3 G/DL (ref 1.5–4.5)
GLUCOSE SERPL-MCNC: 96 MG/DL (ref 70–99)
HBA1C MFR BLD: 5.9 % (ref 4.8–5.6)
HCT VFR BLD AUTO: 44.9 % (ref 34–46.6)
HDLC SERPL-MCNC: 72 MG/DL
HGB BLD-MCNC: 14.5 G/DL (ref 11.1–15.9)
IMM GRANULOCYTES # BLD AUTO: 0 X10E3/UL (ref 0–0.1)
IMM GRANULOCYTES NFR BLD AUTO: 1 %
LDLC SERPL CALC-MCNC: 126 MG/DL (ref 0–99)
LYMPHOCYTES # BLD AUTO: 2 X10E3/UL (ref 0.7–3.1)
LYMPHOCYTES NFR BLD AUTO: 28 %
MCH RBC QN AUTO: 30.1 PG (ref 26.6–33)
MCHC RBC AUTO-ENTMCNC: 32.3 G/DL (ref 31.5–35.7)
MCV RBC AUTO: 93 FL (ref 79–97)
MONOCYTES # BLD AUTO: 0.8 X10E3/UL (ref 0.1–0.9)
MONOCYTES NFR BLD AUTO: 11 %
NEUTROPHILS # BLD AUTO: 4.1 X10E3/UL (ref 1.4–7)
NEUTROPHILS NFR BLD AUTO: 57 %
PLATELET # BLD AUTO: 241 X10E3/UL (ref 150–450)
POTASSIUM SERPL-SCNC: 4 MMOL/L (ref 3.5–5.2)
PROT SERPL-MCNC: 6.7 G/DL (ref 6–8.5)
RBC # BLD AUTO: 4.81 X10E6/UL (ref 3.77–5.28)
SODIUM SERPL-SCNC: 143 MMOL/L (ref 134–144)
T4 FREE SERPL-MCNC: 1.06 NG/DL (ref 0.82–1.77)
TRIGL SERPL-MCNC: 208 MG/DL (ref 0–149)
TSH SERPL DL<=0.005 MIU/L-ACNC: 11.2 UIU/ML (ref 0.45–4.5)
VIT B12 SERPL-MCNC: 486 PG/ML (ref 232–1245)
VLDLC SERPL CALC-MCNC: 36 MG/DL (ref 5–40)
WBC # BLD AUTO: 7.1 X10E3/UL (ref 3.4–10.8)

## 2024-06-18 RX ORDER — ALENDRONATE SODIUM 70 MG/1
70 TABLET ORAL
Qty: 4 TABLET | Refills: 0 | Status: SHIPPED | OUTPATIENT
Start: 2024-06-18

## 2024-06-24 ENCOUNTER — ANTICOAGULATION VISIT (OUTPATIENT)
Dept: PHARMACY | Facility: HOSPITAL | Age: 79
End: 2024-06-24
Payer: MEDICARE

## 2024-06-24 ENCOUNTER — CLINICAL SUPPORT (OUTPATIENT)
Dept: CARDIOLOGY | Facility: CLINIC | Age: 79
End: 2024-06-24
Payer: MEDICARE

## 2024-06-24 ENCOUNTER — OFFICE VISIT (OUTPATIENT)
Dept: FAMILY MEDICINE CLINIC | Facility: CLINIC | Age: 79
End: 2024-06-24
Payer: MEDICARE

## 2024-06-24 VITALS
OXYGEN SATURATION: 98 % | SYSTOLIC BLOOD PRESSURE: 128 MMHG | DIASTOLIC BLOOD PRESSURE: 84 MMHG | WEIGHT: 167.4 LBS | HEART RATE: 68 BPM | HEIGHT: 65 IN | RESPIRATION RATE: 16 BRPM | BODY MASS INDEX: 27.89 KG/M2

## 2024-06-24 DIAGNOSIS — R29.6 FREQUENT FALLS: ICD-10-CM

## 2024-06-24 DIAGNOSIS — R79.89 ELEVATED LIVER FUNCTION TESTS: ICD-10-CM

## 2024-06-24 DIAGNOSIS — E03.9 ACQUIRED HYPOTHYROIDISM: ICD-10-CM

## 2024-06-24 DIAGNOSIS — R73.9 HYPERGLYCEMIA: ICD-10-CM

## 2024-06-24 DIAGNOSIS — Z95.2 HISTORY OF AORTIC VALVE REPLACEMENT: Primary | ICD-10-CM

## 2024-06-24 DIAGNOSIS — Z00.00 MEDICARE ANNUAL WELLNESS VISIT, SUBSEQUENT: Primary | ICD-10-CM

## 2024-06-24 DIAGNOSIS — E78.2 MIXED HYPERLIPIDEMIA: ICD-10-CM

## 2024-06-24 DIAGNOSIS — I10 PRIMARY HYPERTENSION: ICD-10-CM

## 2024-06-24 DIAGNOSIS — Z00.00 PHYSICAL EXAM: ICD-10-CM

## 2024-06-24 LAB — INR PPP: 2.5 (ref 0.9–1.1)

## 2024-06-24 PROCEDURE — 99214 OFFICE O/P EST MOD 30 MIN: CPT | Performed by: FAMILY MEDICINE

## 2024-06-24 PROCEDURE — 36416 COLLJ CAPILLARY BLOOD SPEC: CPT | Performed by: INTERNAL MEDICINE

## 2024-06-24 PROCEDURE — 1125F AMNT PAIN NOTED PAIN PRSNT: CPT | Performed by: FAMILY MEDICINE

## 2024-06-24 PROCEDURE — 3074F SYST BP LT 130 MM HG: CPT | Performed by: FAMILY MEDICINE

## 2024-06-24 PROCEDURE — G0439 PPPS, SUBSEQ VISIT: HCPCS | Performed by: FAMILY MEDICINE

## 2024-06-24 PROCEDURE — 99397 PER PM REEVAL EST PAT 65+ YR: CPT | Performed by: FAMILY MEDICINE

## 2024-06-24 PROCEDURE — 85610 PROTHROMBIN TIME: CPT | Performed by: INTERNAL MEDICINE

## 2024-06-24 PROCEDURE — 3079F DIAST BP 80-89 MM HG: CPT | Performed by: FAMILY MEDICINE

## 2024-06-24 PROCEDURE — 1170F FXNL STATUS ASSESSED: CPT | Performed by: FAMILY MEDICINE

## 2024-06-24 RX ORDER — EZETIMIBE 10 MG/1
10 TABLET ORAL DAILY
Qty: 90 TABLET | Refills: 1 | Status: SHIPPED | OUTPATIENT
Start: 2024-06-24

## 2024-06-24 NOTE — PROGRESS NOTES
Capillary Blood Specimen Collection  Capillary blood collection performed in clinic by Augusta Salazar MA. Patient tolerated the procedure well without complications.   06/24/24   Augusta Salazar MA

## 2024-06-24 NOTE — PROGRESS NOTES
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     Diet: 2-3x week: 1 cup of brussels sprouts or broccoli weekly, green beans, peas  (3/11/23)  Premier Protein (or Equate brand) meal replacement ~2x/week with occasional 3rd 5/28/24  Alcohol: Seldom  Tobacco: None  OTC Pain Medication: APAP      INR History:  Date 4/5 4/19 4/26 5/5 5/11 6/2 6/15 6/18 6/25 7/2 7/9 7/19 7/23 7/30   Total Weekly Dose 15mg 15mg 14mg 14mg 14mg 14mg 14mg 14mg 14mg 14mg 14mg 14mg 14mg 14mg   INR 2.6 3.9 3.9 2.7 3.0 3.6 2.7 2.9 2.9 2.6 2.9 3.1 2.5 2.3   Notes           decr GLV   recv'd 6/21; [facility]       incr GLV decr GLV      Date 8/6 8/13 8/20 8/27 9/3 9/10 9/17 9/24 10/1 10/8 10/15 10/22 10/29 11/5   Total WeeklyDose 14mg 15mg 15mg 14mg 14mg 15mg 14mg 14mg 14mg 14mg 14mg 14mg 15mg 16mg   INR 2.4 3.4 3.8 2.9 2.2 3.2 2.9 3.3 3.2 3.3 3.0 2.4 2.4 2.4   Notes decr GLV decr GLV       1xboost         call call 1x boost   incr VitK call 2x boost; call      Date 11/10 11/17 11/24 12/1 12/8 12/15 12/23 12/30 1/3/22 1/7 1/11 1/18 1/25 2/1   Total WeeklyDose 17mg 16mg 16mg 16mg 15mg 15 mg Pt did not call back 15mg 12 mg 13mg 15mg 15 mg 15 mg 15 mg   INR 3.7 3.3 3.9 4.0 2.6 3.4 4.0 4.9 3.6 2.4 3.3 2.8 2.7 2.9   Notes Dec GLV   Zero GLV call Red x1 call Less GLV   call   Less  Protein drink redx1  self held x1 (misdose)   Dec vit K fall, apap  Call   call          Date 2/8 2/15 2/22 3/1 3/8 3/15 3/22 3/29 4/5 4/12 4/19 4/26 5/3 5/11   Total WeeklyDose 15mg 14mg 14 mg 15 mg 15 mg 15 mg 14mg 13 mg 14 mg 14 mg 14mg 13mg 15mg 14 mg   INR 4.0 4.0 2.8 2.9 2.9 4.2  3.9 3.3 2.9 3.0 3.8 2.4 3.8 2.5   Notes Call; Dec GLV call Call; Mis-dose call   Call; no GLV Inc GLV. Less protein, apap  redx1 call   Less GLV rec'd 4/27, call Dec GLV        Date 5/18 5/25 6/1 6/8 6/15 6/22 6/29 7/6 7/13 7/20 7/22 7/27  8/10  8/17   Total WeeklyDose 15 mg 15mg 16mg 15 mg 15 mg 15 mg 15 mg 15 mg 15mg 14 mg 15 mg 14 mg  14 mg  15 mg   INR 2.6 2.3 2.7 3.2 3.0 2.9 2.6 2.7 3.6 3.0 3.6 3.0  2.4  3.4   Notes   Inc GLV  call call call       call 7/14-- call call call  call  call  call      Date 8/25 8/31 9/7 9/12 9/19 9/26 10/3 10/10 10/17 10/24 10/31   Total WeeklyDose 14mg 13 mg 14 mg 17mg 15 mg 16mg 15mg 14 mg 13mg 17 mg 15mg   INR 4.3 2.8 1.7 2.9 2.1 3.4 3.8 2.4 1.8 3.7 4.5   Notes Dec GLV call Call refused enox; extra protein  Call; no protein drinks Call; less green tea, inc boostx1 Call; cranberries Redx1; call 1x miss Call  Less protein    1/1  Date 11/7 11/14 11/21 11/28 12/5 12/12 12/19 12/27 1/3 1/9 1/16/23 1/23 1/30/23   Total WeeklyDose 13 mg 14 mg 14 mg 14 mg 14 mg 14 mg 14mg 14 mg  13mg 13 mg 14 mg  14 mg 14 mg   INR 3.2 3.3 3.4 3.6 2.5 4.0 2.9 4.1 3.6 2.6 3.3 2.7 3.0   Notes  call redx1 One less protein shake   Inc GLV Decreased GLV W/o meloxicam  Tramadol,  meloxicam Tramadol,  meloxicam Tramadol,  meloxicam meloxicam     Date 2/7/23 2/13 2/20 2/27/23 3/6 3/13/23 3/23 3/27 4/3/23 4/10/23 4/17/23 4/24 5/1   Total WeeklyDose 14 mg 14 mg 14 mg 14 mg  14 mg 14 mg  14 mg 14 mg 15 mg  14mg 13 mg 15 mg 14 mg   INR 2.9 3.0 3.6 2.6 3 3.5 3.4 2.4 2.8 2.5 2.3 3.2  4.1 POCT   Notes Call  call rec 2/21  Call   call Call   call Call  call Missed dose 1x boost Call; missed protein drinks, less GLV     Date 5/2 5/8 5/15 5/22 5/30 6/5 6/19 7/10 7/24 7/31 8/7 8/14 8/21  8/28   Total WeeklyDose 12 mg 13 mg 14 mg 14 mg 14mg 14 mg 14 mg 14 mg 13 mg 13mg  12 mg 12 mg 14 mg 13 mg   INR 3.7 3.3 2.6 2.7 3.4 2.9 4.0 4.1 3.9 4.1 2.8 1.9 4.0  3.2   Notes rec 5/3  Call  Self-heldx1, boostx1    Stopping HM Less GLV   redx1 Ceftin   Rec'd 8/1 redx1 Inc GLV  Prednisone start Less GLV      Date 9/5 9/18 9/25 10/2 10/06 10/13 10/27 11/1 11/6 11/13 11/17 11/27   Total Weekly Dose 13mg 14 mg 12 mg  12 mg 11 mg 12 mg 12 mg 12 mg 11mg 11 mg 12 mg 12mg   INR 4.2 4.2 4.3  4.6 2.6 3.0 4.1 4.3 3.7 2.1 2.7 4.8   Notes redx1 clindamycin redx1 redx2 Red x2; inc GLV            Date 12/01 12/11 12/15 12/22 12/29 1/5/2024 1/12 1/26 2/9  2/26 3/1 3/11  3/18   Total Weekly Dose 11 mg 11 mg 11 mg 11 mg 11 mg 11 mg 11 mg 11 mg  12 mg   12 mg 12mg 11 mg  11 mg   INR 2.4 3.0 2.7 2.7 3.2 3.0 2.4 2.3 3.3  4.3 2.4 3.4 2.6   Notes  call    call    No GLV, apap   call     Date  4/1 4/8/24 4/15 4/22 4/29 5/3 5/6 5/13 5/20 5/28 6/3 6/10 6/17   Total Weekly  Dose 11 mg 12mg 11 mg 11 mg ???  Missed Sunday maybe more 12 mg 14 mg 14 mg 14 mg 14 mg 13 mg  12 mg 14 mg   INR 2.0 2.5 2.5 3.2 1.1 1.5 2.5 3.3 3.4 4.0 4.0 3.6 3.2   Notes Call  Missed x1 No contact  Boost x 1 No contact No contact  Lovenox  Boost x 2  Call Lovenox     call 1 x decrease  APAP  Call  APAP call     Date 6/24              Total WeeklyDose 12 mg              INR 2.5              Notes                 Phone Interview:  Tablet Strength: 2mg  Patient Contact Info: [phone] (Mobile) *preferred*  [email or web address]  Verbal Release Authorization signed on 4/7/21 -- may speak with Tammy Bai (friend: [phone]), Ismael Michele (brother: [phone])  Lab Contact Info: [facility]  ** will call once monthly or if INR is out of range**   12/1/23 Scr: 77 ml/min    Patient Findings    Comments: Patient not contacted for this encounter.     Plan:  1. INR is therapeutic today at 2.5 (goal 2.5-3.5). Ms. Michele will continue warfarin 2 mg daily except 1 mg MonFri until recheck (12 mg/week).   2. Repeat INR in 1 week, 7/01/24  3. Lucie Michele understands the importance of calling the [facility] if she notices any s/sx of bleeding, stroke, or abnormal bruising,  if any changes are made to her medications or medication doses (Rx, OTC, herbal), or if any upcoming procedures are scheduled. Lucie Michele will likewise let us know if any other changes, questions, or concerns arise regarding anticoagulation therapy. she understands the importance of seeking medical attention immediately if she experiences any falls, vehicle accidents, or abnormal bleeding or bruising. Lucie Michele voiced understanding of this information and confirms that she has the [facility]'s contact information. Otherwise, we will plan to contact the patient once monthly or if her INR is out of range.    Elva Langford CPhT   10:47 EDT 6/24/2024    IR[facility]ina, [facility], have reviewed the note in full and agree with the assessment and plan.  06/24/24  14:10 EDT

## 2024-06-24 NOTE — ASSESSMENT & PLAN NOTE
AST is little elevated.  Levels 45.  We will recheck liver ultrasound and get blood work in 3 months.  She has not been drinking.

## 2024-06-24 NOTE — PROGRESS NOTES
The ABCs of the Annual Wellness Visit  Subsequent Medicare Wellness Visit    Subjective    Lucie Michele is a 79 y.o. female who presents for a Subsequent Medicare Wellness Visit.    The following portions of the patient's history were reviewed and   updated as appropriate: allergies, current medications, past family history, past medical history, past social history, past surgical history, and problem list.    Compared to one year ago, the patient feels her physical   health is worse.    Compared to one year ago, the patient feels her mental   health is the same.    Recent Hospitalizations:  She was not admitted to the hospital during the last year.       Current Medical Providers:  Patient Care Team:  [facility] as P[facility] (Cardiology)  Bam Walden APRN as Nurse Practitioner (Nurse Practitioner)  Florentin Golden MD as Surgeon (Neurosurgery)  Korey Hollis MD as Surgeon (Cardiothoracic Surgery)    Outpatient Medications Prior to Visit   Medication Sig Dispense Refill    albuterol sulfate  (90 Base) MCG/ACT inhaler INHALE 2 PUFFS BY MOUTH EVERY 4 HOURS AS NEEDED FOR WHEEZING 25.5 g 1    alendronate (FOSAMAX) 70 MG tablet Take 1 tablet by mouth Every 7 (Seven) Days. 4 tablet 0    atorvastatin (LIPITOR) 20 MG tablet Take 1 tablet by mouth Daily. 90 tablet 3    Breztri Aerosphere 160-9-4.8 MCG/ACT aerosol inhaler USE 2 INHALATIONS TWICE A DAY 10.7 g 11    Calcium Carbonate-Vit D-Min (Caltrate 600+D Plus Minerals) 600-800 MG-UNIT tablet Take 600 mg by mouth 2 (Two) Times a Day. 180 tablet 3    carbonyl iron (FEOSOL) 45 MG tablet tablet 1 po qd      Cholecalciferol 4000 units capsule 1 po qd OTC      Coenzyme Q10 (CO Q-10) 50 MG capsule 1 po qd      Farxiga 10 MG tablet Take 10 mg by mouth Daily.      furosemide (LASIX) 40 MG tablet Take 1 tablet by mouth Daily. 90 tablet 3    GLUCOSAMINE-CHONDROITIN DS PO Take  by mouth 2  (Two) Times a Day. 1500 mg      L-Lysine 500 MG capsule 1 po qd      levothyroxine (SYNTHROID, LEVOTHROID) 125 MCG tablet TAKE 1 TABLET BY MOUTH DAILY 90 tablet 0    omeprazole (priLOSEC) 20 MG capsule TAKE 1 CAPSULE BY MOUTH DAILY 90 capsule 0    OXcarbazepine (TRILEPTAL) 150 MG tablet Take 1 tablet by mouth 3 (Three) Times a Day. 270 tablet 3    oxybutynin (DITROPAN) 5 MG tablet TAKE 1 TABLET BY MOUTH TWICE DAILY 180 tablet 0    Pediatric Multivit-Minerals-C (CHEWABLES MULTIVITAMIN PO) 2 po qd OTC      polycarbophil (calcium polycarbophil) 625 MG tablet tablet 1 po qd      potassium chloride (K-TAB) 20 MEQ tablet controlled-release ER tablet Take 1 tablet by mouth Daily. 90 tablet 3    sotalol (BETAPACE) 80 MG tablet Take 1 tablet by mouth Every 12 (Twelve) Hours. 180 tablet 3    vitamin D (ERGOCALCIFEROL) 03977 units capsule capsule Take 1 capsule by mouth 1 (One) Time Per Week.      vitamin E 400 UNIT capsule Take 180 Units by mouth Daily.      warfarin (COUMADIN) 2 MG tablet Take 1/2 to 1 tablet by mouth daily OR as directed by anticoagulation clinic 90 tablet 1    Enoxaparin Sodium (LOVENOX) 60 MG/0.6ML solution prefilled syringe syringe Inject 0.6 mL under the skin into the appropriate area as directed Every 12 (Twelve) Hours. Inject 0.6 ml under the skin into the appropriate area as directed every 12 hours. 6 mL 0    Enoxaparin Sodium (LOVENOX) 80 MG/0.8ML solution prefilled syringe syringe Inject 0.7 mL under the skin into the appropriate area as directed Every 12 (Twelve) Hours. Push out 0.1 ml then inject 0.7 ml under the skin into the appropriate areas as directed every 12 hours. 8 mL 1     No facility-administered medications prior to visit.       No opioid medication identified on active medication list. I have reviewed chart for other potential  high risk medication/s and harmful drug interactions in the elderly.        Aspirin is not on active medication list.  Aspirin use is contraindicated for this  "patient due to: current use of warfarin.  .    Patient Active Problem List   Diagnosis    Cavernous sinus tumor    Trigeminal neuralgia    Depression    Hypertonicity of bladder    Hyperlipidemia    Primary hypertension    Osteoarthritis    Osteoporosis    JOSE LUIS on CPAP    Disorder of thyroid    Chronic diastolic congestive heart failure    Acquired hypothyroidism    Allergic rhinitis    Biliary sludge    Chronic anticoagulation    Degenerative cervical spinal stenosis    Duodenogastric reflux    Gastroesophageal reflux disease without esophagitis    High risk medication use    History of aortic valve replacement    History of postmenopausal HRT    Major depression in remission    Neoplasm of meninges    Osteopenia    Prediabetes    Primary osteoarthritis involving multiple joints    Pulmonary hypertension    Stage 3a chronic kidney disease    Transient tic disorder    Vitamin D deficiency    Left hip pain    Osteopenia of multiple sites    COPD mixed type    Hyperglycemia    Medicare annual wellness visit, subsequent    S/P ascending aortic aneurysm repair    Wound of left leg    H/O partial thyroidectomy    Hypokalemia    Epigastric pain    Frequent falls    Lung nodule    Paroxysmal atrial flutter    Adjustment disorder    Thrush    Memory change    Symptomatic premature menopause    Elevated liver function tests     Advance Care Planning   Advance Care Planning     Advance Directive is not on file.  ACP discussion was held with the patient during this visit. Patient has an advance directive (not in EMR), copy requested.     Objective    Vitals:    06/24/24 1127   BP: 128/84   BP Location: Left arm   Patient Position: Sitting   Cuff Size: Large Adult   Pulse: 68   Resp: 16   SpO2: 98%   Weight: 75.9 kg (167 lb 6.4 oz)   Height: 165.1 cm (65\")   PainSc:   8   PainLoc: Shoulder     Estimated body mass index is 27.86 kg/m² as calculated from the following:    Height as of this encounter: 165.1 cm (65\").    Weight as " of this encounter: 75.9 kg (167 lb 6.4 oz).           Does the patient have evidence of cognitive impairment? No    Lab Results   Component Value Date    CHLPL 234 (H) 2024    TRIG 208 (H) 2024    HDL 72 2024     (H) 2024    VLDL 36 2024    HGBA1C 5.9 (H) 2024        HEALTH RISK ASSESSMENT    Smoking Status:  Social History     Tobacco Use   Smoking Status Former    Current packs/day: 0.00    Average packs/day: 1 pack/day for 4.0 years (4.0 ttl pk-yrs)    Types: Cigarettes    Start date: 1964    Quit date: 1968    Years since quittin.5    Passive exposure: Past   Smokeless Tobacco Never   Tobacco Comments    up to 3 ppd     Alcohol Consumption:  Social History     Substance and Sexual Activity   Alcohol Use Yes    Alcohol/week: 1.0 standard drink of alcohol    Types: 1 Drinks containing 0.5 oz of alcohol per week    Comment: I enjoy an occasional hi ball or glass of wine with dinner     Fall Risk Screen:    JEREMÍAS Fall Risk Assessment was completed, and patient is at MODERATE risk for falls. Assessment completed on:2024    Depression Screenin/24/2024    11:31 AM   PHQ-2/PHQ-9 Depression Screening   Little Interest or Pleasure in Doing Things 0-->not at all   Feeling Down, Depressed or Hopeless 0-->not at all   PHQ-9: Brief Depression Severity Measure Score 0       Health Habits and Functional and Cognitive Screenin/24/2024    11:29 AM   Functional & Cognitive Status   Do you have difficulty preparing food and eating? No   Do you have difficulty bathing yourself, getting dressed or grooming yourself? No   Do you have difficulty using the toilet? No   Do you have difficulty moving around from place to place? No   Do you have trouble with steps or getting out of a bed or a chair? No   Current Diet Well Balanced Diet   Dental Exam Up to date   Eye Exam Up to date   Exercise (times per week) 0 times per week   Current Exercises Include  Walking;House Cleaning   Do you need help using the phone?  No   Are you deaf or do you have serious difficulty hearing?  Yes   Do you need help to go to places out of walking distance? No   Do you need help shopping? No   Do you need help preparing meals?  No   Do you need help with housework?  No   Do you need help with laundry? No   Do you need help taking your medications? No   Do you need help managing money? No   Do you ever drive or ride in a car without wearing a seat belt? No   Have you felt unusual stress, anger or loneliness in the last month? No   Who do you live with? Child   If you need help, do you have trouble finding someone available to you? No   Do you have difficulty concentrating, remembering or making decisions? No       Age-appropriate Screening Schedule:  Refer to the list below for future screening recommendations based on patient's age, sex and/or medical conditions. Orders for these recommended tests are listed in the plan section. The patient has been provided with a written plan.    Health Maintenance   Topic Date Due    COVID-19 Vaccine (7 - 2023-24 season) 05/15/2024    COLORECTAL CANCER SCREENING  12/26/2034 (Originally 1945)    INFLUENZA VACCINE  08/01/2024    BMI FOLLOWUP  01/16/2025    LIPID PANEL  06/17/2025    ANNUAL WELLNESS VISIT  06/24/2025    DXA SCAN  04/22/2026    TDAP/TD VACCINES (4 - Td or Tdap) 07/18/2033    HEPATITIS C SCREENING  Completed    RSV Vaccine - Adults  Completed    Pneumococcal Vaccine 65+  Completed    ZOSTER VACCINE  Completed                  CMS Preventative Services Quick Reference  Risk Factors Identified During Encounter  None Identified  The above risks/problems have been discussed with the patient.  Pertinent information has been shared with the patient in the After Visit Summary.  An After Visit Summary and PPPS were made available to the patient.    Follow Up:   Next Medicare Wellness visit to be scheduled in 1 year.       Additional E&M Note  "during same encounter follows:  Patient has multiple medical problems which are significant and separately identifiable that require additional work above and beyond the Medicare Wellness Visit.      Chief Complaint  Follow-up and Medicare Wellness-subsequent    Subjective        HPI  Lucie Michele is also being seen today for Wellness and BP    Review of Systems   Constitutional: Negative.    HENT: Negative.     Eyes: Negative.    Respiratory: Negative.     Cardiovascular: Negative.    Gastrointestinal: Negative.    Endocrine: Negative.    Musculoskeletal: Negative.        Objective   Vital Signs:  /84 (BP Location: Left arm, Patient Position: Sitting, Cuff Size: Large Adult)   Pulse 68   Resp 16   Ht 165.1 cm (65\")   Wt 75.9 kg (167 lb 6.4 oz)   SpO2 98%   BMI 27.86 kg/m²     Physical Exam  Vitals and nursing note reviewed.   Constitutional:       Appearance: Normal appearance. She is normal weight.   HENT:      Head: Normocephalic and atraumatic.      Right Ear: Tympanic membrane, ear canal and external ear normal.      Left Ear: Tympanic membrane, ear canal and external ear normal.      Nose: Nose normal.      Mouth/Throat:      Mouth: Mucous membranes are moist.      Pharynx: Oropharynx is clear.   Eyes:      Extraocular Movements: Extraocular movements intact.      Conjunctiva/sclera: Conjunctivae normal.      Pupils: Pupils are equal, round, and reactive to light.   Cardiovascular:      Rate and Rhythm: Normal rate and regular rhythm.      Pulses: Normal pulses.      Heart sounds: Normal heart sounds.   Pulmonary:      Effort: Pulmonary effort is normal.      Breath sounds: Normal breath sounds.   Abdominal:      General: Abdomen is flat. Bowel sounds are normal.      Palpations: Abdomen is soft.   Musculoskeletal:         General: Normal range of motion.      Cervical back: Normal range of motion and neck supple.   Feet:      Comments:      Skin:     General: Skin is warm and dry. "   Neurological:      General: No focal deficit present.      Mental Status: She is alert and oriented to person, place, and time.   Psychiatric:         Mood and Affect: Mood normal.         Behavior: Behavior normal.         Thought Content: Thought content normal.         Judgment: Judgment normal.          The following data was reviewed by: Giovanni Lee MD on 06/24/2024:  CMP          11/15/2023    09:38 12/1/2023    11:15 6/17/2024    09:44   CMP   Glucose 99  96  96    BUN 8  13  12    Creatinine 0.71  0.71  0.81    Sodium 141  143  143    Potassium 4.1  3.4  4.0    Chloride 101  99  104    Calcium 9.3  9.6  9.0    Total Protein  6.7  6.7    Albumin  4.3  4.4    Globulin  2.4  2.3    Total Bilirubin  0.7  0.4    Alkaline Phosphatase  100  115    AST (SGOT)  41  45    ALT (SGPT)  35  30    BUN/Creatinine Ratio 11  18  15      CBC          10/25/2023    10:22 12/1/2023    11:15 6/17/2024    09:44   CBC   WBC 6.1  9.0  7.1    RBC 4.86  4.64  4.81    Hemoglobin 14.3  13.7  14.5    Hematocrit 43.7  41.8  44.9    MCV 90  90  93    MCH 29.4  29.5  30.1    MCHC 32.7  32.8  32.3    RDW 14.9  15.0  14.7    Platelets 218  270  241      Lipid Panel          10/25/2023    10:22 6/17/2024    09:44   Lipid Panel   Total Cholesterol 177  234    Triglycerides 105  208    HDL Cholesterol 57  72    VLDL Cholesterol 19  36    LDL Cholesterol  101  126      TSH          10/25/2023    10:22 6/17/2024    09:44   TSH   TSH 2.720  11.200      Most Recent A1C          6/17/2024    09:44   HGBA1C Most Recent   Hemoglobin A1C 5.9                 Assessment and Plan   Diagnoses and all orders for this visit:    1. Medicare annual wellness visit, subsequent (Primary)  Assessment & Plan:  We discussed health maintenance, diet, exercise, and immunizations.    Orders:  -     Hemoglobin A1c; Future  -     Lipid Panel; Future  -     Comprehensive Metabolic Panel; Future  -     Vitamin B12; Future  -     TSH Rfx On Abnormal To Free T4;  Future  -     CBC & Differential; Future    2. Physical exam  Assessment & Plan:  We discussed health maintenance, diet, exercise, and immunizations.    Orders:  -     Hemoglobin A1c; Future  -     Lipid Panel; Future  -     Comprehensive Metabolic Panel; Future  -     Vitamin B12; Future  -     TSH Rfx On Abnormal To Free T4; Future  -     CBC & Differential; Future    3. Primary hypertension  Assessment & Plan:  Discussed with patient to monitor their blood pressure and if systolic blood pressure goes above 140 or diastolic is above 90 to return to clinic.  Take medicines as directed, call for any problems, patient not having or any worrisome symptoms.        Orders:  -     Hemoglobin A1c; Future  -     Lipid Panel; Future  -     Comprehensive Metabolic Panel; Future  -     Vitamin B12; Future  -     TSH Rfx On Abnormal To Free T4; Future  -     CBC & Differential; Future    4. Mixed hyperlipidemia  Assessment & Plan:  HDL 72.  .  Triglycerides 208.  We will follow-up.  We will add Zetia to her regimen    Orders:  -     Hemoglobin A1c; Future  -     Lipid Panel; Future  -     Comprehensive Metabolic Panel; Future  -     Vitamin B12; Future  -     TSH Rfx On Abnormal To Free T4; Future  -     CBC & Differential; Future    5. Acquired hypothyroidism  Assessment & Plan:  TSH is 11.2.  Free T4 still normal at 1.06.    Orders:  -     Hemoglobin A1c; Future  -     Lipid Panel; Future  -     Comprehensive Metabolic Panel; Future  -     Vitamin B12; Future  -     TSH Rfx On Abnormal To Free T4; Future  -     CBC & Differential; Future    6. Hyperglycemia  Assessment & Plan:  A1c is 5.9.  We will follow-up.    Orders:  -     Hemoglobin A1c; Future  -     Lipid Panel; Future  -     Comprehensive Metabolic Panel; Future  -     Vitamin B12; Future  -     TSH Rfx On Abnormal To Free T4; Future  -     CBC & Differential; Future    7. Frequent falls  Assessment & Plan:  Stable.    Orders:  -     Hemoglobin A1c; Future  -      Lipid Panel; Future  -     Comprehensive Metabolic Panel; Future  -     Vitamin B12; Future  -     TSH Rfx On Abnormal To Free T4; Future  -     CBC & Differential; Future    8. Elevated liver function tests  Assessment & Plan:  AST is little elevated.  Levels 45.  We will recheck liver ultrasound and get blood work in 3 months.  She has not been drinking.    Orders:  -     Hemoglobin A1c; Future  -     Lipid Panel; Future  -     Comprehensive Metabolic Panel; Future  -     Vitamin B12; Future  -     TSH Rfx On Abnormal To Free T4; Future  -     CBC & Differential; Future  -     US Liver; Future    Other orders  -     ezetimibe (Zetia) 10 MG tablet; Take 1 tablet by mouth Daily.  Dispense: 90 tablet; Refill: 1           I spent 10 minutes caring for Lucie on this date of service. This time includes time spent by me in the following activities:preparing for the visit, reviewing tests, obtaining and/or reviewing a separately obtained history, performing a medically appropriate examination and/or evaluation , counseling and educating the patient/family/caregiver, ordering medications, tests, or procedures, documenting information in the medical record, independently interpreting results and communicating that information with the patient/family/caregiver, and care coordination  Follow Up   Return in about 3 months (around 9/24/2024) for Annual physical, Bloodwork 1 week prior to next appointment.  Patient was given instructions and counseling regarding her condition or for health maintenance advice. Please see specific information pulled into the AVS if appropriate.           Answers submitted by the patient for this visit:  Other (Submitted on 6/17/2024)  Please describe your symptoms.: Checkup; sore neck  Have you had these symptoms before?: Yes  How long have you been having these symptoms?: 5-7 days  Primary Reason for Visit (Submitted on 6/17/2024)  What is the primary reason for your visit?: Other

## 2024-06-26 RX ORDER — ALBUTEROL SULFATE 90 UG/1
2 AEROSOL, METERED RESPIRATORY (INHALATION) EVERY 4 HOURS PRN
Qty: 25.5 G | Refills: 0 | Status: SHIPPED | OUTPATIENT
Start: 2024-06-26

## 2024-06-26 NOTE — TELEPHONE ENCOUNTER
Caller: [id number] - [place], [place] - [address] AT [facility] [address] - [phone] [facility] [phone] FX    Relationship: Pharmacy    Best call back number: [phone]       Requested Prescriptions:   Requested Prescriptions     Pending Prescriptions Disp Refills    albuterol sulfate  (90 Base) MCG/ACT inhaler 25.5 g 1     Si puffs Every 4 (Four) Hours As Needed for Wheezing.        Pharmacy where request should be sent: [id number] - [place], [place] - [address] AT [facility] [address] - [phone] [facility] [phone] FX     Last office visit with prescribing clinician: 2024   Last telemedicine visit with prescribing clinician: Visit date not found   Next office visit with prescribing clinician: 2024     Additional details provided by patient: OMID WITH [facility] REPORTS SHE IS UNSURE HOW MANY DAYS THE PATIENT HAS REMAINING ON HER RESCUE INHALER.     Does the patient have less than a 3 day supply:  [] Yes  [] No UNSURE     Would you like a call back once the refill request has been completed: [] Yes [x] No    If the office needs to give you a call back, can they leave a voicemail: [] Yes [x] No    Manjeet Pope Rep   24 11:24 EDT

## 2024-06-27 DIAGNOSIS — I35.9 AORTIC VALVE DISEASE: ICD-10-CM

## 2024-06-27 RX ORDER — WARFARIN SODIUM 2 MG/1
TABLET ORAL
Qty: 90 TABLET | Refills: 1 | Status: SHIPPED | OUTPATIENT
Start: 2024-06-27

## 2024-07-01 ENCOUNTER — ANTICOAGULATION VISIT (OUTPATIENT)
Dept: PHARMACY | Facility: HOSPITAL | Age: 79
End: 2024-07-01
Payer: MEDICARE

## 2024-07-01 ENCOUNTER — CLINICAL SUPPORT (OUTPATIENT)
Dept: CARDIOLOGY | Facility: CLINIC | Age: 79
End: 2024-07-01
Payer: MEDICARE

## 2024-07-01 DIAGNOSIS — Z79.01 CHRONIC ANTICOAGULATION: Primary | ICD-10-CM

## 2024-07-01 LAB — INR PPP: 2.2 (ref 0.9–1.1)

## 2024-07-01 RX ORDER — DAPAGLIFLOZIN 10 MG/1
1 TABLET, FILM COATED ORAL DAILY
Qty: 90 TABLET | Refills: 0 | Status: SHIPPED | OUTPATIENT
Start: 2024-07-01

## 2024-07-01 RX ORDER — DAPAGLIFLOZIN 10 MG/1
1 TABLET, FILM COATED ORAL DAILY
Qty: 90 TABLET | Refills: 0 | Status: CANCELLED | OUTPATIENT
Start: 2024-07-01

## 2024-07-01 NOTE — PROGRESS NOTES
Capillary Blood Specimen Collection  Capillary blood collection performed in clinic by Augusta Salazar MA. Patient tolerated the procedure well without complications.   07/01/24   Augusta Salazar MA

## 2024-07-01 NOTE — PROGRESS NOTES
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     Diet: 2-3x week: 1 cup of brussels sprouts or broccoli weekly, green beans, peas  (3/11/23)  Premier Protein (or Equate brand) meal replacement ~2x/week with occasional 3rd 5/28/24  Alcohol: Seldom  Tobacco: None  OTC Pain Medication: APAP      INR History:  Date 4/5 4/19 4/26 5/5 5/11 6/2 6/15 6/18 6/25 7/2 7/9 7/19 7/23 7/30   Total Weekly Dose 15mg 15mg 14mg 14mg 14mg 14mg 14mg 14mg 14mg 14mg 14mg 14mg 14mg 14mg   INR 2.6 3.9 3.9 2.7 3.0 3.6 2.7 2.9 2.9 2.6 2.9 3.1 2.5 2.3   Notes           decr GLV   recv'd 6/21; [facility]       incr GLV decr GLV      Date 8/6 8/13 8/20 8/27 9/3 9/10 9/17 9/24 10/1 10/8 10/15 10/22 10/29 11/5   Total WeeklyDose 14mg 15mg 15mg 14mg 14mg 15mg 14mg 14mg 14mg 14mg 14mg 14mg 15mg 16mg   INR 2.4 3.4 3.8 2.9 2.2 3.2 2.9 3.3 3.2 3.3 3.0 2.4 2.4 2.4   Notes decr GLV decr GLV       1xboost         call call 1x boost   incr VitK call 2x boost; call      Date 11/10 11/17 11/24 12/1 12/8 12/15 12/23 12/30 1/3/22 1/7 1/11 1/18 1/25 2/1   Total WeeklyDose 17mg 16mg 16mg 16mg 15mg 15 mg Pt did not call back 15mg 12 mg 13mg 15mg 15 mg 15 mg 15 mg   INR 3.7 3.3 3.9 4.0 2.6 3.4 4.0 4.9 3.6 2.4 3.3 2.8 2.7 2.9   Notes Dec GLV   Zero GLV call Red x1 call Less GLV   call   Less  Protein drink redx1  self held x1 (misdose)   Dec vit K fall, apap  Call   call          Date 2/8 2/15 2/22 3/1 3/8 3/15 3/22 3/29 4/5 4/12 4/19 4/26 5/3 5/11   Total WeeklyDose 15mg 14mg 14 mg 15 mg 15 mg 15 mg 14mg 13 mg 14 mg 14 mg 14mg 13mg 15mg 14 mg   INR 4.0 4.0 2.8 2.9 2.9 4.2  3.9 3.3 2.9 3.0 3.8 2.4 3.8 2.5   Notes Call; Dec GLV call Call; Mis-dose call   Call; no GLV Inc GLV. Less protein, apap  redx1 call   Less GLV rec'd 4/27, call Dec GLV        Date 5/18 5/25 6/1 6/8 6/15 6/22 6/29 7/6 7/13 7/20 7/22 7/27  8/10  8/17   Total WeeklyDose 15 mg 15mg 16mg 15 mg 15 mg 15 mg 15 mg 15 mg 15mg 14 mg 15 mg 14 mg  14 mg  15 mg   INR 2.6 2.3 2.7 3.2 3.0 2.9 2.6 2.7 3.6 3.0 3.6 3.0  2.4  3.4   Notes   Inc GLV  call call call       call 7/14-- call call call  call  call  call      Date 8/25 8/31 9/7 9/12 9/19 9/26 10/3 10/10 10/17 10/24 10/31   Total WeeklyDose 14mg 13 mg 14 mg 17mg 15 mg 16mg 15mg 14 mg 13mg 17 mg 15mg   INR 4.3 2.8 1.7 2.9 2.1 3.4 3.8 2.4 1.8 3.7 4.5   Notes Dec GLV call Call refused enox; extra protein  Call; no protein drinks Call; less green tea, inc boostx1 Call; cranberries Redx1; call 1x miss Call  Less protein    1/1  Date 11/7 11/14 11/21 11/28 12/5 12/12 12/19 12/27 1/3 1/9 1/16/23 1/23 1/30/23   Total WeeklyDose 13 mg 14 mg 14 mg 14 mg 14 mg 14 mg 14mg 14 mg  13mg 13 mg 14 mg  14 mg 14 mg   INR 3.2 3.3 3.4 3.6 2.5 4.0 2.9 4.1 3.6 2.6 3.3 2.7 3.0   Notes  call redx1 One less protein shake   Inc GLV Decreased GLV W/o meloxicam  Tramadol,  meloxicam Tramadol,  meloxicam Tramadol,  meloxicam meloxicam     Date 2/7/23 2/13 2/20 2/27/23 3/6 3/13/23 3/23 3/27 4/3/23 4/10/23 4/17/23 4/24 5/1   Total WeeklyDose 14 mg 14 mg 14 mg 14 mg  14 mg 14 mg  14 mg 14 mg 15 mg  14mg 13 mg 15 mg 14 mg   INR 2.9 3.0 3.6 2.6 3 3.5 3.4 2.4 2.8 2.5 2.3 3.2  4.1 POCT   Notes Call  call rec 2/21  Call   call Call   call Call  call Missed dose 1x boost Call; missed protein drinks, less GLV     Date 5/2 5/8 5/15 5/22 5/30 6/5 6/19 7/10 7/24 7/31 8/7 8/14 8/21  8/28   Total WeeklyDose 12 mg 13 mg 14 mg 14 mg 14mg 14 mg 14 mg 14 mg 13 mg 13mg  12 mg 12 mg 14 mg 13 mg   INR 3.7 3.3 2.6 2.7 3.4 2.9 4.0 4.1 3.9 4.1 2.8 1.9 4.0  3.2   Notes rec 5/3  Call  Self-heldx1, boostx1    Stopping HM Less GLV   redx1 Ceftin   Rec'd 8/1 redx1 Inc GLV  Prednisone start Less GLV      Date 9/5 9/18 9/25 10/2 10/06 10/13 10/27 11/1 11/6 11/13 11/17 11/27   Total Weekly Dose 13mg 14 mg 12 mg  12 mg 11 mg 12 mg 12 mg 12 mg 11mg 11 mg 12 mg 12mg   INR 4.2 4.2 4.3  4.6 2.6 3.0 4.1 4.3 3.7 2.1 2.7 4.8   Notes redx1 clindamycin redx1 redx2 Red x2; inc GLV            Date 12/01 12/11 12/15 12/22 12/29 1/5/2024 1/12 1/26 2/9  2/26 3/1 3/11  3/18   Total Weekly Dose 11 mg 11 mg 11 mg 11 mg 11 mg 11 mg 11 mg 11 mg  12 mg   12 mg 12mg 11 mg  11 mg   INR 2.4 3.0 2.7 2.7 3.2 3.0 2.4 2.3 3.3  4.3 2.4 3.4 2.6   Notes  call    call    No GLV, apap   call     Date  4/1 4/8/24 4/15 4/22 4/29 5/3 5/6 5/13 5/20 5/28 6/3 6/10 6/17   Total Weekly  Dose 11 mg 12mg 11 mg 11 mg ???  Missed Sunday maybe more 12 mg 14 mg 14 mg 14 mg 14 mg 13 mg  12 mg 14 mg   INR 2.0 2.5 2.5 3.2 1.1 1.5 2.5 3.3 3.4 4.0 4.0 3.6 3.2   Notes Call  Missed x1 No contact  Boost x 1 No contact No contact  Lovenox  Boost x 2  Call Lovenox     call 1 x decrease  APAP  Call  APAP call     Date 6/24 7/1             Total WeeklyDose 12 mg 12 mg             INR 2.5 2.2             Notes                 Phone Interview:  Tablet Strength: 2mg  Patient Contact Info: [phone] (Mobile) *preferred*  [email or web address]  Verbal Release Authorization signed on 4/7/21 -- may speak with Tammy Bai (friend: [phone]), Ismael Michele (brother: [phone])  Lab Contact Info: [facility] ([facility])  ** will call once monthly or if INR is out of range**   12/1/23 Scr: 77 ml/min      Patient Findings    Negatives: Signs/symptoms of thrombosis, Signs/symptoms of bleeding, Laboratory test error suspected, Change in health, Change in alcohol use, Change in activity, Upcoming invasive procedure, Emergency department visit, Upcoming dental procedure, Missed doses, Extra doses, Change in medications, Change in diet/appetite, Hospital admission, Bruising, Other complaints     Plan:  1.  INR is subtherapeutic today at 2.2 (goal 2.5-3.5). Ms. Michele will take 2mg of warfarin today, then continue 2mg daily except 1mg of warfarin on Friday (TWD increase from 12mg to 13mg).   2. Repeat INR in 1 week, 7/08/24  3. Lucie Michele understands the importance of calling the [facility] if she notices any s/sx of bleeding, stroke, or abnormal bruising, if any changes are made to her medications or medication doses (Rx, OTC, herbal), or if any upcoming procedures are scheduled. Lucie Michele will likewise let us know if any other changes, questions, or concerns arise regarding anticoagulation therapy. she understands the importance of seeking medical attention immediately if she experiences any falls, vehicle accidents, or abnormal bleeding or bruising. Lucie Michele voiced understanding of this information and confirms that she has the [facility]'s contact information. Otherwise, we will plan to contact the patient once monthly or if her INR is out of range.    Zenaida Sommer, PharmD   7/1/2024  13:44 EDT

## 2024-07-08 ENCOUNTER — ANTICOAGULATION VISIT (OUTPATIENT)
Dept: PHARMACY | Facility: HOSPITAL | Age: 79
End: 2024-07-08
Payer: MEDICARE

## 2024-07-08 ENCOUNTER — CLINICAL SUPPORT (OUTPATIENT)
Dept: CARDIOLOGY | Facility: CLINIC | Age: 79
End: 2024-07-08
Payer: MEDICARE

## 2024-07-08 DIAGNOSIS — Z95.2 HISTORY OF AORTIC VALVE REPLACEMENT: Primary | ICD-10-CM

## 2024-07-08 LAB — INR PPP: 3 (ref 0.9–1.1)

## 2024-07-08 PROCEDURE — 85610 PROTHROMBIN TIME: CPT | Performed by: INTERNAL MEDICINE

## 2024-07-08 PROCEDURE — 36416 COLLJ CAPILLARY BLOOD SPEC: CPT | Performed by: INTERNAL MEDICINE

## 2024-07-08 NOTE — PROGRESS NOTES
Venipuncture Blood Specimen Collection  Venipuncture performed in clinic by Ema Hamilton MA with good hemostasis. Patient tolerated the procedure well without complications.   07/08/24   Ema Hamilton MA

## 2024-07-08 NOTE — PROGRESS NOTES
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     Diet: 2-3x week: 1 cup of brussels sprouts or broccoli weekly, green beans, peas  (3/11/23)  Premier Protein (or Equate brand) meal replacement ~2x/week with occasional 3rd 5/28/24  Alcohol: Seldom  Tobacco: None  OTC Pain Medication: APAP      INR History:  Date 4/5 4/19 4/26 5/5 5/11 6/2 6/15 6/18 6/25 7/2 7/9 7/19 7/23 7/30   Total Weekly Dose 15mg 15mg 14mg 14mg 14mg 14mg 14mg 14mg 14mg 14mg 14mg 14mg 14mg 14mg   INR 2.6 3.9 3.9 2.7 3.0 3.6 2.7 2.9 2.9 2.6 2.9 3.1 2.5 2.3   Notes           decr GLV   recv'd 6/21; [facility]       incr GLV decr GLV      Date 8/6 8/13 8/20 8/27 9/3 9/10 9/17 9/24 10/1 10/8 10/15 10/22 10/29 11/5   Total WeeklyDose 14mg 15mg 15mg 14mg 14mg 15mg 14mg 14mg 14mg 14mg 14mg 14mg 15mg 16mg   INR 2.4 3.4 3.8 2.9 2.2 3.2 2.9 3.3 3.2 3.3 3.0 2.4 2.4 2.4   Notes decr GLV decr GLV       1xboost         call call 1x boost   incr VitK call 2x boost; call      Date 11/10 11/17 11/24 12/1 12/8 12/15 12/23 12/30 1/3/22 1/7 1/11 1/18 1/25 2/1   Total WeeklyDose 17mg 16mg 16mg 16mg 15mg 15 mg Pt did not call back 15mg 12 mg 13mg 15mg 15 mg 15 mg 15 mg   INR 3.7 3.3 3.9 4.0 2.6 3.4 4.0 4.9 3.6 2.4 3.3 2.8 2.7 2.9   Notes Dec GLV   Zero GLV call Red x1 call Less GLV   call   Less  Protein drink redx1  self held x1 (misdose)   Dec vit K fall, apap  Call   call          Date 2/8 2/15 2/22 3/1 3/8 3/15 3/22 3/29 4/5 4/12 4/19 4/26 5/3 5/11   Total WeeklyDose 15mg 14mg 14 mg 15 mg 15 mg 15 mg 14mg 13 mg 14 mg 14 mg 14mg 13mg 15mg 14 mg   INR 4.0 4.0 2.8 2.9 2.9 4.2  3.9 3.3 2.9 3.0 3.8 2.4 3.8 2.5   Notes Call; Dec GLV call Call; Mis-dose call   Call; no GLV Inc GLV. Less protein, apap  redx1 call   Less GLV rec'd 4/27, call Dec GLV        Date 5/18 5/25 6/1 6/8 6/15 6/22 6/29 7/6 7/13 7/20 7/22 7/27  8/10  8/17   Total WeeklyDose 15 mg 15mg 16mg 15 mg 15 mg 15 mg 15 mg 15 mg 15mg 14 mg 15 mg 14 mg  14 mg  15 mg   INR 2.6 2.3 2.7 3.2 3.0 2.9 2.6 2.7 3.6 3.0 3.6 3.0  2.4  3.4   Notes   Inc GLV  call call call       call 7/14-- call call call  call  call  call      Date 8/25 8/31 9/7 9/12 9/19 9/26 10/3 10/10 10/17 10/24 10/31   Total WeeklyDose 14mg 13 mg 14 mg 17mg 15 mg 16mg 15mg 14 mg 13mg 17 mg 15mg   INR 4.3 2.8 1.7 2.9 2.1 3.4 3.8 2.4 1.8 3.7 4.5   Notes Dec GLV call Call refused enox; extra protein  Call; no protein drinks Call; less green tea, inc boostx1 Call; cranberries Redx1; call 1x miss Call  Less protein    1/1  Date 11/7 11/14 11/21 11/28 12/5 12/12 12/19 12/27 1/3 1/9 1/16/23 1/23 1/30/23   Total WeeklyDose 13 mg 14 mg 14 mg 14 mg 14 mg 14 mg 14mg 14 mg  13mg 13 mg 14 mg  14 mg 14 mg   INR 3.2 3.3 3.4 3.6 2.5 4.0 2.9 4.1 3.6 2.6 3.3 2.7 3.0   Notes  call redx1 One less protein shake   Inc GLV Decreased GLV W/o meloxicam  Tramadol,  meloxicam Tramadol,  meloxicam Tramadol,  meloxicam meloxicam     Date 2/7/23 2/13 2/20 2/27/23 3/6 3/13/23 3/23 3/27 4/3/23 4/10/23 4/17/23 4/24 5/1   Total WeeklyDose 14 mg 14 mg 14 mg 14 mg  14 mg 14 mg  14 mg 14 mg 15 mg  14mg 13 mg 15 mg 14 mg   INR 2.9 3.0 3.6 2.6 3 3.5 3.4 2.4 2.8 2.5 2.3 3.2  4.1 POCT   Notes Call  call rec 2/21  Call   call Call   call Call  call Missed dose 1x boost Call; missed protein drinks, less GLV     Date 5/2 5/8 5/15 5/22 5/30 6/5 6/19 7/10 7/24 7/31 8/7 8/14 8/21  8/28   Total WeeklyDose 12 mg 13 mg 14 mg 14 mg 14mg 14 mg 14 mg 14 mg 13 mg 13mg  12 mg 12 mg 14 mg 13 mg   INR 3.7 3.3 2.6 2.7 3.4 2.9 4.0 4.1 3.9 4.1 2.8 1.9 4.0  3.2   Notes rec 5/3  Call  Self-heldx1, boostx1    Stopping HM Less GLV   redx1 Ceftin   Rec'd 8/1 redx1 Inc GLV  Prednisone start Less GLV      Date 9/5 9/18 9/25 10/2 10/06 10/13 10/27 11/1 11/6 11/13 11/17 11/27   Total Weekly Dose 13mg 14 mg 12 mg  12 mg 11 mg 12 mg 12 mg 12 mg 11mg 11 mg 12 mg 12mg   INR 4.2 4.2 4.3  4.6 2.6 3.0 4.1 4.3 3.7 2.1 2.7 4.8   Notes redx1 clindamycin redx1 redx2 Red x2; inc GLV            Date 12/01 12/11 12/15 12/22 12/29 1/5/2024 1/12 1/26 2/9  2/26 3/1 3/11  3/18   Total Weekly Dose 11 mg 11 mg 11 mg 11 mg 11 mg 11 mg 11 mg 11 mg  12 mg   12 mg 12mg 11 mg  11 mg   INR 2.4 3.0 2.7 2.7 3.2 3.0 2.4 2.3 3.3  4.3 2.4 3.4 2.6   Notes  call    call    No GLV, apap   call     Date  4/1 4/8/24 4/15 4/22 4/29 5/3 5/6 5/13 5/20 5/28 6/3 6/10 6/17   Total Weekly  Dose 11 mg 12mg 11 mg 11 mg ???  Missed Sunday maybe more 12 mg 14 mg 14 mg 14 mg 14 mg 13 mg  12 mg 14 mg   INR 2.0 2.5 2.5 3.2 1.1 1.5 2.5 3.3 3.4 4.0 4.0 3.6 3.2   Notes Call  Missed x1 No contact  Boost x 1 No contact No contact  Lovenox  Boost x 2  Call Lovenox     call 1 x decrease  APAP  Call  APAP call     Date 6/24 7/1 7/8            Total WeeklyDose 12 mg 12 mg 13 mg            INR 2.5 2.2 3.0            Notes                 Phone Interview:  Tablet Strength: 2mg  Patient Contact Info: [phone] (Mobile) *preferred*  [email or web address]  Verbal Release Authorization signed on 4/7/21 -- may speak with Tammy Bai (friend: [phone]), Ismael Michele (brother: [phone])  Lab Contact Info: [facility] ([facility])  ** will call once monthly or if INR is out of range**   12/1/23 Scr: 77 ml/min      Patient Findings  Positives: Change in medications   Negatives: Signs/symptoms of thrombosis, Signs/symptoms of bleeding, Laboratory test error suspected, Change in health, Change in alcohol use, Change in activity, Upcoming invasive procedure, Emergency department visit, Upcoming dental procedure, Missed doses, Extra doses, Change in diet/appetite, Hospital admission, Bruising, Other  complaints   Comments: Going for a liver scan in two weeks  Starting Zetia 10mg daily. Class B DDI- could increase the effects of warfarin but typicaly no dose changes need to be made.       Plan:  1. INR is subtherapeutic today at 3.0 (goal 2.5-3.5). Ms. Michele will continue 2mg daily except 1mg of warfarin on Friday (TWD increase from 12mg to 13mg).   2. Repeat INR in 1 week, 7/15/24  3. Lucie Michele understands the importance of calling the [facility] if she notices any s/sx of bleeding, stroke, or abnormal bruising, if any changes are made to her medications or medication doses (Rx, OTC, herbal), or if any upcoming procedures are scheduled. Lucie Michele will likewise let us know if any other changes, questions, or concerns arise regarding anticoagulation therapy. she understands the importance of seeking medical attention immediately if she experiences any falls, vehicle accidents, or abnormal bleeding or bruising. Lucie Michele voiced understanding of this information and confirms that she has the [facility]'s contact information. Otherwise, we will plan to contact the patient once monthly or if her INR is out of range.    Justina Jara, JenniD, BCPS   7/8/2024  15:25 EDT

## 2024-07-09 RX ORDER — DAPAGLIFLOZIN 10 MG/1
10 TABLET, FILM COATED ORAL DAILY
COMMUNITY
End: 2024-07-09 | Stop reason: SDUPTHER

## 2024-07-09 RX ORDER — DAPAGLIFLOZIN 10 MG/1
10 TABLET, FILM COATED ORAL DAILY
Qty: 90 TABLET | Refills: 2 | Status: SHIPPED | OUTPATIENT
Start: 2024-07-09

## 2024-07-11 RX ORDER — BUDESONIDE, GLYCOPYRROLATE, AND FORMOTEROL FUMARATE 160; 9; 4.8 UG/1; UG/1; UG/1
2 AEROSOL, METERED RESPIRATORY (INHALATION) 2 TIMES DAILY
Qty: 10.7 G | Refills: 11 | Status: SHIPPED | OUTPATIENT
Start: 2024-07-11

## 2024-07-13 DIAGNOSIS — E03.9 HYPOTHYROIDISM, UNSPECIFIED TYPE: ICD-10-CM

## 2024-07-16 RX ORDER — LEVOTHYROXINE SODIUM 0.12 MG/1
125 TABLET ORAL DAILY
Qty: 90 TABLET | Refills: 0 | Status: SHIPPED | OUTPATIENT
Start: 2024-07-16

## 2024-07-18 ENCOUNTER — HOSPITAL ENCOUNTER (OUTPATIENT)
Dept: ULTRASOUND IMAGING | Facility: HOSPITAL | Age: 79
Discharge: HOME | End: 2024-07-18
Admitting: FAMILY MEDICINE
Payer: MEDICARE

## 2024-07-18 DIAGNOSIS — R79.89 ELEVATED LIVER FUNCTION TESTS: ICD-10-CM

## 2024-07-18 PROCEDURE — 76705 ECHO EXAM OF ABDOMEN: CPT

## 2024-07-22 ENCOUNTER — ANTICOAGULATION VISIT (OUTPATIENT)
Dept: PHARMACY | Facility: HOSPITAL | Age: 79
End: 2024-07-22
Payer: MEDICARE

## 2024-07-22 ENCOUNTER — CLINICAL SUPPORT (OUTPATIENT)
Dept: CARDIOLOGY | Facility: CLINIC | Age: 79
End: 2024-07-22
Payer: MEDICARE

## 2024-07-22 DIAGNOSIS — Z79.01 CHRONIC ANTICOAGULATION: ICD-10-CM

## 2024-07-22 DIAGNOSIS — Z95.2 HISTORY OF AORTIC VALVE REPLACEMENT: Primary | ICD-10-CM

## 2024-07-22 LAB — INR PPP: 3.6 (ref 0.9–1.1)

## 2024-07-22 NOTE — PROGRESS NOTES
Capillary Blood Specimen Collection  Capillary blood collection performed in clinic by Augusta Salazar MA. Patient tolerated the procedure well without complications.   07/22/24   Augusta Salazar MA

## 2024-07-22 NOTE — PROGRESS NOTES
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     Diet: 2-3x week: 1 cup of brussels sprouts or broccoli weekly, green beans, peas  (3/11/23)  Premier Protein (or Equate brand) meal replacement ~2x/week with occasional 3rd 5/28/24  Alcohol: Seldom  Tobacco: None  OTC Pain Medication: APAP      INR History:  Date 4/5 4/19 4/26 5/5 5/11 6/2 6/15 6/18 6/25 7/2 7/9 7/19 7/23 7/30   Total Weekly Dose 15mg 15mg 14mg 14mg 14mg 14mg 14mg 14mg 14mg 14mg 14mg 14mg 14mg 14mg   INR 2.6 3.9 3.9 2.7 3.0 3.6 2.7 2.9 2.9 2.6 2.9 3.1 2.5 2.3   Notes           decr GLV   recv'd 6/21; [facility]       incr GLV decr GLV      Date 8/6 8/13 8/20 8/27 9/3 9/10 9/17 9/24 10/1 10/8 10/15 10/22 10/29 11/5   Total WeeklyDose 14mg 15mg 15mg 14mg 14mg 15mg 14mg 14mg 14mg 14mg 14mg 14mg 15mg 16mg   INR 2.4 3.4 3.8 2.9 2.2 3.2 2.9 3.3 3.2 3.3 3.0 2.4 2.4 2.4   Notes decr GLV decr GLV       1xboost         call call 1x boost   incr VitK call 2x boost; call      Date 11/10 11/17 11/24 12/1 12/8 12/15 12/23 12/30 1/3/22 1/7 1/11 1/18 1/25 2/1   Total WeeklyDose 17mg 16mg 16mg 16mg 15mg 15 mg Pt did not call back 15mg 12 mg 13mg 15mg 15 mg 15 mg 15 mg   INR 3.7 3.3 3.9 4.0 2.6 3.4 4.0 4.9 3.6 2.4 3.3 2.8 2.7 2.9   Notes Dec GLV   Zero GLV call Red x1 call Less GLV   call   Less  Protein drink redx1  self held x1 (misdose)   Dec vit K fall, apap  Call   call          Date 2/8 2/15 2/22 3/1 3/8 3/15 3/22 3/29 4/5 4/12 4/19 4/26 5/3 5/11   Total WeeklyDose 15mg 14mg 14 mg 15 mg 15 mg 15 mg 14mg 13 mg 14 mg 14 mg 14mg 13mg 15mg 14 mg   INR 4.0 4.0 2.8 2.9 2.9 4.2  3.9 3.3 2.9 3.0 3.8 2.4 3.8 2.5   Notes Call; Dec GLV call Call; Mis-dose call   Call; no GLV Inc GLV. Less protein, apap  redx1 call   Less GLV rec'd 4/27, call Dec GLV        Date 5/18 5/25 6/1 6/8 6/15 6/22 6/29 7/6 7/13 7/20 7/22 7/27  8/10  8/17   Total WeeklyDose 15 mg 15mg 16mg 15 mg 15 mg 15 mg 15 mg 15 mg 15mg 14 mg 15 mg 14 mg  14 mg  15 mg   INR 2.6 2.3 2.7 3.2 3.0 2.9 2.6 2.7 3.6 3.0 3.6 3.0  2.4  3.4   Notes   Inc GLV  call call call       call 7/14-- call call call  call  call  call      Date 8/25 8/31 9/7 9/12 9/19 9/26 10/3 10/10 10/17 10/24 10/31   Total WeeklyDose 14mg 13 mg 14 mg 17mg 15 mg 16mg 15mg 14 mg 13mg 17 mg 15mg   INR 4.3 2.8 1.7 2.9 2.1 3.4 3.8 2.4 1.8 3.7 4.5   Notes Dec GLV call Call refused enox; extra protein  Call; no protein drinks Call; less green tea, inc boostx1 Call; cranberries Redx1; call 1x miss Call  Less protein    1/1  Date 11/7 11/14 11/21 11/28 12/5 12/12 12/19 12/27 1/3 1/9 1/16/23 1/23 1/30/23   Total WeeklyDose 13 mg 14 mg 14 mg 14 mg 14 mg 14 mg 14mg 14 mg  13mg 13 mg 14 mg  14 mg 14 mg   INR 3.2 3.3 3.4 3.6 2.5 4.0 2.9 4.1 3.6 2.6 3.3 2.7 3.0   Notes  call redx1 One less protein shake   Inc GLV Decreased GLV W/o meloxicam  Tramadol,  meloxicam Tramadol,  meloxicam Tramadol,  meloxicam meloxicam     Date 2/7/23 2/13 2/20 2/27/23 3/6 3/13/23 3/23 3/27 4/3/23 4/10/23 4/17/23 4/24 5/1   Total WeeklyDose 14 mg 14 mg 14 mg 14 mg  14 mg 14 mg  14 mg 14 mg 15 mg  14mg 13 mg 15 mg 14 mg   INR 2.9 3.0 3.6 2.6 3 3.5 3.4 2.4 2.8 2.5 2.3 3.2  4.1 POCT   Notes Call  call rec 2/21  Call   call Call   call Call  call Missed dose 1x boost Call; missed protein drinks, less GLV     Date 5/2 5/8 5/15 5/22 5/30 6/5 6/19 7/10 7/24 7/31 8/7 8/14 8/21  8/28   Total WeeklyDose 12 mg 13 mg 14 mg 14 mg 14mg 14 mg 14 mg 14 mg 13 mg 13mg  12 mg 12 mg 14 mg 13 mg   INR 3.7 3.3 2.6 2.7 3.4 2.9 4.0 4.1 3.9 4.1 2.8 1.9 4.0  3.2   Notes rec 5/3  Call  Self-heldx1, boostx1    Stopping HM Less GLV   redx1 Ceftin   Rec'd 8/1 redx1 Inc GLV  Prednisone start Less GLV      Date 9/5 9/18 9/25 10/2 10/06 10/13 10/27 11/1 11/6 11/13 11/17 11/27   Total Weekly Dose 13mg 14 mg 12 mg  12 mg 11 mg 12 mg 12 mg 12 mg 11mg 11 mg 12 mg 12mg   INR 4.2 4.2 4.3  4.6 2.6 3.0 4.1 4.3 3.7 2.1 2.7 4.8   Notes redx1 clindamycin redx1 redx2 Red x2; inc GLV            Date 12/01 12/11 12/15 12/22 12/29 1/5/2024 1/12 1/26 2/9  2/26 3/1 3/11  3/18   Total Weekly Dose 11 mg 11 mg 11 mg 11 mg 11 mg 11 mg 11 mg 11 mg  12 mg   12 mg 12mg 11 mg  11 mg   INR 2.4 3.0 2.7 2.7 3.2 3.0 2.4 2.3 3.3  4.3 2.4 3.4 2.6   Notes  call    call    No GLV, apap   call     Date  4/1 4/8/24 4/15 4/22 4/29 5/3 5/6 5/13 5/20 5/28 6/3 6/10 6/17   Total Weekly  Dose 11 mg 12mg 11 mg 11 mg ???  Missed Sunday maybe more 12 mg 14 mg 14 mg 14 mg 14 mg 13 mg  12 mg 14 mg   INR 2.0 2.5 2.5 3.2 1.1 1.5 2.5 3.3 3.4 4.0 4.0 3.6 3.2   Notes Call  Missed x1 No contact  Boost x 1 No contact No contact  Lovenox  Boost x 2  Call Lovenox     call 1 x decrease  APAP  Call  APAP call     Date 6/24 7/1 7/8 7/22           Total WeeklyDose 12 mg 12 mg 13 mg 13mg           INR 2.5 2.2 3.0 3.6           Notes                 Phone Interview:  Tablet Strength: 2mg  Patient Contact Info: [phone] (Mobile) *preferred*  [email or web address]  Verbal Release Authorization signed on 4/7/21 -- may speak with Tammy Bai (friend: [phone]), Ismael Michele (brother: [phone])  Lab Contact Info: [facility] ([facility])  ** will call once monthly or if INR is out of range**   12/1/23 Scr: 77 ml/min      Patient Findings    Positives:Signs/symptoms of bleeding  Negatives:Signs/symptoms of thrombosis, Laboratory test error suspected, Change in health, Change in alcohol use, Change in activity, Upcoming invasive procedure, Emergency department visit, Upcoming dental procedure, Missed doses, Extra doses, Change in medications, Change in diet/appetite, Hospital admission, Bruising,  Other complaints  Comments:Bad scatch on leg that has bleeding from new puppy       Plan:  1. INR is slightly supratherapeutic today at 3.6 (goal 2.5-3.5). Instructed Ms. Michele to take warfarin 2mg daily except warfarin 1mg on Monday and Wednesday   2. Repeat INR in 1 week, 7/29/24  3. Lucie Michele understands the importance of calling the [facility] if she notices any s/sx of bleeding, stroke, or abnormal bruising, if any changes are made to her medications or medication doses (Rx, OTC, herbal), or if any upcoming procedures are scheduled. Lucie Michele will likewise let us know if any other changes, questions, or concerns arise regarding anticoagulation therapy. she understands the importance of seeking medical attention immediately if she experiences any falls, vehicle accidents, or abnormal bleeding or bruising. Lucie Michele voiced understanding of this information and confirms that she has the [facility]'s contact information. Otherwise, we will plan to contact the patient once monthly or if her INR is out of range.      Kathleen Arauz, PharmD  7/22/2024  12:20 EDT

## 2024-07-29 ENCOUNTER — CLINICAL SUPPORT (OUTPATIENT)
Dept: CARDIOLOGY | Facility: CLINIC | Age: 79
End: 2024-07-29
Payer: MEDICARE

## 2024-07-29 DIAGNOSIS — Z95.2 HISTORY OF AORTIC VALVE REPLACEMENT: Primary | ICD-10-CM

## 2024-07-29 NOTE — PROGRESS NOTES
Capillary Blood Specimen Collection  Capillary blood collection performed in clinic by Amberly Kuo RN. Patient tolerated the procedure well without complications.   07/29/24   Amberly Kuo RN

## 2024-08-05 ENCOUNTER — CLINICAL SUPPORT (OUTPATIENT)
Dept: CARDIOLOGY | Facility: CLINIC | Age: 79
End: 2024-08-05
Payer: MEDICARE

## 2024-08-05 ENCOUNTER — ANTICOAGULATION VISIT (OUTPATIENT)
Dept: PHARMACY | Facility: HOSPITAL | Age: 79
End: 2024-08-05
Payer: MEDICARE

## 2024-08-05 DIAGNOSIS — Z95.2 HISTORY OF AORTIC VALVE REPLACEMENT: Primary | ICD-10-CM

## 2024-08-05 LAB
INR PPP: 2.7
INR PPP: 2.7 (ref 0.9–1.1)

## 2024-08-05 PROCEDURE — 36416 COLLJ CAPILLARY BLOOD SPEC: CPT | Performed by: INTERNAL MEDICINE

## 2024-08-05 PROCEDURE — 85610 PROTHROMBIN TIME: CPT | Performed by: INTERNAL MEDICINE

## 2024-08-05 NOTE — PROGRESS NOTES
Capillary Blood Specimen Collection  Capillary blood collection performed in clinic by Augusta Salazar MA. Patient tolerated the procedure well without complications.   08/05/24   Augusta Salazar MA

## 2024-08-05 NOTE — PROGRESS NOTES
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     Diet: 2-3x week: 1 cup of brussels sprouts or broccoli weekly, green beans, peas  (3/11/23)  Premier Protein (or Equate brand) meal replacement ~2x/week with occasional 3rd 5/28/24  Alcohol: Seldom  Tobacco: None  OTC Pain Medication: APAP      INR History:  Date 4/5 4/19 4/26 5/5 5/11 6/2 6/15 6/18 6/25 7/2 7/9 7/19 7/23 7/30   Total Weekly Dose 15mg 15mg 14mg 14mg 14mg 14mg 14mg 14mg 14mg 14mg 14mg 14mg 14mg 14mg   INR 2.6 3.9 3.9 2.7 3.0 3.6 2.7 2.9 2.9 2.6 2.9 3.1 2.5 2.3   Notes           decr GLV   recv'd 6/21; [facility]       incr GLV decr GLV      Date 8/6 8/13 8/20 8/27 9/3 9/10 9/17 9/24 10/1 10/8 10/15 10/22 10/29 11/5   Total WeeklyDose 14mg 15mg 15mg 14mg 14mg 15mg 14mg 14mg 14mg 14mg 14mg 14mg 15mg 16mg   INR 2.4 3.4 3.8 2.9 2.2 3.2 2.9 3.3 3.2 3.3 3.0 2.4 2.4 2.4   Notes decr GLV decr GLV       1xboost         call call 1x boost   incr VitK call 2x boost; call      Date 11/10 11/17 11/24 12/1 12/8 12/15 12/23 12/30 1/3/22 1/7 1/11 1/18 1/25 2/1   Total WeeklyDose 17mg 16mg 16mg 16mg 15mg 15 mg Pt did not call back 15mg 12 mg 13mg 15mg 15 mg 15 mg 15 mg   INR 3.7 3.3 3.9 4.0 2.6 3.4 4.0 4.9 3.6 2.4 3.3 2.8 2.7 2.9   Notes Dec GLV   Zero GLV call Red x1 call Less GLV   call   Less  Protein drink redx1  self held x1 (misdose)   Dec vit K fall, apap  Call   call          Date 2/8 2/15 2/22 3/1 3/8 3/15 3/22 3/29 4/5 4/12 4/19 4/26 5/3 5/11   Total WeeklyDose 15mg 14mg 14 mg 15 mg 15 mg 15 mg 14mg 13 mg 14 mg 14 mg 14mg 13mg 15mg 14 mg   INR 4.0 4.0 2.8 2.9 2.9 4.2  3.9 3.3 2.9 3.0 3.8 2.4 3.8 2.5   Notes Call; Dec GLV call Call; Mis-dose call   Call; no GLV Inc GLV. Less protein, apap  redx1 call   Less GLV rec'd 4/27, call Dec GLV        Date 5/18 5/25 6/1 6/8 6/15 6/22 6/29 7/6 7/13 7/20 7/22 7/27  8/10  8/17   Total WeeklyDose 15 mg 15mg 16mg 15 mg 15 mg 15 mg 15 mg 15 mg 15mg 14 mg 15 mg 14 mg  14 mg  15 mg   INR 2.6 2.3 2.7 3.2 3.0 2.9 2.6 2.7 3.6 3.0 3.6 3.0  2.4  3.4   Notes   Inc GLV  call call call       call 7/14-- call call call  call  call  call      Date 8/25 8/31 9/7 9/12 9/19 9/26 10/3 10/10 10/17 10/24 10/31   Total WeeklyDose 14mg 13 mg 14 mg 17mg 15 mg 16mg 15mg 14 mg 13mg 17 mg 15mg   INR 4.3 2.8 1.7 2.9 2.1 3.4 3.8 2.4 1.8 3.7 4.5   Notes Dec GLV call Call refused enox; extra protein  Call; no protein drinks Call; less green tea, inc boostx1 Call; cranberries Redx1; call 1x miss Call  Less protein    1/1  Date 11/7 11/14 11/21 11/28 12/5 12/12 12/19 12/27 1/3 1/9 1/16/23 1/23 1/30/23   Total WeeklyDose 13 mg 14 mg 14 mg 14 mg 14 mg 14 mg 14mg 14 mg  13mg 13 mg 14 mg  14 mg 14 mg   INR 3.2 3.3 3.4 3.6 2.5 4.0 2.9 4.1 3.6 2.6 3.3 2.7 3.0   Notes  call redx1 One less protein shake   Inc GLV Decreased GLV W/o meloxicam  Tramadol,  meloxicam Tramadol,  meloxicam Tramadol,  meloxicam meloxicam     Date 2/7/23 2/13 2/20 2/27/23 3/6 3/13/23 3/23 3/27 4/3/23 4/10/23 4/17/23 4/24 5/1   Total WeeklyDose 14 mg 14 mg 14 mg 14 mg  14 mg 14 mg  14 mg 14 mg 15 mg  14mg 13 mg 15 mg 14 mg   INR 2.9 3.0 3.6 2.6 3 3.5 3.4 2.4 2.8 2.5 2.3 3.2  4.1 POCT   Notes Call  call rec 2/21  Call   call Call   call Call  call Missed dose 1x boost Call; missed protein drinks, less GLV     Date 5/2 5/8 5/15 5/22 5/30 6/5 6/19 7/10 7/24 7/31 8/7 8/14 8/21  8/28   Total WeeklyDose 12 mg 13 mg 14 mg 14 mg 14mg 14 mg 14 mg 14 mg 13 mg 13mg  12 mg 12 mg 14 mg 13 mg   INR 3.7 3.3 2.6 2.7 3.4 2.9 4.0 4.1 3.9 4.1 2.8 1.9 4.0  3.2   Notes rec 5/3  Call  Self-heldx1, boostx1    Stopping HM Less GLV   redx1 Ceftin   Rec'd 8/1 redx1 Inc GLV  Prednisone start Less GLV      Date 9/5 9/18 9/25 10/2 10/06 10/13 10/27 11/1 11/6 11/13 11/17 11/27   Total Weekly Dose 13mg 14 mg 12 mg  12 mg 11 mg 12 mg 12 mg 12 mg 11mg 11 mg 12 mg 12mg   INR 4.2 4.2 4.3  4.6 2.6 3.0 4.1 4.3 3.7 2.1 2.7 4.8   Notes redx1 clindamycin redx1 redx2 Red x2; inc GLV            Date 12/01 12/11 12/15 12/22 12/29 1/5/2024 1/12 1/26 2/9  2/26 3/1 3/11  3/18   Total Weekly Dose 11 mg 11 mg 11 mg 11 mg 11 mg 11 mg 11 mg 11 mg  12 mg   12 mg 12mg 11 mg  11 mg   INR 2.4 3.0 2.7 2.7 3.2 3.0 2.4 2.3 3.3  4.3 2.4 3.4 2.6   Notes  call    call    No GLV, apap   call     Date  4/1 4/8/24 4/15 4/22 4/29 5/3 5/6 5/13 5/20 5/28 6/3 6/10 6/17   Total Weekly  Dose 11 mg 12mg 11 mg 11 mg ???  Missed Sunday maybe more 12 mg 14 mg 14 mg 14 mg 14 mg 13 mg  12 mg 14 mg   INR 2.0 2.5 2.5 3.2 1.1 1.5 2.5 3.3 3.4 4.0 4.0 3.6 3.2   Notes Call  Missed x1 No contact  Boost x 1 No contact No contact  Lovenox  Boost x 2  Call Lovenox     call 1 x decrease  APAP  Call  APAP call     Date 6/24 7/1 7/8 7/22 8/5          Total WeeklyDose 12 mg 12 mg 13 mg 13mg 13 mg          INR 2.5 2.2 3.0 3.6 2.7          Notes    Call Call              Phone Interview:  Tablet Strength: 2mg  Patient Contact Info: [phone] (Mobile) *preferred*  [email or web address]  Verbal Release Authorization signed on 4/7/21 -- may speak with Tammy Bai (friend: [phone]), Ismael Michele (brother: [phone])  Lab Contact Info: [facility] ([facility])  ** will call once monthly or if INR is out of range**   12/1/23 Scr: 77 ml/min    Patient Findings  Positives: Change in medications   Negatives: Signs/symptoms of thrombosis, Signs/symptoms of bleeding, Laboratory test error suspected, Change in health, Change in alcohol use, Change in activity, Upcoming invasive procedure, Emergency department visit, Upcoming dental procedure, Missed doses, Extra doses, Change in diet/appetite, Hospital  admission, Bruising, Other complaints   Comments: Pt started ezetimibe again    All other findings negative per pt     Plan:  1. INR is therapeutic today at 2.7 (goal 2.5-3.5). Instructed Ms. Michele to take warfarin 2 mg daily except 1 mg on MonWed until recheck.  2. Repeat INR in 1 week, 8/12/24  3. Lucie Michele understands the importance of calling the [facility] if she notices any s/sx of bleeding, stroke, or abnormal bruising, if any changes are made to her medications or medication doses (Rx, OTC, herbal), or if any upcoming procedures are scheduled. Lucie Michele will likewise let us know if any other changes, questions, or concerns arise regarding anticoagulation therapy. she understands the importance of seeking medical attention immediately if she experiences any falls, vehicle accidents, or abnormal bleeding or bruising. Lucie Michele voiced understanding of this information and confirms that she has the [facility]'s contact information. Otherwise, we will plan to contact the patient once monthly or if her INR is out of range.    Rakel Jean-Baptiste  Pharmacy Technician   8/5/2024 11:30 EDT    I, Rozina Hayes, [facility], have reviewed the note in full and agree with the assessment and plan.  08/05/24  12:15 EDT

## 2024-08-07 RX ORDER — BUDESONIDE, GLYCOPYRROLATE, AND FORMOTEROL FUMARATE 160; 9; 4.8 UG/1; UG/1; UG/1
2 AEROSOL, METERED RESPIRATORY (INHALATION) 2 TIMES DAILY
Qty: 10.7 G | Refills: 11 | Status: SHIPPED | OUTPATIENT
Start: 2024-08-07

## 2024-08-12 ENCOUNTER — ANTICOAGULATION VISIT (OUTPATIENT)
Dept: PHARMACY | Facility: HOSPITAL | Age: 79
End: 2024-08-12
Payer: MEDICARE

## 2024-08-12 ENCOUNTER — CLINICAL SUPPORT (OUTPATIENT)
Dept: CARDIOLOGY | Facility: CLINIC | Age: 79
End: 2024-08-12
Payer: MEDICARE

## 2024-08-12 DIAGNOSIS — Z95.2 HISTORY OF AORTIC VALVE REPLACEMENT: ICD-10-CM

## 2024-08-12 DIAGNOSIS — Z79.01 CHRONIC ANTICOAGULATION: Primary | ICD-10-CM

## 2024-08-12 LAB — INR PPP: 2.8 (ref 0.9–1.1)

## 2024-08-12 PROCEDURE — 36416 COLLJ CAPILLARY BLOOD SPEC: CPT | Performed by: INTERNAL MEDICINE

## 2024-08-12 NOTE — PROGRESS NOTES
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     Diet: 2-3x week: 1 cup of brussels sprouts or broccoli weekly, green beans, peas  (3/11/23)  Premier Protein (or Equate brand) meal replacement ~2x/week with occasional 3rd 5/28/24  Alcohol: Seldom  Tobacco: None  OTC Pain Medication: APAP      INR History:  Date 4/5 4/19 4/26 5/5 5/11 6/2 6/15 6/18 6/25 7/2 7/9 7/19 7/23 7/30   Total Weekly Dose 15mg 15mg 14mg 14mg 14mg 14mg 14mg 14mg 14mg 14mg 14mg 14mg 14mg 14mg   INR 2.6 3.9 3.9 2.7 3.0 3.6 2.7 2.9 2.9 2.6 2.9 3.1 2.5 2.3   Notes           decr GLV   recv'd 6/21; [facility]       incr GLV decr GLV      Date 8/6 8/13 8/20 8/27 9/3 9/10 9/17 9/24 10/1 10/8 10/15 10/22 10/29 11/5   Total WeeklyDose 14mg 15mg 15mg 14mg 14mg 15mg 14mg 14mg 14mg 14mg 14mg 14mg 15mg 16mg   INR 2.4 3.4 3.8 2.9 2.2 3.2 2.9 3.3 3.2 3.3 3.0 2.4 2.4 2.4   Notes decr GLV decr GLV       1xboost         call call 1x boost   incr VitK call 2x boost; call      Date 11/10 11/17 11/24 12/1 12/8 12/15 12/23 12/30 1/3/22 1/7 1/11 1/18 1/25 2/1   Total WeeklyDose 17mg 16mg 16mg 16mg 15mg 15 mg Pt did not call back 15mg 12 mg 13mg 15mg 15 mg 15 mg 15 mg   INR 3.7 3.3 3.9 4.0 2.6 3.4 4.0 4.9 3.6 2.4 3.3 2.8 2.7 2.9   Notes Dec GLV   Zero GLV call Red x1 call Less GLV   call   Less  Protein drink redx1  self held x1 (misdose)   Dec vit K fall, apap  Call   call          Date 2/8 2/15 2/22 3/1 3/8 3/15 3/22 3/29 4/5 4/12 4/19 4/26 5/3 5/11   Total WeeklyDose 15mg 14mg 14 mg 15 mg 15 mg 15 mg 14mg 13 mg 14 mg 14 mg 14mg 13mg 15mg 14 mg   INR 4.0 4.0 2.8 2.9 2.9 4.2  3.9 3.3 2.9 3.0 3.8 2.4 3.8 2.5   Notes Call; Dec GLV call Call; Mis-dose call   Call; no GLV Inc GLV. Less protein, apap  redx1 call   Less GLV rec'd 4/27, call Dec GLV        Date 5/18 5/25 6/1 6/8 6/15 6/22 6/29 7/6 7/13 7/20 7/22 7/27  8/10  8/17   Total WeeklyDose 15 mg 15mg 16mg 15 mg 15 mg 15 mg 15 mg 15 mg 15mg 14 mg 15 mg 14 mg  14 mg  15 mg   INR 2.6 2.3 2.7 3.2 3.0 2.9 2.6 2.7 3.6 3.0 3.6 3.0  2.4  3.4   Notes   Inc GLV  call call call       call 7/14-- call call call  call  call  call      Date 8/25 8/31 9/7 9/12 9/19 9/26 10/3 10/10 10/17 10/24 10/31   Total WeeklyDose 14mg 13 mg 14 mg 17mg 15 mg 16mg 15mg 14 mg 13mg 17 mg 15mg   INR 4.3 2.8 1.7 2.9 2.1 3.4 3.8 2.4 1.8 3.7 4.5   Notes Dec GLV call Call refused enox; extra protein  Call; no protein drinks Call; less green tea, inc boostx1 Call; cranberries Redx1; call 1x miss Call  Less protein    1/1  Date 11/7 11/14 11/21 11/28 12/5 12/12 12/19 12/27 1/3 1/9 1/16/23 1/23 1/30/23   Total WeeklyDose 13 mg 14 mg 14 mg 14 mg 14 mg 14 mg 14mg 14 mg  13mg 13 mg 14 mg  14 mg 14 mg   INR 3.2 3.3 3.4 3.6 2.5 4.0 2.9 4.1 3.6 2.6 3.3 2.7 3.0   Notes  call redx1 One less protein shake   Inc GLV Decreased GLV W/o meloxicam  Tramadol,  meloxicam Tramadol,  meloxicam Tramadol,  meloxicam meloxicam     Date 2/7/23 2/13 2/20 2/27/23 3/6 3/13/23 3/23 3/27 4/3/23 4/10/23 4/17/23 4/24 5/1   Total WeeklyDose 14 mg 14 mg 14 mg 14 mg  14 mg 14 mg  14 mg 14 mg 15 mg  14mg 13 mg 15 mg 14 mg   INR 2.9 3.0 3.6 2.6 3 3.5 3.4 2.4 2.8 2.5 2.3 3.2  4.1 POCT   Notes Call  call rec 2/21  Call   call Call   call Call  call Missed dose 1x boost Call; missed protein drinks, less GLV     Date 5/2 5/8 5/15 5/22 5/30 6/5 6/19 7/10 7/24 7/31 8/7 8/14 8/21  8/28   Total WeeklyDose 12 mg 13 mg 14 mg 14 mg 14mg 14 mg 14 mg 14 mg 13 mg 13mg  12 mg 12 mg 14 mg 13 mg   INR 3.7 3.3 2.6 2.7 3.4 2.9 4.0 4.1 3.9 4.1 2.8 1.9 4.0  3.2   Notes rec 5/3  Call  Self-heldx1, boostx1    Stopping HM Less GLV   redx1 Ceftin   Rec'd 8/1 redx1 Inc GLV  Prednisone start Less GLV      Date 9/5 9/18 9/25 10/2 10/06 10/13 10/27 11/1 11/6 11/13 11/17 11/27   Total Weekly Dose 13mg 14 mg 12 mg  12 mg 11 mg 12 mg 12 mg 12 mg 11mg 11 mg 12 mg 12mg   INR 4.2 4.2 4.3  4.6 2.6 3.0 4.1 4.3 3.7 2.1 2.7 4.8   Notes redx1 clindamycin redx1 redx2 Red x2; inc GLV            Date 12/01 12/11 12/15 12/22 12/29 1/5/2024 1/12 1/26 2/9  2/26 3/1 3/11  3/18   Total Weekly Dose 11 mg 11 mg 11 mg 11 mg 11 mg 11 mg 11 mg 11 mg  12 mg   12 mg 12mg 11 mg  11 mg   INR 2.4 3.0 2.7 2.7 3.2 3.0 2.4 2.3 3.3  4.3 2.4 3.4 2.6   Notes  call    call    No GLV, apap   call     Date  4/1 4/8/24 4/15 4/22 4/29 5/3 5/6 5/13 5/20 5/28 6/3 6/10 6/17   Total Weekly  Dose 11 mg 12mg 11 mg 11 mg ???  Missed Sunday maybe more 12 mg 14 mg 14 mg 14 mg 14 mg 13 mg  12 mg 14 mg   INR 2.0 2.5 2.5 3.2 1.1 1.5 2.5 3.3 3.4 4.0 4.0 3.6 3.2   Notes Call  Missed x1 No contact  Boost x 1 No contact No contact  Lovenox  Boost x 2  Call Lovenox     call 1 x decrease  APAP  Call  APAP call     Date 6/24 7/1 7/8 7/22 8/5 8/12         Total WeeklyDose 12 mg 12 mg 13 mg 13mg 13 mg 12 mg         INR 2.5 2.2 3.0 3.6 2.7 2.8         Notes    Call Call              Phone Interview:  Tablet Strength: 2mg  Patient Contact Info: [phone] (Mobile) *preferred*  [email or web address]  Verbal Release Authorization signed on 4/7/21 -- may speak with Tammy Bhumika (friend: [phone]), Ismael Michele (brother: [phone])  Lab Contact Info: [facility] ([facility])  ** will call once monthly or if INR is out of range**   12/1/23 Scr: 77 ml/min    Patient Findings    Patient not contacted at this encounter     Plan:  1. INR is therapeutic today at 2.8 (goal 2.5-3.5). Instructed Ms. Michele to take warfarin 2 mg daily except 1 mg on MonWed until recheck.  2. Repeat INR in 1 week, 8/19/24  3. Lucieelva Michele understands the importance of calling the [facility] if she notices any  s/sx of bleeding, stroke, or abnormal bruising, if any changes are made to her medications or medication doses (Rx, OTC, herbal), or if any upcoming procedures are scheduled. Lucie Michele will likewise let us know if any other changes, questions, or concerns arise regarding anticoagulation therapy. she understands the importance of seeking medical attention immediately if she experiences any falls, vehicle accidents, or abnormal bleeding or bruising. Lucie Michele voiced understanding of this information and confirms that she has the [facility]'s contact information. Otherwise, we will plan to contact the patient once monthly or if her INR is out of range.    Kathleen Arauz, PharmD  8/12/2024  11:01 EDT

## 2024-08-12 NOTE — PROGRESS NOTES
Capillary Blood Specimen Collection  Capillary blood collection performed in clinic  by Rosa Elena De La Rosa RN. Patient tolerated the procedure well without complications.   08/12/24   Rosa Elena De La Rosa RN

## 2024-08-19 DIAGNOSIS — I10 PRIMARY HYPERTENSION: ICD-10-CM

## 2024-08-19 DIAGNOSIS — Z13.820 OSTEOPOROSIS SCREENING: ICD-10-CM

## 2024-08-19 DIAGNOSIS — I10 ESSENTIAL HYPERTENSION, BENIGN: ICD-10-CM

## 2024-08-19 DIAGNOSIS — M85.80 OSTEOPENIA, UNSPECIFIED LOCATION: ICD-10-CM

## 2024-08-19 DIAGNOSIS — E55.9 VITAMIN D DEFICIENCY: ICD-10-CM

## 2024-08-19 DIAGNOSIS — R73.03 PREDIABETES: ICD-10-CM

## 2024-08-19 DIAGNOSIS — N18.31 STAGE 3A CHRONIC KIDNEY DISEASE: ICD-10-CM

## 2024-08-19 DIAGNOSIS — E07.9 DISORDER OF THYROID: ICD-10-CM

## 2024-08-19 DIAGNOSIS — E03.9 ACQUIRED HYPOTHYROIDISM: ICD-10-CM

## 2024-08-19 DIAGNOSIS — M25.552 LEFT HIP PAIN: ICD-10-CM

## 2024-08-20 RX ORDER — ALBUTEROL SULFATE 90 UG/1
2 AEROSOL, METERED RESPIRATORY (INHALATION) EVERY 4 HOURS PRN
Qty: 25.5 G | Refills: 0 | Status: SHIPPED | OUTPATIENT
Start: 2024-08-20

## 2024-08-20 RX ORDER — OMEPRAZOLE 20 MG/1
20 CAPSULE, DELAYED RELEASE ORAL DAILY
Qty: 90 CAPSULE | Refills: 1 | Status: SHIPPED | OUTPATIENT
Start: 2024-08-20

## 2024-08-23 DIAGNOSIS — Z13.820 OSTEOPOROSIS SCREENING: ICD-10-CM

## 2024-08-23 DIAGNOSIS — I10 PRIMARY HYPERTENSION: ICD-10-CM

## 2024-08-23 DIAGNOSIS — M85.80 OSTEOPENIA, UNSPECIFIED LOCATION: ICD-10-CM

## 2024-08-23 DIAGNOSIS — R73.03 PREDIABETES: ICD-10-CM

## 2024-08-23 DIAGNOSIS — E03.9 ACQUIRED HYPOTHYROIDISM: ICD-10-CM

## 2024-08-23 DIAGNOSIS — I10 ESSENTIAL HYPERTENSION, BENIGN: ICD-10-CM

## 2024-08-23 DIAGNOSIS — N18.31 STAGE 3A CHRONIC KIDNEY DISEASE: ICD-10-CM

## 2024-08-23 DIAGNOSIS — E55.9 VITAMIN D DEFICIENCY: ICD-10-CM

## 2024-08-23 DIAGNOSIS — E07.9 DISORDER OF THYROID: ICD-10-CM

## 2024-08-23 DIAGNOSIS — M25.552 LEFT HIP PAIN: ICD-10-CM

## 2024-08-23 RX ORDER — OXYBUTYNIN CHLORIDE 5 MG/1
5 TABLET ORAL 2 TIMES DAILY
Qty: 180 TABLET | Refills: 0 | Status: SHIPPED | OUTPATIENT
Start: 2024-08-23

## 2024-08-27 RX ORDER — ALENDRONATE SODIUM 70 MG/1
70 TABLET ORAL
Qty: 12 TABLET | OUTPATIENT
Start: 2024-08-27

## 2024-08-27 RX ORDER — ALENDRONATE SODIUM 70 MG/1
70 TABLET ORAL
Qty: 4 TABLET | Refills: 0 | Status: SHIPPED | OUTPATIENT
Start: 2024-08-27

## 2024-09-04 ENCOUNTER — ANTICOAGULATION VISIT (OUTPATIENT)
Dept: PHARMACY | Facility: HOSPITAL | Age: 79
End: 2024-09-04
Payer: MEDICARE

## 2024-09-04 ENCOUNTER — OFFICE VISIT (OUTPATIENT)
Dept: CARDIOLOGY | Facility: CLINIC | Age: 79
End: 2024-09-04
Payer: MEDICARE

## 2024-09-04 VITALS
HEIGHT: 65 IN | WEIGHT: 167 LBS | HEART RATE: 74 BPM | OXYGEN SATURATION: 99 % | SYSTOLIC BLOOD PRESSURE: 116 MMHG | DIASTOLIC BLOOD PRESSURE: 68 MMHG | RESPIRATION RATE: 18 BRPM | BODY MASS INDEX: 27.82 KG/M2

## 2024-09-04 DIAGNOSIS — Z95.2 HISTORY OF AORTIC VALVE REPLACEMENT: ICD-10-CM

## 2024-09-04 DIAGNOSIS — G47.33 OSA ON CPAP: ICD-10-CM

## 2024-09-04 DIAGNOSIS — Z86.79 S/P ASCENDING AORTIC ANEURYSM REPAIR: ICD-10-CM

## 2024-09-04 DIAGNOSIS — I48.92 PAROXYSMAL ATRIAL FLUTTER: ICD-10-CM

## 2024-09-04 DIAGNOSIS — I50.32 CHRONIC DIASTOLIC CONGESTIVE HEART FAILURE: Primary | ICD-10-CM

## 2024-09-04 DIAGNOSIS — I10 PRIMARY HYPERTENSION: ICD-10-CM

## 2024-09-04 DIAGNOSIS — Z98.890 S/P ASCENDING AORTIC ANEURYSM REPAIR: ICD-10-CM

## 2024-09-04 LAB — INR PPP: 2.3 (ref 0.9–1.1)
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
## 2024-09-04 NOTE — PROGRESS NOTES
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     Diet: 2-3x week: 1 cup of brussels sprouts or broccoli weekly, green beans, peas  (3/11/23)  Premier Protein (or Equate brand) meal replacement ~2x/week with occasional 3rd 5/28/24  Alcohol: Seldom  Tobacco: None  OTC Pain Medication: APAP      INR History:  Date 4/5 4/19 4/26 5/5 5/11 6/2 6/15 6/18 6/25 7/2 7/9 7/19 7/23 7/30   Total Weekly Dose 15mg 15mg 14mg 14mg 14mg 14mg 14mg 14mg 14mg 14mg 14mg 14mg 14mg 14mg   INR 2.6 3.9 3.9 2.7 3.0 3.6 2.7 2.9 2.9 2.6 2.9 3.1 2.5 2.3   Notes           decr GLV   recv'd 6/21; [facility]       incr GLV decr GLV      Date 8/6 8/13 8/20 8/27 9/3 9/10 9/17 9/24 10/1 10/8 10/15 10/22 10/29 11/5   Total WeeklyDose 14mg 15mg 15mg 14mg 14mg 15mg 14mg 14mg 14mg 14mg 14mg 14mg 15mg 16mg   INR 2.4 3.4 3.8 2.9 2.2 3.2 2.9 3.3 3.2 3.3 3.0 2.4 2.4 2.4   Notes decr GLV decr GLV       1xboost         call call 1x boost   incr VitK call 2x boost; call      Date 11/10 11/17 11/24 12/1 12/8 12/15 12/23 12/30 1/3/22 1/7 1/11 1/18 1/25 2/1   Total WeeklyDose 17mg 16mg 16mg 16mg 15mg 15 mg Pt did not call back 15mg 12 mg 13mg 15mg 15 mg 15 mg 15 mg   INR 3.7 3.3 3.9 4.0 2.6 3.4 4.0 4.9 3.6 2.4 3.3 2.8 2.7 2.9   Notes Dec GLV   Zero GLV call Red x1 call Less GLV   call   Less  Protein drink redx1  self held x1 (misdose)   Dec vit K fall, apap  Call   call          Date 2/8 2/15 2/22 3/1 3/8 3/15 3/22 3/29 4/5 4/12 4/19 4/26 5/3 5/11   Total WeeklyDose 15mg 14mg 14 mg 15 mg 15 mg 15 mg 14mg 13 mg 14 mg 14 mg 14mg 13mg 15mg 14 mg   INR 4.0 4.0 2.8 2.9 2.9 4.2  3.9 3.3 2.9 3.0 3.8 2.4 3.8 2.5   Notes Call; Dec GLV call Call; Mis-dose call   Call; no GLV Inc GLV. Less protein, apap  redx1 call   Less GLV rec'd 4/27, call Dec GLV        Date 5/18 5/25 6/1 6/8 6/15 6/22 6/29 7/6 7/13 7/20 7/22 7/27  8/10  8/17   Total WeeklyDose 15 mg 15mg 16mg 15 mg 15 mg 15 mg 15 mg 15 mg 15mg 14 mg 15 mg 14 mg  14 mg  15 mg   INR 2.6 2.3 2.7 3.2 3.0 2.9 2.6 2.7 3.6 3.0 3.6 3.0  2.4  3.4   Notes   Inc GLV  call call call       call 7/14-- call call call  call  call  call      Date 8/25 8/31 9/7 9/12 9/19 9/26 10/3 10/10 10/17 10/24 10/31   Total WeeklyDose 14mg 13 mg 14 mg 17mg 15 mg 16mg 15mg 14 mg 13mg 17 mg 15mg   INR 4.3 2.8 1.7 2.9 2.1 3.4 3.8 2.4 1.8 3.7 4.5   Notes Dec GLV call Call refused enox; extra protein  Call; no protein drinks Call; less green tea, inc boostx1 Call; cranberries Redx1; call 1x miss Call  Less protein    1/1  Date 11/7 11/14 11/21 11/28 12/5 12/12 12/19 12/27 1/3 1/9 1/16/23 1/23 1/30/23   Total WeeklyDose 13 mg 14 mg 14 mg 14 mg 14 mg 14 mg 14mg 14 mg  13mg 13 mg 14 mg  14 mg 14 mg   INR 3.2 3.3 3.4 3.6 2.5 4.0 2.9 4.1 3.6 2.6 3.3 2.7 3.0   Notes  call redx1 One less protein shake   Inc GLV Decreased GLV W/o meloxicam  Tramadol,  meloxicam Tramadol,  meloxicam Tramadol,  meloxicam meloxicam     Date 2/7/23 2/13 2/20 2/27/23 3/6 3/13/23 3/23 3/27 4/3/23 4/10/23 4/17/23 4/24 5/1   Total WeeklyDose 14 mg 14 mg 14 mg 14 mg  14 mg 14 mg  14 mg 14 mg 15 mg  14mg 13 mg 15 mg 14 mg   INR 2.9 3.0 3.6 2.6 3 3.5 3.4 2.4 2.8 2.5 2.3 3.2  4.1 POCT   Notes Call  call rec 2/21  Call   call Call   call Call  call Missed dose 1x boost Call; missed protein drinks, less GLV     Date 5/2 5/8 5/15 5/22 5/30 6/5 6/19 7/10 7/24 7/31 8/7 8/14 8/21  8/28   Total WeeklyDose 12 mg 13 mg 14 mg 14 mg 14mg 14 mg 14 mg 14 mg 13 mg 13mg  12 mg 12 mg 14 mg 13 mg   INR 3.7 3.3 2.6 2.7 3.4 2.9 4.0 4.1 3.9 4.1 2.8 1.9 4.0  3.2   Notes rec 5/3  Call  Self-heldx1, boostx1    Stopping HM Less GLV   redx1 Ceftin   Rec'd 8/1 redx1 Inc GLV  Prednisone start Less GLV      Date 9/5 9/18 9/25 10/2 10/06 10/13 10/27 11/1 11/6 11/13 11/17 11/27   Total Weekly Dose 13mg 14 mg 12 mg  12 mg 11 mg 12 mg 12 mg 12 mg 11mg 11 mg 12 mg 12mg   INR 4.2 4.2 4.3  4.6 2.6 3.0 4.1 4.3 3.7 2.1 2.7 4.8   Notes redx1 clindamycin redx1 redx2 Red x2; inc GLV            Date 12/01 12/11 12/15 12/22 12/29 1/5/2024 1/12 1/26 2/9  2/26 3/1 3/11  3/18   Total Weekly Dose 11 mg 11 mg 11 mg 11 mg 11 mg 11 mg 11 mg 11 mg  12 mg   12 mg 12mg 11 mg  11 mg   INR 2.4 3.0 2.7 2.7 3.2 3.0 2.4 2.3 3.3  4.3 2.4 3.4 2.6   Notes  call    call    No GLV, apap   call     Date  4/1 4/8/24 4/15 4/22 4/29 5/3 5/6 5/13 5/20 5/28 6/3 6/10 6/17   Total Weekly  Dose 11 mg 12mg 11 mg 11 mg ???  Missed Sunday maybe more 12 mg 14 mg 14 mg 14 mg 14 mg 13 mg  12 mg 14 mg   INR 2.0 2.5 2.5 3.2 1.1 1.5 2.5 3.3 3.4 4.0 4.0 3.6 3.2   Notes Call  Missed x1 No contact  Boost x 1 No contact No contact  Lovenox  Boost x 2  Call Lovenox     call 1 x decrease  APAP  Call  APAP call     Date 6/24 7/1 7/8 7/22 8/5 8/12 9/4        Total WeeklyDose 12 mg 12 mg 13 mg 13mg 13 mg 12 mg 13 mg         INR 2.5 2.2 3.0 3.6 2.7 2.8 2.3        Notes    Call Call    Call          Phone Interview:  Tablet Strength: 2mg  Patient Contact Info: [phone] (Mobile) *preferred*  [email or web address]  Verbal Release Authorization signed on 4/7/21 -- may speak with Tammy Erika (friend: [phone]), Ismael Michele (brother: [phone])  Lab Contact Info: [facility] ([facility])  ** will call once monthly or if INR is out of range**   12/1/23 Scr: 77 ml/min    Patient Findings    Negatives: Signs/symptoms of thrombosis, Signs/symptoms of bleeding, Laboratory test error suspected, Change in health, Change in alcohol use, Change in activity, Upcoming invasive procedure, Emergency department visit, Upcoming dental procedure, Missed doses, Extra doses, Change in medications, Change in  diet/appetite, Hospital admission, Bruising, Other complaints   Comments: All findings negative per patient.     Plan:  1. INR subtherapeutic today at 2.3 (goal 2.5-3.5). Instructed Ms. Michele to BOOST mathew's dose of warfarin to 3 mg, then continue warfarin 2 mg daily except 1 mg on MonWed until recheck.  2. Repeat INR in 1 week, 9.11.24  3. Lucie Michele understands the importance of calling the [facility] if she notices any s/sx of bleeding, stroke, or abnormal bruising, if any changes are made to her medications or medication doses (Rx, OTC, herbal), or if any upcoming procedures are scheduled. Lucie Michele will likewise let us know if any other changes, questions, or concerns arise regarding anticoagulation therapy. she understands the importance of seeking medical attention immediately if she experiences any falls, vehicle accidents, or abnormal bleeding or bruising. Lucie Michele voiced understanding of this information and confirms that she has the [facility]'s contact information. Otherwise, we will plan to contact the patient once monthly or if her INR is out of range.        Elva Langford CPhT   11:42 EDT 9/4/2024      IKathleen, [facility], have reviewed the note in full and agree with the assessment and plan.  09/04/24  15:02 EDT

## 2024-09-04 NOTE — PROGRESS NOTES
Capillary Blood Specimen Collection  Capillary blood collection performed in clinic by Ema Hamilton MA. Patient tolerated the procedure well without complications.   09/04/24   Ema Hamilton MA

## 2024-09-11 ENCOUNTER — ANTICOAGULATION VISIT (OUTPATIENT)
Dept: PHARMACY | Facility: HOSPITAL | Age: 79
End: 2024-09-11
Payer: MEDICARE

## 2024-09-11 ENCOUNTER — CLINICAL SUPPORT (OUTPATIENT)
Dept: CARDIOLOGY | Facility: CLINIC | Age: 79
End: 2024-09-11
Payer: MEDICARE

## 2024-09-11 DIAGNOSIS — Z95.2 HISTORY OF AORTIC VALVE REPLACEMENT: Primary | ICD-10-CM

## 2024-09-11 LAB — INR PPP: 3 (ref 0.9–1.1)

## 2024-09-11 PROCEDURE — 36416 COLLJ CAPILLARY BLOOD SPEC: CPT | Performed by: INTERNAL MEDICINE

## 2024-09-11 NOTE — PROGRESS NOTES
Capillary Blood Specimen Collection  Capillary blood collection performed in by Augusta Salazar MA. Patient tolerated the procedure well without complications.   09/11/24   Augusta Salazar MA

## 2024-09-11 NOTE — PROGRESS NOTES
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     Diet: 2-3x week: 1 cup of brussels sprouts or broccoli weekly, green beans, peas  (3/11/23)  Premier Protein (or Equate brand) meal replacement ~2x/week with occasional 3rd 5/28/24  Alcohol: Seldom  Tobacco: None  OTC Pain Medication: APAP      INR History:  Date 4/5 4/19 4/26 5/5 5/11 6/2 6/15 6/18 6/25 7/2 7/9 7/19 7/23 7/30   Total Weekly Dose 15mg 15mg 14mg 14mg 14mg 14mg 14mg 14mg 14mg 14mg 14mg 14mg 14mg 14mg   INR 2.6 3.9 3.9 2.7 3.0 3.6 2.7 2.9 2.9 2.6 2.9 3.1 2.5 2.3   Notes           decr GLV   recv'd 6/21; [facility]       incr GLV decr GLV      Date 8/6 8/13 8/20 8/27 9/3 9/10 9/17 9/24 10/1 10/8 10/15 10/22 10/29 11/5   Total WeeklyDose 14mg 15mg 15mg 14mg 14mg 15mg 14mg 14mg 14mg 14mg 14mg 14mg 15mg 16mg   INR 2.4 3.4 3.8 2.9 2.2 3.2 2.9 3.3 3.2 3.3 3.0 2.4 2.4 2.4   Notes decr GLV decr GLV       1xboost         call call 1x boost   incr VitK call 2x boost; call      Date 11/10 11/17 11/24 12/1 12/8 12/15 12/23 12/30 1/3/22 1/7 1/11 1/18 1/25 2/1   Total WeeklyDose 17mg 16mg 16mg 16mg 15mg 15 mg Pt did not call back 15mg 12 mg 13mg 15mg 15 mg 15 mg 15 mg   INR 3.7 3.3 3.9 4.0 2.6 3.4 4.0 4.9 3.6 2.4 3.3 2.8 2.7 2.9   Notes Dec GLV   Zero GLV call Red x1 call Less GLV   call   Less  Protein drink redx1  self held x1 (misdose)   Dec vit K fall, apap  Call   call          Date 2/8 2/15 2/22 3/1 3/8 3/15 3/22 3/29 4/5 4/12 4/19 4/26 5/3 5/11   Total WeeklyDose 15mg 14mg 14 mg 15 mg 15 mg 15 mg 14mg 13 mg 14 mg 14 mg 14mg 13mg 15mg 14 mg   INR 4.0 4.0 2.8 2.9 2.9 4.2  3.9 3.3 2.9 3.0 3.8 2.4 3.8 2.5   Notes Call; Dec GLV call Call; Mis-dose call   Call; no GLV Inc GLV. Less protein, apap  redx1 call   Less GLV rec'd 4/27, call Dec GLV        Date 5/18 5/25 6/1 6/8 6/15 6/22 6/29 7/6 7/13 7/20 7/22 7/27  8/10  8/17   Total WeeklyDose 15 mg 15mg 16mg 15 mg 15 mg 15 mg 15 mg 15 mg 15mg 14 mg 15 mg 14 mg  14 mg  15 mg   INR 2.6 2.3 2.7 3.2 3.0 2.9 2.6 2.7 3.6 3.0 3.6 3.0  2.4  3.4   Notes   Inc GLV  call call call       call 7/14-- call call call  call  call  call      Date 8/25 8/31 9/7 9/12 9/19 9/26 10/3 10/10 10/17 10/24 10/31   Total WeeklyDose 14mg 13 mg 14 mg 17mg 15 mg 16mg 15mg 14 mg 13mg 17 mg 15mg   INR 4.3 2.8 1.7 2.9 2.1 3.4 3.8 2.4 1.8 3.7 4.5   Notes Dec GLV call Call refused enox; extra protein  Call; no protein drinks Call; less green tea, inc boostx1 Call; cranberries Redx1; call 1x miss Call  Less protein    1/1  Date 11/7 11/14 11/21 11/28 12/5 12/12 12/19 12/27 1/3 1/9 1/16/23 1/23 1/30/23   Total WeeklyDose 13 mg 14 mg 14 mg 14 mg 14 mg 14 mg 14mg 14 mg  13mg 13 mg 14 mg  14 mg 14 mg   INR 3.2 3.3 3.4 3.6 2.5 4.0 2.9 4.1 3.6 2.6 3.3 2.7 3.0   Notes  call redx1 One less protein shake   Inc GLV Decreased GLV W/o meloxicam  Tramadol,  meloxicam Tramadol,  meloxicam Tramadol,  meloxicam meloxicam     Date 2/7/23 2/13 2/20 2/27/23 3/6 3/13/23 3/23 3/27 4/3/23 4/10/23 4/17/23 4/24 5/1   Total WeeklyDose 14 mg 14 mg 14 mg 14 mg  14 mg 14 mg  14 mg 14 mg 15 mg  14mg 13 mg 15 mg 14 mg   INR 2.9 3.0 3.6 2.6 3 3.5 3.4 2.4 2.8 2.5 2.3 3.2  4.1 POCT   Notes Call  call rec 2/21  Call   call Call   call Call  call Missed dose 1x boost Call; missed protein drinks, less GLV     Date 5/2 5/8 5/15 5/22 5/30 6/5 6/19 7/10 7/24 7/31 8/7 8/14 8/21  8/28   Total WeeklyDose 12 mg 13 mg 14 mg 14 mg 14mg 14 mg 14 mg 14 mg 13 mg 13mg  12 mg 12 mg 14 mg 13 mg   INR 3.7 3.3 2.6 2.7 3.4 2.9 4.0 4.1 3.9 4.1 2.8 1.9 4.0  3.2   Notes rec 5/3  Call  Self-heldx1, boostx1    Stopping HM Less GLV   redx1 Ceftin   Rec'd 8/1 redx1 Inc GLV  Prednisone start Less GLV      Date 9/5 9/18 9/25 10/2 10/06 10/13 10/27 11/1 11/6 11/13 11/17 11/27   Total Weekly Dose 13mg 14 mg 12 mg  12 mg 11 mg 12 mg 12 mg 12 mg 11mg 11 mg 12 mg 12mg   INR 4.2 4.2 4.3  4.6 2.6 3.0 4.1 4.3 3.7 2.1 2.7 4.8   Notes redx1 clindamycin redx1 redx2 Red x2; inc GLV            Date 12/01 12/11 12/15 12/22 12/29 1/5/2024 1/12 1/26 2/9  2/26 3/1 3/11  3/18   Total Weekly Dose 11 mg 11 mg 11 mg 11 mg 11 mg 11 mg 11 mg 11 mg  12 mg   12 mg 12mg 11 mg  11 mg   INR 2.4 3.0 2.7 2.7 3.2 3.0 2.4 2.3 3.3  4.3 2.4 3.4 2.6   Notes  call    call    No GLV, apap   call     Date  4/1 4/8/24 4/15 4/22 4/29 5/3 5/6 5/13 5/20 5/28 6/3 6/10 6/17   Total Weekly  Dose 11 mg 12mg 11 mg 11 mg ???  Missed Sunday maybe more 12 mg 14 mg 14 mg 14 mg 14 mg 13 mg  12 mg 14 mg   INR 2.0 2.5 2.5 3.2 1.1 1.5 2.5 3.3 3.4 4.0 4.0 3.6 3.2   Notes Call  Missed x1 No contact  Boost x 1 No contact No contact  Lovenox  Boost x 2  Call Lovenox     call 1 x decrease  APAP  Call  APAP call     Date 6/24 7/1 7/8 7/22 8/5 8/12 9/4 9/11       Total WeeklyDose 12 mg 12 mg 13 mg 13mg 13 mg 12 mg 13 mg  14 mg       INR 2.5 2.2 3.0 3.6 2.7 2.8 2.3 3.0       Notes    Call Call    Call          Phone Interview:  Tablet Strength: 2mg  Patient Contact Info: [phone] (Mobile) *preferred*  [email or web address]  Verbal Release Authorization signed on 4/7/21 -- may speak with Tammy Bhumika (friend: [phone]), Ismael Michele (brother: [phone])  Lab Contact Info: [facility] ([facility])  ** will call once monthly or if INR is out of range**   12/1/23 Scr: 77 ml/min    Patient Findings  Comments: Patient not contacted at this encounter           Plan:  1. INR therapeutic today at 3.0 (goal 2.5-3.5).  Ms. Michele is to continue warfarin 2 mg daily except 1 mg on MonWed until recheck.  2. Repeat INR in 1 week, 9/18/24  3. Lucie Michele understands the importance of calling the [facility] if she notices any s/sx of bleeding, stroke, or abnormal bruising, if any changes are made to her medications or medication doses (Rx, OTC, herbal), or if any upcoming procedures are scheduled. Lucie RODRIGEUZ Percyligia will likewise let us know if any other changes, questions, or concerns arise regarding anticoagulation therapy. she understands the importance of seeking medical attention immediately if she experiences any falls, vehicle accidents, or abnormal bleeding or bruising. Lucie A Ryne voiced understanding of this information and confirms that she has the [facility]'s contact information. Otherwise, we will plan to contact the patient once monthly or if her INR is out of range.        Kathleen Arauz, PharmD  9/11/2024  11:45 EDT

## 2024-09-17 ENCOUNTER — LAB (OUTPATIENT)
Dept: FAMILY MEDICINE CLINIC | Facility: CLINIC | Age: 79
End: 2024-09-17
Payer: MEDICARE

## 2024-09-17 DIAGNOSIS — Z00.00 MEDICARE ANNUAL WELLNESS VISIT, SUBSEQUENT: ICD-10-CM

## 2024-09-17 DIAGNOSIS — Z00.00 PHYSICAL EXAM: ICD-10-CM

## 2024-09-17 DIAGNOSIS — R79.89 ELEVATED LIVER FUNCTION TESTS: ICD-10-CM

## 2024-09-17 DIAGNOSIS — E03.9 ACQUIRED HYPOTHYROIDISM: ICD-10-CM

## 2024-09-17 DIAGNOSIS — R73.9 HYPERGLYCEMIA: ICD-10-CM

## 2024-09-17 DIAGNOSIS — I10 PRIMARY HYPERTENSION: ICD-10-CM

## 2024-09-17 DIAGNOSIS — R29.6 FREQUENT FALLS: ICD-10-CM

## 2024-09-17 DIAGNOSIS — E78.2 MIXED HYPERLIPIDEMIA: ICD-10-CM

## 2024-09-17 PROCEDURE — 36415 COLL VENOUS BLD VENIPUNCTURE: CPT | Performed by: FAMILY MEDICINE

## 2024-09-18 LAB
ALBUMIN SERPL-MCNC: 4 G/DL (ref 3.8–4.8)
ALP SERPL-CCNC: 101 IU/L (ref 44–121)
ALT SERPL-CCNC: 29 IU/L (ref 0–32)
AST SERPL-CCNC: 32 IU/L (ref 0–40)
BASOPHILS # BLD AUTO: 0 X10E3/UL (ref 0–0.2)
BASOPHILS NFR BLD AUTO: 1 %
BILIRUB SERPL-MCNC: 0.4 MG/DL (ref 0–1.2)
BUN SERPL-MCNC: 9 MG/DL (ref 8–27)
BUN/CREAT SERPL: 12 (ref 12–28)
CALCIUM SERPL-MCNC: 9 MG/DL (ref 8.7–10.3)
CHLORIDE SERPL-SCNC: 102 MMOL/L (ref 96–106)
CHOLEST SERPL-MCNC: 188 MG/DL (ref 100–199)
CO2 SERPL-SCNC: 25 MMOL/L (ref 20–29)
CREAT SERPL-MCNC: 0.73 MG/DL (ref 0.57–1)
EGFRCR SERPLBLD CKD-EPI 2021: 84 ML/MIN/1.73
EOSINOPHIL # BLD AUTO: 0.1 X10E3/UL (ref 0–0.4)
EOSINOPHIL NFR BLD AUTO: 2 %
ERYTHROCYTE [DISTWIDTH] IN BLOOD BY AUTOMATED COUNT: 13.9 % (ref 11.7–15.4)
GLOBULIN SER CALC-MCNC: 2.3 G/DL (ref 1.5–4.5)
GLUCOSE SERPL-MCNC: 87 MG/DL (ref 70–99)
HBA1C MFR BLD: 5.7 % (ref 4.8–5.6)
HCT VFR BLD AUTO: 45.4 % (ref 34–46.6)
HDLC SERPL-MCNC: 69 MG/DL
HGB BLD-MCNC: 14.6 G/DL (ref 11.1–15.9)
IMM GRANULOCYTES # BLD AUTO: 0 X10E3/UL (ref 0–0.1)
IMM GRANULOCYTES NFR BLD AUTO: 1 %
LDLC SERPL CALC-MCNC: 101 MG/DL (ref 0–99)
LYMPHOCYTES # BLD AUTO: 1.9 X10E3/UL (ref 0.7–3.1)
LYMPHOCYTES NFR BLD AUTO: 28 %
MCH RBC QN AUTO: 30.5 PG (ref 26.6–33)
MCHC RBC AUTO-ENTMCNC: 32.2 G/DL (ref 31.5–35.7)
MCV RBC AUTO: 95 FL (ref 79–97)
MONOCYTES # BLD AUTO: 0.8 X10E3/UL (ref 0.1–0.9)
MONOCYTES NFR BLD AUTO: 12 %
NEUTROPHILS # BLD AUTO: 3.8 X10E3/UL (ref 1.4–7)
NEUTROPHILS NFR BLD AUTO: 56 %
PLATELET # BLD AUTO: 230 X10E3/UL (ref 150–450)
POTASSIUM SERPL-SCNC: 3.9 MMOL/L (ref 3.5–5.2)
PROT SERPL-MCNC: 6.3 G/DL (ref 6–8.5)
RBC # BLD AUTO: 4.79 X10E6/UL (ref 3.77–5.28)
SODIUM SERPL-SCNC: 141 MMOL/L (ref 134–144)
TRIGL SERPL-MCNC: 104 MG/DL (ref 0–149)
TSH SERPL DL<=0.005 MIU/L-ACNC: 4.2 UIU/ML (ref 0.45–4.5)
VIT B12 SERPL-MCNC: 380 PG/ML (ref 232–1245)
VLDLC SERPL CALC-MCNC: 18 MG/DL (ref 5–40)
WBC # BLD AUTO: 6.6 X10E3/UL (ref 3.4–10.8)

## 2024-09-25 ENCOUNTER — CLINICAL SUPPORT (OUTPATIENT)
Dept: CARDIOLOGY | Facility: CLINIC | Age: 79
End: 2024-09-25
Payer: MEDICARE

## 2024-09-25 ENCOUNTER — ANTICOAGULATION VISIT (OUTPATIENT)
Dept: PHARMACY | Facility: HOSPITAL | Age: 79
End: 2024-09-25
Payer: MEDICARE

## 2024-09-25 DIAGNOSIS — Z95.2 HISTORY OF AORTIC VALVE REPLACEMENT: Primary | ICD-10-CM

## 2024-09-25 LAB — INR PPP: 3.2 (ref 0.9–1.1)

## 2024-09-25 PROCEDURE — 85610 PROTHROMBIN TIME: CPT | Performed by: INTERNAL MEDICINE

## 2024-09-25 PROCEDURE — 36416 COLLJ CAPILLARY BLOOD SPEC: CPT | Performed by: INTERNAL MEDICINE

## 2024-09-27 ENCOUNTER — OFFICE VISIT (OUTPATIENT)
Dept: FAMILY MEDICINE CLINIC | Facility: CLINIC | Age: 79
End: 2024-09-27
Payer: MEDICARE

## 2024-09-27 VITALS
OXYGEN SATURATION: 95 % | HEIGHT: 65 IN | SYSTOLIC BLOOD PRESSURE: 132 MMHG | WEIGHT: 169 LBS | BODY MASS INDEX: 28.16 KG/M2 | DIASTOLIC BLOOD PRESSURE: 80 MMHG | HEART RATE: 108 BPM

## 2024-09-27 DIAGNOSIS — E55.9 VITAMIN D DEFICIENCY: ICD-10-CM

## 2024-09-27 DIAGNOSIS — K76.9 LIVER LESION: ICD-10-CM

## 2024-09-27 DIAGNOSIS — R73.9 HYPERGLYCEMIA: ICD-10-CM

## 2024-09-27 DIAGNOSIS — Z12.31 ENCOUNTER FOR SCREENING MAMMOGRAM FOR MALIGNANT NEOPLASM OF BREAST: ICD-10-CM

## 2024-09-27 DIAGNOSIS — E03.9 HYPOTHYROIDISM, UNSPECIFIED TYPE: ICD-10-CM

## 2024-09-27 DIAGNOSIS — I10 PRIMARY HYPERTENSION: Primary | ICD-10-CM

## 2024-09-27 DIAGNOSIS — E03.9 ACQUIRED HYPOTHYROIDISM: ICD-10-CM

## 2024-09-27 DIAGNOSIS — T14.8XXA MUSCLE STRAIN: ICD-10-CM

## 2024-09-27 PROCEDURE — 3079F DIAST BP 80-89 MM HG: CPT | Performed by: FAMILY MEDICINE

## 2024-09-27 PROCEDURE — 1126F AMNT PAIN NOTED NONE PRSNT: CPT | Performed by: FAMILY MEDICINE

## 2024-09-27 PROCEDURE — 3075F SYST BP GE 130 - 139MM HG: CPT | Performed by: FAMILY MEDICINE

## 2024-09-27 PROCEDURE — G2211 COMPLEX E/M VISIT ADD ON: HCPCS | Performed by: FAMILY MEDICINE

## 2024-09-27 PROCEDURE — 99214 OFFICE O/P EST MOD 30 MIN: CPT | Performed by: FAMILY MEDICINE

## 2024-09-27 RX ORDER — LEVOTHYROXINE SODIUM 125 UG/1
125 TABLET ORAL DAILY
Qty: 90 TABLET | Refills: 0 | Status: SHIPPED | OUTPATIENT
Start: 2024-09-27

## 2024-09-27 RX ORDER — ALBUTEROL SULFATE 90 UG/1
2 INHALANT RESPIRATORY (INHALATION) EVERY 4 HOURS PRN
Qty: 25.5 G | Refills: 0 | Status: SHIPPED | OUTPATIENT
Start: 2024-09-27

## 2024-09-27 RX ORDER — ALENDRONATE SODIUM 70 MG/1
70 TABLET ORAL
Qty: 4 TABLET | Refills: 0 | Status: SHIPPED | OUTPATIENT
Start: 2024-09-27 | End: 2024-09-30

## 2024-09-30 RX ORDER — ALENDRONATE SODIUM 70 MG/1
70 TABLET ORAL
Qty: 4 TABLET | Refills: 0 | OUTPATIENT
Start: 2024-09-30

## 2024-09-30 RX ORDER — ALENDRONATE SODIUM 70 MG/1
70 TABLET ORAL
Qty: 12 TABLET | Refills: 0 | Status: SHIPPED | OUTPATIENT
Start: 2024-09-30

## 2024-10-22 DIAGNOSIS — G50.0 TRIGEMINAL NEURALGIA: ICD-10-CM

## 2024-10-22 RX ORDER — OXCARBAZEPINE 150 MG/1
150 TABLET, FILM COATED ORAL 3 TIMES DAILY
Qty: 270 TABLET | Refills: 3 | Status: SHIPPED | OUTPATIENT
Start: 2024-10-22

## 2024-11-01 RX ORDER — POTASSIUM CHLORIDE 1500 MG/1
20 TABLET, EXTENDED RELEASE ORAL DAILY
Qty: 90 TABLET | Refills: 3 | Status: SHIPPED | OUTPATIENT
Start: 2024-11-01

## 2024-11-07 ENCOUNTER — OFFICE VISIT (OUTPATIENT)
Dept: NEUROLOGY | Facility: CLINIC | Age: 79
End: 2024-11-07
Payer: MEDICARE

## 2024-11-07 VITALS
HEART RATE: 67 BPM | OXYGEN SATURATION: 94 % | DIASTOLIC BLOOD PRESSURE: 74 MMHG | WEIGHT: 169 LBS | HEIGHT: 65 IN | RESPIRATION RATE: 24 BRPM | BODY MASS INDEX: 28.16 KG/M2 | SYSTOLIC BLOOD PRESSURE: 142 MMHG

## 2024-11-07 DIAGNOSIS — G52.1 GLOSSOPHARYNGEAL NEURALGIA: ICD-10-CM

## 2024-11-07 DIAGNOSIS — R41.3 MEMORY CHANGE: Primary | ICD-10-CM

## 2024-11-07 PROCEDURE — 1159F MED LIST DOCD IN RCRD: CPT | Performed by: NURSE PRACTITIONER

## 2024-11-07 PROCEDURE — 99214 OFFICE O/P EST MOD 30 MIN: CPT | Performed by: NURSE PRACTITIONER

## 2024-11-07 PROCEDURE — 3078F DIAST BP <80 MM HG: CPT | Performed by: NURSE PRACTITIONER

## 2024-11-07 PROCEDURE — 3077F SYST BP >= 140 MM HG: CPT | Performed by: NURSE PRACTITIONER

## 2024-11-07 PROCEDURE — 1160F RVW MEDS BY RX/DR IN RCRD: CPT | Performed by: NURSE PRACTITIONER

## 2024-11-07 RX ORDER — ACETAMINOPHEN 500 MG
500 TABLET ORAL EVERY 6 HOURS PRN
COMMUNITY

## 2024-11-07 NOTE — PROGRESS NOTES
Subjective   Lucie Michele is a 79 y.o. female presenting for follow-up for glossopharyngeal neuralgia.  She is taking oxcarbazepine 150 mg 3 times a day.  She often forgets to take the middle of the day dose and when she does this her symptoms return, however she says that when she takes the medication as prescribed this works well for her.  She has also expressed concerns over the last few years about her memory.  She states that she is not concerned, however her daughter and a friend of hers have expressed this to her.  They state that she has trouble remembering conversations and other simple things.  She says that she feels that it has been worse since she retired and that is because she does not need to remember as many things.  She says she has no reason to remember the date.  She denies a family history of dementia.  At her visit last year she scored a 26 out of 30 on the MoCA.    History of Present Illness     Review of Systems    I have reviewed and confirmed the accuracy of the patient's history: Chief complaint, HPI, ROS, Subjective, and Past Family Social History as entered by the MA/BRIGETTEN/RN. Antonietta Larios MA 11/07/24      Objective     Physical Examination:  General Appearance:  Well developed, well nourished, well groomed, alert, and cooperative.  HEENT: Normocephalic.    Neck and Spine: Normal range of motion.  Normal alignment. No mass or tenderness. No bruits.  Cardiac: Regular rate and rhythm. No murmurs.  Peripheral Vasculature: Radial and pedal pulses are equal and symmetric.   Extremities: No edema or deformities. Normal joint ROM.  Skin: No rashes or birth marks.      Neurological examination:   Higher Integrative Function: Oriented to time, place, and person. Normal registration, recall attention span and concentration. Normal language including comprehension, spontaneous speech, repetition, reading, writing, naming and vocabulary. No neglect with normal visual-spatial function and  construction. Normal fund of knowledge and higher integrative function.   CN II: Pupils are equal, round and reactive to light. Normal visual acuity and visual fields.   CN III IV VI: Extraocular movements are full without nystagmus.   CN V: Normal facial sensation and strength of muscles of mastication.   CN VII: Facial movements are symmetric. No weakness.   CN VIII: Auditory acuity is normal.  CN IX & X: Symmetric palatal movement.   CN XI: Sternocleidomastoid and trapezius are normal. No weakness.   CN XII: The tongue is midline. No atrophy or fasciculations.  Motor: Normal muscle strength, bulk and tone in upper and lower extremities. No fasciculations, rigidity, spasticity, or abnormal movements.   Reflexes: 2+ in the upper and lower extremities. Plantar responses are flexor.   Sensation: Normal light touch, pinprick, vibration, temperature, and proprioception in the arms and legs.   Station and Gait: Normal gait and station.   Coordination: Finger to nose test shows no dysmetria. Rapid alternating movements are normal. Heel to shin is normal.           Assessment & Plan   Diagnoses and all orders for this visit:    1. Memory change (Primary)    2. Glossopharyngeal neuralgia    Glossopharyngeal neuralgia remains well-controlled on oxcarbazepine 150 mg 3 times a day.  We completed another [place] today and she scored a 28 out of 30, improved from last year's 26 out of 30.  Given that her daughter and friend continue to express concerns I did recommend moving forward with neuropsychology testing.  She is in agreement with this.  Referral placed today.  We will follow-up after testing has been completed to discuss results.  She is to follow-up in 1 year for the glossopharyngeal neuralgia.    I spent 40 minutes caring for patient on todays date of service. This time includes time spent by me in the following activities: obtaining and/or reviewing a separately obtained history, performing a medically appropriate  examination and/or evaluation, counseling and educating the patient on diagnosis and treatment, ordering medications, tests, or procedures, referring and communicating with other health care professionals and documenting information in the medical record.

## 2024-11-22 DIAGNOSIS — R73.03 PREDIABETES: ICD-10-CM

## 2024-11-22 DIAGNOSIS — I10 PRIMARY HYPERTENSION: ICD-10-CM

## 2024-11-22 DIAGNOSIS — M85.80 OSTEOPENIA, UNSPECIFIED LOCATION: ICD-10-CM

## 2024-11-22 DIAGNOSIS — E07.9 DISORDER OF THYROID: ICD-10-CM

## 2024-11-22 DIAGNOSIS — M25.552 LEFT HIP PAIN: ICD-10-CM

## 2024-11-22 DIAGNOSIS — E55.9 VITAMIN D DEFICIENCY: ICD-10-CM

## 2024-11-22 DIAGNOSIS — Z13.820 OSTEOPOROSIS SCREENING: ICD-10-CM

## 2024-11-22 DIAGNOSIS — N18.31 STAGE 3A CHRONIC KIDNEY DISEASE: ICD-10-CM

## 2024-11-22 DIAGNOSIS — I10 ESSENTIAL HYPERTENSION, BENIGN: ICD-10-CM

## 2024-11-22 DIAGNOSIS — E03.9 ACQUIRED HYPOTHYROIDISM: ICD-10-CM

## 2024-11-22 RX ORDER — OXYBUTYNIN CHLORIDE 5 MG/1
5 TABLET ORAL 2 TIMES DAILY
Qty: 180 TABLET | Refills: 0 | Status: SHIPPED | OUTPATIENT
Start: 2024-11-22

## 2024-12-02 ENCOUNTER — CLINICAL SUPPORT (OUTPATIENT)
Dept: CARDIOLOGY | Facility: CLINIC | Age: 79
End: 2024-12-02
Payer: MEDICARE

## 2024-12-02 ENCOUNTER — ANTICOAGULATION VISIT (OUTPATIENT)
Dept: PHARMACY | Facility: HOSPITAL | Age: 79
End: 2024-12-02
Payer: MEDICARE

## 2024-12-02 DIAGNOSIS — I50.32 CHRONIC DIASTOLIC CONGESTIVE HEART FAILURE: ICD-10-CM

## 2024-12-02 DIAGNOSIS — Z95.2 HISTORY OF AORTIC VALVE REPLACEMENT: Primary | ICD-10-CM

## 2024-12-02 LAB — INR PPP: 2.7 (ref 0.9–1.1)

## 2024-12-02 PROCEDURE — 36416 COLLJ CAPILLARY BLOOD SPEC: CPT | Performed by: INTERNAL MEDICINE

## 2024-12-02 PROCEDURE — 85610 PROTHROMBIN TIME: CPT | Performed by: INTERNAL MEDICINE

## 2024-12-02 NOTE — PROGRESS NOTES
Capillary Blood Specimen Collection  Capillary blood collection performed in clinic by Augusta Salazar MA. Patient tolerated the procedure well without complications.   12/02/24   Augusta Salazar MA

## 2024-12-02 NOTE — PROGRESS NOTES
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     Diet: 2-3x week: 1 cup of brussels sprouts or broccoli weekly, green beans, peas  (3/11/23)  Premier Protein (or Equate brand) meal replacement ~2x/week with occasional 3rd 5/28/24  Alcohol: Seldom  Tobacco: None  OTC Pain Medication: APAP      INR History:  Date 4/5 4/19 4/26 5/5 5/11 6/2 6/15 6/18 6/25 7/2 7/9 7/19 7/23 7/30   Total Weekly Dose 15mg 15mg 14mg 14mg 14mg 14mg 14mg 14mg 14mg 14mg 14mg 14mg 14mg 14mg   INR 2.6 3.9 3.9 2.7 3.0 3.6 2.7 2.9 2.9 2.6 2.9 3.1 2.5 2.3   Notes           decr GLV   recv'd 6/21; [facility]       incr GLV decr GLV      Date 8/6 8/13 8/20 8/27 9/3 9/10 9/17 9/24 10/1 10/8 10/15 10/22 10/29 11/5   Total WeeklyDose 14mg 15mg 15mg 14mg 14mg 15mg 14mg 14mg 14mg 14mg 14mg 14mg 15mg 16mg   INR 2.4 3.4 3.8 2.9 2.2 3.2 2.9 3.3 3.2 3.3 3.0 2.4 2.4 2.4   Notes decr GLV decr GLV       1xboost         call call 1x boost   incr VitK call 2x boost; call      Date 11/10 11/17 11/24 12/1 12/8 12/15 12/23 12/30 1/3/22 1/7 1/11 1/18 1/25 2/1   Total WeeklyDose 17mg 16mg 16mg 16mg 15mg 15 mg Pt did not call back 15mg 12 mg 13mg 15mg 15 mg 15 mg 15 mg   INR 3.7 3.3 3.9 4.0 2.6 3.4 4.0 4.9 3.6 2.4 3.3 2.8 2.7 2.9   Notes Dec GLV   Zero GLV call Red x1 call Less GLV   call   Less  Protein drink redx1  self held x1 (misdose)   Dec vit K fall, apap  Call   call          Date 2/8 2/15 2/22 3/1 3/8 3/15 3/22 3/29 4/5 4/12 4/19 4/26 5/3 5/11   Total WeeklyDose 15mg 14mg 14 mg 15 mg 15 mg 15 mg 14mg 13 mg 14 mg 14 mg 14mg 13mg 15mg 14 mg   INR 4.0 4.0 2.8 2.9 2.9 4.2  3.9 3.3 2.9 3.0 3.8 2.4 3.8 2.5   Notes Call; Dec GLV call Call; Mis-dose call   Call; no GLV Inc GLV. Less protein, apap  redx1 call   Less GLV rec'd 4/27, call Dec GLV        Date 5/18 5/25 6/1 6/8 6/15 6/22 6/29 7/6 7/13 7/20 7/22 7/27  8/10  8/17   Total WeeklyDose 15 mg 15mg 16mg 15 mg 15 mg 15 mg 15 mg 15 mg 15mg 14 mg 15 mg 14 mg  14 mg  15 mg   INR 2.6 2.3 2.7 3.2 3.0 2.9 2.6 2.7 3.6 3.0 3.6 3.0  2.4  3.4   Notes   Inc GLV  call call call       call 7/14-- call call call  call  call  call      Date 8/25 8/31 9/7 9/12 9/19 9/26 10/3 10/10 10/17 10/24 10/31   Total WeeklyDose 14mg 13 mg 14 mg 17mg 15 mg 16mg 15mg 14 mg 13mg 17 mg 15mg   INR 4.3 2.8 1.7 2.9 2.1 3.4 3.8 2.4 1.8 3.7 4.5   Notes Dec GLV call Call refused enox; extra protein  Call; no protein drinks Call; less green tea, inc boostx1 Call; cranberries Redx1; call 1x miss Call  Less protein    1/1  Date 11/7 11/14 11/21 11/28 12/5 12/12 12/19 12/27 1/3 1/9 1/16/23 1/23 1/30/23   Total WeeklyDose 13 mg 14 mg 14 mg 14 mg 14 mg 14 mg 14mg 14 mg  13mg 13 mg 14 mg  14 mg 14 mg   INR 3.2 3.3 3.4 3.6 2.5 4.0 2.9 4.1 3.6 2.6 3.3 2.7 3.0   Notes  call redx1 One less protein shake   Inc GLV Decreased GLV W/o meloxicam  Tramadol,  meloxicam Tramadol,  meloxicam Tramadol,  meloxicam meloxicam     Date 2/7/23 2/13 2/20 2/27/23 3/6 3/13/23 3/23 3/27 4/3/23 4/10/23 4/17/23 4/24 5/1   Total WeeklyDose 14 mg 14 mg 14 mg 14 mg  14 mg 14 mg  14 mg 14 mg 15 mg  14mg 13 mg 15 mg 14 mg   INR 2.9 3.0 3.6 2.6 3 3.5 3.4 2.4 2.8 2.5 2.3 3.2  4.1 POCT   Notes Call  call rec 2/21  Call   call Call   call Call  call Missed dose 1x boost Call; missed protein drinks, less GLV     Date 5/2 5/8 5/15 5/22 5/30 6/5 6/19 7/10 7/24 7/31 8/7 8/14 8/21  8/28   Total WeeklyDose 12 mg 13 mg 14 mg 14 mg 14mg 14 mg 14 mg 14 mg 13 mg 13mg  12 mg 12 mg 14 mg 13 mg   INR 3.7 3.3 2.6 2.7 3.4 2.9 4.0 4.1 3.9 4.1 2.8 1.9 4.0  3.2   Notes rec 5/3  Call  Self-heldx1, boostx1    Stopping HM Less GLV   redx1 Ceftin   Rec'd 8/1 redx1 Inc GLV  Prednisone start Less GLV      Date 9/5 9/18 9/25 10/2 10/06 10/13 10/27 11/1 11/6 11/13 11/17 11/27   Total Weekly Dose 13mg 14 mg 12 mg  12 mg 11 mg 12 mg 12 mg 12 mg 11mg 11 mg 12 mg 12mg   INR 4.2 4.2 4.3  4.6 2.6 3.0 4.1 4.3 3.7 2.1 2.7 4.8   Notes redx1 clindamycin redx1 redx2 Red x2; inc GLV            Date 12/01 12/11 12/15 12/22 12/29 1/5/2024 1/12 1/26 2/9  2/26 3/1 3/11  3/18   Total Weekly Dose 11 mg 11 mg 11 mg 11 mg 11 mg 11 mg 11 mg 11 mg  12 mg   12 mg 12mg 11 mg  11 mg   INR 2.4 3.0 2.7 2.7 3.2 3.0 2.4 2.3 3.3  4.3 2.4 3.4 2.6   Notes  call    call    No GLV, apap   call     Date  4/1 4/8/24 4/15 4/22 4/29 5/3 5/6 5/13 5/20 5/28 6/3 6/10 6/17   Total Weekly  Dose 11 mg 12mg 11 mg 11 mg ???  Missed Sunday maybe more 12 mg 14 mg 14 mg 14 mg 14 mg 13 mg  12 mg 14 mg   INR 2.0 2.5 2.5 3.2 1.1 1.5 2.5 3.3 3.4 4.0 4.0 3.6 3.2   Notes Call  Missed x1 No contact  Boost x 1 No contact No contact  Lovenox  Boost x 2  Call Lovenox     call 1 x decrease  APAP  Call  APAP call     Date 6/24 7/1 7/8 7/22 8/5 8/12 9/4 9/11 9/25 12/2     Total WeeklyDose 12 mg 12 mg 13 mg 13mg 13 mg 12 mg 13 mg  14 mg 12 mg 12 mg     INR 2.5 2.2 3.0 3.6 2.7 2.8 2.3 3.0 3.2 2.7     Notes    Call Call    Call   Call       Phone Interview:  Tablet Strength: 2mg  Patient Contact Info: [phone] (Mobile) *preferred*  [email or web address]  Verbal Release Authorization signed on 4/7/21 -- may speak with Tammy Bai (friend: [phone]), Ismael Michele (brother: [phone])  Lab Contact Info: [facility] ([facility])  ** will call once monthly or if INR is out of range**   12/1/23 Scr: 77 ml/min    Patient Findings    Positives: Extra doses   Negatives: Signs/symptoms of thrombosis, Signs/symptoms of bleeding, Laboratory test error suspected, Change in health, Change in alcohol use, Change in activity, Upcoming invasive procedure, Emergency department visit, Upcoming dental procedure,  Missed doses, Change in medications, Change in diet/appetite, Hospital admission, Bruising, Other complaints   Comments: Patient has been taking 3mg on Wednesdays (tracker updated)    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 She has had it checked at the hospital previously and has been in range.    All other findings negative per patient         Plan:  1. INR therapeutic today at 2.7 (goal 2.5-3.5). Ms. Michele to continue warfarin 2 mg daily except 1 mg on Mon and 3 mg on Wednesday  until recheck.  2. Repeat INR 12/30/24  3. Lucie Michele understands the importance of calling the [facility] if she notices any s/sx of bleeding, stroke, or abnormal bruising, if any changes are made to her medications or medication doses (Rx, OTC, herbal), or if any upcoming procedures are scheduled. Lucie Michele will likewise let us know if any other changes, questions, or concerns arise regarding anticoagulation therapy. she understands the importance of seeking medical attention immediately if she experiences any falls, vehicle accidents, or abnormal bleeding or bruising. Lucie Michele voiced understanding of this information and confirms that she has the [facility]'s contact information. Otherwise, we will plan to contact the patient once monthly or if her INR is out of range.    Kathleen Arauz, PharmD  12/2/2024  13:14 EST

## 2024-12-12 RX ORDER — EZETIMIBE 10 MG/1
10 TABLET ORAL DAILY
Qty: 90 TABLET | Refills: 0 | Status: SHIPPED | OUTPATIENT
Start: 2024-12-12

## 2024-12-26 RX ORDER — ALENDRONATE SODIUM 70 MG/1
70 TABLET ORAL
Qty: 12 TABLET | Refills: 0 | Status: SHIPPED | OUTPATIENT
Start: 2024-12-26

## 2024-12-27 DIAGNOSIS — Z86.79 S/P ASCENDING AORTIC ANEURYSM REPAIR: Primary | ICD-10-CM

## 2024-12-27 DIAGNOSIS — Z98.890 S/P ASCENDING AORTIC ANEURYSM REPAIR: Primary | ICD-10-CM

## 2024-12-30 LAB — INR PPP: 4.3

## 2024-12-31 DIAGNOSIS — E03.9 HYPOTHYROIDISM, UNSPECIFIED TYPE: ICD-10-CM

## 2024-12-31 RX ORDER — LEVOTHYROXINE SODIUM 125 UG/1
125 TABLET ORAL DAILY
Qty: 90 TABLET | Refills: 0 | Status: SHIPPED | OUTPATIENT
Start: 2024-12-31

## 2025-01-01 ENCOUNTER — READMISSION MANAGEMENT (OUTPATIENT)
Dept: CALL CENTER | Facility: HOSPITAL | Age: 80
End: 2025-01-01
Payer: MEDICARE

## 2025-01-01 NOTE — OUTREACH NOTE
Prep Survey      Flowsheet Row Responses   [facility]   Is LACE score < 7 ? Non-BH Discharge   Eligibility [facility]   Date of Discharge 12/31/24   Discharge Disposition Home or Self Care   Discharge diagnosis CHOLELITHIASIS   Does the patient have one of the following disease processes/diagnoses(primary or secondary)? Other   Prep survey completed? Yes            Lashaun CRAMER - Registered Nurse

## 2025-01-02 ENCOUNTER — TRANSITIONAL CARE MANAGEMENT TELEPHONE ENCOUNTER (OUTPATIENT)
Dept: CALL CENTER | Facility: HOSPITAL | Age: 80
End: 2025-01-02
Payer: MEDICARE

## 2025-01-02 NOTE — OUTREACH NOTE
Call Center TCM Note      Flowsheet Row Responses   [facility] patient discharged from? Non-  [place]   Does the patient have one of the following disease processes/diagnoses(primary or secondary)? Other   TCM attempt successful? Yes   Call start time 1034   Call end time 1039   Discharge diagnosis CHOLELITHIASIS   Person spoke with today (if not patient) and relationship patient   Meds reviewed with patient/caregiver? Yes   Is the patient having any side effects they believe may be caused by any medication additions or changes? No   Does the patient have all medications ordered at discharge? Yes   Is the patient taking all medications as directed (includes completed medication regime)? Yes   Comments Patient was released from [facility]. She had to be seen back in the ER since discharge for a fall. She has a followup today with ortho at 2 pm (Dr Montesinos, orthopaedics) and also a followup on 1/16 th with Dr Arreguin for esphageal Ulcers.   Does the patient have an appointment with their PCP within 7-14 days of discharge? Other   Nursing Interventions Patient desires to follow up with specialty only   Psychosocial issues? No   Did the patient receive a copy of their discharge instructions? Yes   Nursing interventions Reviewed instructions with patient   What is the patient's perception of their health status since discharge? Improving   Is the patient/caregiver able to teach back signs and symptoms related to disease process for when to call PCP? Yes   Is the patient/caregiver able to teach back signs and symptoms related to disease process for when to call 911? Yes   Is the patient/caregiver able to teach back the hierarchy of who to call/visit for symptoms/problems? PCP, Specialist, Home health nurse, Urgent Care, ED, 911 Yes   TCM call completed? Yes   Wrap up additional comments No needs at this time.   Call end time 1039   Would this patient benefit from a Referral to [facility] Social Work? No   Is the patient interested  in additional calls from an ambulatory ? No            Patrick Mills RN    1/2/2025, 10:39 EST

## 2025-01-03 ENCOUNTER — TELEPHONE (OUTPATIENT)
Dept: CARDIOLOGY | Facility: CLINIC | Age: 80
End: 2025-01-03
Payer: MEDICARE

## 2025-01-03 NOTE — TELEPHONE ENCOUNTER
Caller: Lucie Michele    Relationship: Self    Best call back number: [phone]    What is the best time to reach you: ANY    Who are you requesting to speak with (clinical staff, provider,  specific staff member): CLINICAL      What was the call regarding: PATIENT ADVISING SHE WAS IN THE [facility] 12.26.2024-12.31.2024 FOR ESOPHAGEAL ULCERS. CAN YOU PLEASE LOOK UP INR RESULTS FROM THAT VISIT AND SEND IT TO THE CLINIC. PLEASE CALL THE PATIENT TO ADVISE.     Is it okay if the provider responds through MyChart: NO

## 2025-01-06 ENCOUNTER — ANTICOAGULATION VISIT (OUTPATIENT)
Dept: PHARMACY | Facility: HOSPITAL | Age: 80
End: 2025-01-06
Payer: MEDICARE

## 2025-01-06 NOTE — PROGRESS NOTES
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     Diet: 2-3x week: 1 cup of brussels sprouts or broccoli weekly, green beans, peas  (3/11/23)  Premier Protein (or Equate brand) meal replacement ~2x/week with occasional 3rd 5/28/24  Alcohol: Seldom  Tobacco: None  OTC Pain Medication: APAP      INR History:  Date 4/5 4/19 4/26 5/5 5/11 6/2 6/15 6/18 6/25 7/2 7/9 7/19 7/23 7/30   Total Weekly Dose 15mg 15mg 14mg 14mg 14mg 14mg 14mg 14mg 14mg 14mg 14mg 14mg 14mg 14mg   INR 2.6 3.9 3.9 2.7 3.0 3.6 2.7 2.9 2.9 2.6 2.9 3.1 2.5 2.3   Notes           decr GLV   recv'd 6/21; [facility]       incr GLV decr GLV      Date 8/6 8/13 8/20 8/27 9/3 9/10 9/17 9/24 10/1 10/8 10/15 10/22 10/29 11/5   Total WeeklyDose 14mg 15mg 15mg 14mg 14mg 15mg 14mg 14mg 14mg 14mg 14mg 14mg 15mg 16mg   INR 2.4 3.4 3.8 2.9 2.2 3.2 2.9 3.3 3.2 3.3 3.0 2.4 2.4 2.4   Notes decr GLV decr GLV       1xboost         call call 1x boost   incr VitK call 2x boost; call      Date 11/10 11/17 11/24 12/1 12/8 12/15 12/23 12/30 1/3/22 1/7 1/11 1/18 1/25 2/1   Total WeeklyDose 17mg 16mg 16mg 16mg 15mg 15 mg Pt did not call back 15mg 12 mg 13mg 15mg 15 mg 15 mg 15 mg   INR 3.7 3.3 3.9 4.0 2.6 3.4 4.0 4.9 3.6 2.4 3.3 2.8 2.7 2.9   Notes Dec GLV   Zero GLV call Red x1 call Less GLV   call   Less  Protein drink redx1  self held x1 (misdose)   Dec vit K fall, apap  Call   call          Date 2/8 2/15 2/22 3/1 3/8 3/15 3/22 3/29 4/5 4/12 4/19 4/26 5/3 5/11   Total WeeklyDose 15mg 14mg 14 mg 15 mg 15 mg 15 mg 14mg 13 mg 14 mg 14 mg 14mg 13mg 15mg 14 mg   INR 4.0 4.0 2.8 2.9 2.9 4.2  3.9 3.3 2.9 3.0 3.8 2.4 3.8 2.5   Notes Call; Dec GLV call Call; Mis-dose call   Call; no GLV Inc GLV. Less protein, apap  redx1 call   Less GLV rec'd 4/27, call Dec GLV        Date 5/18 5/25 6/1 6/8 6/15 6/22 6/29 7/6 7/13 7/20 7/22 7/27  8/10  8/17   Total WeeklyDose 15 mg 15mg 16mg 15 mg 15 mg 15 mg 15 mg 15 mg 15mg 14 mg 15 mg 14 mg  14 mg  15 mg   INR 2.6 2.3 2.7 3.2 3.0 2.9 2.6 2.7 3.6 3.0 3.6 3.0  2.4  3.4   Notes   Inc GLV  call call call       call 7/14-- call call call  call  call  call      Date 8/25 8/31 9/7 9/12 9/19 9/26 10/3 10/10 10/17 10/24 10/31   Total WeeklyDose 14mg 13 mg 14 mg 17mg 15 mg 16mg 15mg 14 mg 13mg 17 mg 15mg   INR 4.3 2.8 1.7 2.9 2.1 3.4 3.8 2.4 1.8 3.7 4.5   Notes Dec GLV call Call refused enox; extra protein  Call; no protein drinks Call; less green tea, inc boostx1 Call; cranberries Redx1; call 1x miss Call  Less protein    1/1  Date 11/7 11/14 11/21 11/28 12/5 12/12 12/19 12/27 1/3 1/9 1/16/23 1/23 1/30/23   Total WeeklyDose 13 mg 14 mg 14 mg 14 mg 14 mg 14 mg 14mg 14 mg  13mg 13 mg 14 mg  14 mg 14 mg   INR 3.2 3.3 3.4 3.6 2.5 4.0 2.9 4.1 3.6 2.6 3.3 2.7 3.0   Notes  call redx1 One less protein shake   Inc GLV Decreased GLV W/o meloxicam  Tramadol,  meloxicam Tramadol,  meloxicam Tramadol,  meloxicam meloxicam     Date 2/7/23 2/13 2/20 2/27/23 3/6 3/13/23 3/23 3/27 4/3/23 4/10/23 4/17/23 4/24 5/1   Total WeeklyDose 14 mg 14 mg 14 mg 14 mg  14 mg 14 mg  14 mg 14 mg 15 mg  14mg 13 mg 15 mg 14 mg   INR 2.9 3.0 3.6 2.6 3 3.5 3.4 2.4 2.8 2.5 2.3 3.2  4.1 POCT   Notes Call  call rec 2/21  Call   call Call   call Call  call Missed dose 1x boost Call; missed protein drinks, less GLV     Date 5/2 5/8 5/15 5/22 5/30 6/5 6/19 7/10 7/24 7/31 8/7 8/14 8/21  8/28   Total WeeklyDose 12 mg 13 mg 14 mg 14 mg 14mg 14 mg 14 mg 14 mg 13 mg 13mg  12 mg 12 mg 14 mg 13 mg   INR 3.7 3.3 2.6 2.7 3.4 2.9 4.0 4.1 3.9 4.1 2.8 1.9 4.0  3.2   Notes rec 5/3  Call  Self-heldx1, boostx1    Stopping HM Less GLV   redx1 Ceftin   Rec'd 8/1 redx1 Inc GLV  Prednisone start Less GLV      Date 9/5 9/18 9/25 10/2 10/06 10/13 10/27 11/1 11/6 11/13 11/17 11/27   Total Weekly Dose 13mg 14 mg 12 mg  12 mg 11 mg 12 mg 12 mg 12 mg 11mg 11 mg 12 mg 12mg   INR 4.2 4.2 4.3  4.6 2.6 3.0 4.1 4.3 3.7 2.1 2.7 4.8   Notes redx1 clindamycin redx1 redx2 Red x2; inc GLV            Date 12/01 12/11 12/15 12/22 12/29 1/5/2024 1/12 1/26 2/9  2/26 3/1 3/11  3/18   Total Weekly Dose 11 mg 11 mg 11 mg 11 mg 11 mg 11 mg 11 mg 11 mg  12 mg   12 mg 12mg 11 mg  11 mg   INR 2.4 3.0 2.7 2.7 3.2 3.0 2.4 2.3 3.3  4.3 2.4 3.4 2.6   Notes  call    call    No GLV, apap   call     Date  4/1 4/8/24 4/15 4/22 4/29 5/3 5/6 5/13 5/20 5/28 6/3 6/10 6/17   Total Weekly  Dose 11 mg 12mg 11 mg 11 mg ???  Missed Sunday maybe more 12 mg 14 mg 14 mg 14 mg 14 mg 13 mg  12 mg 14 mg   INR 2.0 2.5 2.5 3.2 1.1 1.5 2.5 3.3 3.4 4.0 4.0 3.6 3.2   Notes Call  Missed x1 No contact  Boost x 1 No contact No contact  Lovenox  Boost x 2  Call Lovenox     call 1 x decrease  APAP  Call  APAP call     Date 6/24 7/1 7/8 7/22 8/5 8/12 9/4 9/11 9/25 12/2 12/30    Total WeeklyDose 12 mg 12 mg 13 mg 13mg 13 mg 12 mg 13 mg  14 mg 12 mg 12 mg 14 mg    INR 2.5 2.2 3.0 3.6 2.7 2.8 2.3 3.0 3.2 2.7 4.3    Notes    Call Call    Call   Call Rec'd 1/6      Phone Interview:  Tablet Strength: 2mg  Patient Contact Info: [phone] (Mobile) *preferred*  [email or web address]  Verbal Release Authorization signed on 4/7/21 -- may speak with Tammy Bai (friend: [phone]), Ismael Michele (brother: [phone])  Lab Contact Info: [facility] ([facility])  ** will call once monthly or if INR is out of range**   12/1/23 Scr: 77 ml/min    Patient Findings    Positives: Hospital admission   Negatives: Signs/symptoms of thrombosis, Signs/symptoms of bleeding, Laboratory test error suspected, Change in health, Change in alcohol use, Change in activity, Upcoming invasive procedure, Emergency department visit,  Upcoming dental procedure, Missed doses, Extra doses, Change in medications, Change in diet/appetite, Bruising, Other complaints   Comments: Patient was at [facility] 12/26-12/31 for esophageal ulcers  Patient had a fall last week and twisted her knee, currently on crutches    All other findings negative per patient           Plan:  1. INR supratherapeutic 12/30 at 4.3 (goal 2.5-3.5). Ms. Michele to take warfarin 2 mg daily except 1 mg on Monday  until recheck.  2. Repeat INR 1/15/25  3. Lucie Michele understands the importance of calling the [facility] if she notices any s/sx of bleeding, stroke, or abnormal bruising, if any changes are made to her medications or medication doses (Rx, OTC, herbal), or if any upcoming procedures are scheduled. Lucie Michele will likewise let us know if any other changes, questions, or concerns arise regarding anticoagulation therapy. she understands the importance of seeking medical attention immediately if she experiences any falls, vehicle accidents, or abnormal bleeding or bruising. Lucie Michele voiced understanding of this information and confirms that she has the [facility]'s contact information. Otherwise, we will plan to contact the patient once monthly or if her INR is out of range.    Kathleen Arauz, PharmD  1/6/2025  11:37 EST

## 2025-01-13 ENCOUNTER — ANTICOAGULATION VISIT (OUTPATIENT)
Dept: PHARMACY | Facility: HOSPITAL | Age: 80
End: 2025-01-13
Payer: MEDICARE

## 2025-01-13 ENCOUNTER — CLINICAL SUPPORT (OUTPATIENT)
Dept: CARDIOLOGY | Facility: CLINIC | Age: 80
End: 2025-01-13
Payer: MEDICARE

## 2025-01-13 DIAGNOSIS — Z95.2 HISTORY OF AORTIC VALVE REPLACEMENT: Primary | ICD-10-CM

## 2025-01-13 DIAGNOSIS — I50.32 CHRONIC DIASTOLIC CONGESTIVE HEART FAILURE: ICD-10-CM

## 2025-01-13 DIAGNOSIS — I48.92 PAROXYSMAL ATRIAL FLUTTER: ICD-10-CM

## 2025-01-13 LAB — INR PPP: 5.5 (ref 0.9–1.1)

## 2025-01-13 PROCEDURE — 36416 COLLJ CAPILLARY BLOOD SPEC: CPT | Performed by: INTERNAL MEDICINE

## 2025-01-13 PROCEDURE — 36415 COLL VENOUS BLD VENIPUNCTURE: CPT | Performed by: INTERNAL MEDICINE

## 2025-01-13 PROCEDURE — 85610 PROTHROMBIN TIME: CPT | Performed by: INTERNAL MEDICINE

## 2025-01-13 NOTE — PROGRESS NOTES
Per anticoag clinic, protime INR drawn     Venipuncture Blood Specimen Collection  Venipuncture performed in clinic by Rosa Elena De La Rosa RN with good hemostasis. Patient tolerated the procedure well without complications.   01/13/25   Rosa Elena De La Rosa RN

## 2025-01-13 NOTE — PROGRESS NOTES
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     Diet: 2-3x week: 1 cup of brussels sprouts or broccoli weekly, green beans, peas  (3/11/23)  Premier Protein (or Equate brand) meal replacement ~2x/week with occasional 3rd 5/28/24  Alcohol: Seldom  Tobacco: None  OTC Pain Medication: APAP      INR History:  Date 4/5 4/19 4/26 5/5 5/11 6/2 6/15 6/18 6/25 7/2 7/9 7/19 7/23 7/30   Total Weekly Dose 15mg 15mg 14mg 14mg 14mg 14mg 14mg 14mg 14mg 14mg 14mg 14mg 14mg 14mg   INR 2.6 3.9 3.9 2.7 3.0 3.6 2.7 2.9 2.9 2.6 2.9 3.1 2.5 2.3   Notes           decr GLV   recv'd 6/21; [facility]       incr GLV decr GLV      Date 8/6 8/13 8/20 8/27 9/3 9/10 9/17 9/24 10/1 10/8 10/15 10/22 10/29 11/5   Total WeeklyDose 14mg 15mg 15mg 14mg 14mg 15mg 14mg 14mg 14mg 14mg 14mg 14mg 15mg 16mg   INR 2.4 3.4 3.8 2.9 2.2 3.2 2.9 3.3 3.2 3.3 3.0 2.4 2.4 2.4   Notes decr GLV decr GLV       1xboost         call call 1x boost   incr VitK call 2x boost; call      Date 11/10 11/17 11/24 12/1 12/8 12/15 12/23 12/30 1/3/22 1/7 1/11 1/18 1/25 2/1   Total WeeklyDose 17mg 16mg 16mg 16mg 15mg 15 mg Pt did not call back 15mg 12 mg 13mg 15mg 15 mg 15 mg 15 mg   INR 3.7 3.3 3.9 4.0 2.6 3.4 4.0 4.9 3.6 2.4 3.3 2.8 2.7 2.9   Notes Dec GLV   Zero GLV call Red x1 call Less GLV   call   Less  Protein drink redx1  self held x1 (misdose)   Dec vit K fall, apap  Call   call          Date 2/8 2/15 2/22 3/1 3/8 3/15 3/22 3/29 4/5 4/12 4/19 4/26 5/3 5/11   Total WeeklyDose 15mg 14mg 14 mg 15 mg 15 mg 15 mg 14mg 13 mg 14 mg 14 mg 14mg 13mg 15mg 14 mg   INR 4.0 4.0 2.8 2.9 2.9 4.2 3.9  3.3 2.9 3.0 3.8 2.4 3.8 2.5   Notes Call; Dec GLV call Call; Mis-dose call   Call; no GLV Inc GLV. Less protein, apap  redx1 call   Less GLV rec'd 4/27, call Dec GLV        Date 5/18 5/25 6/1 6/8 6/15 6/22 6/29 7/6 7/13 7/20 7/22 7/27  8/10  8/17   Total WeeklyDose 15 mg 15mg 16mg 15 mg 15 mg 15 mg 15 mg 15 mg 15mg 14 mg 15 mg 14 mg  14 mg  15 mg   INR 2.6 2.3 2.7 3.2 3.0 2.9 2.6 2.7 3.6 3.0 3.6 3.0  2.4  3.4   Notes   Inc GLV  call call call       call 7/14-- call call call  call  call  call      Date 8/25 8/31 9/7 9/12 9/19 9/26 10/3 10/10 10/17 10/24 10/31   Total WeeklyDose 14mg 13 mg 14 mg 17mg 15 mg 16mg 15mg 14 mg 13mg 17 mg 15mg   INR 4.3 2.8 1.7 2.9 2.1 3.4 3.8 2.4 1.8 3.7 4.5   Notes Dec GLV call Call refused enox; extra protein  Call; no protein drinks Call; less green tea, inc boostx1 Call; cranberries Redx1; call 1x miss Call  Less protein    1/1  Date 11/7 11/14 11/21 11/28 12/5 12/12 12/19 12/27 1/3 1/9 1/16/23 1/23 1/30/23   Total WeeklyDose 13 mg 14 mg 14 mg 14 mg 14 mg 14 mg 14mg 14 mg  13mg 13 mg 14 mg  14 mg 14 mg   INR 3.2 3.3 3.4 3.6 2.5 4.0 2.9 4.1 3.6 2.6 3.3 2.7 3.0   Notes  call redx1 One less protein shake   Inc GLV Decreased GLV W/o meloxicam  Tramadol,  meloxicam Tramadol,  meloxicam Tramadol,  meloxicam meloxicam     Date 2/7/23 2/13 2/20 2/27/23 3/6 3/13/23 3/23 3/27 4/3/23 4/10/23 4/17/23 4/24 5/1   Total WeeklyDose 14 mg 14 mg 14 mg 14 mg  14 mg 14 mg  14 mg 14 mg 15 mg  14mg 13 mg 15 mg 14 mg   INR 2.9 3.0 3.6 2.6 3 3.5 3.4 2.4 2.8 2.5 2.3 3.2  4.1 POCT   Notes Call  call rec 2/21  Call   call Call   call Call  call Missed dose 1x boost Call; missed protein drinks, less GLV     Date 5/2 5/8 5/15 5/22 5/30 6/5 6/19 7/10 7/24 7/31 8/7 8/14 8/21  8/28   Total WeeklyDose 12 mg 13 mg 14 mg 14 mg 14mg 14 mg 14 mg 14 mg 13 mg 13mg  12 mg 12 mg 14 mg 13 mg   INR 3.7 3.3 2.6 2.7 3.4 2.9 4.0 4.1 3.9 4.1 2.8 1.9 4.0  3.2   Notes rec 5/3  Call  Self-heldx1, boostx1    Stopping HM Less GLV   88 y.o male w/ pmhx a.fib, diastolic CHF (last echo noted 4/2015: LV 60%, severe pulm. htn, mod. AS), HTN presenting w/ c/o of CP, SOB admitted for hypoxic respiratory failure and acute on chronic diastolic HF exacerbation most likely in setting of HTN urgency and worsening renal function, TRAVIS on CKD. redx1 Ceftin   Rec'd 8/1 redx1 Inc GLV  Prednisone start Less GLV      Date 9/5 9/18 9/25 10/2 10/06 10/13 10/27 11/1 11/6 11/13 11/17 11/27   Total Weekly Dose 13mg 14 mg 12 mg  12 mg 11 mg 12 mg 12 mg 12 mg 11mg 11 mg 12 mg 12mg   INR 4.2 4.2 4.3  4.6 2.6 3.0 4.1 4.3 3.7 2.1 2.7 4.8   Notes redx1 clindamycin redx1 redx2 Red x2; inc GLV            Date 12/01 12/11 12/15 12/22 12/29 1/5/2024 1/12 1/26 2/9  2/26 3/1 3/11  3/18   Total Weekly Dose 11 mg 11 mg 11 mg 11 mg 11 mg 11 mg 11 mg 11 mg  12 mg   12 mg 12mg 11 mg  11 mg   INR 2.4 3.0 2.7 2.7 3.2 3.0 2.4 2.3 3.3  4.3 2.4 3.4 2.6   Notes  call    call    No GLV, apap   call     Date  4/1 4/8/24 4/15 4/22 4/29 5/3 5/6 5/13 5/20 5/28 6/3 6/10 6/17   Total Weekly  Dose 11 mg 12mg 11 mg 11 mg ???  Missed Sunday maybe more 12 mg 14 mg 14 mg 14 mg 14 mg 13 mg  12 mg 14 mg   INR 2.0 2.5 2.5 3.2 1.1 1.5 2.5 3.3 3.4 4.0 4.0 3.6 3.2   Notes Call  Missed x1 No contact  Boost x 1 No contact No contact  Lovenox  Boost x 2, Call Lovenox     call 1 x decrease  APAP;  Call  APAP call     Date 6/24 7/1 7/8 7/22 8/5 8/12 9/4 9/11 9/25 12/2 12/26-31 12/30/24 1/13/25   Total WeeklyDose 12 mg 12 mg 13 mg 13mg 13 mg 12 mg 13 mg  14 mg 12 mg 12 mg [facility] 14 mg 13 mg   INR 2.5 2.2 3.0 3.6 2.7 2.8 2.3 3.0 3.2 2.7  4.3 5.5POCT  Xx emil   Notes    Call Call  Call   Call Esoph ulcers Rec'd 1/6 redx1     Date                 Total Weekly  Dose                INR                Notes                  Phone Interview:  Tablet Strength: 2mg  Patient Contact Info: [phone] (Mobile) *preferred*  [email or web address]  Verbal Release Authorization signed on 4/7/21 -- may speak with Tammy Bai (friend: [phone]), Ismael Michele (brother: [phone])  Lab Contact Info: [facility] ([facility])  ** will call once monthly or if INR is out of range**   12/1/23 Scr: 77 ml/min    Patient Findings  Positives: Change in health, Change in medications, Change in diet/appetite   Negatives:  Signs/symptoms of thrombosis, Signs/symptoms of bleeding, Laboratory test error suspected, Change in alcohol use, Change in activity, Upcoming invasive procedure, Emergency department visit, Upcoming dental procedure, Missed doses, Extra doses, Hospital admission, Bruising, Other complaints   Comments: Patient was at [facility] 12/26-12/31 for esophageal ulcers  She has been taking acetaminophen and norco (sparingly) for pain s/p fall. Reviewed potential DDI.   She remains on crutches.  Her appetite has changed and she is eating quite a bit of soup and soft food s/p hospital admission.  She was started on a couple new medications but she cannot recall names.  She will report tomorrow when she is contacted regarding venipuncture results from today.    Patient had INR readings >4.5.  [facility] recommended for patient to have a lab draw to confirm the readings; she is still at the MD office and will have drawn there.  The results will be available tomorrow.  Patient was educated about the variability of the test strip readings at values >4.5 and was also educated to monitor for any signs excessive bleeding (including in the urine, stool, emesis, etc.).   She was advised to go to the ED if falls and hits head for CT scan of head.      Otherwise, above findings negative     Plan:    1. INR is supra therapeutic today at 5.5 (goal 2.5-3.5). Instructed Ms. Michele to HOLD warfarin today.  She will have venipuncture INR lab draw today; results will be back on 1/14 for correlation of INR and evalution of warfarin dosing needs.   Regimen had been relatively stable prior to recent hospitalization at warfarin 2 mg oral daily except 1 mg Mondays and 3 mg Wednesdays   2. Repeat INR 1/13/25 via venipuncture.  3. Verbal information provided over the phone. Patient RBV dosing instructions, expresses understanding by teach back, and has no further questions at this time.  4. Lucie Michele understands  the importance of calling the [facility] if she notices any s/sx of bleeding, stroke, or abnormal bruising, if any changes are made to her medications or medication doses (Rx, OTC, herbal), or if any upcoming procedures are scheduled. Lucie RODRIGUEZ Ryne will likewise let us know if any other changes, questions, or concerns arise regarding anticoagulation therapy. she understands the importance of seeking medical attention immediately if she experiences any falls, vehicle accidents, or abnormal bleeding or bruising. Lucie JENNIFER Michele voiced understanding of this information and confirms that she has the [facility]'s contact information. Otherwise, we will plan to contact the patient once monthly or if her INR is out of range.    Jesenia Garcia, PharmD  1/13/2025  10:30 EST

## 2025-01-13 NOTE — PROGRESS NOTES
Capillary Blood Specimen Collection  Capillary blood collection performed in clinic by Augusta Salazar MA. Patient tolerated the procedure well without complications.   01/13/25   Augusta Salazar MA

## 2025-01-14 ENCOUNTER — ANTICOAGULATION VISIT (OUTPATIENT)
Dept: PHARMACY | Facility: HOSPITAL | Age: 80
End: 2025-01-14
Payer: MEDICARE

## 2025-01-14 LAB
INR PPP: 4.5 (ref 0.9–1.2)
PROTHROMBIN TIME: 44.3 SEC (ref 9.1–12)

## 2025-01-14 NOTE — PROGRESS NOTES
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     Diet: 2-3x week: 1 cup of brussels sprouts or broccoli weekly, green beans, peas  (3/11/23)  Premier Protein (or Equate brand) meal replacement ~2x/week with occasional 3rd 5/28/24  Alcohol: Seldom  Tobacco: None  OTC Pain Medication: APAP      INR History:  Date 4/5 4/19 4/26 5/5 5/11 6/2 6/15 6/18 6/25 7/2 7/9 7/19 7/23 7/30   Total Weekly Dose 15mg 15mg 14mg 14mg 14mg 14mg 14mg 14mg 14mg 14mg 14mg 14mg 14mg 14mg   INR 2.6 3.9 3.9 2.7 3.0 3.6 2.7 2.9 2.9 2.6 2.9 3.1 2.5 2.3   Notes           decr GLV   recv'd 6/21; [facility]       incr GLV decr GLV      Date 8/6 8/13 8/20 8/27 9/3 9/10 9/17 9/24 10/1 10/8 10/15 10/22 10/29 11/5   Total WeeklyDose 14mg 15mg 15mg 14mg 14mg 15mg 14mg 14mg 14mg 14mg 14mg 14mg 15mg 16mg   INR 2.4 3.4 3.8 2.9 2.2 3.2 2.9 3.3 3.2 3.3 3.0 2.4 2.4 2.4   Notes decr GLV decr GLV       1xboost         call call 1x boost   incr VitK call 2x boost; call      Date 11/10 11/17 11/24 12/1 12/8 12/15 12/23 12/30 1/3/22 1/7 1/11 1/18 1/25 2/1   Total WeeklyDose 17mg 16mg 16mg 16mg 15mg 15 mg Pt did not call back 15mg 12 mg 13mg 15mg 15 mg 15 mg 15 mg   INR 3.7 3.3 3.9 4.0 2.6 3.4 4.0 4.9 3.6 2.4 3.3 2.8 2.7 2.9   Notes Dec GLV   Zero GLV call Red x1 call Less GLV   call   Less  Protein drink redx1  self held x1 (misdose)   Dec vit K fall, apap  Call   call          Date 2/8 2/15 2/22 3/1 3/8 3/15 3/22 3/29 4/5 4/12 4/19 4/26 5/3 5/11   Total WeeklyDose 15mg 14mg 14 mg 15 mg 15 mg 15 mg 14mg 13 mg 14 mg 14 mg 14mg 13mg 15mg 14 mg   INR 4.0 4.0 2.8 2.9 2.9 4.2 3.9  3.3 2.9 3.0 3.8 2.4 3.8 2.5   Notes Call; Dec GLV call Call; Mis-dose call   Call; no GLV Inc GLV. Less protein, apap  redx1 call   Less GLV rec'd 4/27, call Dec GLV        Date 5/18 5/25 6/1 6/8 6/15 6/22 6/29 7/6 7/13 7/20 7/22 7/27  8/10  8/17   Total WeeklyDose 15 mg 15mg 16mg 15 mg 15 mg 15 mg 15 mg 15 mg 15mg 14 mg 15 mg 14 mg  14 mg  15 mg   INR 2.6 2.3 2.7 3.2 3.0 2.9 2.6 2.7 3.6 3.0 3.6 3.0  2.4  3.4   Notes   Inc GLV  call call call       call 7/14-- call call call  call  call  call      Date 8/25 8/31 9/7 9/12 9/19 9/26 10/3 10/10 10/17 10/24 10/31   Total WeeklyDose 14mg 13 mg 14 mg 17mg 15 mg 16mg 15mg 14 mg 13mg 17 mg 15mg   INR 4.3 2.8 1.7 2.9 2.1 3.4 3.8 2.4 1.8 3.7 4.5   Notes Dec GLV call Call refused enox; extra protein  Call; no protein drinks Call; less green tea, inc boostx1 Call; cranberries Redx1; call 1x miss Call  Less protein    1/1  Date 11/7 11/14 11/21 11/28 12/5 12/12 12/19 12/27 1/3 1/9 1/16/23 1/23 1/30/23   Total WeeklyDose 13 mg 14 mg 14 mg 14 mg 14 mg 14 mg 14mg 14 mg  13mg 13 mg 14 mg  14 mg 14 mg   INR 3.2 3.3 3.4 3.6 2.5 4.0 2.9 4.1 3.6 2.6 3.3 2.7 3.0   Notes  call redx1 One less protein shake   Inc GLV Decreased GLV W/o meloxicam  Tramadol,  meloxicam Tramadol,  meloxicam Tramadol,  meloxicam meloxicam     Date 2/7/23 2/13 2/20 2/27/23 3/6 3/13/23 3/23 3/27 4/3/23 4/10/23 4/17/23 4/24 5/1   Total WeeklyDose 14 mg 14 mg 14 mg 14 mg  14 mg 14 mg  14 mg 14 mg 15 mg  14mg 13 mg 15 mg 14 mg   INR 2.9 3.0 3.6 2.6 3 3.5 3.4 2.4 2.8 2.5 2.3 3.2  4.1 POCT   Notes Call  call rec 2/21  Call   call Call   call Call  call Missed dose 1x boost Call; missed protein drinks, less GLV     Date 5/2 5/8 5/15 5/22 5/30 6/5 6/19 7/10 7/24 7/31 8/7 8/14 8/21  8/28   Total WeeklyDose 12 mg 13 mg 14 mg 14 mg 14mg 14 mg 14 mg 14 mg 13 mg 13mg  12 mg 12 mg 14 mg 13 mg   INR 3.7 3.3 2.6 2.7 3.4 2.9 4.0 4.1 3.9 4.1 2.8 1.9 4.0  3.2   Notes rec 5/3  Call  Self-heldx1, boostx1    Stopping HM Less GLV   redx1 Ceftin   Rec'd 8/1 redx1 Inc GLV  Prednisone start Less GLV      Date 9/5 9/18 9/25 10/2 10/06 10/13 10/27 11/1 11/6 11/13 11/17 11/27   Total Weekly Dose 13mg 14 mg 12 mg  12 mg 11 mg 12 mg 12 mg 12 mg 11mg 11 mg 12 mg 12mg   INR 4.2 4.2 4.3  4.6 2.6 3.0 4.1 4.3 3.7 2.1 2.7 4.8   Notes redx1 clindamycin redx1 redx2 Red x2; inc GLV            Date 12/01 12/11 12/15 12/22 12/29 1/5/2024 1/12 1/26 2/9  2/26 3/1 3/11  3/18   Total Weekly Dose 11 mg 11 mg 11 mg 11 mg 11 mg 11 mg 11 mg 11 mg  12 mg   12 mg 12mg 11 mg  11 mg   INR 2.4 3.0 2.7 2.7 3.2 3.0 2.4 2.3 3.3  4.3 2.4 3.4 2.6   Notes  call    call    No GLV, apap   call     Date  4/1 4/8/24 4/15 4/22 4/29 5/3 5/6 5/13 5/20 5/28 6/3 6/10 6/17   Total Weekly  Dose 11 mg 12mg 11 mg 11 mg ???  Missed Sunday maybe more 12 mg 14 mg 14 mg 14 mg 14 mg 13 mg  12 mg 14 mg   INR 2.0 2.5 2.5 3.2 1.1 1.5 2.5 3.3 3.4 4.0 4.0 3.6 3.2   Notes Call  Missed x1 No contact  Boost x 1 No contact No contact  Lovenox  Boost x 2, Call Lovenox     call 1 x decrease  APAP;  Call  APAP call     Date 6/24 7/1 7/8 7/22 8/5 8/12 9/4 9/11 9/25 12/2 12/26-31 12/30/24 1/13/25   Total WeeklyDose 12 mg 12 mg 13 mg 13mg 13 mg 12 mg 13 mg  14 mg 12 mg 12 mg [facility] 14 mg 13 mg   INR 2.5 2.2 3.0 3.6 2.7 2.8 2.3 3.0 3.2 2.7  4.3 5.5POCT  xx emil   Notes    Call Call  Call   Call Esoph ulcers Rec'd 1/6 redx1     Date  1/13               Total Weekly  Dose 13 mg               INR 4.5 emil               Notes                  Phone Interview:  Tablet Strength: 2mg  Patient Contact Info: [phone] (Mobile) *preferred*  [email or web address]  Verbal Release Authorization signed on 4/7/21 -- may speak with Tammy Bai (friend: [phone]), Ismael Michele (brother: [phone])  Lab Contact Info: [facility] ([facility])  ** will call once monthly or if INR is out of range**   12/1/23 Scr: 77 ml/min    Patient Findings  Positives: Change in health, Change in medications, Change in diet/appetite    Negatives: Signs/symptoms of thrombosis, Signs/symptoms of bleeding, Laboratory test error suspected, Change in alcohol use, Change in activity, Upcoming invasive procedure, Emergency department visit, Upcoming dental procedure, Missed doses, Extra doses, Hospital admission, Bruising, Other complaints   Comments: Patient was at [facility] 12/26-12/31 for esophageal ulcers  She has been taking acetaminophen and norco (sparingly) for pain s/p fall. Reviewed potential DDI.   She remains on crutches.  Her appetite has changed and she is eating quite a bit of soup and soft food s/p hospital admission.  She was started on a couple new medications but she cannot recall names.  She will report tomorrow when she is contacted regarding venipuncture results from today.    Patient had INR readings >4.5.  [facility] recommended for patient to have a lab draw to confirm the readings; she is still at the MD office and will have drawn there.  The results will be available tomorrow.  Patient was educated about the variability of the test strip readings at values >4.5 and was also educated to monitor for any signs excessive bleeding (including in the urine, stool, emesis, etc.).   She was advised to go to the ED if falls and hits head for CT scan of head.      Otherwise, above findings negative     Plan:    1. INR is supra therapeutic yesterday at 4.5 via venipuncture  (goal 2.5-3.5). Result received today. Ms Michele held yesterdays dose as instructed. Instructed Ms. Michele to take warfarin 2 mg daily until recheck. TWD decreased ~10 % from 13 to 12 mg    Regimen had been relatively stable prior to recent hospitalization at warfarin 2 mg oral daily except 1 mg Mondays and 3 mg Wednesdays   2. Repeat INR 1/20/25   3. Verbal information provided over the phone. Patient RBV dosing instructions, expresses understanding by teach back, and has no further questions at this time.  4. Lucie Michele  understands the importance of calling the [facility] if she notices any s/sx of bleeding, stroke, or abnormal bruising, if any changes are made to her medications or medication doses (Rx, OTC, herbal), or if any upcoming procedures are scheduled. Lucie RODRIGUEZ Ryne will likewise let us know if any other changes, questions, or concerns arise regarding anticoagulation therapy. she understands the importance of seeking medical attention immediately if she experiences any falls, vehicle accidents, or abnormal bleeding or bruising. Lucie JENNIFER Michele voiced understanding of this information and confirms that she has the [facility]'s contact information. Otherwise, we will plan to contact the patient once monthly or if her INR is out of range.    Kathleen Arauz, PharmD  1/14/2025  09:17 EST

## 2025-01-20 ENCOUNTER — ANTICOAGULATION VISIT (OUTPATIENT)
Dept: PHARMACY | Facility: HOSPITAL | Age: 80
End: 2025-01-20
Payer: MEDICARE

## 2025-01-20 ENCOUNTER — CLINICAL SUPPORT (OUTPATIENT)
Dept: CARDIOLOGY | Facility: CLINIC | Age: 80
End: 2025-01-20
Payer: MEDICARE

## 2025-01-20 DIAGNOSIS — Z95.2 HISTORY OF AORTIC VALVE REPLACEMENT: Primary | ICD-10-CM

## 2025-01-20 LAB — INR PPP: 4.8 (ref 0.9–1.1)

## 2025-01-20 PROCEDURE — 85610 PROTHROMBIN TIME: CPT | Performed by: INTERNAL MEDICINE

## 2025-01-20 PROCEDURE — 36416 COLLJ CAPILLARY BLOOD SPEC: CPT | Performed by: INTERNAL MEDICINE

## 2025-01-20 NOTE — PROGRESS NOTES
Capillary Blood Specimen Collection  Capillary blood collection performed in clinic by Augusta Salazar MA. Patient tolerated the procedure well without complications.   01/20/25   Augusta Salazar MA

## 2025-01-20 NOTE — PROGRESS NOTES
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     Diet: 2-3x week: 1 cup of brussels sprouts or broccoli weekly, green beans, peas  (3/11/23)  Premier Protein (or Equate brand) meal replacement ~2x/week with occasional 3rd 5/28/24  Alcohol: Seldom  Tobacco: None  OTC Pain Medication: APAP      INR History:  Date 4/5 4/19 4/26 5/5 5/11 6/2 6/15 6/18 6/25 7/2 7/9 7/19 7/23 7/30   Total Weekly Dose 15mg 15mg 14mg 14mg 14mg 14mg 14mg 14mg 14mg 14mg 14mg 14mg 14mg 14mg   INR 2.6 3.9 3.9 2.7 3.0 3.6 2.7 2.9 2.9 2.6 2.9 3.1 2.5 2.3   Notes           decr GLV   recv'd 6/21; [facility]       incr GLV decr GLV      Date 8/6 8/13 8/20 8/27 9/3 9/10 9/17 9/24 10/1 10/8 10/15 10/22 10/29 11/5   Total WeeklyDose 14mg 15mg 15mg 14mg 14mg 15mg 14mg 14mg 14mg 14mg 14mg 14mg 15mg 16mg   INR 2.4 3.4 3.8 2.9 2.2 3.2 2.9 3.3 3.2 3.3 3.0 2.4 2.4 2.4   Notes decr GLV decr GLV       1xboost         call call 1x boost   incr VitK call 2x boost; call      Date 11/10 11/17 11/24 12/1 12/8 12/15 12/23 12/30 1/3/22 1/7 1/11 1/18 1/25 2/1   Total WeeklyDose 17mg 16mg 16mg 16mg 15mg 15 mg Pt did not call back 15mg 12 mg 13mg 15mg 15 mg 15 mg 15 mg   INR 3.7 3.3 3.9 4.0 2.6 3.4 4.0 4.9 3.6 2.4 3.3 2.8 2.7 2.9   Notes Dec GLV   Zero GLV call Red x1 call Less GLV   call   Less  Protein drink redx1  self held x1 (misdose)   Dec vit K fall, apap  Call   call          Date 2/8 2/15 2/22 3/1 3/8 3/15 3/22 3/29 4/5 4/12 4/19 4/26 5/3 5/11   Total WeeklyDose 15mg 14mg 14 mg 15 mg 15 mg 15 mg 14mg 13 mg 14 mg 14 mg 14mg 13mg 15mg 14 mg   INR 4.0 4.0 2.8 2.9 2.9 4.2 3.9  3.3 2.9 3.0 3.8 2.4 3.8 2.5   Notes Call; Dec GLV call Call; Mis-dose call   Call; no GLV Inc GLV. Less protein, apap  redx1 call   Less GLV rec'd 4/27, call Dec GLV        Date 5/18 5/25 6/1 6/8 6/15 6/22 6/29 7/6 7/13 7/20 7/22 7/27  8/10  8/17   Total WeeklyDose 15 mg 15mg 16mg 15 mg 15 mg 15 mg 15 mg 15 mg 15mg 14 mg 15 mg 14 mg  14 mg  15 mg   INR 2.6 2.3 2.7 3.2 3.0 2.9 2.6 2.7 3.6 3.0 3.6 3.0  2.4  3.4   Notes   Inc GLV  call call call       call 7/14-- call call call  call  call  call      Date 8/25 8/31 9/7 9/12 9/19 9/26 10/3 10/10 10/17 10/24 10/31   Total WeeklyDose 14mg 13 mg 14 mg 17mg 15 mg 16mg 15mg 14 mg 13mg 17 mg 15mg   INR 4.3 2.8 1.7 2.9 2.1 3.4 3.8 2.4 1.8 3.7 4.5   Notes Dec GLV call Call refused enox; extra protein  Call; no protein drinks Call; less green tea, inc boostx1 Call; cranberries Redx1; call 1x miss Call  Less protein    1/1  Date 11/7 11/14 11/21 11/28 12/5 12/12 12/19 12/27 1/3 1/9 1/16/23 1/23 1/30/23   Total WeeklyDose 13 mg 14 mg 14 mg 14 mg 14 mg 14 mg 14mg 14 mg  13mg 13 mg 14 mg  14 mg 14 mg   INR 3.2 3.3 3.4 3.6 2.5 4.0 2.9 4.1 3.6 2.6 3.3 2.7 3.0   Notes  call redx1 One less protein shake   Inc GLV Decreased GLV W/o meloxicam  Tramadol,  meloxicam Tramadol,  meloxicam Tramadol,  meloxicam meloxicam     Date 2/7/23 2/13 2/20 2/27/23 3/6 3/13/23 3/23 3/27 4/3/23 4/10/23 4/17/23 4/24 5/1   Total WeeklyDose 14 mg 14 mg 14 mg 14 mg  14 mg 14 mg  14 mg 14 mg 15 mg  14mg 13 mg 15 mg 14 mg   INR 2.9 3.0 3.6 2.6 3 3.5 3.4 2.4 2.8 2.5 2.3 3.2  4.1 POCT   Notes Call  call rec 2/21  Call   call Call   call Call  call Missed dose 1x boost Call; missed protein drinks, less GLV     Date 5/2 5/8 5/15 5/22 5/30 6/5 6/19 7/10 7/24 7/31 8/7 8/14 8/21  8/28   Total WeeklyDose 12 mg 13 mg 14 mg 14 mg 14mg 14 mg 14 mg 14 mg 13 mg 13mg  12 mg 12 mg 14 mg 13 mg   INR 3.7 3.3 2.6 2.7 3.4 2.9 4.0 4.1 3.9 4.1 2.8 1.9 4.0  3.2   Notes rec 5/3  Call  Self-heldx1, boostx1    Stopping HM Less GLV   redx1 Ceftin   Rec'd 8/1 redx1 Inc GLV  Prednisone start Less GLV      Date 9/5 9/18 9/25 10/2 10/06 10/13 10/27 11/1 11/6 11/13 11/17 11/27   Total Weekly Dose 13mg 14 mg 12 mg  12 mg 11 mg 12 mg 12 mg 12 mg 11mg 11 mg 12 mg 12mg   INR 4.2 4.2 4.3  4.6 2.6 3.0 4.1 4.3 3.7 2.1 2.7 4.8   Notes redx1 clindamycin redx1 redx2 Red x2; inc GLV            Date 12/01 12/11 12/15 12/22 12/29 1/5/2024 1/12 1/26 2/9  2/26 3/1 3/11  3/18   Total Weekly Dose 11 mg 11 mg 11 mg 11 mg 11 mg 11 mg 11 mg 11 mg  12 mg   12 mg 12mg 11 mg  11 mg   INR 2.4 3.0 2.7 2.7 3.2 3.0 2.4 2.3 3.3  4.3 2.4 3.4 2.6   Notes  call    call    No GLV, apap   call     Date  4/1 4/8/24 4/15 4/22 4/29 5/3 5/6 5/13 5/20 5/28 6/3 6/10 6/17   Total Weekly  Dose 11 mg 12mg 11 mg 11 mg ???  Missed Sunday maybe more 12 mg 14 mg 14 mg 14 mg 14 mg 13 mg  12 mg 14 mg   INR 2.0 2.5 2.5 3.2 1.1 1.5 2.5 3.3 3.4 4.0 4.0 3.6 3.2   Notes Call  Missed x1 No contact  Boost x 1 No contact No contact  Lovenox  Boost x 2, Call Lovenox     call 1 x decrease  APAP;  Call  APAP call     Date 6/24 7/1 7/8 7/22 8/5 8/12 9/4 9/11 9/25 12/2 12/26-31 12/30/24 1/13/25   Total WeeklyDose 12 mg 12 mg 13 mg 13mg 13 mg 12 mg 13 mg  14 mg 12 mg 12 mg [facility] 14 mg 13 mg   INR 2.5 2.2 3.0 3.6 2.7 2.8 2.3 3.0 3.2 2.7  4.3 5.5POCT  xx emil   Notes    Call Call  Call   Call Esoph ulcers Rec'd 1/6 redx1     Date  1/13 1/20 1/27             Total Weekly  Dose 13 mg 12 mg              INR 4.5 emil 4.8              Notes redx1 redx1 redx1               Phone Interview:  Tablet Strength: 2mg  Patient Contact Info: [phone] (Mobile) *preferred*  [email or web address]  Verbal Release Authorization signed on 4/7/21 -- may speak with Tammy Bai (friend: [phone]), Ismael Michele (brother: [phone])  Lab Contact Info: [facility] ([facility])  ** will call once monthly or if INR is out of range**   12/1/23 Scr: 77 ml/min      Patient Findings    Positives: Change in diet/appetite,  Bruising   Negatives: Signs/symptoms of thrombosis, Signs/symptoms of bleeding, Laboratory test error suspected, Change in health, Change in alcohol use, Change in activity, Upcoming invasive procedure, Emergency department visit, Upcoming dental procedure, Missed doses, Extra doses, Change in medications, Hospital admission, Other complaints   Comments: Bruising from previous fall taking longer to heal but nothing new  Pot pie with green beans and broccoli    All other findings negative per patient       Plan:    1. INR is supra therapeutic today at 4.8   (goal 2.5-3.5).  Instructed Ms. Michele to HOLD today's dose and take warfarin 1 mg tomorrow and Wednesday then take warfarin 2 mg daily until recheck. TWD decreased ~15 % from 12 to 10 mg    Regimen had been relatively stable prior to recent hospitalization at warfarin 2 mg oral daily except 1 mg Mondays and 3 mg Wednesdays   2. Repeat INR 1/27/25   3. Verbal information provided over the phone. Patient RBV dosing instructions, expresses understanding by teach back, and has no further questions at this time.  4. Lucie Michele understands the importance of calling the [facility] if she notices any s/sx of bleeding, stroke, or abnormal bruising, if any changes are made to her medications or medication doses (Rx, OTC, herbal), or if any upcoming procedures are scheduled. Lucie Michele will likewise let us know if any other changes, questions, or concerns arise regarding anticoagulation therapy. she understands the importance of seeking medical attention immediately if she experiences any falls, vehicle accidents, or abnormal bleeding or bruising. Lucie Michele voiced understanding of this information and confirms that she has the [facility]'s contact information. Otherwise, we will plan to contact the patient once monthly or if her INR is out of range.    Kathleen Arauz, PharmD  1/20/2025  13:01 EST

## 2025-01-27 ENCOUNTER — CLINICAL SUPPORT (OUTPATIENT)
Dept: CARDIOLOGY | Facility: CLINIC | Age: 80
End: 2025-01-27
Payer: MEDICARE

## 2025-01-27 ENCOUNTER — ANTICOAGULATION VISIT (OUTPATIENT)
Dept: PHARMACY | Facility: HOSPITAL | Age: 80
End: 2025-01-27
Payer: MEDICARE

## 2025-01-27 DIAGNOSIS — Z95.2 HISTORY OF AORTIC VALVE REPLACEMENT: Primary | ICD-10-CM

## 2025-01-27 LAB — INR PPP: 2.4 (ref 0.9–1.1)

## 2025-01-27 PROCEDURE — 85610 PROTHROMBIN TIME: CPT | Performed by: INTERNAL MEDICINE

## 2025-01-27 PROCEDURE — 36416 COLLJ CAPILLARY BLOOD SPEC: CPT | Performed by: INTERNAL MEDICINE

## 2025-01-27 NOTE — PROGRESS NOTES
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     Diet: 2-3x week: 1 cup of brussels sprouts or broccoli weekly, green beans, peas  (01/27/2025)  Premier Protein (or Equate brand) meal replacement daily  Alcohol: Seldom  Tobacco: None  OTC Pain Medication: APAP      INR History:  Date 4/5 4/19 4/26 5/5 5/11 6/2 6/15 6/18 6/25 7/2 7/9 7/19 7/23 7/30   Total Weekly Dose 15mg 15mg 14mg 14mg 14mg 14mg 14mg 14mg 14mg 14mg 14mg 14mg 14mg 14mg   INR 2.6 3.9 3.9 2.7 3.0 3.6 2.7 2.9 2.9 2.6 2.9 3.1 2.5 2.3   Notes           decr GLV   recv'd 6/21; [facility]        incr GLV decr GLV      Date 8/6 8/13 8/20 8/27 9/3 9/10 9/17 9/24 10/1 10/8 10/15 10/22 10/29 11/5   Total WeeklyDose 14mg 15mg 15mg 14mg 14mg 15mg 14mg 14mg 14mg 14mg 14mg 14mg 15mg 16mg   INR 2.4 3.4 3.8 2.9 2.2 3.2 2.9 3.3 3.2 3.3 3.0 2.4 2.4 2.4   Notes decr GLV decr GLV       1xboost         call call 1x boost   incr VitK call 2x boost; call      Date 11/10 11/17 11/24 12/1 12/8 12/15 12/23 12/30 1/3/22 1/7 1/11 1/18 1/25 2/1   Total WeeklyDose 17mg 16mg 16mg 16mg 15mg 15 mg Pt did not call back 15mg 12 mg 13mg 15mg 15 mg 15 mg 15 mg   INR 3.7 3.3 3.9 4.0 2.6 3.4 4.0 4.9 3.6 2.4 3.3 2.8 2.7 2.9   Notes Dec GLV   Zero GLV call Red x1 call Less GLV   call   Less  Protein drink redx1  self held x1 (misdose)   Dec vit K fall, apap  Call   call          Date 2/8 2/15 2/22 3/1 3/8 3/15 3/22 3/29 4/5 4/12 4/19 4/26 5/3 5/11   Total WeeklyDose 15mg 14mg 14 mg 15 mg 15 mg 15 mg 14mg 13 mg 14 mg 14 mg 14mg 13mg 15mg 14 mg   INR 4.0 4.0 2.8 2.9 2.9 4.2 3.9 3.3 2.9 3.0 3.8 2.4 3.8 2.5    Notes Call; Dec GLV call Call; Mis-dose call   Call; no GLV Inc GLV. Less protein, apap  redx1 call   Less GLV rec'd 4/27, call Dec GLV        Date 5/18 5/25 6/1 6/8 6/15 6/22 6/29 7/6 7/13 7/20 7/22 7/27  8/10  8/17   Total WeeklyDose 15 mg 15mg 16mg 15 mg 15 mg 15 mg 15 mg 15 mg 15mg 14 mg 15 mg 14 mg  14 mg  15 mg   INR 2.6 2.3 2.7 3.2 3.0 2.9 2.6 2.7 3.6 3.0 3.6 3.0  2.4  3.4   Notes   Inc GLV  call call call       call 7/14-- call call call  call  call  call      Date 8/25 8/31 9/7 9/12 9/19 9/26 10/3 10/10 10/17 10/24 10/31   Total WeeklyDose 14mg 13 mg 14 mg 17mg 15 mg 16mg 15mg 14 mg 13mg 17 mg 15mg   INR 4.3 2.8 1.7 2.9 2.1 3.4 3.8 2.4 1.8 3.7 4.5   Notes Dec GLV call Call refused enox; extra protein  Call; no protein drinks Call; less green tea, inc boostx1 Call; cranberries Redx1; call 1x miss Call  Less protein    1/1  Date 11/7 11/14 11/21 11/28 12/5 12/12 12/19 12/27 1/3 1/9 1/16/23 1/23 1/30/23   Total WeeklyDose 13 mg 14 mg 14 mg 14 mg 14 mg 14 mg 14mg 14 mg  13mg 13 mg 14 mg  14 mg 14 mg   INR 3.2 3.3 3.4 3.6 2.5 4.0 2.9 4.1 3.6 2.6 3.3 2.7 3.0   Notes  call redx1 One less protein shake   Inc GLV Decreased GLV W/o meloxicam  Tramadol,  meloxicam Tramadol,  meloxicam Tramadol,  meloxicam meloxicam     Date 2/7/23 2/13 2/20 2/27/23 3/6 3/13/23 3/23 3/27 4/3/23 4/10/23 4/17/23 4/24 5/1   Total WeeklyDose 14 mg 14 mg 14 mg 14 mg  14 mg 14 mg  14 mg 14 mg 15 mg  14mg 13 mg 15 mg 14 mg   INR 2.9 3.0 3.6 2.6 3 3.5 3.4 2.4 2.8 2.5 2.3 3.2  4.1 POCT   Notes Call  call rec 2/21  Call   call Call   call Call  call Missed dose 1x boost Call; missed protein drinks, less GLV     Date 5/2 5/8 5/15 5/22 5/30 6/5 6/19 7/10 7/24 7/31 8/7 8/14 8/21  8/28   Total WeeklyDose 12 mg 13 mg 14 mg 14 mg 14mg 14 mg 14 mg 14 mg 13 mg 13mg  12 mg 12 mg 14 mg 13 mg   INR 3.7 3.3 2.6 2.7 3.4 2.9 4.0 4.1 3.9 4.1 2.8 1.9 4.0  3.2   Notes rec 5/3  Call  Self-heldx1, boostx1    Stopping HM Less GLV  redx1 Ceftin   Rec'd 8/1 redx1  Inc GLV  Prednisone start Less GLV      Date 9/5 9/18 9/25 10/2 10/06 10/13 10/27 11/1 11/6 11/13 11/17 11/27   Total Weekly Dose 13mg 14 mg 12 mg  12 mg 11 mg 12 mg 12 mg 12 mg 11mg 11 mg 12 mg 12mg   INR 4.2 4.2 4.3  4.6 2.6 3.0 4.1 4.3 3.7 2.1 2.7 4.8   Notes redx1 clindamycin redx1 redx2 Red x2; inc GLV            Date 12/01 12/11 12/15 12/22 12/29 1/5/2024 1/12 1/26 2/9  2/26 3/1 3/11  3/18   Total Weekly Dose 11 mg 11 mg 11 mg 11 mg 11 mg 11 mg 11 mg 11 mg  12 mg   12 mg 12mg 11 mg  11 mg   INR 2.4 3.0 2.7 2.7 3.2 3.0 2.4 2.3 3.3  4.3 2.4 3.4 2.6   Notes  call    call    No GLV, apap   call     Date  4/1 4/8/24 4/15 4/22 4/29 5/3 5/6 5/13 5/20 5/28 6/3 6/10 6/17   Total Weekly  Dose 11 mg 12mg 11 mg 11 mg ???  Missed Sunday maybe more 12 mg 14 mg 14 mg 14 mg 14 mg 13 mg  12 mg 14 mg   INR 2.0 2.5 2.5 3.2 1.1 1.5 2.5 3.3 3.4 4.0 4.0 3.6 3.2   Notes Call  Missed x1 No contact  Boost x 1 No contact No contact  Lovenox  Boost x 2, Call Lovenox     call 1 x decrease  APAP;  Call  APAP call     Date 6/24 7/1 7/8 7/22 8/5 8/12 9/4 9/11 9/25 12/2 12/26-31 12/30/24 1/13/25   Total WeeklyDose 12 mg 12 mg 13 mg 13mg 13 mg 12 mg 13 mg  14 mg 12 mg 12 mg [facility] 14 mg 13 mg   INR 2.5 2.2 3.0 3.6 2.7 2.8 2.3 3.0 3.2 2.7  4.3 5.5POCT  xx emil   Notes    Call Call  Call   Call Esoph ulcers Rec'd 1/6 redx1     Date  1/13 1/20 1/27             Total Weekly  Dose 13 mg 12 mg 10mg             INR 4.5 emil 4.8              Notes redx1 redx1 redx1               Phone Interview:  Tablet Strength: 2mg  Patient Contact Info: [phone] (Mobile) *preferred*  [email or web address]  Verbal Release Authorization signed on 4/7/21 -- may speak with Tammy Bai (friend: [phone]), Ismael Michele (brother: [phone])  Lab Contact Info: [facility] ([facility])  ** will call once monthly or if INR is out of range**      Patient Findings    Negatives: Signs/symptoms of thrombosis, Signs/symptoms of bleeding, Laboratory test error  suspected, Change in health, Change in alcohol use, Change in activity, Upcoming invasive procedure, Emergency department visit, Upcoming dental procedure, Missed doses, Extra doses, Change in medications, Change in diet/appetite, Hospital admission, Bruising, Other complaints   Comments: Brusing are fading, one behind the right knee hasn't changed since she fell.         Plan:    1. INR is subtherapeutic today at 2.4   (goal 2.5-3.5).  Instructed Ms. Michele to take 2mg daily except 1mg on MWF  2. Repeat INR 2/3/2025   3. Verbal information provided over the phone. Patient RBV dosing instructions, expresses understanding by teach back, and has no further questions at this time.  4. Lucie Michele understands the importance of calling the [facility] if she notices any s/sx of bleeding, stroke, or abnormal bruising, if any changes are made to her medications or medication doses (Rx, OTC, herbal), or if any upcoming procedures are scheduled. Lucie Michele will likewise let us know if any other changes, questions, or concerns arise regarding anticoagulation therapy. she understands the importance of seeking medical attention immediately if she experiences any falls, vehicle accidents, or abnormal bleeding or bruising. Lucie Michele voiced understanding of this information and confirms that she has the [facility]'s contact information. Otherwise, we will plan to contact the patient once monthly or if her INR is out of range.    Ramana Bagley, PharmD  1/27/2025  11:49 EST

## 2025-01-27 NOTE — PROGRESS NOTES
Capillary Blood Specimen Collection  Capillary blood collection performed in clinic by Augusta Salazar MA. Patient tolerated the procedure well without complications.   01/27/25   Augusta Salazar MA

## 2025-02-03 ENCOUNTER — ANTICOAGULATION VISIT (OUTPATIENT)
Dept: PHARMACY | Facility: HOSPITAL | Age: 80
End: 2025-02-03
Payer: MEDICARE

## 2025-02-03 ENCOUNTER — CLINICAL SUPPORT (OUTPATIENT)
Dept: CARDIOLOGY | Facility: CLINIC | Age: 80
End: 2025-02-03
Payer: MEDICARE

## 2025-02-03 DIAGNOSIS — Z95.2 HISTORY OF AORTIC VALVE REPLACEMENT: Primary | ICD-10-CM

## 2025-02-03 LAB — INR PPP: 1.9 (ref 0.9–1.1)

## 2025-02-03 PROCEDURE — 85610 PROTHROMBIN TIME: CPT | Performed by: INTERNAL MEDICINE

## 2025-02-03 PROCEDURE — 36416 COLLJ CAPILLARY BLOOD SPEC: CPT | Performed by: INTERNAL MEDICINE

## 2025-02-03 NOTE — PROGRESS NOTES
Capillary Blood Specimen Collection  Capillary blood collection performed in clinic by Augusta Salazar MA. Patient tolerated the procedure well without complications.   02/03/25   Augusta Salazar MA

## 2025-02-03 NOTE — PROGRESS NOTES
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     Diet: 2-3x week: 1 cup of brussels sprouts or broccoli weekly, green beans, peas  (01/27/2025)  Premier Protein (or Equate brand) meal replacement daily  Alcohol: Seldom  Tobacco: None  OTC Pain Medication: APAP      INR History:  Date 4/5 4/19 4/26 5/5 5/11 6/2 6/15 6/18 6/25 7/2 7/9 7/19 7/23 7/30   Total Weekly Dose 15mg 15mg 14mg 14mg 14mg 14mg 14mg 14mg 14mg 14mg 14mg 14mg 14mg 14mg   INR 2.6 3.9 3.9 2.7 3.0 3.6 2.7 2.9 2.9 2.6 2.9 3.1 2.5 2.3   Notes           decr GLV   recv'd 6/21; [facility]        incr GLV decr GLV      Date 8/6 8/13 8/20 8/27 9/3 9/10 9/17 9/24 10/1 10/8 10/15 10/22 10/29 11/5   Total WeeklyDose 14mg 15mg 15mg 14mg 14mg 15mg 14mg 14mg 14mg 14mg 14mg 14mg 15mg 16mg   INR 2.4 3.4 3.8 2.9 2.2 3.2 2.9 3.3 3.2 3.3 3.0 2.4 2.4 2.4   Notes decr GLV decr GLV       1xboost         call call 1x boost   incr VitK call 2x boost; call      Date 11/10 11/17 11/24 12/1 12/8 12/15 12/23 12/30 1/3/22 1/7 1/11 1/18 1/25 2/1   Total WeeklyDose 17mg 16mg 16mg 16mg 15mg 15 mg Pt did not call back 15mg 12 mg 13mg 15mg 15 mg 15 mg 15 mg   INR 3.7 3.3 3.9 4.0 2.6 3.4 4.0 4.9 3.6 2.4 3.3 2.8 2.7 2.9   Notes Dec GLV   Zero GLV call Red x1 call Less GLV   call   Less  Protein drink redx1  self held x1 (misdose)   Dec vit K fall, apap  Call   call          Date 2/8 2/15 2/22 3/1 3/8 3/15 3/22 3/29 4/5 4/12 4/19 4/26 5/3 5/11   Total WeeklyDose 15mg 14mg 14 mg 15 mg 15 mg 15 mg 14mg 13 mg 14 mg 14 mg 14mg 13mg 15mg 14 mg   INR 4.0 4.0 2.8 2.9 2.9 4.2 3.9 3.3 2.9 3.0 3.8 2.4 3.8 2.5    Notes Call; Dec GLV call Call; Mis-dose call   Call; no GLV Inc GLV. Less protein, apap  redx1 call   Less GLV rec'd 4/27, call Dec GLV        Date 5/18 5/25 6/1 6/8 6/15 6/22 6/29 7/6 7/13 7/20 7/22 7/27  8/10  8/17   Total WeeklyDose 15 mg 15mg 16mg 15 mg 15 mg 15 mg 15 mg 15 mg 15mg 14 mg 15 mg 14 mg  14 mg  15 mg   INR 2.6 2.3 2.7 3.2 3.0 2.9 2.6 2.7 3.6 3.0 3.6 3.0  2.4  3.4   Notes   Inc GLV  call call call       call 7/14-- call call call  call  call  call      Date 8/25 8/31 9/7 9/12 9/19 9/26 10/3 10/10 10/17 10/24 10/31   Total WeeklyDose 14mg 13 mg 14 mg 17mg 15 mg 16mg 15mg 14 mg 13mg 17 mg 15mg   INR 4.3 2.8 1.7 2.9 2.1 3.4 3.8 2.4 1.8 3.7 4.5   Notes Dec GLV call Call refused enox; extra protein  Call; no protein drinks Call; less green tea, inc boostx1 Call; cranberries Redx1; call 1x miss Call  Less protein    1/1  Date 11/7 11/14 11/21 11/28 12/5 12/12 12/19 12/27 1/3 1/9 1/16/23 1/23 1/30/23   Total WeeklyDose 13 mg 14 mg 14 mg 14 mg 14 mg 14 mg 14mg 14 mg  13mg 13 mg 14 mg  14 mg 14 mg   INR 3.2 3.3 3.4 3.6 2.5 4.0 2.9 4.1 3.6 2.6 3.3 2.7 3.0   Notes  call redx1 One less protein shake   Inc GLV Decreased GLV W/o meloxicam  Tramadol,  meloxicam Tramadol,  meloxicam Tramadol,  meloxicam meloxicam     Date 2/7/23 2/13 2/20 2/27/23 3/6 3/13/23 3/23 3/27 4/3/23 4/10/23 4/17/23 4/24 5/1   Total WeeklyDose 14 mg 14 mg 14 mg 14 mg  14 mg 14 mg  14 mg 14 mg 15 mg  14mg 13 mg 15 mg 14 mg   INR 2.9 3.0 3.6 2.6 3 3.5 3.4 2.4 2.8 2.5 2.3 3.2  4.1 POCT   Notes Call  call rec 2/21  Call   call Call   call Call  call Missed dose 1x boost Call; missed protein drinks, less GLV     Date 5/2 5/8 5/15 5/22 5/30 6/5 6/19 7/10 7/24 7/31 8/7 8/14 8/21  8/28   Total WeeklyDose 12 mg 13 mg 14 mg 14 mg 14mg 14 mg 14 mg 14 mg 13 mg 13mg  12 mg 12 mg 14 mg 13 mg   INR 3.7 3.3 2.6 2.7 3.4 2.9 4.0 4.1 3.9 4.1 2.8 1.9 4.0  3.2   Notes rec 5/3  Call  Self-heldx1, boostx1    Stopping HM Less GLV  redx1 Ceftin   Rec'd 8/1 redx1  Inc GLV  Prednisone start Less GLV      Date 9/5 9/18 9/25 10/2 10/06 10/13 10/27 11/1 11/6 11/13 11/17 11/27   Total Weekly Dose 13mg 14 mg 12 mg  12 mg 11 mg 12 mg 12 mg 12 mg 11mg 11 mg 12 mg 12mg   INR 4.2 4.2 4.3  4.6 2.6 3.0 4.1 4.3 3.7 2.1 2.7 4.8   Notes redx1 clindamycin redx1 redx2 Red x2; inc GLV            Date 12/01 12/11 12/15 12/22 12/29 1/5/2024 1/12 1/26 2/9  2/26 3/1 3/11  3/18   Total Weekly Dose 11 mg 11 mg 11 mg 11 mg 11 mg 11 mg 11 mg 11 mg  12 mg   12 mg 12mg 11 mg  11 mg   INR 2.4 3.0 2.7 2.7 3.2 3.0 2.4 2.3 3.3  4.3 2.4 3.4 2.6   Notes  call    call    No GLV, apap   call     Date  4/1 4/8/24 4/15 4/22 4/29 5/3 5/6 5/13 5/20 5/28 6/3 6/10 6/17   Total Weekly  Dose 11 mg 12mg 11 mg 11 mg ???  Missed Sunday maybe more 12 mg 14 mg 14 mg 14 mg 14 mg 13 mg  12 mg 14 mg   INR 2.0 2.5 2.5 3.2 1.1 1.5 2.5 3.3 3.4 4.0 4.0 3.6 3.2   Notes Call  Missed x1 No contact  Boost x 1 No contact No contact  Lovenox  Boost x 2, Call Lovenox     call 1 x decrease  APAP;  Call  APAP call     Date 6/24 7/1 7/8 7/22 8/5 8/12 9/4 9/11 9/25 12/2 12/26-31 12/30/24 1/13/25   Total WeeklyDose 12 mg 12 mg 13 mg 13mg 13 mg 12 mg 13 mg  14 mg 12 mg 12 mg [facility] 14 mg 13 mg   INR 2.5 2.2 3.0 3.6 2.7 2.8 2.3 3.0 3.2 2.7  4.3 5.5POCT  xx emil   Notes    Call Call  Call   Call Esoph ulcers Rec'd 1/6 redx1     Date  1/13 1/20 1/27 2/3            Total Weekly  Dose 13 mg 12 mg 10mg 11 mg            INR 4.5 emil 4.8 2.4 1.9            Notes redx1 redx1 redx1               Phone Interview:  Tablet Strength: 2mg  Patient Contact Info: [phone] (Mobile) *preferred*  [email or web address]  Verbal Release Authorization signed on 4/7/21 -- may speak with Tammy Bai (friend: [phone]), Ismael Michele (brother: [phone])  Lab Contact Info: [facility] ([facility])  ** will call once monthly or if INR is out of range**      Patient Findings  Positives: Change in medications, Change in diet/appetite   Negatives:  "Signs/symptoms of thrombosis, Signs/symptoms of bleeding, Laboratory test error suspected, Change in health, Change in alcohol use, Change in activity, Upcoming invasive procedure, Emergency department visit, Upcoming dental procedure, Missed doses, Extra doses, Hospital admission, Bruising, Other complaints   Comments: Small serving of broccoli on Friday (less than 1/2 cup). Had pot pie (\"might have had 10 peas in it\") on Saturday night which should not have affected INR much either. Not taking any of the pain medications other than ordinary occasional APAP dose but was already doing this for quite a time prior to being in hospital recently when INR was elevated.       Plan:  1. INR is subtherapeutic today at 1.9 (goal 2.5-3.5).  Instructed Ms. Michele to take a boosted dose of warfarin 3 mg today and then take an increased dose of warfarin 2 mg daily except 1 mg on THU ONLY until recheck.  2. Repeat INR in one week, 2/10.  3. Verbal information provided over the phone. Patient RBV dosing instructions, expresses understanding by teach back, and has no further questions at this time.  4. Lucie Michele understands the importance of calling the [facility] if she notices any s/sx of bleeding, stroke, or abnormal bruising, if any changes are made to her medications or medication doses (Rx, OTC, herbal), or if any upcoming procedures are scheduled. Lucie Michele will likewise let us know if any other changes, questions, or concerns arise regarding anticoagulation therapy. she understands the importance of seeking medical attention immediately if she experiences any falls, vehicle accidents, or abnormal bleeding or bruising. Lucie Michele voiced understanding of this information and confirms that she has the [facility]'s contact information. Otherwise, we will plan to contact the patient once monthly or if her INR is out of range.    Tyrone Hensley, PharmD  2/3/2025  13:37 EST    "

## 2025-02-10 ENCOUNTER — CLINICAL SUPPORT (OUTPATIENT)
Dept: CARDIOLOGY | Facility: CLINIC | Age: 80
End: 2025-02-10
Payer: MEDICARE

## 2025-02-10 ENCOUNTER — ANTICOAGULATION VISIT (OUTPATIENT)
Dept: PHARMACY | Facility: HOSPITAL | Age: 80
End: 2025-02-10
Payer: MEDICARE

## 2025-02-10 DIAGNOSIS — Z95.2 HISTORY OF AORTIC VALVE REPLACEMENT: Primary | ICD-10-CM

## 2025-02-10 LAB — INR PPP: 2.7 (ref 0.9–1.1)

## 2025-02-10 NOTE — PROGRESS NOTES
Capillary Blood Specimen Collection  Capillary blood collection performed in clinic by Rosa Elena De La Rosa RN. Patient tolerated the procedure well without complications.   02/10/25   Rosa Elena De La Rosa RN

## 2025-02-10 NOTE — PROGRESS NOTES
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     Diet: 2-3x week: 1 cup of brussels sprouts or broccoli weekly, green beans, peas  (01/27/2025)  Premier Protein (or Equate brand) meal replacement daily  Alcohol: Seldom  Tobacco: None  OTC Pain Medication: APAP      INR History:  Date 4/5 4/19 4/26 5/5 5/11 6/2 6/15 6/18 6/25 7/2 7/9 7/19 7/23 7/30   Total Weekly Dose 15mg 15mg 14mg 14mg 14mg 14mg 14mg 14mg 14mg 14mg 14mg 14mg 14mg 14mg   INR 2.6 3.9 3.9 2.7 3.0 3.6 2.7 2.9 2.9 2.6 2.9 3.1 2.5 2.3   Notes           decr GLV   recv'd 6/21; HM        incr GLV decr GLV      Date 8/6 8/13 8/20 8/27 9/3 9/10 9/17 9/24 10/1 10/8 10/15 10/22 10/29 11/5   Total WeeklyDose 14mg 15mg 15mg 14mg 14mg 15mg 14mg 14mg 14mg 14mg 14mg 14mg 15mg 16mg   INR 2.4 3.4 3.8 2.9 2.2 3.2 2.9 3.3 3.2 3.3 3.0 2.4 2.4 2.4   Notes decr GLV decr GLV       1xboost         call call 1x boost   incr VitK call 2x boost; call      Date 11/10 11/17 11/24 12/1 12/8 12/15 12/23 12/30 1/3/22 1/7 1/11 1/18 1/25 2/1   Total WeeklyDose 17mg 16mg 16mg 16mg 15mg 15 mg Pt did not call back 15mg 12 mg 13mg 15mg 15 mg 15 mg 15 mg   INR 3.7 3.3 3.9 4.0 2.6 3.4 4.0 4.9 3.6 2.4 3.3 2.8 2.7 2.9   Notes Dec GLV   Zero GLV call Red x1 call Less GLV   call   Less  Protein drink redx1  self held x1 (misdose)   Dec vit K fall, apap  Call   call          Date 2/8 2/15 2/22 3/1 3/8 3/15 3/22 3/29 4/5 4/12 4/19 4/26 5/3 5/11   Total WeeklyDose 15mg 14mg 14 mg 15 mg 15 mg 15 mg 14mg 13 mg 14 mg 14 mg 14mg 13mg 15mg 14 mg   INR 4.0 4.0 2.8 2.9 2.9 4.2 3.9 3.3 2.9 3.0 3.8 2.4 3.8 2.5    Notes Call; Dec GLV call Call; Mis-dose call   Call; no GLV Inc GLV. Less protein, apap  redx1 call   Less GLV rec'd 4/27, call Dec GLV        Date 5/18 5/25 6/1 6/8 6/15 6/22 6/29 7/6 7/13 7/20 7/22 7/27  8/10  8/17   Total WeeklyDose 15 mg 15mg 16mg 15 mg 15 mg 15 mg 15 mg 15 mg 15mg 14 mg 15 mg 14 mg  14 mg  15 mg   INR 2.6 2.3 2.7 3.2 3.0 2.9 2.6 2.7 3.6 3.0 3.6 3.0  2.4  3.4   Notes   Inc GLV  call call call       call 7/14-- call call call  call  call  call      Date 8/25 8/31 9/7 9/12 9/19 9/26 10/3 10/10 10/17 10/24 10/31   Total WeeklyDose 14mg 13 mg 14 mg 17mg 15 mg 16mg 15mg 14 mg 13mg 17 mg 15mg   INR 4.3 2.8 1.7 2.9 2.1 3.4 3.8 2.4 1.8 3.7 4.5   Notes Dec GLV call Call refused enox; extra protein  Call; no protein drinks Call; less green tea, inc boostx1 Call; cranberries Redx1; call 1x miss Call  Less protein    1/1  Date 11/7 11/14 11/21 11/28 12/5 12/12 12/19 12/27 1/3 1/9 1/16/23 1/23 1/30/23   Total WeeklyDose 13 mg 14 mg 14 mg 14 mg 14 mg 14 mg 14mg 14 mg  13mg 13 mg 14 mg  14 mg 14 mg   INR 3.2 3.3 3.4 3.6 2.5 4.0 2.9 4.1 3.6 2.6 3.3 2.7 3.0   Notes  call redx1 One less protein shake   Inc GLV Decreased GLV W/o meloxicam  Tramadol,  meloxicam Tramadol,  meloxicam Tramadol,  meloxicam meloxicam     Date 2/7/23 2/13 2/20 2/27/23 3/6 3/13/23 3/23 3/27 4/3/23 4/10/23 4/17/23 4/24 5/1   Total WeeklyDose 14 mg 14 mg 14 mg 14 mg  14 mg 14 mg  14 mg 14 mg 15 mg  14mg 13 mg 15 mg 14 mg   INR 2.9 3.0 3.6 2.6 3 3.5 3.4 2.4 2.8 2.5 2.3 3.2  4.1 POCT   Notes Call  call rec 2/21  Call   call Call   call Call  call Missed dose 1x boost Call; missed protein drinks, less GLV     Date 5/2 5/8 5/15 5/22 5/30 6/5 6/19 7/10 7/24 7/31 8/7 8/14 8/21  8/28   Total WeeklyDose 12 mg 13 mg 14 mg 14 mg 14mg 14 mg 14 mg 14 mg 13 mg 13mg  12 mg 12 mg 14 mg 13 mg   INR 3.7 3.3 2.6 2.7 3.4 2.9 4.0 4.1 3.9 4.1 2.8 1.9 4.0  3.2   Notes rec 5/3  Call  Self-heldx1, boostx1    Stopping HM Less GLV  redx1 Ceftin   Rec'd 8/1 redx1  Inc GLV  Prednisone start Less GLV      Date 9/5 9/18 9/25 10/2 10/06 10/13 10/27 11/1 11/6 11/13 11/17 11/27   Total Weekly Dose 13mg 14 mg 12 mg  12 mg 11 mg 12 mg 12 mg 12 mg 11mg 11 mg 12 mg 12mg   INR 4.2 4.2 4.3  4.6 2.6 3.0 4.1 4.3 3.7 2.1 2.7 4.8   Notes redx1 clindamycin redx1 redx2 Red x2; inc GLV            Date 12/01 12/11 12/15 12/22 12/29 1/5/2024 1/12 1/26 2/9  2/26 3/1 3/11  3/18   Total Weekly Dose 11 mg 11 mg 11 mg 11 mg 11 mg 11 mg 11 mg 11 mg  12 mg   12 mg 12mg 11 mg  11 mg   INR 2.4 3.0 2.7 2.7 3.2 3.0 2.4 2.3 3.3  4.3 2.4 3.4 2.6   Notes  call    call    No GLV, apap   call     Date  4/1 4/8/24 4/15 4/22 4/29 5/3 5/6 5/13 5/20 5/28 6/3 6/10 6/17   Total Weekly  Dose 11 mg 12mg 11 mg 11 mg ???  Missed Sunday maybe more 12 mg 14 mg 14 mg 14 mg 14 mg 13 mg  12 mg 14 mg   INR 2.0 2.5 2.5 3.2 1.1 1.5 2.5 3.3 3.4 4.0 4.0 3.6 3.2   Notes Call  Missed x1 No contact  Boost x 1 No contact No contact  Lovenox  Boost x 2, Call Lovenox     call 1 x decrease  APAP;  Call  APAP call     Date 6/24 7/1 7/8 7/22 8/5 8/12 9/4 9/11 9/25 12/2 12/26-31 12/30/24 1/13/25   Total WeeklyDose 12 mg 12 mg 13 mg 13mg 13 mg 12 mg 13 mg  14 mg 12 mg 12 mg [facility] 14 mg 13 mg   INR 2.5 2.2 3.0 3.6 2.7 2.8 2.3 3.0 3.2 2.7  4.3 5.5POCT  xx emil   Notes    Call Call  Call   Call Esoph ulcers Rec'd 1/6 redx1     Date  1/13 1/20 1/27 2/3 2/10           Total Weekly  Dose 13 mg 12 mg 10mg 11 mg 14mg           INR 4.5 emil 4.8 2.4 1.9 2.7           Notes redx1 redx1 redx1               Phone Interview:  Tablet Strength: 2mg  Patient Contact Info: [phone] (Mobile) *preferred*  [email or web address]  Verbal Release Authorization signed on 4/7/21 -- may speak with Tammy Bai (friend: [phone]), Ismael Michele (brother: [phone])  Lab Contact Info: [facility] ([facility])  ** will call once monthly or if INR is out of range**      Patient Findings    Positives: Upcoming invasive procedure   Negatives: Signs/symptoms  of thrombosis, Signs/symptoms of bleeding, Laboratory test error suspected, Change in health, Change in alcohol use, Change in activity, Emergency department visit, Upcoming dental procedure, Missed doses, Extra doses, Change in medications, Change in diet/appetite, Hospital admission, Bruising, Other complaints   Comments: Leg is getting better from where she fell. PT next Monday. Having an endoscopy with Dr. Canela next Monday. Upcoming ECHO on 2/28.           Plan:  1. INR is therapeutic today at 2.0 (goal 2.5-3.5).  Instructed Ms. Michele to take a 2mg daily until recheck  2. Repeat INR in one week, 2/17.  3. Verbal information provided over the phone. Patient RBV dosing instructions, expresses understanding by teach back, and has no further questions at this time.  4. Lucie Michele understands the importance of calling the [facility] if she notices any s/sx of bleeding, stroke, or abnormal bruising, if any changes are made to her medications or medication doses (Rx, OTC, herbal), or if any upcoming procedures are scheduled. Lucie Michele will likewise let us know if any other changes, questions, or concerns arise regarding anticoagulation therapy. she understands the importance of seeking medical attention immediately if she experiences any falls, vehicle accidents, or abnormal bleeding or bruising. Lucie Michele voiced understanding of this information and confirms that she has the [facility]'s contact information. Otherwise, we will plan to contact the patient once monthly or if her INR is out of range.    Ramana Bagley, PharmD  2/10/2025  15:18 EST

## 2025-02-13 ENCOUNTER — TELEPHONE (OUTPATIENT)
Dept: PHARMACY | Facility: HOSPITAL | Age: 80
End: 2025-02-13
Payer: MEDICARE

## 2025-02-13 NOTE — TELEPHONE ENCOUNTER
Dr. Blake,     We were recently informed that Lucie Michele is undergoing EGD on 2/17 with Dr. Eddi Arreguin at [facility]. Dr. Arreguin requested that the patient hold warfarin for 1 day prior to procedure and resume warfarin POD1 (2 days total warfarin hold).      Lucie Michele is on warfarin for [facility] Hank aortic valve replacement ~2011; therefore, bridge therapy is not recommended.        2/16: Hold warfarin  2/17: Procedure, hold warfarin   2/18: Resume warfarin 3 mg (slightly boosted dose)   2/19: Take warfarin 3 mg     2/20: Check INR        Please advise if you are agreeable to plan above or if you prefer an alternative approach to Lucie Michele anticoagulation plan for the upcoming procedure.      Zenaida Sommer, PharmD  2/13/2025   12:14 EST    [facility]    [phone] phone  [phone] fax

## 2025-02-20 ENCOUNTER — ANTICOAGULATION VISIT (OUTPATIENT)
Dept: PHARMACY | Facility: HOSPITAL | Age: 80
End: 2025-02-20
Payer: MEDICARE

## 2025-02-20 ENCOUNTER — CLINICAL SUPPORT (OUTPATIENT)
Dept: CARDIOLOGY | Facility: CLINIC | Age: 80
End: 2025-02-20
Payer: MEDICARE

## 2025-02-20 DIAGNOSIS — Z95.2 HISTORY OF AORTIC VALVE REPLACEMENT: Primary | ICD-10-CM

## 2025-02-20 LAB — INR PPP: 1.6 (ref 0.9–1.1)

## 2025-02-20 NOTE — PROGRESS NOTES
Capillary Blood Specimen Collection  Capillary blood collection performed in clinic by Rosa Elena De La Rosa RN. Patient tolerated the procedure well without complications.   02/20/25   Rosa Elena De La Rosa RN

## 2025-02-20 NOTE — PROGRESS NOTES
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     Diet: 2-3x week: 1 cup of brussels sprouts or broccoli weekly, green beans, peas  (01/27/2025)  Premier Protein (or Equate brand) meal replacement daily  Alcohol: Seldom  Tobacco: None  OTC Pain Medication: APAP      INR History:  Date 4/5 4/19 4/26 5/5 5/11 6/2 6/15 6/18 6/25 7/2 7/9 7/19 7/23 7/30   Total Weekly Dose 15mg 15mg 14mg 14mg 14mg 14mg 14mg 14mg 14mg 14mg 14mg 14mg 14mg 14mg   INR 2.6 3.9 3.9 2.7 3.0 3.6 2.7 2.9 2.9 2.6 2.9 3.1 2.5 2.3   Notes           decr GLV   recv'd 6/21; HM        incr GLV decr GLV      Date 8/6 8/13 8/20 8/27 9/3 9/10 9/17 9/24 10/1 10/8 10/15 10/22 10/29 11/5   Total WeeklyDose 14mg 15mg 15mg 14mg 14mg 15mg 14mg 14mg 14mg 14mg 14mg 14mg 15mg 16mg   INR 2.4 3.4 3.8 2.9 2.2 3.2 2.9 3.3 3.2 3.3 3.0 2.4 2.4 2.4   Notes decr GLV decr GLV       1xboost         call call 1x boost   incr VitK call 2x boost; call      Date 11/10 11/17 11/24 12/1 12/8 12/15 12/23 12/30 1/3/22 1/7 1/11 1/18 1/25 2/1   Total WeeklyDose 17mg 16mg 16mg 16mg 15mg 15 mg Pt did not call back 15mg 12 mg 13mg 15mg 15 mg 15 mg 15 mg   INR 3.7 3.3 3.9 4.0 2.6 3.4 4.0 4.9 3.6 2.4 3.3 2.8 2.7 2.9   Notes Dec GLV   Zero GLV call Red x1 call Less GLV   call   Less  Protein drink redx1  self held x1 (misdose)   Dec vit K fall, apap  Call   call          Date 2/8 2/15 2/22 3/1 3/8 3/15 3/22 3/29 4/5 4/12 4/19 4/26 5/3 5/11   Total WeeklyDose 15mg 14mg 14 mg 15 mg 15 mg 15 mg 14mg 13 mg 14 mg 14 mg 14mg 13mg 15mg 14 mg   INR 4.0 4.0 2.8 2.9 2.9 4.2 3.9 3.3 2.9 3.0 3.8 2.4 3.8 2.5    Notes Call; Dec GLV call Call; Mis-dose call   Call; no GLV Inc GLV. Less protein, apap  redx1 call   Less GLV rec'd 4/27, call Dec GLV        Date 5/18 5/25 6/1 6/8 6/15 6/22 6/29 7/6 7/13 7/20 7/22 7/27  8/10  8/17   Total WeeklyDose 15 mg 15mg 16mg 15 mg 15 mg 15 mg 15 mg 15 mg 15mg 14 mg 15 mg 14 mg  14 mg  15 mg   INR 2.6 2.3 2.7 3.2 3.0 2.9 2.6 2.7 3.6 3.0 3.6 3.0  2.4  3.4   Notes   Inc GLV  call call call       call 7/14-- call call call  call  call  call      Date 8/25 8/31 9/7 9/12 9/19 9/26 10/3 10/10 10/17 10/24 10/31   Total WeeklyDose 14mg 13 mg 14 mg 17mg 15 mg 16mg 15mg 14 mg 13mg 17 mg 15mg   INR 4.3 2.8 1.7 2.9 2.1 3.4 3.8 2.4 1.8 3.7 4.5   Notes Dec GLV call Call refused enox; extra protein  Call; no protein drinks Call; less green tea, inc boostx1 Call; cranberries Redx1; call 1x miss Call  Less protein    1/1  Date 11/7 11/14 11/21 11/28 12/5 12/12 12/19 12/27 1/3 1/9 1/16/23 1/23 1/30/23   Total WeeklyDose 13 mg 14 mg 14 mg 14 mg 14 mg 14 mg 14mg 14 mg  13mg 13 mg 14 mg  14 mg 14 mg   INR 3.2 3.3 3.4 3.6 2.5 4.0 2.9 4.1 3.6 2.6 3.3 2.7 3.0   Notes  call redx1 One less protein shake   Inc GLV Decreased GLV W/o meloxicam  Tramadol,  meloxicam Tramadol,  meloxicam Tramadol,  meloxicam meloxicam     Date 2/7/23 2/13 2/20 2/27/23 3/6 3/13/23 3/23 3/27 4/3/23 4/10/23 4/17/23 4/24 5/1   Total WeeklyDose 14 mg 14 mg 14 mg 14 mg  14 mg 14 mg  14 mg 14 mg 15 mg  14mg 13 mg 15 mg 14 mg   INR 2.9 3.0 3.6 2.6 3 3.5 3.4 2.4 2.8 2.5 2.3 3.2  4.1 POCT   Notes Call  call rec 2/21  Call   call Call   call Call  call Missed dose 1x boost Call; missed protein drinks, less GLV     Date 5/2 5/8 5/15 5/22 5/30 6/5 6/19 7/10 7/24 7/31 8/7 8/14 8/21  8/28   Total WeeklyDose 12 mg 13 mg 14 mg 14 mg 14mg 14 mg 14 mg 14 mg 13 mg 13mg  12 mg 12 mg 14 mg 13 mg   INR 3.7 3.3 2.6 2.7 3.4 2.9 4.0 4.1 3.9 4.1 2.8 1.9 4.0  3.2   Notes rec 5/3  Call  Self-heldx1, boostx1    Stopping HM Less GLV  redx1 Ceftin   Rec'd 8/1 redx1  Inc GLV  Prednisone start Less GLV      Date 9/5 9/18 9/25 10/2 10/06 10/13 10/27 11/1 11/6 11/13 11/17 11/27   Total Weekly Dose 13mg 14 mg 12 mg  12 mg 11 mg 12 mg 12 mg 12 mg 11mg 11 mg 12 mg 12mg   INR 4.2 4.2 4.3  4.6 2.6 3.0 4.1 4.3 3.7 2.1 2.7 4.8   Notes redx1 clindamycin redx1 redx2 Red x2; inc GLV            Date 12/01 12/11 12/15 12/22 12/29 1/5/2024 1/12 1/26 2/9  2/26 3/1 3/11  3/18   Total Weekly Dose 11 mg 11 mg 11 mg 11 mg 11 mg 11 mg 11 mg 11 mg  12 mg   12 mg 12mg 11 mg  11 mg   INR 2.4 3.0 2.7 2.7 3.2 3.0 2.4 2.3 3.3  4.3 2.4 3.4 2.6   Notes  call    call    No GLV, apap   call     Date  4/1 4/8/24 4/15 4/22 4/29 5/3 5/6 5/13 5/20 5/28 6/3 6/10 6/17   Total Weekly  Dose 11 mg 12mg 11 mg 11 mg ???  Missed Sunday maybe more 12 mg 14 mg 14 mg 14 mg 14 mg 13 mg  12 mg 14 mg   INR 2.0 2.5 2.5 3.2 1.1 1.5 2.5 3.3 3.4 4.0 4.0 3.6 3.2   Notes Call  Missed x1 No contact  Boost x 1 No contact No contact  Lovenox  Boost x 2, Call Lovenox     call 1 x decrease  APAP;  Call  APAP call     Date 6/24 7/1 7/8 7/22 8/5 8/12 9/4 9/11 9/25 12/2 12/26-31 12/30/24 1/13/25   Total WeeklyDose 12 mg 12 mg 13 mg 13mg 13 mg 12 mg 13 mg  14 mg 12 mg 12 mg [facility] 14 mg 13 mg   INR 2.5 2.2 3.0 3.6 2.7 2.8 2.3 3.0 3.2 2.7  4.3 5.5POCT  xx emil   Notes    Call Call  Call   Call Esoph ulcers Rec'd 1/6 redx1     Date  1/13 1/20 1/27 2/3 2/10 2/20          Total Weekly  Dose 13 mg 12 mg 10mg 11 mg 14mg 10 mg           INR 4.5 emil 4.8 2.4 1.9 2.7 1.6          Notes redx1 redx1 redx1  call Procedure-call             Phone Interview:  Tablet Strength: 2mg  Patient Contact Info: [phone] (Mobile) *preferred*  [email or web address]  Verbal Release Authorization signed on 4/7/21 -- may speak with Tammy Bai (friend: [phone]), Ismael Michele (brother: [phone])  Lab Contact Info: [facility] ([facility])  ** will call once monthly or if INR is out of range**        Patient Findings    Positives: Missed doses    Negatives: Signs/symptoms of thrombosis, Signs/symptoms of bleeding, Laboratory test error suspected, Change in health, Change in alcohol use, Change in activity, Upcoming invasive procedure, Emergency department visit, Upcoming dental procedure, Extra doses, Change in medications, Change in diet/appetite, Hospital admission, Bruising, Other complaints   Comments: Patient did not take the 2 boosted doses of warfarin 3mg as instructed after procedure. (Tracker updated)    All other findings negative per patient             Plan:  1. INR is subtherapeutic today at 1.6 (goal 2.5-3.5) after holding x 2 days for procedure.  Instructed Ms. Michele to take warfarin 3mg today and tomorrow the warfarin 2mg Sat/Sun  until recheck  2. Repeat INR 2/24/25.  3. Verbal information provided over the phone. Patient RBV dosing instructions, expresses understanding by teach back, and has no further questions at this time.  4. Lucie Michele understands the importance of calling the [facility] if she notices any s/sx of bleeding, stroke, or abnormal bruising, if any changes are made to her medications or medication doses (Rx, OTC, herbal), or if any upcoming procedures are scheduled. Lucie Michele will likewise let us know if any other changes, questions, or concerns arise regarding anticoagulation therapy. she understands the importance of seeking medical attention immediately if she experiences any falls, vehicle accidents, or abnormal bleeding or bruising. Lucie Michele voiced understanding of this information and confirms that she has the [facility]'s contact information. Otherwise, we will plan to contact the patient once monthly or if her INR is out of range.    Kathleen Arauz, PharmD  2/20/2025  11:07 EST

## 2025-02-24 ENCOUNTER — CLINICAL SUPPORT (OUTPATIENT)
Dept: CARDIOLOGY | Facility: CLINIC | Age: 80
End: 2025-02-24
Payer: MEDICARE

## 2025-02-24 ENCOUNTER — ANTICOAGULATION VISIT (OUTPATIENT)
Dept: PHARMACY | Facility: HOSPITAL | Age: 80
End: 2025-02-24
Payer: MEDICARE

## 2025-02-24 DIAGNOSIS — N18.31 STAGE 3A CHRONIC KIDNEY DISEASE: ICD-10-CM

## 2025-02-24 DIAGNOSIS — M85.80 OSTEOPENIA, UNSPECIFIED LOCATION: ICD-10-CM

## 2025-02-24 DIAGNOSIS — I10 PRIMARY HYPERTENSION: ICD-10-CM

## 2025-02-24 DIAGNOSIS — E07.9 DISORDER OF THYROID: ICD-10-CM

## 2025-02-24 DIAGNOSIS — I10 ESSENTIAL HYPERTENSION, BENIGN: ICD-10-CM

## 2025-02-24 DIAGNOSIS — Z95.2 HISTORY OF AORTIC VALVE REPLACEMENT: Primary | ICD-10-CM

## 2025-02-24 DIAGNOSIS — Z13.820 OSTEOPOROSIS SCREENING: ICD-10-CM

## 2025-02-24 DIAGNOSIS — R73.03 PREDIABETES: ICD-10-CM

## 2025-02-24 DIAGNOSIS — E55.9 VITAMIN D DEFICIENCY: ICD-10-CM

## 2025-02-24 DIAGNOSIS — E03.9 ACQUIRED HYPOTHYROIDISM: ICD-10-CM

## 2025-02-24 DIAGNOSIS — M25.552 LEFT HIP PAIN: ICD-10-CM

## 2025-02-24 LAB — INR PPP: 2.6 (ref 0.9–1.1)

## 2025-02-24 PROCEDURE — 85610 PROTHROMBIN TIME: CPT | Performed by: INTERNAL MEDICINE

## 2025-02-24 PROCEDURE — 36416 COLLJ CAPILLARY BLOOD SPEC: CPT | Performed by: INTERNAL MEDICINE

## 2025-02-24 RX ORDER — OMEPRAZOLE 20 MG/1
20 CAPSULE, DELAYED RELEASE ORAL DAILY
Qty: 90 CAPSULE | Refills: 1 | Status: SHIPPED | OUTPATIENT
Start: 2025-02-24

## 2025-02-24 RX ORDER — OXYBUTYNIN CHLORIDE 5 MG/1
5 TABLET ORAL 2 TIMES DAILY
Qty: 180 TABLET | Refills: 0 | Status: SHIPPED | OUTPATIENT
Start: 2025-02-24

## 2025-02-24 NOTE — PROGRESS NOTES
Capillary Blood Specimen Collection  Capillary blood collection performed in clinic by Augusta Salazar MA. Patient tolerated the procedure well without complications.   02/24/25   Augusta Salazar MA

## 2025-02-24 NOTE — PROGRESS NOTES
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     Diet: 2-3x week: 1 cup of brussels sprouts or broccoli weekly, green beans, peas  (01/27/2025)  Premier Protein (or Equate brand) meal replacement daily  Alcohol: Seldom  Tobacco: None  OTC Pain Medication: APAP      INR History:  Date 4/5 4/19 4/26 5/5 5/11 6/2 6/15 6/18 6/25 7/2 7/9 7/19 7/23 7/30   Total Weekly Dose 15mg 15mg 14mg 14mg 14mg 14mg 14mg 14mg 14mg 14mg 14mg 14mg 14mg 14mg   INR 2.6 3.9 3.9 2.7 3.0 3.6 2.7 2.9 2.9 2.6 2.9 3.1 2.5 2.3   Notes           decr GLV   recv'd 6/21; HM        incr GLV decr GLV      Date 8/6 8/13 8/20 8/27 9/3 9/10 9/17 9/24 10/1 10/8 10/15 10/22 10/29 11/5   Total WeeklyDose 14mg 15mg 15mg 14mg 14mg 15mg 14mg 14mg 14mg 14mg 14mg 14mg 15mg 16mg   INR 2.4 3.4 3.8 2.9 2.2 3.2 2.9 3.3 3.2 3.3 3.0 2.4 2.4 2.4   Notes decr GLV decr GLV       1xboost         call call 1x boost   incr VitK call 2x boost; call      Date 11/10 11/17 11/24 12/1 12/8 12/15 12/23 12/30 1/3/22 1/7 1/11 1/18 1/25 2/1   Total WeeklyDose 17mg 16mg 16mg 16mg 15mg 15 mg Pt did not call back 15mg 12 mg 13mg 15mg 15 mg 15 mg 15 mg   INR 3.7 3.3 3.9 4.0 2.6 3.4 4.0 4.9 3.6 2.4 3.3 2.8 2.7 2.9   Notes Dec GLV   Zero GLV call Red x1 call Less GLV   call   Less  Protein drink redx1  self held x1 (misdose)   Dec vit K fall, apap  Call   call          Date 2/8 2/15 2/22 3/1 3/8 3/15 3/22 3/29 4/5 4/12 4/19 4/26 5/3 5/11   Total WeeklyDose 15mg 14mg 14 mg 15 mg 15 mg 15 mg 14mg 13 mg 14 mg 14 mg 14mg 13mg 15mg 14 mg   INR 4.0 4.0 2.8 2.9 2.9 4.2 3.9 3.3 2.9 3.0 3.8 2.4 3.8 2.5    Notes Call; Dec GLV call Call; Mis-dose call   Call; no GLV Inc GLV. Less protein, apap  redx1 call   Less GLV rec'd 4/27, call Dec GLV        Date 5/18 5/25 6/1 6/8 6/15 6/22 6/29 7/6 7/13 7/20 7/22 7/27  8/10  8/17   Total WeeklyDose 15 mg 15mg 16mg 15 mg 15 mg 15 mg 15 mg 15 mg 15mg 14 mg 15 mg 14 mg  14 mg  15 mg   INR 2.6 2.3 2.7 3.2 3.0 2.9 2.6 2.7 3.6 3.0 3.6 3.0  2.4  3.4   Notes   Inc GLV  call call call       call 7/14-- call call call  call  call  call      Date 8/25 8/31 9/7 9/12 9/19 9/26 10/3 10/10 10/17 10/24 10/31   Total WeeklyDose 14mg 13 mg 14 mg 17mg 15 mg 16mg 15mg 14 mg 13mg 17 mg 15mg   INR 4.3 2.8 1.7 2.9 2.1 3.4 3.8 2.4 1.8 3.7 4.5   Notes Dec GLV call Call refused enox; extra protein  Call; no protein drinks Call; less green tea, inc boostx1 Call; cranberries Redx1; call 1x miss Call  Less protein    1/1  Date 11/7 11/14 11/21 11/28 12/5 12/12 12/19 12/27 1/3 1/9 1/16/23 1/23 1/30/23   Total WeeklyDose 13 mg 14 mg 14 mg 14 mg 14 mg 14 mg 14mg 14 mg  13mg 13 mg 14 mg  14 mg 14 mg   INR 3.2 3.3 3.4 3.6 2.5 4.0 2.9 4.1 3.6 2.6 3.3 2.7 3.0   Notes  call redx1 One less protein shake   Inc GLV Decreased GLV W/o meloxicam  Tramadol,  meloxicam Tramadol,  meloxicam Tramadol,  meloxicam meloxicam     Date 2/7/23 2/13 2/20 2/27/23 3/6 3/13/23 3/23 3/27 4/3/23 4/10/23 4/17/23 4/24 5/1   Total WeeklyDose 14 mg 14 mg 14 mg 14 mg  14 mg 14 mg  14 mg 14 mg 15 mg  14mg 13 mg 15 mg 14 mg   INR 2.9 3.0 3.6 2.6 3 3.5 3.4 2.4 2.8 2.5 2.3 3.2  4.1 POCT   Notes Call  call rec 2/21  Call   call Call   call Call  call Missed dose 1x boost Call; missed protein drinks, less GLV     Date 5/2 5/8 5/15 5/22 5/30 6/5 6/19 7/10 7/24 7/31 8/7 8/14 8/21  8/28   Total WeeklyDose 12 mg 13 mg 14 mg 14 mg 14mg 14 mg 14 mg 14 mg 13 mg 13mg  12 mg 12 mg 14 mg 13 mg   INR 3.7 3.3 2.6 2.7 3.4 2.9 4.0 4.1 3.9 4.1 2.8 1.9 4.0  3.2   Notes rec 5/3  Call  Self-heldx1, boostx1    Stopping HM Less GLV  redx1 Ceftin   Rec'd 8/1 redx1  Inc GLV  Prednisone start Less GLV      Date 9/5 9/18 9/25 10/2 10/06 10/13 10/27 11/1 11/6 11/13 11/17 11/27   Total Weekly Dose 13mg 14 mg 12 mg  12 mg 11 mg 12 mg 12 mg 12 mg 11mg 11 mg 12 mg 12mg   INR 4.2 4.2 4.3  4.6 2.6 3.0 4.1 4.3 3.7 2.1 2.7 4.8   Notes redx1 clindamycin redx1 redx2 Red x2; inc GLV            Date 12/01 12/11 12/15 12/22 12/29 1/5/2024 1/12 1/26 2/9  2/26 3/1 3/11  3/18   Total Weekly Dose 11 mg 11 mg 11 mg 11 mg 11 mg 11 mg 11 mg 11 mg  12 mg   12 mg 12mg 11 mg  11 mg   INR 2.4 3.0 2.7 2.7 3.2 3.0 2.4 2.3 3.3  4.3 2.4 3.4 2.6   Notes  call    call    No GLV, apap   call     Date  4/1 4/8/24 4/15 4/22 4/29 5/3 5/6 5/13 5/20 5/28 6/3 6/10 6/17   Total Weekly  Dose 11 mg 12mg 11 mg 11 mg ???  Missed Sunday maybe more 12 mg 14 mg 14 mg 14 mg 14 mg 13 mg  12 mg 14 mg   INR 2.0 2.5 2.5 3.2 1.1 1.5 2.5 3.3 3.4 4.0 4.0 3.6 3.2   Notes Call  Missed x1 No contact  Boost x 1 No contact No contact  Lovenox  Boost x 2, Call Lovenox     call 1 x decrease  APAP;  Call  APAP call     Date 6/24 7/1 7/8 7/22 8/5 8/12 9/4 9/11 9/25 12/2 12/26-31 12/30/24 1/13/25   Total WeeklyDose 12 mg 12 mg 13 mg 13mg 13 mg 12 mg 13 mg  14 mg 12 mg 12 mg [facility] 14 mg 13 mg   INR 2.5 2.2 3.0 3.6 2.7 2.8 2.3 3.0 3.2 2.7  4.3 5.5POCT  xx emil   Notes    Call Call  Call   Call Esoph ulcers Rec'd 1/6 redx1     Date  1/13 1/20 1/27 2/3 2/10 2/20 2/24         Total Weekly  Dose 13 mg 12 mg 10mg 11 mg 14mg 10 mg  14 mg         INR 4.5 emil 4.8 2.4 1.9 2.7 1.6 2.6         Notes redx1 redx1 redx1  call Procedure-call  call           Phone Interview:  Tablet Strength: 2mg  Patient Contact Info: [phone] (Mobile) *preferred*  [email or web address]  Verbal Release Authorization signed on 4/7/21 -- may speak with Tammy Bai (friend: [phone]), Ismael Percyligia (brother: [phone])  Lab Contact Info: [facility] ([facility])  ** will call once monthly or if INR is out of range**        Patient Findings    Negatives:  Signs/symptoms of thrombosis, Signs/symptoms of bleeding, Laboratory test error suspected, Change in health, Change in alcohol use, Change in activity, Upcoming invasive procedure, Emergency department visit, Upcoming dental procedure, Missed doses, Extra doses, Change in medications, Change in diet/appetite, Hospital admission, Bruising, Other complaints   Comments: All findings negative per patient         Plan:  1. INR is therapeutic today at 2.6 (goal 2.5-3.5). Instructed Ms. Michele to resume normal regimen of warfarin 2 mg daily until recheck   2. Repeat INR 3/3/25  3. Verbal information provided over the phone. Patient RBV dosing instructions, expresses understanding by teach back, and has no further questions at this time.  4. Lucie Michele understands the importance of calling the [facility] if she notices any s/sx of bleeding, stroke, or abnormal bruising, if any changes are made to her medications or medication doses (Rx, OTC, herbal), or if any upcoming procedures are scheduled. Lucie Michele will likewise let us know if any other changes, questions, or concerns arise regarding anticoagulation therapy. she understands the importance of seeking medical attention immediately if she experiences any falls, vehicle accidents, or abnormal bleeding or bruising. Lucie Michele voiced understanding of this information and confirms that she has the [facility]'s contact information. Otherwise, we will plan to contact the patient once monthly or if her INR is out of range.    Kathleen Arauz, PharmD  2/24/2025  11:59 EST

## 2025-03-03 ENCOUNTER — ANTICOAGULATION VISIT (OUTPATIENT)
Dept: PHARMACY | Facility: HOSPITAL | Age: 80
End: 2025-03-03
Payer: MEDICARE

## 2025-03-03 ENCOUNTER — CLINICAL SUPPORT (OUTPATIENT)
Dept: CARDIOLOGY | Facility: CLINIC | Age: 80
End: 2025-03-03
Payer: MEDICARE

## 2025-03-03 DIAGNOSIS — I48.92 PAROXYSMAL ATRIAL FLUTTER: Primary | ICD-10-CM

## 2025-03-03 LAB — INR PPP: 2.9 (ref 0.9–1.1)

## 2025-03-03 PROCEDURE — 36416 COLLJ CAPILLARY BLOOD SPEC: CPT | Performed by: INTERNAL MEDICINE

## 2025-03-03 PROCEDURE — 85610 PROTHROMBIN TIME: CPT | Performed by: INTERNAL MEDICINE

## 2025-03-03 NOTE — PROGRESS NOTES
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     Diet: 2-3x week: 1 cup of brussels sprouts or broccoli weekly, green beans, peas  (01/27/2025)  Premier Protein (or Equate brand) meal replacement daily  Alcohol: Seldom  Tobacco: None  OTC Pain Medication: APAP      INR History:  Date 4/5 4/19 4/26 5/5 5/11 6/2 6/15 6/18 6/25 7/2 7/9 7/19 7/23 7/30   Total Weekly Dose 15mg 15mg 14mg 14mg 14mg 14mg 14mg 14mg 14mg 14mg 14mg 14mg 14mg 14mg   INR 2.6 3.9 3.9 2.7 3.0 3.6 2.7 2.9 2.9 2.6 2.9 3.1 2.5 2.3   Notes           decr GLV   recv'd 6/21; HM        incr GLV decr GLV      Date 8/6 8/13 8/20 8/27 9/3 9/10 9/17 9/24 10/1 10/8 10/15 10/22 10/29 11/5   Total WeeklyDose 14mg 15mg 15mg 14mg 14mg 15mg 14mg 14mg 14mg 14mg 14mg 14mg 15mg 16mg   INR 2.4 3.4 3.8 2.9 2.2 3.2 2.9 3.3 3.2 3.3 3.0 2.4 2.4 2.4   Notes decr GLV decr GLV       1xboost         call call 1x boost   incr VitK call 2x boost; call      Date 11/10 11/17 11/24 12/1 12/8 12/15 12/23 12/30 1/3/22 1/7 1/11 1/18 1/25 2/1   Total WeeklyDose 17mg 16mg 16mg 16mg 15mg 15 mg Pt did not call back 15mg 12 mg 13mg 15mg 15 mg 15 mg 15 mg   INR 3.7 3.3 3.9 4.0 2.6 3.4 4.0 4.9 3.6 2.4 3.3 2.8 2.7 2.9   Notes Dec GLV   Zero GLV call Red x1 call Less GLV   call   Less  Protein drink redx1  self held x1 (misdose)   Dec vit K fall, apap  Call   call          Date 2/8 2/15 2/22 3/1 3/8 3/15 3/22 3/29 4/5 4/12 4/19 4/26 5/3 5/11   Total WeeklyDose 15mg 14mg 14 mg 15 mg 15 mg 15 mg 14mg 13 mg 14 mg 14 mg 14mg 13mg 15mg 14 mg   INR 4.0 4.0 2.8 2.9 2.9 4.2 3.9 3.3 2.9 3.0 3.8 2.4 3.8 2.5    Notes Call; Dec GLV call Call; Mis-dose call   Call; no GLV Inc GLV. Less protein, apap  redx1 call   Less GLV rec'd 4/27, call Dec GLV        Date 5/18 5/25 6/1 6/8 6/15 6/22 6/29 7/6 7/13 7/20 7/22 7/27  8/10  8/17   Total WeeklyDose 15 mg 15mg 16mg 15 mg 15 mg 15 mg 15 mg 15 mg 15mg 14 mg 15 mg 14 mg  14 mg  15 mg   INR 2.6 2.3 2.7 3.2 3.0 2.9 2.6 2.7 3.6 3.0 3.6 3.0  2.4  3.4   Notes   Inc GLV  call call call       call 7/14-- call call call  call  call  call      Date 8/25 8/31 9/7 9/12 9/19 9/26 10/3 10/10 10/17 10/24 10/31   Total WeeklyDose 14mg 13 mg 14 mg 17mg 15 mg 16mg 15mg 14 mg 13mg 17 mg 15mg   INR 4.3 2.8 1.7 2.9 2.1 3.4 3.8 2.4 1.8 3.7 4.5   Notes Dec GLV call Call refused enox; extra protein  Call; no protein drinks Call; less green tea, inc boostx1 Call; cranberries Redx1; call 1x miss Call  Less protein    1/1  Date 11/7 11/14 11/21 11/28 12/5 12/12 12/19 12/27 1/3 1/9 1/16/23 1/23 1/30/23   Total WeeklyDose 13 mg 14 mg 14 mg 14 mg 14 mg 14 mg 14mg 14 mg  13mg 13 mg 14 mg  14 mg 14 mg   INR 3.2 3.3 3.4 3.6 2.5 4.0 2.9 4.1 3.6 2.6 3.3 2.7 3.0   Notes  call redx1 One less protein shake   Inc GLV Decreased GLV W/o meloxicam  Tramadol,  meloxicam Tramadol,  meloxicam Tramadol,  meloxicam meloxicam     Date 2/7/23 2/13 2/20 2/27/23 3/6 3/13/23 3/23 3/27 4/3/23 4/10/23 4/17/23 4/24 5/1   Total WeeklyDose 14 mg 14 mg 14 mg 14 mg  14 mg 14 mg  14 mg 14 mg 15 mg  14mg 13 mg 15 mg 14 mg   INR 2.9 3.0 3.6 2.6 3 3.5 3.4 2.4 2.8 2.5 2.3 3.2  4.1 POCT   Notes Call  call rec 2/21  Call   call Call   call Call  call Missed dose 1x boost Call; missed protein drinks, less GLV     Date 5/2 5/8 5/15 5/22 5/30 6/5 6/19 7/10 7/24 7/31 8/7 8/14 8/21  8/28   Total WeeklyDose 12 mg 13 mg 14 mg 14 mg 14mg 14 mg 14 mg 14 mg 13 mg 13mg  12 mg 12 mg 14 mg 13 mg   INR 3.7 3.3 2.6 2.7 3.4 2.9 4.0 4.1 3.9 4.1 2.8 1.9 4.0  3.2   Notes rec 5/3  Call  Self-heldx1, boostx1    Stopping HM Less GLV  redx1 Ceftin   Rec'd 8/1 redx1  Inc GLV  Prednisone start Less GLV      Date 9/5 9/18 9/25 10/2 10/06 10/13 10/27 11/1 11/6 11/13 11/17 11/27   Total Weekly Dose 13mg 14 mg 12 mg  12 mg 11 mg 12 mg 12 mg 12 mg 11mg 11 mg 12 mg 12mg   INR 4.2 4.2 4.3  4.6 2.6 3.0 4.1 4.3 3.7 2.1 2.7 4.8   Notes redx1 clindamycin redx1 redx2 Red x2; inc GLV            Date 12/01 12/11 12/15 12/22 12/29 1/5/2024 1/12 1/26 2/9  2/26 3/1 3/11  3/18   Total Weekly Dose 11 mg 11 mg 11 mg 11 mg 11 mg 11 mg 11 mg 11 mg  12 mg   12 mg 12mg 11 mg  11 mg   INR 2.4 3.0 2.7 2.7 3.2 3.0 2.4 2.3 3.3  4.3 2.4 3.4 2.6   Notes  call    call    No GLV, apap   call     Date  4/1 4/8/24 4/15 4/22 4/29 5/3 5/6 5/13 5/20 5/28 6/3 6/10 6/17   Total Weekly  Dose 11 mg 12mg 11 mg 11 mg ???  Missed Sunday maybe more 12 mg 14 mg 14 mg 14 mg 14 mg 13 mg  12 mg 14 mg   INR 2.0 2.5 2.5 3.2 1.1 1.5 2.5 3.3 3.4 4.0 4.0 3.6 3.2   Notes Call  Missed x1 No contact  Boost x 1 No contact No contact  Lovenox  Boost x 2, Call Lovenox     call 1 x decrease  APAP;  Call  APAP call     Date 6/24 7/1 7/8 7/22 8/5 8/12 9/4 9/11 9/25 12/2 12/26-31 12/30/24 1/13/25   Total WeeklyDose 12 mg 12 mg 13 mg 13mg 13 mg 12 mg 13 mg  14 mg 12 mg 12 mg [facility] 14 mg 13 mg   INR 2.5 2.2 3.0 3.6 2.7 2.8 2.3 3.0 3.2 2.7  4.3 5.5POCT  xx emil   Notes    Call Call  Call   Call Esoph ulcers Rec'd 1/6 redx1     Date  1/13 1/20 1/27 2/3 2/10 2/20 2/24 3/3        Total Weekly  Dose 13 mg 12 mg 10mg 11 mg 14mg 10 mg  14 mg 14 mg        INR 4.5 emil 4.8 2.4 1.9 2.7 1.6 2.6 2.9        Notes redx1 redx1 redx1  call Procedure-call  call  call         Phone Interview:  Tablet Strength: 2mg  Patient Contact Info: [phone] (Mobile) *preferred*  [email or web address]  Verbal Release Authorization signed on 4/7/21 -- may speak with Tammy Bai (friend: [phone]), Ismael Michele (brother: [phone])  Lab Contact Info: [facility] ([facility])  ** will call once monthly or if INR is out of range**    Patient  Findings    Comments: Patient not contacted for this encounter.     Plan:  1. INR therapeutic today at 2.9 (goal 2.5-3.5). Ms. Michele will continue warfarin 2 mg daily until recheck.   2. Repeat INR in 1 week, 3.10.25  4. Lucie Michele understands the importance of calling the [facility] if she notices any s/sx of bleeding, stroke, or abnormal bruising, if any changes are made to her medications or medication doses (Rx, OTC, herbal), or if any upcoming procedures are scheduled. Lucie Michele will likewise let us know if any other changes, questions, or concerns arise regarding anticoagulation therapy. she understands the importance of seeking medical attention immediately if she experiences any falls, vehicle accidents, or abnormal bleeding or bruising. Lucie Michele voiced understanding of this information and confirms that she has the [facility]'s contact information. Otherwise, we will plan to contact the patient once monthly or if her INR is out of range.        Elva Langford CPhT   11:59 EST 3/3/2025    IKathleen, PharmD, have reviewed the note in full and agree with the assessment and plan.  03/03/25  12:02 EST

## 2025-03-03 NOTE — PROGRESS NOTES
Capillary Blood Specimen Collection  Capillary blood collection performed in clinic  by Rosa Elena De La Rosa RN. Patient tolerated the procedure well without complications.   03/03/25   Rosa Elena De La Rosa RN     [No Acute Distress] : no acute distress [Normal Sclera/Conjunctiva] : normal sclera/conjunctiva [Normal Outer Ear/Nose] : the outer ears and nose were normal in appearance [No Lymphadenopathy] : no lymphadenopathy [Supple] : supple [No Respiratory Distress] : no respiratory distress  [No Accessory Muscle Use] : no accessory muscle use [Clear to Auscultation] : lungs were clear to auscultation bilaterally [Normal Rate] : normal rate  [Regular Rhythm] : with a regular rhythm [Normal S1, S2] : normal S1 and S2 [Pedal Pulses Present] : the pedal pulses are present [No Edema] : there was no peripheral edema [Soft] : abdomen soft [Non Tender] : non-tender [Non-distended] : non-distended [Normal Bowel Sounds] : normal bowel sounds [No Focal Deficits] : no focal deficits [Normal Gait] : normal gait [Alert and Oriented x3] : oriented to person, place, and time

## 2025-03-05 ENCOUNTER — OFFICE VISIT (OUTPATIENT)
Dept: CARDIOLOGY | Facility: CLINIC | Age: 80
End: 2025-03-05
Payer: MEDICARE

## 2025-03-05 VITALS
RESPIRATION RATE: 18 BRPM | WEIGHT: 165 LBS | OXYGEN SATURATION: 99 % | HEART RATE: 84 BPM | SYSTOLIC BLOOD PRESSURE: 136 MMHG | DIASTOLIC BLOOD PRESSURE: 80 MMHG | BODY MASS INDEX: 27.49 KG/M2 | HEIGHT: 65 IN | TEMPERATURE: 97 F

## 2025-03-05 DIAGNOSIS — G47.33 OSA ON CPAP: ICD-10-CM

## 2025-03-05 DIAGNOSIS — Z86.79 S/P ASCENDING AORTIC ANEURYSM REPAIR: ICD-10-CM

## 2025-03-05 DIAGNOSIS — I48.92 PAROXYSMAL ATRIAL FLUTTER: ICD-10-CM

## 2025-03-05 DIAGNOSIS — Z98.890 S/P ASCENDING AORTIC ANEURYSM REPAIR: ICD-10-CM

## 2025-03-05 DIAGNOSIS — I10 PRIMARY HYPERTENSION: ICD-10-CM

## 2025-03-05 DIAGNOSIS — I50.32 CHRONIC DIASTOLIC CONGESTIVE HEART FAILURE: Primary | ICD-10-CM
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
## 2025-03-06 LAB
ALBUMIN SERPL-MCNC: 3.9 G/DL (ref 3.8–4.8)
ALP SERPL-CCNC: 93 IU/L (ref 44–121)
ALT SERPL-CCNC: 15 IU/L (ref 0–32)
AST SERPL-CCNC: 28 IU/L (ref 0–40)
BASOPHILS # BLD AUTO: 0.1 X10E3/UL (ref 0–0.2)
BASOPHILS NFR BLD AUTO: 1 %
BILIRUB SERPL-MCNC: 0.4 MG/DL (ref 0–1.2)
BUN SERPL-MCNC: 12 MG/DL (ref 8–27)
BUN/CREAT SERPL: 18 (ref 12–28)
CALCIUM SERPL-MCNC: 9.3 MG/DL (ref 8.7–10.3)
CHLORIDE SERPL-SCNC: 101 MMOL/L (ref 96–106)
CHOLEST SERPL-MCNC: 266 MG/DL (ref 100–199)
CK SERPL-CCNC: 60 U/L (ref 32–182)
CO2 SERPL-SCNC: 26 MMOL/L (ref 20–29)
CREAT SERPL-MCNC: 0.68 MG/DL (ref 0.57–1)
CRP SERPL HS-MCNC: 4.65 MG/L (ref 0–3)
EGFRCR SERPLBLD CKD-EPI 2021: 89 ML/MIN/1.73
EOSINOPHIL # BLD AUTO: 0.1 X10E3/UL (ref 0–0.4)
EOSINOPHIL NFR BLD AUTO: 2 %
ERYTHROCYTE [DISTWIDTH] IN BLOOD BY AUTOMATED COUNT: 14.4 % (ref 11.7–15.4)
GLOBULIN SER CALC-MCNC: 2.4 G/DL (ref 1.5–4.5)
GLUCOSE SERPL-MCNC: 80 MG/DL (ref 70–99)
HCT VFR BLD AUTO: 39.6 % (ref 34–46.6)
HDLC SERPL-MCNC: 54 MG/DL
HGB BLD-MCNC: 13.1 G/DL (ref 11.1–15.9)
IMM GRANULOCYTES # BLD AUTO: 0.1 X10E3/UL (ref 0–0.1)
IMM GRANULOCYTES NFR BLD AUTO: 1 %
LDLC SERPL CALC-MCNC: 170 MG/DL (ref 0–99)
LPA SERPL-SCNC: 140.4 NMOL/L
LYMPHOCYTES # BLD AUTO: 2.2 X10E3/UL (ref 0.7–3.1)
LYMPHOCYTES NFR BLD AUTO: 36 %
MCH RBC QN AUTO: 29.6 PG (ref 26.6–33)
MCHC RBC AUTO-ENTMCNC: 33.1 G/DL (ref 31.5–35.7)
MCV RBC AUTO: 90 FL (ref 79–97)
MONOCYTES # BLD AUTO: 0.6 X10E3/UL (ref 0.1–0.9)
MONOCYTES NFR BLD AUTO: 10 %
NEUTROPHILS # BLD AUTO: 3.1 X10E3/UL (ref 1.4–7)
NEUTROPHILS NFR BLD AUTO: 50 %
NT-PROBNP SERPL-MCNC: 533 PG/ML (ref 0–738)
PLATELET # BLD AUTO: 270 X10E3/UL (ref 150–450)
POTASSIUM SERPL-SCNC: 3.6 MMOL/L (ref 3.5–5.2)
PROT SERPL-MCNC: 6.3 G/DL (ref 6–8.5)
RBC # BLD AUTO: 4.42 X10E6/UL (ref 3.77–5.28)
SODIUM SERPL-SCNC: 143 MMOL/L (ref 134–144)
TRIGL SERPL-MCNC: 227 MG/DL (ref 0–149)
TSH SERPL DL<=0.005 MIU/L-ACNC: 6.36 UIU/ML (ref 0.45–4.5)
VLDLC SERPL CALC-MCNC: 42 MG/DL (ref 5–40)
WBC # BLD AUTO: 6.2 X10E3/UL (ref 3.4–10.8)

## 2025-03-07 ENCOUNTER — TELEPHONE (OUTPATIENT)
Dept: CARDIOLOGY | Facility: CLINIC | Age: 80
End: 2025-03-07
Payer: MEDICARE

## 2025-03-07 NOTE — TELEPHONE ENCOUNTER
Spoke with Ms Michele and went over her labs:  Lipids are bad,     she is suppose to take lipitor and zetia is she taking them > if yes add repatha also her Lpa is elevated  140     She states she is taking her atorvastatin and zetia both.  States she did eat a lot of candy over the holidays.  Explained to her that he LPA is genetic and not affected by that number. She is coming Monday for an INR check and I will give her information on LPA and the repatha shot for her to make a decision.

## 2025-03-07 NOTE — TELEPHONE ENCOUNTER
----- Message from Blas Blake sent at 3/6/2025  5:15 PM EST -----  Lipids are bad,     she is suppose to take lipitor and zetia is she taking them > if yes add repatha also her Lpa is elevated  140

## 2025-03-09 LAB — APO B SERPL-MCNC: 155 MG/DL

## 2025-03-10 ENCOUNTER — ANTICOAGULATION VISIT (OUTPATIENT)
Dept: PHARMACY | Facility: HOSPITAL | Age: 80
End: 2025-03-10
Payer: MEDICARE

## 2025-03-10 ENCOUNTER — CLINICAL SUPPORT (OUTPATIENT)
Dept: CARDIOLOGY | Facility: CLINIC | Age: 80
End: 2025-03-10
Payer: MEDICARE

## 2025-03-10 DIAGNOSIS — Z86.79 S/P ASCENDING AORTIC ANEURYSM REPAIR: Primary | ICD-10-CM

## 2025-03-10 DIAGNOSIS — Z98.890 S/P ASCENDING AORTIC ANEURYSM REPAIR: Primary | ICD-10-CM

## 2025-03-10 LAB — INR PPP: 3.2 (ref 0.9–1.1)

## 2025-03-10 NOTE — PROGRESS NOTES
Capillary Blood Specimen Collection  Capillary blood collection performed in clinic by Augusta Salazar MA. Patient tolerated the procedure well without complications.   03/10/25   Augusta Salazar MA

## 2025-03-10 NOTE — PROGRESS NOTES
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     Diet: 2-3x week: 1 cup of brussels sprouts or broccoli weekly, green beans, peas  (01/27/2025)  Premier Protein (or Equate brand) meal replacement daily  Alcohol: Seldom  Tobacco: None  OTC Pain Medication: APAP      INR History:  Date 4/5 4/19 4/26 5/5 5/11 6/2 6/15 6/18 6/25 7/2 7/9 7/19 7/23 7/30   Total Weekly Dose 15mg 15mg 14mg 14mg 14mg 14mg 14mg 14mg 14mg 14mg 14mg 14mg 14mg 14mg   INR 2.6 3.9 3.9 2.7 3.0 3.6 2.7 2.9 2.9 2.6 2.9 3.1 2.5 2.3   Notes           decr GLV   recv'd 6/21; HM        incr GLV decr GLV      Date 8/6 8/13 8/20 8/27 9/3 9/10 9/17 9/24 10/1 10/8 10/15 10/22 10/29 11/5   Total WeeklyDose 14mg 15mg 15mg 14mg 14mg 15mg 14mg 14mg 14mg 14mg 14mg 14mg 15mg 16mg   INR 2.4 3.4 3.8 2.9 2.2 3.2 2.9 3.3 3.2 3.3 3.0 2.4 2.4 2.4   Notes decr GLV decr GLV       1xboost         call call 1x boost   incr VitK call 2x boost; call      Date 11/10 11/17 11/24 12/1 12/8 12/15 12/23 12/30 1/3/22 1/7 1/11 1/18 1/25 2/1   Total WeeklyDose 17mg 16mg 16mg 16mg 15mg 15 mg Pt did not call back 15mg 12 mg 13mg 15mg 15 mg 15 mg 15 mg   INR 3.7 3.3 3.9 4.0 2.6 3.4 4.0 4.9 3.6 2.4 3.3 2.8 2.7 2.9   Notes Dec GLV   Zero GLV call Red x1 call Less GLV   call   Less  Protein drink redx1  self held x1 (misdose)   Dec vit K fall, apap  Call   call          Date 2/8 2/15 2/22 3/1 3/8 3/15 3/22 3/29 4/5 4/12 4/19 4/26 5/3 5/11   Total WeeklyDose 15mg 14mg 14 mg 15 mg 15 mg 15 mg 14mg 13 mg 14 mg 14 mg 14mg 13mg 15mg 14 mg   INR 4.0 4.0 2.8 2.9 2.9 4.2 3.9 3.3 2.9 3.0 3.8 2.4 3.8 2.5    Notes Call; Dec GLV call Call; Mis-dose call   Call; no GLV Inc GLV. Less protein, apap  redx1 call   Less GLV rec'd 4/27, call Dec GLV        Date 5/18 5/25 6/1 6/8 6/15 6/22 6/29 7/6 7/13 7/20 7/22 7/27  8/10  8/17   Total WeeklyDose 15 mg 15mg 16mg 15 mg 15 mg 15 mg 15 mg 15 mg 15mg 14 mg 15 mg 14 mg  14 mg  15 mg   INR 2.6 2.3 2.7 3.2 3.0 2.9 2.6 2.7 3.6 3.0 3.6 3.0  2.4  3.4   Notes   Inc GLV  call call call       call 7/14-- call call call  call  call  call      Date 8/25 8/31 9/7 9/12 9/19 9/26 10/3 10/10 10/17 10/24 10/31   Total WeeklyDose 14mg 13 mg 14 mg 17mg 15 mg 16mg 15mg 14 mg 13mg 17 mg 15mg   INR 4.3 2.8 1.7 2.9 2.1 3.4 3.8 2.4 1.8 3.7 4.5   Notes Dec GLV call Call refused enox; extra protein  Call; no protein drinks Call; less green tea, inc boostx1 Call; cranberries Redx1; call 1x miss Call  Less protein    1/1  Date 11/7 11/14 11/21 11/28 12/5 12/12 12/19 12/27 1/3 1/9 1/16/23 1/23 1/30/23   Total WeeklyDose 13 mg 14 mg 14 mg 14 mg 14 mg 14 mg 14mg 14 mg  13mg 13 mg 14 mg  14 mg 14 mg   INR 3.2 3.3 3.4 3.6 2.5 4.0 2.9 4.1 3.6 2.6 3.3 2.7 3.0   Notes  call redx1 One less protein shake   Inc GLV Decreased GLV W/o meloxicam  Tramadol,  meloxicam Tramadol,  meloxicam Tramadol,  meloxicam meloxicam     Date 2/7/23 2/13 2/20 2/27/23 3/6 3/13/23 3/23 3/27 4/3/23 4/10/23 4/17/23 4/24 5/1   Total WeeklyDose 14 mg 14 mg 14 mg 14 mg  14 mg 14 mg  14 mg 14 mg 15 mg  14mg 13 mg 15 mg 14 mg   INR 2.9 3.0 3.6 2.6 3 3.5 3.4 2.4 2.8 2.5 2.3 3.2  4.1 POCT   Notes Call  call rec 2/21  Call   call Call   call Call  call Missed dose 1x boost Call; missed protein drinks, less GLV     Date 5/2 5/8 5/15 5/22 5/30 6/5 6/19 7/10 7/24 7/31 8/7 8/14 8/21  8/28   Total WeeklyDose 12 mg 13 mg 14 mg 14 mg 14mg 14 mg 14 mg 14 mg 13 mg 13mg  12 mg 12 mg 14 mg 13 mg   INR 3.7 3.3 2.6 2.7 3.4 2.9 4.0 4.1 3.9 4.1 2.8 1.9 4.0  3.2   Notes rec 5/3  Call  Self-heldx1, boostx1    Stopping HM Less GLV  redx1 Ceftin   Rec'd 8/1 redx1  Inc GLV  Prednisone start Less GLV      Date 9/5 9/18 9/25 10/2 10/06 10/13 10/27 11/1 11/6 11/13 11/17 11/27   Total Weekly Dose 13mg 14 mg 12 mg  12 mg 11 mg 12 mg 12 mg 12 mg 11mg 11 mg 12 mg 12mg   INR 4.2 4.2 4.3  4.6 2.6 3.0 4.1 4.3 3.7 2.1 2.7 4.8   Notes redx1 clindamycin redx1 redx2 Red x2; inc GLV            Date 12/01 12/11 12/15 12/22 12/29 1/5/2024 1/12 1/26 2/9  2/26 3/1 3/11  3/18   Total Weekly Dose 11 mg 11 mg 11 mg 11 mg 11 mg 11 mg 11 mg 11 mg  12 mg   12 mg 12mg 11 mg  11 mg   INR 2.4 3.0 2.7 2.7 3.2 3.0 2.4 2.3 3.3  4.3 2.4 3.4 2.6   Notes  call    call    No GLV, apap   call     Date  4/1 4/8/24 4/15 4/22 4/29 5/3 5/6 5/13 5/20 5/28 6/3 6/10 6/17   Total Weekly  Dose 11 mg 12mg 11 mg 11 mg ???  Missed Sunday maybe more 12 mg 14 mg 14 mg 14 mg 14 mg 13 mg  12 mg 14 mg   INR 2.0 2.5 2.5 3.2 1.1 1.5 2.5 3.3 3.4 4.0 4.0 3.6 3.2   Notes Call  Missed x1 No contact  Boost x 1 No contact No contact  Lovenox  Boost x 2, Call Lovenox     call 1 x decrease  APAP;  Call  APAP call     Date 6/24 7/1 7/8 7/22 8/5 8/12 9/4 9/11 9/25 12/2 12/26-31 12/30/24 1/13/25   Total WeeklyDose 12 mg 12 mg 13 mg 13mg 13 mg 12 mg 13 mg  14 mg 12 mg 12 mg [facility] 14 mg 13 mg   INR 2.5 2.2 3.0 3.6 2.7 2.8 2.3 3.0 3.2 2.7  4.3 5.5POCT  xx emil   Notes    Call Call  Call   Call Esoph ulcers Rec'd 1/6 redx1     Date  1/13 1/20 1/27 2/3 2/10 2/20 2/24 3/3 3/10       Total Weekly  Dose 13 mg 12 mg 10mg 11 mg 14mg 10 mg  14 mg 14 mg 14mg       INR 4.5 emil 4.8 2.4 1.9 2.7 1.6 2.6 2.9 3.2       Notes redx1 redx1 redx1  call Procedure-call  call           Phone Interview:  Tablet Strength: 2mg  Patient Contact Info: [phone] (Mobile) *preferred*  [email or web address]  Verbal Release Authorization signed on 4/7/21 -- may speak with Tammy Bai (friend: [phone]), Ismael Michele (brother: [phone])  Lab Contact Info: [facility] ([facility])  ** will call once monthly or if INR is out of range**    Patient  Findings    Comments: Patient not contacted during this encounter.       Plan:  1. INR therapeutic today at 3.2 (goal 2.5-3.5). Ms. Michele will continue warfarin 2 mg daily until recheck.   2. Repeat INR in 1 week, 3.17.25  4. Lucie Michele understands the importance of calling the [facility] if she notices any s/sx of bleeding, stroke, or abnormal bruising, if any changes are made to her medications or medication doses (Rx, OTC, herbal), or if any upcoming procedures are scheduled. Lucie Michele will likewise let us know if any other changes, questions, or concerns arise regarding anticoagulation therapy. she understands the importance of seeking medical attention immediately if she experiences any falls, vehicle accidents, or abnormal bleeding or bruising. Lucie Michele voiced understanding of this information and confirms that she has the [facility]'s contact information. Otherwise, we will plan to contact the patient once monthly or if her INR is out of range.    Gerald Little   [facility]  3/10/2025 11:43 EDT    I, Kathleen Arauz, PharmD, have reviewed the note in full and agree with the assessment and plan.  03/10/25  14:14 EDT

## 2025-03-17 DIAGNOSIS — M25.552 LEFT HIP PAIN: ICD-10-CM

## 2025-03-17 DIAGNOSIS — N18.31 STAGE 3A CHRONIC KIDNEY DISEASE: ICD-10-CM

## 2025-03-17 DIAGNOSIS — E55.9 VITAMIN D DEFICIENCY: ICD-10-CM

## 2025-03-17 DIAGNOSIS — I10 PRIMARY HYPERTENSION: ICD-10-CM

## 2025-03-17 DIAGNOSIS — E03.9 ACQUIRED HYPOTHYROIDISM: ICD-10-CM

## 2025-03-17 DIAGNOSIS — E07.9 DISORDER OF THYROID: ICD-10-CM

## 2025-03-17 DIAGNOSIS — M85.80 OSTEOPENIA, UNSPECIFIED LOCATION: ICD-10-CM

## 2025-03-17 DIAGNOSIS — Z13.820 OSTEOPOROSIS SCREENING: ICD-10-CM

## 2025-03-17 DIAGNOSIS — R73.03 PREDIABETES: ICD-10-CM

## 2025-03-17 RX ORDER — EZETIMIBE 10 MG/1
10 TABLET ORAL DAILY
Qty: 90 TABLET | Refills: 1 | Status: SHIPPED | OUTPATIENT
Start: 2025-03-17

## 2025-03-17 RX ORDER — FUROSEMIDE 40 MG/1
40 TABLET ORAL DAILY
Qty: 90 TABLET | Refills: 3 | Status: SHIPPED | OUTPATIENT
Start: 2025-03-17

## 2025-03-18 ENCOUNTER — CLINICAL SUPPORT (OUTPATIENT)
Dept: CARDIOLOGY | Facility: CLINIC | Age: 80
End: 2025-03-18
Payer: MEDICARE

## 2025-03-18 ENCOUNTER — ANTICOAGULATION VISIT (OUTPATIENT)
Dept: PHARMACY | Facility: HOSPITAL | Age: 80
End: 2025-03-18
Payer: MEDICARE

## 2025-03-18 DIAGNOSIS — Z95.2 HISTORY OF AORTIC VALVE REPLACEMENT: Primary | ICD-10-CM

## 2025-03-18 LAB — INR PPP: 3.3 (ref 0.9–1.1)

## 2025-03-18 PROCEDURE — 85610 PROTHROMBIN TIME: CPT | Performed by: INTERNAL MEDICINE

## 2025-03-18 PROCEDURE — 36416 COLLJ CAPILLARY BLOOD SPEC: CPT | Performed by: INTERNAL MEDICINE

## 2025-03-18 NOTE — PROGRESS NOTES
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     Diet: 2-3x week: 1 cup of brussels sprouts or broccoli weekly, green beans, peas  (01/27/2025)  Premier Protein (or Equate brand) meal replacement daily  Alcohol: Seldom  Tobacco: None  OTC Pain Medication: APAP      INR History:  Date 4/5 4/19 4/26 5/5 5/11 6/2 6/15 6/18 6/25 7/2 7/9 7/19 7/23 7/30   Total Weekly Dose 15mg 15mg 14mg 14mg 14mg 14mg 14mg 14mg 14mg 14mg 14mg 14mg 14mg 14mg   INR 2.6 3.9 3.9 2.7 3.0 3.6 2.7 2.9 2.9 2.6 2.9 3.1 2.5 2.3   Notes           decr GLV   recv'd 6/21; HM        incr GLV decr GLV      Date 8/6 8/13 8/20 8/27 9/3 9/10 9/17 9/24 10/1 10/8 10/15 10/22 10/29 11/5   Total WeeklyDose 14mg 15mg 15mg 14mg 14mg 15mg 14mg 14mg 14mg 14mg 14mg 14mg 15mg 16mg   INR 2.4 3.4 3.8 2.9 2.2 3.2 2.9 3.3 3.2 3.3 3.0 2.4 2.4 2.4   Notes decr GLV decr GLV       1xboost         call call 1x boost   incr VitK call 2x boost; call      Date 11/10 11/17 11/24 12/1 12/8 12/15 12/23 12/30 1/3/22 1/7 1/11 1/18 1/25 2/1   Total WeeklyDose 17mg 16mg 16mg 16mg 15mg 15 mg Pt did not call back 15mg 12 mg 13mg 15mg 15 mg 15 mg 15 mg   INR 3.7 3.3 3.9 4.0 2.6 3.4 4.0 4.9 3.6 2.4 3.3 2.8 2.7 2.9   Notes Dec GLV   Zero GLV call Red x1 call Less GLV   call   Less  Protein drink redx1  self held x1 (misdose)   Dec vit K fall, apap  Call   call          Date 2/8 2/15 2/22 3/1 3/8 3/15 3/22 3/29 4/5 4/12 4/19 4/26 5/3 5/11   Total WeeklyDose 15mg 14mg 14 mg 15 mg 15 mg 15 mg 14mg 13 mg 14 mg 14 mg 14mg 13mg 15mg 14 mg   INR 4.0 4.0 2.8 2.9 2.9 4.2 3.9 3.3 2.9 3.0 3.8 2.4 3.8 2.5    Notes Call; Dec GLV call Call; Mis-dose call   Call; no GLV Inc GLV. Less protein, apap  redx1 call   Less GLV rec'd 4/27, call Dec GLV        Date 5/18 5/25 6/1 6/8 6/15 6/22 6/29 7/6 7/13 7/20 7/22 7/27  8/10  8/17   Total WeeklyDose 15 mg 15mg 16mg 15 mg 15 mg 15 mg 15 mg 15 mg 15mg 14 mg 15 mg 14 mg  14 mg  15 mg   INR 2.6 2.3 2.7 3.2 3.0 2.9 2.6 2.7 3.6 3.0 3.6 3.0  2.4  3.4   Notes   Inc GLV  call call call       call 7/14-- call call call  call  call  call      Date 8/25 8/31 9/7 9/12 9/19 9/26 10/3 10/10 10/17 10/24 10/31   Total WeeklyDose 14mg 13 mg 14 mg 17mg 15 mg 16mg 15mg 14 mg 13mg 17 mg 15mg   INR 4.3 2.8 1.7 2.9 2.1 3.4 3.8 2.4 1.8 3.7 4.5   Notes Dec GLV call Call refused enox; extra protein  Call; no protein drinks Call; less green tea, inc boostx1 Call; cranberries Redx1; call 1x miss Call  Less protein    1/1  Date 11/7 11/14 11/21 11/28 12/5 12/12 12/19 12/27 1/3 1/9 1/16/23 1/23 1/30/23   Total WeeklyDose 13 mg 14 mg 14 mg 14 mg 14 mg 14 mg 14mg 14 mg  13mg 13 mg 14 mg  14 mg 14 mg   INR 3.2 3.3 3.4 3.6 2.5 4.0 2.9 4.1 3.6 2.6 3.3 2.7 3.0   Notes  call redx1 One less protein shake   Inc GLV Decreased GLV W/o meloxicam  Tramadol,  meloxicam Tramadol,  meloxicam Tramadol,  meloxicam meloxicam     Date 2/7/23 2/13 2/20 2/27/23 3/6 3/13/23 3/23 3/27 4/3/23 4/10/23 4/17/23 4/24 5/1   Total WeeklyDose 14 mg 14 mg 14 mg 14 mg  14 mg 14 mg  14 mg 14 mg 15 mg  14mg 13 mg 15 mg 14 mg   INR 2.9 3.0 3.6 2.6 3 3.5 3.4 2.4 2.8 2.5 2.3 3.2  4.1 POCT   Notes Call  call rec 2/21  Call   call Call   call Call  call Missed dose 1x boost Call; missed protein drinks, less GLV     Date 5/2 5/8 5/15 5/22 5/30 6/5 6/19 7/10 7/24 7/31 8/7 8/14 8/21  8/28   Total WeeklyDose 12 mg 13 mg 14 mg 14 mg 14mg 14 mg 14 mg 14 mg 13 mg 13mg  12 mg 12 mg 14 mg 13 mg   INR 3.7 3.3 2.6 2.7 3.4 2.9 4.0 4.1 3.9 4.1 2.8 1.9 4.0  3.2   Notes rec 5/3  Call  Self-heldx1, boostx1    Stopping HM Less GLV  redx1 Ceftin   Rec'd 8/1 redx1  Inc GLV  Prednisone start Less GLV      Date 9/5 9/18 9/25 10/2 10/06 10/13 10/27 11/1 11/6 11/13 11/17 11/27   Total Weekly Dose 13mg 14 mg 12 mg  12 mg 11 mg 12 mg 12 mg 12 mg 11mg 11 mg 12 mg 12mg   INR 4.2 4.2 4.3  4.6 2.6 3.0 4.1 4.3 3.7 2.1 2.7 4.8   Notes redx1 clindamycin redx1 redx2 Red x2; inc GLV            Date 12/01 12/11 12/15 12/22 12/29 1/5/2024 1/12 1/26 2/9  2/26 3/1 3/11  3/18   Total Weekly Dose 11 mg 11 mg 11 mg 11 mg 11 mg 11 mg 11 mg 11 mg  12 mg   12 mg 12mg 11 mg  11 mg   INR 2.4 3.0 2.7 2.7 3.2 3.0 2.4 2.3 3.3  4.3 2.4 3.4 2.6   Notes  call    call    No GLV, apap   call     Date  4/1 4/8/24 4/15 4/22 4/29 5/3 5/6 5/13 5/20 5/28 6/3 6/10 6/17   Total Weekly  Dose 11 mg 12mg 11 mg 11 mg ???  Missed Sunday maybe more 12 mg 14 mg 14 mg 14 mg 14 mg 13 mg  12 mg 14 mg   INR 2.0 2.5 2.5 3.2 1.1 1.5 2.5 3.3 3.4 4.0 4.0 3.6 3.2   Notes Call  Missed x1 No contact  Boost x 1 No contact No contact  Lovenox  Boost x 2, Call Lovenox     call 1 x decrease  APAP;  Call  APAP call     Date 6/24 7/1 7/8 7/22 8/5 8/12 9/4 9/11 9/25 12/2 12/26-31 12/30/24 1/13/25   Total WeeklyDose 12 mg 12 mg 13 mg 13mg 13 mg 12 mg 13 mg  14 mg 12 mg 12 mg [facility] 14 mg 13 mg   INR 2.5 2.2 3.0 3.6 2.7 2.8 2.3 3.0 3.2 2.7  4.3 5.5POCT  xx emil   Notes    Call Call  Call   Call Esoph ulcers Rec'd 1/6 redx1     Date  1/13 1/20 1/27 2/3 2/10 2/20 2/24 3/3 3/10 3/18      Total Weekly  Dose 13 mg 12 mg 10mg 11 mg 14mg 10 mg  14 mg 14 mg 14mg 14 mg       INR 4.5 emil 4.8 2.4 1.9 2.7 1.6 2.6 2.9 3.2 3.3      Notes redx1 redx1 redx1  call Procedure-call  call           Phone Interview:  Tablet Strength: 2mg  Patient Contact Info: [phone] (Mobile) *preferred*  [email or web address]  Verbal Release Authorization signed on 4/7/21 -- may speak with Tammy Bai (friend: [phone]), Ismael Greerligia (brother: [phone])  Lab Contact Info: [facility] ([facility])  ** will call once monthly or if INR is out of range**    Patient  Findings    Comments: Patient not contacted during this encounter.       Plan:  1. INR therapeutic today at 3.3 (goal 2.5-3.5). Ms. Michele will continue warfarin 2 mg daily until recheck.   2. Repeat INR in 1 week, 3/24/25  4. Lucie Michele understands the importance of calling the [facility] if she notices any s/sx of bleeding, stroke, or abnormal bruising, if any changes are made to her medications or medication doses (Rx, OTC, herbal), or if any upcoming procedures are scheduled. Lucie Michele will likewise let us know if any other changes, questions, or concerns arise regarding anticoagulation therapy. she understands the importance of seeking medical attention immediately if she experiences any falls, vehicle accidents, or abnormal bleeding or bruising. Lucie Michele voiced understanding of this information and confirms that she has the [facility]'s contact information. Otherwise, we will plan to contact the patient once monthly or if her INR is out of range.    Kathleen Arauz, PharmD  3/18/2025  11:05 EDT

## 2025-03-18 NOTE — PROGRESS NOTES
Capillary Blood Specimen Collection  Capillary blood collection performed in clinic by Amberly Kuo RN. Patient tolerated the procedure well without complications.   03/18/25   Amberly Kuo RN

## 2025-03-20 ENCOUNTER — HOSPITAL ENCOUNTER (OUTPATIENT)
Dept: CT IMAGING | Facility: HOSPITAL | Age: 80
Discharge: HOME | End: 2025-03-20
Admitting: NURSE PRACTITIONER
Payer: MEDICARE

## 2025-03-20 DIAGNOSIS — Z98.890 S/P ASCENDING AORTIC ANEURYSM REPAIR: ICD-10-CM

## 2025-03-20 DIAGNOSIS — Z86.79 S/P ASCENDING AORTIC ANEURYSM REPAIR: ICD-10-CM

## 2025-03-20 PROCEDURE — 71275 CT ANGIOGRAPHY CHEST: CPT

## 2025-03-20 PROCEDURE — 25510000001 IOPAMIDOL PER 1 ML: Performed by: NURSE PRACTITIONER

## 2025-03-20 RX ORDER — IOPAMIDOL 755 MG/ML
100 INJECTION, SOLUTION INTRAVASCULAR
Status: COMPLETED | OUTPATIENT
Start: 2025-03-20 | End: 2025-03-20

## 2025-03-20 RX ADMIN — IOPAMIDOL 100 ML: 755 INJECTION, SOLUTION INTRAVENOUS at 15:59

## 2025-03-21 ENCOUNTER — LAB (OUTPATIENT)
Dept: FAMILY MEDICINE CLINIC | Facility: CLINIC | Age: 80
End: 2025-03-21
Payer: MEDICARE

## 2025-03-21 DIAGNOSIS — E03.9 ACQUIRED HYPOTHYROIDISM: ICD-10-CM

## 2025-03-21 DIAGNOSIS — E55.9 VITAMIN D DEFICIENCY: ICD-10-CM

## 2025-03-21 DIAGNOSIS — Z12.31 ENCOUNTER FOR SCREENING MAMMOGRAM FOR MALIGNANT NEOPLASM OF BREAST: ICD-10-CM

## 2025-03-21 DIAGNOSIS — I10 PRIMARY HYPERTENSION: ICD-10-CM

## 2025-03-21 DIAGNOSIS — R73.9 HYPERGLYCEMIA: ICD-10-CM
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
## 2025-03-22 LAB
25(OH)D3+25(OH)D2 SERPL-MCNC: 70.4 NG/ML (ref 30–100)
ALBUMIN SERPL-MCNC: 4.2 G/DL (ref 3.8–4.8)
ALP SERPL-CCNC: 97 IU/L (ref 44–121)
ALT SERPL-CCNC: 14 IU/L (ref 0–32)
AST SERPL-CCNC: 25 IU/L (ref 0–40)
BASOPHILS # BLD AUTO: 0 X10E3/UL (ref 0–0.2)
BASOPHILS NFR BLD AUTO: 0 %
BILIRUB SERPL-MCNC: 0.6 MG/DL (ref 0–1.2)
BUN SERPL-MCNC: 11 MG/DL (ref 8–27)
BUN/CREAT SERPL: 14 (ref 12–28)
CALCIUM SERPL-MCNC: 9.5 MG/DL (ref 8.7–10.3)
CHLORIDE SERPL-SCNC: 98 MMOL/L (ref 96–106)
CHOLEST SERPL-MCNC: 282 MG/DL (ref 100–199)
CO2 SERPL-SCNC: 26 MMOL/L (ref 20–29)
CREAT SERPL-MCNC: 0.77 MG/DL (ref 0.57–1)
EGFRCR SERPLBLD CKD-EPI 2021: 79 ML/MIN/1.73
EOSINOPHIL # BLD AUTO: 0.1 X10E3/UL (ref 0–0.4)
EOSINOPHIL NFR BLD AUTO: 2 %
ERYTHROCYTE [DISTWIDTH] IN BLOOD BY AUTOMATED COUNT: 14.4 % (ref 11.7–15.4)
GLOBULIN SER CALC-MCNC: 2.4 G/DL (ref 1.5–4.5)
GLUCOSE SERPL-MCNC: 95 MG/DL (ref 70–99)
HBA1C MFR BLD: 5.6 % (ref 4.8–5.6)
HCT VFR BLD AUTO: 43.9 % (ref 34–46.6)
HDLC SERPL-MCNC: 65 MG/DL
HGB BLD-MCNC: 14.7 G/DL (ref 11.1–15.9)
IMM GRANULOCYTES # BLD AUTO: 0.1 X10E3/UL (ref 0–0.1)
IMM GRANULOCYTES NFR BLD AUTO: 1 %
LDLC SERPL CALC-MCNC: 184 MG/DL (ref 0–99)
LYMPHOCYTES # BLD AUTO: 2.3 X10E3/UL (ref 0.7–3.1)
LYMPHOCYTES NFR BLD AUTO: 29 %
MCH RBC QN AUTO: 30.5 PG (ref 26.6–33)
MCHC RBC AUTO-ENTMCNC: 33.5 G/DL (ref 31.5–35.7)
MCV RBC AUTO: 91 FL (ref 79–97)
MONOCYTES # BLD AUTO: 0.8 X10E3/UL (ref 0.1–0.9)
MONOCYTES NFR BLD AUTO: 11 %
NEUTROPHILS # BLD AUTO: 4.5 X10E3/UL (ref 1.4–7)
NEUTROPHILS NFR BLD AUTO: 57 %
PLATELET # BLD AUTO: 263 X10E3/UL (ref 150–450)
POTASSIUM SERPL-SCNC: 3.8 MMOL/L (ref 3.5–5.2)
PROT SERPL-MCNC: 6.6 G/DL (ref 6–8.5)
RBC # BLD AUTO: 4.82 X10E6/UL (ref 3.77–5.28)
SODIUM SERPL-SCNC: 139 MMOL/L (ref 134–144)
T4 FREE SERPL-MCNC: 1.25 NG/DL (ref 0.82–1.77)
TRIGL SERPL-MCNC: 179 MG/DL (ref 0–149)
TSH SERPL DL<=0.005 MIU/L-ACNC: 5.23 UIU/ML (ref 0.45–4.5)
VIT B12 SERPL-MCNC: 425 PG/ML (ref 232–1245)
VLDLC SERPL CALC-MCNC: 33 MG/DL (ref 5–40)
WBC # BLD AUTO: 7.8 X10E3/UL (ref 3.4–10.8)

## 2025-03-24 ENCOUNTER — RESULTS FOLLOW-UP (OUTPATIENT)
Dept: FAMILY MEDICINE CLINIC | Facility: CLINIC | Age: 80
End: 2025-03-24
Payer: MEDICARE

## 2025-03-24 ENCOUNTER — OFFICE VISIT (OUTPATIENT)
Dept: CARDIAC SURGERY | Facility: CLINIC | Age: 80
End: 2025-03-24
Payer: MEDICARE

## 2025-03-24 VITALS
BODY MASS INDEX: 26.99 KG/M2 | DIASTOLIC BLOOD PRESSURE: 70 MMHG | OXYGEN SATURATION: 98 % | WEIGHT: 162 LBS | SYSTOLIC BLOOD PRESSURE: 138 MMHG | TEMPERATURE: 98.4 F | HEART RATE: 73 BPM | HEIGHT: 65 IN

## 2025-03-24 DIAGNOSIS — Z95.2 HISTORY OF AORTIC VALVE REPLACEMENT: ICD-10-CM

## 2025-03-24 DIAGNOSIS — Z86.79 S/P ASCENDING AORTIC ANEURYSM REPAIR: Primary | ICD-10-CM

## 2025-03-24 DIAGNOSIS — R91.1 LUNG NODULE: ICD-10-CM

## 2025-03-24 DIAGNOSIS — R91.1 LUNG NODULE: Primary | ICD-10-CM

## 2025-03-24 DIAGNOSIS — Z98.890 S/P ASCENDING AORTIC ANEURYSM REPAIR: Primary | ICD-10-CM

## 2025-03-24 PROCEDURE — 1160F RVW MEDS BY RX/DR IN RCRD: CPT

## 2025-03-24 PROCEDURE — 1159F MED LIST DOCD IN RCRD: CPT

## 2025-03-24 PROCEDURE — 3075F SYST BP GE 130 - 139MM HG: CPT

## 2025-03-24 PROCEDURE — 99215 OFFICE O/P EST HI 40 MIN: CPT

## 2025-03-24 PROCEDURE — 3078F DIAST BP <80 MM HG: CPT

## 2025-03-24 RX ORDER — ALENDRONATE SODIUM 70 MG/1
70 TABLET ORAL WEEKLY
Qty: 12 TABLET | Refills: 0 | Status: SHIPPED | OUTPATIENT
Start: 2025-03-24

## 2025-03-25 ENCOUNTER — ANTICOAGULATION VISIT (OUTPATIENT)
Dept: PHARMACY | Facility: HOSPITAL | Age: 80
End: 2025-03-25
Payer: MEDICARE

## 2025-03-25 ENCOUNTER — CLINICAL SUPPORT (OUTPATIENT)
Dept: CARDIOLOGY | Facility: CLINIC | Age: 80
End: 2025-03-25
Payer: MEDICARE

## 2025-03-25 DIAGNOSIS — Z95.2 HISTORY OF AORTIC VALVE REPLACEMENT: Primary | ICD-10-CM

## 2025-03-25 LAB — INR PPP: 3.2 (ref 0.9–1.1)

## 2025-03-25 PROCEDURE — 85610 PROTHROMBIN TIME: CPT | Performed by: INTERNAL MEDICINE

## 2025-03-25 PROCEDURE — 36416 COLLJ CAPILLARY BLOOD SPEC: CPT | Performed by: INTERNAL MEDICINE

## 2025-03-25 NOTE — PROGRESS NOTES
Venipuncture Blood Specimen Collection  Venipuncture performed in clinic by Augusta Salazar MA with good hemostasis. Patient tolerated the procedure well without complications.   03/25/25   Augusta Salazar MA

## 2025-03-25 NOTE — PROGRESS NOTES
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     Diet: 2-3x week: 1 cup of brussels sprouts or broccoli weekly, green beans, peas  (01/27/2025)  Premier Protein (or Equate brand) meal replacement daily  Alcohol: Seldom  Tobacco: None  OTC Pain Medication: APAP      INR History:  Date 4/5 4/19 4/26 5/5 5/11 6/2 6/15 6/18 6/25 7/2 7/9 7/19 7/23 7/30   Total Weekly Dose 15mg 15mg 14mg 14mg 14mg 14mg 14mg 14mg 14mg 14mg 14mg 14mg 14mg 14mg   INR 2.6 3.9 3.9 2.7 3.0 3.6 2.7 2.9 2.9 2.6 2.9 3.1 2.5 2.3   Notes           decr GLV   recv'd 6/21; HM        incr GLV decr GLV      Date 8/6 8/13 8/20 8/27 9/3 9/10 9/17 9/24 10/1 10/8 10/15 10/22 10/29 11/5   Total WeeklyDose 14mg 15mg 15mg 14mg 14mg 15mg 14mg 14mg 14mg 14mg 14mg 14mg 15mg 16mg   INR 2.4 3.4 3.8 2.9 2.2 3.2 2.9 3.3 3.2 3.3 3.0 2.4 2.4 2.4   Notes decr GLV decr GLV       1xboost         call call 1x boost   incr VitK call 2x boost; call      Date 11/10 11/17 11/24 12/1 12/8 12/15 12/23 12/30 1/3/22 1/7 1/11 1/18 1/25 2/1   Total WeeklyDose 17mg 16mg 16mg 16mg 15mg 15 mg Pt did not call back 15mg 12 mg 13mg 15mg 15 mg 15 mg 15 mg   INR 3.7 3.3 3.9 4.0 2.6 3.4 4.0 4.9 3.6 2.4 3.3 2.8 2.7 2.9   Notes Dec GLV   Zero GLV call Red x1 call Less GLV   call   Less  Protein drink redx1  self held x1 (misdose)   Dec vit K fall, apap  Call   call          Date 2/8 2/15 2/22 3/1 3/8 3/15 3/22 3/29 4/5 4/12 4/19 4/26 5/3 5/11   Total WeeklyDose 15mg 14mg 14 mg 15 mg 15 mg 15 mg 14mg 13 mg 14 mg 14 mg 14mg 13mg 15mg 14 mg   INR 4.0 4.0 2.8 2.9 2.9 4.2 3.9 3.3 2.9 3.0 3.8 2.4 3.8 2.5    Notes Call; Dec GLV call Call; Mis-dose call   Call; no GLV Inc GLV. Less protein, apap  redx1 call   Less GLV rec'd 4/27, call Dec GLV        Date 5/18 5/25 6/1 6/8 6/15 6/22 6/29 7/6 7/13 7/20 7/22 7/27  8/10  8/17   Total WeeklyDose 15 mg 15mg 16mg 15 mg 15 mg 15 mg 15 mg 15 mg 15mg 14 mg 15 mg 14 mg  14 mg  15 mg   INR 2.6 2.3 2.7 3.2 3.0 2.9 2.6 2.7 3.6 3.0 3.6 3.0  2.4  3.4   Notes   Inc GLV  call call call       call 7/14-- call call call  call  call  call      Date 8/25 8/31 9/7 9/12 9/19 9/26 10/3 10/10 10/17 10/24 10/31   Total WeeklyDose 14mg 13 mg 14 mg 17mg 15 mg 16mg 15mg 14 mg 13mg 17 mg 15mg   INR 4.3 2.8 1.7 2.9 2.1 3.4 3.8 2.4 1.8 3.7 4.5   Notes Dec GLV call Call refused enox; extra protein  Call; no protein drinks Call; less green tea, inc boostx1 Call; cranberries Redx1; call 1x miss Call  Less protein    1/1  Date 11/7 11/14 11/21 11/28 12/5 12/12 12/19 12/27 1/3 1/9 1/16/23 1/23 1/30/23   Total WeeklyDose 13 mg 14 mg 14 mg 14 mg 14 mg 14 mg 14mg 14 mg  13mg 13 mg 14 mg  14 mg 14 mg   INR 3.2 3.3 3.4 3.6 2.5 4.0 2.9 4.1 3.6 2.6 3.3 2.7 3.0   Notes  call redx1 One less protein shake   Inc GLV Decreased GLV W/o meloxicam  Tramadol,  meloxicam Tramadol,  meloxicam Tramadol,  meloxicam meloxicam     Date 2/7/23 2/13 2/20 2/27/23 3/6 3/13/23 3/23 3/27 4/3/23 4/10/23 4/17/23 4/24 5/1   Total WeeklyDose 14 mg 14 mg 14 mg 14 mg  14 mg 14 mg  14 mg 14 mg 15 mg  14mg 13 mg 15 mg 14 mg   INR 2.9 3.0 3.6 2.6 3 3.5 3.4 2.4 2.8 2.5 2.3 3.2  4.1 POCT   Notes Call  call rec 2/21  Call   call Call   call Call  call Missed dose 1x boost Call; missed protein drinks, less GLV     Date 5/2 5/8 5/15 5/22 5/30 6/5 6/19 7/10 7/24 7/31 8/7 8/14 8/21  8/28   Total WeeklyDose 12 mg 13 mg 14 mg 14 mg 14mg 14 mg 14 mg 14 mg 13 mg 13mg  12 mg 12 mg 14 mg 13 mg   INR 3.7 3.3 2.6 2.7 3.4 2.9 4.0 4.1 3.9 4.1 2.8 1.9 4.0  3.2   Notes rec 5/3  Call  Self-heldx1, boostx1    Stopping HM Less GLV  redx1 Ceftin   Rec'd 8/1 redx1  Inc GLV  Prednisone start Less GLV      Date 9/5 9/18 9/25 10/2 10/06 10/13 10/27 11/1 11/6 11/13 11/17 11/27   Total Weekly Dose 13mg 14 mg 12 mg  12 mg 11 mg 12 mg 12 mg 12 mg 11mg 11 mg 12 mg 12mg   INR 4.2 4.2 4.3  4.6 2.6 3.0 4.1 4.3 3.7 2.1 2.7 4.8   Notes redx1 clindamycin redx1 redx2 Red x2; inc GLV            Date 12/01 12/11 12/15 12/22 12/29 1/5/2024 1/12 1/26 2/9  2/26 3/1 3/11  3/18   Total Weekly Dose 11 mg 11 mg 11 mg 11 mg 11 mg 11 mg 11 mg 11 mg  12 mg   12 mg 12mg 11 mg  11 mg   INR 2.4 3.0 2.7 2.7 3.2 3.0 2.4 2.3 3.3  4.3 2.4 3.4 2.6   Notes  call    call    No GLV, apap   call     Date  4/1 4/8/24 4/15 4/22 4/29 5/3 5/6 5/13 5/20 5/28 6/3 6/10 6/17   Total Weekly  Dose 11 mg 12mg 11 mg 11 mg ???  Missed Sunday maybe more 12 mg 14 mg 14 mg 14 mg 14 mg 13 mg  12 mg 14 mg   INR 2.0 2.5 2.5 3.2 1.1 1.5 2.5 3.3 3.4 4.0 4.0 3.6 3.2   Notes Call  Missed x1 No contact  Boost x 1 No contact No contact  Lovenox  Boost x 2, Call Lovenox     call 1 x decrease  APAP;  Call  APAP call     Date 6/24 7/1 7/8 7/22 8/5 8/12 9/4 9/11 9/25 12/2 12/26-31 12/30/24 1/13/25   Total WeeklyDose 12 mg 12 mg 13 mg 13mg 13 mg 12 mg 13 mg  14 mg 12 mg 12 mg [facility] 14 mg 13 mg   INR 2.5 2.2 3.0 3.6 2.7 2.8 2.3 3.0 3.2 2.7  4.3 5.5POCT  xx emil   Notes    Call Call  Call   Call Esoph ulcers Rec'd 1/6 redx1     Date  1/13 1/20 1/27 2/3 2/10 2/20 2/24 3/3 3/10 3/18 3/25     Total Weekly  Dose 13 mg 12 mg 10mg 11 mg 14mg 10 mg  14 mg 14 mg 14mg 14 mg  14 mg     INR 4.5 emil 4.8 2.4 1.9 2.7 1.6 2.6 2.9 3.2 3.3 3.2     Notes redx1 redx1 redx1  call Procedure-call  call    Call   ED visit        Phone Interview:  Tablet Strength: 2mg  Patient Contact Info: [phone] (Mobile) *preferred*  [email or web address]  Verbal Release Authorization signed on 4/7/21 -- may speak with Tammy Bai (friend: [phone]), Ismael Michele (brother: [phone])  Lab Contact Info: [facility] ([facility])  ** will call once monthly or if INR  is out of range**    Patient Findings    Positives: Change in health, Emergency department visit   Negatives: Signs/symptoms of thrombosis, Signs/symptoms of bleeding, Laboratory test error suspected, Change in alcohol use, Change in activity, Upcoming invasive procedure, Upcoming dental procedure, Missed doses, Extra doses, Change in medications, Change in diet/appetite, Hospital admission, Bruising, Other complaints   Comments: Patient reports she visited the ED this past week for esophageal ulcers. All other findings negative per patient.     Plan:  1. INR therapeutic today at 3.2 (goal 2.5-3.5). Instructed Ms. Michele to continue warfarin 2 mg daily until recheck.   2. Repeat INR 3.31.25  3. Verbal information provided over the phone. Patient RBV dosing instructions, expresses understanding by teach back, and has no further questions at this time.  4. Lucie Michele understands the importance of calling the [facility] if she notices any s/sx of bleeding, stroke, or abnormal bruising, if any changes are made to her medications or medication doses (Rx, OTC, herbal), or if any upcoming procedures are scheduled. Lucie Michele will likewise let us know if any other changes, questions, or concerns arise regarding anticoagulation therapy. she understands the importance of seeking medical attention immediately if she experiences any falls, vehicle accidents, or abnormal bleeding or bruising. Lucie Michele voiced understanding of this information and confirms that she has the [facility]'s contact information. Otherwise, we will plan to contact the patient once monthly or if her INR is out of range.      Elva Langford CPhT, [facility]   11:14 EDT   3/25/2025    Kathleen MAS, PharmD, have reviewed the note in full and agree with the assessment and plan.  03/25/25  13:17 EDT

## 2025-03-26 ENCOUNTER — TELEPHONE (OUTPATIENT)
Dept: CARDIOLOGY | Facility: CLINIC | Age: 80
End: 2025-03-26
Payer: MEDICARE

## 2025-03-26 ENCOUNTER — TELEPHONE (OUTPATIENT)
Dept: PHARMACY | Facility: HOSPITAL | Age: 80
End: 2025-03-26
Payer: MEDICARE

## 2025-03-26 NOTE — TELEPHONE ENCOUNTER
Per patient scheduled for Upper & Lower GI Wed. She is suppose to start holding Warfrin tonight. Patient had to bridge in the past and asking if she is suppose to do that this time too, really doesn't want to if she doesn't. Can you please call her [phone]

## 2025-03-26 NOTE — TELEPHONE ENCOUNTER
Patient will be having procedure 3/31 - warfarin hold starting tonight.   Per Dr. Blake, does not need to bridge with Lovenox.   Will be off of warfarin 3/26-3/31 and will resume at maintenance dosing.     Zenaida Sommer, PharmD   3/26/2025  14:25 EDT

## 2025-03-26 NOTE — TELEPHONE ENCOUNTER
Spoke with Ms Michele and advised her that I spoke with Dr. Blake and he states you do not have to bridge while stopping the coumadin for the next 5 days. She will start holding her dose tonight and procedures are on Monday.  I will also let the coumadin clinic know as well. She verbalized understanding.     Spoke with Zenaida, Pharmacist and advised.
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
## 2025-03-28 ENCOUNTER — OFFICE VISIT (OUTPATIENT)
Dept: FAMILY MEDICINE CLINIC | Facility: CLINIC | Age: 80
End: 2025-03-28
Payer: MEDICARE

## 2025-03-28 VITALS
OXYGEN SATURATION: 98 % | WEIGHT: 160 LBS | BODY MASS INDEX: 26.66 KG/M2 | HEIGHT: 65 IN | DIASTOLIC BLOOD PRESSURE: 82 MMHG | HEART RATE: 68 BPM | RESPIRATION RATE: 16 BRPM | SYSTOLIC BLOOD PRESSURE: 132 MMHG

## 2025-03-28 DIAGNOSIS — K76.9 LIVER LESION: ICD-10-CM

## 2025-03-28 DIAGNOSIS — E03.9 ACQUIRED HYPOTHYROIDISM: ICD-10-CM

## 2025-03-28 DIAGNOSIS — E03.9 HYPOTHYROIDISM, UNSPECIFIED TYPE: ICD-10-CM

## 2025-03-28 DIAGNOSIS — R73.9 HYPERGLYCEMIA: ICD-10-CM

## 2025-03-28 DIAGNOSIS — K27.9 PUD (PEPTIC ULCER DISEASE): ICD-10-CM

## 2025-03-28 DIAGNOSIS — E78.2 MIXED HYPERLIPIDEMIA: Primary | ICD-10-CM

## 2025-03-28 DIAGNOSIS — I10 PRIMARY HYPERTENSION: ICD-10-CM

## 2025-03-28 RX ORDER — LEVOTHYROXINE SODIUM 125 UG/1
125 TABLET ORAL DAILY
Qty: 90 TABLET | Refills: 0 | Status: SHIPPED | OUTPATIENT
Start: 2025-03-28

## 2025-03-28 NOTE — ASSESSMENT & PLAN NOTE
Patient was admitted for peptic ulcer disease.  She has been scoped twice.  She is going to get scoped on Monday.  I would have her add Pepcid to her regimen if she continues to have some abdominal pain.  She is on sucralfate and omeprazole.  Return to clinic in 2 weeks for recheck.

## 2025-04-01 ENCOUNTER — CLINICAL SUPPORT (OUTPATIENT)
Dept: CARDIOLOGY | Facility: CLINIC | Age: 80
End: 2025-04-01
Payer: MEDICARE

## 2025-04-01 DIAGNOSIS — Z95.2 HISTORY OF AORTIC VALVE REPLACEMENT: Primary | ICD-10-CM

## 2025-04-01 LAB — INR PPP: 1.2 (ref 0.9–1.1)

## 2025-04-01 PROCEDURE — 85610 PROTHROMBIN TIME: CPT | Performed by: INTERNAL MEDICINE

## 2025-04-01 PROCEDURE — 36416 COLLJ CAPILLARY BLOOD SPEC: CPT | Performed by: INTERNAL MEDICINE

## 2025-04-01 NOTE — PROGRESS NOTES
Capillary Blood Specimen Collection  Capillary blood collection performed in clinic by Augusta Salazar MA. Patient tolerated the procedure well without complications.   04/01/25   Augusta Salazar MA

## 2025-04-02 ENCOUNTER — ANTICOAGULATION VISIT (OUTPATIENT)
Dept: PHARMACY | Facility: HOSPITAL | Age: 80
End: 2025-04-02
Payer: MEDICARE

## 2025-04-02 NOTE — PROGRESS NOTES
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     Diet: 2-3x week: 1 cup of brussels sprouts or broccoli weekly, green beans, peas  (01/27/2025)  Premier Protein (or Equate brand) meal replacement daily  Alcohol: Seldom  Tobacco: None  OTC Pain Medication: APAP      INR History:  Date 4/5 4/19 4/26 5/5 5/11 6/2 6/15 6/18 6/25 7/2 7/9 7/19 7/23 7/30   Total Weekly Dose 15mg 15mg 14mg 14mg 14mg 14mg 14mg 14mg 14mg 14mg 14mg 14mg 14mg 14mg   INR 2.6 3.9 3.9 2.7 3.0 3.6 2.7 2.9 2.9 2.6 2.9 3.1 2.5 2.3   Notes           decr GLV   recv'd 6/21; HM        incr GLV decr GLV      Date 8/6 8/13 8/20 8/27 9/3 9/10 9/17 9/24 10/1 10/8 10/15 10/22 10/29 11/5   Total WeeklyDose 14mg 15mg 15mg 14mg 14mg 15mg 14mg 14mg 14mg 14mg 14mg 14mg 15mg 16mg   INR 2.4 3.4 3.8 2.9 2.2 3.2 2.9 3.3 3.2 3.3 3.0 2.4 2.4 2.4   Notes decr GLV decr GLV       1xboost         call call 1x boost   incr VitK call 2x boost; call      Date 11/10 11/17 11/24 12/1 12/8 12/15 12/23 12/30 1/3/22 1/7 1/11 1/18 1/25 2/1   Total WeeklyDose 17mg 16mg 16mg 16mg 15mg 15 mg Pt did not call back 15mg 12 mg 13mg 15mg 15 mg 15 mg 15 mg   INR 3.7 3.3 3.9 4.0 2.6 3.4 4.0 4.9 3.6 2.4 3.3 2.8 2.7 2.9   Notes Dec GLV   Zero GLV call Red x1 call Less GLV   call   Less  Protein drink redx1  self held x1 (misdose)   Dec vit K fall, apap  Call   call          Date 2/8 2/15 2/22 3/1 3/8 3/15 3/22 3/29 4/5 4/12 4/19 4/26 5/3 5/11   Total WeeklyDose 15mg 14mg 14 mg 15 mg 15 mg 15 mg 14mg 13 mg 14 mg 14 mg 14mg 13mg 15mg 14 mg   INR 4.0 4.0 2.8 2.9 2.9 4.2 3.9 3.3 2.9 3.0 3.8 2.4 3.8 2.5    Notes Call; Dec GLV call Call; Mis-dose call   Call; no GLV Inc GLV. Less protein, apap  redx1 call   Less GLV rec'd 4/27, call Dec GLV        Date 5/18 5/25 6/1 6/8 6/15 6/22 6/29 7/6 7/13 7/20 7/22 7/27  8/10  8/17   Total WeeklyDose 15 mg 15mg 16mg 15 mg 15 mg 15 mg 15 mg 15 mg 15mg 14 mg 15 mg 14 mg  14 mg  15 mg   INR 2.6 2.3 2.7 3.2 3.0 2.9 2.6 2.7 3.6 3.0 3.6 3.0  2.4  3.4   Notes   Inc GLV  call call call       call 7/14-- call call call  call  call  call      Date 8/25 8/31 9/7 9/12 9/19 9/26 10/3 10/10 10/17 10/24 10/31   Total WeeklyDose 14mg 13 mg 14 mg 17mg 15 mg 16mg 15mg 14 mg 13mg 17 mg 15mg   INR 4.3 2.8 1.7 2.9 2.1 3.4 3.8 2.4 1.8 3.7 4.5   Notes Dec GLV call Call refused enox; extra protein  Call; no protein drinks Call; less green tea, inc boostx1 Call; cranberries Redx1; call 1x miss Call  Less protein    1/1  Date 11/7 11/14 11/21 11/28 12/5 12/12 12/19 12/27 1/3 1/9 1/16/23 1/23 1/30/23   Total WeeklyDose 13 mg 14 mg 14 mg 14 mg 14 mg 14 mg 14mg 14 mg  13mg 13 mg 14 mg  14 mg 14 mg   INR 3.2 3.3 3.4 3.6 2.5 4.0 2.9 4.1 3.6 2.6 3.3 2.7 3.0   Notes  call redx1 One less protein shake   Inc GLV Decreased GLV W/o meloxicam  Tramadol,  meloxicam Tramadol,  meloxicam Tramadol,  meloxicam meloxicam     Date 2/7/23 2/13 2/20 2/27/23 3/6 3/13/23 3/23 3/27 4/3/23 4/10/23 4/17/23 4/24 5/1   Total WeeklyDose 14 mg 14 mg 14 mg 14 mg  14 mg 14 mg  14 mg 14 mg 15 mg  14mg 13 mg 15 mg 14 mg   INR 2.9 3.0 3.6 2.6 3 3.5 3.4 2.4 2.8 2.5 2.3 3.2  4.1 POCT   Notes Call  call rec 2/21  Call   call Call   call Call  call Missed dose 1x boost Call; missed protein drinks, less GLV     Date 5/2 5/8 5/15 5/22 5/30 6/5 6/19 7/10 7/24 7/31 8/7 8/14 8/21  8/28   Total WeeklyDose 12 mg 13 mg 14 mg 14 mg 14mg 14 mg 14 mg 14 mg 13 mg 13mg  12 mg 12 mg 14 mg 13 mg   INR 3.7 3.3 2.6 2.7 3.4 2.9 4.0 4.1 3.9 4.1 2.8 1.9 4.0  3.2   Notes rec 5/3  Call  Self-heldx1, boostx1    Stopping HM Less GLV  redx1 Ceftin   Rec'd 8/1 redx1  Inc GLV  Prednisone start Less GLV      Date 9/5 9/18 9/25 10/2 10/06 10/13 10/27 11/1 11/6 11/13 11/17 11/27   Total Weekly Dose 13mg 14 mg 12 mg  12 mg 11 mg 12 mg 12 mg 12 mg 11mg 11 mg 12 mg 12mg   INR 4.2 4.2 4.3  4.6 2.6 3.0 4.1 4.3 3.7 2.1 2.7 4.8   Notes redx1 clindamycin redx1 redx2 Red x2; inc GLV            Date 12/01 12/11 12/15 12/22 12/29 1/5/2024 1/12 1/26 2/9  2/26 3/1 3/11  3/18   Total Weekly Dose 11 mg 11 mg 11 mg 11 mg 11 mg 11 mg 11 mg 11 mg  12 mg   12 mg 12mg 11 mg  11 mg   INR 2.4 3.0 2.7 2.7 3.2 3.0 2.4 2.3 3.3  4.3 2.4 3.4 2.6   Notes  call    call    No GLV, apap   call     Date  4/1 4/8/24 4/15 4/22 4/29 5/3 5/6 5/13 5/20 5/28 6/3 6/10 6/17   Total Weekly  Dose 11 mg 12mg 11 mg 11 mg ???  Missed Sunday maybe more 12 mg 14 mg 14 mg 14 mg 14 mg 13 mg  12 mg 14 mg   INR 2.0 2.5 2.5 3.2 1.1 1.5 2.5 3.3 3.4 4.0 4.0 3.6 3.2   Notes Call  Missed x1 No contact  Boost x 1 No contact No contact  Lovenox  Boost x 2, Call Lovenox     call 1 x decrease  APAP;  Call  APAP call     Date 6/24 7/1 7/8 7/22 8/5 8/12 9/4 9/11 9/25 12/2 12/26-31 12/30/24 1/13/25   Total WeeklyDose 12 mg 12 mg 13 mg 13mg 13 mg 12 mg 13 mg  14 mg 12 mg 12 mg [facility] 14 mg 13 mg   INR 2.5 2.2 3.0 3.6 2.7 2.8 2.3 3.0 3.2 2.7  4.3 5.5POCT  xx emil   Notes    Call Call  Call   Call Esoph ulcers Rec'd 1/6 redx1     Date  1/13 1/20 1/27 2/3 2/10 2/20 2/24 3/3 3/10 3/18 3/25 4/1    Total Weekly  Dose 13 mg 12 mg 10mg 11 mg 14mg 10 mg  14 mg 14 mg 14mg 14 mg  14 mg 6 mg    INR 4.5 emil 4.8 2.4 1.9 2.7 1.6 2.6 2.9 3.2 3.3 3.2 1.2    Notes redx1 redx1 redx1  call Procedure-call  call    Call   ED visit  Hold x 5  Boost x 2     Phone Interview:  Tablet Strength: 2mg  Patient Contact Info: [phone] (Mobile) *preferred*  [email or web address]  Verbal Release Authorization signed on 4/7/21 -- may speak with Tammy Bai (friend: [phone]), Ismael Michele (brother: [phone])  Lab Contact Info: [facility] ([place])  ** will  call once monthly or if INR is out of range**    Patient Findings    Negatives: Signs/symptoms of thrombosis, Signs/symptoms of bleeding, Laboratory test error suspected, Change in health, Change in alcohol use, Change in activity, Upcoming invasive procedure, Emergency department visit, Upcoming dental procedure, Missed doses, Extra doses, Change in medications, Change in diet/appetite, Hospital admission, Bruising, Other complaints   Comments: Patient coming off 5 day warfarin hold for procedure. Resumed warfarin 3/31 PM. Patient concerned about INR being so low. Will do 2 boosted doses and resume normal maintenance dosing of warfarin 2 mg daily and recheck Monday.    All other findings negative per patient       Plan:  1. INR subtherapeutic 4/1 at 1.2 (goal 2.5-3.5) after being on 5 day warfarin hold for procedure. Spoke with patient 4/2. Instructed Ms. Michele to BOOST today and tomorrow to warfarin 4 mg and continue warfarin 2 mg daily until recheck.   2. Repeat INR 4/7/25  3. Verbal information provided over the phone. Patient RBV dosing instructions, expresses understanding by teach back, and has no further questions at this time.  4. Lucie Michele understands the importance of calling the [facility] if she notices any s/sx of bleeding, stroke, or abnormal bruising, if any changes are made to her medications or medication doses (Rx, OTC, herbal), or if any upcoming procedures are scheduled. Lucie Michele will likewise let us know if any other changes, questions, or concerns arise regarding anticoagulation therapy. she understands the importance of seeking medical attention immediately if she experiences any falls, vehicle accidents, or abnormal bleeding or bruising. Lucie Michele voiced understanding of this information and confirms that she has the [facility]'s contact information. Otherwise, we will plan to contact the patient once monthly or if her INR is out of  range.    Kathleen Arauz, PharmD  4/2/2025  13:24 EDT

## 2025-04-04 ENCOUNTER — TELEPHONE (OUTPATIENT)
Dept: CARDIOLOGY | Facility: CLINIC | Age: 80
End: 2025-04-04
Payer: MEDICARE

## 2025-04-04 DIAGNOSIS — E78.5 HYPERLIPIDEMIA, UNSPECIFIED HYPERLIPIDEMIA TYPE: ICD-10-CM

## 2025-04-04 RX ORDER — ATORVASTATIN CALCIUM 20 MG/1
20 TABLET, FILM COATED ORAL DAILY
Qty: 90 TABLET | Refills: 2 | Status: SHIPPED | OUTPATIENT
Start: 2025-04-04

## 2025-04-07 ENCOUNTER — HOSPITAL ENCOUNTER (OUTPATIENT)
Facility: HOSPITAL | Age: 80
Discharge: HOME | End: 2025-04-07
Payer: MEDICARE

## 2025-04-07 ENCOUNTER — ANTICOAGULATION VISIT (OUTPATIENT)
Dept: PHARMACY | Facility: HOSPITAL | Age: 80
End: 2025-04-07
Payer: MEDICARE

## 2025-04-07 ENCOUNTER — CLINICAL SUPPORT (OUTPATIENT)
Dept: CARDIOLOGY | Facility: CLINIC | Age: 80
End: 2025-04-07
Payer: MEDICARE

## 2025-04-07 DIAGNOSIS — Z95.2 HISTORY OF AORTIC VALVE REPLACEMENT: Primary | ICD-10-CM

## 2025-04-07 DIAGNOSIS — R91.1 LUNG NODULE: ICD-10-CM

## 2025-04-07 LAB
GLUCOSE BLDC GLUCOMTR-MCNC: 82 MG/DL (ref 70–130)
INR PPP: 3 (ref 0.9–1.1)

## 2025-04-07 PROCEDURE — A9552 F18 FDG: HCPCS

## 2025-04-07 PROCEDURE — 78815 PET IMAGE W/CT SKULL-THIGH: CPT

## 2025-04-07 PROCEDURE — 34310000005 FLUDEOXYGLUCOSE F18 SOLUTION

## 2025-04-07 PROCEDURE — 82948 REAGENT STRIP/BLOOD GLUCOSE: CPT

## 2025-04-07 PROCEDURE — 85610 PROTHROMBIN TIME: CPT | Performed by: INTERNAL MEDICINE

## 2025-04-07 PROCEDURE — 36416 COLLJ CAPILLARY BLOOD SPEC: CPT | Performed by: INTERNAL MEDICINE

## 2025-04-07 RX ADMIN — FLUDEOXYGLUCOSE F18 1 DOSE: 300 INJECTION INTRAVENOUS at 13:35

## 2025-04-07 NOTE — PROGRESS NOTES
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     Diet: 2-3x week: 1 cup of brussels sprouts or broccoli weekly, green beans, peas  (01/27/2025)  Premier Protein (or Equate brand) meal replacement daily  Alcohol: Seldom  Tobacco: None  OTC Pain Medication: APAP      INR History:  Date 4/5 4/19 4/26 5/5 5/11 6/2 6/15 6/18 6/25 7/2 7/9 7/19 7/23 7/30   Total Weekly Dose 15mg 15mg 14mg 14mg 14mg 14mg 14mg 14mg 14mg 14mg 14mg 14mg 14mg 14mg   INR 2.6 3.9 3.9 2.7 3.0 3.6 2.7 2.9 2.9 2.6 2.9 3.1 2.5 2.3   Notes           decr GLV   recv'd 6/21; HM        incr GLV decr GLV      Date 8/6 8/13 8/20 8/27 9/3 9/10 9/17 9/24 10/1 10/8 10/15 10/22 10/29 11/5   Total WeeklyDose 14mg 15mg 15mg 14mg 14mg 15mg 14mg 14mg 14mg 14mg 14mg 14mg 15mg 16mg   INR 2.4 3.4 3.8 2.9 2.2 3.2 2.9 3.3 3.2 3.3 3.0 2.4 2.4 2.4   Notes decr GLV decr GLV       1xboost         call call 1x boost   incr VitK call 2x boost; call      Date 11/10 11/17 11/24 12/1 12/8 12/15 12/23 12/30 1/3/22 1/7 1/11 1/18 1/25 2/1   Total WeeklyDose 17mg 16mg 16mg 16mg 15mg 15 mg Pt did not call back 15mg 12 mg 13mg 15mg 15 mg 15 mg 15 mg   INR 3.7 3.3 3.9 4.0 2.6 3.4 4.0 4.9 3.6 2.4 3.3 2.8 2.7 2.9   Notes Dec GLV   Zero GLV call Red x1 call Less GLV   call   Less  Protein drink redx1  self held x1 (misdose)   Dec vit K fall, apap  Call   call          Date 2/8 2/15 2/22 3/1 3/8 3/15 3/22 3/29 4/5 4/12 4/19 4/26 5/3 5/11   Total WeeklyDose 15mg 14mg 14 mg 15 mg 15 mg 15 mg 14mg 13 mg 14 mg 14 mg 14mg 13mg 15mg 14 mg   INR 4.0 4.0 2.8 2.9 2.9 4.2 3.9 3.3 2.9 3.0 3.8 2.4 3.8 2.5    Notes Call; Dec GLV call Call; Mis-dose call   Call; no GLV Inc GLV. Less protein, apap  redx1 call   Less GLV rec'd 4/27, call Dec GLV        Date 5/18 5/25 6/1 6/8 6/15 6/22 6/29 7/6 7/13 7/20 7/22 7/27  8/10  8/17   Total WeeklyDose 15 mg 15mg 16mg 15 mg 15 mg 15 mg 15 mg 15 mg 15mg 14 mg 15 mg 14 mg  14 mg  15 mg   INR 2.6 2.3 2.7 3.2 3.0 2.9 2.6 2.7 3.6 3.0 3.6 3.0  2.4  3.4   Notes   Inc GLV  call call call       call 7/14-- call call call  call  call  call      Date 8/25 8/31 9/7 9/12 9/19 9/26 10/3 10/10 10/17 10/24 10/31   Total WeeklyDose 14mg 13 mg 14 mg 17mg 15 mg 16mg 15mg 14 mg 13mg 17 mg 15mg   INR 4.3 2.8 1.7 2.9 2.1 3.4 3.8 2.4 1.8 3.7 4.5   Notes Dec GLV call Call refused enox; extra protein  Call; no protein drinks Call; less green tea, inc boostx1 Call; cranberries Redx1; call 1x miss Call  Less protein    1/1  Date 11/7 11/14 11/21 11/28 12/5 12/12 12/19 12/27 1/3 1/9 1/16/23 1/23 1/30/23   Total WeeklyDose 13 mg 14 mg 14 mg 14 mg 14 mg 14 mg 14mg 14 mg  13mg 13 mg 14 mg  14 mg 14 mg   INR 3.2 3.3 3.4 3.6 2.5 4.0 2.9 4.1 3.6 2.6 3.3 2.7 3.0   Notes  call redx1 One less protein shake   Inc GLV Decreased GLV W/o meloxicam  Tramadol,  meloxicam Tramadol,  meloxicam Tramadol,  meloxicam meloxicam     Date 2/7/23 2/13 2/20 2/27/23 3/6 3/13/23 3/23 3/27 4/3/23 4/10/23 4/17/23 4/24 5/1   Total WeeklyDose 14 mg 14 mg 14 mg 14 mg  14 mg 14 mg  14 mg 14 mg 15 mg  14mg 13 mg 15 mg 14 mg   INR 2.9 3.0 3.6 2.6 3 3.5 3.4 2.4 2.8 2.5 2.3 3.2  4.1 POCT   Notes Call  call rec 2/21  Call   call Call   call Call  call Missed dose 1x boost Call; missed protein drinks, less GLV     Date 5/2 5/8 5/15 5/22 5/30 6/5 6/19 7/10 7/24 7/31 8/7 8/14 8/21  8/28   Total WeeklyDose 12 mg 13 mg 14 mg 14 mg 14mg 14 mg 14 mg 14 mg 13 mg 13mg  12 mg 12 mg 14 mg 13 mg   INR 3.7 3.3 2.6 2.7 3.4 2.9 4.0 4.1 3.9 4.1 2.8 1.9 4.0  3.2   Notes rec 5/3  Call  Self-heldx1, boostx1    Stopping HM Less GLV  redx1 Ceftin   Rec'd 8/1 redx1  Inc GLV  Prednisone start Less GLV      Date 9/5 9/18 9/25 10/2 10/06 10/13 10/27 11/1 11/6 11/13 11/17 11/27   Total Weekly Dose 13mg 14 mg 12 mg  12 mg 11 mg 12 mg 12 mg 12 mg 11mg 11 mg 12 mg 12mg   INR 4.2 4.2 4.3  4.6 2.6 3.0 4.1 4.3 3.7 2.1 2.7 4.8   Notes redx1 clindamycin redx1 redx2 Red x2; inc GLV            Date 12/01 12/11 12/15 12/22 12/29 1/5/2024 1/12 1/26 2/9  2/26 3/1 3/11  3/18   Total Weekly Dose 11 mg 11 mg 11 mg 11 mg 11 mg 11 mg 11 mg 11 mg  12 mg   12 mg 12mg 11 mg  11 mg   INR 2.4 3.0 2.7 2.7 3.2 3.0 2.4 2.3 3.3  4.3 2.4 3.4 2.6   Notes  call    call    No GLV, apap   call     Date  4/1 4/8/24 4/15 4/22 4/29 5/3 5/6 5/13 5/20 5/28 6/3 6/10 6/17   Total Weekly  Dose 11 mg 12mg 11 mg 11 mg ???  Missed Sunday maybe more 12 mg 14 mg 14 mg 14 mg 14 mg 13 mg  12 mg 14 mg   INR 2.0 2.5 2.5 3.2 1.1 1.5 2.5 3.3 3.4 4.0 4.0 3.6 3.2   Notes Call  Missed x1 No contact  Boost x 1 No contact No contact  Lovenox  Boost x 2, Call Lovenox     call 1 x decrease  APAP;  Call  APAP call     Date 6/24 7/1 7/8 7/22 8/5 8/12 9/4 9/11 9/25 12/2 12/26-31 12/30/24 1/13/25   Total WeeklyDose 12 mg 12 mg 13 mg 13mg 13 mg 12 mg 13 mg  14 mg 12 mg 12 mg [facility] 14 mg 13 mg   INR 2.5 2.2 3.0 3.6 2.7 2.8 2.3 3.0 3.2 2.7  4.3 5.5POCT  xx emil   Notes    Call Call  Call   Call Esoph ulcers Rec'd 1/6 redx1     Date  1/13 1/20 1/27 2/3 2/10 2/20 2/24 3/3 3/10 3/18 3/25 4/1 4/7   Total Weekly  Dose 13 mg 12 mg 10mg 11 mg 14mg 10 mg  14 mg 14 mg 14mg 14 mg  14 mg 6 mg 18 mg    INR 4.5 emil 4.8 2.4 1.9 2.7 1.6 2.6 2.9 3.2 3.3 3.2 1.2 3.0   Notes redx1 redx1 redx1  call Procedure-call  call    Call   ED visit  Hold x 5  Boost x 2  call     Date                 Total Weekly  Dose                INR                Notes                      Phone Interview:  Tablet Strength: 2mg  Patient Contact Info: [phone] (Mobile) *preferred*  [email or web address]  Verbal Release Authorization signed on 4/7/21 -- may speak with Tammy  Jaysonbaron (friend: [phone]), Ismael Michele (brother: [phone])  Lab Contact Info: [facility] ([facility])  ** will call once monthly or if INR is out of range**    Patient Findings    Negatives: Signs/symptoms of thrombosis, Signs/symptoms of bleeding, Laboratory test error suspected, Change in health, Change in alcohol use, Change in activity, Upcoming invasive procedure, Emergency department visit, Upcoming dental procedure, Missed doses, Extra doses, Change in medications, Change in diet/appetite, Hospital admission, Bruising, Other complaints   Comments: All findings negative per patient         Plan:  1. INR therapeutic today at 3.0 (goal 2.5-3.5) Instructed Ms. Michele to continue warfarin 2 mg daily until recheck.   2. Repeat INR 4/14/25  3. Verbal information provided over the phone. Patient RBV dosing instructions, expresses understanding by teach back, and has no further questions at this time.  4. Lucie Michele understands the importance of calling the [facility] if she notices any s/sx of bleeding, stroke, or abnormal bruising, if any changes are made to her medications or medication doses (Rx, OTC, herbal), or if any upcoming procedures are scheduled. Lucie Michele will likewise let us know if any other changes, questions, or concerns arise regarding anticoagulation therapy. she understands the importance of seeking medical attention immediately if she experiences any falls, vehicle accidents, or abnormal bleeding or bruising. Lucie Michele voiced understanding of this information and confirms that she has the [facility]'s contact information. Otherwise, we will plan to contact the patient once monthly or if her INR is out of range.    Kathleen Arauz, PharmD  4/7/2025  10:20 EDT

## 2025-04-07 NOTE — PROGRESS NOTES
Capillary Blood Specimen Collection  Capillary blood collection performed in clinic by Ema Hamilton MA. Patient tolerated the procedure well without complications.   04/07/25   Ema Hamilton MA
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
## 2025-04-10 NOTE — ASSESSMENT & PLAN NOTE
Patient had Pepcid added to her omeprazole regimen.  She has been scoped.  We are going to try and find the biopsy results to see if she had H. pylori or not.  Recheck in 6 weeks.  She is feeling better.  Continue current regimen of Carafate and omeprazole.

## 2025-04-11 ENCOUNTER — OFFICE VISIT (OUTPATIENT)
Dept: FAMILY MEDICINE CLINIC | Facility: CLINIC | Age: 80
End: 2025-04-11
Payer: MEDICARE

## 2025-04-11 ENCOUNTER — TELEPHONE (OUTPATIENT)
Dept: FAMILY MEDICINE CLINIC | Facility: CLINIC | Age: 80
End: 2025-04-11

## 2025-04-11 VITALS
SYSTOLIC BLOOD PRESSURE: 128 MMHG | DIASTOLIC BLOOD PRESSURE: 84 MMHG | WEIGHT: 157.2 LBS | OXYGEN SATURATION: 97 % | RESPIRATION RATE: 16 BRPM | BODY MASS INDEX: 26.19 KG/M2 | HEART RATE: 52 BPM | HEIGHT: 65 IN

## 2025-04-11 DIAGNOSIS — K27.9 PUD (PEPTIC ULCER DISEASE): Primary | ICD-10-CM

## 2025-04-11 DIAGNOSIS — K76.9 LIVER LESION: ICD-10-CM

## 2025-04-11 PROBLEM — T14.8XXA MUSCLE STRAIN: Status: RESOLVED | Noted: 2024-09-27 | Resolved: 2025-04-11

## 2025-04-11 PROBLEM — S81.802A WOUND OF LEFT LEG: Status: RESOLVED | Noted: 2023-01-04 | Resolved: 2025-04-11

## 2025-04-11 PROBLEM — B37.0 THRUSH: Status: RESOLVED | Noted: 2023-12-01 | Resolved: 2025-04-11

## 2025-04-11 PROBLEM — R73.9 HYPERGLYCEMIA: Status: RESOLVED | Noted: 2022-11-02 | Resolved: 2025-04-11

## 2025-04-11 PROBLEM — M85.80 OSTEOPENIA: Status: RESOLVED | Noted: 2022-04-13 | Resolved: 2025-04-11

## 2025-04-11 PROCEDURE — 3074F SYST BP LT 130 MM HG: CPT | Performed by: FAMILY MEDICINE

## 2025-04-11 PROCEDURE — 99214 OFFICE O/P EST MOD 30 MIN: CPT | Performed by: FAMILY MEDICINE

## 2025-04-11 PROCEDURE — 1126F AMNT PAIN NOTED NONE PRSNT: CPT | Performed by: FAMILY MEDICINE

## 2025-04-11 PROCEDURE — 3079F DIAST BP 80-89 MM HG: CPT | Performed by: FAMILY MEDICINE

## 2025-04-11 NOTE — TELEPHONE ENCOUNTER
Caller: Lucie Michele     Relationship: [unfilled]     Best call back number: [phone]    What is your medical concern? PT STATED THAT PCP WANTED FOR HER TO HAVE A PET SCAN DONE, BUT WANTED TO MAKE PCP AWARE THAT SHE ALREADY HAD A PET SCAN DONE BY CARDIOLOGIST DR MCGRATH 4/7, PT SCAN COVERED FROM BELLY BUTTON TO NECK, PT DID NOT WANT TO HAVE ANOTHER ONE IF NOT NEEDED
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
## 2025-04-12 NOTE — TELEPHONE ENCOUNTER
Spoke with pt and she is concerned about the cost of the mri and wanted to know if the pet scan she had done would suffice.

## 2025-04-14 ENCOUNTER — OFFICE VISIT (OUTPATIENT)
Dept: CARDIAC SURGERY | Facility: CLINIC | Age: 80
End: 2025-04-14
Payer: MEDICARE

## 2025-04-14 VITALS
HEART RATE: 66 BPM | BODY MASS INDEX: 26.76 KG/M2 | OXYGEN SATURATION: 97 % | SYSTOLIC BLOOD PRESSURE: 140 MMHG | DIASTOLIC BLOOD PRESSURE: 72 MMHG | WEIGHT: 160.6 LBS | TEMPERATURE: 98.6 F | HEIGHT: 65 IN

## 2025-04-14 DIAGNOSIS — R91.1 LUNG NODULE: Primary | ICD-10-CM

## 2025-04-14 DIAGNOSIS — Z86.79 S/P ASCENDING AORTIC ANEURYSM REPAIR: ICD-10-CM

## 2025-04-14 DIAGNOSIS — Z98.890 S/P ASCENDING AORTIC ANEURYSM REPAIR: ICD-10-CM

## 2025-04-14 PROCEDURE — 1159F MED LIST DOCD IN RCRD: CPT | Performed by: NURSE PRACTITIONER

## 2025-04-14 PROCEDURE — 99215 OFFICE O/P EST HI 40 MIN: CPT | Performed by: NURSE PRACTITIONER

## 2025-04-14 PROCEDURE — 3078F DIAST BP <80 MM HG: CPT | Performed by: NURSE PRACTITIONER

## 2025-04-14 PROCEDURE — 3077F SYST BP >= 140 MM HG: CPT | Performed by: NURSE PRACTITIONER

## 2025-04-14 PROCEDURE — 1160F RVW MEDS BY RX/DR IN RCRD: CPT | Performed by: NURSE PRACTITIONER

## 2025-04-15 ENCOUNTER — CLINICAL SUPPORT (OUTPATIENT)
Dept: CARDIOLOGY | Facility: CLINIC | Age: 80
End: 2025-04-15
Payer: MEDICARE

## 2025-04-15 ENCOUNTER — ANTICOAGULATION VISIT (OUTPATIENT)
Dept: PHARMACY | Facility: HOSPITAL | Age: 80
End: 2025-04-15
Payer: MEDICARE

## 2025-04-15 DIAGNOSIS — Z95.2 HISTORY OF AORTIC VALVE REPLACEMENT: Primary | ICD-10-CM

## 2025-04-15 LAB — INR PPP: 3 (ref 0.9–1.1)

## 2025-04-15 PROCEDURE — 36416 COLLJ CAPILLARY BLOOD SPEC: CPT | Performed by: INTERNAL MEDICINE

## 2025-04-15 PROCEDURE — 85610 PROTHROMBIN TIME: CPT | Performed by: INTERNAL MEDICINE

## 2025-04-15 NOTE — PROGRESS NOTES
Capillary Blood Specimen Collection  Capillary blood collection performed in clinic by Augusta Salazar MA. Patient tolerated the procedure well without complications.   04/15/25   Augusta Salazar MA

## 2025-04-15 NOTE — PROGRESS NOTES
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     Diet: 2-3x week: 1 cup of brussels sprouts or broccoli weekly, green beans, peas  (01/27/2025)  Premier Protein (or Equate brand) meal replacement daily  Alcohol: Seldom  Tobacco: None  OTC Pain Medication: APAP      INR History:  Date 4/5 4/19 4/26 5/5 5/11 6/2 6/15 6/18 6/25 7/2 7/9 7/19 7/23 7/30   Total Weekly Dose 15mg 15mg 14mg 14mg 14mg 14mg 14mg 14mg 14mg 14mg 14mg 14mg 14mg 14mg   INR 2.6 3.9 3.9 2.7 3.0 3.6 2.7 2.9 2.9 2.6 2.9 3.1 2.5 2.3   Notes           decr GLV   recv'd 6/21; HM        incr GLV decr GLV      Date 8/6 8/13 8/20 8/27 9/3 9/10 9/17 9/24 10/1 10/8 10/15 10/22 10/29 11/5   Total WeeklyDose 14mg 15mg 15mg 14mg 14mg 15mg 14mg 14mg 14mg 14mg 14mg 14mg 15mg 16mg   INR 2.4 3.4 3.8 2.9 2.2 3.2 2.9 3.3 3.2 3.3 3.0 2.4 2.4 2.4   Notes decr GLV decr GLV       1xboost         call call 1x boost   incr VitK call 2x boost; call      Date 11/10 11/17 11/24 12/1 12/8 12/15 12/23 12/30 1/3/22 1/7 1/11 1/18 1/25 2/1   Total WeeklyDose 17mg 16mg 16mg 16mg 15mg 15 mg Pt did not call back 15mg 12 mg 13mg 15mg 15 mg 15 mg 15 mg   INR 3.7 3.3 3.9 4.0 2.6 3.4 4.0 4.9 3.6 2.4 3.3 2.8 2.7 2.9   Notes Dec GLV   Zero GLV call Red x1 call Less GLV   call   Less  Protein drink redx1  self held x1 (misdose)   Dec vit K fall, apap  Call   call          Date 2/8 2/15 2/22 3/1 3/8 3/15 3/22 3/29 4/5 4/12 4/19 4/26 5/3 5/11   Total WeeklyDose 15mg 14mg 14 mg 15 mg 15 mg 15 mg 14mg 13 mg 14 mg 14 mg 14mg 13mg 15mg 14 mg   INR 4.0 4.0 2.8 2.9 2.9 4.2 3.9 3.3 2.9 3.0 3.8 2.4 3.8 2.5    Notes Call; Dec GLV call Call; Mis-dose call   Call; no GLV Inc GLV. Less protein, apap  redx1 call   Less GLV rec'd 4/27, call Dec GLV        Date 5/18 5/25 6/1 6/8 6/15 6/22 6/29 7/6 7/13 7/20 7/22 7/27  8/10  8/17   Total WeeklyDose 15 mg 15mg 16mg 15 mg 15 mg 15 mg 15 mg 15 mg 15mg 14 mg 15 mg 14 mg  14 mg  15 mg   INR 2.6 2.3 2.7 3.2 3.0 2.9 2.6 2.7 3.6 3.0 3.6 3.0  2.4  3.4   Notes   Inc GLV  call call call       call 7/14-- call call call  call  call  call      Date 8/25 8/31 9/7 9/12 9/19 9/26 10/3 10/10 10/17 10/24 10/31   Total WeeklyDose 14mg 13 mg 14 mg 17mg 15 mg 16mg 15mg 14 mg 13mg 17 mg 15mg   INR 4.3 2.8 1.7 2.9 2.1 3.4 3.8 2.4 1.8 3.7 4.5   Notes Dec GLV call Call refused enox; extra protein  Call; no protein drinks Call; less green tea, inc boostx1 Call; cranberries Redx1; call 1x miss Call  Less protein    1/1  Date 11/7 11/14 11/21 11/28 12/5 12/12 12/19 12/27 1/3 1/9 1/16/23 1/23 1/30/23   Total WeeklyDose 13 mg 14 mg 14 mg 14 mg 14 mg 14 mg 14mg 14 mg  13mg 13 mg 14 mg  14 mg 14 mg   INR 3.2 3.3 3.4 3.6 2.5 4.0 2.9 4.1 3.6 2.6 3.3 2.7 3.0   Notes  call redx1 One less protein shake   Inc GLV Decreased GLV W/o meloxicam  Tramadol,  meloxicam Tramadol,  meloxicam Tramadol,  meloxicam meloxicam     Date 2/7/23 2/13 2/20 2/27/23 3/6 3/13/23 3/23 3/27 4/3/23 4/10/23 4/17/23 4/24 5/1   Total WeeklyDose 14 mg 14 mg 14 mg 14 mg  14 mg 14 mg  14 mg 14 mg 15 mg  14mg 13 mg 15 mg 14 mg   INR 2.9 3.0 3.6 2.6 3 3.5 3.4 2.4 2.8 2.5 2.3 3.2  4.1 POCT   Notes Call  call rec 2/21  Call   call Call   call Call  call Missed dose 1x boost Call; missed protein drinks, less GLV     Date 5/2 5/8 5/15 5/22 5/30 6/5 6/19 7/10 7/24 7/31 8/7 8/14 8/21  8/28   Total WeeklyDose 12 mg 13 mg 14 mg 14 mg 14mg 14 mg 14 mg 14 mg 13 mg 13mg  12 mg 12 mg 14 mg 13 mg   INR 3.7 3.3 2.6 2.7 3.4 2.9 4.0 4.1 3.9 4.1 2.8 1.9 4.0  3.2   Notes rec 5/3  Call  Self-heldx1, boostx1    Stopping HM Less GLV  redx1 Ceftin   Rec'd 8/1 redx1  Inc GLV  Prednisone start Less GLV      Date 9/5 9/18 9/25 10/2 10/06 10/13 10/27 11/1 11/6 11/13 11/17 11/27   Total Weekly Dose 13mg 14 mg 12 mg  12 mg 11 mg 12 mg 12 mg 12 mg 11mg 11 mg 12 mg 12mg   INR 4.2 4.2 4.3  4.6 2.6 3.0 4.1 4.3 3.7 2.1 2.7 4.8   Notes redx1 clindamycin redx1 redx2 Red x2; inc GLV            Date 12/01 12/11 12/15 12/22 12/29 1/5/2024 1/12 1/26 2/9  2/26 3/1 3/11  3/18   Total Weekly Dose 11 mg 11 mg 11 mg 11 mg 11 mg 11 mg 11 mg 11 mg  12 mg   12 mg 12mg 11 mg  11 mg   INR 2.4 3.0 2.7 2.7 3.2 3.0 2.4 2.3 3.3  4.3 2.4 3.4 2.6   Notes  call    call    No GLV, apap   call     Date  4/1 4/8/24 4/15 4/22 4/29 5/3 5/6 5/13 5/20 5/28 6/3 6/10 6/17   Total Weekly  Dose 11 mg 12mg 11 mg 11 mg ???  Missed Sunday maybe more 12 mg 14 mg 14 mg 14 mg 14 mg 13 mg  12 mg 14 mg   INR 2.0 2.5 2.5 3.2 1.1 1.5 2.5 3.3 3.4 4.0 4.0 3.6 3.2   Notes Call  Missed x1 No contact  Boost x 1 No contact No contact  Lovenox  Boost x 2, Call Lovenox     call 1 x decrease  APAP;  Call  APAP call     Date 6/24 7/1 7/8 7/22 8/5 8/12 9/4 9/11 9/25 12/2 12/26-31 12/30/24 1/13/25   Total WeeklyDose 12 mg 12 mg 13 mg 13mg 13 mg 12 mg 13 mg  14 mg 12 mg 12 mg [facility] 14 mg 13 mg   INR 2.5 2.2 3.0 3.6 2.7 2.8 2.3 3.0 3.2 2.7  4.3 5.5POCT  xx emil   Notes    Call Call  Call   Call Esoph ulcers Rec'd 1/6 redx1     Date  1/13 1/20 1/27 2/3 2/10 2/20 2/24 3/3 3/10 3/18 3/25 4/1 4/7   Total Weekly  Dose 13 mg 12 mg 10mg 11 mg 14mg 10 mg  14 mg 14 mg 14mg 14 mg  14 mg 6 mg 18 mg    INR 4.5 emil 4.8 2.4 1.9 2.7 1.6 2.6 2.9 3.2 3.3 3.2 1.2 3.0   Notes redx1 redx1 redx1  call Procedure-call  call    Call   ED visit  Hold x 5  Boost x 2  call     Date  4/15               Total Weekly  Dose 14 mg                INR 3.0               Notes                      Phone Interview:  Tablet Strength: 2mg  Patient Contact Info: [phone] (Mobile) *preferred*  [email or web address]  Verbal Release Authorization signed on 4/7/21 -- may speak with  Tammy Bhumika (friend: [phone]), Ismael Michele (brother: [phone])  Lab Contact Info: [facility] ([facility])  ** will call once monthly or if INR is out of range**      Patient Findings    Comments: Patient not contacted at this encounter         Plan:  1. INR therapeutic today at 3.0 (goal 2.5-3.5) Instructed Ms. Michele to continue warfarin 2 mg daily until recheck.   2. Repeat INR 4/21/25  3. Verbal information provided over the phone. Patient RBV dosing instructions, expresses understanding by teach back, and has no further questions at this time.  4. Lucie Michele understands the importance of calling the [facility] if she notices any s/sx of bleeding, stroke, or abnormal bruising, if any changes are made to her medications or medication doses (Rx, OTC, herbal), or if any upcoming procedures are scheduled. Lucie Michele will likewise let us know if any other changes, questions, or concerns arise regarding anticoagulation therapy. she understands the importance of seeking medical attention immediately if she experiences any falls, vehicle accidents, or abnormal bleeding or bruising. Lucie Michele voiced understanding of this information and confirms that she has the [facility]'s contact information. Otherwise, we will plan to contact the patient once monthly or if her INR is out of range.    Kathleen Arauz, PharmD  4/15/2025  11:06 EDT

## 2025-04-22 ENCOUNTER — CLINICAL SUPPORT (OUTPATIENT)
Dept: CARDIOLOGY | Facility: CLINIC | Age: 80
End: 2025-04-22
Payer: MEDICARE

## 2025-04-22 ENCOUNTER — ANTICOAGULATION VISIT (OUTPATIENT)
Dept: PHARMACY | Facility: HOSPITAL | Age: 80
End: 2025-04-22
Payer: MEDICARE

## 2025-04-22 DIAGNOSIS — Z95.2 HISTORY OF AORTIC VALVE REPLACEMENT: Primary | ICD-10-CM

## 2025-04-22 LAB — INR PPP: 3.6 (ref 0.9–1.1)

## 2025-04-22 PROCEDURE — 85610 PROTHROMBIN TIME: CPT | Performed by: INTERNAL MEDICINE

## 2025-04-22 PROCEDURE — 36416 COLLJ CAPILLARY BLOOD SPEC: CPT | Performed by: INTERNAL MEDICINE

## 2025-04-22 NOTE — PROGRESS NOTES
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     Diet: 2-3x week: 1 cup of brussels sprouts or broccoli weekly, green beans, peas  (01/27/2025)  Premier Protein (or Equate brand) meal replacement daily  Alcohol: Seldom  Tobacco: None  OTC Pain Medication: APAP      INR History:  Date 4/5 4/19 4/26 5/5 5/11 6/2 6/15 6/18 6/25 7/2 7/9 7/19 7/23 7/30   Total Weekly Dose 15mg 15mg 14mg 14mg 14mg 14mg 14mg 14mg 14mg 14mg 14mg 14mg 14mg 14mg   INR 2.6 3.9 3.9 2.7 3.0 3.6 2.7 2.9 2.9 2.6 2.9 3.1 2.5 2.3   Notes           decr GLV   recv'd 6/21; HM        incr GLV decr GLV      Date 8/6 8/13 8/20 8/27 9/3 9/10 9/17 9/24 10/1 10/8 10/15 10/22 10/29 11/5   Total WeeklyDose 14mg 15mg 15mg 14mg 14mg 15mg 14mg 14mg 14mg 14mg 14mg 14mg 15mg 16mg   INR 2.4 3.4 3.8 2.9 2.2 3.2 2.9 3.3 3.2 3.3 3.0 2.4 2.4 2.4   Notes decr GLV decr GLV       1xboost         call call 1x boost   incr VitK call 2x boost; call      Date 11/10 11/17 11/24 12/1 12/8 12/15 12/23 12/30 1/3/22 1/7 1/11 1/18 1/25 2/1   Total WeeklyDose 17mg 16mg 16mg 16mg 15mg 15 mg Pt did not call back 15mg 12 mg 13mg 15mg 15 mg 15 mg 15 mg   INR 3.7 3.3 3.9 4.0 2.6 3.4 4.0 4.9 3.6 2.4 3.3 2.8 2.7 2.9   Notes Dec GLV   Zero GLV call Red x1 call Less GLV   call   Less  Protein drink redx1  self held x1 (misdose)   Dec vit K fall, apap  Call   call          Date 2/8 2/15 2/22 3/1 3/8 3/15 3/22 3/29 4/5 4/12 4/19 4/26 5/3 5/11   Total WeeklyDose 15mg 14mg 14 mg 15 mg 15 mg 15 mg 14mg 13 mg 14 mg 14 mg 14mg 13mg 15mg 14 mg   INR 4.0 4.0 2.8 2.9 2.9 4.2 3.9 3.3 2.9 3.0 3.8 2.4 3.8 2.5    Notes Call; Dec GLV call Call; Mis-dose call   Call; no GLV Inc GLV. Less protein, apap  redx1 call   Less GLV rec'd 4/27, call Dec GLV        Date 5/18 5/25 6/1 6/8 6/15 6/22 6/29 7/6 7/13 7/20 7/22 7/27  8/10  8/17   Total WeeklyDose 15 mg 15mg 16mg 15 mg 15 mg 15 mg 15 mg 15 mg 15mg 14 mg 15 mg 14 mg  14 mg  15 mg   INR 2.6 2.3 2.7 3.2 3.0 2.9 2.6 2.7 3.6 3.0 3.6 3.0  2.4  3.4   Notes   Inc GLV  call call call       call 7/14-- call call call  call  call  call      Date 8/25 8/31 9/7 9/12 9/19 9/26 10/3 10/10 10/17 10/24 10/31   Total WeeklyDose 14mg 13 mg 14 mg 17mg 15 mg 16mg 15mg 14 mg 13mg 17 mg 15mg   INR 4.3 2.8 1.7 2.9 2.1 3.4 3.8 2.4 1.8 3.7 4.5   Notes Dec GLV call Call refused enox; extra protein  Call; no protein drinks Call; less green tea, inc boostx1 Call; cranberries Redx1; call 1x miss Call  Less protein    1/1  Date 11/7 11/14 11/21 11/28 12/5 12/12 12/19 12/27 1/3 1/9 1/16/23 1/23 1/30/23   Total WeeklyDose 13 mg 14 mg 14 mg 14 mg 14 mg 14 mg 14mg 14 mg  13mg 13 mg 14 mg  14 mg 14 mg   INR 3.2 3.3 3.4 3.6 2.5 4.0 2.9 4.1 3.6 2.6 3.3 2.7 3.0   Notes  call redx1 One less protein shake   Inc GLV Decreased GLV W/o meloxicam  Tramadol,  meloxicam Tramadol,  meloxicam Tramadol,  meloxicam meloxicam     Date 2/7/23 2/13 2/20 2/27/23 3/6 3/13/23 3/23 3/27 4/3/23 4/10/23 4/17/23 4/24 5/1   Total WeeklyDose 14 mg 14 mg 14 mg 14 mg  14 mg 14 mg  14 mg 14 mg 15 mg  14mg 13 mg 15 mg 14 mg   INR 2.9 3.0 3.6 2.6 3 3.5 3.4 2.4 2.8 2.5 2.3 3.2  4.1 POCT   Notes Call  call rec 2/21  Call   call Call   call Call  call Missed dose 1x boost Call; missed protein drinks, less GLV     Date 5/2 5/8 5/15 5/22 5/30 6/5 6/19 7/10 7/24 7/31 8/7 8/14 8/21  8/28   Total WeeklyDose 12 mg 13 mg 14 mg 14 mg 14mg 14 mg 14 mg 14 mg 13 mg 13mg  12 mg 12 mg 14 mg 13 mg   INR 3.7 3.3 2.6 2.7 3.4 2.9 4.0 4.1 3.9 4.1 2.8 1.9 4.0  3.2   Notes rec 5/3  Call  Self-heldx1, boostx1    Stopping HM Less GLV  redx1 Ceftin   Rec'd 8/1 redx1  Inc GLV  Prednisone start Less GLV      Date 9/5 9/18 9/25 10/2 10/06 10/13 10/27 11/1 11/6 11/13 11/17 11/27   Total Weekly Dose 13mg 14 mg 12 mg  12 mg 11 mg 12 mg 12 mg 12 mg 11mg 11 mg 12 mg 12mg   INR 4.2 4.2 4.3  4.6 2.6 3.0 4.1 4.3 3.7 2.1 2.7 4.8   Notes redx1 clindamycin redx1 redx2 Red x2; inc GLV            Date 12/01 12/11 12/15 12/22 12/29 1/5/2024 1/12 1/26 2/9  2/26 3/1 3/11  3/18   Total Weekly Dose 11 mg 11 mg 11 mg 11 mg 11 mg 11 mg 11 mg 11 mg  12 mg   12 mg 12mg 11 mg  11 mg   INR 2.4 3.0 2.7 2.7 3.2 3.0 2.4 2.3 3.3  4.3 2.4 3.4 2.6   Notes  call    call    No GLV, apap   call     Date  4/1 4/8/24 4/15 4/22 4/29 5/3 5/6 5/13 5/20 5/28 6/3 6/10 6/17   Total Weekly  Dose 11 mg 12mg 11 mg 11 mg ???  Missed Sunday maybe more 12 mg 14 mg 14 mg 14 mg 14 mg 13 mg  12 mg 14 mg   INR 2.0 2.5 2.5 3.2 1.1 1.5 2.5 3.3 3.4 4.0 4.0 3.6 3.2   Notes Call  Missed x1 No contact  Boost x 1 No contact No contact  Lovenox  Boost x 2, Call Lovenox     call 1 x decrease  APAP;  Call  APAP call     Date 6/24 7/1 7/8 7/22 8/5 8/12 9/4 9/11 9/25 12/2 12/26-31 12/30/24 1/13/25   Total WeeklyDose 12 mg 12 mg 13 mg 13mg 13 mg 12 mg 13 mg  14 mg 12 mg 12 mg [facility] 14 mg 13 mg   INR 2.5 2.2 3.0 3.6 2.7 2.8 2.3 3.0 3.2 2.7  4.3 5.5POCT  xx emil   Notes    Call Call  Call   Call Esoph ulcers Rec'd 1/6 redx1     Date  1/13 1/20 1/27 2/3 2/10 2/20 2/24 3/3 3/10 3/18 3/25 4/1 4/7   Total Weekly  Dose 13 mg 12 mg 10mg 11 mg 14mg 10 mg  14 mg 14 mg 14mg 14 mg  14 mg 6 mg 18 mg    INR 4.5 emil 4.8 2.4 1.9 2.7 1.6 2.6 2.9 3.2 3.3 3.2 1.2 3.0   Notes redx1 redx1 redx1  call Procedure-call  call    Call   ED visit  Hold x 5  Boost x 2  call     Date  4/15 4/22              Total Weekly  Dose 14 mg  14 mg              INR 3.0 3.6              Notes                      Phone Interview:  Tablet Strength: 2mg  Patient Contact Info: [phone] (Mobile) *preferred*  [email or web address]  Verbal Release Authorization signed on 4/7/21 --  may speak with Tammy Erika (friend: [phone]), Ismael Michele (brother: [phone])  Lab Contact Info: [facility] ([facility])  ** will call once monthly or if INR is out of range**      Patient Findings    Comments: Patient not contacted at this encounter         Plan:  1. INR therapeutic today at 3.6 (goal 2.5-3.5) Instructed Ms. Michele to continue warfarin 2 mg daily until recheck.   2. Repeat INR 4/29/25  3. Verbal information provided over the phone. Patient RBV dosing instructions, expresses understanding by teach back, and has no further questions at this time.  4. Lucie Michele understands the [facility] if she notices any s/sx of bleeding, stroke, or abnormal bruising, if any changes are made to her medications or medication doses (Rx, OTC, herbal), or if any upcoming procedures are scheduled. Lucie Michele will likewise let us know if any other changes, questions, or concerns arise regarding anticoagulation therapy. she understands the importance of seeking medical attention immediately if she experiences any falls, vehicle accidents, or abnormal bleeding or bruising. Lucie Michele voiced understanding of this information and confirms that [facility] information. Otherwise, we will plan to contact the patient once monthly or if her INR is out of range.    Kathleen Arauz, PharmD  4/22/2025  13:13 EDT

## 2025-04-22 NOTE — PROGRESS NOTES
Capillary Blood Specimen Collection  Capillary blood collection performed in clinic by Augusta Salazar MA. Patient tolerated the procedure well without complications.   04/22/25   Augusta Salazar MA

## 2025-04-28 ENCOUNTER — ANTICOAGULATION VISIT (OUTPATIENT)
Dept: PHARMACY | Facility: HOSPITAL | Age: 80
End: 2025-04-28
Payer: MEDICARE

## 2025-04-28 ENCOUNTER — CLINICAL SUPPORT (OUTPATIENT)
Dept: CARDIOLOGY | Facility: CLINIC | Age: 80
End: 2025-04-28
Payer: MEDICARE

## 2025-04-28 DIAGNOSIS — Z95.2 HISTORY OF AORTIC VALVE REPLACEMENT: Primary | ICD-10-CM

## 2025-04-28 LAB — INR PPP: 4.3 (ref 0.9–1.1)

## 2025-04-28 PROCEDURE — 36416 COLLJ CAPILLARY BLOOD SPEC: CPT | Performed by: INTERNAL MEDICINE

## 2025-04-28 PROCEDURE — 85610 PROTHROMBIN TIME: CPT | Performed by: INTERNAL MEDICINE

## 2025-04-28 NOTE — PROGRESS NOTES
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  Missed Sunday maybe more 12 mg 14 mg 14 mg 14 mg 14 mg 13 mg  12 mg 14 mg   INR 2.0 2.5 2.5 3.2 1.1 1.5 2.5 3.3 3.4 4.0 4.0 3.6 3.2   Notes Call  Missed x1 No contact  Boost x 1 No contact No contact  Lovenox  Boost x 2, Call Lovenox     call 1 x decrease  APAP;  Call  APAP call     Date 6/24 7/1 7/8 7/22 8/5 8/12 9/4 9/11 9/25 12/2 12/26-31 12/30/24 1/13/25   Total WeeklyDose 12 mg 12 mg 13 mg 13mg 13 mg 12 mg 13 mg  14 mg 12 mg 12 mg [facility] 14 mg 13 mg   INR 2.5 2.2 3.0 3.6 2.7 2.8 2.3 3.0 3.2 2.7  4.3 5.5POCT  xx emil   Notes    Call Call  Call   Call Esoph ulcers Rec'd 1/6 redx1     Date  1/13 1/20 1/27 2/3 2/10 2/20 2/24 3/3 3/10 3/18 3/25 4/1 4/7   Total Weekly  Dose 13 mg 12 mg 10mg 11 mg 14mg 10 mg  14 mg 14 mg 14mg 14 mg  14 mg 6 mg 18 mg    INR 4.5 emil 4.8 2.4 1.9 2.7 1.6 2.6 2.9 3.2 3.3 3.2 1.2 3.0   Notes redx1 redx1 redx1  call Procedure-call  call    Call   ED visit  Hold x 5  Boost x 2  call     Date  4/15 4/22 4/28             Total Weekly  Dose 14 mg  14 mg 14 mg              INR 3.0 3.6 4.3             Notes                    Phone Interview:  Tablet Strength: 2 mg  Patient Contact Info: [phone] (Mobile)  *preferred*   Verbal Release Authorization signed on 4/7/21 -- may speak with Tammy Bai (friend: [phone]), Ismael Michele (brother: [phone])  Lab Contact Info: [facility] ([facility])  ** will call once monthly or if INR is out of range**      Patient Findings:  Negatives: Signs/symptoms of thrombosis, Signs/symptoms of bleeding, Laboratory test error suspected, Change in health, Change in alcohol use, Change in activity, Upcoming invasive procedure, Emergency department visit, Upcoming dental procedure, Missed doses, Extra doses, Change in medications, Change in diet/appetite, Hospital admission, Bruising, Other complaints   Comments: All findings negative per patient. She is unsure why INR has increased and is surprised.       Plan:  1. INR supratherapeutic today at 4.3 (goal 2.5-3.5) Instructed Ms. Michele to reduce today's dose to warfarin 1 mg, then continue warfarin 2 mg daily until recheck. Patient will also have an extra serving of GLV this week.   2. Repeat INR 5/5/25  3. Verbal information provided over the phone. Patient RBV dosing instructions, expresses understanding by teach back, and has no further questions at this time.  4. Lucie Michele understands the importance of calling the [facility] if she notices any s/sx of bleeding, stroke, or abnormal bruising, if any changes are made to her medications or medication doses (Rx, OTC, herbal), or if any upcoming procedures are scheduled. Lucie Michele will likewise let us know if any other changes, questions, or concerns arise regarding anticoagulation therapy. she understands the importance of seeking medical attention immediately if she experiences any falls, vehicle accidents, or abnormal bleeding or bruising. Lucie Michele voiced understanding of this information and confirms that she has the [facility]'s contact information. Otherwise, we will plan to contact the patient once monthly or if her INR is out  of range.    Zenaida Sommer, PharmD   4/28/2025  14:34 EDT

## 2025-04-28 NOTE — PROGRESS NOTES
Capillary Blood Specimen Collection  Capillary blood collection performed in clinic by Augusta Salazar MA. Patient tolerated the procedure well without complications.   04/28/25   Augusta Salazar MA

## 2025-04-29 RX ORDER — SOTALOL HYDROCHLORIDE 80 MG/1
80 TABLET ORAL EVERY 12 HOURS SCHEDULED
Qty: 180 TABLET | Refills: 3 | Status: SHIPPED | OUTPATIENT
Start: 2025-04-29 | End: 2025-04-30

## 2025-04-30 RX ORDER — SOTALOL HYDROCHLORIDE 80 MG/1
80 TABLET ORAL EVERY 12 HOURS SCHEDULED
Qty: 180 TABLET | Refills: 3 | Status: SHIPPED | OUTPATIENT
Start: 2025-04-30

## 2025-05-05 ENCOUNTER — ANTICOAGULATION VISIT (OUTPATIENT)
Dept: PHARMACY | Facility: HOSPITAL | Age: 80
End: 2025-05-05
Payer: MEDICARE

## 2025-05-05 ENCOUNTER — HOSPITAL ENCOUNTER (OUTPATIENT)
Facility: HOSPITAL | Age: 80
Discharge: HOME | End: 2025-05-05
Admitting: FAMILY MEDICINE
Payer: MEDICARE

## 2025-05-05 ENCOUNTER — CLINICAL SUPPORT (OUTPATIENT)
Dept: CARDIOLOGY | Facility: CLINIC | Age: 80
End: 2025-05-05
Payer: MEDICARE

## 2025-05-05 DIAGNOSIS — Z95.2 HISTORY OF AORTIC VALVE REPLACEMENT: Primary | ICD-10-CM

## 2025-05-05 DIAGNOSIS — K76.9 LIVER LESION: ICD-10-CM

## 2025-05-05 LAB — INR PPP: 4.4 (ref 0.9–1.1)

## 2025-05-05 PROCEDURE — 74183 MRI ABD W/O CNTR FLWD CNTR: CPT

## 2025-05-05 PROCEDURE — 85610 PROTHROMBIN TIME: CPT | Performed by: INTERNAL MEDICINE

## 2025-05-05 PROCEDURE — 25510000002 GADOBENATE DIMEGLUMINE 529 MG/ML SOLUTION: Performed by: FAMILY MEDICINE

## 2025-05-05 PROCEDURE — 36416 COLLJ CAPILLARY BLOOD SPEC: CPT | Performed by: INTERNAL MEDICINE

## 2025-05-05 PROCEDURE — A9577 INJ MULTIHANCE: HCPCS | Performed by: FAMILY MEDICINE

## 2025-05-05 RX ADMIN — GADOBENATE DIMEGLUMINE 14 ML: 529 INJECTION, SOLUTION INTRAVENOUS at 15:25

## 2025-05-05 NOTE — PROGRESS NOTES
Capillary Blood Specimen Collection  Capillary blood collection performed in clinic by Rosa Elena De La Rosa RN. Patient tolerated the procedure well without complications.   05/05/25   Rosa Elena De La Rosa RN

## 2025-05-05 NOTE — PROGRESS NOTES
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  Missed Sunday maybe more 12 mg 14 mg 14 mg 14 mg 14 mg 13 mg  12 mg 14 mg   INR 2.0 2.5 2.5 3.2 1.1 1.5 2.5 3.3 3.4 4.0 4.0 3.6 3.2   Notes Call  Missed x1 No contact  Boost x 1 No contact No contact  Lovenox  Boost x 2, Call Lovenox     call 1 x decrease  APAP;  Call  APAP call     Date 6/24 7/1 7/8 7/22 8/5 8/12 9/4 9/11 9/25 12/2 12/26-31 12/30/24 1/13/25   Total WeeklyDose 12 mg 12 mg 13 mg 13mg 13 mg 12 mg 13 mg  14 mg 12 mg 12 mg [facility] 14 mg 13 mg   INR 2.5 2.2 3.0 3.6 2.7 2.8 2.3 3.0 3.2 2.7  4.3 5.5POCT  xx emil   Notes    Call Call  Call   Call Esoph ulcers Rec'd 1/6 redx1     Date  1/13 1/20 1/27 2/3 2/10 2/20 2/24 3/3 3/10 3/18 3/25 4/1 4/7   Total Weekly  Dose 13 mg 12 mg 10mg 11 mg 14mg 10 mg  14 mg 14 mg 14mg 14 mg  14 mg 6 mg 18 mg    INR 4.5 emil 4.8 2.4 1.9 2.7 1.6 2.6 2.9 3.2 3.3 3.2 1.2 3.0   Notes redx1 redx1 redx1  call Procedure-call  call    Call   ED visit  Hold x 5  Boost x 2  call     Date  4/15 4/22 4/28 5/5            Total Weekly  Dose 14 mg  14 mg 14 mg  13mg            INR 3.0 3.6 4.3 4.4            Notes    Maybe less GLV?                Phone Interview:  Tablet Strength: 2 mg  Patient Contact Info:  [phone] (Mobile) *preferred*   Verbal Release Authorization signed on 4/7/21 -- may speak with Tammy Bai (friend: [phone]), Ismael Michele (brother: [phone])  Lab Contact Info: [facility] ([facility])  ** will call once monthly or if INR is out of range**      Patient Findings    Positives: Change in diet/appetite   Negatives: Signs/symptoms of thrombosis, Signs/symptoms of bleeding, Laboratory test error suspected, Change in health, Change in alcohol use, Change in activity, Upcoming invasive procedure, Emergency department visit, Upcoming dental procedure, Missed doses, Extra doses, Change in medications, Hospital admission, Bruising, Other complaints   Comments: Patient reports that she had mixed greens a couple times this week. She reports that she is inconsistent with her green intake.       Plan:  1. INR supratherapeutic today at 4.4 (goal 2.5-3.5) Instructed Ms. Michele to reduce today's dose to warfarin 1 mg and 1mg on Friday then continue warfarin 2 mg daily until recheck. Patient will also have an extra serving of GLV this week.   2. Repeat INR 5/12/2025  3. Verbal information provided over the phone. Patient RBV dosing instructions, expresses understanding by teach back, and has no further questions at this time.  4. Lucie Michele understands the importance of calling the [facility] if she notices any s/sx of bleeding, stroke, or abnormal bruising, if any changes are made to her medications or medication doses (Rx, OTC, herbal), or if any upcoming procedures are scheduled. Luice Michele will likewise let us know if any other changes, questions, or concerns arise regarding anticoagulation therapy. she understands the importance of seeking medical attention immediately if she experiences any falls, vehicle accidents, or abnormal bleeding or bruising. Lucie Michele voiced understanding of this information and confirms that she has the [facility]'s contact  information. Otherwise, we will plan to contact the patient once monthly or if her INR is out of range.    Thank you,    Ramana Bagley PharmD, NANCI, BCPS  5/5/2025  11:27 EDT

## 2025-05-12 ENCOUNTER — CLINICAL SUPPORT (OUTPATIENT)
Dept: CARDIOLOGY | Facility: CLINIC | Age: 80
End: 2025-05-12
Payer: MEDICARE

## 2025-05-12 ENCOUNTER — ANTICOAGULATION VISIT (OUTPATIENT)
Dept: PHARMACY | Facility: HOSPITAL | Age: 80
End: 2025-05-12
Payer: MEDICARE

## 2025-05-12 DIAGNOSIS — I48.92 PAROXYSMAL ATRIAL FLUTTER: Primary | ICD-10-CM

## 2025-05-12 DIAGNOSIS — I35.9 AORTIC VALVE DISEASE: ICD-10-CM

## 2025-05-12 LAB — INR PPP: 4.7 (ref 0.9–1.1)

## 2025-05-12 PROCEDURE — 36416 COLLJ CAPILLARY BLOOD SPEC: CPT | Performed by: INTERNAL MEDICINE

## 2025-05-12 PROCEDURE — 85610 PROTHROMBIN TIME: CPT | Performed by: INTERNAL MEDICINE

## 2025-05-12 RX ORDER — WARFARIN SODIUM 2 MG/1
TABLET ORAL
Qty: 90 TABLET | Refills: 1 | Status: SHIPPED | OUTPATIENT
Start: 2025-05-12

## 2025-05-12 NOTE — PROGRESS NOTES
Capillary Blood Specimen Collection  Capillary blood collection performed in clinic by Rosa Elena De La Rosa RN. Patient tolerated the procedure well without complications.   05/12/25   Rosa Elena De La Rosa RN

## 2025-05-12 NOTE — PROGRESS NOTES
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  Missed Sunday maybe more 12 mg 14 mg 14 mg 14 mg 14 mg 13 mg  12 mg 14 mg   INR 2.0 2.5 2.5 3.2 1.1 1.5 2.5 3.3 3.4 4.0 4.0 3.6 3.2   Notes Call  Missed x1 No contact  Boost x 1 No contact No contact  Lovenox  Boost x 2, Call Lovenox     call 1 x decrease  APAP;  Call  APAP call     Date 6/24 7/1 7/8 7/22 8/5 8/12 9/4 9/11 9/25 12/2 12/26-31 12/30/24 1/13/25   Total WeeklyDose 12 mg 12 mg 13 mg 13mg 13 mg 12 mg 13 mg  14 mg 12 mg 12 mg [facility] 14 mg 13 mg   INR 2.5 2.2 3.0 3.6 2.7 2.8 2.3 3.0 3.2 2.7  4.3 5.5POCT  xx emil   Notes    Call Call  Call   Call Esoph ulcers Rec'd 1/6 redx1     Date  1/13 1/20 1/27 2/3 2/10 2/20 2/24 3/3 3/10 3/18 3/25 4/1 4/7   Total Weekly  Dose 13 mg 12 mg 10mg 11 mg 14mg 10 mg  14 mg 14 mg 14mg 14 mg  14 mg 6 mg 18 mg    INR 4.5 emil 4.8 2.4 1.9 2.7 1.6 2.6 2.9 3.2 3.3 3.2 1.2 3.0   Notes redx1 redx1 redx1  call Procedure-call  call    Call   ED visit  Hold x 5  Boost x 2  call     Date  4/15 4/22 4/28 5/5 5/12           Total Weekly  Dose 14 mg  14 mg 14 mg  13mg 12 mg           INR 3.0 3.6 4.3 4.4 4.7           Notes    Maybe less GLV? Call                Phone Interview:  Tablet Strength: 2  mg  Patient Contact Info: [phone] (Mobile) *preferred*   Verbal Release Authorization signed on 4/7/21 -- may speak with Tammy Bai (friend: [phone]), Ismael Michele (brother: [phone])  Lab Contact Info: [facility] ([facility])  ** will call once monthly or if INR is out of range**      Patient Findings    Positives: Change in diet/appetite   Negatives: Signs/symptoms of thrombosis, Signs/symptoms of bleeding, Laboratory test error suspected, Change in health, Change in alcohol use, Change in activity, Upcoming invasive procedure, Emergency department visit, Upcoming dental procedure, Missed doses, Extra doses, Change in medications, Hospital admission, Bruising, Other complaints   Comments: Patient had broccoli and asparagus last week. Patient reports decreased appetite previously but is back to normal. Patient very surprised INR went up after decrease in dose and extra GLV last week.    All other findings negative per patient           Plan:  1. INR supratherapeutic today at 4.7 (goal 2.5-3.5) Instructed Ms. Michele to take warfarin 2 mg daily except for warfarin 1 mg on Mon/Wed/Fri until recheck. Patient will also have an extra serving of GLV this week.   2. Repeat INR 5/19/2025  3. Verbal information provided over the phone. Patient RBV dosing instructions, expresses understanding by teach back, and has no further questions at this time.  4. Lucie Michele understands the importance of calling the [facility] if she notices any s/sx of bleeding, stroke, or abnormal bruising, if any changes are made to her medications or medication doses (Rx, OTC, herbal), or if any upcoming procedures are scheduled. Lucie Michele will likewise let us know if any other changes, questions, or concerns arise regarding anticoagulation therapy. she understands the importance of seeking medical attention immediately if she experiences any falls, vehicle accidents, or abnormal bleeding or bruising.  Lucie Michele voiced understanding of this information and confirms that she has the [facility]'s contact information. Otherwise, we will plan to contact the patient once monthly or if her INR is out of range.    Kathleen Arauz, PharmD  5/12/2025  14:08 EDT

## 2025-05-19 ENCOUNTER — CLINICAL SUPPORT (OUTPATIENT)
Dept: CARDIOLOGY | Facility: CLINIC | Age: 80
End: 2025-05-19
Payer: MEDICARE

## 2025-05-19 ENCOUNTER — ANTICOAGULATION VISIT (OUTPATIENT)
Dept: PHARMACY | Facility: HOSPITAL | Age: 80
End: 2025-05-19
Payer: MEDICARE

## 2025-05-19 DIAGNOSIS — Z95.2 HISTORY OF AORTIC VALVE REPLACEMENT: Primary | ICD-10-CM

## 2025-05-19 LAB — INR PPP: 3 (ref 0.9–1.1)

## 2025-05-19 PROCEDURE — 36416 COLLJ CAPILLARY BLOOD SPEC: CPT | Performed by: INTERNAL MEDICINE

## 2025-05-19 PROCEDURE — 85610 PROTHROMBIN TIME: CPT | Performed by: INTERNAL MEDICINE

## 2025-05-19 NOTE — PROGRESS NOTES
Capillary Blood Specimen Collection  Capillary blood collection performed in clinic by Augusta Salazar MA. Patient tolerated the procedure well without complications.   05/19/25   Augusta Salazar MA

## 2025-05-19 NOTE — PROGRESS NOTES
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  Missed Sunday maybe more 12 mg 14 mg 14 mg 14 mg 14 mg 13 mg  12 mg 14 mg   INR 2.0 2.5 2.5 3.2 1.1 1.5 2.5 3.3 3.4 4.0 4.0 3.6 3.2   Notes Call  Missed x1 No contact  Boost x 1 No contact No contact  Lovenox  Boost x 2, Call Lovenox     call 1 x decrease  APAP;  Call  APAP call     Date 6/24 7/1 7/8 7/22 8/5 8/12 9/4 9/11 9/25 12/2 12/26-31 12/30/24 1/13/25   Total WeeklyDose 12 mg 12 mg 13 mg 13mg 13 mg 12 mg 13 mg  14 mg 12 mg 12 mg [facility] 14 mg 13 mg   INR 2.5 2.2 3.0 3.6 2.7 2.8 2.3 3.0 3.2 2.7  4.3 5.5POCT  xx emil   Notes    Call Call  Call   Call Esoph ulcers Rec'd 1/6 redx1     Date  1/13 1/20 1/27 2/3 2/10 2/20 2/24 3/3 3/10 3/18 3/25 4/1 4/7   Total Weekly  Dose 13 mg 12 mg 10mg 11 mg 14mg 10 mg  14 mg 14 mg 14mg 14 mg  14 mg 6 mg 18 mg    INR 4.5 emil 4.8 2.4 1.9 2.7 1.6 2.6 2.9 3.2 3.3 3.2 1.2 3.0   Notes redx1 redx1 redx1  call Procedure-call  call    Call   ED visit  Hold x 5  Boost x 2  call     Date  4/15 4/22 4/28 5/5 5/12 5/19          Total Weekly  Dose 14 mg  14 mg 14 mg  13mg 12 mg 11 mg           INR 3.0 3.6 4.3 4.4 4.7 3.0          Notes    Maybe less GLV? Call  Call               Phone Interview:  Tablet  Strength: 2 mg  Patient Contact Info: [phone] (Mobile) *preferred*   Verbal Release Authorization signed on 4/7/21 -- may speak with Tammy Bai (friend: [phone]), Ismael Michele (brother: [phone])  Lab Contact Info: [facility] ([facility])  ** will call once monthly or if INR is out of range**        UNABLE TO GET IN CONTACT WITH THE PATIENT. PLEASE DISREGARD THE FOLLOWING PLAN UNTIL ABLE TO GET IN CONTACT WITH PATIENT/ PATIENT REPRESENTATIVE. [facility] 5/19        Plan:  1. INR therapeutic today at 3.0 (goal 2.5-3.5) Instructed Ms. Michele to take warfarin 2 mg daily except for warfarin 1 mg on Mon/Wed/Fri until recheck.   2. Repeat INR 5/28/2025  3. Verbal information provided over the phone. Patient RBV dosing instructions, expresses understanding by teach back, and has no further questions at this time.  4. Lucie Michele understands the importance of calling the [facility] if she notices any s/sx of bleeding, stroke, or abnormal bruising, if any changes are made to her medications or medication doses (Rx, OTC, herbal), or if any upcoming procedures are scheduled. Lucie Michele will likewise let us know if any other changes, questions, or concerns arise regarding anticoagulation therapy. she understands the importance of seeking medical attention immediately if she experiences any falls, vehicle accidents, or abnormal bleeding or bruising. Lucie Michele voiced understanding of this information and confirms that she has the [facility]'s contact information. Otherwise, we will plan to contact the patient once monthly or if her INR is out of range.

## 2025-05-20 NOTE — PROGRESS NOTES
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  Missed Sunday maybe more 12 mg 14 mg 14 mg 14 mg 14 mg 13 mg  12 mg 14 mg   INR 2.0 2.5 2.5 3.2 1.1 1.5 2.5 3.3 3.4 4.0 4.0 3.6 3.2   Notes Call  Missed x1 No contact  Boost x 1 No contact No contact   Lovenox  Boost x 2, Call Lovenox        call 1 x decrease  APAP;  Call  APAP call      Date 6/24 7/1 7/8 7/22 8/5 8/12 9/4 9/11 9/25 12/2 12/26-31 12/30/24 1/13/25   Total WeeklyDose 12 mg 12 mg 13 mg 13mg 13 mg 12 mg 13 mg  14 mg 12 mg 12 mg [facility] 14 mg 13 mg   INR 2.5 2.2 3.0 3.6 2.7 2.8 2.3 3.0 3.2 2.7   4.3 5.5POCT  xx emil   Notes       Call Call   Call     Call Esoph ulcers Rec'd 1/6 redx1      Date  1/13 1/20 1/27 2/3 2/10 2/20 2/24 3/3 3/10 3/18 3/25 4/1 4/7   Total Weekly  Dose 13 mg 12 mg 10mg 11 mg 14mg 10 mg  14 mg 14 mg 14mg 14 mg  14 mg 6 mg 18 mg    INR 4.5 emil 4.8 2.4 1.9 2.7 1.6 2.6 2.9 3.2 3.3 3.2 1.2 3.0   Notes redx1 redx1 redx1   call Procedure-call  call       Call   ED visit  Hold x 5  Boost x 2  call      Date  4/15 4/22 4/28 5/5 5/12 5/19                 Total Weekly  Dose 14 mg  14 mg 14 mg  13mg 12 mg 11 mg                  INR 3.0 3.6 4.3 4.4 4.7 3.0                 Notes       Maybe less GLV? Call  Call                      Phone Interview:  Tablet Strength: 2 mg  Patient Contact Info: [phone] (Mobile) *preferred*   Verbal Release Authorization signed on 4/7/21 -- may speak with Tammy Bai (friend:[phone]), Ismael Michele (brother: [phone])  Lab Contact Info: [facility] ([facility])  ** will call once monthly or if INR is out of range**    Patient Findings    Negatives: Signs/symptoms of thrombosis, Signs/symptoms of bleeding, Laboratory test error suspected, Change in health, Change in alcohol use, Change in activity, Upcoming invasive procedure, Emergency department visit, Upcoming dental procedure, Missed doses, Extra doses, Change in medications, Change in diet/appetite, Hospital admission, Bruising, Other complaints   Comments: All findings negative per patient.     Plan:  INR was therapeutic yesterday at 3.0 (goal 2.5-3.5). Per Kathleen Arauz, PharmD instructed Ms. Michele to continue warfarin 2 mg daily except 1 mg Mon/Wed/Fri until recheck.   Repeat INR 5.27.25  Verbal information provided over the phone. Patient RBV dosing instructions, expresses understanding by teach back, and has no further questions at this time.  Lucie Michele understands the importance of calling the [facility] if she notices any s/sx of bleeding, stroke, or abnormal bruising, if any changes are made to her medications or medication doses (Rx, OTC, herbal), or if any upcoming procedures are scheduled. Lucie Michele will likewise let us know if any other changes, questions, or concerns arise regarding anticoagulation therapy. she understands the importance of seeking medical attention immediately if she experiences any falls, vehicle accidents, or abnormal bleeding or bruising. Lucie Michele voiced understanding of this information and confirms that she has the [facility]'s contact information. Otherwise, we will plan to contact the patient once monthly or if her INR is out of range.        Elva Langford CPhT, [facility]   10:19 EDT   5/20/2025    I, Zenaida Sommer, PharmD, have reviewed the note in full and agree with the assessment and plan.  05/20/25  12:17 EDT

## 2025-05-22 NOTE — ASSESSMENT & PLAN NOTE
Patient had Pepcid added to her omeprazole regimen.  She has been scoped.  Been looking for a path report for H. pylori.  She is feeling better.  Continue current regimen of Carafate and omeprazole.
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
## 2025-05-22 NOTE — ASSESSMENT & PLAN NOTE
MRI showed;IMPRESSION:  Impression:  1. Combination of hepatic cysts and hepatic hemangiomas, considered benign requiring no follow-up. Hepatic hemangiomas are either stable or decreasing in size since 2010.  2. Small gallbladder polyps present since 2010 considered benign. No evidence of acute cholecystitis or biliary obstruction.  3. No acute MRI findings. Additional chronic/ancillary findings as above.         stretcher

## 2025-05-23 ENCOUNTER — OFFICE VISIT (OUTPATIENT)
Dept: FAMILY MEDICINE CLINIC | Facility: CLINIC | Age: 80
End: 2025-05-23
Payer: MEDICARE

## 2025-05-23 VITALS
HEART RATE: 68 BPM | SYSTOLIC BLOOD PRESSURE: 128 MMHG | HEIGHT: 65 IN | OXYGEN SATURATION: 98 % | DIASTOLIC BLOOD PRESSURE: 84 MMHG | BODY MASS INDEX: 26.86 KG/M2 | WEIGHT: 161.2 LBS

## 2025-05-23 DIAGNOSIS — R73.9 HYPERGLYCEMIA: ICD-10-CM

## 2025-05-23 DIAGNOSIS — E03.9 ACQUIRED HYPOTHYROIDISM: ICD-10-CM

## 2025-05-23 DIAGNOSIS — K27.9 PUD (PEPTIC ULCER DISEASE): ICD-10-CM

## 2025-05-23 DIAGNOSIS — N18.31 STAGE 3A CHRONIC KIDNEY DISEASE: ICD-10-CM

## 2025-05-23 DIAGNOSIS — E55.9 VITAMIN D DEFICIENCY: ICD-10-CM

## 2025-05-23 DIAGNOSIS — K76.9 LIVER LESION: Primary | ICD-10-CM

## 2025-05-23 DIAGNOSIS — E78.2 MIXED HYPERLIPIDEMIA: ICD-10-CM

## 2025-05-23 RX ORDER — BUDESONIDE, GLYCOPYRROLATE, AND FORMOTEROL FUMARATE 160; 9; 4.8 UG/1; UG/1; UG/1
2 AEROSOL, METERED RESPIRATORY (INHALATION) 2 TIMES DAILY
Qty: 10.7 G | Refills: 11 | Status: SHIPPED | OUTPATIENT
Start: 2025-05-23

## 2025-05-27 ENCOUNTER — ANTICOAGULATION VISIT (OUTPATIENT)
Dept: PHARMACY | Facility: HOSPITAL | Age: 80
End: 2025-05-27
Payer: MEDICARE

## 2025-05-27 ENCOUNTER — CLINICAL SUPPORT (OUTPATIENT)
Dept: CARDIOLOGY | Facility: CLINIC | Age: 80
End: 2025-05-27
Payer: MEDICARE

## 2025-05-27 DIAGNOSIS — Z95.2 HISTORY OF AORTIC VALVE REPLACEMENT: Primary | ICD-10-CM

## 2025-05-27 LAB — INR PPP: 6.2 (ref 0.9–1.1)

## 2025-05-27 PROCEDURE — 36416 COLLJ CAPILLARY BLOOD SPEC: CPT | Performed by: INTERNAL MEDICINE

## 2025-05-27 PROCEDURE — 85610 PROTHROMBIN TIME: CPT | Performed by: INTERNAL MEDICINE

## 2025-05-27 NOTE — PROGRESS NOTES
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  Missed Sunday maybe more 12 mg 14 mg 14 mg 14 mg 14 mg 13 mg  12 mg 14 mg   INR 2.0 2.5 2.5 3.2 1.1 1.5 2.5 3.3 3.4 4.0 4.0 3.6 3.2   Notes Call  Missed x1 No contact  Boost x 1 No contact No contact   Lovenox  Boost x 2, Call Lovenox        call 1 x decrease  APAP;  Call  APAP call      Date 6/24 7/1 7/8 7/22 8/5 8/12 9/4 9/11 9/25 12/2 12/26-31 12/30/24 1/13/25   Total WeeklyDose 12 mg 12 mg 13 mg 13mg 13 mg 12 mg 13 mg  14 mg 12 mg 12 mg [facility] 14 mg 13 mg   INR 2.5 2.2 3.0 3.6 2.7 2.8 2.3 3.0 3.2 2.7   4.3 5.5POCT  xx emil   Notes       Call Call   Call     Call Esoph ulcers Rec'd 1/6 redx1      Date  1/13 1/20 1/27 2/3 2/10 2/20 2/24 3/3 3/10 3/18 3/25 4/1 4/7   Total Weekly  Dose 13 mg 12 mg 10mg 11 mg 14mg 10 mg  14 mg 14 mg 14mg 14 mg  14 mg 6 mg 18 mg    INR 4.5 emil 4.8 2.4 1.9 2.7 1.6 2.6 2.9 3.2 3.3 3.2 1.2 3.0   Notes redx1 redx1 redx1   call Procedure-call  call       Call   ED visit  Hold x 5  Boost x 2  call      Date  4/15 4/22 4/28 5/5 5/12 5/19  5/27               Total Weekly  Dose 14 mg  14 mg 14 mg  13mg 12 mg 11 mg   11 mg               INR 3.0 3.6 4.3 4.4 4.7 3.0  6.2               Notes       Maybe less GLV?  Call  Call   Call                  Phone Interview:  Tablet Strength: 2 mg  Patient Contact Info: [phone] (Mobile) *preferred*   Verbal Release Authorization signed on 4/7/21 -- may speak with Tammy Bai (friend:[phone]), Ismael Michele (brother: [phone])  Lab Contact Info: [facility] ([facility])  ** will call once monthly or if INR is out of range**    Patient Findings    Negatives: Signs/symptoms of thrombosis, Signs/symptoms of bleeding, Laboratory test error suspected, Change in health, Change in alcohol use, Change in activity, Upcoming invasive procedure, Emergency department visit, Upcoming dental procedure, Missed doses, Extra doses, Change in medications, Change in diet/appetite, Hospital admission, Bruising, Other complaints   Comments: All findings negative per patient.     Plan:  INR is supratherapeutic today at 6.2 (goal 2.5-3.5). Instructed Ms. Michele to HOLD today and tomorrow's dose and continue warfarin 2 mg daily except 1 mg Mon/Wed/Fri until recheck.   Repeat INR 6/2/25  Verbal information provided over the phone. Patient RBV dosing instructions, expresses understanding by teach back, and has no further questions at this time.  Lucie Michele understands the importance of calling the [facility] if she notices any s/sx of bleeding, stroke, or abnormal bruising, if any changes are made to her medications or medication doses (Rx, OTC, herbal), or if any upcoming procedures are scheduled. Lucie Michele will likewise let us know if any other changes, questions, or concerns arise regarding anticoagulation therapy. she understands the importance of seeking medical attention immediately if she experiences any falls, vehicle accidents, or abnormal bleeding or bruising. Lucie Michele voiced understanding of this information and confirms that she has the [facility]'s contact information. Otherwise, we will plan to contact the patient once monthly or if  her INR is out of range.     Kathleen Arauz, PharmD  5/27/2025  10:55 EDT

## 2025-05-27 NOTE — PROGRESS NOTES
Capillary Blood Specimen Collection  Capillary blood collection performed in clinic by Augusta Salazar MA. Patient tolerated the procedure well without complications.   05/27/25   Augusta Salazar MA

## 2025-05-29 DIAGNOSIS — R73.03 PREDIABETES: ICD-10-CM

## 2025-05-29 DIAGNOSIS — I10 PRIMARY HYPERTENSION: ICD-10-CM

## 2025-05-29 DIAGNOSIS — N18.31 STAGE 3A CHRONIC KIDNEY DISEASE: ICD-10-CM

## 2025-05-29 DIAGNOSIS — E03.9 ACQUIRED HYPOTHYROIDISM: ICD-10-CM

## 2025-05-29 DIAGNOSIS — E55.9 VITAMIN D DEFICIENCY: ICD-10-CM

## 2025-05-29 DIAGNOSIS — M85.80 OSTEOPENIA, UNSPECIFIED LOCATION: ICD-10-CM

## 2025-05-29 DIAGNOSIS — I10 ESSENTIAL HYPERTENSION, BENIGN: ICD-10-CM

## 2025-05-29 DIAGNOSIS — Z13.820 OSTEOPOROSIS SCREENING: ICD-10-CM

## 2025-05-29 DIAGNOSIS — E07.9 DISORDER OF THYROID: ICD-10-CM

## 2025-05-29 DIAGNOSIS — M25.552 LEFT HIP PAIN: ICD-10-CM

## 2025-05-29 RX ORDER — OXYBUTYNIN CHLORIDE 5 MG/1
5 TABLET ORAL 2 TIMES DAILY
Qty: 180 TABLET | Refills: 1 | Status: SHIPPED | OUTPATIENT
Start: 2025-05-29

## 2025-05-31 DIAGNOSIS — M25.552 LEFT HIP PAIN: ICD-10-CM

## 2025-05-31 DIAGNOSIS — N18.31 STAGE 3A CHRONIC KIDNEY DISEASE: ICD-10-CM

## 2025-05-31 DIAGNOSIS — E55.9 VITAMIN D DEFICIENCY: ICD-10-CM

## 2025-05-31 DIAGNOSIS — I10 PRIMARY HYPERTENSION: ICD-10-CM

## 2025-05-31 DIAGNOSIS — E07.9 DISORDER OF THYROID: ICD-10-CM

## 2025-05-31 DIAGNOSIS — I10 ESSENTIAL HYPERTENSION, BENIGN: ICD-10-CM

## 2025-05-31 DIAGNOSIS — R73.03 PREDIABETES: ICD-10-CM

## 2025-05-31 DIAGNOSIS — E03.9 ACQUIRED HYPOTHYROIDISM: ICD-10-CM

## 2025-05-31 DIAGNOSIS — M85.80 OSTEOPENIA, UNSPECIFIED LOCATION: ICD-10-CM

## 2025-05-31 DIAGNOSIS — Z13.820 OSTEOPOROSIS SCREENING: ICD-10-CM

## 2025-06-02 ENCOUNTER — CLINICAL SUPPORT (OUTPATIENT)
Dept: CARDIOLOGY | Facility: CLINIC | Age: 80
End: 2025-06-02
Payer: MEDICARE

## 2025-06-02 ENCOUNTER — ANTICOAGULATION VISIT (OUTPATIENT)
Dept: PHARMACY | Facility: HOSPITAL | Age: 80
End: 2025-06-02
Payer: MEDICARE

## 2025-06-02 DIAGNOSIS — Z95.2 HISTORY OF AORTIC VALVE REPLACEMENT: Primary | ICD-10-CM

## 2025-06-02 LAB — INR PPP: 2.7 (ref 0.9–1.1)

## 2025-06-02 PROCEDURE — 85610 PROTHROMBIN TIME: CPT | Performed by: INTERNAL MEDICINE

## 2025-06-02 PROCEDURE — 36416 COLLJ CAPILLARY BLOOD SPEC: CPT | Performed by: INTERNAL MEDICINE

## 2025-06-02 RX ORDER — OMEPRAZOLE 20 MG/1
20 CAPSULE, DELAYED RELEASE ORAL DAILY
Qty: 90 CAPSULE | Refills: 1 | Status: SHIPPED | OUTPATIENT
Start: 2025-06-02

## 2025-06-02 NOTE — PROGRESS NOTES
Capillary Blood Specimen Collection  Capillary blood collection performed in clinic by Augusta Salazar MA. Patient tolerated the procedure well without complications.   06/02/25   Augusta Salazar MA

## 2025-06-02 NOTE — PROGRESS NOTES
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  Missed Sunday maybe more 12 mg 14 mg 14 mg 14 mg 14 mg 13 mg  12 mg 14 mg   INR 2.0 2.5 2.5 3.2 1.1 1.5 2.5 3.3 3.4 4.0 4.0 3.6 3.2   Notes Call  Missed x1 No contact  Boost x 1 No contact No contact   Lovenox  Boost x 2, Call Lovenox        call 1 x decrease  APAP;  Call  APAP call      Date 6/24 7/1 7/8 7/22 8/5 8/12 9/4 9/11 9/25 12/2 12/26-31 12/30/24 1/13/25   Total WeeklyDose 12 mg 12 mg 13 mg 13mg 13 mg 12 mg 13 mg  14 mg 12 mg 12 mg [facility] 14 mg 13 mg   INR 2.5 2.2 3.0 3.6 2.7 2.8 2.3 3.0 3.2 2.7   4.3 5.5POCT  xx emil   Notes       Call Call   Call     Call Esoph ulcers Rec'd 1/6 redx1      Date  1/13 1/20 1/27 2/3 2/10 2/20 2/24 3/3 3/10 3/18 3/25 4/1 4/7   Total Weekly  Dose 13 mg 12 mg 10mg 11 mg 14mg 10 mg  14 mg 14 mg 14mg 14 mg  14 mg 6 mg 18 mg    INR 4.5 emil 4.8 2.4 1.9 2.7 1.6 2.6 2.9 3.2 3.3 3.2 1.2 3.0   Notes redx1 redx1 redx1   call Procedure-call  call       Call   ED visit  Hold x 5  Boost x 2  call      Date  4/15 4/22 4/28 5/5 5/12 5/19  5/27  6/2            Total Weekly  Dose 14 mg  14 mg 14 mg  13mg 12 mg 11 mg   11 mg 11 mg             INR 3.0 3.6 4.3 4.4 4.7 3.0  6.2  2.7             Notes       Maybe  less GLV? Call  Call   Call Call                Phone Interview:  Tablet Strength: 2 mg  Patient Contact Info: [phone] (Mobile) *preferred*   Verbal Release Authorization signed on 4/7/21 -- may speak with Tammy Bai (friend:[phone]), Ismael Michele (brother: [phone])  Lab Contact Info: [facility] ([facility])  ** will call once monthly or if INR is out of range**    Patient Findings  Positives: Change in diet/appetite   Negatives: Signs/symptoms of thrombosis, Signs/symptoms of bleeding, Laboratory test error suspected, Change in health, Change in alcohol use, Change in activity, Upcoming invasive procedure, Emergency department visit, Upcoming dental procedure, Missed doses, Extra doses, Change in medications, Hospital admission, Bruising, Other complaints   Comments: Had broccoli salad 3x times and mixed veggies a few times as well this past week which is out of the ordinary for her. Dosing verified, no other changes. Patient still has no idea what led to 6.2 INR last week and says she follows all directions very closely.       Plan:  INR is therapeutic today at 2.7 (goal 2.5-3.5). Instructed Ms. Michele to take a dose of warfarin 1 mg daily except 2 mg TueThuSat until recheck.   Repeat INR in one week 6/9.  Verbal information provided over the phone. Patient RBV dosing instructions, expresses understanding by teach back, and has no further questions at this time.  Lucie Michele understands the importance of calling the [facility] if she notices any s/sx of bleeding, stroke, or abnormal bruising, if any changes are made to her medications or medication doses (Rx, OTC, herbal), or if any upcoming procedures are scheduled. Lucie Michele will likewise let us know if any other changes, questions, or concerns arise regarding anticoagulation therapy. she understands the importance of seeking medical attention immediately if she experiences any falls, vehicle accidents, or abnormal  bleeding or bruising. Lucie Michele voiced understanding of this information and confirms that she has the [facility]'s contact information. Otherwise, we will plan to contact the patient once monthly or if her INR is out of range.     Tyrone Hensley, JenniD, BCPS  6/2/2025  11:52 EDT

## 2025-06-10 ENCOUNTER — CLINICAL SUPPORT (OUTPATIENT)
Dept: CARDIOLOGY | Facility: CLINIC | Age: 80
End: 2025-06-10
Payer: MEDICARE

## 2025-06-10 ENCOUNTER — ANTICOAGULATION VISIT (OUTPATIENT)
Dept: PHARMACY | Facility: HOSPITAL | Age: 80
End: 2025-06-10
Payer: MEDICARE

## 2025-06-10 DIAGNOSIS — Z95.2 HISTORY OF AORTIC VALVE REPLACEMENT: Primary | ICD-10-CM

## 2025-06-10 LAB — INR PPP: 3.1 (ref 0.9–1.1)

## 2025-06-10 PROCEDURE — 36416 COLLJ CAPILLARY BLOOD SPEC: CPT | Performed by: INTERNAL MEDICINE

## 2025-06-10 NOTE — PROGRESS NOTES
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  Missed Sunday maybe more 12 mg 14 mg 14 mg 14 mg 14 mg 13 mg  12 mg 14 mg   INR 2.0 2.5 2.5 3.2 1.1 1.5 2.5 3.3 3.4 4.0 4.0 3.6 3.2   Notes Call  Missed x1 No contact  Boost x 1 No contact No contact   Lovenox  Boost x 2, Call Lovenox        call 1 x decrease  APAP;  Call  APAP call      Date 6/24 7/1 7/8 7/22 8/5 8/12 9/4 9/11 9/25 12/2 12/26-31 12/30/24 1/13/25   Total WeeklyDose 12 mg 12 mg 13 mg 13mg 13 mg 12 mg 13 mg  14 mg 12 mg 12 mg [facility] 14 mg 13 mg   INR 2.5 2.2 3.0 3.6 2.7 2.8 2.3 3.0 3.2 2.7   4.3 5.5POCT  xx emil   Notes       Call Call   Call     Call Esoph ulcers Rec'd 1/6 redx1      Date  1/13 1/20 1/27 2/3 2/10 2/20 2/24 3/3 3/10 3/18 3/25 4/1 4/7   Total Weekly  Dose 13 mg 12 mg 10mg 11 mg 14mg 10 mg  14 mg 14 mg 14mg 14 mg  14 mg 6 mg 18 mg    INR 4.5 emil 4.8 2.4 1.9 2.7 1.6 2.6 2.9 3.2 3.3 3.2 1.2 3.0   Notes redx1 redx1 redx1   call Procedure-call  call       Call   ED visit  Hold x 5  Boost x 2  call      Date  4/15 4/22 4/28 5/5 5/12 5/19  5/27  6/2 6/10           Total Weekly  Dose 14 mg  14 mg 14 mg  13mg 12 mg 11 mg   11 mg 11 mg  10 mg           INR 3.0 3.6 4.3 4.4 4.7 3.0  6.2  2.7  3.1           Notes        Maybe less GLV? Call  Call   Call Call  Call              Phone Interview:  Tablet Strength: 2 mg  Patient Contact Info: [phone] (Mobile) *preferred*   Verbal Release Authorization signed on 4/7/21 -- may speak with Tammy Bai (friend:[phone]), Ismael Michele (brother: [phone])  Lab Contact Info: [facility] ([facility])  ** will call once monthly or if INR is out of range**      Patient Findings    Negatives: Signs/symptoms of thrombosis, Signs/symptoms of bleeding, Laboratory test error suspected, Change in health, Change in alcohol use, Change in activity, Upcoming invasive procedure, Emergency department visit, Upcoming dental procedure, Missed doses, Extra doses, Change in medications, Change in diet/appetite, Hospital admission, Bruising, Other complaints   Comments: All findings negative per patient           Plan:  INR is therapeutic today at 3.1 (goal 2.5-3.5). Instructed Ms. Michele to continue decreased dose of warfarin 1 mg daily except 2 mg TueThuSat until recheck.   Repeat INR in one week 6/17.  Verbal information provided over the phone. Patient RBV dosing instructions, expresses understanding by teach back, and has no further questions at this time.  Lucie Michele understands the importance of calling the [facility] if she notices any s/sx of bleeding, stroke, or abnormal bruising, if any changes are made to her medications or medication doses (Rx, OTC, herbal), or if any upcoming procedures are scheduled. Lucie Michele will likewise let us know if any other changes, questions, or concerns arise regarding anticoagulation therapy. she understands the importance of seeking medical attention immediately if she experiences any falls, vehicle accidents, or abnormal bleeding or bruising. Lucie Michele voiced understanding of this information and confirms that she has the [facility]'s contact information. Otherwise, we will plan to contact the patient  once monthly or if her INR is out of range.     Kathleen Arauz, PharmD  6/10/2025  11:01 EDT

## 2025-06-10 NOTE — PROGRESS NOTES
Capillary Blood Specimen Collection  Capillary blood collection performed in clinic by Amberly Kuo RN. Patient tolerated the procedure well without complications.   06/10/25   Amberly Kuo RN

## 2025-06-16 ENCOUNTER — ANTICOAGULATION VISIT (OUTPATIENT)
Dept: PHARMACY | Facility: HOSPITAL | Age: 80
End: 2025-06-16
Payer: MEDICARE

## 2025-06-16 ENCOUNTER — CLINICAL SUPPORT (OUTPATIENT)
Dept: CARDIOLOGY | Facility: CLINIC | Age: 80
End: 2025-06-16
Payer: MEDICARE

## 2025-06-16 DIAGNOSIS — I48.92 PAROXYSMAL ATRIAL FLUTTER: ICD-10-CM

## 2025-06-16 DIAGNOSIS — Z95.2 HISTORY OF AORTIC VALVE REPLACEMENT: Primary | ICD-10-CM

## 2025-06-16 LAB — INR PPP: 4.2 (ref 0.9–1.1)

## 2025-06-16 PROCEDURE — 36416 COLLJ CAPILLARY BLOOD SPEC: CPT | Performed by: INTERNAL MEDICINE

## 2025-06-16 RX ORDER — ALENDRONATE SODIUM 70 MG/1
70 TABLET ORAL WEEKLY
Qty: 4 TABLET | Refills: 0 | Status: SHIPPED | OUTPATIENT
Start: 2025-06-16

## 2025-06-16 NOTE — PROGRESS NOTES
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  Missed Sunday maybe more 12 mg 14 mg 14 mg 14 mg 14 mg 13 mg  12 mg 14 mg   INR 2.0 2.5 2.5 3.2 1.1 1.5 2.5 3.3 3.4 4.0 4.0 3.6 3.2   Notes Call  Missed x1 No contact  Boost x 1 No contact No contact   Lovenox  Boost x 2, Call Lovenox        call 1 x decrease  APAP;  Call  APAP call      Date 6/24 7/1 7/8 7/22 8/5 8/12 9/4 9/11 9/25 12/2 12/26-31 12/30/24 1/13/25   Total WeeklyDose 12 mg 12 mg 13 mg 13mg 13 mg 12 mg 13 mg  14 mg 12 mg 12 mg [facility] 14 mg 13 mg   INR 2.5 2.2 3.0 3.6 2.7 2.8 2.3 3.0 3.2 2.7   4.3 5.5POCT  xx emil   Notes       Call Call   Call     Call Esoph ulcers Rec'd 1/6 redx1      Date  1/13 1/20 1/27 2/3 2/10 2/20 2/24 3/3 3/10 3/18 3/25 4/1 4/7   Total Weekly  Dose 13 mg 12 mg 10mg 11 mg 14mg 10 mg  14 mg 14 mg 14mg 14 mg  14 mg 6 mg 18 mg    INR 4.5 emil 4.8 2.4 1.9 2.7 1.6 2.6 2.9 3.2 3.3 3.2 1.2 3.0   Notes redx1 redx1 redx1   call Procedure-call  call       Call   ED visit  Hold x 5  Boost x 2  call      Date  4/15 4/22 4/28 5/5 5/12 5/19  5/27  6/2 6/10  6/16         Total Weekly  Dose 14 mg  14 mg 14 mg  13mg 12 mg 11 mg   11 mg 11 mg  10 mg  10 mg         INR 3.0 3.6 4.3 4.4 4.7 3.0  6.2  2.7  3.1  4.2          Notes       Maybe less GLV? Call  Call   Call Call  Call  Call             Phone Interview:  Tablet Strength: 2 mg  Patient Contact Info: [phone] (Mobile) *preferred*   Verbal Release Authorization signed on 4/7/21 -- may speak with Tammy Bai (friend:[phone]), Ismael Michele (brother: [phone])  Lab Contact Info: [facility] ([facility]  ** will call once monthly or if INR is out of range**      Patient Findings  Positives: Missed doses   Negatives: Signs/symptoms of thrombosis, Signs/symptoms of bleeding, Laboratory test error suspected, Change in health, Change in alcohol use, Change in activity, Upcoming invasive procedure, Emergency department visit, Upcoming dental procedure, Extra doses, Change in medications, Change in diet/appetite, Hospital admission, Bruising, Other complaints   Comments: Patient missed dose last Wednesday.  No s/s of bleeding    All other findings negative per patient       Plan:  INR is supratherapeutic today at 4.2 (goal 2.5-3.5). Instructed Ms. Michele to take a decrease dose of warfarin 1 mg daily except for 2 mg on Monday until recheck. INR elevated after a decrease in dose from previous week and a missed dose. Will trial a lower TWD    Repeat INR in one week 6/23  Verbal information provided over the phone. Patient RBV dosing instructions, expresses understanding by teach back, and has no further questions at this time.  Lucie Michele understands the importance of calling the [facility] if she notices any s/sx of bleeding, stroke, or abnormal bruising, if any changes are made to her medications or medication doses (Rx, OTC, herbal), or if any upcoming procedures are scheduled. Lucie Michele will likewise let us know if any other changes, questions, or concerns arise regarding anticoagulation therapy. she understands the importance of seeking medical attention immediately if she experiences any falls, vehicle accidents, or abnormal bleeding or  bruising. Lucie Michele voiced understanding of this information and confirms that she has the [facility]'s contact information. Otherwise, we will plan to contact the patient once monthly or if her INR is out of range.     Kathleen Arauz, PharmD  6/16/2025  13:17 EDT

## 2025-06-16 NOTE — PROGRESS NOTES
Capillary Blood Specimen Collection  Capillary blood collection performed in clinic by Amberly Kuo RN. Patient tolerated the procedure well without complications.   06/16/25   Amberly Kuo RN

## 2025-06-17 RX ORDER — ALENDRONATE SODIUM 70 MG/1
70 TABLET ORAL WEEKLY
Qty: 12 TABLET | OUTPATIENT
Start: 2025-06-17

## 2025-06-19 RX ORDER — EZETIMIBE 10 MG/1
10 TABLET ORAL DAILY
Qty: 90 TABLET | Refills: 1 | OUTPATIENT
Start: 2025-06-19

## 2025-06-23 ENCOUNTER — ANTICOAGULATION VISIT (OUTPATIENT)
Dept: PHARMACY | Facility: HOSPITAL | Age: 80
End: 2025-06-23
Payer: MEDICARE

## 2025-06-23 ENCOUNTER — CLINICAL SUPPORT (OUTPATIENT)
Dept: CARDIOLOGY | Facility: CLINIC | Age: 80
End: 2025-06-23
Payer: MEDICARE

## 2025-06-23 DIAGNOSIS — Z95.2 HISTORY OF AORTIC VALVE REPLACEMENT: Primary | ICD-10-CM

## 2025-06-23 LAB — INR PPP: 2.2 (ref 0.9–1.1)

## 2025-06-23 PROCEDURE — 85610 PROTHROMBIN TIME: CPT | Performed by: INTERNAL MEDICINE

## 2025-06-23 PROCEDURE — 36416 COLLJ CAPILLARY BLOOD SPEC: CPT | Performed by: INTERNAL MEDICINE

## 2025-06-23 NOTE — PROGRESS NOTES
Capillary Blood Specimen Collection  Capillary blood collection performed in clinic by Ema Hamilton MA. Patient tolerated the procedure well without complications.   06/23/25   Ema Hamilton MA

## 2025-06-23 NOTE — PROGRESS NOTES
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  Missed Sunday maybe more 12 mg 14 mg 14 mg 14 mg 14 mg 13 mg  12 mg 14 mg   INR 2.0 2.5 2.5 3.2 1.1 1.5 2.5 3.3 3.4 4.0 4.0 3.6 3.2   Notes Call  Missed x1 No contact  Boost x 1 No contact No contact   Lovenox  Boost x 2, Call Lovenox        call 1 x decrease  APAP;  Call  APAP call      Date 6/24 7/1 7/8 7/22 8/5 8/12 9/4 9/11 9/25 12/2 12/26-31 12/30/24 1/13/25   Total WeeklyDose 12 mg 12 mg 13 mg 13mg 13 mg 12 mg 13 mg  14 mg 12 mg 12 mg [facility] 14 mg 13 mg   INR 2.5 2.2 3.0 3.6 2.7 2.8 2.3 3.0 3.2 2.7   4.3 5.5POCT  xx emil   Notes       Call Call   Call     Call Esoph ulcers Rec'd 1/6 redx1      Date  1/13 1/20 1/27 2/3 2/10 2/20 2/24 3/3 3/10 3/18 3/25 4/1 4/7   Total Weekly  Dose 13 mg 12 mg 10mg 11 mg 14mg 10 mg  14 mg 14 mg 14mg 14 mg  14 mg 6 mg 18 mg    INR 4.5 emil 4.8 2.4 1.9 2.7 1.6 2.6 2.9 3.2 3.3 3.2 1.2 3.0   Notes redx1 redx1 redx1   call Procedure-call  call       Call   ED visit  Hold x 5  Boost x 2  call      Date  4/15 4/22 4/28 5/5 5/12 5/19  5/27  6/2 6/10  6/16  6/23       Total Weekly  Dose 14 mg  14 mg 14 mg  13mg 12 mg 11 mg   11 mg 11 mg  10 mg  10 mg  9 mg       INR 3.0 3.6 4.3 4.4 4.7 3.0  6.2  2.7  3.1   4.2  2.2       Notes       Maybe less GLV? Call  Call   Call Call  Call  Call   Call          Phone Interview:  Tablet Strength: 2 mg  Patient Contact Info: [phone] (Mobile) *preferred*   Verbal Release Authorization signed on 4/7/21 -- may speak with Tammy Bai (friend:[phone]), Ismael Michele (brother: [phone])  Lab Contact Info: [facility] ([facility])  ** will call once monthly or if INR is out of range**      Patient Findings    Positives: Extra doses   Negatives: Signs/symptoms of thrombosis, Signs/symptoms of bleeding, Laboratory test error suspected, Change in health, Change in alcohol use, Change in activity, Upcoming invasive procedure, Emergency department visit, Upcoming dental procedure, Missed doses, Change in medications, Change in diet/appetite, Hospital admission, Bruising, Other complaints   Comments: Patient stated she took 2 mg Sat and Sun. All other findings negative per patient.       Plan:  INR sub therapeutic today at 2.2 (goal 2.5-3.5). Per Zenaida Sommer PharmD Instructed Ms. Michele to take a warfarin 1 mg daily except for 2 mg Sat until recheck.     Repeat INR in one week 6/30  Verbal information provided over the phone. Patient RBV dosing instructions, expresses understanding by teach back, and has no further questions at this time.  Lucie Michele understands the importance of calling the [facility] if she notices any s/sx of bleeding, stroke, or abnormal bruising, if any changes are made to her medications or medication doses (Rx, OTC, herbal), or if any upcoming procedures are scheduled. Lucie Michele will likewise let us know if any other changes, questions, or concerns arise regarding anticoagulation therapy. she understands the importance of seeking medical attention immediately if she experiences any falls, vehicle accidents, or abnormal bleeding or bruising. Lucie Michele voiced understanding of this information and confirms that she has the [facility]'s contact information. Otherwise, we will plan to contact the patient once monthly or if her INR is out of range.         Gerald Little   [facility]  6/23/2025 15:56 EDT    I, Kathleen Arauz, JenniD, have reviewed the note in full and agree with the assessment and plan.  06/23/25  16:09 EDT

## 2025-06-27 DIAGNOSIS — E03.9 HYPOTHYROIDISM, UNSPECIFIED TYPE: ICD-10-CM

## 2025-06-27 RX ORDER — LEVOTHYROXINE SODIUM 125 UG/1
125 TABLET ORAL DAILY
Qty: 90 TABLET | Refills: 1 | Status: SHIPPED | OUTPATIENT
Start: 2025-06-27

## 2025-06-30 ENCOUNTER — CLINICAL SUPPORT (OUTPATIENT)
Dept: CARDIOLOGY | Facility: CLINIC | Age: 80
End: 2025-06-30
Payer: MEDICARE

## 2025-06-30 ENCOUNTER — ANTICOAGULATION VISIT (OUTPATIENT)
Dept: PHARMACY | Facility: HOSPITAL | Age: 80
End: 2025-06-30
Payer: MEDICARE

## 2025-06-30 DIAGNOSIS — I48.92 PAROXYSMAL ATRIAL FLUTTER: ICD-10-CM

## 2025-06-30 DIAGNOSIS — Z95.2 HISTORY OF AORTIC VALVE REPLACEMENT: Primary | ICD-10-CM

## 2025-06-30 LAB — INR PPP: 2.2 (ref 0.9–1.1)

## 2025-06-30 PROCEDURE — 36416 COLLJ CAPILLARY BLOOD SPEC: CPT | Performed by: INTERNAL MEDICINE

## 2025-06-30 NOTE — PROGRESS NOTES
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  Missed Sunday maybe more 12 mg 14 mg 14 mg 14 mg 14 mg 13 mg  12 mg 14 mg   INR 2.0 2.5 2.5 3.2 1.1 1.5 2.5 3.3 3.4 4.0 4.0 3.6 3.2   Notes Call  Missed x1 No contact  Boost x 1 No contact No contact   Lovenox  Boost x 2, Call Lovenox        call 1 x decrease  APAP;  Call  APAP call      Date 6/24 7/1 7/8 7/22 8/5 8/12 9/4 9/11 9/25 12/2 12/26-31 12/30/24 1/13/25   Total WeeklyDose 12 mg 12 mg 13 mg 13mg 13 mg 12 mg 13 mg  14 mg 12 mg 12 mg [facility] 14 mg 13 mg   INR 2.5 2.2 3.0 3.6 2.7 2.8 2.3 3.0 3.2 2.7   4.3 5.5POCT  xx emil   Notes       Call Call   Call     Call Esoph ulcers Rec'd 1/6 redx1      Date  1/13 1/20 1/27 2/3 2/10 2/20 2/24 3/3 3/10 3/18 3/25 4/1 4/7   Total Weekly  Dose 13 mg 12 mg 10mg 11 mg 14mg 10 mg  14 mg 14 mg 14mg 14 mg  14 mg 6 mg 18 mg    INR 4.5 emil 4.8 2.4 1.9 2.7 1.6 2.6 2.9 3.2 3.3 3.2 1.2 3.0   Notes redx1 redx1 redx1   call Procedure-call  call       Call   ED visit  Hold x 5  Boost x 2  call      Date  4/15 4/22 4/28 5/5 5/12 5/19  5/27  6/2 6/10  6/16  6/23  6/30     Total Weekly  Dose 14 mg  14 mg 14 mg  13mg 12 mg 11 mg   11 mg 11 mg  10 mg  10 mg  9 mg  8 mg     INR 3.0 3.6 4.3 4.4 4.7 3.0  6.2   2.7  3.1  4.2  2.2  2.2     Notes       Maybe less GLV? Call  Call   Call Call  Call  Call   Call  Call        Phone Interview:  Tablet Strength: 2 mg  Patient Contact Info: [phone] (Mobile) *preferred*   Verbal Release Authorization signed on 4/7/21 -- may speak with Tammy Bai (friend:[phone]), Ismael Michele (brother: [phone])  Lab Contact Info: [facility] ([facility])  ** will call once monthly or if INR is out of range**      Patient Findings  Negatives: Signs/symptoms of thrombosis, Signs/symptoms of bleeding, Laboratory test error suspected, Change in health, Change in alcohol use, Change in activity, Upcoming invasive procedure, Emergency department visit, Upcoming dental procedure, Missed doses, Extra doses, Change in medications, Change in diet/appetite, Hospital admission, Bruising, Other complaints   Comments: Patient fell last Wednesday. Has a blood blister and bruise on her calf.    All other findings negative per patient         Plan:  INR sub therapeutic today at 2.2 (goal 2.5-3.5). Instructed Ms. Michele to take warfarin 1 mg daily except for warfarin 2 mg on Tue/Thu/Sat until recheck.     Repeat INR in one week 7/7/25  Verbal information provided over the phone. Patient RBV dosing instructions, expresses understanding by teach back, and has no further questions at this time.  Lucie Michele understands the importance of calling the [facility] if she notices any s/sx of bleeding, stroke, or abnormal bruising, if any changes are made to her medications or medication doses (Rx, OTC, herbal), or if any upcoming procedures are scheduled. Lucie Michele will likewise let us know if any other changes, questions, or concerns arise regarding anticoagulation therapy. she understands the importance of seeking medical attention immediately if she experiences any falls, vehicle accidents, or abnormal bleeding or bruising. Lucie RODRIGUEZ Ryne voiced understanding of this information  and confirms that she has the [facility]'s contact information. Otherwise, we will plan to contact the patient once monthly or if her INR is out of range.       Kathleen Arauz, PharmD  6/30/2025  13:47 EDT

## 2025-06-30 NOTE — PROGRESS NOTES
Capillary Blood Specimen Collection  Capillary blood collection performed in CLINIC by Mitzi Joseph. Patient tolerated the procedure well without complications.   06/30/25   Mitzi Joseph

## 2025-07-07 ENCOUNTER — ANTICOAGULATION VISIT (OUTPATIENT)
Dept: PHARMACY | Facility: HOSPITAL | Age: 80
End: 2025-07-07
Payer: MEDICARE

## 2025-07-07 ENCOUNTER — CLINICAL SUPPORT (OUTPATIENT)
Dept: CARDIOLOGY | Facility: CLINIC | Age: 80
End: 2025-07-07
Payer: MEDICARE

## 2025-07-07 DIAGNOSIS — I48.92 PAROXYSMAL ATRIAL FLUTTER: Primary | ICD-10-CM

## 2025-07-07 LAB — INR PPP: 2.7 (ref 0.9–1.1)

## 2025-07-07 PROCEDURE — 36416 COLLJ CAPILLARY BLOOD SPEC: CPT | Performed by: INTERNAL MEDICINE

## 2025-07-07 PROCEDURE — 85610 PROTHROMBIN TIME: CPT | Performed by: INTERNAL MEDICINE

## 2025-07-07 RX ORDER — DAPAGLIFLOZIN 10 MG/1
1 TABLET, FILM COATED ORAL DAILY
Qty: 90 TABLET | Refills: 1 | Status: SHIPPED | OUTPATIENT
Start: 2025-07-07

## 2025-07-07 NOTE — PROGRESS NOTES
Capillary Blood Specimen Collection  Capillary blood collection performed in clinic by Rosa Elena De La Rosa RN. Patient tolerated the procedure well without complications.   07/07/25   Rosa Elena De La Rosa RN

## 2025-07-07 NOTE — PROGRESS NOTES
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  Missed Sunday maybe more 12 mg 14 mg 14 mg 14 mg 14 mg 13 mg  12 mg 14 mg   INR 2.0 2.5 2.5 3.2 1.1 1.5 2.5 3.3 3.4 4.0 4.0 3.6 3.2   Notes Call  Missed x1 No contact  Boost x 1 No contact No contact   Lovenox  Boost x 2, Call Lovenox        call 1 x decrease  APAP;  Call  APAP call      Date 6/24 7/1 7/8 7/22 8/5 8/12 9/4 9/11 9/25 12/2 12/26-31 12/30/24 1/13/25   Total WeeklyDose 12 mg 12 mg 13 mg 13mg 13 mg 12 mg 13 mg  14 mg 12 mg 12 mg [facility] 14 mg 13 mg   INR 2.5 2.2 3.0 3.6 2.7 2.8 2.3 3.0 3.2 2.7   4.3 5.5POCT  xx emil   Notes       Call Call   Call     Call Esoph ulcers Rec'd 1/6 redx1      Date  1/13 1/20 1/27 2/3 2/10 2/20 2/24 3/3 3/10 3/18 3/25 4/1 4/7   Total Weekly  Dose 13 mg 12 mg 10mg 11 mg 14mg 10 mg  14 mg 14 mg 14mg 14 mg  14 mg 6 mg 18 mg    INR 4.5 emil 4.8 2.4 1.9 2.7 1.6 2.6 2.9 3.2 3.3 3.2 1.2 3.0   Notes redx1 redx1 redx1   call Procedure-call  call       Call   ED visit  Hold x 5  Boost x 2  call      Date  4/15 4/22 4/28 5/5 5/12 5/19  5/27  6/2 6/10  6/16  6/23  6/30  7/7   Total Weekly  Dose 14 mg  14 mg 14 mg  13mg 12 mg 11 mg   11 mg 11 mg  10 mg  10 mg  9 mg  8 mg  11 mg   INR 3.0 3.6 4.3 4.4 4.7  3.0  6.2  2.7  3.1  4.2  2.2  2.2  2.7   Notes       Maybe less GLV? Call  Call   Call Call  Call  Call   Call  Call  call      Phone Interview:  Tablet Strength: 2 mg  Patient Contact Info: [phone] (Mobile) *preferred*   Verbal Release Authorization signed on 4/7/21 -- may speak with Tammy Bai (friend:[phone]), Ismael Michele (brother: [phone])  Lab Contact Info: [facility] ([facility])  ** will call once monthly or if INR is out of range**      Patient Findings    Positives: Other complaints   Negatives: Signs/symptoms of thrombosis, Signs/symptoms of bleeding, Laboratory test error suspected, Change in health, Change in alcohol use, Change in activity, Upcoming invasive procedure, Emergency department visit, Upcoming dental procedure, Missed doses, Extra doses, Change in medications, Change in diet/appetite, Hospital admission, Bruising   Comments: Per patient she had went to a destination wedding and had eaten richer foods but had tried to lessen the GLV. Patient reports that the bruising and blood blister from her previous fall is healing and she is doctoring at home. Patient reports no complications from the accident. All other findings negative per patient.     Plan:  INR therapeutic today at 2.7 (goal 2.5-3.5). Instructed Ms. Michele to take warfarin 2 mg daily except for warfarin 1  mg on Tue/Thu/Sat until recheck.     Repeat INR in one week 7/14/25  Verbal information provided over the phone. Patient RBV dosing instructions, expresses understanding by teach back, and has no further questions at this time.  Lucie Michele understands the importance of calling the [facility] if she notices any s/sx of bleeding, stroke, or abnormal bruising, if any changes are made to her medications or medication doses (Rx, OTC, herbal), or if any upcoming procedures are scheduled. Lucie Michele will likewise let us know if any other changes, questions, or concerns arise regarding  anticoagulation therapy. she understands the importance of seeking medical attention immediately if she experiences any falls, vehicle accidents, or abnormal bleeding or bruising. Lucie Michele voiced understanding of this information and confirms that she has the [facility]'s contact information. Otherwise, we will plan to contact the patient once monthly or if her INR is out of range.     \  Bang Kingston CPhT 7/7/2025 12:21 EDT     I, Rozina Hayes, [facility], have reviewed the note in full and agree with the assessment and plan.  07/07/25  14:40 EDT

## 2025-07-14 ENCOUNTER — ANTICOAGULATION VISIT (OUTPATIENT)
Dept: PHARMACY | Facility: HOSPITAL | Age: 80
End: 2025-07-14
Payer: MEDICARE

## 2025-07-14 ENCOUNTER — CLINICAL SUPPORT (OUTPATIENT)
Dept: CARDIOLOGY | Facility: CLINIC | Age: 80
End: 2025-07-14
Payer: MEDICARE

## 2025-07-14 DIAGNOSIS — Z95.2 HISTORY OF AORTIC VALVE REPLACEMENT: Primary | ICD-10-CM

## 2025-07-14 LAB — INR PPP: 3.2 (ref 0.9–1.1)

## 2025-07-14 PROCEDURE — 36416 COLLJ CAPILLARY BLOOD SPEC: CPT | Performed by: INTERNAL MEDICINE

## 2025-07-14 RX ORDER — ALENDRONATE SODIUM 70 MG/1
70 TABLET ORAL WEEKLY
Qty: 12 TABLET | OUTPATIENT
Start: 2025-07-14

## 2025-07-14 RX ORDER — ALENDRONATE SODIUM 70 MG/1
70 TABLET ORAL WEEKLY
Qty: 4 TABLET | Refills: 0 | Status: SHIPPED | OUTPATIENT
Start: 2025-07-14

## 2025-07-14 NOTE — PROGRESS NOTES
Capillary Blood Specimen Collection  Capillary blood collection performed in clinic by Amberly Kuo RN. Patient tolerated the procedure well without complications.   07/14/25   Amberly Kuo RN

## 2025-07-14 NOTE — PROGRESS NOTES
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 12 mg 14 mg 14 mg 14 mg 14 13 mg  12 mg 14 mg   INR 2.0 2.5 2.5 3.2 1.1 1.5 2.5 3.3 3.4 4.0 4.0 3.6 3.2   Notes Call  Missed x1 No contact  Boost x 1 No contact No contact   Boost x 2, Call Lovenox        call 1 x dec  APAP;  Call APAP call      Date 6/24 7/1 7/8 7/22 8/5 8/12 9/4 9/11 9/25 12/2 12/26-31 12/30/24 1/13/25   Total WeeklyDose 12 mg 12 mg 13 mg 13mg 13 mg 12 mg 13 mg  14 mg 12 mg 12 mg [facility] 14 mg 13 mg   INR 2.5 2.2 3.0 3.6 2.7 2.8 2.3 3.0 3.2 2.7   4.3 5.5POCT  xx emil   Notes       Call Call   Call     Call Esoph ulcers Rec'd 1/6 redx1      Date  1/13 1/20 1/27 2/3 2/10 2/20 2/24 3/3 3/10 3/18 3/25 4/1 4/7   Total Weekly  Dose 13 mg 12 mg 10mg 11 mg 14mg 10 mg  14 mg 14 mg 14mg 14 mg  14 mg 6 mg 18 mg    INR 4.5 emil 4.8 2.4 1.9 2.7 1.6 2.6 2.9 3.2 3.3 3.2 1.2 3.0   Notes redx1 redx1 redx1   call Procedure-call  call       Call   ED visit  Hold x 5  Boost x 2  call      Date  4/15 4/22 4/28 5/5 5/12 5/19  5/27  6/2 6/10  6/16  6/23  6/30  7/7   Total Weekly  Dose 14 mg  14 mg 14 mg  13mg 12 mg 11 mg   11 mg 11 mg  10 mg  10 mg  9 mg  8 mg  11 mg   INR 3.0 3.6 4.3 4.4 4.7 3.0  6.2  2.7  3.1  4.2  2.2  2.2  2.7   Notes        Maybe less GLV? Call  Call   Call Call  Call  Call   Call  Call  call      Date  7/14               Total Weekly  Dose 11 mg               INR 3.2               Notes                    Phone Interview:  Tablet Strength: 2 mg  Patient Contact Info: [phone] (Mobile) *preferred*   Verbal Release Authorization signed on 4/7/21 -- may speak with Tammy Bai (friend:[phone]), Ismael Michele (brother: [phone])  Lab Contact Info: [facility] ([facility])  ** will call once monthly or if INR is out of range**      Patient Findings:  Comments: Patient not contacted during this encounter.       Plan:  INR therapeutic today at 3.2 (goal 2.5-3.5). Instructed Ms. Michele to take warfarin 2 mg daily except for warfarin 1 mg on Tue/Thu/Sat until recheck.     Repeat INR in one week 7/21/25  Verbal information provided over the phone. Patient RBV dosing instructions, expresses understanding by teach back, and has no further questions at this time.  Lucie Michele understands the importance of calling the [facility] if she notices any s/sx of bleeding, stroke, or abnormal bruising, if any changes are made to her medications or medication doses (Rx, OTC, herbal), or if any upcoming procedures are scheduled. Lucie Michele will likewise let us know if any other changes, questions, or concerns arise regarding anticoagulation therapy. she understands the importance of seeking medical attention immediately if she experiences any falls, vehicle accidents, or abnormal bleeding or bruising. Lucie Michele voiced understanding of this information and confirms that she has the [facility]'s contact information. Otherwise, we will plan to contact the patient once monthly or if her INR is out of range.     Gerald Little CPHT  7/14/2025 12:04 EDT      I, Todd Frey, PharmD, have reviewed the note in full and agree with the assessment and plan.  07/14/25  15:09 EDT

## 2025-07-21 ENCOUNTER — ANTICOAGULATION VISIT (OUTPATIENT)
Dept: PHARMACY | Facility: HOSPITAL | Age: 80
End: 2025-07-21
Payer: MEDICARE

## 2025-07-21 ENCOUNTER — CLINICAL SUPPORT (OUTPATIENT)
Dept: CARDIOLOGY | Facility: CLINIC | Age: 80
End: 2025-07-21
Payer: MEDICARE

## 2025-07-21 DIAGNOSIS — Z95.2 HISTORY OF AORTIC VALVE REPLACEMENT: Primary | ICD-10-CM

## 2025-07-21 LAB — INR PPP: 3.7 (ref 0.9–1.1)

## 2025-07-21 PROCEDURE — 85610 PROTHROMBIN TIME: CPT | Performed by: INTERNAL MEDICINE

## 2025-07-21 PROCEDURE — 36416 COLLJ CAPILLARY BLOOD SPEC: CPT | Performed by: INTERNAL MEDICINE

## 2025-07-21 NOTE — PROGRESS NOTES
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 12 mg 14 mg 14 mg 14 mg 14 13 mg  12 mg 14 mg   INR 2.0 2.5 2.5 3.2 1.1 1.5 2.5 3.3 3.4 4.0 4.0 3.6 3.2   Notes Call  Missed x1 No contact  Boost x 1 No contact No contact   Boost x 2, Call Lovenox        call 1 x dec  APAP;  Call APAP call      Date 6/24 7/1 7/8 7/22 8/5 8/12 9/4 9/11 9/25 12/2 12/26-31 12/30/24 1/13/25   Total WeeklyDose 12 mg 12 mg 13 mg 13mg 13 mg 12 mg 13 mg  14 mg 12 mg 12 mg [facility] 14 mg 13 mg   INR 2.5 2.2 3.0 3.6 2.7 2.8 2.3 3.0 3.2 2.7   4.3 5.5POCT  xx emil   Notes       Call Call   Call     Call Esoph ulcers Rec'd 1/6 redx1      Date  1/13 1/20 1/27 2/3 2/10 2/20 2/24 3/3 3/10 3/18 3/25 4/1 4/7   Total Weekly  Dose 13 mg 12 mg 10mg 11 mg 14mg 10 mg  14 mg 14 mg 14mg 14 mg  14 mg 6 mg 18 mg    INR 4.5 emil 4.8 2.4 1.9 2.7 1.6 2.6 2.9 3.2 3.3 3.2 1.2 3.0   Notes redx1 redx1 redx1   call Procedure-call  call       Call   ED visit  Hold x 5  Boost x 2  call      Date  4/15 4/22 4/28 5/5 5/12 5/19  5/27  6/2 6/10  6/16  6/23  6/30  7/7   Total Weekly  Dose 14 mg  14 mg 14 mg  13mg 12 mg 11 mg   11 mg 11 mg  10 mg  10 mg  9 mg  8 mg  11 mg   INR 3.0 3.6 4.3 4.4 4.7 3.0  6.2  2.7  3.1  4.2  2.2  2.2  2.7   Notes        Maybe less GLV? Call  Call   Call Call  Call  Call   Call  Call  call      Date  7/14 7/21              Total Weekly  Dose 11 mg 11 mg              INR 3.2 3.7              Notes                    Phone Interview:  Tablet Strength: 2 mg  Patient Contact Info: [phone] (Mobile) *preferred*   Verbal Release Authorization signed on 4/7/21 -- may speak with Tammy Bai (friend:[phone]), Ismael Michele (brother: [phone])  Lab Contact Info: [facility] ([facility])  ** will call once monthly or if INR is out of range**      Patient Findings:  Comments: Patient not contacted during this encounter.       Plan:  INR therapeutic today at 3.2 (goal 2.5-3.5).  Ms. Michele to take warfarin 2 mg daily except for warfarin 1 mg on Tue/Thu/Sat until recheck.     Repeat INR in one week 7/28/25  Verbal information provided over the phone. Patient RBV dosing instructions, expresses understanding by teach back, and has no further questions at this time.  Lucie Michele understands the importance of calling the [facility] if she notices any s/sx of bleeding, stroke, or abnormal bruising, if any changes are made to her medications or medication doses (Rx, OTC, herbal), or if any upcoming procedures are scheduled. Lucie Michele will likewise let us know if any other changes, questions, or concerns arise regarding anticoagulation therapy. she understands the importance of seeking medical attention immediately if she experiences any falls, vehicle accidents, or abnormal bleeding or bruising. Lucie Michele voiced understanding of this information and confirms that she has the [facility]'s contact information. Otherwise, we will plan to contact the patient once monthly or if her INR is out of range.      Kathleen Arauz, PharmD  7/21/2025  13:16 EDT

## 2025-07-21 NOTE — PROGRESS NOTES
Capillary Blood Specimen Collection  Capillary blood collection performed in clinic by Rosa Elena De La Rosa RN. Patient tolerated the procedure well without complications.   07/21/25   Rosa Elena De La Rosa RN

## 2025-07-28 ENCOUNTER — CLINICAL SUPPORT (OUTPATIENT)
Dept: CARDIOLOGY | Facility: CLINIC | Age: 80
End: 2025-07-28
Payer: MEDICARE

## 2025-07-28 ENCOUNTER — ANTICOAGULATION VISIT (OUTPATIENT)
Dept: PHARMACY | Facility: HOSPITAL | Age: 80
End: 2025-07-28
Payer: MEDICARE

## 2025-07-28 DIAGNOSIS — Z95.2 HISTORY OF AORTIC VALVE REPLACEMENT: Primary | ICD-10-CM

## 2025-07-28 LAB — INR PPP: 3.9 (ref 0.9–1.1)

## 2025-07-28 PROCEDURE — 85610 PROTHROMBIN TIME: CPT | Performed by: INTERNAL MEDICINE

## 2025-07-28 PROCEDURE — 36416 COLLJ CAPILLARY BLOOD SPEC: CPT | Performed by: INTERNAL MEDICINE

## 2025-07-28 NOTE — PROGRESS NOTES
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 12 mg 14 mg 14 mg 14 mg 14 13 mg  12 mg 14 mg   INR 2.0 2.5 2.5 3.2 1.1 1.5 2.5 3.3 3.4 4.0 4.0 3.6 3.2   Notes Call  Missed x1 No contact  Boost x 1 No contact No contact   Boost x 2, Call Lovenox        call 1 x dec  APAP;  Call APAP call      Date 6/24 7/1 7/8 7/22 8/5 8/12 9/4 9/11 9/25 12/2 12/26-31 12/30/24 1/13/25   Total WeeklyDose 12 mg 12 mg 13 mg 13mg 13 mg 12 mg 13 mg  14 mg 12 mg 12 mg [facility] 14 mg 13 mg   INR 2.5 2.2 3.0 3.6 2.7 2.8 2.3 3.0 3.2 2.7   4.3 5.5POCT  xx emil   Notes       Call Call   Call     Call Esoph ulcers Rec'd 1/6 redx1      Date  1/13 1/20 1/27 2/3 2/10 2/20 2/24 3/3 3/10 3/18 3/25 4/1 4/7   Total Weekly  Dose 13 mg 12 mg 10mg 11 mg 14mg 10 mg  14 mg 14 mg 14mg 14 mg  14 mg 6 mg 18 mg    INR 4.5 emil 4.8 2.4 1.9 2.7 1.6 2.6 2.9 3.2 3.3 3.2 1.2 3.0   Notes redx1 redx1 redx1   call Procedure-call  call       Call   ED visit  Hold x 5  Boost x 2  call      Date  4/15 4/22 4/28 5/5 5/12 5/19  5/27  6/2 6/10  6/16  6/23  6/30  7/7   Total Weekly  Dose 14 mg  14 mg 14 mg  13mg 12 mg 11 mg   11 mg 11 mg  10 mg  10 mg  9 mg  8 mg  11 mg   INR 3.0 3.6 4.3 4.4 4.7 3.0  6.2  2.7  3.1  4.2  2.2  2.2  2.7   Notes        Maybe less GLV? Call  Call   Call Call  Call  Call   Call  Call  call      Date  7/14 7/21 7/28             Total Weekly  Dose 11 mg 11 mg 11 mg             INR 3.2 3.7 3.9             Notes   Call                 Phone Interview:  Tablet Strength: 2 mg  Patient Contact Info: [phone] (Mobile) *preferred*   Verbal Release Authorization signed on 4/7/21 -- may speak with Tammy Bai (friend:[phone]), Ismael Michele (brother: [phone])  Lab Contact Info: [facility] ([facility])  ** will call once monthly or if INR is out of range**    UNABLE TO GET IN CONTACT WITH THE PATIENT. PLEASE DISREGARD THE FOLLOWING PLAN UNTIL ABLE TO GET IN CONTACT WITH PATIENT/ PATIENT REPRESENTATIVE.  [facility] 7/28        Plan:  INR supratherapeutic today at 3.9 (goal 2.5-3.5).  Ms. Michele to decrease today's dose to warfarin 1 mg and continue warfarin 2 mg daily except for warfarin 1 mg on Tue/Thu/Sat until recheck.     Repeat INR in one week 8/4/25  Verbal information provided over the phone. Patient RBV dosing instructions, expresses understanding by teach back, and has no further questions at this time.  Lucie Michele understands the importance of calling the [facility] if she notices any s/sx of bleeding, stroke, or abnormal bruising, if any changes are made to her medications or medication doses (Rx, OTC, herbal), or if any upcoming procedures are scheduled. Lucie Michele will likewise let us know if any other changes, questions, or concerns arise regarding anticoagulation therapy. she understands the importance of seeking medical attention immediately if she experiences any falls, vehicle accidents, or abnormal bleeding or bruising. Lucie Michele voiced understanding of this information and confirms that she has the [facility]'s contact information. Otherwise, we will plan to contact the patient once monthly or if her INR is out of range.

## 2025-07-28 NOTE — PROGRESS NOTES
Capillary Blood Specimen Collection  Capillary blood collection performed in clinic by Ema Hamilton MA. Patient tolerated the procedure well without complications.   07/28/25   Ema Hamilton MA

## 2025-07-28 NOTE — PROGRESS NOTES
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 12 mg 14 mg 14 mg 14 mg 14 13 mg  12 mg 14 mg   INR 2.0 2.5 2.5 3.2 1.1 1.5 2.5 3.3 3.4 4.0 4.0 3.6 3.2   Notes Call  Missed x1 No contact  Boost x 1 No contact No contact   Boost x 2, Call Lovenox        call 1 x dec  APAP;  Call APAP call      Date 6/24 7/1 7/8 7/22 8/5 8/12 9/4 9/11 9/25 12/2 12/26-31 12/30/24 1/13/25   Total WeeklyDose 12 mg 12 mg 13 mg 13mg 13 mg 12 mg 13 mg  14 mg 12 mg 12 mg [facility] 14 mg 13 mg   INR 2.5 2.2 3.0 3.6 2.7 2.8 2.3 3.0 3.2 2.7   4.3 5.5POCT  xx emil   Notes       Call Call   Call     Call Esoph ulcers Rec'd 1/6 redx1      Date  1/13 1/20 1/27 2/3 2/10 2/20 2/24 3/3 3/10 3/18 3/25 4/1 4/7   Total Weekly  Dose 13 mg 12 mg 10mg 11 mg 14mg 10 mg  14 mg 14 mg 14mg 14 mg  14 mg 6 mg 18 mg    INR 4.5 emil 4.8 2.4 1.9 2.7 1.6 2.6 2.9 3.2 3.3 3.2 1.2 3.0   Notes redx1 redx1 redx1   call Procedure-call  call       Call   ED visit  Hold x 5  Boost x 2  call      Date  4/15 4/22 4/28 5/5 5/12 5/19  5/27  6/2 6/10  6/16  6/23  6/30  7/7   Total Weekly  Dose 14 mg  14 mg 14 mg  13mg 12 mg 11 mg   11 mg 11 mg  10 mg  10 mg  9 mg  8 mg  11 mg   INR 3.0 3.6 4.3 4.4 4.7 3.0  6.2  2.7  3.1  4.2  2.2  2.2  2.7   Notes        Maybe less GLV? Call  Call   Call Call  Call  Call   Call  Call  call      Date  7/14 7/21 7/28             Total Weekly  Dose 11 mg 11 mg 11 mg             INR 3.2 3.7 3.9             Notes   Call                 Phone Interview:  Tablet Strength: 2 mg  Patient Contact Info: [phone] (Mobile) *preferred*   Verbal Release Authorization signed on 4/7/21 -- may speak with Tammy Bai (friend:[phone]), Ismael Michele (brother: [phone])  Lab Contact Info: [facility] ([facility])  ** will call once monthly or if INR is out of range**      Patient Findings    Negatives: Signs/symptoms of thrombosis, Signs/symptoms of bleeding, Laboratory test error suspected, Change in health, Change in alcohol use, Change in activity, Upcoming invasive procedure, Emergency department visit, Upcoming dental procedure, Missed doses, Extra doses, Change in medications, Change in diet/appetite, Hospital admission, Bruising, Other complaints   Comments: Patient states she had around three servings of GLV specifically broccoli within the past week. All other findings negative per patient.         Plan:  INR supratherapeutic today at 3.9 (goal 2.5-3.5).  Per Kathleen Arteaga D instructed Ms. Michele to decrease today's dose to warfarin 1 mg and continue warfarin 2 mg daily except for warfarin 1 mg on Tue/Thu/Sat until recheck.     Repeat INR in one week 8/4/25  Verbal information provided over the phone. Patient RBV dosing instructions, expresses understanding by teach back, and has no further questions at this time.  Lucie Michele understands the importance of calling the [facility] if she notices any s/sx of bleeding, stroke, or abnormal bruising, if any changes are made to her medications or medication doses (Rx, OTC, herbal), or if any upcoming procedures are scheduled. Lucie Michele will likewise let us know if any other changes, questions, or concerns arise regarding anticoagulation therapy. she  understands the importance of seeking medical attention immediately if she experiences any falls, vehicle accidents, or abnormal bleeding or bruising. Lucie Michele voiced understanding of this information and confirms that she has the [facility]'s contact information. Otherwise, we will plan to contact the patient once monthly or if her INR is out of range.    Bang Kingston CPhT 7/28/2025 13:28 EDT     I, Kathleen Arauz, JenniD, have reviewed the note in full and agree with the assessment and plan.  07/28/25  13:46 EDT

## 2025-08-04 ENCOUNTER — CLINICAL SUPPORT (OUTPATIENT)
Dept: CARDIOLOGY | Facility: CLINIC | Age: 80
End: 2025-08-04
Payer: MEDICARE

## 2025-08-04 ENCOUNTER — ANTICOAGULATION VISIT (OUTPATIENT)
Dept: PHARMACY | Facility: HOSPITAL | Age: 80
End: 2025-08-04
Payer: MEDICARE

## 2025-08-04 DIAGNOSIS — Z95.2 HISTORY OF AORTIC VALVE REPLACEMENT: Primary | ICD-10-CM

## 2025-08-04 LAB — INR PPP: 3.8 (ref 0.9–1.1)

## 2025-08-04 PROCEDURE — 85610 PROTHROMBIN TIME: CPT | Performed by: INTERNAL MEDICINE

## 2025-08-04 PROCEDURE — 36416 COLLJ CAPILLARY BLOOD SPEC: CPT | Performed by: INTERNAL MEDICINE

## 2025-08-11 ENCOUNTER — ANTICOAGULATION VISIT (OUTPATIENT)
Dept: PHARMACY | Facility: HOSPITAL | Age: 80
End: 2025-08-11
Payer: MEDICARE

## 2025-08-11 ENCOUNTER — CLINICAL SUPPORT (OUTPATIENT)
Dept: CARDIOLOGY | Facility: CLINIC | Age: 80
End: 2025-08-11
Payer: MEDICARE

## 2025-08-11 DIAGNOSIS — Z95.2 HISTORY OF AORTIC VALVE REPLACEMENT: Primary | ICD-10-CM

## 2025-08-11 DIAGNOSIS — I48.92 PAROXYSMAL ATRIAL FLUTTER: ICD-10-CM

## 2025-08-11 LAB — INR PPP: 2.7 (ref 0.9–1.1)

## 2025-08-11 PROCEDURE — 36416 COLLJ CAPILLARY BLOOD SPEC: CPT | Performed by: INTERNAL MEDICINE

## 2025-08-11 PROCEDURE — 85610 PROTHROMBIN TIME: CPT | Performed by: INTERNAL MEDICINE

## 2025-08-18 ENCOUNTER — CLINICAL SUPPORT (OUTPATIENT)
Dept: CARDIOLOGY | Facility: CLINIC | Age: 80
End: 2025-08-18
Payer: MEDICARE

## 2025-08-18 ENCOUNTER — ANTICOAGULATION VISIT (OUTPATIENT)
Dept: PHARMACY | Facility: HOSPITAL | Age: 80
End: 2025-08-18
Payer: MEDICARE

## 2025-08-18 DIAGNOSIS — Z95.2 HISTORY OF AORTIC VALVE REPLACEMENT: Primary | ICD-10-CM

## 2025-08-18 LAB — INR PPP: 2.9 (ref 0.9–1.1)

## 2025-08-18 PROCEDURE — 85610 PROTHROMBIN TIME: CPT | Performed by: INTERNAL MEDICINE

## 2025-08-18 PROCEDURE — 36416 COLLJ CAPILLARY BLOOD SPEC: CPT | Performed by: INTERNAL MEDICINE

## 2025-08-21 RX ORDER — ALENDRONATE SODIUM 70 MG/1
70 TABLET ORAL WEEKLY
Qty: 4 TABLET | Refills: 0 | Status: SHIPPED | OUTPATIENT
Start: 2025-08-21

## 2025-08-25 ENCOUNTER — OFFICE VISIT (OUTPATIENT)
Dept: CARDIOLOGY | Facility: CLINIC | Age: 80
End: 2025-08-25
Payer: MEDICARE

## 2025-08-25 ENCOUNTER — ANTICOAGULATION VISIT (OUTPATIENT)
Dept: PHARMACY | Facility: HOSPITAL | Age: 80
End: 2025-08-25
Payer: MEDICARE

## 2025-08-25 VITALS
HEART RATE: 86 BPM | WEIGHT: 164 LBS | BODY MASS INDEX: 27.32 KG/M2 | RESPIRATION RATE: 18 BRPM | DIASTOLIC BLOOD PRESSURE: 72 MMHG | OXYGEN SATURATION: 99 % | SYSTOLIC BLOOD PRESSURE: 124 MMHG | HEIGHT: 65 IN

## 2025-08-25 DIAGNOSIS — R73.9 HYPERGLYCEMIA: ICD-10-CM

## 2025-08-25 DIAGNOSIS — Z95.2 HISTORY OF AORTIC VALVE REPLACEMENT: ICD-10-CM

## 2025-08-25 DIAGNOSIS — Z98.890 S/P ASCENDING AORTIC ANEURYSM REPAIR: ICD-10-CM

## 2025-08-25 DIAGNOSIS — I10 PRIMARY HYPERTENSION: ICD-10-CM

## 2025-08-25 DIAGNOSIS — E78.2 MIXED HYPERLIPIDEMIA: ICD-10-CM

## 2025-08-25 DIAGNOSIS — I50.32 CHRONIC DIASTOLIC CONGESTIVE HEART FAILURE: Primary | ICD-10-CM

## 2025-08-25 DIAGNOSIS — I48.92 PAROXYSMAL ATRIAL FLUTTER: ICD-10-CM

## 2025-08-25 DIAGNOSIS — E03.9 ACQUIRED HYPOTHYROIDISM: ICD-10-CM

## 2025-08-25 DIAGNOSIS — E55.9 VITAMIN D DEFICIENCY: ICD-10-CM

## 2025-08-25 DIAGNOSIS — N18.31 STAGE 3A CHRONIC KIDNEY DISEASE: ICD-10-CM

## 2025-08-25 DIAGNOSIS — E78.00 PURE HYPERCHOLESTEROLEMIA: ICD-10-CM

## 2025-08-25 DIAGNOSIS — G47.33 OSA ON CPAP: ICD-10-CM

## 2025-08-25 DIAGNOSIS — K76.9 LIVER LESION: ICD-10-CM

## 2025-08-25 DIAGNOSIS — Z86.79 S/P ASCENDING AORTIC ANEURYSM REPAIR: ICD-10-CM

## 2025-08-25 DIAGNOSIS — K27.9 PUD (PEPTIC ULCER DISEASE): ICD-10-CM

## 2025-08-25 DIAGNOSIS — I27.20 PULMONARY HYPERTENSION: ICD-10-CM

## 2025-08-25 LAB — INR PPP: 1.7 (ref 0.9–1.1)

## 2025-08-26 LAB
25(OH)D3+25(OH)D2 SERPL-MCNC: 78.6 NG/ML (ref 30–100)
ALBUMIN SERPL-MCNC: 4.1 G/DL (ref 3.8–4.8)
ALP SERPL-CCNC: 95 IU/L (ref 44–121)
ALT SERPL-CCNC: 32 IU/L (ref 0–32)
AST SERPL-CCNC: 32 IU/L (ref 0–40)
BASOPHILS # BLD AUTO: 0 X10E3/UL (ref 0–0.2)
BASOPHILS NFR BLD AUTO: 1 %
BILIRUB SERPL-MCNC: 0.6 MG/DL (ref 0–1.2)
BUN SERPL-MCNC: 15 MG/DL (ref 8–27)
BUN/CREAT SERPL: 21 (ref 12–28)
CALCIUM SERPL-MCNC: 9.2 MG/DL (ref 8.7–10.3)
CHLORIDE SERPL-SCNC: 102 MMOL/L (ref 96–106)
CHOLEST SERPL-MCNC: 195 MG/DL (ref 100–199)
CK SERPL-CCNC: 84 U/L (ref 32–182)
CO2 SERPL-SCNC: 24 MMOL/L (ref 20–29)
CREAT SERPL-MCNC: 0.7 MG/DL (ref 0.57–1)
EGFRCR SERPLBLD CKD-EPI 2021: 88 ML/MIN/1.73
EOSINOPHIL # BLD AUTO: 0.3 X10E3/UL (ref 0–0.4)
EOSINOPHIL NFR BLD AUTO: 4 %
ERYTHROCYTE [DISTWIDTH] IN BLOOD BY AUTOMATED COUNT: 14.3 % (ref 11.7–15.4)
GLOBULIN SER CALC-MCNC: 2.8 G/DL (ref 1.5–4.5)
GLUCOSE SERPL-MCNC: 98 MG/DL (ref 70–99)
HBA1C MFR BLD: 5.9 % (ref 4.8–5.6)
HCT VFR BLD AUTO: 44.8 % (ref 34–46.6)
HDLC SERPL-MCNC: 65 MG/DL
HGB BLD-MCNC: 14.5 G/DL (ref 11.1–15.9)
IMM GRANULOCYTES # BLD AUTO: 0 X10E3/UL (ref 0–0.1)
IMM GRANULOCYTES NFR BLD AUTO: 1 %
LDLC SERPL CALC-MCNC: 107 MG/DL (ref 0–99)
LYMPHOCYTES # BLD AUTO: 1.6 X10E3/UL (ref 0.7–3.1)
LYMPHOCYTES NFR BLD AUTO: 20 %
MCH RBC QN AUTO: 29.6 PG (ref 26.6–33)
MCHC RBC AUTO-ENTMCNC: 32.4 G/DL (ref 31.5–35.7)
MCV RBC AUTO: 91 FL (ref 79–97)
MONOCYTES # BLD AUTO: 0.7 X10E3/UL (ref 0.1–0.9)
MONOCYTES NFR BLD AUTO: 9 %
NEUTROPHILS # BLD AUTO: 5.2 X10E3/UL (ref 1.4–7)
NEUTROPHILS NFR BLD AUTO: 65 %
NT-PROBNP SERPL-MCNC: 350 PG/ML (ref 0–738)
PLATELET # BLD AUTO: 268 X10E3/UL (ref 150–450)
POTASSIUM SERPL-SCNC: 3.8 MMOL/L (ref 3.5–5.2)
PROT SERPL-MCNC: 6.9 G/DL (ref 6–8.5)
RBC # BLD AUTO: 4.9 X10E6/UL (ref 3.77–5.28)
SODIUM SERPL-SCNC: 141 MMOL/L (ref 134–144)
T4 FREE SERPL-MCNC: 1.12 NG/DL (ref 0.82–1.77)
TRIGL SERPL-MCNC: 133 MG/DL (ref 0–149)
TSH SERPL DL<=0.005 MIU/L-ACNC: 6.21 UIU/ML (ref 0.45–4.5)
VIT B12 SERPL-MCNC: 257 PG/ML (ref 232–1245)
VLDLC SERPL CALC-MCNC: 23 MG/DL (ref 5–40)
WBC # BLD AUTO: 7.9 X10E3/UL (ref 3.4–10.8)

## 2025-08-28 LAB
APO B SERPL-MCNC: 101 MG/DL
LPA SERPL-SCNC: 162 NMOL/L